# Patient Record
Sex: FEMALE | Race: WHITE | Employment: OTHER
[De-identification: names, ages, dates, MRNs, and addresses within clinical notes are randomized per-mention and may not be internally consistent; named-entity substitution may affect disease eponyms.]

---

## 2017-01-17 ENCOUNTER — CARE COORDINATION (OUTPATIENT)
Dept: CARE COORDINATION | Facility: CLINIC | Age: 77
End: 2017-01-17

## 2017-02-07 ENCOUNTER — CARE COORDINATION (OUTPATIENT)
Dept: CARE COORDINATION | Facility: CLINIC | Age: 77
End: 2017-02-07

## 2017-02-15 ENCOUNTER — HOSPITAL ENCOUNTER (OUTPATIENT)
Dept: PHARMACY | Age: 77
Setting detail: THERAPIES SERIES
Discharge: HOME | End: 2017-02-15
Payer: MEDICARE

## 2017-02-15 DIAGNOSIS — Z95.828 S/P IVC FILTER: ICD-10-CM

## 2017-02-15 DIAGNOSIS — I26.99 OTHER PULMONARY EMBOLISM WITHOUT ACUTE COR PULMONALE, UNSPECIFIED CHRONICITY (HCC): ICD-10-CM

## 2017-02-15 LAB
INR BLD: 2.1
PROTIME: 24.8 SECONDS

## 2017-02-15 PROCEDURE — 85610 PROTHROMBIN TIME: CPT

## 2017-02-15 PROCEDURE — G0463 HOSPITAL OUTPT CLINIC VISIT: HCPCS

## 2017-03-15 ENCOUNTER — HOSPITAL ENCOUNTER (OUTPATIENT)
Dept: PHARMACY | Age: 77
Setting detail: THERAPIES SERIES
Discharge: HOME | End: 2017-03-15
Payer: MEDICARE

## 2017-03-15 DIAGNOSIS — Z95.828 S/P IVC FILTER: ICD-10-CM

## 2017-03-15 DIAGNOSIS — I26.99 OTHER PULMONARY EMBOLISM WITHOUT ACUTE COR PULMONALE, UNSPECIFIED CHRONICITY (HCC): ICD-10-CM

## 2017-03-15 LAB
INR BLD: 3.8
PROTIME: 45.7 SECONDS

## 2017-03-15 PROCEDURE — G0463 HOSPITAL OUTPT CLINIC VISIT: HCPCS

## 2017-03-15 PROCEDURE — 85610 PROTHROMBIN TIME: CPT

## 2017-03-22 ENCOUNTER — TELEPHONE (OUTPATIENT)
Dept: PHARMACY | Age: 77
End: 2017-03-22

## 2017-03-29 ENCOUNTER — HOSPITAL ENCOUNTER (OUTPATIENT)
Dept: PHARMACY | Age: 77
Setting detail: THERAPIES SERIES
Discharge: HOME | End: 2017-03-29
Payer: MEDICARE

## 2017-03-29 DIAGNOSIS — Z95.828 S/P IVC FILTER: ICD-10-CM

## 2017-03-29 LAB
INR BLD: 2.1
PROTIME: 25.5 SECONDS

## 2017-03-29 PROCEDURE — 85610 PROTHROMBIN TIME: CPT

## 2017-03-29 PROCEDURE — G0463 HOSPITAL OUTPT CLINIC VISIT: HCPCS

## 2017-04-04 ENCOUNTER — OFFICE VISIT (OUTPATIENT)
Dept: FAMILY MEDICINE CLINIC | Age: 77
End: 2017-04-04
Payer: MEDICARE

## 2017-04-04 VITALS
RESPIRATION RATE: 16 BRPM | BODY MASS INDEX: 33.41 KG/M2 | SYSTOLIC BLOOD PRESSURE: 120 MMHG | WEIGHT: 200.8 LBS | DIASTOLIC BLOOD PRESSURE: 78 MMHG | HEART RATE: 62 BPM

## 2017-04-04 DIAGNOSIS — M54.2 POSTERIOR NECK PAIN: ICD-10-CM

## 2017-04-04 DIAGNOSIS — I10 ESSENTIAL HYPERTENSION: ICD-10-CM

## 2017-04-04 DIAGNOSIS — E78.2 MIXED HYPERLIPIDEMIA: ICD-10-CM

## 2017-04-04 DIAGNOSIS — E03.9 ACQUIRED HYPOTHYROIDISM: ICD-10-CM

## 2017-04-04 DIAGNOSIS — E11.9 TYPE 2 DIABETES MELLITUS WITHOUT COMPLICATION, WITHOUT LONG-TERM CURRENT USE OF INSULIN (HCC): Primary | ICD-10-CM

## 2017-04-04 PROCEDURE — 1090F PRES/ABSN URINE INCON ASSESS: CPT | Performed by: FAMILY MEDICINE

## 2017-04-04 PROCEDURE — 1123F ACP DISCUSS/DSCN MKR DOCD: CPT | Performed by: FAMILY MEDICINE

## 2017-04-04 PROCEDURE — G8417 CALC BMI ABV UP PARAM F/U: HCPCS | Performed by: FAMILY MEDICINE

## 2017-04-04 PROCEDURE — 99214 OFFICE O/P EST MOD 30 MIN: CPT | Performed by: FAMILY MEDICINE

## 2017-04-04 PROCEDURE — 4040F PNEUMOC VAC/ADMIN/RCVD: CPT | Performed by: FAMILY MEDICINE

## 2017-04-04 PROCEDURE — G8427 DOCREV CUR MEDS BY ELIG CLIN: HCPCS | Performed by: FAMILY MEDICINE

## 2017-04-04 PROCEDURE — 1036F TOBACCO NON-USER: CPT | Performed by: FAMILY MEDICINE

## 2017-04-04 PROCEDURE — G8400 PT W/DXA NO RESULTS DOC: HCPCS | Performed by: FAMILY MEDICINE

## 2017-04-04 RX ORDER — ACETAMINOPHEN AND CODEINE PHOSPHATE 300; 30 MG/1; MG/1
1 TABLET ORAL 3 TIMES DAILY PRN
Qty: 10 TABLET | Refills: 0 | Status: SHIPPED | OUTPATIENT
Start: 2017-04-04 | End: 2017-08-07

## 2017-04-04 RX ORDER — TIZANIDINE 2 MG/1
2 TABLET ORAL EVERY 6 HOURS PRN
Qty: 30 TABLET | Refills: 0 | Status: SHIPPED | OUTPATIENT
Start: 2017-04-04 | End: 2017-12-26 | Stop reason: ALTCHOICE

## 2017-04-04 ASSESSMENT — ENCOUNTER SYMPTOMS
ABDOMINAL PAIN: 0
CHEST TIGHTNESS: 0
BLOOD IN STOOL: 0
SHORTNESS OF BREATH: 0

## 2017-04-24 ENCOUNTER — CARE COORDINATION (OUTPATIENT)
Dept: CARE COORDINATION | Age: 77
End: 2017-04-24

## 2017-04-26 ENCOUNTER — HOSPITAL ENCOUNTER (OUTPATIENT)
Dept: PHARMACY | Age: 77
Setting detail: THERAPIES SERIES
Discharge: HOME | End: 2017-04-26
Payer: MEDICARE

## 2017-04-26 DIAGNOSIS — Z95.828 S/P IVC FILTER: ICD-10-CM

## 2017-04-26 LAB
INR BLD: 2.8
PROTIME: 33.8 SECONDS

## 2017-04-26 PROCEDURE — 85610 PROTHROMBIN TIME: CPT

## 2017-04-26 PROCEDURE — 99211 OFF/OP EST MAY X REQ PHY/QHP: CPT

## 2017-05-04 ENCOUNTER — HOSPITAL ENCOUNTER (OUTPATIENT)
Age: 77
Discharge: HOME | End: 2017-05-04
Payer: MEDICARE

## 2017-05-04 DIAGNOSIS — E78.2 MIXED HYPERLIPIDEMIA: ICD-10-CM

## 2017-05-04 DIAGNOSIS — E11.9 TYPE 2 DIABETES MELLITUS WITHOUT COMPLICATION, WITHOUT LONG-TERM CURRENT USE OF INSULIN (HCC): ICD-10-CM

## 2017-05-04 DIAGNOSIS — E03.9 ACQUIRED HYPOTHYROIDISM: ICD-10-CM

## 2017-05-04 DIAGNOSIS — I10 ESSENTIAL HYPERTENSION: ICD-10-CM

## 2017-05-04 LAB
ALT SERPL-CCNC: 10 U/L (ref 5–33)
ANION GAP SERPL CALCULATED.3IONS-SCNC: 12 MMOL/L (ref 9–17)
AST SERPL-CCNC: 21 U/L
BUN BLDV-MCNC: 9 MG/DL (ref 8–23)
BUN/CREAT BLD: ABNORMAL (ref 9–20)
CALCIUM SERPL-MCNC: 9.3 MG/DL (ref 8.6–10.4)
CHLORIDE BLD-SCNC: 101 MMOL/L (ref 98–107)
CHOLESTEROL/HDL RATIO: 2.6
CHOLESTEROL: 153 MG/DL
CO2: 26 MMOL/L (ref 20–31)
CREAT SERPL-MCNC: 0.68 MG/DL (ref 0.5–0.9)
CREATININE URINE: 42.1 MG/DL (ref 28–217)
GFR AFRICAN AMERICAN: >60 ML/MIN
GFR NON-AFRICAN AMERICAN: >60 ML/MIN
GFR SERPL CREATININE-BSD FRML MDRD: ABNORMAL ML/MIN/{1.73_M2}
GFR SERPL CREATININE-BSD FRML MDRD: ABNORMAL ML/MIN/{1.73_M2}
GLUCOSE BLD-MCNC: 109 MG/DL (ref 70–99)
HDLC SERPL-MCNC: 58 MG/DL
LDL CHOLESTEROL: 61 MG/DL (ref 0–130)
MAGNESIUM: 2.1 MG/DL (ref 1.6–2.6)
MICROALBUMIN/CREAT 24H UR: <12 MG/L
MICROALBUMIN/CREAT UR-RTO: 29 MCG/MG CREAT
POTASSIUM SERPL-SCNC: 4.1 MMOL/L (ref 3.7–5.3)
SODIUM BLD-SCNC: 139 MMOL/L (ref 135–144)
TRIGL SERPL-MCNC: 171 MG/DL
TSH SERPL DL<=0.05 MIU/L-ACNC: 1.9 MIU/L (ref 0.3–5)
VLDLC SERPL CALC-MCNC: ABNORMAL MG/DL (ref 1–30)

## 2017-05-04 PROCEDURE — 82043 UR ALBUMIN QUANTITATIVE: CPT

## 2017-05-04 PROCEDURE — 84450 TRANSFERASE (AST) (SGOT): CPT

## 2017-05-04 PROCEDURE — 36415 COLL VENOUS BLD VENIPUNCTURE: CPT

## 2017-05-04 PROCEDURE — 84443 ASSAY THYROID STIM HORMONE: CPT

## 2017-05-04 PROCEDURE — 83735 ASSAY OF MAGNESIUM: CPT

## 2017-05-04 PROCEDURE — 82570 ASSAY OF URINE CREATININE: CPT

## 2017-05-04 PROCEDURE — 80061 LIPID PANEL: CPT

## 2017-05-04 PROCEDURE — 84460 ALANINE AMINO (ALT) (SGPT): CPT

## 2017-05-04 PROCEDURE — 83036 HEMOGLOBIN GLYCOSYLATED A1C: CPT

## 2017-05-04 PROCEDURE — 80048 BASIC METABOLIC PNL TOTAL CA: CPT

## 2017-05-05 LAB
ESTIMATED AVERAGE GLUCOSE: 100 MG/DL
HBA1C MFR BLD: 5.1 % (ref 4–6)

## 2017-05-24 ENCOUNTER — HOSPITAL ENCOUNTER (OUTPATIENT)
Dept: PHARMACY | Age: 77
Setting detail: THERAPIES SERIES
Discharge: HOME | End: 2017-05-24
Payer: MEDICARE

## 2017-05-24 DIAGNOSIS — Z95.828 S/P IVC FILTER: ICD-10-CM

## 2017-05-24 LAB
INR BLD: 2.9
PROTIME: 35.1 SECONDS

## 2017-05-24 PROCEDURE — 85610 PROTHROMBIN TIME: CPT

## 2017-05-24 PROCEDURE — 99211 OFF/OP EST MAY X REQ PHY/QHP: CPT

## 2017-06-21 ENCOUNTER — HOSPITAL ENCOUNTER (OUTPATIENT)
Dept: PHARMACY | Age: 77
Setting detail: THERAPIES SERIES
Discharge: HOME | End: 2017-06-21
Payer: MEDICARE

## 2017-06-21 LAB
INR BLD: 3.1
PROTIME: 37.2 SECONDS

## 2017-06-21 PROCEDURE — 85610 PROTHROMBIN TIME: CPT

## 2017-06-21 PROCEDURE — 99211 OFF/OP EST MAY X REQ PHY/QHP: CPT

## 2017-07-14 ENCOUNTER — CARE COORDINATION (OUTPATIENT)
Dept: CARE COORDINATION | Age: 77
End: 2017-07-14

## 2017-07-14 ENCOUNTER — HOSPITAL ENCOUNTER (OUTPATIENT)
Dept: PHARMACY | Age: 77
Setting detail: THERAPIES SERIES
Discharge: HOME | End: 2017-07-14
Payer: MEDICARE

## 2017-07-14 DIAGNOSIS — Z95.828 S/P IVC FILTER: ICD-10-CM

## 2017-07-14 LAB
INR BLD: 1.5
PROTIME: 18.3 SECONDS

## 2017-07-14 PROCEDURE — 99211 OFF/OP EST MAY X REQ PHY/QHP: CPT

## 2017-07-14 PROCEDURE — 85610 PROTHROMBIN TIME: CPT

## 2017-07-20 ENCOUNTER — HOSPITAL ENCOUNTER (OUTPATIENT)
Dept: PHARMACY | Age: 77
Discharge: HOME | End: 2017-07-20

## 2017-07-20 DIAGNOSIS — I26.99 OTHER PULMONARY EMBOLISM WITHOUT ACUTE COR PULMONALE, UNSPECIFIED CHRONICITY (HCC): ICD-10-CM

## 2017-07-20 DIAGNOSIS — Z95.828 S/P IVC FILTER: ICD-10-CM

## 2017-07-20 LAB
INR BLD: 3.3
PROTIME: 39.1 SECONDS

## 2017-07-20 PROCEDURE — 85610 PROTHROMBIN TIME: CPT

## 2017-07-20 PROCEDURE — 99211 OFF/OP EST MAY X REQ PHY/QHP: CPT

## 2017-08-02 ENCOUNTER — HOSPITAL ENCOUNTER (OUTPATIENT)
Dept: PHARMACY | Age: 77
Setting detail: THERAPIES SERIES
Discharge: HOME | End: 2017-08-02
Payer: MEDICARE

## 2017-08-02 DIAGNOSIS — I26.99 OTHER PULMONARY EMBOLISM WITHOUT ACUTE COR PULMONALE, UNSPECIFIED CHRONICITY (HCC): ICD-10-CM

## 2017-08-02 DIAGNOSIS — Z95.828 S/P IVC FILTER: ICD-10-CM

## 2017-08-02 LAB
INR BLD: 2.8
PROTIME: 33.7 SECONDS

## 2017-08-02 PROCEDURE — 99211 OFF/OP EST MAY X REQ PHY/QHP: CPT

## 2017-08-02 PROCEDURE — 85610 PROTHROMBIN TIME: CPT

## 2017-08-02 RX ORDER — BRIMONIDINE TARTRATE/TIMOLOL 0.2%-0.5%
1 DROPS OPHTHALMIC (EYE) 2 TIMES DAILY
Refills: 9 | COMMUNITY
Start: 2017-07-07 | End: 2021-01-12 | Stop reason: ALTCHOICE

## 2017-08-07 ENCOUNTER — OFFICE VISIT (OUTPATIENT)
Dept: FAMILY MEDICINE CLINIC | Age: 77
End: 2017-08-07
Payer: MEDICARE

## 2017-08-07 VITALS
WEIGHT: 206.4 LBS | BODY MASS INDEX: 34.39 KG/M2 | SYSTOLIC BLOOD PRESSURE: 120 MMHG | RESPIRATION RATE: 16 BRPM | HEIGHT: 65 IN | HEART RATE: 70 BPM | DIASTOLIC BLOOD PRESSURE: 80 MMHG

## 2017-08-07 DIAGNOSIS — I10 ESSENTIAL HYPERTENSION: ICD-10-CM

## 2017-08-07 DIAGNOSIS — E78.2 MIXED HYPERLIPIDEMIA: ICD-10-CM

## 2017-08-07 DIAGNOSIS — E11.9 TYPE 2 DIABETES MELLITUS WITHOUT COMPLICATION, WITHOUT LONG-TERM CURRENT USE OF INSULIN (HCC): Primary | ICD-10-CM

## 2017-08-07 DIAGNOSIS — E03.9 ACQUIRED HYPOTHYROIDISM: ICD-10-CM

## 2017-08-07 DIAGNOSIS — E66.9 OBESITY (BMI 30.0-34.9): ICD-10-CM

## 2017-08-07 PROCEDURE — G8427 DOCREV CUR MEDS BY ELIG CLIN: HCPCS | Performed by: FAMILY MEDICINE

## 2017-08-07 PROCEDURE — G8417 CALC BMI ABV UP PARAM F/U: HCPCS | Performed by: FAMILY MEDICINE

## 2017-08-07 PROCEDURE — 4040F PNEUMOC VAC/ADMIN/RCVD: CPT | Performed by: FAMILY MEDICINE

## 2017-08-07 PROCEDURE — 1123F ACP DISCUSS/DSCN MKR DOCD: CPT | Performed by: FAMILY MEDICINE

## 2017-08-07 PROCEDURE — 99214 OFFICE O/P EST MOD 30 MIN: CPT | Performed by: FAMILY MEDICINE

## 2017-08-07 PROCEDURE — 1036F TOBACCO NON-USER: CPT | Performed by: FAMILY MEDICINE

## 2017-08-07 PROCEDURE — 1090F PRES/ABSN URINE INCON ASSESS: CPT | Performed by: FAMILY MEDICINE

## 2017-08-07 PROCEDURE — G8400 PT W/DXA NO RESULTS DOC: HCPCS | Performed by: FAMILY MEDICINE

## 2017-08-07 ASSESSMENT — PATIENT HEALTH QUESTIONNAIRE - PHQ9
1. LITTLE INTEREST OR PLEASURE IN DOING THINGS: 0
SUM OF ALL RESPONSES TO PHQ QUESTIONS 1-9: 0
2. FEELING DOWN, DEPRESSED OR HOPELESS: 0
SUM OF ALL RESPONSES TO PHQ9 QUESTIONS 1 & 2: 0

## 2017-08-07 ASSESSMENT — ENCOUNTER SYMPTOMS
BLOOD IN STOOL: 0
CHEST TIGHTNESS: 0
ABDOMINAL PAIN: 0
SHORTNESS OF BREATH: 0

## 2017-08-10 ENCOUNTER — HOSPITAL ENCOUNTER (OUTPATIENT)
Age: 77
Setting detail: SPECIMEN
Discharge: HOME | End: 2017-08-10
Payer: MEDICARE

## 2017-08-10 ENCOUNTER — OFFICE VISIT (OUTPATIENT)
Dept: FAMILY MEDICINE CLINIC | Age: 77
End: 2017-08-10
Payer: MEDICARE

## 2017-08-10 VITALS
HEART RATE: 60 BPM | WEIGHT: 206 LBS | BODY MASS INDEX: 34.28 KG/M2 | SYSTOLIC BLOOD PRESSURE: 126 MMHG | DIASTOLIC BLOOD PRESSURE: 74 MMHG | RESPIRATION RATE: 16 BRPM

## 2017-08-10 DIAGNOSIS — R30.0 DYSURIA: Primary | ICD-10-CM

## 2017-08-10 DIAGNOSIS — R10.30 LOWER ABDOMINAL PAIN: ICD-10-CM

## 2017-08-10 LAB
-: NORMAL
AMORPHOUS: NORMAL
BACTERIA: NORMAL
BILIRUBIN URINE: NEGATIVE
BILIRUBIN, POC: NEGATIVE
BLOOD URINE, POC: NEGATIVE
CASTS UA: NORMAL /LPF (ref 0–8)
CLARITY, POC: CLEAR
COLOR, POC: YELLOW
COLOR: YELLOW
CRYSTALS, UA: NORMAL /HPF
EPITHELIAL CELLS UA: NORMAL /HPF (ref 0–5)
GLUCOSE URINE, POC: NEGATIVE
GLUCOSE URINE: NEGATIVE
KETONES, POC: NEGATIVE
KETONES, URINE: NEGATIVE
LEUKOCYTE EST, POC: NEGATIVE
LEUKOCYTE ESTERASE, URINE: NEGATIVE
MUCUS: NORMAL
NITRITE, POC: NEGATIVE
NITRITE, URINE: NEGATIVE
OTHER OBSERVATIONS UA: NORMAL
PH UA: 5 (ref 5–8)
PH, POC: 5
PROTEIN UA: NEGATIVE
PROTEIN, POC: NEGATIVE
RBC UA: NORMAL /HPF (ref 0–4)
RENAL EPITHELIAL, UA: NORMAL /HPF
SPECIFIC GRAVITY UA: 1.02 (ref 1–1.03)
SPECIFIC GRAVITY, POC: 1.01
TRICHOMONAS: NORMAL
TURBIDITY: CLEAR
URINE HGB: NEGATIVE
UROBILINOGEN, POC: NEGATIVE
UROBILINOGEN, URINE: NORMAL
WBC UA: NORMAL /HPF (ref 0–5)
YEAST: NORMAL

## 2017-08-10 PROCEDURE — 99213 OFFICE O/P EST LOW 20 MIN: CPT | Performed by: FAMILY MEDICINE

## 2017-08-10 PROCEDURE — G8400 PT W/DXA NO RESULTS DOC: HCPCS | Performed by: FAMILY MEDICINE

## 2017-08-10 PROCEDURE — 1123F ACP DISCUSS/DSCN MKR DOCD: CPT | Performed by: FAMILY MEDICINE

## 2017-08-10 PROCEDURE — G8427 DOCREV CUR MEDS BY ELIG CLIN: HCPCS | Performed by: FAMILY MEDICINE

## 2017-08-10 PROCEDURE — G8417 CALC BMI ABV UP PARAM F/U: HCPCS | Performed by: FAMILY MEDICINE

## 2017-08-10 PROCEDURE — 81002 URINALYSIS NONAUTO W/O SCOPE: CPT | Performed by: FAMILY MEDICINE

## 2017-08-10 PROCEDURE — 4040F PNEUMOC VAC/ADMIN/RCVD: CPT | Performed by: FAMILY MEDICINE

## 2017-08-10 PROCEDURE — 1090F PRES/ABSN URINE INCON ASSESS: CPT | Performed by: FAMILY MEDICINE

## 2017-08-10 PROCEDURE — 1036F TOBACCO NON-USER: CPT | Performed by: FAMILY MEDICINE

## 2017-08-10 RX ORDER — NITROFURANTOIN 25; 75 MG/1; MG/1
100 CAPSULE ORAL 2 TIMES DAILY
Qty: 20 CAPSULE | Refills: 0 | Status: SHIPPED | OUTPATIENT
Start: 2017-08-10 | End: 2017-08-20

## 2017-08-10 ASSESSMENT — ENCOUNTER SYMPTOMS
SHORTNESS OF BREATH: 0
CHEST TIGHTNESS: 0
ABDOMINAL PAIN: 1
BLOOD IN STOOL: 0

## 2017-08-12 LAB
CULTURE: NORMAL
CULTURE: NORMAL
Lab: NORMAL
SPECIMEN DESCRIPTION: NORMAL
STATUS: NORMAL

## 2017-08-30 ENCOUNTER — HOSPITAL ENCOUNTER (OUTPATIENT)
Dept: PHARMACY | Age: 77
Setting detail: THERAPIES SERIES
Discharge: HOME | End: 2017-08-30
Payer: MEDICARE

## 2017-08-30 DIAGNOSIS — Z95.828 S/P IVC FILTER: ICD-10-CM

## 2017-08-30 DIAGNOSIS — I26.99 OTHER PULMONARY EMBOLISM WITHOUT ACUTE COR PULMONALE, UNSPECIFIED CHRONICITY (HCC): ICD-10-CM

## 2017-08-30 LAB
INR BLD: 4.1
PROTIME: 49 SECONDS

## 2017-08-30 PROCEDURE — 85610 PROTHROMBIN TIME: CPT

## 2017-08-30 PROCEDURE — 99211 OFF/OP EST MAY X REQ PHY/QHP: CPT

## 2017-09-05 ENCOUNTER — HOSPITAL ENCOUNTER (OUTPATIENT)
Dept: PHARMACY | Age: 77
Setting detail: THERAPIES SERIES
Discharge: HOME | End: 2017-09-05
Payer: MEDICARE

## 2017-09-05 DIAGNOSIS — I26.99 OTHER PULMONARY EMBOLISM WITHOUT ACUTE COR PULMONALE, UNSPECIFIED CHRONICITY (HCC): ICD-10-CM

## 2017-09-05 DIAGNOSIS — Z95.828 S/P IVC FILTER: ICD-10-CM

## 2017-09-05 LAB
INR BLD: 2.4
PROTIME: 28.2 SECONDS

## 2017-09-05 PROCEDURE — 85610 PROTHROMBIN TIME: CPT

## 2017-09-05 PROCEDURE — 99211 OFF/OP EST MAY X REQ PHY/QHP: CPT

## 2017-09-12 ENCOUNTER — CARE COORDINATION (OUTPATIENT)
Dept: CARE COORDINATION | Age: 77
End: 2017-09-12

## 2017-09-20 ENCOUNTER — HOSPITAL ENCOUNTER (OUTPATIENT)
Dept: PHARMACY | Age: 77
Setting detail: THERAPIES SERIES
Discharge: HOME | End: 2017-09-20
Payer: MEDICARE

## 2017-09-20 DIAGNOSIS — I26.99 OTHER PULMONARY EMBOLISM WITHOUT ACUTE COR PULMONALE, UNSPECIFIED CHRONICITY (HCC): ICD-10-CM

## 2017-09-20 DIAGNOSIS — Z95.828 S/P IVC FILTER: ICD-10-CM

## 2017-09-20 LAB
INR BLD: 2.7
PROTIME: 32.8 SECONDS

## 2017-09-20 PROCEDURE — 85610 PROTHROMBIN TIME: CPT

## 2017-09-20 PROCEDURE — 99211 OFF/OP EST MAY X REQ PHY/QHP: CPT

## 2017-10-12 ENCOUNTER — IMMUNIZATION (OUTPATIENT)
Dept: FAMILY MEDICINE CLINIC | Age: 77
End: 2017-10-12
Payer: MEDICARE

## 2017-10-12 VITALS — TEMPERATURE: 97.7 F

## 2017-10-12 DIAGNOSIS — Z23 NEEDS FLU SHOT: Primary | ICD-10-CM

## 2017-10-12 PROCEDURE — 90686 IIV4 VACC NO PRSV 0.5 ML IM: CPT | Performed by: FAMILY MEDICINE

## 2017-10-12 PROCEDURE — G0008 ADMIN INFLUENZA VIRUS VAC: HCPCS | Performed by: FAMILY MEDICINE

## 2017-10-18 ENCOUNTER — HOSPITAL ENCOUNTER (OUTPATIENT)
Dept: PHARMACY | Age: 77
Setting detail: THERAPIES SERIES
Discharge: HOME | End: 2017-10-18
Payer: MEDICARE

## 2017-10-18 DIAGNOSIS — Z95.828 S/P IVC FILTER: ICD-10-CM

## 2017-10-18 DIAGNOSIS — I26.99 OTHER PULMONARY EMBOLISM WITHOUT ACUTE COR PULMONALE, UNSPECIFIED CHRONICITY (HCC): ICD-10-CM

## 2017-10-18 LAB
INR BLD: 3.4
PROTIME: 40.7 SECONDS

## 2017-10-18 PROCEDURE — 99211 OFF/OP EST MAY X REQ PHY/QHP: CPT

## 2017-10-18 PROCEDURE — 85610 PROTHROMBIN TIME: CPT

## 2017-11-01 ENCOUNTER — HOSPITAL ENCOUNTER (OUTPATIENT)
Dept: PHARMACY | Age: 77
Setting detail: THERAPIES SERIES
Discharge: HOME | End: 2017-11-01
Payer: MEDICARE

## 2017-11-01 DIAGNOSIS — I26.99 OTHER PULMONARY EMBOLISM WITHOUT ACUTE COR PULMONALE, UNSPECIFIED CHRONICITY (HCC): ICD-10-CM

## 2017-11-01 DIAGNOSIS — Z95.828 S/P IVC FILTER: ICD-10-CM

## 2017-11-01 LAB
INR BLD: 3.4
PROTIME: 40.6 SECONDS

## 2017-11-01 PROCEDURE — 99211 OFF/OP EST MAY X REQ PHY/QHP: CPT

## 2017-11-01 PROCEDURE — 85610 PROTHROMBIN TIME: CPT

## 2017-11-01 NOTE — PROGRESS NOTES
Patient states compliant all of the time with regimen. No bleeding or thromboembolic side effects noted. No significant med or dietary changes. No significant recent illness or disease state changes other than continued stress with  in nursing home. PT/INR done in office per protocol. INR was supratherapeutic at 3.4 (goal 2-3)    Warfarin regimen will be held for today then lowered to 2.5mg on Friday and 5mg 6x weekly. Will retest in 2 weeks. Patient understands dosing directions and information discussed. Dosing schedule and follow up appointment given to patient. Progress note routed to referring physicians office.

## 2017-11-15 ENCOUNTER — HOSPITAL ENCOUNTER (OUTPATIENT)
Dept: PHARMACY | Age: 77
Setting detail: THERAPIES SERIES
Discharge: HOME | End: 2017-11-15
Payer: MEDICARE

## 2017-11-15 DIAGNOSIS — Z95.828 S/P IVC FILTER: ICD-10-CM

## 2017-11-15 DIAGNOSIS — I26.99 OTHER PULMONARY EMBOLISM WITHOUT ACUTE COR PULMONALE, UNSPECIFIED CHRONICITY (HCC): ICD-10-CM

## 2017-11-15 LAB
INR BLD: 2.9
PROTIME: 35.3 SECONDS

## 2017-11-15 PROCEDURE — 85610 PROTHROMBIN TIME: CPT

## 2017-11-15 PROCEDURE — 99211 OFF/OP EST MAY X REQ PHY/QHP: CPT

## 2017-11-15 NOTE — PROGRESS NOTES
Patient states compliant all of the time with regimen. No bleeding or thromboembolic side effects noted. No significant med or dietary changes. No significant recent illness or disease state changes. PT/INR done in office per protocol. INR was therapeutic at 2.9 (goal 2-3). Warfarin regimen will be continued at current dose of 2.5mg on Friday and 5mg 6x weekly. Will retest in 4 weeks. Patient understands dosing directions and information discussed. Dosing schedule and follow up appointment given to patient. Progress note routed to referring physicians office.

## 2017-12-11 RX ORDER — ATENOLOL 100 MG/1
TABLET ORAL
Qty: 90 TABLET | Refills: 1 | Status: SHIPPED | OUTPATIENT
Start: 2017-12-11 | End: 2018-04-12 | Stop reason: SDUPTHER

## 2017-12-13 ENCOUNTER — HOSPITAL ENCOUNTER (OUTPATIENT)
Dept: PHARMACY | Age: 77
Setting detail: THERAPIES SERIES
Discharge: HOME | End: 2017-12-13
Payer: MEDICARE

## 2017-12-13 DIAGNOSIS — Z95.828 S/P IVC FILTER: ICD-10-CM

## 2017-12-13 DIAGNOSIS — I26.99 OTHER PULMONARY EMBOLISM WITHOUT ACUTE COR PULMONALE, UNSPECIFIED CHRONICITY (HCC): ICD-10-CM

## 2017-12-13 PROCEDURE — 85610 PROTHROMBIN TIME: CPT

## 2017-12-13 PROCEDURE — 99211 OFF/OP EST MAY X REQ PHY/QHP: CPT

## 2017-12-26 ENCOUNTER — OFFICE VISIT (OUTPATIENT)
Dept: FAMILY MEDICINE CLINIC | Age: 77
End: 2017-12-26
Payer: MEDICARE

## 2017-12-26 ENCOUNTER — HOSPITAL ENCOUNTER (EMERGENCY)
Age: 77
Discharge: HOME | End: 2017-12-26
Attending: EMERGENCY MEDICINE
Payer: MEDICARE

## 2017-12-26 ENCOUNTER — APPOINTMENT (OUTPATIENT)
Dept: GENERAL RADIOLOGY | Age: 77
End: 2017-12-26
Payer: MEDICARE

## 2017-12-26 VITALS
OXYGEN SATURATION: 95 % | TEMPERATURE: 98.3 F | WEIGHT: 204 LBS | SYSTOLIC BLOOD PRESSURE: 131 MMHG | HEART RATE: 72 BPM | RESPIRATION RATE: 12 BRPM | DIASTOLIC BLOOD PRESSURE: 63 MMHG | HEIGHT: 65 IN | BODY MASS INDEX: 33.99 KG/M2

## 2017-12-26 VITALS
TEMPERATURE: 98.7 F | RESPIRATION RATE: 16 BRPM | WEIGHT: 205 LBS | HEART RATE: 76 BPM | HEIGHT: 60 IN | OXYGEN SATURATION: 94 % | SYSTOLIC BLOOD PRESSURE: 90 MMHG | DIASTOLIC BLOOD PRESSURE: 60 MMHG | BODY MASS INDEX: 40.25 KG/M2

## 2017-12-26 DIAGNOSIS — N30.01 ACUTE CYSTITIS WITH HEMATURIA: Primary | ICD-10-CM

## 2017-12-26 DIAGNOSIS — J06.9 VIRAL URI WITH COUGH: ICD-10-CM

## 2017-12-26 DIAGNOSIS — R05.9 COUGH: Primary | ICD-10-CM

## 2017-12-26 LAB
-: ABNORMAL
ABSOLUTE EOS #: 0.03 K/UL (ref 0–0.4)
ABSOLUTE IMMATURE GRANULOCYTE: ABNORMAL K/UL (ref 0–0.3)
ABSOLUTE LYMPH #: 0.86 K/UL (ref 1–4.8)
ABSOLUTE MONO #: 0.43 K/UL (ref 0.1–1.3)
AMORPHOUS: ABNORMAL
ANION GAP SERPL CALCULATED.3IONS-SCNC: 13 MMOL/L (ref 9–17)
ATYPICAL LYMPHOCYTE ABSOLUTE COUNT: 0.05 K/UL
ATYPICAL LYMPHOCYTES: 2 %
BACTERIA: ABNORMAL
BASOPHILS # BLD: 2 % (ref 0–2)
BASOPHILS ABSOLUTE: 0.05 K/UL (ref 0–0.2)
BILIRUBIN URINE: NEGATIVE
BNP INTERPRETATION: NORMAL
BUN BLDV-MCNC: 15 MG/DL (ref 8–23)
BUN/CREAT BLD: ABNORMAL (ref 9–20)
CALCIUM SERPL-MCNC: 8.5 MG/DL (ref 8.6–10.4)
CASTS UA: ABNORMAL /LPF
CHLORIDE BLD-SCNC: 99 MMOL/L (ref 98–107)
CO2: 23 MMOL/L (ref 20–31)
COLOR: YELLOW
COMMENT UA: ABNORMAL
CREAT SERPL-MCNC: 0.75 MG/DL (ref 0.5–0.9)
CRYSTALS, UA: ABNORMAL /HPF
DIFFERENTIAL TYPE: ABNORMAL
EKG ATRIAL RATE: 74 BPM
EKG P AXIS: 79 DEGREES
EKG P-R INTERVAL: 194 MS
EKG Q-T INTERVAL: 412 MS
EKG QRS DURATION: 122 MS
EKG QTC CALCULATION (BAZETT): 457 MS
EKG R AXIS: 73 DEGREES
EKG T AXIS: 32 DEGREES
EKG VENTRICULAR RATE: 74 BPM
EOSINOPHILS RELATIVE PERCENT: 1 % (ref 0–4)
EPITHELIAL CELLS UA: ABNORMAL /HPF
GFR AFRICAN AMERICAN: >60 ML/MIN
GFR NON-AFRICAN AMERICAN: >60 ML/MIN
GFR SERPL CREATININE-BSD FRML MDRD: ABNORMAL ML/MIN/{1.73_M2}
GFR SERPL CREATININE-BSD FRML MDRD: ABNORMAL ML/MIN/{1.73_M2}
GLUCOSE BLD-MCNC: 102 MG/DL (ref 70–99)
GLUCOSE URINE: NEGATIVE
HCT VFR BLD CALC: 38.6 % (ref 36–46)
HEMOGLOBIN: 13 G/DL (ref 12–16)
IMMATURE GRANULOCYTES: ABNORMAL %
INR BLD: 2.4
KETONES, URINE: NEGATIVE
LEUKOCYTE ESTERASE, URINE: ABNORMAL
LYMPHOCYTES # BLD: 32 % (ref 24–44)
MCH RBC QN AUTO: 32.6 PG (ref 26–34)
MCHC RBC AUTO-ENTMCNC: 33.6 G/DL (ref 31–37)
MCV RBC AUTO: 97.2 FL (ref 80–100)
MONOCYTES # BLD: 16 % (ref 1–7)
MORPHOLOGY: NORMAL
MUCUS: ABNORMAL
NITRITE, URINE: POSITIVE
OTHER OBSERVATIONS UA: ABNORMAL
PARTIAL THROMBOPLASTIN TIME: 37.8 SEC (ref 23–31)
PDW BLD-RTO: 14.1 % (ref 11.5–14.9)
PH UA: 5.5 (ref 5–8)
PLATELET # BLD: 108 K/UL (ref 150–450)
PLATELET ESTIMATE: ABNORMAL
PMV BLD AUTO: 9.8 FL (ref 6–12)
POTASSIUM SERPL-SCNC: 3.7 MMOL/L (ref 3.7–5.3)
PRO-BNP: 152 PG/ML
PROTEIN UA: ABNORMAL
PROTHROMBIN TIME: 27.4 SEC (ref 9.7–12)
RBC # BLD: 3.97 M/UL (ref 4–5.2)
RBC # BLD: ABNORMAL 10*6/UL
RBC UA: ABNORMAL /HPF
RENAL EPITHELIAL, UA: ABNORMAL /HPF
SEG NEUTROPHILS: 47 % (ref 36–66)
SEGMENTED NEUTROPHILS ABSOLUTE COUNT: 1.28 K/UL (ref 1.3–9.1)
SODIUM BLD-SCNC: 135 MMOL/L (ref 135–144)
SPECIFIC GRAVITY UA: 1.02 (ref 1–1.03)
TRICHOMONAS: ABNORMAL
TROPONIN INTERP: NORMAL
TROPONIN T: <0.03 NG/ML
TURBIDITY: ABNORMAL
URINE HGB: NEGATIVE
UROBILINOGEN, URINE: NORMAL
WBC # BLD: 2.7 K/UL (ref 3.5–11)
WBC # BLD: ABNORMAL 10*3/UL
WBC UA: ABNORMAL /HPF
YEAST: ABNORMAL

## 2017-12-26 PROCEDURE — 83880 ASSAY OF NATRIURETIC PEPTIDE: CPT

## 2017-12-26 PROCEDURE — 36415 COLL VENOUS BLD VENIPUNCTURE: CPT

## 2017-12-26 PROCEDURE — 85730 THROMBOPLASTIN TIME PARTIAL: CPT

## 2017-12-26 PROCEDURE — 85610 PROTHROMBIN TIME: CPT

## 2017-12-26 PROCEDURE — 1036F TOBACCO NON-USER: CPT | Performed by: FAMILY MEDICINE

## 2017-12-26 PROCEDURE — 85025 COMPLETE CBC W/AUTO DIFF WBC: CPT

## 2017-12-26 PROCEDURE — 81001 URINALYSIS AUTO W/SCOPE: CPT

## 2017-12-26 PROCEDURE — 6370000000 HC RX 637 (ALT 250 FOR IP): Performed by: EMERGENCY MEDICINE

## 2017-12-26 PROCEDURE — 99284 EMERGENCY DEPT VISIT MOD MDM: CPT

## 2017-12-26 PROCEDURE — 4040F PNEUMOC VAC/ADMIN/RCVD: CPT | Performed by: FAMILY MEDICINE

## 2017-12-26 PROCEDURE — 87088 URINE BACTERIA CULTURE: CPT

## 2017-12-26 PROCEDURE — 93005 ELECTROCARDIOGRAM TRACING: CPT

## 2017-12-26 PROCEDURE — 1090F PRES/ABSN URINE INCON ASSESS: CPT | Performed by: FAMILY MEDICINE

## 2017-12-26 PROCEDURE — G8400 PT W/DXA NO RESULTS DOC: HCPCS | Performed by: FAMILY MEDICINE

## 2017-12-26 PROCEDURE — 99213 OFFICE O/P EST LOW 20 MIN: CPT | Performed by: FAMILY MEDICINE

## 2017-12-26 PROCEDURE — 87186 SC STD MICRODIL/AGAR DIL: CPT

## 2017-12-26 PROCEDURE — 71020 XR CHEST STANDARD TWO VW: CPT

## 2017-12-26 PROCEDURE — 84484 ASSAY OF TROPONIN QUANT: CPT

## 2017-12-26 PROCEDURE — 1123F ACP DISCUSS/DSCN MKR DOCD: CPT | Performed by: FAMILY MEDICINE

## 2017-12-26 PROCEDURE — G8484 FLU IMMUNIZE NO ADMIN: HCPCS | Performed by: FAMILY MEDICINE

## 2017-12-26 PROCEDURE — 80048 BASIC METABOLIC PNL TOTAL CA: CPT

## 2017-12-26 PROCEDURE — G8427 DOCREV CUR MEDS BY ELIG CLIN: HCPCS | Performed by: FAMILY MEDICINE

## 2017-12-26 PROCEDURE — G8417 CALC BMI ABV UP PARAM F/U: HCPCS | Performed by: FAMILY MEDICINE

## 2017-12-26 PROCEDURE — 87086 URINE CULTURE/COLONY COUNT: CPT

## 2017-12-26 RX ORDER — CEPHALEXIN 250 MG/1
500 CAPSULE ORAL ONCE
Status: COMPLETED | OUTPATIENT
Start: 2017-12-26 | End: 2017-12-26

## 2017-12-26 RX ORDER — BENZONATATE 100 MG/1
100 CAPSULE ORAL ONCE
Status: COMPLETED | OUTPATIENT
Start: 2017-12-26 | End: 2017-12-26

## 2017-12-26 RX ORDER — 0.9 % SODIUM CHLORIDE 0.9 %
1000 INTRAVENOUS SOLUTION INTRAVENOUS ONCE
Status: DISCONTINUED | OUTPATIENT
Start: 2017-12-26 | End: 2017-12-26 | Stop reason: HOSPADM

## 2017-12-26 RX ORDER — CEPHALEXIN 500 MG/1
500 CAPSULE ORAL 4 TIMES DAILY
Qty: 28 CAPSULE | Refills: 0 | Status: SHIPPED | OUTPATIENT
Start: 2017-12-26 | End: 2018-01-02

## 2017-12-26 RX ORDER — BENZONATATE 100 MG/1
100 CAPSULE ORAL 3 TIMES DAILY PRN
Qty: 30 CAPSULE | Refills: 0 | Status: SHIPPED | OUTPATIENT
Start: 2017-12-26 | End: 2018-01-02

## 2017-12-26 RX ADMIN — BENZONATATE 100 MG: 100 CAPSULE ORAL at 19:32

## 2017-12-26 RX ADMIN — CEPHALEXIN 500 MG: 250 CAPSULE ORAL at 20:23

## 2017-12-26 ASSESSMENT — ENCOUNTER SYMPTOMS
EYE DISCHARGE: 0
TROUBLE SWALLOWING: 0
COUGH: 1
SINUS PAIN: 1
EYE PAIN: 0
RHINORRHEA: 1
SORE THROAT: 1
BACK PAIN: 1
NAUSEA: 0
COLOR CHANGE: 0
NAUSEA: 1
DIARRHEA: 0
VOICE CHANGE: 0
VOMITING: 0
ABDOMINAL PAIN: 0
CHEST TIGHTNESS: 1
ABDOMINAL PAIN: 0
WHEEZING: 1
EYE ITCHING: 0
VOMITING: 0
CHEST TIGHTNESS: 1
EYE REDNESS: 0
CONSTIPATION: 0
SHORTNESS OF BREATH: 0
RHINORRHEA: 0
SORE THROAT: 0
HEMOPTYSIS: 0
SHORTNESS OF BREATH: 1
SINUS PRESSURE: 1
COUGH: 1

## 2017-12-26 ASSESSMENT — PAIN DESCRIPTION - DESCRIPTORS: DESCRIPTORS: SQUEEZING

## 2017-12-26 ASSESSMENT — PAIN SCALES - GENERAL: PAINLEVEL_OUTOF10: 7

## 2017-12-26 ASSESSMENT — PAIN DESCRIPTION - PAIN TYPE: TYPE: ACUTE PAIN

## 2017-12-26 ASSESSMENT — PAIN DESCRIPTION - LOCATION: LOCATION: CHEST;RIB CAGE

## 2017-12-26 NOTE — ED PROVIDER NOTES
[address]  eMERGENCY dEPARTMENT eNCOUnter   Attending Attestation     Pt Name: Sher Pearl  MRN: [id number]  [place] 1940  Date of evaluation: 12/26/17       Sher Pearl is a 65 y.o. female who presents with Illness and Cough      History:   Patient has had a cough for the last 2 days. Patient just feels generally weak and unwell. Patient states she has had pneumonia before and this felt similar. Patient denies vomiting and has chronic diarrhea. She denies fevers. Exam: Vitals:   Vitals:    12/26/17 1520 12/26/17 1556   BP: (!) 106/55    Pulse: 77    Resp: 20    Temp:  98.3 °F (36.8 °C)   TempSrc:  Oral   SpO2: 97%    Weight: 204 lb (92.5 kg)    Height: 5' 5\" (1.651 m)      Heart regular rate rhythm no murmurs. Lungs scattered wheezing diffusely. Abdomen soft nontender. I performed a history and physical examination of the patient and discussed management with the resident. I reviewed the residents note and agree with the documented findings and plan of care. Any areas of disagreement are noted on the chart. I was personally present for the key portions of any procedures. I have documented in the chart those procedures where I was not present during the key portions. I have personally reviewed all images and agree with the resident's interpretation. I have reviewed the emergency nurses triage note. I agree with the chief complaint, past medical history, past surgical history, allergies, medications, social and family history as documented unless otherwise noted below. Documentation of the HPI, Physical Exam and Medical Decision Making performed by medical students or scribes is based on my personal performance of the HPI, PE and MDM. I personally evaluated and examined the patient in conjunction with the APC and agree with the assessment, treatment plan, and disposition of the patient as recorded by the APC. Additional findings are as noted.     Tori Biggs MD  Attending Emergency

## 2017-12-26 NOTE — ED PROVIDER NOTES
[address]  Emergency Department Encounter  Emergency Medicine Resident     Pt Name: Yoshi Valle  MRN: [id number]  [place] 1940  Date of evaluation: 12/26/17  PCP:  Angella Cope MD    [address] [place] [address]       Chief Complaint   Patient presents with    Illness    Cough       HISTORY OF PRESENT ILLNESS  (Location/Symptom, Timing/Onset, Context/Setting, Quality, Duration, Modifying Factors, Severity.)      Yoshi Valle is a 65 y.o. female who presents with 5 day history of cough productive of yellow mucus. Positive for sick contacts at home. Denies any fever, but does complain of some chills and sweats. States that her cough is productive of yellow mucus. Denies any history of smoking. Denies any chest pain, but states that she has a diffuse chest tightness around her lower chest area. Does have a history of atrial fibrillation for which she is on Coumadin. States that she's had some nausea and vomiting over the weekend. Denies any changes in urinary or bowel habits. States that she did receive the flu shot this year. States that her complaints are consistent with the last time she was diagnosed with pneumonia. PAST MEDICAL / SURGICAL / SOCIAL / FAMILY HISTORY      has a past medical history of Atrial fibrillation ([address] Chronic back pain; Diverticulosis; GERD (gastroesophageal reflux disease); Hyperlipidemia; Hypertension; Hypothyroidism; Obesity (BMI 30.0-34.9); Onychomycosis; PE (pulmonary embolism); and Type II or unspecified type diabetes mellitus without mention of complication, not stated as uncontrolled. has a past surgical history that includes Colon surgery; Vena Cava Filter Placement; Cholecystectomy; and Hysterectomy. Social History     Social History    Marital status:      Spouse name: N/A    Number of children: N/A    Years of education: N/A     Occupational History    Not on file.      Social History Main Topics    Smoking status: Never tenderness to palpation   Neurological: She is alert and oriented to person, place, and time. She has normal strength. GCS eye subscore is 4. GCS verbal subscore is 5. GCS motor subscore is 6. Skin: Skin is warm and dry.    Psychiatric: Her behavior is normal.       DIFFERENTIAL  DIAGNOSIS     PLAN (LABS / IMAGING / EKG):  Orders Placed This Encounter   Procedures    Urine Culture    XR CHEST STANDARD (2 VW)    CBC Auto Differential    Basic Metabolic Panel    Brain Natriuretic Peptide    TROPONIN    Protime-INR    APTT    Urinalysis with Microscopic    EKG 12 Lead    Insert peripheral IV       MEDICATIONS ORDERED:  Orders Placed This Encounter   Medications    0.9 % sodium chloride bolus    benzonatate (TESSALON) capsule 100 mg    cephALEXin (KEFLEX) capsule 500 mg    cephALEXin (KEFLEX) 500 MG capsule     Sig: Take 1 capsule by mouth 4 times daily for 7 days     Dispense:  28 capsule     Refill:  0    benzonatate (TESSALON PERLES) 100 MG capsule     Sig: Take 1 capsule by mouth 3 times daily as needed for Cough     Dispense:  30 capsule     Refill:  0       DIAGNOSTIC RESULTS / EMERGENCY DEPARTMENT COURSE / MDM     LABS:  Results for orders placed or performed during the hospital encounter of 12/26/17   CBC Auto Differential   Result Value Ref Range    WBC 2.7 (L) 3.5 - 11.0 k/uL    RBC 3.97 (L) 4.0 - 5.2 m/uL    Hemoglobin 13.0 12.0 - 16.0 g/dL    Hematocrit 38.6 36 - 46 %    MCV 97.2 80 - 100 fL    MCH 32.6 26 - 34 pg    MCHC 33.6 31 - 37 g/dL    RDW 14.1 11.5 - 14.9 %    Platelets 445 (L) 306 - 450 k/uL    MPV 9.8 6.0 - 12.0 fL    Differential Type NOT REPORTED     Immature Granulocytes NOT REPORTED 0 %    Absolute Immature Granulocyte NOT REPORTED 0.00 - 0.30 k/uL    WBC Morphology NOT REPORTED     RBC Morphology NOT REPORTED     Platelet Estimate NOT REPORTED     Seg Neutrophils 47 36 - 66 %    Lymphocytes 32 24 - 44 %    Atypical Lymphocytes 2 %    Monocytes 16 (H) 1 - 7 %    Eosinophils % 1 0 - 4 %    Basophils 2 0 - 2 %    Segs Absolute 1.28 (L) 1.3 - 9.1 k/uL    Absolute Lymph # 0.86 (L) 1.0 - 4.8 k/uL    Atypical Lymphocytes Absolute 0.05 k/uL    Absolute Mono # 0.43 0.1 - 1.3 k/uL    Absolute Eos # 0.03 0.0 - 0.4 k/uL    Basophils # 0.05 0.0 - 0.2 k/uL    Morphology Normal    Basic Metabolic Panel   Result Value Ref Range    Glucose 102 (H) 70 - 99 mg/dL    BUN 15 8 - 23 mg/dL    CREATININE 0.75 0.50 - 0.90 mg/dL    Bun/Cre Ratio NOT REPORTED 9 - 20    Calcium 8.5 (L) 8.6 - 10.4 mg/dL    Sodium 135 135 - 144 mmol/L    Potassium 3.7 3.7 - 5.3 mmol/L    Chloride 99 98 - 107 mmol/L    CO2 23 20 - 31 mmol/L    Anion Gap 13 9 - 17 mmol/L    GFR Non-African American >60 >60 mL/min    GFR African American >60 >60 mL/min    GFR Comment          GFR Staging NOT REPORTED    Brain Natriuretic Peptide   Result Value Ref Range    Pro- <300 pg/mL    BNP Interpretation         TROPONIN   Result Value Ref Range    Troponin T <0.03 <0.03 ng/mL    Troponin Interp         Protime-INR   Result Value Ref Range    Protime 27.4 (H) 9.7 - 12.0 sec    INR 2.4    APTT   Result Value Ref Range    PTT 37.8 (H) 23.0 - 31.0 sec   Urinalysis with Microscopic   Result Value Ref Range    Color, UA YELLOW YEL    Turbidity UA CLOUDY (A) CLEAR    Glucose, Ur NEGATIVE NEG    Bilirubin Urine NEGATIVE NEG    Ketones, Urine NEGATIVE NEG    Specific Gravity, UA 1.024 1.000 - 1.030    Urine Hgb NEGATIVE NEG    pH, UA 5.5 5.0 - 8.0    Protein, UA TRACE (A) NEG    Urobilinogen, Urine Normal NORM    Nitrite, Urine POSITIVE (A) NEG    Leukocyte Esterase, Urine MOD (A) NEG    Urinalysis Comments NOT REPORTED     -          WBC, UA 10 TO 20 /HPF    RBC, UA 0 TO 2 /HPF    Casts UA HYALINE /LPF    Crystals UA NOT REPORTED NONE /HPF    Epithelial Cells UA 5 TO 10 /HPF    Renal Epithelial, Urine NOT REPORTED 0 /HPF    Bacteria, UA MANY (A) NONE    Mucus, UA 1+ (A) NONE    Trichomonas, UA NOT REPORTED NONE    Amorphous, UA NOT REPORTED NONE

## 2017-12-26 NOTE — ED NOTES
Writer spoke with ED Phlebotomist,Claudia,  in regards to lab draw.       Rosita Benites RN  12/26/17 2438

## 2017-12-26 NOTE — PROGRESS NOTES
Outpatient Prescriptions   Medication Sig Dispense Refill    atenolol (TENORMIN) 100 MG tablet TAKE 1 TABLET BY MOUTH EVERY DAY 90 tablet 1    doxazosin (CARDURA) 8 MG tablet TAKE 1 TABLET BY MOUTH EVERY DAY 90 tablet 1    COMBIGAN 0.2-0.5 % ophthalmic solution Place 1 drop into both eyes 2 times daily  9    levothyroxine (SYNTHROID) 100 MCG tablet TAKE 1 TABLET BY MOUTH EVERY DAY 90 tablet 1    NONFORMULARY Indications: Advanced anti-oxidant w/echinacea and garlic      Cholecalciferol (VITAMIN D3) 5000 UNITS TABS Take 1 tablet by mouth daily      Cranberry 450 MG CAPS Take 450 mg by mouth daily       warfarin (COUMADIN) 5 MG tablet Take 5 mg by mouth daily Indications: 5 mg Wed-Monday; 2.5mg (5mg tab x 0.5) on Tuesdays (Managed by [facility] antico [facility]) Managed by [facility]      Lactobacillus (PROBIOTIC ACIDOPHILUS PO) Take 1 tablet by mouth daily      cholestyramine (QUESTRAN) 4 G packet Take 1 packet by mouth every evening   3    latanoprost (XALATAN) 0.005 % ophthalmic solution Place 1 drop into both eyes nightly       No current facility-administered medications for this visit. Allergies   Allergen Reactions    Avapro [Irbesartan]     Ciprofloxacin Hcl Nausea Only    Cozaar [Losartan Potassium]     Diovan [Valsartan]     Lipitor [Atorvastatin Calcium]     Lisinopril     Pcn [Penicillins]     Pravachol [Pravastatin Sodium]      myalgias    Tape [Adhesive Tape] Dermatitis    Zetia [Ezetimibe]     Morphine Nausea And Vomiting       Health Maintenance   Topic Date Due    DTaP/Tdap/Td vaccine (1 - Tdap) 10/02/2002    Lipid screen  05/04/2022    Zostavax vaccine  Addressed    DEXA (modify frequency per FRAX score)  Addressed    Flu vaccine  Completed    Pneumococcal low/med risk  Completed       Subjective:      Review of Systems   Constitutional: Positive for appetite change, chills, diaphoresis and fatigue. Negative for activity change and fever.    HENT: Positive for congestion, rhinorrhea, sinus pain, sinus pressure and sore throat. Negative for ear discharge, ear pain, hearing loss, postnasal drip, sneezing, trouble swallowing and voice change. Eyes: Negative for discharge, redness, itching and visual disturbance. Respiratory: Positive for cough, chest tightness, shortness of breath and wheezing. Negative for hemoptysis. Cardiovascular: Negative for chest pain. Gastrointestinal: Positive for nausea. Negative for abdominal pain, constipation, diarrhea and vomiting. Genitourinary: Negative for decreased urine volume, dysuria, flank pain and frequency. Musculoskeletal: Positive for back pain and neck pain. Negative for myalgias and neck stiffness. Has a history of chronic neck and back pain   Skin: Negative for rash. Allergic/Immunologic: Negative for environmental allergies. Neurological: Positive for headaches. Negative for dizziness, weakness and light-headedness. Hematological: Negative for adenopathy. Psychiatric/Behavioral: The patient is not nervous/anxious. Objective:     Physical Exam   Constitutional: She is oriented to person, place, and time. She appears well-developed and well-nourished. No distress. HENT:   Head: Normocephalic. Right Ear: External ear normal. A middle ear effusion is present. Left Ear: External ear normal. A middle ear effusion is present. Nose: Mucosal edema, rhinorrhea and sinus tenderness present. Mouth/Throat: Uvula is midline. No uvula swelling. Posterior oropharyngeal erythema present. No oropharyngeal exudate or posterior oropharyngeal edema. Eyes: Conjunctivae and EOM are normal. Pupils are equal, round, and reactive to light. Right eye exhibits no discharge. Left eye exhibits no discharge. Neck: Normal range of motion. Neck supple. Cardiovascular: Normal rate, regular rhythm and normal heart sounds. No murmur heard. Pulmonary/Chest: Effort normal. No respiratory distress.

## 2017-12-26 NOTE — ED NOTES
Writer updated Dr. Merlin Case in regards to the IV & lab draw issue.       Desirae Bennett RN  12/26/17 1281

## 2017-12-27 ENCOUNTER — TELEPHONE (OUTPATIENT)
Dept: PHARMACY | Age: 77
End: 2017-12-27

## 2017-12-27 NOTE — ED NOTES
Writer spoke with Dr. Vanesa Jimenez. Pt does not need to have her blood drawn for another Troponin level.       Tate Licona RN  12/26/17 2015

## 2017-12-27 NOTE — ED NOTES
Pt said that last Thursday she started with the coughing. Pt says that she gets sob with walking. Pt denied chest pain, but says it feels like her ribs are squeezing.      Delwyn Holstein, RN  12/26/17 2038

## 2017-12-28 LAB
CULTURE: ABNORMAL
CULTURE: ABNORMAL
Lab: ABNORMAL
ORGANISM: ABNORMAL
SPECIMEN DESCRIPTION: ABNORMAL
SPECIMEN DESCRIPTION: ABNORMAL
STATUS: ABNORMAL

## 2017-12-29 ENCOUNTER — HOSPITAL ENCOUNTER (OUTPATIENT)
Dept: PHARMACY | Age: 77
Setting detail: THERAPIES SERIES
Discharge: HOME | End: 2017-12-29
Payer: MEDICARE

## 2017-12-29 DIAGNOSIS — Z95.828 S/P IVC FILTER: ICD-10-CM

## 2017-12-29 LAB
INR BLD: 5.2
PROTIME: 63 SECONDS

## 2017-12-29 PROCEDURE — 85610 PROTHROMBIN TIME: CPT

## 2017-12-29 PROCEDURE — 99211 OFF/OP EST MAY X REQ PHY/QHP: CPT

## 2017-12-29 NOTE — PROGRESS NOTES
Patient states compliant with regimen. No bleeding or thromboembolic side effects noted. No significant med or dietary changes. No significant recent illness or disease state changes. PT/INR done in office per protocol. INR today is 5.2. Patient understands dosing directions and information discussed. Dosing card given to patient. Progress note faxed to office. INR supratherapeutic at 5.2. Goal 2-3  The patient did begin a Keflex antibiotic regimen for a UTI and the Keflex has made her very  Nauseated. Because of this she has been eating very little for the last several days  Hold warfarin x three days, then resume her usual dose of 2.5 mg Fridays and 5 mg 6x weekly  Recheck INR in five days  The patient was advised of precautions for an elevated INR.

## 2018-01-03 ENCOUNTER — HOSPITAL ENCOUNTER (OUTPATIENT)
Dept: PHARMACY | Age: 78
Setting detail: THERAPIES SERIES
Discharge: HOME | End: 2018-01-03
Payer: MEDICARE

## 2018-01-03 LAB
INR BLD: 2.2
PROTIME: 26.7 SECONDS

## 2018-01-03 PROCEDURE — 85610 PROTHROMBIN TIME: CPT

## 2018-01-03 PROCEDURE — 99211 OFF/OP EST MAY X REQ PHY/QHP: CPT

## 2018-01-03 NOTE — PROGRESS NOTES
Patient states compliant all of the time with regimen. No bleeding or thromboembolic side effects noted. No significant dietary changes. No disease state changes. Patient finished cephalexin on Saturday. Was taking for UTI. Had diarrhea as a side effect. Diarrhea has resolved. Patient has a cold now and is not eating well. PT/INR done in office per protocol. INR was therapeutic at 2.2 (goal 2-3). Warfarin regimen will be continued at current dose 2.5 mg Fri and 5 mg all other days. Will retest in 2 weeks. Patient understands dosing directions and information discussed. Dosing schedule and follow up appointment given to patient. Progress note routed to referring physicians office.

## 2018-01-09 ENCOUNTER — OFFICE VISIT (OUTPATIENT)
Dept: FAMILY MEDICINE CLINIC | Age: 78
End: 2018-01-09
Payer: MEDICARE

## 2018-01-09 VITALS
SYSTOLIC BLOOD PRESSURE: 110 MMHG | RESPIRATION RATE: 14 BRPM | WEIGHT: 204 LBS | TEMPERATURE: 98.1 F | DIASTOLIC BLOOD PRESSURE: 70 MMHG | HEART RATE: 64 BPM | BODY MASS INDEX: 33.95 KG/M2

## 2018-01-09 DIAGNOSIS — E11.9 TYPE 2 DIABETES MELLITUS WITHOUT COMPLICATION, WITHOUT LONG-TERM CURRENT USE OF INSULIN (HCC): Primary | ICD-10-CM

## 2018-01-09 DIAGNOSIS — E03.9 HYPOTHYROIDISM, UNSPECIFIED TYPE: ICD-10-CM

## 2018-01-09 DIAGNOSIS — I10 ESSENTIAL HYPERTENSION: ICD-10-CM

## 2018-01-09 DIAGNOSIS — J40 BRONCHITIS: ICD-10-CM

## 2018-01-09 DIAGNOSIS — E78.2 MIXED HYPERLIPIDEMIA: ICD-10-CM

## 2018-01-09 PROCEDURE — G8427 DOCREV CUR MEDS BY ELIG CLIN: HCPCS | Performed by: FAMILY MEDICINE

## 2018-01-09 PROCEDURE — G8417 CALC BMI ABV UP PARAM F/U: HCPCS | Performed by: FAMILY MEDICINE

## 2018-01-09 PROCEDURE — 1036F TOBACCO NON-USER: CPT | Performed by: FAMILY MEDICINE

## 2018-01-09 PROCEDURE — G8400 PT W/DXA NO RESULTS DOC: HCPCS | Performed by: FAMILY MEDICINE

## 2018-01-09 PROCEDURE — 1090F PRES/ABSN URINE INCON ASSESS: CPT | Performed by: FAMILY MEDICINE

## 2018-01-09 PROCEDURE — 4040F PNEUMOC VAC/ADMIN/RCVD: CPT | Performed by: FAMILY MEDICINE

## 2018-01-09 PROCEDURE — 99214 OFFICE O/P EST MOD 30 MIN: CPT | Performed by: FAMILY MEDICINE

## 2018-01-09 PROCEDURE — 1123F ACP DISCUSS/DSCN MKR DOCD: CPT | Performed by: FAMILY MEDICINE

## 2018-01-09 PROCEDURE — G8484 FLU IMMUNIZE NO ADMIN: HCPCS | Performed by: FAMILY MEDICINE

## 2018-01-09 RX ORDER — GUAIFENESIN AND CODEINE PHOSPHATE 100; 10 MG/5ML; MG/5ML
5 SOLUTION ORAL 4 TIMES DAILY PRN
Qty: 120 ML | Refills: 0 | Status: SHIPPED | OUTPATIENT
Start: 2018-01-09 | End: 2018-01-15

## 2018-01-09 RX ORDER — AZITHROMYCIN 250 MG/1
TABLET, FILM COATED ORAL
Qty: 1 PACKET | Refills: 1 | Status: SHIPPED | OUTPATIENT
Start: 2018-01-09 | End: 2018-01-19

## 2018-01-09 ASSESSMENT — ENCOUNTER SYMPTOMS
BLOOD IN STOOL: 0
SHORTNESS OF BREATH: 0
ABDOMINAL PAIN: 0
COUGH: 1
CHEST TIGHTNESS: 0

## 2018-01-09 NOTE — PROGRESS NOTES
hypertension  Continue current management  - ALT; Future  - AST; Future  - Basic Metabolic Panel; Future  - Lipid Panel; Future  - Magnesium; Future    3. Mixed hyperlipidemia  Continue current management  - ALT; Future  - AST; Future  - Basic Metabolic Panel; Future  - Lipid Panel; Future    4. Hypothyroidism, unspecified type  Continue current management  - TSH without Reflex; Future    5. Bronchitis    - azithromycin (ZITHROMAX) 250 MG tablet; Take 2 tabs (500 mg) on Day 1, and take 1 tab (250 mg) on days 2 through 5. Dispense: 1 packet; Refill: 1  - guaiFENesin-codeine (TUSSI-ORGANIDIN NR) 100-10 MG/5ML syrup; Take 5 mLs by mouth 4 times daily as needed for Cough for up to 6 days. Dispense: 120 mL; Refill: 0            Plan:      Jenifer Stanley received counseling on the following healthy behaviors: nutrition and medication adherence  Reviewed prior labs and health maintenance  Continue current medications, diet and exercise. Discussed use, benefit, and side effects of prescribed medications. Barriers to medication compliance addressed. Patient given educational materials - see patient instructions  Was a self-tracking handout given in paper form or via [employer]? No:     Requested Prescriptions      No prescriptions requested or ordered in this encounter       All patient questions answered. Patient voiced understanding. Quality Measures    There is no height or weight on file to calculate BMI. Elevated. Weight control planned discussed Healthy diet and regular exercise. . Blood pressure is normal. Treatment plan consists of No treatment change needed. Fall Risk 8/12/2016 6/23/2015 6/13/2014   2 or more falls in past year? no no no   Fall with injury in past year? no no no     The patient does not have a history of falls. I did not - not indicated , complete a risk assessment for falls.  A plan of care for falls No Treatment plan indicated    Lab Results   Component Value Date    LDLCHOLESTEROL 64 05/04/2017    (goal LDL reduction with dx if diabetes is 50% LDL reduction)    PHQ Scores 8/7/2017 4/11/2016 2/20/2015 2/13/2014   PHQ2 Score 0 0 0 0   PHQ9 Score 0 0 0 0     Interpretation of Total Score Depression Severity: 1-4 = Minimal depression, 5-9 = Mild depression, 10-14 = Moderate depression, 15-19 = Moderately severe depression, 20-27 = Severe depression        Orders Placed This Encounter   Procedures    ALT     Standing Status:   Future     Standing Expiration Date:   1/9/2019    AST     Standing Status:   Future     Standing Expiration Date:   1/9/2019    Basic Metabolic Panel     Fasting     Standing Status:   Future     Standing Expiration Date:   1/9/2019    Lipid Panel     Standing Status:   Future     Standing Expiration Date:   1/9/2019     Order Specific Question:   Is Patient Fasting?/# of Hours     Answer: Fast 8-10 hours    Magnesium     Standing Status:   Future     Standing Expiration Date:   1/9/2019    Hemoglobin A1C     Standing Status:   Future     Standing Expiration Date:   1/9/2019    TSH without Reflex     Standing Status:   Future     Standing Expiration Date:   1/9/2019     Orders Placed This Encounter   Medications    azithromycin (ZITHROMAX) 250 MG tablet     Sig: Take 2 tabs (500 mg) on Day 1, and take 1 tab (250 mg) on days 2 through 5. Dispense:  1 packet     Refill:  1    guaiFENesin-codeine (TUSSI-ORGANIDIN NR) 100-10 MG/5ML syrup     Sig: Take 5 mLs by mouth 4 times daily as needed for Cough for up to 6 days. Dispense:  120 mL     Refill:  0     Continue routine medications.  Use humidifier  Follow-up in 4 months or sooner if needed

## 2018-01-11 ENCOUNTER — TELEPHONE (OUTPATIENT)
Dept: FAMILY MEDICINE CLINIC | Age: 78
End: 2018-01-11

## 2018-01-17 ENCOUNTER — HOSPITAL ENCOUNTER (OUTPATIENT)
Dept: PHARMACY | Age: 78
Setting detail: THERAPIES SERIES
Discharge: HOME | End: 2018-01-17
Payer: MEDICARE

## 2018-01-17 ENCOUNTER — TELEPHONE (OUTPATIENT)
Dept: FAMILY MEDICINE CLINIC | Age: 78
End: 2018-01-17

## 2018-01-17 LAB
HCT VFR BLD CALC: 40.6 % (ref 36–46)
HEMOGLOBIN: 13.6 G/DL (ref 12–16)
INR BLD: 9
MCH RBC QN AUTO: 32 PG (ref 26–34)
MCHC RBC AUTO-ENTMCNC: 33.4 G/DL (ref 31–37)
MCV RBC AUTO: 95.8 FL (ref 80–100)
NRBC AUTOMATED: ABNORMAL PER 100 WBC
PDW BLD-RTO: 13.8 % (ref 11.5–14.9)
PLATELET # BLD: 137 K/UL (ref 150–450)
PMV BLD AUTO: 9.3 FL (ref 6–12)
PROTHROMBIN TIME: 110.1 SEC (ref 9.7–12)
RBC # BLD: 4.24 M/UL (ref 4–5.2)
WBC # BLD: 4.4 K/UL (ref 3.5–11)

## 2018-01-17 PROCEDURE — 85610 PROTHROMBIN TIME: CPT

## 2018-01-17 PROCEDURE — 36415 COLL VENOUS BLD VENIPUNCTURE: CPT

## 2018-01-17 PROCEDURE — 85027 COMPLETE CBC AUTOMATED: CPT

## 2018-01-17 PROCEDURE — 99211 OFF/OP EST MAY X REQ PHY/QHP: CPT

## 2018-01-17 NOTE — TELEPHONE ENCOUNTER
FYI:  Elevated INR, INR was 9. Coumadin Clinic is holding her coumadin today, tomorrow and seeing her again on Friday, 1-19-18. Patient was informed to go to the ER if she falls or starts bleeding. The patient had just lost he  and been on a zpak, she also had diarrhea which may have contributed to elevated INR.

## 2018-01-19 ENCOUNTER — HOSPITAL ENCOUNTER (OUTPATIENT)
Dept: PHARMACY | Age: 78
Setting detail: THERAPIES SERIES
Discharge: HOME | End: 2018-01-19
Payer: MEDICARE

## 2018-01-19 DIAGNOSIS — Z95.828 S/P IVC FILTER: ICD-10-CM

## 2018-01-19 LAB
INR BLD: 3
PROTIME: 35.5 SECONDS

## 2018-01-19 PROCEDURE — 85610 PROTHROMBIN TIME: CPT

## 2018-01-19 PROCEDURE — 99211 OFF/OP EST MAY X REQ PHY/QHP: CPT

## 2018-01-19 RX ORDER — LEVOTHYROXINE SODIUM 0.1 MG/1
TABLET ORAL
Qty: 90 TABLET | Refills: 0 | Status: SHIPPED | OUTPATIENT
Start: 2018-01-19 | End: 2018-04-13 | Stop reason: SDUPTHER

## 2018-01-23 ENCOUNTER — HOSPITAL ENCOUNTER (OUTPATIENT)
Dept: PHARMACY | Age: 78
Setting detail: THERAPIES SERIES
Discharge: HOME | End: 2018-01-23
Payer: MEDICARE

## 2018-01-23 LAB
INR BLD: 1.6
PROTIME: 19.6 SECONDS

## 2018-01-23 PROCEDURE — 85610 PROTHROMBIN TIME: CPT

## 2018-01-23 PROCEDURE — 99211 OFF/OP EST MAY X REQ PHY/QHP: CPT

## 2018-01-29 ENCOUNTER — HOSPITAL ENCOUNTER (OUTPATIENT)
Dept: PHARMACY | Age: 78
Setting detail: THERAPIES SERIES
Discharge: HOME | End: 2018-01-29
Payer: MEDICARE

## 2018-01-29 DIAGNOSIS — Z95.828 S/P IVC FILTER: ICD-10-CM

## 2018-01-29 LAB
INR BLD: 3.7
PROTIME: 44.2 SECONDS

## 2018-01-29 PROCEDURE — 99211 OFF/OP EST MAY X REQ PHY/QHP: CPT

## 2018-01-29 PROCEDURE — 85610 PROTHROMBIN TIME: CPT

## 2018-01-29 NOTE — PROGRESS NOTES
Patient states compliant with regimen. No bleeding or thromboembolic side effects noted. No significant med or dietary changes. No significant recent illness or disease state changes. PT/INR done in office per protocol. INR today is 3.7. Patient understands dosing directions and information discussed. Dosing card given to patient. Progress note faxed to office. INR elevated at 3.7.  Goal 2-3  Reduce warfarin dose to 2.5 mg MWF and 5 mg 4x weekly  Recheck INR in one week  The patient has begun taking Probiotic Gummy Advanced

## 2018-02-05 ENCOUNTER — HOSPITAL ENCOUNTER (OUTPATIENT)
Dept: PHARMACY | Age: 78
Setting detail: THERAPIES SERIES
Discharge: HOME | End: 2018-02-05
Payer: MEDICARE

## 2018-02-05 DIAGNOSIS — Z95.828 S/P IVC FILTER: ICD-10-CM

## 2018-02-05 LAB
INR BLD: 2.8
PROTIME: 33.6 SECONDS

## 2018-02-05 PROCEDURE — 85610 PROTHROMBIN TIME: CPT

## 2018-02-05 PROCEDURE — 99211 OFF/OP EST MAY X REQ PHY/QHP: CPT

## 2018-02-14 ENCOUNTER — HOSPITAL ENCOUNTER (OUTPATIENT)
Age: 78
Discharge: HOME | End: 2018-02-14
Payer: MEDICARE

## 2018-02-14 DIAGNOSIS — I10 ESSENTIAL HYPERTENSION: ICD-10-CM

## 2018-02-14 DIAGNOSIS — E11.9 TYPE 2 DIABETES MELLITUS WITHOUT COMPLICATION, WITHOUT LONG-TERM CURRENT USE OF INSULIN (HCC): ICD-10-CM

## 2018-02-14 DIAGNOSIS — E78.2 MIXED HYPERLIPIDEMIA: ICD-10-CM

## 2018-02-14 DIAGNOSIS — E03.9 HYPOTHYROIDISM, UNSPECIFIED TYPE: ICD-10-CM

## 2018-02-14 LAB
ALT SERPL-CCNC: 12 U/L (ref 5–33)
ANION GAP SERPL CALCULATED.3IONS-SCNC: 10 MMOL/L (ref 9–17)
AST SERPL-CCNC: 23 U/L
BUN BLDV-MCNC: 9 MG/DL (ref 8–23)
BUN/CREAT BLD: ABNORMAL (ref 9–20)
CALCIUM SERPL-MCNC: 9.8 MG/DL (ref 8.6–10.4)
CHLORIDE BLD-SCNC: 102 MMOL/L (ref 98–107)
CHOLESTEROL/HDL RATIO: 2.9
CHOLESTEROL: 161 MG/DL
CO2: 28 MMOL/L (ref 20–31)
CREAT SERPL-MCNC: 0.68 MG/DL (ref 0.5–0.9)
ESTIMATED AVERAGE GLUCOSE: 103 MG/DL
GFR AFRICAN AMERICAN: >60 ML/MIN
GFR NON-AFRICAN AMERICAN: >60 ML/MIN
GFR SERPL CREATININE-BSD FRML MDRD: ABNORMAL ML/MIN/{1.73_M2}
GFR SERPL CREATININE-BSD FRML MDRD: ABNORMAL ML/MIN/{1.73_M2}
GLUCOSE BLD-MCNC: 107 MG/DL (ref 70–99)
HBA1C MFR BLD: 5.2 % (ref 4–6)
HDLC SERPL-MCNC: 56 MG/DL
LDL CHOLESTEROL: 74 MG/DL (ref 0–130)
MAGNESIUM: 1.9 MG/DL (ref 1.6–2.6)
POTASSIUM SERPL-SCNC: 4.4 MMOL/L (ref 3.7–5.3)
SODIUM BLD-SCNC: 140 MMOL/L (ref 135–144)
TRIGL SERPL-MCNC: 157 MG/DL
TSH SERPL DL<=0.05 MIU/L-ACNC: 1.45 MIU/L (ref 0.3–5)
VLDLC SERPL CALC-MCNC: ABNORMAL MG/DL (ref 1–30)

## 2018-02-14 PROCEDURE — 80048 BASIC METABOLIC PNL TOTAL CA: CPT

## 2018-02-14 PROCEDURE — 84443 ASSAY THYROID STIM HORMONE: CPT

## 2018-02-14 PROCEDURE — 36415 COLL VENOUS BLD VENIPUNCTURE: CPT

## 2018-02-14 PROCEDURE — 84450 TRANSFERASE (AST) (SGOT): CPT

## 2018-02-14 PROCEDURE — 83036 HEMOGLOBIN GLYCOSYLATED A1C: CPT

## 2018-02-14 PROCEDURE — 80061 LIPID PANEL: CPT

## 2018-02-14 PROCEDURE — 84460 ALANINE AMINO (ALT) (SGPT): CPT

## 2018-02-14 PROCEDURE — 83735 ASSAY OF MAGNESIUM: CPT

## 2018-02-19 ENCOUNTER — HOSPITAL ENCOUNTER (OUTPATIENT)
Dept: PHARMACY | Age: 78
Setting detail: THERAPIES SERIES
Discharge: HOME | End: 2018-02-19
Payer: MEDICARE

## 2018-02-19 DIAGNOSIS — Z95.828 S/P IVC FILTER: ICD-10-CM

## 2018-02-19 LAB
INR BLD: 2.4
PROTIME: 29.3 SECONDS

## 2018-02-19 PROCEDURE — 99211 OFF/OP EST MAY X REQ PHY/QHP: CPT

## 2018-02-19 PROCEDURE — 85610 PROTHROMBIN TIME: CPT

## 2018-03-19 ENCOUNTER — OFFICE VISIT (OUTPATIENT)
Dept: FAMILY MEDICINE CLINIC | Age: 78
End: 2018-03-19
Payer: MEDICARE

## 2018-03-19 ENCOUNTER — HOSPITAL ENCOUNTER (OUTPATIENT)
Dept: PHARMACY | Age: 78
Setting detail: THERAPIES SERIES
Discharge: HOME | End: 2018-03-19
Payer: MEDICARE

## 2018-03-19 VITALS
TEMPERATURE: 97.8 F | WEIGHT: 200.6 LBS | BODY MASS INDEX: 33.38 KG/M2 | RESPIRATION RATE: 14 BRPM | SYSTOLIC BLOOD PRESSURE: 120 MMHG | HEART RATE: 68 BPM | DIASTOLIC BLOOD PRESSURE: 90 MMHG

## 2018-03-19 DIAGNOSIS — S46.812A TRAPEZIUS STRAIN, LEFT, INITIAL ENCOUNTER: ICD-10-CM

## 2018-03-19 DIAGNOSIS — J01.10 ACUTE NON-RECURRENT FRONTAL SINUSITIS: Primary | ICD-10-CM

## 2018-03-19 DIAGNOSIS — M54.2 NECK PAIN ON LEFT SIDE: ICD-10-CM

## 2018-03-19 LAB
INR BLD: 2.8
PROTIME: 33.4 SECONDS

## 2018-03-19 PROCEDURE — 99214 OFFICE O/P EST MOD 30 MIN: CPT | Performed by: NURSE PRACTITIONER

## 2018-03-19 PROCEDURE — 4040F PNEUMOC VAC/ADMIN/RCVD: CPT | Performed by: NURSE PRACTITIONER

## 2018-03-19 PROCEDURE — G8427 DOCREV CUR MEDS BY ELIG CLIN: HCPCS | Performed by: NURSE PRACTITIONER

## 2018-03-19 PROCEDURE — G8482 FLU IMMUNIZE ORDER/ADMIN: HCPCS | Performed by: NURSE PRACTITIONER

## 2018-03-19 PROCEDURE — 1036F TOBACCO NON-USER: CPT | Performed by: NURSE PRACTITIONER

## 2018-03-19 PROCEDURE — G8400 PT W/DXA NO RESULTS DOC: HCPCS | Performed by: NURSE PRACTITIONER

## 2018-03-19 PROCEDURE — G8417 CALC BMI ABV UP PARAM F/U: HCPCS | Performed by: NURSE PRACTITIONER

## 2018-03-19 PROCEDURE — 99211 OFF/OP EST MAY X REQ PHY/QHP: CPT

## 2018-03-19 PROCEDURE — 85610 PROTHROMBIN TIME: CPT

## 2018-03-19 PROCEDURE — 1090F PRES/ABSN URINE INCON ASSESS: CPT | Performed by: NURSE PRACTITIONER

## 2018-03-19 PROCEDURE — 1123F ACP DISCUSS/DSCN MKR DOCD: CPT | Performed by: NURSE PRACTITIONER

## 2018-03-19 RX ORDER — BENZONATATE 100 MG/1
100 CAPSULE ORAL 3 TIMES DAILY PRN
Qty: 30 CAPSULE | Refills: 0 | Status: SHIPPED | OUTPATIENT
Start: 2018-03-19 | End: 2018-03-27 | Stop reason: SDUPTHER

## 2018-03-19 RX ORDER — AZITHROMYCIN 250 MG/1
TABLET, FILM COATED ORAL
Qty: 1 PACKET | Refills: 0 | Status: SHIPPED | OUTPATIENT
Start: 2018-03-19 | End: 2018-03-29

## 2018-03-19 RX ORDER — ACETAMINOPHEN AND CODEINE PHOSPHATE 300; 30 MG/1; MG/1
1 TABLET ORAL DAILY PRN
Qty: 6 TABLET | Refills: 0 | Status: SHIPPED | OUTPATIENT
Start: 2018-03-19 | End: 2018-03-25

## 2018-03-19 RX ORDER — CYCLOBENZAPRINE HCL 10 MG
10 TABLET ORAL 2 TIMES DAILY PRN
Qty: 20 TABLET | Refills: 0 | Status: SHIPPED | OUTPATIENT
Start: 2018-03-19 | End: 2018-03-27 | Stop reason: SDUPTHER

## 2018-03-19 RX ORDER — ATENOLOL 50 MG/1
50 TABLET ORAL DAILY
COMMUNITY
End: 2018-04-12

## 2018-03-19 RX ORDER — ACETAMINOPHEN AND CODEINE PHOSPHATE 300; 30 MG/1; MG/1
1 TABLET ORAL DAILY PRN
Qty: 6 TABLET | Refills: 0 | Status: SHIPPED | OUTPATIENT
Start: 2018-03-19 | End: 2018-03-19 | Stop reason: SDUPTHER

## 2018-03-19 ASSESSMENT — ENCOUNTER SYMPTOMS
SHORTNESS OF BREATH: 0
NAUSEA: 0
RHINORRHEA: 1
ABDOMINAL PAIN: 0
SINUS PRESSURE: 1
COUGH: 1
SORE THROAT: 1

## 2018-03-20 ENCOUNTER — TELEPHONE (OUTPATIENT)
Dept: FAMILY MEDICINE CLINIC | Age: 78
End: 2018-03-20

## 2018-03-20 RX ORDER — TIZANIDINE 2 MG/1
2 TABLET ORAL 3 TIMES DAILY PRN
Qty: 30 TABLET | Refills: 0 | Status: SHIPPED | OUTPATIENT
Start: 2018-03-20 | End: 2018-03-28 | Stop reason: SDUPTHER

## 2018-03-27 DIAGNOSIS — J01.10 ACUTE NON-RECURRENT FRONTAL SINUSITIS: ICD-10-CM

## 2018-03-27 DIAGNOSIS — S46.812A TRAPEZIUS STRAIN, LEFT, INITIAL ENCOUNTER: ICD-10-CM

## 2018-03-27 RX ORDER — BENZONATATE 100 MG/1
100 CAPSULE ORAL 3 TIMES DAILY PRN
Qty: 30 CAPSULE | Refills: 0 | Status: SHIPPED | OUTPATIENT
Start: 2018-03-27 | End: 2018-04-06

## 2018-03-27 RX ORDER — CYCLOBENZAPRINE HCL 10 MG
10 TABLET ORAL 2 TIMES DAILY PRN
Qty: 20 TABLET | Refills: 0 | Status: SHIPPED | OUTPATIENT
Start: 2018-03-27 | End: 2021-02-10

## 2018-03-28 RX ORDER — TIZANIDINE 2 MG/1
2 TABLET ORAL 3 TIMES DAILY PRN
Qty: 30 TABLET | Refills: 0 | Status: ON HOLD | OUTPATIENT
Start: 2018-03-28 | End: 2021-01-26 | Stop reason: HOSPADM

## 2018-04-12 ENCOUNTER — TELEPHONE (OUTPATIENT)
Dept: PHARMACY | Age: 78
End: 2018-04-12

## 2018-04-13 RX ORDER — LEVOTHYROXINE SODIUM 0.1 MG/1
TABLET ORAL
Qty: 90 TABLET | Refills: 1 | Status: SHIPPED | OUTPATIENT
Start: 2018-04-13 | End: 2018-10-11 | Stop reason: SDUPTHER

## 2018-04-13 RX ORDER — DOXAZOSIN 8 MG/1
TABLET ORAL
Qty: 90 TABLET | Refills: 1 | Status: SHIPPED | OUTPATIENT
Start: 2018-04-13 | End: 2018-10-16 | Stop reason: SDUPTHER

## 2018-04-17 ENCOUNTER — HOSPITAL ENCOUNTER (OUTPATIENT)
Dept: PHARMACY | Age: 78
Setting detail: THERAPIES SERIES
Discharge: HOME | End: 2018-04-17
Payer: MEDICARE

## 2018-04-17 LAB
INR BLD: 1.9
PROTIME: 22.2 SECONDS

## 2018-04-17 PROCEDURE — 85610 PROTHROMBIN TIME: CPT

## 2018-04-17 PROCEDURE — 99211 OFF/OP EST MAY X REQ PHY/QHP: CPT

## 2018-05-02 ENCOUNTER — HOSPITAL ENCOUNTER (OUTPATIENT)
Dept: PHARMACY | Age: 78
Discharge: HOME | End: 2018-05-02

## 2018-05-02 DIAGNOSIS — Z95.828 S/P IVC FILTER: ICD-10-CM

## 2018-05-21 ENCOUNTER — OFFICE VISIT (OUTPATIENT)
Dept: FAMILY MEDICINE CLINIC | Age: 78
End: 2018-05-21
Payer: MEDICARE

## 2018-05-21 VITALS
DIASTOLIC BLOOD PRESSURE: 80 MMHG | BODY MASS INDEX: 33.95 KG/M2 | HEART RATE: 60 BPM | SYSTOLIC BLOOD PRESSURE: 126 MMHG | WEIGHT: 204 LBS | RESPIRATION RATE: 14 BRPM

## 2018-05-21 DIAGNOSIS — E11.9 TYPE 2 DIABETES MELLITUS WITHOUT COMPLICATION, WITHOUT LONG-TERM CURRENT USE OF INSULIN (HCC): Primary | ICD-10-CM

## 2018-05-21 DIAGNOSIS — E03.9 HYPOTHYROIDISM, UNSPECIFIED TYPE: ICD-10-CM

## 2018-05-21 DIAGNOSIS — J30.9 ALLERGIC RHINITIS, UNSPECIFIED CHRONICITY, UNSPECIFIED SEASONALITY, UNSPECIFIED TRIGGER: ICD-10-CM

## 2018-05-21 DIAGNOSIS — I10 ESSENTIAL HYPERTENSION: ICD-10-CM

## 2018-05-21 DIAGNOSIS — E78.2 MIXED HYPERLIPIDEMIA: ICD-10-CM

## 2018-05-21 PROCEDURE — G8417 CALC BMI ABV UP PARAM F/U: HCPCS | Performed by: FAMILY MEDICINE

## 2018-05-21 PROCEDURE — 1090F PRES/ABSN URINE INCON ASSESS: CPT | Performed by: FAMILY MEDICINE

## 2018-05-21 PROCEDURE — G8400 PT W/DXA NO RESULTS DOC: HCPCS | Performed by: FAMILY MEDICINE

## 2018-05-21 PROCEDURE — G8427 DOCREV CUR MEDS BY ELIG CLIN: HCPCS | Performed by: FAMILY MEDICINE

## 2018-05-21 PROCEDURE — 99214 OFFICE O/P EST MOD 30 MIN: CPT | Performed by: FAMILY MEDICINE

## 2018-05-21 PROCEDURE — 1036F TOBACCO NON-USER: CPT | Performed by: FAMILY MEDICINE

## 2018-05-21 PROCEDURE — 4040F PNEUMOC VAC/ADMIN/RCVD: CPT | Performed by: FAMILY MEDICINE

## 2018-05-21 PROCEDURE — 1123F ACP DISCUSS/DSCN MKR DOCD: CPT | Performed by: FAMILY MEDICINE

## 2018-05-21 RX ORDER — FEXOFENADINE HCL 180 MG/1
180 TABLET ORAL DAILY
Qty: 30 TABLET | Refills: 2 | Status: SHIPPED | OUTPATIENT
Start: 2018-05-21 | End: 2019-05-31 | Stop reason: CLARIF

## 2018-05-21 RX ORDER — APIXABAN 5 MG/1
TABLET, FILM COATED ORAL
Status: ON HOLD | COMMUNITY
Start: 2018-04-16 | End: 2021-01-26 | Stop reason: HOSPADM

## 2018-05-21 ASSESSMENT — ENCOUNTER SYMPTOMS
ABDOMINAL PAIN: 0
RHINORRHEA: 1
COUGH: 1
CHEST TIGHTNESS: 0
SHORTNESS OF BREATH: 0
BLOOD IN STOOL: 0

## 2018-10-11 RX ORDER — LEVOTHYROXINE SODIUM 0.1 MG/1
TABLET ORAL
Qty: 90 TABLET | Refills: 0 | Status: SHIPPED | OUTPATIENT
Start: 2018-10-11 | End: 2018-11-02 | Stop reason: SDUPTHER

## 2018-10-11 RX ORDER — ATENOLOL 100 MG/1
100 TABLET ORAL DAILY
Qty: 90 TABLET | Refills: 0 | Status: SHIPPED | OUTPATIENT
Start: 2018-10-11 | End: 2018-10-16 | Stop reason: SDUPTHER

## 2018-10-22 ENCOUNTER — TELEPHONE (OUTPATIENT)
Dept: FAMILY MEDICINE CLINIC | Age: 78
End: 2018-10-22

## 2018-10-22 ENCOUNTER — OFFICE VISIT (OUTPATIENT)
Dept: FAMILY MEDICINE CLINIC | Age: 78
End: 2018-10-22
Payer: MEDICARE

## 2018-10-22 VITALS
BODY MASS INDEX: 35.11 KG/M2 | DIASTOLIC BLOOD PRESSURE: 70 MMHG | SYSTOLIC BLOOD PRESSURE: 126 MMHG | HEART RATE: 64 BPM | RESPIRATION RATE: 14 BRPM | TEMPERATURE: 98.2 F | WEIGHT: 211 LBS

## 2018-10-22 DIAGNOSIS — E03.9 HYPOTHYROIDISM, UNSPECIFIED TYPE: ICD-10-CM

## 2018-10-22 DIAGNOSIS — I27.82 OTHER CHRONIC PULMONARY EMBOLISM WITHOUT ACUTE COR PULMONALE (HCC): ICD-10-CM

## 2018-10-22 DIAGNOSIS — K21.9 GASTROESOPHAGEAL REFLUX DISEASE, ESOPHAGITIS PRESENCE NOT SPECIFIED: ICD-10-CM

## 2018-10-22 DIAGNOSIS — E78.2 MIXED HYPERLIPIDEMIA: ICD-10-CM

## 2018-10-22 DIAGNOSIS — I10 ESSENTIAL HYPERTENSION: ICD-10-CM

## 2018-10-22 DIAGNOSIS — Z23 NEED FOR INFLUENZA VACCINATION: ICD-10-CM

## 2018-10-22 DIAGNOSIS — E11.9 TYPE 2 DIABETES MELLITUS WITHOUT COMPLICATION, WITHOUT LONG-TERM CURRENT USE OF INSULIN (HCC): Primary | ICD-10-CM

## 2018-10-22 PROCEDURE — 1036F TOBACCO NON-USER: CPT | Performed by: FAMILY MEDICINE

## 2018-10-22 PROCEDURE — G8400 PT W/DXA NO RESULTS DOC: HCPCS | Performed by: FAMILY MEDICINE

## 2018-10-22 PROCEDURE — 4040F PNEUMOC VAC/ADMIN/RCVD: CPT | Performed by: FAMILY MEDICINE

## 2018-10-22 PROCEDURE — G8427 DOCREV CUR MEDS BY ELIG CLIN: HCPCS | Performed by: FAMILY MEDICINE

## 2018-10-22 PROCEDURE — 99214 OFFICE O/P EST MOD 30 MIN: CPT | Performed by: FAMILY MEDICINE

## 2018-10-22 PROCEDURE — 1101F PT FALLS ASSESS-DOCD LE1/YR: CPT | Performed by: FAMILY MEDICINE

## 2018-10-22 PROCEDURE — G0008 ADMIN INFLUENZA VIRUS VAC: HCPCS | Performed by: FAMILY MEDICINE

## 2018-10-22 PROCEDURE — G8417 CALC BMI ABV UP PARAM F/U: HCPCS | Performed by: FAMILY MEDICINE

## 2018-10-22 PROCEDURE — 1090F PRES/ABSN URINE INCON ASSESS: CPT | Performed by: FAMILY MEDICINE

## 2018-10-22 PROCEDURE — 1123F ACP DISCUSS/DSCN MKR DOCD: CPT | Performed by: FAMILY MEDICINE

## 2018-10-22 PROCEDURE — 90686 IIV4 VACC NO PRSV 0.5 ML IM: CPT | Performed by: FAMILY MEDICINE

## 2018-10-22 PROCEDURE — G8482 FLU IMMUNIZE ORDER/ADMIN: HCPCS | Performed by: FAMILY MEDICINE

## 2018-10-22 RX ORDER — FAMOTIDINE 40 MG/1
40 TABLET, FILM COATED ORAL DAILY
Qty: 90 TABLET | Refills: 1 | Status: SHIPPED | OUTPATIENT
Start: 2018-10-22 | End: 2018-10-22 | Stop reason: SDUPTHER

## 2018-10-22 RX ORDER — FAMOTIDINE 40 MG/1
40 TABLET, FILM COATED ORAL DAILY
Qty: 90 TABLET | Refills: 1 | Status: SHIPPED | OUTPATIENT
Start: 2018-10-22 | End: 2022-05-09

## 2018-10-22 RX ORDER — FAMOTIDINE 40 MG/1
40 TABLET, FILM COATED ORAL DAILY
Qty: 30 TABLET | Refills: 3 | Status: SHIPPED | OUTPATIENT
Start: 2018-10-22 | End: 2018-10-22 | Stop reason: SDUPTHER

## 2018-10-22 ASSESSMENT — ENCOUNTER SYMPTOMS
BLOOD IN STOOL: 0
CHEST TIGHTNESS: 0
SHORTNESS OF BREATH: 0
ABDOMINAL PAIN: 0

## 2018-10-22 ASSESSMENT — PATIENT HEALTH QUESTIONNAIRE - PHQ9
SUM OF ALL RESPONSES TO PHQ QUESTIONS 1-9: 0
1. LITTLE INTEREST OR PLEASURE IN DOING THINGS: 0
2. FEELING DOWN, DEPRESSED OR HOPELESS: 0
SUM OF ALL RESPONSES TO PHQ QUESTIONS 1-9: 0
SUM OF ALL RESPONSES TO PHQ9 QUESTIONS 1 & 2: 0

## 2018-10-22 NOTE — PROGRESS NOTES
[facility] Attributed Provider  Melissa Chen MD as Consulting Physician (Pulmonology)  Rizwana Edwards MD as Surgeon (General Surgery)  Delfino Bradley MD as Consulting Physician (Pain Management)  Latricia Boo MD as Surgeon (Orthopedic Surgery)         Medical History Review  Past Medical, Family, and Social History reviewed and does contribute to the patient presenting condition    Health Maintenance   Topic Date Due    Shingles Vaccine (1 of 2 - 2 Dose Series) 06/07/1989    DTaP/Tdap/Td vaccine (1 - Tdap) 10/02/2002    Flu vaccine (1) 09/01/2018    TSH testing  02/14/2019    Potassium monitoring  02/14/2019    Creatinine monitoring  02/14/2019    DEXA (modify frequency per FRAX score)  Addressed    Pneumococcal low/med risk  Completed     HPI  Issue and is a 23-year-old obese white female who presents for diabetes, hypertension, hyperlipidemia, hypothyroidism. She also complains of GERD symptoms which she has had for several months. She states she is taking and tolerating her routine medication. She denies any chest pain, abdominal pain, shortness of breath, fever or chills. She states she has a good appetite and remains active. Review of Systems   Constitutional: Negative for chills and fever. HENT: Negative for congestion. Respiratory: Negative for chest tightness and shortness of breath. Cardiovascular: Negative for chest pain. Gastrointestinal: Negative for abdominal pain and blood in stool. Genitourinary: Negative for dysuria and hematuria. Skin: Negative for rash. Neurological: Negative for dizziness. Psychiatric/Behavioral: Negative for confusion. Objective:   Physical Exam   Constitutional: She is oriented to person, place, and time. She appears well-developed and well-nourished. No distress. HENT:   Head: Normocephalic and atraumatic.    Right Ear: Tympanic membrane, external ear and ear canal normal.   Left Ear: Tympanic membrane, external ear and ear canal normal.

## 2018-11-02 RX ORDER — LEVOTHYROXINE SODIUM 0.1 MG/1
TABLET ORAL
Qty: 90 TABLET | Refills: 1 | Status: SHIPPED | OUTPATIENT
Start: 2018-11-02 | End: 2019-06-23 | Stop reason: SDUPTHER

## 2018-11-02 RX ORDER — ATENOLOL 100 MG/1
100 TABLET ORAL DAILY
Qty: 90 TABLET | Refills: 1 | Status: SHIPPED | OUTPATIENT
Start: 2018-11-02 | End: 2019-10-07 | Stop reason: SDUPTHER

## 2018-11-02 RX ORDER — DOXAZOSIN 8 MG/1
TABLET ORAL
Qty: 90 TABLET | Refills: 1 | Status: SHIPPED | OUTPATIENT
Start: 2018-11-02 | End: 2019-03-26 | Stop reason: SDUPTHER

## 2018-11-12 ENCOUNTER — HOSPITAL ENCOUNTER (OUTPATIENT)
Age: 78
Discharge: HOME | End: 2018-11-12
Payer: MEDICARE

## 2018-11-12 DIAGNOSIS — E03.9 HYPOTHYROIDISM, UNSPECIFIED TYPE: ICD-10-CM

## 2018-11-12 DIAGNOSIS — E11.9 TYPE 2 DIABETES MELLITUS WITHOUT COMPLICATION, WITHOUT LONG-TERM CURRENT USE OF INSULIN (HCC): ICD-10-CM

## 2018-11-12 DIAGNOSIS — I10 ESSENTIAL HYPERTENSION: ICD-10-CM

## 2018-11-12 DIAGNOSIS — E78.2 MIXED HYPERLIPIDEMIA: ICD-10-CM

## 2018-11-12 LAB
ALT SERPL-CCNC: 12 U/L (ref 5–33)
ANION GAP SERPL CALCULATED.3IONS-SCNC: 13 MMOL/L (ref 9–17)
AST SERPL-CCNC: 23 U/L
BUN BLDV-MCNC: 11 MG/DL (ref 8–23)
BUN/CREAT BLD: ABNORMAL (ref 9–20)
CALCIUM SERPL-MCNC: 9.8 MG/DL (ref 8.6–10.4)
CHLORIDE BLD-SCNC: 101 MMOL/L (ref 98–107)
CHOLESTEROL/HDL RATIO: 2.5
CHOLESTEROL: 162 MG/DL
CO2: 25 MMOL/L (ref 20–31)
CREAT SERPL-MCNC: 0.78 MG/DL (ref 0.5–0.9)
ESTIMATED AVERAGE GLUCOSE: 103 MG/DL
GFR AFRICAN AMERICAN: >60 ML/MIN
GFR NON-AFRICAN AMERICAN: >60 ML/MIN
GFR SERPL CREATININE-BSD FRML MDRD: ABNORMAL ML/MIN/{1.73_M2}
GFR SERPL CREATININE-BSD FRML MDRD: ABNORMAL ML/MIN/{1.73_M2}
GLUCOSE BLD-MCNC: 115 MG/DL (ref 70–99)
HBA1C MFR BLD: 5.2 % (ref 4–6)
HDLC SERPL-MCNC: 64 MG/DL
LDL CHOLESTEROL: 78 MG/DL (ref 0–130)
MAGNESIUM: 2.1 MG/DL (ref 1.6–2.6)
POTASSIUM SERPL-SCNC: 4.6 MMOL/L (ref 3.7–5.3)
SODIUM BLD-SCNC: 139 MMOL/L (ref 135–144)
TRIGL SERPL-MCNC: 102 MG/DL
TSH SERPL DL<=0.05 MIU/L-ACNC: 1.75 MIU/L (ref 0.3–5)
VLDLC SERPL CALC-MCNC: NORMAL MG/DL (ref 1–30)

## 2018-11-12 PROCEDURE — 84460 ALANINE AMINO (ALT) (SGPT): CPT

## 2018-11-12 PROCEDURE — 80061 LIPID PANEL: CPT

## 2018-11-12 PROCEDURE — 83036 HEMOGLOBIN GLYCOSYLATED A1C: CPT

## 2018-11-12 PROCEDURE — 84450 TRANSFERASE (AST) (SGOT): CPT

## 2018-11-12 PROCEDURE — 83735 ASSAY OF MAGNESIUM: CPT

## 2018-11-12 PROCEDURE — 36415 COLL VENOUS BLD VENIPUNCTURE: CPT

## 2018-11-12 PROCEDURE — 80048 BASIC METABOLIC PNL TOTAL CA: CPT

## 2018-11-12 PROCEDURE — 84443 ASSAY THYROID STIM HORMONE: CPT

## 2018-12-03 ENCOUNTER — OFFICE VISIT (OUTPATIENT)
Dept: FAMILY MEDICINE CLINIC | Age: 78
End: 2018-12-03
Payer: MEDICARE

## 2018-12-03 VITALS
DIASTOLIC BLOOD PRESSURE: 80 MMHG | SYSTOLIC BLOOD PRESSURE: 130 MMHG | BODY MASS INDEX: 34.45 KG/M2 | RESPIRATION RATE: 16 BRPM | WEIGHT: 207 LBS | HEART RATE: 60 BPM | TEMPERATURE: 97.1 F

## 2018-12-03 DIAGNOSIS — R68.89 FLU-LIKE SYMPTOMS: Primary | ICD-10-CM

## 2018-12-03 DIAGNOSIS — H65.02 ACUTE SEROUS OTITIS MEDIA OF LEFT EAR, RECURRENCE NOT SPECIFIED: ICD-10-CM

## 2018-12-03 DIAGNOSIS — J20.9 ACUTE BRONCHITIS, UNSPECIFIED ORGANISM: ICD-10-CM

## 2018-12-03 DIAGNOSIS — R11.0 NAUSEA: ICD-10-CM

## 2018-12-03 DIAGNOSIS — R05.3 PERSISTENT COUGH: ICD-10-CM

## 2018-12-03 DIAGNOSIS — R06.02 SOB (SHORTNESS OF BREATH): ICD-10-CM

## 2018-12-03 LAB
INFLUENZA A ANTIBODY: NEGATIVE
INFLUENZA B ANTIBODY: NEGATIVE

## 2018-12-03 PROCEDURE — G8482 FLU IMMUNIZE ORDER/ADMIN: HCPCS | Performed by: NURSE PRACTITIONER

## 2018-12-03 PROCEDURE — 1101F PT FALLS ASSESS-DOCD LE1/YR: CPT | Performed by: NURSE PRACTITIONER

## 2018-12-03 PROCEDURE — 1036F TOBACCO NON-USER: CPT | Performed by: NURSE PRACTITIONER

## 2018-12-03 PROCEDURE — 1123F ACP DISCUSS/DSCN MKR DOCD: CPT | Performed by: NURSE PRACTITIONER

## 2018-12-03 PROCEDURE — G8400 PT W/DXA NO RESULTS DOC: HCPCS | Performed by: NURSE PRACTITIONER

## 2018-12-03 PROCEDURE — 1090F PRES/ABSN URINE INCON ASSESS: CPT | Performed by: NURSE PRACTITIONER

## 2018-12-03 PROCEDURE — 4040F PNEUMOC VAC/ADMIN/RCVD: CPT | Performed by: NURSE PRACTITIONER

## 2018-12-03 PROCEDURE — 87804 INFLUENZA ASSAY W/OPTIC: CPT | Performed by: NURSE PRACTITIONER

## 2018-12-03 PROCEDURE — G8417 CALC BMI ABV UP PARAM F/U: HCPCS | Performed by: NURSE PRACTITIONER

## 2018-12-03 PROCEDURE — G8427 DOCREV CUR MEDS BY ELIG CLIN: HCPCS | Performed by: NURSE PRACTITIONER

## 2018-12-03 PROCEDURE — 99214 OFFICE O/P EST MOD 30 MIN: CPT | Performed by: NURSE PRACTITIONER

## 2018-12-03 RX ORDER — DORZOLAMIDE HCL 20 MG/ML
1 SOLUTION/ DROPS OPHTHALMIC 2 TIMES DAILY
Refills: 10 | COMMUNITY
Start: 2018-11-09

## 2018-12-03 RX ORDER — BENZONATATE 100 MG/1
100 CAPSULE ORAL 3 TIMES DAILY PRN
Qty: 30 CAPSULE | Refills: 0 | Status: SHIPPED | OUTPATIENT
Start: 2018-12-03 | End: 2018-12-13

## 2018-12-03 RX ORDER — ONDANSETRON 4 MG/1
4 TABLET, ORALLY DISINTEGRATING ORAL 3 TIMES DAILY PRN
Qty: 12 TABLET | Refills: 0 | Status: SHIPPED | OUTPATIENT
Start: 2018-12-03 | End: 2021-02-10

## 2018-12-03 RX ORDER — AZITHROMYCIN 250 MG/1
TABLET, FILM COATED ORAL
Qty: 1 PACKET | Refills: 0 | Status: SHIPPED | OUTPATIENT
Start: 2018-12-03 | End: 2018-12-13

## 2018-12-03 ASSESSMENT — ENCOUNTER SYMPTOMS
ABDOMINAL PAIN: 0
COUGH: 1
SHORTNESS OF BREATH: 1
SINUS PRESSURE: 1
RHINORRHEA: 1
NAUSEA: 1
VOMITING: 0

## 2018-12-03 NOTE — PROGRESS NOTES
Subjective:      Patient ID: Timmy Odonnell is a 77 y.o. female. Visit Information    Have you changed or started any medications since your last visit including any over-the-counter medicines, vitamins, or herbal medicines? no   Are you having any side effects from any of your medications? -  no  Have you stopped taking any of your medications? Is so, why? -  no    Have you seen any other physician or provider since your last visit? No  Have you had any other diagnostic tests since your last visit? Yes - Records Obtained  Have you been seen in the emergency room and/or had an admission to a hospital since we last saw you? No  Have you had your routine dental cleaning in the past 6 months? no    Have you activated your [employer] account? If not, what are your barriers? No:      Patient Care Team:  Sampson Chapman MD as PCP - General (Family Medicine)  Sampson Chapman MD as PCP - S Attributed Provider  Lizzy Hairston MD as Consulting Physician (Pulmonology)  Alissa Kenney MD as Surgeon (General Surgery)  Christiana Javier MD as Consulting Physician (Pain Management)  Lynne Gilman MD as Surgeon (Orthopedic Surgery)    Medical History Review  Past Medical, Family, and Social History reviewed and does not contribute to the patient presenting condition    Health Maintenance   Topic Date Due    Shingles Vaccine (1 of 2 - 2 Dose Series) 06/07/1989    DTaP/Tdap/Td vaccine (1 - Tdap) 10/02/2002    TSH testing  11/12/2019    Potassium monitoring  11/12/2019    Creatinine monitoring  11/12/2019    DEXA (modify frequency per FRAX score)  Addressed    Flu vaccine  Completed    Pneumococcal low/med risk  Completed     HPI     77year old female with hx of DM presents with flu like symptoms: chills nasal congestion pressure cough on and off since thanksgiving. Cough is productive with yellowish sputum. Also has nausea and exertional sob. Took robitussin without relief.  Has pressure in bilateral ears which

## 2019-03-26 ENCOUNTER — OFFICE VISIT (OUTPATIENT)
Dept: FAMILY MEDICINE CLINIC | Age: 79
End: 2019-03-26
Payer: MEDICARE

## 2019-03-26 VITALS
DIASTOLIC BLOOD PRESSURE: 70 MMHG | HEART RATE: 60 BPM | SYSTOLIC BLOOD PRESSURE: 126 MMHG | RESPIRATION RATE: 14 BRPM | BODY MASS INDEX: 34.31 KG/M2 | WEIGHT: 206.2 LBS

## 2019-03-26 DIAGNOSIS — E11.9 TYPE 2 DIABETES MELLITUS WITHOUT COMPLICATION, WITHOUT LONG-TERM CURRENT USE OF INSULIN (HCC): Primary | ICD-10-CM

## 2019-03-26 DIAGNOSIS — I27.82 OTHER CHRONIC PULMONARY EMBOLISM WITHOUT ACUTE COR PULMONALE (HCC): ICD-10-CM

## 2019-03-26 DIAGNOSIS — E78.2 MIXED HYPERLIPIDEMIA: ICD-10-CM

## 2019-03-26 DIAGNOSIS — E03.9 HYPOTHYROIDISM, UNSPECIFIED TYPE: ICD-10-CM

## 2019-03-26 DIAGNOSIS — I10 ESSENTIAL HYPERTENSION: ICD-10-CM

## 2019-03-26 LAB — HBA1C MFR BLD: 5.5 %

## 2019-03-26 PROCEDURE — 1090F PRES/ABSN URINE INCON ASSESS: CPT | Performed by: FAMILY MEDICINE

## 2019-03-26 PROCEDURE — G8427 DOCREV CUR MEDS BY ELIG CLIN: HCPCS | Performed by: FAMILY MEDICINE

## 2019-03-26 PROCEDURE — 1036F TOBACCO NON-USER: CPT | Performed by: FAMILY MEDICINE

## 2019-03-26 PROCEDURE — G8482 FLU IMMUNIZE ORDER/ADMIN: HCPCS | Performed by: FAMILY MEDICINE

## 2019-03-26 PROCEDURE — G8417 CALC BMI ABV UP PARAM F/U: HCPCS | Performed by: FAMILY MEDICINE

## 2019-03-26 PROCEDURE — 99214 OFFICE O/P EST MOD 30 MIN: CPT | Performed by: FAMILY MEDICINE

## 2019-03-26 PROCEDURE — 4040F PNEUMOC VAC/ADMIN/RCVD: CPT | Performed by: FAMILY MEDICINE

## 2019-03-26 PROCEDURE — 1123F ACP DISCUSS/DSCN MKR DOCD: CPT | Performed by: FAMILY MEDICINE

## 2019-03-26 PROCEDURE — G8400 PT W/DXA NO RESULTS DOC: HCPCS | Performed by: FAMILY MEDICINE

## 2019-03-26 PROCEDURE — 83036 HEMOGLOBIN GLYCOSYLATED A1C: CPT | Performed by: FAMILY MEDICINE

## 2019-03-26 RX ORDER — DOXAZOSIN 8 MG/1
TABLET ORAL
Qty: 90 TABLET | Refills: 1 | Status: SHIPPED | OUTPATIENT
Start: 2019-03-26 | End: 2019-10-07 | Stop reason: SDUPTHER

## 2019-03-26 ASSESSMENT — ENCOUNTER SYMPTOMS
BLOOD IN STOOL: 0
ABDOMINAL PAIN: 0
SHORTNESS OF BREATH: 0
CHEST TIGHTNESS: 0

## 2019-03-26 ASSESSMENT — PATIENT HEALTH QUESTIONNAIRE - PHQ9
1. LITTLE INTEREST OR PLEASURE IN DOING THINGS: 0
2. FEELING DOWN, DEPRESSED OR HOPELESS: 0
SUM OF ALL RESPONSES TO PHQ9 QUESTIONS 1 & 2: 0
SUM OF ALL RESPONSES TO PHQ QUESTIONS 1-9: 0
SUM OF ALL RESPONSES TO PHQ QUESTIONS 1-9: 0

## 2019-05-31 ENCOUNTER — OFFICE VISIT (OUTPATIENT)
Dept: FAMILY MEDICINE CLINIC | Age: 79
End: 2019-05-31
Payer: MEDICARE

## 2019-05-31 VITALS
SYSTOLIC BLOOD PRESSURE: 120 MMHG | HEART RATE: 60 BPM | RESPIRATION RATE: 13 BRPM | DIASTOLIC BLOOD PRESSURE: 70 MMHG | WEIGHT: 207.2 LBS | TEMPERATURE: 99.2 F | BODY MASS INDEX: 34.48 KG/M2

## 2019-05-31 DIAGNOSIS — J40 BRONCHITIS: Primary | ICD-10-CM

## 2019-05-31 DIAGNOSIS — J30.9 ALLERGIC RHINITIS, UNSPECIFIED SEASONALITY, UNSPECIFIED TRIGGER: ICD-10-CM

## 2019-05-31 PROBLEM — H40.9 GLAUCOMA: Status: ACTIVE | Noted: 2019-05-31

## 2019-05-31 PROCEDURE — 1036F TOBACCO NON-USER: CPT | Performed by: FAMILY MEDICINE

## 2019-05-31 PROCEDURE — G8417 CALC BMI ABV UP PARAM F/U: HCPCS | Performed by: FAMILY MEDICINE

## 2019-05-31 PROCEDURE — 1123F ACP DISCUSS/DSCN MKR DOCD: CPT | Performed by: FAMILY MEDICINE

## 2019-05-31 PROCEDURE — G8427 DOCREV CUR MEDS BY ELIG CLIN: HCPCS | Performed by: FAMILY MEDICINE

## 2019-05-31 PROCEDURE — G8400 PT W/DXA NO RESULTS DOC: HCPCS | Performed by: FAMILY MEDICINE

## 2019-05-31 PROCEDURE — 1090F PRES/ABSN URINE INCON ASSESS: CPT | Performed by: FAMILY MEDICINE

## 2019-05-31 PROCEDURE — 4040F PNEUMOC VAC/ADMIN/RCVD: CPT | Performed by: FAMILY MEDICINE

## 2019-05-31 PROCEDURE — 99213 OFFICE O/P EST LOW 20 MIN: CPT | Performed by: FAMILY MEDICINE

## 2019-05-31 RX ORDER — AZITHROMYCIN 250 MG/1
TABLET, FILM COATED ORAL
Qty: 6 TABLET | Refills: 0 | Status: SHIPPED | OUTPATIENT
Start: 2019-05-31 | End: 2019-09-11

## 2019-05-31 RX ORDER — LEVOCETIRIZINE DIHYDROCHLORIDE 2.5 MG/5ML
SOLUTION ORAL
Qty: 1 BOTTLE | Refills: 1 | Status: ON HOLD | OUTPATIENT
Start: 2019-05-31 | End: 2021-01-26 | Stop reason: HOSPADM

## 2019-05-31 ASSESSMENT — ENCOUNTER SYMPTOMS
CHEST TIGHTNESS: 0
ABDOMINAL PAIN: 0
RHINORRHEA: 1
SHORTNESS OF BREATH: 1

## 2019-05-31 NOTE — PROGRESS NOTES
Subjective:      Patient ID: Dutch Lazo is a 77 y.o. female. Visit Information    Have you changed or started any medications since your last visit including any over-the-counter medicines, vitamins, or herbal medicines? no   Are you having any side effects from any of your medications? -  no  Have you stopped taking any of your medications? Is so, why? -  no    Have you seen any other physician or provider since your last visit? No  Have you had any other diagnostic tests since your last visit? No  Have you been seen in the emergency room and/or had an admission to a hospital since we last saw you? No  Have you had your routine dental cleaning in the past 6 months? no    Have you activated your [employer] account? If not, what are your barriers? No     Patient Care Team:  Virgilio Koyanagi, MD as PCP - General (Family Medicine)  Virgilio Koyanagi, MD as PCP - [facility]  Elmira Nichole MD as Consulting Physician (Pulmonology)  Mercedez Schwartz MD as Surgeon (General Surgery)  Ar Guerrero MD as Consulting Physician (Pain Management)  Alexis Gray MD as Surgeon (Orthopedic Surgery)    Medical History Review  Past Medical, Family, and Social History reviewed and does contribute to the patient presenting condition    Health Maintenance   Topic Date Due    Shingles Vaccine (1 of 2) 06/07/1989    DTaP/Tdap/Td vaccine (1 - Tdap) 10/02/2002    TSH testing  11/12/2019    Potassium monitoring  11/12/2019    Creatinine monitoring  11/12/2019    Flu vaccine  Completed    Pneumococcal 65+ years Vaccine  Completed    DEXA (modify frequency per FRAX score)  Addressed     HPI  Patient is a 79-year-old obese white female who presents for allergic rhinitis. . She also states that for almost a month she has had a cough which is now productive of yellowish sputum. She states she discontinued her  Claritin because it stopped working for her allergist and her insurance did not cover [employer].  She denies any fever, chills, chest pain or abdominal pain. She states she sometimes gets short of breath with exertion. She states she is taking and tolerating her routine medication. Review of Systems   Constitutional: Negative for chills and fever. HENT: Positive for congestion, rhinorrhea and sneezing. Respiratory: Positive for shortness of breath (sometimes with exertion). Negative for chest tightness. Cardiovascular: Negative for chest pain. Gastrointestinal: Negative for abdominal pain. Skin: Negative for rash. Objective:   Physical Exam   Constitutional: She is oriented to person, place, and time. She appears well-developed and well-nourished. No distress. HENT:   Head: Normocephalic and atraumatic. Right Ear: Tympanic membrane, external ear and ear canal normal.   Left Ear: Tympanic membrane, external ear and ear canal normal.   Nose: Nose normal.   Mouth/Throat: Oropharynx is clear and moist. No oropharyngeal exudate. Eyes: Conjunctivae are normal. Right eye exhibits no discharge. Left eye exhibits no discharge. No scleral icterus. Neck: Neck supple. Cardiovascular: Normal rate, regular rhythm and normal heart sounds. Pulmonary/Chest: Effort normal and breath sounds normal. No respiratory distress. She has no wheezes. Abdominal: Soft. She exhibits no distension. There is no tenderness. Musculoskeletal: She exhibits no edema. Neurological: She is alert and oriented to person, place, and time. Skin: Skin is warm and dry. No rash noted. Psychiatric: She has a normal mood and affect. Her behavior is normal.   Nursing note and vitals reviewed. Assessment:       Diagnosis Orders   1. Bronchitis     2.  Allergic rhinitis, unspecified seasonality, unspecified trigger             Plan:        Orders Placed This Encounter   Medications    azithromycin (ZITHROMAX) 250 MG tablet     Sig: Take 2 tablets on day 1 followed by 1 tablet daily thereafter     Dispense:  6 tablet     Refill: 0    Levocetirizine Dihydrochloride 2.5 MG/5ML SOLN     Sig: Take 1-2 teaspoons daily as needed for allergy symptoms     Dispense:  1 Bottle     Refill:  1      continue routine medication  Follow-up in 4-5 months     Ana Mccain MA

## 2019-07-22 ENCOUNTER — TELEPHONE (OUTPATIENT)
Dept: FAMILY MEDICINE CLINIC | Age: 79
End: 2019-07-22

## 2019-07-22 ENCOUNTER — HOSPITAL ENCOUNTER (OUTPATIENT)
Age: 79
Discharge: HOME | End: 2019-07-22
Payer: MEDICARE

## 2019-07-22 DIAGNOSIS — R30.0 DYSURIA: Primary | ICD-10-CM

## 2019-07-22 DIAGNOSIS — R30.0 DYSURIA: ICD-10-CM

## 2019-07-22 PROCEDURE — 87088 URINE BACTERIA CULTURE: CPT

## 2019-07-22 PROCEDURE — 87186 SC STD MICRODIL/AGAR DIL: CPT

## 2019-07-22 PROCEDURE — 87086 URINE CULTURE/COLONY COUNT: CPT

## 2019-07-22 RX ORDER — SULFAMETHOXAZOLE AND TRIMETHOPRIM 800; 160 MG/1; MG/1
1 TABLET ORAL 2 TIMES DAILY
Qty: 20 TABLET | Refills: 0 | Status: SHIPPED | OUTPATIENT
Start: 2019-07-22 | End: 2019-08-01

## 2019-07-24 LAB
CULTURE: ABNORMAL
Lab: ABNORMAL
SPECIMEN DESCRIPTION: ABNORMAL

## 2019-09-11 ENCOUNTER — HOSPITAL ENCOUNTER (OUTPATIENT)
Age: 79
Setting detail: SPECIMEN
Discharge: HOME | End: 2019-09-11
Payer: MEDICARE

## 2019-09-11 ENCOUNTER — OFFICE VISIT (OUTPATIENT)
Dept: FAMILY MEDICINE CLINIC | Age: 79
End: 2019-09-11
Payer: MEDICARE

## 2019-09-11 VITALS
WEIGHT: 207 LBS | TEMPERATURE: 97.8 F | BODY MASS INDEX: 34.45 KG/M2 | RESPIRATION RATE: 14 BRPM | HEART RATE: 64 BPM | SYSTOLIC BLOOD PRESSURE: 128 MMHG | DIASTOLIC BLOOD PRESSURE: 78 MMHG

## 2019-09-11 DIAGNOSIS — I10 ESSENTIAL HYPERTENSION: ICD-10-CM

## 2019-09-11 DIAGNOSIS — Z01.818 PREOPERATIVE CLEARANCE: ICD-10-CM

## 2019-09-11 DIAGNOSIS — R39.9 URINARY TRACT INFECTION SYMPTOMS: ICD-10-CM

## 2019-09-11 DIAGNOSIS — E03.9 HYPOTHYROIDISM, UNSPECIFIED TYPE: ICD-10-CM

## 2019-09-11 DIAGNOSIS — E11.9 TYPE 2 DIABETES MELLITUS WITHOUT COMPLICATION, WITHOUT LONG-TERM CURRENT USE OF INSULIN (HCC): Primary | ICD-10-CM

## 2019-09-11 DIAGNOSIS — E78.2 MIXED HYPERLIPIDEMIA: ICD-10-CM

## 2019-09-11 LAB
BILIRUBIN, POC: NEGATIVE
BLOOD URINE, POC: ABNORMAL
CLARITY, POC: ABNORMAL
COLOR, POC: YELLOW
GLUCOSE URINE, POC: NEGATIVE
KETONES, POC: NEGATIVE
LEUKOCYTE EST, POC: ABNORMAL
NITRITE, POC: NEGATIVE
PH, POC: 6.5
PROTEIN, POC: NEGATIVE
SPECIFIC GRAVITY, POC: 1
UROBILINOGEN, POC: NEGATIVE

## 2019-09-11 PROCEDURE — 4040F PNEUMOC VAC/ADMIN/RCVD: CPT | Performed by: NURSE PRACTITIONER

## 2019-09-11 PROCEDURE — 99214 OFFICE O/P EST MOD 30 MIN: CPT | Performed by: NURSE PRACTITIONER

## 2019-09-11 PROCEDURE — 1036F TOBACCO NON-USER: CPT | Performed by: NURSE PRACTITIONER

## 2019-09-11 PROCEDURE — 81003 URINALYSIS AUTO W/O SCOPE: CPT | Performed by: NURSE PRACTITIONER

## 2019-09-11 PROCEDURE — 1123F ACP DISCUSS/DSCN MKR DOCD: CPT | Performed by: NURSE PRACTITIONER

## 2019-09-11 PROCEDURE — 1090F PRES/ABSN URINE INCON ASSESS: CPT | Performed by: NURSE PRACTITIONER

## 2019-09-11 PROCEDURE — G8417 CALC BMI ABV UP PARAM F/U: HCPCS | Performed by: NURSE PRACTITIONER

## 2019-09-11 PROCEDURE — G8400 PT W/DXA NO RESULTS DOC: HCPCS | Performed by: NURSE PRACTITIONER

## 2019-09-11 PROCEDURE — G8427 DOCREV CUR MEDS BY ELIG CLIN: HCPCS | Performed by: NURSE PRACTITIONER

## 2019-09-11 RX ORDER — SULFAMETHOXAZOLE AND TRIMETHOPRIM 800; 160 MG/1; MG/1
1 TABLET ORAL 2 TIMES DAILY
Qty: 20 TABLET | Refills: 0 | Status: SHIPPED | OUTPATIENT
Start: 2019-09-11 | End: 2021-04-23 | Stop reason: SDUPTHER

## 2019-09-11 ASSESSMENT — ENCOUNTER SYMPTOMS
WHEEZING: 0
NAUSEA: 0
ABDOMINAL PAIN: 0
SHORTNESS OF BREATH: 0
CHEST TIGHTNESS: 0

## 2019-09-12 ENCOUNTER — HOSPITAL ENCOUNTER (OUTPATIENT)
Age: 79
Discharge: HOME | End: 2019-09-12
Payer: MEDICARE

## 2019-09-12 DIAGNOSIS — E03.9 HYPOTHYROIDISM, UNSPECIFIED TYPE: ICD-10-CM

## 2019-09-12 DIAGNOSIS — Z01.818 PREOPERATIVE CLEARANCE: ICD-10-CM

## 2019-09-12 DIAGNOSIS — E11.9 TYPE 2 DIABETES MELLITUS WITHOUT COMPLICATION, WITHOUT LONG-TERM CURRENT USE OF INSULIN (HCC): ICD-10-CM

## 2019-09-12 DIAGNOSIS — E78.2 MIXED HYPERLIPIDEMIA: ICD-10-CM

## 2019-09-12 DIAGNOSIS — I10 ESSENTIAL HYPERTENSION: ICD-10-CM

## 2019-09-12 LAB
ALT SERPL-CCNC: 10 U/L (ref 5–33)
CHOLESTEROL/HDL RATIO: 2.1
CHOLESTEROL: 150 MG/DL
ESTIMATED AVERAGE GLUCOSE: 103 MG/DL
HBA1C MFR BLD: 5.2 % (ref 4–6)
HCT VFR BLD CALC: 42.8 % (ref 36–46)
HDLC SERPL-MCNC: 70 MG/DL
HEMOGLOBIN: 14.1 G/DL (ref 12–16)
LDL CHOLESTEROL: 63 MG/DL (ref 0–130)
MAGNESIUM: 2 MG/DL (ref 1.6–2.6)
MCH RBC QN AUTO: 32.5 PG (ref 26–34)
MCHC RBC AUTO-ENTMCNC: 33 G/DL (ref 31–37)
MCV RBC AUTO: 98.5 FL (ref 80–100)
NRBC AUTOMATED: ABNORMAL PER 100 WBC
PDW BLD-RTO: 14.6 % (ref 11.5–14.9)
PLATELET # BLD: 130 K/UL (ref 150–450)
PMV BLD AUTO: 9.1 FL (ref 6–12)
RBC # BLD: 4.34 M/UL (ref 4–5.2)
TRIGL SERPL-MCNC: 85 MG/DL
TSH SERPL DL<=0.05 MIU/L-ACNC: 3.08 MIU/L (ref 0.3–5)
VLDLC SERPL CALC-MCNC: NORMAL MG/DL (ref 1–30)
WBC # BLD: 4.3 K/UL (ref 3.5–11)

## 2019-09-12 PROCEDURE — 83036 HEMOGLOBIN GLYCOSYLATED A1C: CPT

## 2019-09-12 PROCEDURE — 36415 COLL VENOUS BLD VENIPUNCTURE: CPT

## 2019-09-12 PROCEDURE — 84443 ASSAY THYROID STIM HORMONE: CPT

## 2019-09-12 PROCEDURE — 83735 ASSAY OF MAGNESIUM: CPT

## 2019-09-12 PROCEDURE — 85027 COMPLETE CBC AUTOMATED: CPT

## 2019-09-12 PROCEDURE — 84460 ALANINE AMINO (ALT) (SGPT): CPT

## 2019-09-12 PROCEDURE — 80061 LIPID PANEL: CPT

## 2019-09-13 LAB
CULTURE: ABNORMAL
Lab: ABNORMAL
SPECIMEN DESCRIPTION: ABNORMAL

## 2019-10-07 ENCOUNTER — OFFICE VISIT (OUTPATIENT)
Dept: FAMILY MEDICINE CLINIC | Age: 79
End: 2019-10-07
Payer: MEDICARE

## 2019-10-07 VITALS
WEIGHT: 207 LBS | SYSTOLIC BLOOD PRESSURE: 126 MMHG | DIASTOLIC BLOOD PRESSURE: 80 MMHG | RESPIRATION RATE: 13 BRPM | BODY MASS INDEX: 34.49 KG/M2 | HEART RATE: 58 BPM | HEIGHT: 65 IN | TEMPERATURE: 97.8 F

## 2019-10-07 DIAGNOSIS — Z23 NEED FOR INFLUENZA VACCINATION: ICD-10-CM

## 2019-10-07 DIAGNOSIS — Z96.22 S/P BILATERAL MYRINGOTOMY WITH TUBE PLACEMENT: ICD-10-CM

## 2019-10-07 DIAGNOSIS — E11.9 TYPE 2 DIABETES MELLITUS WITHOUT COMPLICATION, WITHOUT LONG-TERM CURRENT USE OF INSULIN (HCC): ICD-10-CM

## 2019-10-07 DIAGNOSIS — B37.2 MONILIAL RASH: Primary | ICD-10-CM

## 2019-10-07 PROCEDURE — 1036F TOBACCO NON-USER: CPT | Performed by: FAMILY MEDICINE

## 2019-10-07 PROCEDURE — 99213 OFFICE O/P EST LOW 20 MIN: CPT | Performed by: FAMILY MEDICINE

## 2019-10-07 PROCEDURE — G8417 CALC BMI ABV UP PARAM F/U: HCPCS | Performed by: FAMILY MEDICINE

## 2019-10-07 PROCEDURE — 4040F PNEUMOC VAC/ADMIN/RCVD: CPT | Performed by: FAMILY MEDICINE

## 2019-10-07 PROCEDURE — G0008 ADMIN INFLUENZA VIRUS VAC: HCPCS | Performed by: FAMILY MEDICINE

## 2019-10-07 PROCEDURE — G8427 DOCREV CUR MEDS BY ELIG CLIN: HCPCS | Performed by: FAMILY MEDICINE

## 2019-10-07 PROCEDURE — G8400 PT W/DXA NO RESULTS DOC: HCPCS | Performed by: FAMILY MEDICINE

## 2019-10-07 PROCEDURE — 1123F ACP DISCUSS/DSCN MKR DOCD: CPT | Performed by: FAMILY MEDICINE

## 2019-10-07 PROCEDURE — G8482 FLU IMMUNIZE ORDER/ADMIN: HCPCS | Performed by: FAMILY MEDICINE

## 2019-10-07 PROCEDURE — 1090F PRES/ABSN URINE INCON ASSESS: CPT | Performed by: FAMILY MEDICINE

## 2019-10-07 PROCEDURE — 90653 IIV ADJUVANT VACCINE IM: CPT | Performed by: FAMILY MEDICINE

## 2019-10-07 RX ORDER — CLOTRIMAZOLE 1 G/ML
SOLUTION TOPICAL
Qty: 60 ML | Refills: 2 | Status: SHIPPED | OUTPATIENT
Start: 2019-10-07 | End: 2019-11-12

## 2019-10-07 RX ORDER — DOXAZOSIN 8 MG/1
TABLET ORAL
Qty: 90 TABLET | Refills: 1 | Status: SHIPPED | OUTPATIENT
Start: 2019-10-07 | End: 2020-03-11

## 2019-10-07 RX ORDER — ATENOLOL 100 MG/1
100 TABLET ORAL DAILY
Qty: 90 TABLET | Refills: 1 | Status: SHIPPED | OUTPATIENT
Start: 2019-10-07 | End: 2020-03-11

## 2019-10-07 ASSESSMENT — ENCOUNTER SYMPTOMS
SHORTNESS OF BREATH: 0
CHEST TIGHTNESS: 0
ABDOMINAL PAIN: 0
BLOOD IN STOOL: 0

## 2019-10-17 ENCOUNTER — TELEPHONE (OUTPATIENT)
Dept: FAMILY MEDICINE CLINIC | Age: 79
End: 2019-10-17

## 2019-10-17 RX ORDER — FLUCONAZOLE 150 MG/1
150 TABLET ORAL DAILY
Qty: 3 TABLET | Refills: 0 | Status: SHIPPED | OUTPATIENT
Start: 2019-10-17 | End: 2019-10-20

## 2019-11-12 ENCOUNTER — OFFICE VISIT (OUTPATIENT)
Dept: FAMILY MEDICINE CLINIC | Age: 79
End: 2019-11-12
Payer: MEDICARE

## 2019-11-12 VITALS
WEIGHT: 202 LBS | HEART RATE: 74 BPM | HEIGHT: 65 IN | SYSTOLIC BLOOD PRESSURE: 136 MMHG | TEMPERATURE: 97.4 F | BODY MASS INDEX: 33.66 KG/M2 | DIASTOLIC BLOOD PRESSURE: 68 MMHG

## 2019-11-12 DIAGNOSIS — E11.9 TYPE 2 DIABETES MELLITUS WITHOUT COMPLICATION, WITHOUT LONG-TERM CURRENT USE OF INSULIN (HCC): ICD-10-CM

## 2019-11-12 DIAGNOSIS — B37.2 MONILIAL RASH: Primary | ICD-10-CM

## 2019-11-12 PROCEDURE — G8482 FLU IMMUNIZE ORDER/ADMIN: HCPCS | Performed by: FAMILY MEDICINE

## 2019-11-12 PROCEDURE — G8417 CALC BMI ABV UP PARAM F/U: HCPCS | Performed by: FAMILY MEDICINE

## 2019-11-12 PROCEDURE — 1036F TOBACCO NON-USER: CPT | Performed by: FAMILY MEDICINE

## 2019-11-12 PROCEDURE — 99213 OFFICE O/P EST LOW 20 MIN: CPT | Performed by: FAMILY MEDICINE

## 2019-11-12 PROCEDURE — 1090F PRES/ABSN URINE INCON ASSESS: CPT | Performed by: FAMILY MEDICINE

## 2019-11-12 PROCEDURE — G8400 PT W/DXA NO RESULTS DOC: HCPCS | Performed by: FAMILY MEDICINE

## 2019-11-12 PROCEDURE — G8427 DOCREV CUR MEDS BY ELIG CLIN: HCPCS | Performed by: FAMILY MEDICINE

## 2019-11-12 PROCEDURE — 4040F PNEUMOC VAC/ADMIN/RCVD: CPT | Performed by: FAMILY MEDICINE

## 2019-11-12 PROCEDURE — 1123F ACP DISCUSS/DSCN MKR DOCD: CPT | Performed by: FAMILY MEDICINE

## 2019-11-12 RX ORDER — CLOTRIMAZOLE 1 %
CREAM (GRAM) TOPICAL
Refills: 2 | COMMUNITY
Start: 2019-11-04 | End: 2019-11-12 | Stop reason: SDUPTHER

## 2019-11-12 RX ORDER — CLOTRIMAZOLE 1 %
CREAM (GRAM) TOPICAL
Qty: 45 G | Refills: 2 | Status: SHIPPED | OUTPATIENT
Start: 2019-11-12 | End: 2020-08-04 | Stop reason: ALTCHOICE

## 2019-11-12 ASSESSMENT — ENCOUNTER SYMPTOMS
BLOOD IN STOOL: 0
CHEST TIGHTNESS: 0
ABDOMINAL PAIN: 0
SHORTNESS OF BREATH: 0

## 2020-03-11 RX ORDER — DOXAZOSIN 8 MG/1
TABLET ORAL
Qty: 90 TABLET | Refills: 1 | Status: SHIPPED | OUTPATIENT
Start: 2020-03-11 | End: 2020-08-26

## 2020-03-11 RX ORDER — ATENOLOL 100 MG/1
100 TABLET ORAL DAILY
Qty: 90 TABLET | Refills: 1 | Status: SHIPPED | OUTPATIENT
Start: 2020-03-11 | End: 2020-10-05

## 2020-03-30 RX ORDER — LEVOTHYROXINE SODIUM 0.1 MG/1
TABLET ORAL
Qty: 90 TABLET | Refills: 1 | Status: SHIPPED | OUTPATIENT
Start: 2020-03-30 | End: 2020-08-26

## 2020-08-04 ENCOUNTER — OFFICE VISIT (OUTPATIENT)
Dept: FAMILY MEDICINE CLINIC | Age: 80
End: 2020-08-04
Payer: MEDICARE

## 2020-08-04 VITALS
DIASTOLIC BLOOD PRESSURE: 80 MMHG | BODY MASS INDEX: 32.52 KG/M2 | TEMPERATURE: 96.6 F | OXYGEN SATURATION: 98 % | WEIGHT: 195.4 LBS | SYSTOLIC BLOOD PRESSURE: 130 MMHG | HEART RATE: 75 BPM

## 2020-08-04 LAB — HBA1C MFR BLD: 5.3 %

## 2020-08-04 PROCEDURE — 4040F PNEUMOC VAC/ADMIN/RCVD: CPT | Performed by: FAMILY MEDICINE

## 2020-08-04 PROCEDURE — 83036 HEMOGLOBIN GLYCOSYLATED A1C: CPT | Performed by: FAMILY MEDICINE

## 2020-08-04 PROCEDURE — 1090F PRES/ABSN URINE INCON ASSESS: CPT | Performed by: FAMILY MEDICINE

## 2020-08-04 PROCEDURE — G8427 DOCREV CUR MEDS BY ELIG CLIN: HCPCS | Performed by: FAMILY MEDICINE

## 2020-08-04 PROCEDURE — 1123F ACP DISCUSS/DSCN MKR DOCD: CPT | Performed by: FAMILY MEDICINE

## 2020-08-04 PROCEDURE — 1036F TOBACCO NON-USER: CPT | Performed by: FAMILY MEDICINE

## 2020-08-04 PROCEDURE — G8417 CALC BMI ABV UP PARAM F/U: HCPCS | Performed by: FAMILY MEDICINE

## 2020-08-04 PROCEDURE — 99214 OFFICE O/P EST MOD 30 MIN: CPT | Performed by: FAMILY MEDICINE

## 2020-08-04 PROCEDURE — G8400 PT W/DXA NO RESULTS DOC: HCPCS | Performed by: FAMILY MEDICINE

## 2020-08-04 RX ORDER — NETARSUDIL 0.2 MG/ML
1 SOLUTION/ DROPS OPHTHALMIC; TOPICAL NIGHTLY
Status: ON HOLD | COMMUNITY
Start: 2020-07-24 | End: 2022-08-11 | Stop reason: HOSPADM

## 2020-08-04 RX ORDER — TIMOLOL MALEATE 5 MG/ML
1 SOLUTION/ DROPS OPHTHALMIC 2 TIMES DAILY
Status: ON HOLD | COMMUNITY
Start: 2020-08-03 | End: 2022-08-11 | Stop reason: HOSPADM

## 2020-08-04 ASSESSMENT — ENCOUNTER SYMPTOMS
BLOOD IN STOOL: 0
CHEST TIGHTNESS: 0
SHORTNESS OF BREATH: 0
ABDOMINAL PAIN: 0

## 2020-08-04 ASSESSMENT — PATIENT HEALTH QUESTIONNAIRE - PHQ9
SUM OF ALL RESPONSES TO PHQ QUESTIONS 1-9: 0
1. LITTLE INTEREST OR PLEASURE IN DOING THINGS: 0
2. FEELING DOWN, DEPRESSED OR HOPELESS: 0
SUM OF ALL RESPONSES TO PHQ9 QUESTIONS 1 & 2: 0
SUM OF ALL RESPONSES TO PHQ QUESTIONS 1-9: 0

## 2020-08-04 NOTE — PROGRESS NOTES
Subjective:      Patient ID: Chanel Murray is a 79 y.o. female. Chronic Disease Visit Information    BP Readings from Last 3 Encounters:   11/12/19 136/68   10/07/19 126/80   09/11/19 128/78          Hemoglobin A1C (%)   Date Value   09/12/2019 5.2   03/26/2019 5.5   11/12/2018 5.2     Microalb/Crt. Ratio (mcg/mg creat)   Date Value   05/04/2017 29 (H)     LDL Cholesterol (mg/dL)   Date Value   09/12/2019 63     HDL (mg/dL)   Date Value   09/12/2019 70     BUN (mg/dL)   Date Value   11/12/2018 11     CREATININE (mg/dL)   Date Value   11/12/2018 0.78     Glucose (mg/dL)   Date Value   11/12/2018 115 (H)   03/17/2012 123 (H)            Have you changed or started any medications since your last visit including any over-the-counter medicines, vitamins, or herbal medicines? yes - rhopressa   Are you having any side effects from any of your medications? -  no  Have you stopped taking any of your medications? Is so, why? -  Unknown, patient unable to verify complete med list.    Have you seen any other physician or provider since your last visit? Yes - Records Obtained  Have you had any other diagnostic tests since your last visit? No  Have you been seen in the emergency room and/or had an admission to a hospital since we last saw you? No  Have you had your annual diabetic retinal (eye) exam? Yes - Records Obtained  Have you had your routine dental cleaning in the past 6 months? no    Have you activated your [employer] account? If not, what are your barriers?  No: declined     Patient Care Team:  Kindra Moyer MD as PCP - General (Family Medicine)  Kindra Moyer MD as PCP - [facility]  Alfonso Griffiths MD as Consulting Physician (Pulmonology)  Ja Avila MD as Surgeon (General Surgery)  Love Augustin MD as Consulting Physician (Pain Management)  Cat Vargas MD as Surgeon (Orthopedic Surgery)         Medical History Review  Past Medical, Family, and Social History reviewed and does exercise. Discussed use, benefit, and side effects of prescribed medications. Barriers to medication compliance addressed. Patient given educational materials - see patient instructions  Was a self-tracking handout given in paper form or via [employer]? No:     Requested Prescriptions      No prescriptions requested or ordered in this encounter       All patient questions answered. Patient voiced understanding. Quality Measures    Body mass index is 32.52 kg/m². Elevated. Weight control planned discussed Healthy diet and regular exercise. BP: 130/80. Blood pressure is normal. Treatment plan consists of Weight Reduction. Fall Risk 8/4/2020 3/26/2019 1/9/2018 8/12/2016 6/23/2015 6/13/2014   2 or more falls in past year? no no no no no no   Fall with injury in past year? no no no no no no     The patient does not have a history of falls. I did , complete a risk assessment for falls.  A plan of care for falls No Treatment plan indicated    Lab Results   Component Value Date    LDLCHOLESTEROL 63 09/12/2019    (goal LDL reduction with dx if diabetes is 50% LDL reduction)    PHQ Scores 8/4/2020 3/26/2019 10/22/2018 8/7/2017 4/11/2016 2/20/2015 2/13/2014   PHQ2 Score 0 0 0 0 0 0 0   PHQ9 Score 0 0 0 0 0 0 0     Interpretation of Total Score Depression Severity: 1-4 = Minimal depression, 5-9 = Mild depression, 10-14 = Moderate depression, 15-19 = Moderately severe depression, 20-27 = Severe depression    Orders Placed This Encounter   Procedures    Microalbumin, Ur     Standing Status:   Future     Standing Expiration Date:   8/3/2021    ALT     Standing Status:   Future     Standing Expiration Date:   8/4/2021    AST     Standing Status:   Future     Standing Expiration Date:   8/4/2021    Basic Metabolic Panel     Fasting     Standing Status:   Future     Standing Expiration Date:   8/4/2021    Lipid Panel     Standing Status:   Future     Standing Expiration Date:   8/4/2021     Order Specific Question:

## 2020-08-11 ENCOUNTER — HOSPITAL ENCOUNTER (OUTPATIENT)
Age: 80
Discharge: HOME | End: 2020-08-11
Payer: MEDICARE

## 2020-08-11 LAB
ALT SERPL-CCNC: 13 U/L (ref 5–33)
ANION GAP SERPL CALCULATED.3IONS-SCNC: 10 MMOL/L (ref 9–17)
AST SERPL-CCNC: 22 U/L
BUN BLDV-MCNC: 7 MG/DL (ref 8–23)
BUN/CREAT BLD: ABNORMAL (ref 9–20)
CALCIUM SERPL-MCNC: 9.4 MG/DL (ref 8.6–10.4)
CHLORIDE BLD-SCNC: 103 MMOL/L (ref 98–107)
CHOLESTEROL/HDL RATIO: 2.2
CHOLESTEROL: 141 MG/DL
CO2: 25 MMOL/L (ref 20–31)
CREAT SERPL-MCNC: 0.76 MG/DL (ref 0.5–0.9)
CREATININE URINE: 16.7 MG/DL (ref 28–217)
GFR AFRICAN AMERICAN: >60 ML/MIN
GFR NON-AFRICAN AMERICAN: >60 ML/MIN
GFR SERPL CREATININE-BSD FRML MDRD: ABNORMAL ML/MIN/{1.73_M2}
GFR SERPL CREATININE-BSD FRML MDRD: ABNORMAL ML/MIN/{1.73_M2}
GLUCOSE BLD-MCNC: 120 MG/DL (ref 70–99)
HDLC SERPL-MCNC: 63 MG/DL
LDL CHOLESTEROL: 58 MG/DL (ref 0–130)
MAGNESIUM: 2 MG/DL (ref 1.6–2.6)
MICROALBUMIN/CREAT 24H UR: <12 MG/L
MICROALBUMIN/CREAT UR-RTO: ABNORMAL MCG/MG CREAT
POTASSIUM SERPL-SCNC: 4.2 MMOL/L (ref 3.7–5.3)
SODIUM BLD-SCNC: 138 MMOL/L (ref 135–144)
TRIGL SERPL-MCNC: 98 MG/DL
TSH SERPL DL<=0.05 MIU/L-ACNC: 1.39 MIU/L (ref 0.3–5)
VLDLC SERPL CALC-MCNC: NORMAL MG/DL (ref 1–30)

## 2020-08-11 PROCEDURE — 84450 TRANSFERASE (AST) (SGOT): CPT

## 2020-08-11 PROCEDURE — 83735 ASSAY OF MAGNESIUM: CPT

## 2020-08-11 PROCEDURE — 80048 BASIC METABOLIC PNL TOTAL CA: CPT

## 2020-08-11 PROCEDURE — 84460 ALANINE AMINO (ALT) (SGPT): CPT

## 2020-08-11 PROCEDURE — 84443 ASSAY THYROID STIM HORMONE: CPT

## 2020-08-11 PROCEDURE — 82570 ASSAY OF URINE CREATININE: CPT

## 2020-08-11 PROCEDURE — 82043 UR ALBUMIN QUANTITATIVE: CPT

## 2020-08-11 PROCEDURE — 80061 LIPID PANEL: CPT

## 2020-08-11 PROCEDURE — 36415 COLL VENOUS BLD VENIPUNCTURE: CPT

## 2020-08-26 RX ORDER — LEVOTHYROXINE SODIUM 0.1 MG/1
TABLET ORAL
Qty: 90 TABLET | Refills: 1 | Status: SHIPPED | OUTPATIENT
Start: 2020-08-26 | End: 2020-12-28

## 2020-08-26 RX ORDER — DOXAZOSIN 8 MG/1
TABLET ORAL
Qty: 90 TABLET | Refills: 1 | Status: SHIPPED | OUTPATIENT
Start: 2020-08-26 | End: 2020-12-28

## 2020-10-05 RX ORDER — ATENOLOL 100 MG/1
100 TABLET ORAL DAILY
Qty: 90 TABLET | Refills: 1 | Status: SHIPPED | OUTPATIENT
Start: 2020-10-05 | End: 2021-06-21

## 2020-11-30 ENCOUNTER — TELEPHONE (OUTPATIENT)
Dept: FAMILY MEDICINE CLINIC | Age: 80
End: 2020-11-30

## 2020-11-30 RX ORDER — FLUTICASONE PROPIONATE 50 MCG
SPRAY, SUSPENSION (ML) NASAL
Qty: 1 BOTTLE | Refills: 1 | Status: SHIPPED | OUTPATIENT
Start: 2020-11-30 | End: 2021-02-10

## 2020-11-30 RX ORDER — GUAIFENESIN AND DEXTROMETHORPHAN HYDROBROMIDE 600; 30 MG/1; MG/1
TABLET, EXTENDED RELEASE ORAL
Qty: 28 TABLET | Refills: 1 | Status: SHIPPED | OUTPATIENT
Start: 2020-11-30 | End: 2021-01-12 | Stop reason: ALTCHOICE

## 2020-11-30 NOTE — TELEPHONE ENCOUNTER
The patient is currently having nasal congestion with white drainage, cough with white sputum, ear pressure, headache- off and on. Symptoms started last Friday. She wondered if something could be sent to [facility] on [address]. Please advise.

## 2020-12-04 ENCOUNTER — TELEPHONE (OUTPATIENT)
Dept: FAMILY MEDICINE CLINIC | Age: 80
End: 2020-12-04

## 2020-12-04 RX ORDER — BENZONATATE 100 MG/1
CAPSULE ORAL
Qty: 30 CAPSULE | Refills: 1 | Status: SHIPPED | OUTPATIENT
Start: 2020-12-04 | End: 2021-01-12 | Stop reason: ALTCHOICE

## 2020-12-04 RX ORDER — AZITHROMYCIN 250 MG/1
TABLET, FILM COATED ORAL
Qty: 6 TABLET | Refills: 0 | Status: SHIPPED | OUTPATIENT
Start: 2020-12-04 | End: 2021-01-12 | Stop reason: ALTCHOICE

## 2020-12-04 NOTE — TELEPHONE ENCOUNTER
The patient did not  script for Flonase due to glaucoma or Mucinex DM due to her insurance not covering it and history of HTN. She is still congested with white drainage and coughing up white sputum. She wondered if you could send an antibiotic to [facility]. Please advise.

## 2020-12-04 NOTE — TELEPHONE ENCOUNTER
GRACIA-Hugo and Tessalon Perles prescribed. Patient should try using normal saline nasal spray and humidifiers.

## 2020-12-28 RX ORDER — DOXAZOSIN 8 MG/1
TABLET ORAL
Qty: 90 TABLET | Refills: 1 | Status: ON HOLD | OUTPATIENT
Start: 2020-12-28 | End: 2021-01-26 | Stop reason: HOSPADM

## 2020-12-28 RX ORDER — LEVOTHYROXINE SODIUM 0.1 MG/1
TABLET ORAL
Qty: 90 TABLET | Refills: 1 | Status: SHIPPED | OUTPATIENT
Start: 2020-12-28 | End: 2021-06-21

## 2021-01-12 ENCOUNTER — OFFICE VISIT (OUTPATIENT)
Dept: FAMILY MEDICINE CLINIC | Age: 81
End: 2021-01-12
Payer: MEDICARE

## 2021-01-12 VITALS
HEART RATE: 66 BPM | HEIGHT: 65 IN | DIASTOLIC BLOOD PRESSURE: 68 MMHG | RESPIRATION RATE: 16 BRPM | BODY MASS INDEX: 32.15 KG/M2 | WEIGHT: 193 LBS | TEMPERATURE: 97.2 F | SYSTOLIC BLOOD PRESSURE: 120 MMHG

## 2021-01-12 DIAGNOSIS — E78.2 MIXED HYPERLIPIDEMIA: ICD-10-CM

## 2021-01-12 DIAGNOSIS — I27.82 OTHER CHRONIC PULMONARY EMBOLISM WITHOUT ACUTE COR PULMONALE (HCC): ICD-10-CM

## 2021-01-12 DIAGNOSIS — E11.9 TYPE 2 DIABETES MELLITUS WITHOUT COMPLICATION, WITHOUT LONG-TERM CURRENT USE OF INSULIN (HCC): Primary | ICD-10-CM

## 2021-01-12 DIAGNOSIS — K21.9 GASTROESOPHAGEAL REFLUX DISEASE, UNSPECIFIED WHETHER ESOPHAGITIS PRESENT: ICD-10-CM

## 2021-01-12 DIAGNOSIS — I10 ESSENTIAL HYPERTENSION: ICD-10-CM

## 2021-01-12 LAB — HBA1C MFR BLD: 4.9 %

## 2021-01-12 PROCEDURE — 1123F ACP DISCUSS/DSCN MKR DOCD: CPT | Performed by: FAMILY MEDICINE

## 2021-01-12 PROCEDURE — G8417 CALC BMI ABV UP PARAM F/U: HCPCS | Performed by: FAMILY MEDICINE

## 2021-01-12 PROCEDURE — 99214 OFFICE O/P EST MOD 30 MIN: CPT | Performed by: FAMILY MEDICINE

## 2021-01-12 PROCEDURE — G8484 FLU IMMUNIZE NO ADMIN: HCPCS | Performed by: FAMILY MEDICINE

## 2021-01-12 PROCEDURE — G8400 PT W/DXA NO RESULTS DOC: HCPCS | Performed by: FAMILY MEDICINE

## 2021-01-12 PROCEDURE — 4040F PNEUMOC VAC/ADMIN/RCVD: CPT | Performed by: FAMILY MEDICINE

## 2021-01-12 PROCEDURE — 1036F TOBACCO NON-USER: CPT | Performed by: FAMILY MEDICINE

## 2021-01-12 PROCEDURE — G8427 DOCREV CUR MEDS BY ELIG CLIN: HCPCS | Performed by: FAMILY MEDICINE

## 2021-01-12 PROCEDURE — 1090F PRES/ABSN URINE INCON ASSESS: CPT | Performed by: FAMILY MEDICINE

## 2021-01-12 PROCEDURE — 83036 HEMOGLOBIN GLYCOSYLATED A1C: CPT | Performed by: FAMILY MEDICINE

## 2021-01-12 ASSESSMENT — PATIENT HEALTH QUESTIONNAIRE - PHQ9
2. FEELING DOWN, DEPRESSED OR HOPELESS: 0
SUM OF ALL RESPONSES TO PHQ QUESTIONS 1-9: 0
SUM OF ALL RESPONSES TO PHQ9 QUESTIONS 1 & 2: 0
SUM OF ALL RESPONSES TO PHQ QUESTIONS 1-9: 0

## 2021-01-12 ASSESSMENT — ENCOUNTER SYMPTOMS
BLOOD IN STOOL: 0
ABDOMINAL PAIN: 0
CHEST TIGHTNESS: 0
SHORTNESS OF BREATH: 0

## 2021-01-12 NOTE — PROGRESS NOTES
Health Maintenance   Topic Date Due    DTaP/Tdap/Td vaccine (1 - Tdap) 06/07/1958    Shingles Vaccine (1 of 2) 06/07/1989    Annual Wellness Visit (AWV)  05/29/2019    TSH testing  08/11/2021    Potassium monitoring  08/11/2021    Creatinine monitoring  08/11/2021    Flu vaccine  Completed    Pneumococcal 65+ years Vaccine  Completed    DEXA (modify frequency per FRAX score)  Addressed    Hepatitis A vaccine  Aged Out    Hib vaccine  Aged Out    Meningococcal (ACWY) vaccine  Aged Out     Patient is an 79-year-old obese white female who presents for diabetes, hypertension, hyperlipidemia, GERD. She states she is taking and tolerating her routine medication. Her hemoglobin A1c today is 4.9. She denies any chest pain, abdominal pain, shortness of breath, fever or chills. She states she has a good appetite and remains active. Review of Systems   Constitutional: Negative for chills and fever. HENT: Negative for congestion. Respiratory: Negative for chest tightness and shortness of breath. Cardiovascular: Negative for chest pain. Gastrointestinal: Negative for abdominal pain and blood in stool. Genitourinary: Negative for dysuria and hematuria. Skin: Negative for rash. Neurological: Negative for dizziness. Psychiatric/Behavioral: Negative for confusion and dysphoric mood. Objective:   Physical Exam  Vitals signs and nursing note reviewed. Constitutional:       General: She is not in acute distress. Appearance: She is well-developed. HENT:      Head: Normocephalic and atraumatic. Right Ear: Tympanic membrane, ear canal and external ear normal.      Left Ear: Tympanic membrane, ear canal and external ear normal.      Nose: Nose normal.      Mouth/Throat:      Mouth: Mucous membranes are moist.      Pharynx: Oropharynx is clear. Eyes:      General: No scleral icterus. Right eye: No discharge. Left eye: No discharge. Conjunctiva/sclera: Conjunctivae normal.   Neck:      Musculoskeletal: Neck supple. Cardiovascular:      Rate and Rhythm: Normal rate and regular rhythm. Heart sounds: Normal heart sounds. Pulmonary:      Effort: Pulmonary effort is normal. No respiratory distress. Breath sounds: Normal breath sounds. No wheezing. Abdominal:      General: There is no distension. Palpations: Abdomen is soft. Tenderness: There is no abdominal tenderness. Skin:     General: Skin is warm and dry. Findings: No rash. Neurological:      Mental Status: She is alert and oriented to person, place, and time. Psychiatric:         Mood and Affect: Mood normal.         Behavior: Behavior normal.         Assessment:      1. Type 2 diabetes mellitus without complication, without long-term current use of insulin (HCC)    - POCT glycosylated hemoglobin (Hb A1C)  - Comprehensive Metabolic Panel; Future  - Lipid Panel; Future  - Magnesium; Future  Continue current management  2. Essential hypertension    - Comprehensive Metabolic Panel; Future  - Lipid Panel; Future  - Magnesium; Future  Continue current management  3. Mixed hyperlipidemia    - Comprehensive Metabolic Panel; Future  - Lipid Panel; Future  Continue current management  4. Gastroesophageal reflux disease, unspecified whether esophagitis present    - Magnesium; Future  Continue current management  5. 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 Discussed use, benefit, and side effects of prescribed medications. Barriers to medication compliance addressed.   Patient given educational materials - see patient instructions Was a self-tracking handout given in paper form or via [employer]? No:     Requested Prescriptions      No prescriptions requested or ordered in this encounter       All patient questions answered. Patient voiced understanding. Quality Measures    Body mass index is 32.12 kg/m². Elevated. Weight control planned discussed Healthy diet and regular exercise. BP: 120/68. Blood pressure is normal. Treatment plan consists of Weight Reduction. Fall Risk 8/4/2020 3/26/2019 1/9/2018 8/12/2016 6/23/2015 6/13/2014   2 or more falls in past year? no no no no no no   Fall with injury in past year? no no no no no no     The patient does not have a history of falls. I did not - not indicated , complete a risk assessment for falls. A plan of care for falls No Treatment plan indicated    Lab Results   Component Value Date    LDLCHOLESTEROL 58 08/11/2020    (goal LDL reduction with dx if diabetes is 50% LDL reduction)    PHQ Scores 1/12/2021 8/4/2020 3/26/2019 10/22/2018 8/7/2017 4/11/2016 2/20/2015   PHQ2 Score 0 0 0 0 0 0 0   PHQ9 Score 0 0 0 0 0 0 0     Interpretation of Total Score Depression Severity: 1-4 = Minimal depression, 5-9 = Mild depression, 10-14 = Moderate depression, 15-19 = Moderately severe depression, 20-27 = Severe depression    Orders Placed This Encounter   Procedures    Comprehensive Metabolic Panel     Fasting     Standing Status:   Future     Standing Expiration Date:   1/12/2022    Lipid Panel     Standing Status:   Future     Standing Expiration Date:   1/12/2022     Order Specific Question:   Is Patient Fasting?/# of Hours     Answer:    Fast 8-10 hours    Magnesium     Standing Status:   Future     Standing Expiration Date:   1/12/2022    POCT glycosylated hemoglobin (Hb A1C)      Patient encouraged to get the COVID-19 vaccine when it becomes available to her   Continue routine medications  Follow-up in 5 to 6 months

## 2021-01-19 ENCOUNTER — HOSPITAL ENCOUNTER (OUTPATIENT)
Age: 81
Discharge: HOME | End: 2021-01-19
Payer: MEDICARE

## 2021-01-19 DIAGNOSIS — E78.2 MIXED HYPERLIPIDEMIA: ICD-10-CM

## 2021-01-19 DIAGNOSIS — K21.9 GASTROESOPHAGEAL REFLUX DISEASE, UNSPECIFIED WHETHER ESOPHAGITIS PRESENT: ICD-10-CM

## 2021-01-19 DIAGNOSIS — E11.9 TYPE 2 DIABETES MELLITUS WITHOUT COMPLICATION, WITHOUT LONG-TERM CURRENT USE OF INSULIN (HCC): ICD-10-CM

## 2021-01-19 DIAGNOSIS — I10 ESSENTIAL HYPERTENSION: ICD-10-CM

## 2021-01-19 LAB
ALBUMIN SERPL-MCNC: 3.9 G/DL (ref 3.5–5.2)
ALBUMIN/GLOBULIN RATIO: ABNORMAL (ref 1–2.5)
ALP BLD-CCNC: 77 U/L (ref 35–104)
ALT SERPL-CCNC: 14 U/L (ref 5–33)
ANION GAP SERPL CALCULATED.3IONS-SCNC: 10 MMOL/L (ref 9–17)
AST SERPL-CCNC: 26 U/L
BILIRUB SERPL-MCNC: 1.05 MG/DL (ref 0.3–1.2)
BUN BLDV-MCNC: 10 MG/DL (ref 8–23)
BUN/CREAT BLD: ABNORMAL (ref 9–20)
CALCIUM SERPL-MCNC: 9.7 MG/DL (ref 8.6–10.4)
CHLORIDE BLD-SCNC: 101 MMOL/L (ref 98–107)
CHOLESTEROL/HDL RATIO: 2.1
CHOLESTEROL: 142 MG/DL
CO2: 25 MMOL/L (ref 20–31)
CREAT SERPL-MCNC: 0.74 MG/DL (ref 0.5–0.9)
GFR AFRICAN AMERICAN: >60 ML/MIN
GFR NON-AFRICAN AMERICAN: >60 ML/MIN
GFR SERPL CREATININE-BSD FRML MDRD: ABNORMAL ML/MIN/{1.73_M2}
GFR SERPL CREATININE-BSD FRML MDRD: ABNORMAL ML/MIN/{1.73_M2}
GLUCOSE BLD-MCNC: 113 MG/DL (ref 70–99)
HDLC SERPL-MCNC: 68 MG/DL
LDL CHOLESTEROL: 51 MG/DL (ref 0–130)
MAGNESIUM: 1.9 MG/DL (ref 1.6–2.6)
POTASSIUM SERPL-SCNC: 4 MMOL/L (ref 3.7–5.3)
SODIUM BLD-SCNC: 136 MMOL/L (ref 135–144)
TOTAL PROTEIN: 7.1 G/DL (ref 6.4–8.3)
TRIGL SERPL-MCNC: 114 MG/DL
VLDLC SERPL CALC-MCNC: NORMAL MG/DL (ref 1–30)

## 2021-01-19 PROCEDURE — 80061 LIPID PANEL: CPT

## 2021-01-19 PROCEDURE — 36415 COLL VENOUS BLD VENIPUNCTURE: CPT

## 2021-01-19 PROCEDURE — 80053 COMPREHEN METABOLIC PANEL: CPT

## 2021-01-19 PROCEDURE — 83735 ASSAY OF MAGNESIUM: CPT

## 2021-01-23 ENCOUNTER — HOSPITAL ENCOUNTER (INPATIENT)
Age: 81
LOS: 3 days | Discharge: HOME | DRG: 392 | End: 2021-01-26
Attending: EMERGENCY MEDICINE | Admitting: FAMILY MEDICINE
Payer: MEDICARE

## 2021-01-23 ENCOUNTER — APPOINTMENT (OUTPATIENT)
Dept: CT IMAGING | Age: 81
DRG: 392 | End: 2021-01-23
Payer: MEDICARE

## 2021-01-23 DIAGNOSIS — K92.2 LOWER GI BLEED: Primary | ICD-10-CM

## 2021-01-23 DIAGNOSIS — D64.9 ANEMIA, UNSPECIFIED TYPE: ICD-10-CM

## 2021-01-23 PROBLEM — I48.0 PAROXYSMAL ATRIAL FIBRILLATION (HCC): Status: ACTIVE | Noted: 2021-01-23

## 2021-01-23 PROBLEM — D50.9 IRON DEFICIENCY ANEMIA: Status: ACTIVE | Noted: 2021-01-23

## 2021-01-23 LAB
-: NORMAL
ABSOLUTE EOS #: 0.2 K/UL (ref 0–0.4)
ABSOLUTE IMMATURE GRANULOCYTE: ABNORMAL K/UL (ref 0–0.3)
ABSOLUTE LYMPH #: 0.8 K/UL (ref 1–4.8)
ABSOLUTE MONO #: 0.5 K/UL (ref 0.1–1.3)
ALBUMIN SERPL-MCNC: 3.1 G/DL (ref 3.5–5.2)
ALBUMIN/GLOBULIN RATIO: ABNORMAL (ref 1–2.5)
ALP BLD-CCNC: 89 U/L (ref 35–104)
ALT SERPL-CCNC: 15 U/L (ref 5–33)
ANION GAP SERPL CALCULATED.3IONS-SCNC: 13 MMOL/L (ref 9–17)
AST SERPL-CCNC: 27 U/L
BASOPHILS # BLD: 1 % (ref 0–2)
BASOPHILS ABSOLUTE: 0 K/UL (ref 0–0.2)
BILIRUB SERPL-MCNC: 0.51 MG/DL (ref 0.3–1.2)
BUN BLDV-MCNC: 11 MG/DL (ref 8–23)
BUN/CREAT BLD: ABNORMAL (ref 9–20)
CALCIUM SERPL-MCNC: 9 MG/DL (ref 8.6–10.4)
CHLORIDE BLD-SCNC: 102 MMOL/L (ref 98–107)
CO2: 18 MMOL/L (ref 20–31)
CREAT SERPL-MCNC: 0.79 MG/DL (ref 0.5–0.9)
DIFFERENTIAL TYPE: ABNORMAL
EOSINOPHILS RELATIVE PERCENT: 3 % (ref 0–4)
GFR AFRICAN AMERICAN: >60 ML/MIN
GFR NON-AFRICAN AMERICAN: >60 ML/MIN
GFR SERPL CREATININE-BSD FRML MDRD: ABNORMAL ML/MIN/{1.73_M2}
GFR SERPL CREATININE-BSD FRML MDRD: ABNORMAL ML/MIN/{1.73_M2}
GLUCOSE BLD-MCNC: 182 MG/DL (ref 70–99)
HCT VFR BLD CALC: 35.2 % (ref 36–46)
HEMOGLOBIN: 11.4 G/DL (ref 12–16)
IMMATURE GRANULOCYTES: ABNORMAL %
INR BLD: 1.4
LYMPHOCYTES # BLD: 17 % (ref 24–44)
MCH RBC QN AUTO: 32 PG (ref 26–34)
MCHC RBC AUTO-ENTMCNC: 32.5 G/DL (ref 31–37)
MCV RBC AUTO: 98.2 FL (ref 80–100)
MONOCYTES # BLD: 11 % (ref 1–7)
NRBC AUTOMATED: ABNORMAL PER 100 WBC
PARTIAL THROMBOPLASTIN TIME: 38.7 SEC (ref 24–36)
PDW BLD-RTO: 14.6 % (ref 11.5–14.9)
PLATELET # BLD: 111 K/UL (ref 150–450)
PLATELET ESTIMATE: ABNORMAL
PMV BLD AUTO: 10.5 FL (ref 6–12)
POTASSIUM SERPL-SCNC: 4.5 MMOL/L (ref 3.7–5.3)
PROTHROMBIN TIME: 16.6 SEC (ref 11.8–14.6)
RBC # BLD: 3.58 M/UL (ref 4–5.2)
RBC # BLD: ABNORMAL 10*6/UL
REASON FOR REJECTION: NORMAL
SEG NEUTROPHILS: 68 % (ref 36–66)
SEGMENTED NEUTROPHILS ABSOLUTE COUNT: 3.3 K/UL (ref 1.3–9.1)
SODIUM BLD-SCNC: 133 MMOL/L (ref 135–144)
TOTAL PROTEIN: 6.5 G/DL (ref 6.4–8.3)
TROPONIN INTERP: NORMAL
TROPONIN T: NORMAL NG/ML
TROPONIN, HIGH SENSITIVITY: 12 NG/L (ref 0–14)
TSH SERPL DL<=0.05 MIU/L-ACNC: 1.09 MIU/L (ref 0.3–5)
WBC # BLD: 4.8 K/UL (ref 3.5–11)
WBC # BLD: ABNORMAL 10*3/UL
ZZ NTE CLEAN UP: ORDERED TEST: NORMAL
ZZ NTE WITH NAME CLEAN UP: SPECIMEN SOURCE: NORMAL

## 2021-01-23 PROCEDURE — 2060000000 HC ICU INTERMEDIATE R&B

## 2021-01-23 PROCEDURE — 99284 EMERGENCY DEPT VISIT MOD MDM: CPT

## 2021-01-23 PROCEDURE — 2580000003 HC RX 258: Performed by: STUDENT IN AN ORGANIZED HEALTH CARE EDUCATION/TRAINING PROGRAM

## 2021-01-23 PROCEDURE — 36415 COLL VENOUS BLD VENIPUNCTURE: CPT

## 2021-01-23 PROCEDURE — 84443 ASSAY THYROID STIM HORMONE: CPT

## 2021-01-23 PROCEDURE — 80053 COMPREHEN METABOLIC PANEL: CPT

## 2021-01-23 PROCEDURE — 2580000003 HC RX 258: Performed by: EMERGENCY MEDICINE

## 2021-01-23 PROCEDURE — 96360 HYDRATION IV INFUSION INIT: CPT

## 2021-01-23 PROCEDURE — 85025 COMPLETE CBC W/AUTO DIFF WBC: CPT

## 2021-01-23 PROCEDURE — C9113 INJ PANTOPRAZOLE SODIUM, VIA: HCPCS | Performed by: FAMILY MEDICINE

## 2021-01-23 PROCEDURE — 6360000004 HC RX CONTRAST MEDICATION: Performed by: EMERGENCY MEDICINE

## 2021-01-23 PROCEDURE — 6360000002 HC RX W HCPCS: Performed by: FAMILY MEDICINE

## 2021-01-23 PROCEDURE — 84484 ASSAY OF TROPONIN QUANT: CPT

## 2021-01-23 PROCEDURE — 93005 ELECTROCARDIOGRAM TRACING: CPT | Performed by: STUDENT IN AN ORGANIZED HEALTH CARE EDUCATION/TRAINING PROGRAM

## 2021-01-23 PROCEDURE — 85730 THROMBOPLASTIN TIME PARTIAL: CPT

## 2021-01-23 PROCEDURE — 85610 PROTHROMBIN TIME: CPT

## 2021-01-23 PROCEDURE — 6370000000 HC RX 637 (ALT 250 FOR IP): Performed by: FAMILY MEDICINE

## 2021-01-23 PROCEDURE — 74177 CT ABD & PELVIS W/CONTRAST: CPT

## 2021-01-23 PROCEDURE — 2580000003 HC RX 258: Performed by: FAMILY MEDICINE

## 2021-01-23 RX ORDER — ACETAMINOPHEN 650 MG/1
650 SUPPOSITORY RECTAL EVERY 6 HOURS PRN
Status: DISCONTINUED | OUTPATIENT
Start: 2021-01-23 | End: 2021-01-26 | Stop reason: HOSPADM

## 2021-01-23 RX ORDER — DORZOLAMIDE HCL 20 MG/ML
1 SOLUTION/ DROPS OPHTHALMIC 2 TIMES DAILY
Status: DISCONTINUED | OUTPATIENT
Start: 2021-01-23 | End: 2021-01-26 | Stop reason: HOSPADM

## 2021-01-23 RX ORDER — FAMOTIDINE 20 MG/1
40 TABLET, FILM COATED ORAL DAILY
Status: DISCONTINUED | OUTPATIENT
Start: 2021-01-23 | End: 2021-01-26 | Stop reason: HOSPADM

## 2021-01-23 RX ORDER — 0.9 % SODIUM CHLORIDE 0.9 %
500 INTRAVENOUS SOLUTION INTRAVENOUS ONCE
Status: COMPLETED | OUTPATIENT
Start: 2021-01-23 | End: 2021-01-23

## 2021-01-23 RX ORDER — SODIUM CHLORIDE 0.9 % (FLUSH) 0.9 %
10 SYRINGE (ML) INJECTION EVERY 12 HOURS SCHEDULED
Status: DISCONTINUED | OUTPATIENT
Start: 2021-01-23 | End: 2021-01-26 | Stop reason: ALTCHOICE

## 2021-01-23 RX ORDER — 0.9 % SODIUM CHLORIDE 0.9 %
80 INTRAVENOUS SOLUTION INTRAVENOUS ONCE
Status: COMPLETED | OUTPATIENT
Start: 2021-01-23 | End: 2021-01-23

## 2021-01-23 RX ORDER — SODIUM CHLORIDE 0.9 % (FLUSH) 0.9 %
10 SYRINGE (ML) INJECTION ONCE
Status: COMPLETED | OUTPATIENT
Start: 2021-01-23 | End: 2021-01-23

## 2021-01-23 RX ORDER — TIMOLOL MALEATE 5 MG/ML
1 SOLUTION/ DROPS OPHTHALMIC 2 TIMES DAILY
Status: DISCONTINUED | OUTPATIENT
Start: 2021-01-23 | End: 2021-01-26 | Stop reason: HOSPADM

## 2021-01-23 RX ORDER — POTASSIUM CHLORIDE 20 MEQ/1
40 TABLET, EXTENDED RELEASE ORAL PRN
Status: DISCONTINUED | OUTPATIENT
Start: 2021-01-23 | End: 2021-01-26 | Stop reason: HOSPADM

## 2021-01-23 RX ORDER — DOXAZOSIN MESYLATE 4 MG/1
4 TABLET ORAL DAILY
Status: DISCONTINUED | OUTPATIENT
Start: 2021-01-23 | End: 2021-01-26 | Stop reason: HOSPADM

## 2021-01-23 RX ORDER — ATENOLOL 50 MG/1
100 TABLET ORAL DAILY
Status: DISCONTINUED | OUTPATIENT
Start: 2021-01-23 | End: 2021-01-26 | Stop reason: HOSPADM

## 2021-01-23 RX ORDER — PANTOPRAZOLE SODIUM 40 MG/10ML
40 INJECTION, POWDER, LYOPHILIZED, FOR SOLUTION INTRAVENOUS DAILY
Status: DISCONTINUED | OUTPATIENT
Start: 2021-01-23 | End: 2021-01-26 | Stop reason: ALTCHOICE

## 2021-01-23 RX ORDER — ACETAMINOPHEN 325 MG/1
650 TABLET ORAL EVERY 6 HOURS PRN
Status: DISCONTINUED | OUTPATIENT
Start: 2021-01-23 | End: 2021-01-26 | Stop reason: HOSPADM

## 2021-01-23 RX ORDER — ACETAMINOPHEN 325 MG/1
650 TABLET ORAL EVERY 4 HOURS PRN
Status: DISCONTINUED | OUTPATIENT
Start: 2021-01-23 | End: 2021-01-26 | Stop reason: HOSPADM

## 2021-01-23 RX ORDER — SODIUM CHLORIDE 0.9 % (FLUSH) 0.9 %
10 SYRINGE (ML) INJECTION PRN
Status: DISCONTINUED | OUTPATIENT
Start: 2021-01-23 | End: 2021-01-26 | Stop reason: ALTCHOICE

## 2021-01-23 RX ORDER — LEVOTHYROXINE SODIUM 0.1 MG/1
100 TABLET ORAL DAILY
Status: DISCONTINUED | OUTPATIENT
Start: 2021-01-24 | End: 2021-01-26 | Stop reason: HOSPADM

## 2021-01-23 RX ORDER — ONDANSETRON 2 MG/ML
4 INJECTION INTRAMUSCULAR; INTRAVENOUS EVERY 6 HOURS PRN
Status: DISCONTINUED | OUTPATIENT
Start: 2021-01-23 | End: 2021-01-26 | Stop reason: ALTCHOICE

## 2021-01-23 RX ORDER — ONDANSETRON 2 MG/ML
4 INJECTION INTRAMUSCULAR; INTRAVENOUS EVERY 8 HOURS PRN
Status: DISCONTINUED | OUTPATIENT
Start: 2021-01-23 | End: 2021-01-26 | Stop reason: ALTCHOICE

## 2021-01-23 RX ORDER — LATANOPROST 50 UG/ML
1 SOLUTION/ DROPS OPHTHALMIC NIGHTLY
Status: DISCONTINUED | OUTPATIENT
Start: 2021-01-23 | End: 2021-01-26 | Stop reason: HOSPADM

## 2021-01-23 RX ORDER — SODIUM CHLORIDE 9 MG/ML
10 INJECTION INTRAVENOUS DAILY
Status: DISCONTINUED | OUTPATIENT
Start: 2021-01-23 | End: 2021-01-26 | Stop reason: ALTCHOICE

## 2021-01-23 RX ORDER — PROMETHAZINE HYDROCHLORIDE 25 MG/1
12.5 TABLET ORAL EVERY 6 HOURS PRN
Status: DISCONTINUED | OUTPATIENT
Start: 2021-01-23 | End: 2021-01-26 | Stop reason: HOSPADM

## 2021-01-23 RX ORDER — POTASSIUM CHLORIDE 7.45 MG/ML
10 INJECTION INTRAVENOUS PRN
Status: DISCONTINUED | OUTPATIENT
Start: 2021-01-23 | End: 2021-01-26 | Stop reason: ALTCHOICE

## 2021-01-23 RX ORDER — MAGNESIUM SULFATE 1 G/100ML
1000 INJECTION INTRAVENOUS PRN
Status: DISCONTINUED | OUTPATIENT
Start: 2021-01-23 | End: 2021-01-26 | Stop reason: ALTCHOICE

## 2021-01-23 RX ORDER — SODIUM CHLORIDE 9 MG/ML
INJECTION, SOLUTION INTRAVENOUS CONTINUOUS
Status: DISCONTINUED | OUTPATIENT
Start: 2021-01-23 | End: 2021-01-25 | Stop reason: ALTCHOICE

## 2021-01-23 RX ADMIN — Medication 10 ML: at 16:55

## 2021-01-23 RX ADMIN — FAMOTIDINE 40 MG: 20 TABLET ORAL at 20:09

## 2021-01-23 RX ADMIN — DORZOLAMIDE HYDROCHLORIDE 1 DROP: 20 SOLUTION/ DROPS OPHTHALMIC at 22:09

## 2021-01-23 RX ADMIN — LATANOPROST 1 DROP: 50 SOLUTION OPHTHALMIC at 22:12

## 2021-01-23 RX ADMIN — SODIUM CHLORIDE 500 ML: 9 INJECTION, SOLUTION INTRAVENOUS at 16:00

## 2021-01-23 RX ADMIN — TIMOLOL MALEATE 1 DROP: 5 SOLUTION/ DROPS OPHTHALMIC at 22:09

## 2021-01-23 RX ADMIN — PANTOPRAZOLE SODIUM 40 MG: 40 INJECTION, POWDER, FOR SOLUTION INTRAVENOUS at 20:09

## 2021-01-23 RX ADMIN — Medication 10 ML: at 20:10

## 2021-01-23 RX ADMIN — DOXAZOSIN 4 MG: 4 TABLET ORAL at 20:09

## 2021-01-23 RX ADMIN — SODIUM CHLORIDE 10 ML: 9 INJECTION INTRAMUSCULAR; INTRAVENOUS; SUBCUTANEOUS at 20:09

## 2021-01-23 RX ADMIN — SODIUM CHLORIDE: 9 INJECTION, SOLUTION INTRAVENOUS at 20:09

## 2021-01-23 RX ADMIN — SODIUM CHLORIDE 80 ML: 9 INJECTION, SOLUTION INTRAVENOUS at 16:55

## 2021-01-23 RX ADMIN — IOPAMIDOL 75 ML: 755 INJECTION, SOLUTION INTRAVENOUS at 16:55

## 2021-01-23 ASSESSMENT — ENCOUNTER SYMPTOMS
BACK PAIN: 0
RECTAL PAIN: 0
CHEST TIGHTNESS: 0
TROUBLE SWALLOWING: 0
ABDOMINAL PAIN: 0
VOMITING: 0
BLOOD IN STOOL: 1
COLOR CHANGE: 0
SHORTNESS OF BREATH: 1
SHORTNESS OF BREATH: 0
COUGH: 0
EYE ITCHING: 0
WHEEZING: 0
EYE REDNESS: 0
NAUSEA: 0

## 2021-01-23 ASSESSMENT — PAIN SCALES - GENERAL
PAINLEVEL_OUTOF10: 0
PAINLEVEL_OUTOF10: 0

## 2021-01-23 NOTE — ED PROVIDER NOTES
[address] ED  eMERGENCY dEPARTMENT eNCOUnter   Attending Attestation     Pt Name: Glenn Ladd  MRN: [id number]  [place] 1940  Date of evaluation: 1/23/21       Glenn Ladd is a 79 y.o. female who presents with Rectal Bleeding      History:   Patient is here because she noticed some blood on the paper when she wiped after having a bowel movement today. Patient states her stools have not changed from her baseline. Patient is on Eliquis and she is here because she was told that she ever saw blood that she needed to be evaluated. Patient does have an extensive history of diverticulosis had surgery for this but has no pain or fevers. Exam: Vitals:   Vitals:    01/23/21 1508 01/23/21 1509   BP: (!) 163/97    Pulse: 112    Resp: 20    Temp:  98 °F (36.7 °C)   TempSrc:  Oral   SpO2: 97%      Heart regular rate rhythm no murmurs. Abdomen soft nontender nondistended. I performed a history and physical examination of the patient and discussed management with the resident. I reviewed the residents note and agree with the documented findings and plan of care. Any areas of disagreement are noted on the chart. I was personally present for the key portions of any procedures. I have documented in the chart those procedures where I was not present during the key portions. I have personally reviewed all images and agree with the resident's interpretation. I have reviewed the emergency nurses triage note. I agree with the chief complaint, past medical history, past surgical history, allergies, medications, social and family history as documented unless otherwise noted below. Documentation of the HPI, Physical Exam and Medical Decision Making performed by medical students or scribes is based on my personal performance of the HPI, PE and MDM. I personally evaluated and examined the patient in conjunction with the APC and agree with the assessment, treatment plan, and disposition of the patient as recorded by the APC. Additional findings are as noted.     Carlota Torres MD  Attending Emergency  Physician             Mauricio Garcia MD  01/23/21 [id number]

## 2021-01-23 NOTE — ED NOTES
Multiple attempts by multiple RN's for IV and blood draw prior to paging lab.       Catherine Finn, RN  01/23/21 [id number]

## 2021-01-23 NOTE — ED NOTES
Admission Dx: GI admit     Pts Chief Complaints on Arrival: rectal bleeding     ADL's - Self-care    Pending Diagnostics:  NA     Residence PTA: home     Special Considerations/Circumstances:  Hard of hearing, does wear hearing aids.      Vitals: Current vital signs:  BP (!) 163/97   Pulse 112   Temp 98 °F (36.7 °C) (Oral)   Resp 20   SpO2 97%                             Lalito Mccain, RN  01/23/21 [id number]

## 2021-01-23 NOTE — ED PROVIDER NOTES
[address] [facility]  Emergency Department Encounter  EmergencyMedicine Resident     Pt Name:Genie Chan  MRN: [id number]  [place] 1940  Date of evaluation: 1/23/21  PCP:  Rozanne Sicard, MD    [address] [place] [address]       Chief Complaint   Patient presents with    Rectal Bleeding       HISTORY OF PRESENT ILLNESS  (Location/Symptom, Timing/Onset, Context/Setting, Quality, Duration, Modifying Factors, Severity.)    This patient was evaluated in the Emergency Department for symptoms described in the history of present illness. He/she was evaluated in the context of the global COVID-19 pandemic, which necessitated consideration that the patient might be at risk for infection with the SARS-CoV-2 virus that causes COVID-19. Institutional protocols and algorithms that pertain to the evaluation of patients at risk for COVID-19 are in a state of rapid change based on information released by regulatory bodies including the CDC and federal and state organizations. These policies and algorithms were followed during the patient's care in the ED. Anastasiia Rosen is a 79 y.o. female with a past medical history includes A. fib on Eliquis, diverticulosis with colon resection and reanastomoses, hyperlipidemia, hypertension, diabetes, known rectal fissure present emergency department today with complaints of new onset rectal bleeding. Patient states that she had one episode of normal bowel movement and noticed blood when she wiped. There was no blood in the toilet. She has been told in the past that she needs evaluation if she ever notices rectal bleeding. She has had colon resection in the past due to significant diverticulitis. Denies any new medications besides new eardrops. Continues to take Eliquis as prescribed for history of A. fib. Chronic shortness of breath is unchanged from baseline. No new chest pain, lightheadedness, palpitations, or dizziness. Denies any recent illness. PAST MEDICAL / SURGICAL / SOCIAL / FAMILY HISTORY      has a past medical history of Atrial fibrillation ([facility] [address] Chronic back pain, Diverticulosis, GERD (gastroesophageal reflux disease), Hyperlipidemia, Hypertension, Hypothyroidism, Obesity (BMI 30.0-34.9), Onychomycosis, PE (pulmonary embolism), and Type II or unspecified type diabetes mellitus without mention of complication, not stated as uncontrolled. has a past surgical history that includes Colon surgery; Vena Cava Filter Placement; Cholecystectomy; Hysterectomy; and Tympanostomy tube placement (Bilateral, 09/20/2019). Social History     Socioeconomic History    Marital status:       Spouse name: Not on file    Number of children: Not on file    Years of education: Not on file    Highest education level: Not on file   Occupational History    Not on file   Social Needs    Financial resource strain: Not on file    Food insecurity     Worry: Not on file     Inability: Not on file    Transportation needs     Medical: Not on file     Non-medical: Not on file   Tobacco Use    Smoking status: Never Smoker    Smokeless tobacco: Never Used   Substance and Sexual Activity    Alcohol use: No    Drug use: No    Sexual activity: Not on file   Lifestyle    Physical activity     Days per week: Not on file     Minutes per session: Not on file    Stress: Not on file   Relationships    Social connections     Talks on phone: Not on file     Gets together: Not on file     Attends Sikh service: Not on file     Active member of club or organization: Not on file     Attends meetings of clubs or organizations: Not on file     Relationship status: Not on file    Intimate partner violence     Fear of current or ex partner: Not on file     Emotionally abused: Not on file     Physically abused: Not on file     Forced sexual activity: Not on file   Other Topics Concern    Not on file   Social History Narrative    Not on file       Family History Adopted: Yes       Allergies: Avapro [irbesartan], Ciprofloxacin hcl, Cozaar [losartan potassium], Diovan [valsartan], Lipitor [atorvastatin calcium], Lisinopril, Pcn [penicillins], Pravachol [pravastatin sodium], Tape [adhesive tape], Tramadol, Zetia [ezetimibe], and Morphine    Home Medications:  Prior to Admission medications    Medication Sig Start Date End Date Taking?  Authorizing Provider   Lactobacillus (ACIDOPHILUS/PECTIN PO) Take 100 mg by mouth daily    Historical Provider, MD   doxazosin (CARDURA) 8 MG tablet TAKE 1 TABLET BY MOUTH ONCE A DAY (DO NOT CHANGE NDC) 12/28/20   MINH Garza CNP   levothyroxine (SYNTHROID) 100 MCG tablet TAKE 1 TABLET BY MOUTH ONCE A DAY 12/28/20   MINH Garza CNP   fluticasone (FLONASE) 50 MCG/ACT nasal spray 2 sprays into each nostril daily 11/30/20   David Delvalle MD   atenolol (TENORMIN) 100 MG tablet TAKE 1 TABLET BY MOUTH DAILY 10/5/20   David Delvalle MD   RHOPRESSA 0.02 % SOLN INSTILL 1 DROP INTO AFFECTED EYE(S) BY OPHTHALMIC ROUTE ONCE DAILY IN THE EVENING 7/24/20   Historical Provider, MD   timolol (TIMOPTIC) 0.5 % ophthalmic solution ONE DROP TWICE A DAY INTO EACH EYE 8/3/20   Historical Provider, MD   Levocetirizine Dihydrochloride 2.5 MG/5ML SOLN Take 1-2 teaspoons daily as needed for allergy symptoms 5/31/19   David Delvalle MD   dorzolamide (TRUSOPT) 2 % ophthalmic solution INSTILL 1 DROP BY OPHTHALMIC ROUTE 2 TIMES EVERY DAY IN BOTH EYES 11/9/18   Historical Provider, MD   ondansetron (ZOFRAN-ODT) 4 MG disintegrating tablet Take 1 tablet by mouth 3 times daily as needed for Nausea or Vomiting 12/3/18   MINH Garza CNP   famotidine (PEPCID) 40 MG tablet Take 1 tablet by mouth daily 10/22/18   David Delvalle MD   ELIQUIS 5 MG TABS tablet  4/16/18   Historical Provider, MD   tiZANidine (ZANAFLEX) 2 MG tablet Take 1 tablet by mouth 3 times daily as needed (For muscle spasms) 3/28/18   Jasmin Leon, APRN - CNP Extraocular Movements: Extraocular movements intact. Conjunctiva/sclera: Conjunctivae normal.   Neck:      Musculoskeletal: Normal range of motion. No muscular tenderness. Cardiovascular:      Rate and Rhythm: Tachycardia present. Rhythm irregular. Pulses: Normal pulses. Pulmonary:      Effort: Pulmonary effort is normal. No respiratory distress. Breath sounds: No stridor. No wheezing or rhonchi. Abdominal:      General: There is no distension. Palpations: Abdomen is soft. Tenderness: There is no abdominal tenderness. There is no guarding or rebound. Genitourinary:     Comments: Mild gross blood on underwear. No active bleeding from rectum. No fissure or hemorrhoid present. Hemoccult +  Musculoskeletal: Normal range of motion. General: No swelling or deformity. Skin:     General: Skin is warm and dry. Findings: No rash. Neurological:      Mental Status: She is alert and oriented to person, place, and time.    Psychiatric:         Behavior: Behavior normal.         DIFFERENTIAL  DIAGNOSIS     PLAN (LABS / IMAGING / EKG):  Orders Placed This Encounter   Procedures    CT ABDOMEN PELVIS W IV CONTRAST    Comprehensive Metabolic Panel w/ Reflex to MG    CBC Auto Differential    SPECIMEN REJECTION    Protime-INR    APTT    HEMOGLOBIN AND HEMATOCRIT, BLOOD    Comprehensive Metabolic Panel w/ Reflex to MG    CBC auto differential    Hgb/Hct    TROPONIN    Troponin    TSH with Reflex    DIET CLEAR LIQUID;    Vital signs per unit routine    Notify physician    Notify physician    Up with assistance    Telemetry Monitoring    Vital signs per unit routine    Notify physician    Up as tolerated    Daily weights    Intake and output    Place intermittent pneumatic compression device    Full Code    Inpatient consult to GI    Contact isolation    OT eval and treat    PT evaluation and treat    Initiate Oxygen Therapy Protocol  Pulse Oximetry Spot Check    EKG 12 Lead    PATIENT STATUS (FROM ED OR OR/PROCEDURAL) Inpatient       MEDICATIONS ORDERED:  Orders Placed This Encounter   Medications    0.9 % sodium chloride bolus    sodium chloride flush 0.9 % injection 10 mL    0.9 % sodium chloride bolus    iopamidol (ISOVUE-370) 76 % injection 75 mL    sodium chloride flush 0.9 % injection 10 mL    sodium chloride flush 0.9 % injection 10 mL    acetaminophen (TYLENOL) tablet 650 mg    0.9 % sodium chloride infusion    ondansetron (ZOFRAN) injection 4 mg    atenolol (TENORMIN) tablet 100 mg    dorzolamide (TRUSOPT) 2 % ophthalmic solution 1 drop    doxazosin (CARDURA) tablet 4 mg    famotidine (PEPCID) tablet 40 mg    latanoprost (XALATAN) 0.005 % ophthalmic solution 1 drop    levothyroxine (SYNTHROID) tablet 100 mcg    timolol (TIMOPTIC) 0.5 % ophthalmic solution 1 drop    sodium chloride flush 0.9 % injection 10 mL    sodium chloride flush 0.9 % injection 10 mL    OR Linked Order Group     potassium chloride (KLOR-CON M) extended release tablet 40 mEq     potassium bicarb-citric acid (EFFER-K) effervescent tablet 40 mEq     potassium chloride 10 mEq/100 mL IVPB (Peripheral Line)    magnesium sulfate 1000 mg in dextrose 5% 100 mL IVPB    OR Linked Order Group     promethazine (PHENERGAN) tablet 12.5 mg     ondansetron (ZOFRAN) injection 4 mg    magnesium hydroxide (MILK OF MAGNESIA) 400 MG/5ML suspension 30 mL    OR Linked Order Group     acetaminophen (TYLENOL) tablet 650 mg     acetaminophen (TYLENOL) suppository 650 mg    AND Linked Order Group     pantoprazole (PROTONIX) injection 40 mg     sodium chloride (PF) 0.9 % injection 10 mL         DIAGNOSTIC RESULTS / EMERGENCY DEPARTMENT COURSE / MDM     Results for orders placed or performed during the hospital encounter of 01/23/21   Comprehensive Metabolic Panel w/ Reflex to MG   Result Value Ref Range    Glucose 182 (H) 70 - 99 mg/dL BUN 11 8 - 23 mg/dL    CREATININE 0.79 0.50 - 0.90 mg/dL    Bun/Cre Ratio NOT REPORTED 9 - 20    Calcium 9.0 8.6 - 10.4 mg/dL    Sodium 133 (L) 135 - 144 mmol/L    Potassium 4.5 3.7 - 5.3 mmol/L    Chloride 102 98 - 107 mmol/L    CO2 18 (L) 20 - 31 mmol/L    Anion Gap 13 9 - 17 mmol/L    Alkaline Phosphatase 89 35 - 104 U/L    ALT 15 5 - 33 U/L    AST 27 <32 U/L    Total Bilirubin 0.51 0.3 - 1.2 mg/dL    Total Protein 6.5 6.4 - 8.3 g/dL    Alb 3.1 (L) 3.5 - 5.2 g/dL    Albumin/Globulin Ratio NOT REPORTED 1.0 - 2.5    GFR Non-African American >60 >60 mL/min    GFR African American >60 >60 mL/min    GFR Comment          GFR Staging NOT REPORTED    CBC Auto Differential   Result Value Ref Range    WBC 4.8 3.5 - 11.0 k/uL    RBC 3.58 (L) 4.0 - 5.2 m/uL    Hemoglobin 11.4 (L) 12.0 - 16.0 g/dL    Hematocrit 35.2 (L) 36 - 46 %    MCV 98.2 80 - 100 fL    MCH 32.0 26 - 34 pg    MCHC 32.5 31 - 37 g/dL    RDW 14.6 11.5 - 14.9 %    Platelets 535 (L) 264 - 450 k/uL    MPV 10.5 6.0 - 12.0 fL    NRBC Automated NOT REPORTED per 100 WBC    Differential Type NOT REPORTED     Seg Neutrophils 68 (H) 36 - 66 %    Lymphocytes 17 (L) 24 - 44 %    Monocytes 11 (H) 1 - 7 %    Eosinophils % 3 0 - 4 %    Basophils 1 0 - 2 %    Immature Granulocytes NOT REPORTED 0 %    Segs Absolute 3.30 1.3 - 9.1 k/uL    Absolute Lymph # 0.80 (L) 1.0 - 4.8 k/uL    Absolute Mono # 0.50 0.1 - 1.3 k/uL    Absolute Eos # 0.20 0.0 - 0.4 k/uL    Basophils Absolute 0.00 0.0 - 0.2 k/uL    Absolute Immature Granulocyte NOT REPORTED 0.00 - 0.30 k/uL    WBC Morphology NOT REPORTED     RBC Morphology NOT REPORTED     Platelet Estimate NOT REPORTED    SPECIMEN REJECTION   Result Value Ref Range    Specimen Source . BLOOD     Ordered Test PT,PTT     Reason for Rejection Unable to perform testing: No specimen received.      - NOT REPORTED    Protime-INR   Result Value Ref Range    Protime 16.6 (H) 11.8 - 14.6 sec    INR 1.4    APTT   Result Value Ref Range PTT 38.7 (H) 24.0 - 36.0 sec   Troponin   Result Value Ref Range    Troponin, High Sensitivity 12 0 - 14 ng/L    Troponin T NOT REPORTED <0.03 ng/mL    Troponin Interp NOT REPORTED    TSH with Reflex   Result Value Ref Range    TSH 1.09 0.30 - 5.00 mIU/L   EKG 12 Lead   Result Value Ref Range    Ventricular Rate 106 BPM    Atrial Rate 106 BPM    P-R Interval 192 ms    QRS Duration 126 ms    Q-T Interval 356 ms    QTc Calculation (Bazett) 472 ms    P Axis 85 degrees    R Axis 37 degrees    T Axis 40 degrees         RADIOLOGY:  CT ABDOMEN PELVIS W IV CONTRAST   Final Result   1. No CT evidence of an acute intra-abdominal or intrapelvic process. 2. Postsurgical sequela involving multifocal areas of the bowel. Correlate   with surgical history. No bowel obstruction or acute inflammatory process   evident. I have no prior study for comparison. 3.  Mild intra and extrahepatic bile duct dilatation status post   cholecystectomy typical of reservoir effect. 4.  Diverticulosis coli without CT evidence of acute diverticulitis. EKG  EKG Interpretation    Interpreted by me    Rhythm: Sinus tachycardia  Rate: Ventricular rate of 106  Axis: normal  Ectopy: none  Conduction: Normal right bundle branch pattern  ST Segments: no acute change  T Waves: T wave flattening in lead III and V3  Q Waves: none  Poor R wave progression    Clinical Impression: Sinus tachycardia with right bundle branch pattern, unchanged from previous EKG    All EKG's are interpreted by the Emergency Department Physician who either signs or Co-signs this chart in the absence of a cardiologist.    IMPRESSION/MDM/EMERGENCY DEPARTMENT COURSE:  Patient came to emergency department, HPI and physical exam were conducted. All nursing notes were reviewed. 29-year-old female with a past medical history that includes diverticulosis with colon resection and atrial fibrillation on Eliquis presented emergency department after 1 episode of rectal bleeding. Reports normal bowel movement followed by bright red blood on bath tissue after wiping. No blood in the toilet. At baseline shortness of breath. No chest pain. No abdominal pain. Denies any rectal pain with defecation. No lightheadedness or dizziness. Patient was screened and has no clinical signs or symptoms of a CoVID-19 infection at this time. However, given current pandemic and atypical presentations, face mask, eye protection, surgical cap, and gloves were worn during examination. Patient was wearing surgical mask. Patient is hypertensive 163/97. Tachycardic at 112. Respirations at 20.  97% on room air. Afebrile. Patient does not appear acutely ill or toxic. Differential includes anemia, lower GI bleed, upper GI bleed, internal hemorrhoids, diverticulosis, mesenteric ischemia, bowel ischemia. Will obtain basic work-up including to evaluate for electrolyte abnormalities and anemia CMP, CBC. Given patient's age and no recent CT imaging will obtain CT abdomen/pelvis with contrast.  Patient is tachycardic could be secondary to dehydration versus anemia. Will obtain EKG. Type and cross if significant anemia present. Also obtain coags. 500 cc fluid bolus.     ED Course as of Jan 24 0115   Sat Jan 23, 2021   1614 Hemoglobin is 11.4 from 14 over a year ago    [ZT]   1630 Creatinine: 0.79 [ZT]   1630 BUN: 11 [ZT]   1630 Sodium(!): 133 [ZT]   1630 CO2(!): 18 [ZT]   1630 Potassium: 4.5 [ZT]   1630 Chloride: 102 [ZT]   1630 Alk Phos: 89 [ZT]   1630 ALT: 15 [ZT]   1630 AST: 27 [ZT]   1630 Albumin(!): 3.1 [ZT]   1652 INR: 1.4 [ZT]   1652 PTT(!): 38.7 [ZT]      ED Course User Index  [ZT] Yodit Molina DO Tachycardia did improve after fluids. No significant elevation in troponin. Electrolytes within normal limits. Hemoglobin is 11.4 from over 14 a year ago. EKG unremarkable. INR 1.4. PTT 38.7. CT abdomen showing evidence of diverticulosis but no acute diverticulitis. No other acute intra abdominal pathology. Due to over 3 g drop in hemoglobin over the past year, in addition to tachycardia, it was decided the patient may need admission despite stable BP and otherwise unremarkable work-up. Babita Hendrickson Discussed with patient's PCP Dr. Christian Torre who is recommending admission for further work-up. Patient is comfortable with admission. Bridging orders placed. CONSULTS:  IP CONSULT TO GI      FINAL IMPRESSION      1. Lower GI bleed    2. Anemia, unspecified type          DISPOSITION / PLAN     DISPOSITION        PATIENT REFERRED TO:  No follow-up provider specified.     DISCHARGE MEDICATIONS:  Current Discharge Medication List          Tejas Castillo DO  Emergency Medicine Resident    (Please note that portions of thisnote were completed with a voice recognition program.  Efforts were made to edit the dictations but occasionally words are mis-transcribed.)       Tejas Castillo DO  Resident  01/24/21 [id number]

## 2021-01-23 NOTE — H&P
History and Physical      Name: Arelis Martinez  MRN: [id number]     Acct: [de-identified]  Room: 12/12    Admit Date: 1/23/2021  PCP: Chandrakant Max MD      Chief Complaint:     Chief Complaint   Patient presents with    Rectal Bleeding       History Obtained From:     patient, electronic medical record    History of Present Illness:      Arelis Martinez is a  79 y.o.  female  With a past medical history of atrial fibrillation on Eliquis, bowel resection for diverticulosis, hypertension hypothyroidism presents with Rectal Bleeding   patient states that today she noticed blood when  When the patient white after a normal bowel movement. Patient denies abdominal pain vomiting chest pain shortness of breath palpitations lightheadedness dysuria flank pain fever or chills. Past Medical History:     Past Medical History:   Diagnosis Date    Atrial fibrillation ([address] 3/28/2014    Chronic back pain     with rt. leg pain    Diverticulosis     GERD (gastroesophageal reflux disease)     Hyperlipidemia     Hypertension     Hypothyroidism     Obesity (BMI 30.0-34. 9) 8/12/2016    Onychomycosis     PE (pulmonary embolism)     H/O DVT of rt leg    Type II or unspecified type diabetes mellitus without mention of complication, not stated as uncontrolled         Past Surgical History:     Past Surgical History:   Procedure Laterality Date    CHOLECYSTECTOMY      COLON SURGERY      s/p sigmoid cloectomy    HYSTERECTOMY      TYMPANOSTOMY TUBE PLACEMENT Bilateral 09/20/2019    DR Roshni Aragon    VENA CAVA FILTER PLACEMENT      s/p        Medications Prior to Admission:       Prior to Admission medications    Medication Sig Start Date End Date Taking?  Authorizing Provider   Lactobacillus (ACIDOPHILUS/PECTIN PO) Take 100 mg by mouth daily    Historical Provider, MD   doxazosin (CARDURA) 8 MG tablet TAKE 1 TABLET BY MOUTH ONCE A DAY (DO NOT CHANGE NDC) 12/28/20   MINH Butler - CNP levothyroxine (SYNTHROID) 100 MCG tablet TAKE 1 TABLET BY MOUTH ONCE A DAY 12/28/20   MINH Brennan CNP   fluticasone [facility]) 50 MCG/ACT nasal spray 2 sprays into each nostril daily 11/30/20   Deedee Cash MD   atenolol (TENORMIN) 100 MG tablet TAKE 1 TABLET BY MOUTH DAILY 10/5/20   Deedee Cash MD   RHOPRESSA 0.02 % SOLN INSTILL 1 DROP INTO AFFECTED EYE(S) BY OPHTHALMIC ROUTE ONCE DAILY IN THE EVENING 7/24/20   Historical Provider, MD   timolol (TIMOPTIC) 0.5 % ophthalmic solution ONE DROP TWICE A DAY INTO EACH EYE 8/3/20   Historical Provider, MD   Levocetirizine Dihydrochloride 2.5 MG/5ML SOLN Take 1-2 teaspoons daily as needed for allergy symptoms 5/31/19   Deedee Cash MD   dorzolamide (TRUSOPT) 2 % ophthalmic solution INSTILL 1 DROP BY OPHTHALMIC ROUTE 2 TIMES EVERY DAY IN BOTH EYES 11/9/18   Historical Provider, MD   ondansetron (ZOFRAN-ODT) 4 MG disintegrating tablet Take 1 tablet by mouth 3 times daily as needed for Nausea or Vomiting 12/3/18   MINH Brennan CNP   famotidine (PEPCID) 40 MG tablet Take 1 tablet by mouth daily 10/22/18   Deedee Cash MD   ELIQUIS 5 MG TABS tablet  4/16/18   Historical Provider, MD   tiZANidine (ZANAFLEX) 2 MG tablet Take 1 tablet by mouth 3 times daily as needed (For muscle spasms) 3/28/18   MINH Brennan CNP   cyclobenzaprine (FLEXERIL) 10 MG tablet Take 1 tablet by mouth 2 times daily as needed (prn) 3/27/18   MINH Brennan CNP   NONFORMULARY Indications: Advanced anti-oxidant w/echinacea and garlic    Historical Provider, MD   Cholecalciferol (VITAMIN D3) 5000 UNITS TABS Take 1 tablet by mouth daily    Historical Provider, MD   Cranberry 450 MG CAPS Take 450 mg by mouth daily     Historical Provider, MD   Lactobacillus (PROBIOTIC ACIDOPHILUS PO) Take 1 tablet by mouth daily    Historical Provider, MD   cholestyramine Antoine Brenda) 4 G packet Take 1 packet by mouth every evening  4/14/16   Historical Provider, MD latanoprost (XALATAN) 0.005 % ophthalmic solution Place 1 drop into both eyes nightly    Historical Provider, MD        Allergies: Avapro [irbesartan], Ciprofloxacin hcl, Cozaar [losartan potassium], Diovan [valsartan], Lipitor [atorvastatin calcium], Lisinopril, Pcn [penicillins], Pravachol [pravastatin sodium], Tape [adhesive tape], Tramadol, Zetia [ezetimibe], and Morphine    Social History:     Tobacco:    reports that she has never smoked. She has never used smokeless tobacco.  Alcohol:      reports no history of alcohol use. Drug Use:  reports no history of drug use. Family History:     Family History   Adopted: Yes       Review of Systems:     Positive and Negative as described in HPI   all 10 systems are reviewed and negative except as Noted      Review of Systems   Constitutional: Negative for chills and fatigue. HENT: Negative for drooling, mouth sores, sneezing and trouble swallowing. Eyes: Negative for redness and itching. Respiratory: Negative for cough, chest tightness and shortness of breath. Cardiovascular: Negative for chest pain, palpitations and leg swelling. Gastrointestinal: Positive for blood in stool. Negative for abdominal pain, nausea, rectal pain and vomiting. Endocrine: Negative for heat intolerance and polyphagia. Genitourinary: Negative for difficulty urinating, dysuria, flank pain and pelvic pain. Musculoskeletal: Negative for arthralgias, joint swelling and neck stiffness. Skin: Negative for color change and pallor. Allergic/Immunologic: Negative for food allergies. Neurological: Negative for dizziness, seizures and headaches. Hematological: Does not bruise/bleed easily. Psychiatric/Behavioral: Negative for agitation, behavioral problems and suicidal ideas. The patient is not hyperactive.         Code Status:  Prior    Physical Exam:     Vitals:  /62   Pulse 82   Temp 98.5 °F (36.9 °C) (Oral)   Resp 14   SpO2 95% Temp (24hrs), Av.3 °F (36.8 °C), Min:98 °F (36.7 °C), Max:98.5 °F (36.9 °C)        Physical Exam  Vitals signs reviewed. Constitutional:       Appearance: She is obese. HENT:      Head: Normocephalic. Right Ear: External ear normal.      Left Ear: External ear normal.      Nose: Nose normal.      Mouth/Throat:      Mouth: Mucous membranes are moist.      Pharynx: Oropharynx is clear. Eyes:      Conjunctiva/sclera: Conjunctivae normal.   Neck:      Musculoskeletal: Normal range of motion and neck supple. Cardiovascular:      Rate and Rhythm: Regular rhythm. Tachycardia present. Pulses: Normal pulses. Heart sounds: Normal heart sounds. Pulmonary:      Effort: Pulmonary effort is normal.      Breath sounds: Normal breath sounds. Abdominal:      General: Bowel sounds are normal. There is no distension. Palpations: Abdomen is soft. Tenderness: There is no right CVA tenderness or left CVA tenderness. Musculoskeletal:         General: No deformity. Right lower leg: No edema. Left lower leg: No edema. Skin:     General: Skin is warm. Capillary Refill: Capillary refill takes less than 2 seconds. Coloration: Skin is not jaundiced. Neurological:      General: No focal deficit present. Mental Status: She is alert. Mental status is at baseline.    Psychiatric:         Mood and Affect: Mood normal.         Behavior: Behavior normal.               Data:     Recent Results (from the past 24 hour(s))   Comprehensive Metabolic Panel w/ Reflex to MG    Collection Time: 21  3:58 PM   Result Value Ref Range    Glucose 182 (H) 70 - 99 mg/dL    BUN 11 8 - 23 mg/dL    CREATININE 0.79 0.50 - 0.90 mg/dL    Bun/Cre Ratio NOT REPORTED 9 - 20    Calcium 9.0 8.6 - 10.4 mg/dL    Sodium 133 (L) 135 - 144 mmol/L    Potassium 4.5 3.7 - 5.3 mmol/L    Chloride 102 98 - 107 mmol/L    CO2 18 (L) 20 - 31 mmol/L    Anion Gap 13 9 - 17 mmol/L Alkaline Phosphatase 89 35 - 104 U/L    ALT 15 5 - 33 U/L    AST 27 <32 U/L    Total Bilirubin 0.51 0.3 - 1.2 mg/dL    Total Protein 6.5 6.4 - 8.3 g/dL    Alb 3.1 (L) 3.5 - 5.2 g/dL    Albumin/Globulin Ratio NOT REPORTED 1.0 - 2.5    GFR Non-African American >60 >60 mL/min    GFR African American >60 >60 mL/min    GFR Comment          GFR Staging NOT REPORTED    CBC Auto Differential    Collection Time: 01/23/21  3:58 PM   Result Value Ref Range    WBC 4.8 3.5 - 11.0 k/uL    RBC 3.58 (L) 4.0 - 5.2 m/uL    Hemoglobin 11.4 (L) 12.0 - 16.0 g/dL    Hematocrit 35.2 (L) 36 - 46 %    MCV 98.2 80 - 100 fL    MCH 32.0 26 - 34 pg    MCHC 32.5 31 - 37 g/dL    RDW 14.6 11.5 - 14.9 %    Platelets 920 (L) 665 - 450 k/uL    MPV 10.5 6.0 - 12.0 fL    NRBC Automated NOT REPORTED per 100 WBC    Differential Type NOT REPORTED     Seg Neutrophils 68 (H) 36 - 66 %    Lymphocytes 17 (L) 24 - 44 %    Monocytes 11 (H) 1 - 7 %    Eosinophils % 3 0 - 4 %    Basophils 1 0 - 2 %    Immature Granulocytes NOT REPORTED 0 %    Segs Absolute 3.30 1.3 - 9.1 k/uL    Absolute Lymph # 0.80 (L) 1.0 - 4.8 k/uL    Absolute Mono # 0.50 0.1 - 1.3 k/uL    Absolute Eos # 0.20 0.0 - 0.4 k/uL    Basophils Absolute 0.00 0.0 - 0.2 k/uL    Absolute Immature Granulocyte NOT REPORTED 0.00 - 0.30 k/uL    WBC Morphology NOT REPORTED     RBC Morphology NOT REPORTED     Platelet Estimate NOT REPORTED    SPECIMEN REJECTION    Collection Time: 01/23/21  3:58 PM   Result Value Ref Range    Specimen Source . BLOOD     Ordered Test PT,PTT     Reason for Rejection Unable to perform testing: No specimen received.      - NOT REPORTED    Protime-INR    Collection Time: 01/23/21  4:34 PM   Result Value Ref Range    Protime 16.6 (H) 11.8 - 14.6 sec    INR 1.4    APTT    Collection Time: 01/23/21  4:34 PM   Result Value Ref Range    PTT 38.7 (H) 24.0 - 36.0 sec   EKG 12 Lead    Collection Time: 01/23/21  4:54 PM   Result Value Ref Range    Ventricular Rate 106 BPM Atrial Rate 106 BPM    P-R Interval 192 ms    QRS Duration 126 ms    Q-T Interval 356 ms    QTc Calculation (Bazett) 472 ms    P Axis 85 degrees    R Axis 37 degrees    T Axis 40 degrees       Assesment:     Primary Problem  GI bleed    Principal Problem:    GI bleed  Active Problems:    Gastroesophageal reflux disease without esophagitis    Diverticulosis    Hypothyroidism    Essential hypertension    Obesity (BMI 30.0-34.9)    Iron deficiency anemia    Paroxysmal atrial fibrillation (HCC)  Resolved Problems:    * No resolved hospital problems. *      Plan:     1.  clear liquids  2. Protonix 40 mg IV daily  3.  consult GI  4.  hold Eliquis  5. H&H every 12 hours  6. trop in a.m.  7.  IV normal saline at 75 mL/hour  8.  check TSH  9. DVT prophylaxis No pharmacological DVT prophylaxis due to GI bleed  10.   EPCs  11.  check and replace electrolytes per sliding scale  12.  restart home medications        Electronically signed by Javi Yung MD     Copy sent to Dr. Chandrakant Max MD

## 2021-01-24 LAB
ABSOLUTE EOS #: 0.1 K/UL (ref 0–0.4)
ABSOLUTE IMMATURE GRANULOCYTE: ABNORMAL K/UL (ref 0–0.3)
ABSOLUTE LYMPH #: 1 K/UL (ref 1–4.8)
ABSOLUTE MONO #: 0.6 K/UL (ref 0.1–1.3)
ALBUMIN SERPL-MCNC: 3.1 G/DL (ref 3.5–5.2)
ALBUMIN/GLOBULIN RATIO: ABNORMAL (ref 1–2.5)
ALP BLD-CCNC: 85 U/L (ref 35–104)
ALT SERPL-CCNC: 16 U/L (ref 5–33)
ANION GAP SERPL CALCULATED.3IONS-SCNC: 10 MMOL/L (ref 9–17)
AST SERPL-CCNC: 28 U/L
BASOPHILS # BLD: 1 % (ref 0–2)
BASOPHILS ABSOLUTE: 0 K/UL (ref 0–0.2)
BILIRUB SERPL-MCNC: 0.71 MG/DL (ref 0.3–1.2)
BUN BLDV-MCNC: 6 MG/DL (ref 8–23)
BUN/CREAT BLD: ABNORMAL (ref 9–20)
CALCIUM SERPL-MCNC: 8.9 MG/DL (ref 8.6–10.4)
CHLORIDE BLD-SCNC: 107 MMOL/L (ref 98–107)
CO2: 23 MMOL/L (ref 20–31)
CREAT SERPL-MCNC: 0.6 MG/DL (ref 0.5–0.9)
DIFFERENTIAL TYPE: ABNORMAL
EOSINOPHILS RELATIVE PERCENT: 3 % (ref 0–4)
GFR AFRICAN AMERICAN: >60 ML/MIN
GFR NON-AFRICAN AMERICAN: >60 ML/MIN
GFR SERPL CREATININE-BSD FRML MDRD: ABNORMAL ML/MIN/{1.73_M2}
GFR SERPL CREATININE-BSD FRML MDRD: ABNORMAL ML/MIN/{1.73_M2}
GLUCOSE BLD-MCNC: 117 MG/DL (ref 70–99)
HCT VFR BLD CALC: 32.4 % (ref 36–46)
HCT VFR BLD CALC: 32.6 % (ref 36–46)
HCT VFR BLD CALC: 33 % (ref 36–46)
HEMOGLOBIN: 10.6 G/DL (ref 12–16)
HEMOGLOBIN: 10.9 G/DL (ref 12–16)
HEMOGLOBIN: 11 G/DL (ref 12–16)
IMMATURE GRANULOCYTES: ABNORMAL %
LYMPHOCYTES # BLD: 22 % (ref 24–44)
MCH RBC QN AUTO: 32.2 PG (ref 26–34)
MCHC RBC AUTO-ENTMCNC: 33.8 G/DL (ref 31–37)
MCV RBC AUTO: 95.5 FL (ref 80–100)
MONOCYTES # BLD: 14 % (ref 1–7)
NRBC AUTOMATED: ABNORMAL PER 100 WBC
PDW BLD-RTO: 13.9 % (ref 11.5–14.9)
PLATELET # BLD: 116 K/UL (ref 150–450)
PLATELET ESTIMATE: ABNORMAL
PMV BLD AUTO: 9 FL (ref 6–12)
POTASSIUM SERPL-SCNC: 4.1 MMOL/L (ref 3.7–5.3)
RBC # BLD: 3.39 M/UL (ref 4–5.2)
RBC # BLD: ABNORMAL 10*6/UL
SARS-COV-2, RAPID: NOT DETECTED
SARS-COV-2: NORMAL
SARS-COV-2: NORMAL
SEG NEUTROPHILS: 60 % (ref 36–66)
SEGMENTED NEUTROPHILS ABSOLUTE COUNT: 2.8 K/UL (ref 1.3–9.1)
SODIUM BLD-SCNC: 140 MMOL/L (ref 135–144)
SOURCE: NORMAL
TOTAL PROTEIN: 6.2 G/DL (ref 6.4–8.3)
TROPONIN INTERP: NORMAL
TROPONIN T: NORMAL NG/ML
TROPONIN, HIGH SENSITIVITY: 13 NG/L (ref 0–14)
WBC # BLD: 4.7 K/UL (ref 3.5–11)
WBC # BLD: ABNORMAL 10*3/UL

## 2021-01-24 PROCEDURE — 6370000000 HC RX 637 (ALT 250 FOR IP): Performed by: FAMILY MEDICINE

## 2021-01-24 PROCEDURE — 2580000003 HC RX 258: Performed by: STUDENT IN AN ORGANIZED HEALTH CARE EDUCATION/TRAINING PROGRAM

## 2021-01-24 PROCEDURE — 6370000000 HC RX 637 (ALT 250 FOR IP): Performed by: NURSE PRACTITIONER

## 2021-01-24 PROCEDURE — 85014 HEMATOCRIT: CPT

## 2021-01-24 PROCEDURE — 84484 ASSAY OF TROPONIN QUANT: CPT

## 2021-01-24 PROCEDURE — 85018 HEMOGLOBIN: CPT

## 2021-01-24 PROCEDURE — 2580000003 HC RX 258: Performed by: FAMILY MEDICINE

## 2021-01-24 PROCEDURE — 99222 1ST HOSP IP/OBS MODERATE 55: CPT | Performed by: INTERNAL MEDICINE

## 2021-01-24 PROCEDURE — 36415 COLL VENOUS BLD VENIPUNCTURE: CPT

## 2021-01-24 PROCEDURE — 6360000002 HC RX W HCPCS: Performed by: FAMILY MEDICINE

## 2021-01-24 PROCEDURE — C9113 INJ PANTOPRAZOLE SODIUM, VIA: HCPCS | Performed by: FAMILY MEDICINE

## 2021-01-24 PROCEDURE — 80053 COMPREHEN METABOLIC PANEL: CPT

## 2021-01-24 PROCEDURE — 2060000000 HC ICU INTERMEDIATE R&B

## 2021-01-24 PROCEDURE — U0002 COVID-19 LAB TEST NON-CDC: HCPCS

## 2021-01-24 PROCEDURE — 97161 PT EVAL LOW COMPLEX 20 MIN: CPT

## 2021-01-24 PROCEDURE — 85025 COMPLETE CBC W/AUTO DIFF WBC: CPT

## 2021-01-24 RX ORDER — POLYETHYLENE GLYCOL 3350 17 G/17G
238 POWDER, FOR SOLUTION ORAL ONCE
Status: COMPLETED | OUTPATIENT
Start: 2021-01-24 | End: 2021-01-24

## 2021-01-24 RX ADMIN — TIMOLOL MALEATE 1 DROP: 5 SOLUTION/ DROPS OPHTHALMIC at 07:28

## 2021-01-24 RX ADMIN — SODIUM CHLORIDE 10 ML: 9 INJECTION INTRAMUSCULAR; INTRAVENOUS; SUBCUTANEOUS at 07:26

## 2021-01-24 RX ADMIN — LEVOTHYROXINE SODIUM 100 MCG: 0.1 TABLET ORAL at 07:25

## 2021-01-24 RX ADMIN — PANTOPRAZOLE SODIUM 40 MG: 40 INJECTION, POWDER, FOR SOLUTION INTRAVENOUS at 07:26

## 2021-01-24 RX ADMIN — FAMOTIDINE 40 MG: 20 TABLET ORAL at 07:31

## 2021-01-24 RX ADMIN — ACETAMINOPHEN 650 MG: 325 TABLET, FILM COATED ORAL at 20:31

## 2021-01-24 RX ADMIN — SODIUM CHLORIDE: 9 INJECTION, SOLUTION INTRAVENOUS at 05:30

## 2021-01-24 RX ADMIN — BISACODYL 10 MG: 5 TABLET, COATED ORAL at 15:34

## 2021-01-24 RX ADMIN — LATANOPROST 1 DROP: 50 SOLUTION OPHTHALMIC at 19:57

## 2021-01-24 RX ADMIN — DORZOLAMIDE HYDROCHLORIDE 1 DROP: 20 SOLUTION/ DROPS OPHTHALMIC at 19:56

## 2021-01-24 RX ADMIN — DOXAZOSIN 4 MG: 4 TABLET ORAL at 20:26

## 2021-01-24 RX ADMIN — TIMOLOL MALEATE 1 DROP: 5 SOLUTION/ DROPS OPHTHALMIC at 19:56

## 2021-01-24 RX ADMIN — ACETAMINOPHEN 650 MG: 325 TABLET, FILM COATED ORAL at 11:30

## 2021-01-24 RX ADMIN — SODIUM CHLORIDE: 9 INJECTION, SOLUTION INTRAVENOUS at 20:31

## 2021-01-24 RX ADMIN — DORZOLAMIDE HYDROCHLORIDE 1 DROP: 20 SOLUTION/ DROPS OPHTHALMIC at 07:27

## 2021-01-24 RX ADMIN — ATENOLOL 100 MG: 50 TABLET ORAL at 07:24

## 2021-01-24 RX ADMIN — POLYETHYLENE GLYCOL 3350 238 G: 17 POWDER, FOR SOLUTION ORAL at 16:45

## 2021-01-24 ASSESSMENT — PAIN DESCRIPTION - DESCRIPTORS: DESCRIPTORS: DISCOMFORT

## 2021-01-24 ASSESSMENT — ENCOUNTER SYMPTOMS
CHOKING: 0
SHORTNESS OF BREATH: 0
ABDOMINAL PAIN: 0
COLOR CHANGE: 0
EYE ITCHING: 0
EYE REDNESS: 0
TROUBLE SWALLOWING: 0
CHEST TIGHTNESS: 0
WHEEZING: 0
BLOOD IN STOOL: 0
DIARRHEA: 1
RECTAL PAIN: 0
ABDOMINAL DISTENTION: 0
EYES NEGATIVE: 1
ANAL BLEEDING: 1
BLOOD IN STOOL: 1
VOICE CHANGE: 0
SORE THROAT: 0
NAUSEA: 0
COUGH: 0
BACK PAIN: 0
VOMITING: 0

## 2021-01-24 ASSESSMENT — PAIN SCALES - GENERAL
PAINLEVEL_OUTOF10: 0
PAINLEVEL_OUTOF10: 3

## 2021-01-24 ASSESSMENT — PAIN DESCRIPTION - FREQUENCY: FREQUENCY: INTERMITTENT

## 2021-01-24 ASSESSMENT — PAIN DESCRIPTION - ONSET: ONSET: ON-GOING

## 2021-01-24 NOTE — PROGRESS NOTES
Progress Note    1/24/2021   4:08 PM    Name:  Juice Walden  MRN:    [id number]     Acct:     [de-identified]   Room:  2097/2097-01  IP Day: 1     Admit Date: 1/23/2021  3:05 PM  PCP: Yao Yao MD    Subjective:     C/C:   Chief Complaint   Patient presents with    Rectal Bleeding       Interval History: Status: not changed. Patient had one episode of blood in stool. Denies abdominal pain nausea vomiting or chest pain. Recent labs reviewed blood pressure heart rate stable. Recent labs reviewed hemoglobin stable at 11    ROS:   all 10 systems reviewed and are negative except as noted    Review of Systems   Constitutional: Negative for chills and fatigue. HENT: Negative for drooling, mouth sores, sneezing and trouble swallowing. Eyes: Negative for redness and itching. Respiratory: Negative for cough, chest tightness and shortness of breath. Cardiovascular: Negative for chest pain, palpitations and leg swelling. Gastrointestinal: Positive for blood in stool. Negative for abdominal pain, nausea and vomiting. Endocrine: Negative for heat intolerance and polyphagia. Genitourinary: Negative for difficulty urinating, flank pain and pelvic pain. Musculoskeletal: Negative for arthralgias, joint swelling and neck stiffness. Skin: Negative for color change and pallor. Allergic/Immunologic: Negative for food allergies. Neurological: Negative for dizziness, seizures and headaches. Hematological: Does not bruise/bleed easily. Psychiatric/Behavioral: Negative for agitation, behavioral problems and suicidal ideas. The patient is not hyperactive. Medications: Allergies:    Allergies   Allergen Reactions    Avapro [Irbesartan]     Ciprofloxacin Hcl Nausea Only    Cozaar [Losartan Potassium]     Diovan [Valsartan]     Lipitor [Atorvastatin Calcium]     Lisinopril     Pcn [Penicillins]     Pravachol [Pravastatin Sodium]      myalgias    Tape [Adhesive Tape] Dermatitis  Tramadol Swelling    Zetia [Ezetimibe]     Morphine Nausea And Vomiting       Current Meds:     polyethylene glycol (GLYCOLAX) powder 238 g, Once      sodium chloride flush 0.9 % injection 10 mL, 2 times per day      sodium chloride flush 0.9 % injection 10 mL, PRN      acetaminophen (TYLENOL) tablet 650 mg, Q4H PRN      0.9 % sodium chloride infusion, Continuous      ondansetron (ZOFRAN) injection 4 mg, Q8H PRN      atenolol (TENORMIN) tablet 100 mg, Daily      dorzolamide (TRUSOPT) 2 % ophthalmic solution 1 drop, BID      doxazosin (CARDURA) tablet 4 mg, Daily      famotidine (PEPCID) tablet 40 mg, Daily      latanoprost (XALATAN) 0.005 % ophthalmic solution 1 drop, Nightly      levothyroxine (SYNTHROID) tablet 100 mcg, Daily      timolol (TIMOPTIC) 0.5 % ophthalmic solution 1 drop, BID      sodium chloride flush 0.9 % injection 10 mL, 2 times per day      sodium chloride flush 0.9 % injection 10 mL, PRN      potassium chloride (KLOR-CON M) extended release tablet 40 mEq, PRN    Or      potassium bicarb-citric acid (EFFER-K) effervescent tablet 40 mEq, PRN    Or      potassium chloride 10 mEq/100 mL IVPB (Peripheral Line), PRN      magnesium sulfate 1000 mg in dextrose 5% 100 mL IVPB, PRN      promethazine (PHENERGAN) tablet 12.5 mg, Q6H PRN    Or      ondansetron (ZOFRAN) injection 4 mg, Q6H PRN      magnesium hydroxide (MILK OF MAGNESIA) 400 MG/5ML suspension 30 mL, Daily PRN      acetaminophen (TYLENOL) tablet 650 mg, Q6H PRN    Or      acetaminophen (TYLENOL) suppository 650 mg, Q6H PRN      pantoprazole (PROTONIX) injection 40 mg, Daily    And      sodium chloride (PF) 0.9 % injection 10 mL, Daily        Data:     Code Status:  Full Code    Family History   Adopted: Yes       Social History     Socioeconomic History    Marital status:       Spouse name: Not on file    Number of children: Not on file    Years of education: Not on file  Highest education level: Not on file   Occupational History    Not on file   Social Needs    Financial resource strain: Not on file    Food insecurity     Worry: Not on file     Inability: Not on file    Transportation needs     Medical: Not on file     Non-medical: Not on file   Tobacco Use    Smoking status: Never Smoker    Smokeless tobacco: Never Used   Substance and Sexual Activity    Alcohol use: No    Drug use: No    Sexual activity: Not on file   Lifestyle    Physical activity     Days per week: Not on file     Minutes per session: Not on file    Stress: Not on file   Relationships    Social connections     Talks on phone: Not on file     Gets together: Not on file     Attends Yarsanism service: Not on file     Active member of club or organization: Not on file     Attends meetings of clubs or organizations: Not on file     Relationship status: Not on file    Intimate partner violence     Fear of current or ex partner: Not on file     Emotionally abused: Not on file     Physically abused: Not on file     Forced sexual activity: Not on file   Other Topics Concern    Not on file   Social History Narrative    Not on file       I/O (24Hr): Intake/Output Summary (Last 24 hours) at 1/24/2021 1608  Last data filed at 1/24/2021 0530  Gross per 24 hour   Intake 940 ml   Output 550 ml   Net 390 ml     Radiology:  Ct Abdomen Pelvis W Iv Contrast    Result Date: 1/23/2021  1. No CT evidence of an acute intra-abdominal or intrapelvic process. 2. Postsurgical sequela involving multifocal areas of the bowel. Correlate with surgical history. No bowel obstruction or acute inflammatory process evident. I have no prior study for comparison. 3.  Mild intra and extrahepatic bile duct dilatation status post cholecystectomy typical of reservoir effect. 4.  Diverticulosis coli without CT evidence of acute diverticulitis.        Labs:  Recent Results (from the past 24 hour(s))   Troponin Collection Time: 01/23/21  4:23 PM   Result Value Ref Range    Troponin, High Sensitivity 12 0 - 14 ng/L    Troponin T NOT REPORTED <0.03 ng/mL    Troponin Interp NOT REPORTED    TSH with Reflex    Collection Time: 01/23/21  4:23 PM   Result Value Ref Range    TSH 1.09 0.30 - 5.00 mIU/L   Protime-INR    Collection Time: 01/23/21  4:34 PM   Result Value Ref Range    Protime 16.6 (H) 11.8 - 14.6 sec    INR 1.4    APTT    Collection Time: 01/23/21  4:34 PM   Result Value Ref Range    PTT 38.7 (H) 24.0 - 36.0 sec   EKG 12 Lead    Collection Time: 01/23/21  4:54 PM   Result Value Ref Range    Ventricular Rate 106 BPM    Atrial Rate 106 BPM    P-R Interval 192 ms    QRS Duration 126 ms    Q-T Interval 356 ms    QTc Calculation (Bazett) 472 ms    P Axis 85 degrees    R Axis 37 degrees    T Axis 40 degrees   Comprehensive Metabolic Panel w/ Reflex to MG    Collection Time: 01/24/21  5:04 AM   Result Value Ref Range    Glucose 117 (H) 70 - 99 mg/dL    BUN 6 (L) 8 - 23 mg/dL    CREATININE 0.60 0.50 - 0.90 mg/dL    Bun/Cre Ratio NOT REPORTED 9 - 20    Calcium 8.9 8.6 - 10.4 mg/dL    Sodium 140 135 - 144 mmol/L    Potassium 4.1 3.7 - 5.3 mmol/L    Chloride 107 98 - 107 mmol/L    CO2 23 20 - 31 mmol/L    Anion Gap 10 9 - 17 mmol/L    Alkaline Phosphatase 85 35 - 104 U/L    ALT 16 5 - 33 U/L    AST 28 <32 U/L    Total Bilirubin 0.71 0.3 - 1.2 mg/dL    Total Protein 6.2 (L) 6.4 - 8.3 g/dL    Alb 3.1 (L) 3.5 - 5.2 g/dL    Albumin/Globulin Ratio NOT REPORTED 1.0 - 2.5    GFR Non-African American >60 >60 mL/min    GFR African American >60 >60 mL/min    GFR Comment          GFR Staging NOT REPORTED    CBC auto differential    Collection Time: 01/24/21  5:04 AM   Result Value Ref Range    WBC 4.7 3.5 - 11.0 k/uL    RBC 3.39 (L) 4.0 - 5.2 m/uL    Hemoglobin 10.9 (L) 12.0 - 16.0 g/dL    Hematocrit 32.4 (L) 36 - 46 %    MCV 95.5 80 - 100 fL    MCH 32.2 26 - 34 pg    MCHC 33.8 31 - 37 g/dL    RDW 13.9 11.5 - 14.9 % Platelets 706 (L) 328 - 450 k/uL    MPV 9.0 6.0 - 12.0 fL    NRBC Automated NOT REPORTED per 100 WBC    Differential Type NOT REPORTED     Seg Neutrophils 60 36 - 66 %    Lymphocytes 22 (L) 24 - 44 %    Monocytes 14 (H) 1 - 7 %    Eosinophils % 3 0 - 4 %    Basophils 1 0 - 2 %    Immature Granulocytes NOT REPORTED 0 %    Segs Absolute 2.80 1.3 - 9.1 k/uL    Absolute Lymph # 1.00 1.0 - 4.8 k/uL    Absolute Mono # 0.60 0.1 - 1.3 k/uL    Absolute Eos # 0.10 0.0 - 0.4 k/uL    Basophils Absolute 0.00 0.0 - 0.2 k/uL    Absolute Immature Granulocyte NOT REPORTED 0.00 - 0.30 k/uL    WBC Morphology NOT REPORTED     RBC Morphology NOT REPORTED     Platelet Estimate NOT REPORTED    TROPONIN    Collection Time: 21  5:04 AM   Result Value Ref Range    Troponin, High Sensitivity 13 0 - 14 ng/L    Troponin T NOT REPORTED <0.03 ng/mL    Troponin Interp NOT REPORTED    HEMOGLOBIN AND HEMATOCRIT, BLOOD    Collection Time: 21 12:57 PM   Result Value Ref Range    Hemoglobin 11.0 (L) 12.0 - 16.0 g/dL    Hematocrit 33.0 (L) 36 - 46 %   COVID-19    Collection Time: 21  3:47 PM    Specimen: Other   Result Value Ref Range    SARS-CoV-2          SARS-CoV-2, Rapid Not Detected Not Detected    Source . NASOPHARYNGEAL SWAB     SARS-CoV-2             Physical Examination:        Vitals:  BP (!) 140/61   Pulse 67   Temp 98.4 °F (36.9 °C) (Oral)   Resp 16   Wt 206 lb 5.6 oz (93.6 kg)   SpO2 97%   BMI 34.34 kg/m²   Temp (24hrs), Av.3 °F (36.8 °C), Min:98.2 °F (36.8 °C), Max:98.5 °F (36.9 °C)    No results for input(s): POCGLU in the last 72 hours. Physical Exam  Vitals signs reviewed. HENT:      Head: Normocephalic. Right Ear: External ear normal.      Left Ear: External ear normal.      Nose: Nose normal.      Mouth/Throat:      Mouth: Mucous membranes are moist.      Pharynx: Oropharynx is clear.    Eyes:      Conjunctiva/sclera: Conjunctivae normal.   Neck: Musculoskeletal: Normal range of motion and neck supple. Cardiovascular:      Rate and Rhythm: Normal rate and regular rhythm. Pulses: Normal pulses. Heart sounds: Normal heart sounds. Pulmonary:      Effort: Pulmonary effort is normal.      Breath sounds: Normal breath sounds. Abdominal:      General: Bowel sounds are normal.      Palpations: Abdomen is soft. Tenderness: There is no abdominal tenderness. Musculoskeletal:         General: No deformity. Right lower leg: No edema. Left lower leg: No edema. Skin:     General: Skin is warm. Capillary Refill: Capillary refill takes less than 2 seconds. Coloration: Skin is not jaundiced. Neurological:      General: No focal deficit present. Mental Status: She is alert. Mental status is at baseline. Psychiatric:         Mood and Affect: Mood normal.         Behavior: Behavior normal.         Assessment:        Primary Problem  GI bleed     Principal Problem:    GI bleed  Active Problems:    Gastroesophageal reflux disease without esophagitis    Diverticulosis    Hypothyroidism    Essential hypertension    Obesity (BMI 30.0-34.9)    Iron deficiency anemia    Paroxysmal atrial fibrillation (HCC)  Resolved Problems:    * No resolved hospital problems. *      Past Medical History:   Diagnosis Date    Atrial fibrillation ([facility] [address] 3/28/2014    Chronic back pain     with rt. leg pain    Diverticulosis     GERD (gastroesophageal reflux disease)     Hyperlipidemia     Hypertension     Hypothyroidism     Obesity (BMI 30.0-34. 9) 8/12/2016    Onychomycosis     PE (pulmonary embolism)     H/O DVT of rt leg    Type II or unspecified type diabetes mellitus without mention of complication, not stated as uncontrolled         Plan:        1. Clear liquid diet  2. Monitor CBC, CMP  3. IV normal saline at 75 mL/h  1. PPI Protonix 40 mg IV daily  2. DVT Prophylaxis no pharmacological DVT prophylaxis due to GI bleed  3.  EPCs 4. PT/OT to evaluate and treat  5. Pain control  6. Replace electrolytes as per sliding scale  7. Home medications reviewed and appropriate medications continued  8.  Reviewed labs and imaging studies from last 24 hours and results explained to patient      Electronically signed by Jamarcus Allen MD

## 2021-01-24 NOTE — PROGRESS NOTES
Physical Therapy    Facility/Department: [facility] PROGRESSIVE CARE  Initial Assessment    NAME: Baltazar Nevarez  : 1940  MRN: [id number]    Date of Service: 2021    Discharge Recommendations:      PT Equipment Recommendations  Equipment Needed: No    Assessment   Assessment: No PT goals Identified  Decision Making: Low Complexity  History: none  Exam: none  Clinical Presentation: stable  No Skilled PT: At baseline function  REQUIRES PT FOLLOW UP: No  Activity Tolerance  Activity Tolerance: Patient Tolerated treatment well       Patient Diagnosis(es): The primary encounter diagnosis was Lower GI bleed. A diagnosis of Anemia, unspecified type was also pertinent to this visit. has a past medical history of Atrial fibrillation ([facility] [address] Chronic back pain, Diverticulosis, GERD (gastroesophageal reflux disease), Hyperlipidemia, Hypertension, Hypothyroidism, Obesity (BMI 30.0-34.9), Onychomycosis, PE (pulmonary embolism), and Type II or unspecified type diabetes mellitus without mention of complication, not stated as uncontrolled. has a past surgical history that includes Colon surgery; Vena Cava Filter Placement; Cholecystectomy; Hysterectomy; and Tympanostomy tube placement (Bilateral, 2019).        Vision/Hearing  Vision: Impaired  Vision Exceptions: Wears glasses at all times  Hearing: Exceptions to [facility]  Hearing Exceptions: Hard of hearing/hearing concerns;Bilateral hearing aid(not working well due to Nutrino blockage\")     Subjective  General  Family / Caregiver Present: No  Follows Commands: Within Functional Limits  Subjective  Subjective: pt pleasant and cooperative  Pain Screening  Patient Currently in Pain: Denies  Vital Signs  Patient Currently in Pain: Denies       Orientation  Orientation  Overall Orientation Status: Within Normal Limits  Social/Functional History  Social/Functional History  Lives With: Alone  Type of Home: House  Home Layout: One level  Home Access: Stairs to enter with rails Entrance Stairs - Number of Steps: 7  Entrance Stairs - Rails: Left  Bathroom Shower/Tub: Walk-in shower  Bathroom Toilet: Handicap height  Bathroom Equipment: Built-in shower seat, Grab bars in shower  Home Equipment: Cane, Rolling walker  Receives Help From: Family  ADL Assistance: Independent  Homemaking Assistance: Independent  Ambulation Assistance: Independent(uses cane outside or inside PRN)  Transfer Assistance: Independent       Objective       AROM RLE (degrees)  RLE AROM: WNL  AROM LLE (degrees)  LLE AROM : WNL  Strength RLE  Strength RLE: WNL  Comment: grossly 4/5  Strength LLE  Strength LLE: WNL  Comment: grossly 4/5        Bed mobility  Supine to Sit: Independent  Sit to Supine: Independent  Scooting: Independent  Transfers  Sit to Stand: Independent  Stand to sit:  Independent  Ambulation  Ambulation?: Yes  Ambulation 1  Surface: level tile  Device: No Device  Assistance: Supervision  Gait Deviations: Slow Nadya  Distance: 50ft  Comments: forward flexed trunk with R trunk shift  Stairs/Curb  Stairs?: No     Balance  Sitting - Static: Good  Sitting - Dynamic: Good  Standing - Static: Good  Standing - Dynamic: Good        Plan   Plan  Plan Comment: No skilled PT needed at this time      Therapy Time   Individual Concurrent Group Co-treatment   Time In 0840         Time Out 0901         Minutes [address] [facility],  Normal for race

## 2021-01-24 NOTE — CARE COORDINATION
CASE MANAGEMENT NOTE:    Admission Date:  1/23/2021 Yanely Mercado is a 79 y.o.  female    Admitted for : GI bleed [K92.2]    Met with:  Patient    PCP:  David Delvalle                                Insurance:  Medicare / [facility]      Current Residence/ Living Arrangements:  independently at home             Current Services PTA:  No    Is patient agreeable to VNS: No    Freedom of choice provided:  Yes    List of [address] provided: Yes    VNS chosen:  No    DME:  straight cane, walker and wheelchair    Home Oxygen: No    Nebulizer: No    CPAP/BIPAP: No    Supplier: N/A    Potential Assistance Needed: No    SNF needed: No    Freedom of choice and list provided: No    Pharmacy:  [facility] in [facility]       Does Patient want to use MEDS to BEDS? No    Is patient currently receiving oral anticoagulation therapy? No    Is the Patient an [facility] with Readmission Risk Score greater than 14%? No  If yes, pt needs a follow up appointment made within 7 days. Family Members/Caregivers that pt would like involved in their care:    Yes    If yes, list name here:  Son Alison You and daughter Fidelina Goetz    Transportation Provider:  Family             Is patient in Isolation/One on One/Altered Mental Status? No  If yes, skip next question. If no, would they like an I-Pad to  use? No  If yes, call [phone]. Discharge Plan:  1/24 MEDICARE / [facility] - independent from 1 story home alone. DME: 2 canes, walker, wheelchair. Eliquis PTA. GI Bleed. Hgb 10.9. GI consult, clear liquids, OT ordered. Gave list of VNS agencies. Declines home care.                   Electronically signed by: Rosa Low RN on 1/24/2021 at 10:09 AM

## 2021-01-24 NOTE — CONSULTS
GI Consult Note:    Name: Luke Heck  MRN: [id number]     Acct: [de-identified]  Room: 2097/2097-01    Admit Date: 1/23/2021  PCP: Lucien Mccabe MD    Physician Requesting Consult: Tamra Meredith MD     Reason for Consult: Rectal bleeding    Chief Complaint:     Chief Complaint   Patient presents with    Rectal Bleeding       History Obtained From:     patient, electronic medical record, nursing staff    History of Present Illness:      Luke Heck is a  79 y.o.  female who presents with Rectal Bleeding      Symptoms:  Patient seen at [facility] at about 11:45 AM.    Patient has history of rectal bleeding. Prior to admission for couple of days she had small to moderate amount of rectal bleeding with bowel movements. She denies constipation. In fact she has 2-3 loose bowel movements a day. No nocturnal bowel movements. Bowel movements are semiformed to liquid consistency. She does not strain at bowel movements. No tenesmus. Patient has been on Eliquis for atrial fibrillation and DVT. Patient denies abdominal pain. Has some nausea however no emesis. Has a history of chronic GERD. Denies dysphagia. Has decreased appetite. However no weight loss. This patient had partial sigmoid colectomy done in the past for diverticulitis. Following the surgery patient had ventral hernia for which she had hernia repair done few times by Dr. Reshma Escobar. Past Medical History:     Past Medical History:   Diagnosis Date    Atrial fibrillation ([address] 3/28/2014    Chronic back pain     with rt. leg pain    Diverticulosis     GERD (gastroesophageal reflux disease)     Hyperlipidemia     Hypertension     Hypothyroidism     Obesity (BMI 30.0-34. 9) 8/12/2016    Onychomycosis     PE (pulmonary embolism)     H/O DVT of rt leg    Type II or unspecified type diabetes mellitus without mention of complication, not stated as uncontrolled         Past Surgical History: Past Surgical History:   Procedure Laterality Date    CHOLECYSTECTOMY      COLON SURGERY      s/p sigmoid cloectomy    HYSTERECTOMY      TYMPANOSTOMY TUBE PLACEMENT Bilateral 09/20/2019    DR Tariq Tapia    VENA CAVA FILTER PLACEMENT      s/p        Medications Prior to Admission:       Prior to Admission medications    Medication Sig Start Date End Date Taking?  Authorizing Provider   Lactobacillus (ACIDOPHILUS/PECTIN PO) Take 100 mg by mouth daily    Historical Provider, MD   doxazosin (CARDURA) 8 MG tablet TAKE 1 TABLET BY MOUTH ONCE A DAY (DO NOT CHANGE NDC) 12/28/20   MINH Oneil CNP   levothyroxine (SYNTHROID) 100 MCG tablet TAKE 1 TABLET BY MOUTH ONCE A DAY 12/28/20   MINH Oneil CNP   fluticasone (FLONASE) 50 MCG/ACT nasal spray 2 sprays into each nostril daily 11/30/20   Audelia Castanon MD   atenolol (TENORMIN) 100 MG tablet TAKE 1 TABLET BY MOUTH DAILY 10/5/20   Audelia Castanon MD   RHOPRESSA 0.02 % SOLN INSTILL 1 DROP INTO AFFECTED EYE(S) BY OPHTHALMIC ROUTE ONCE DAILY IN THE EVENING 7/24/20   Historical Provider, MD   timolol (TIMOPTIC) 0.5 % ophthalmic solution ONE DROP TWICE A DAY INTO EACH EYE 8/3/20   Historical Provider, MD   Levocetirizine Dihydrochloride 2.5 MG/5ML SOLN Take 1-2 teaspoons daily as needed for allergy symptoms 5/31/19   Audelia Castanon MD   dorzolamide (TRUSOPT) 2 % ophthalmic solution INSTILL 1 DROP BY OPHTHALMIC ROUTE 2 TIMES EVERY DAY IN BOTH EYES 11/9/18   Historical Provider, MD   ondansetron (ZOFRAN-ODT) 4 MG disintegrating tablet Take 1 tablet by mouth 3 times daily as needed for Nausea or Vomiting 12/3/18   MINH Oneil CNP   famotidine (PEPCID) 40 MG tablet Take 1 tablet by mouth daily 10/22/18   Audelia Castanon MD   ELIQUIS 5 MG TABS tablet  4/16/18   Historical Provider, MD   tiZANidine (ZANAFLEX) 2 MG tablet Take 1 tablet by mouth 3 times daily as needed (For muscle spasms) 3/28/18   MINH Oneil CNP cyclobenzaprine (FLEXERIL) 10 MG tablet Take 1 tablet by mouth 2 times daily as needed (prn) 3/27/18   MINH Moura - CNP   NONFORMULARY Indications: Advanced anti-oxidant w/echinacea and garlic    Historical Provider, MD   Cholecalciferol (VITAMIN D3) 5000 UNITS TABS Take 1 tablet by mouth daily    Historical Provider, MD   Cranberry 450 MG CAPS Take 450 mg by mouth daily     Historical Provider, MD   Lactobacillus (PROBIOTIC ACIDOPHILUS PO) Take 1 tablet by mouth daily    Historical Provider, MD   cholestyramine Patsie Ilya) 4 G packet Take 1 packet by mouth every evening  4/14/16   Historical Provider, MD   latanoprost (XALATAN) 0.005 % ophthalmic solution Place 1 drop into both eyes nightly    Historical Provider, MD        Allergies: Avapro [irbesartan], Ciprofloxacin hcl, Cozaar [losartan potassium], Diovan [valsartan], Lipitor [atorvastatin calcium], Lisinopril, Pcn [penicillins], Pravachol [pravastatin sodium], Tape [adhesive tape], Tramadol, Zetia [ezetimibe], and Morphine    Social History:     Tobacco:    reports that she has never smoked. She has never used smokeless tobacco.  Alcohol:      reports no history of alcohol use. Drug Use: reports no history of drug use. Family History:     Family History   Adopted: Yes       Review of Systems:     Review of Systems   Constitutional: Negative for appetite change, fatigue, fever and unexpected weight change. HENT: Negative for mouth sores, sore throat, trouble swallowing and voice change. Eyes: Negative. Respiratory: Negative for cough, choking, shortness of breath and wheezing. Cardiovascular: Negative for chest pain, palpitations and leg swelling. Gastrointestinal: Positive for anal bleeding and diarrhea. Negative for abdominal distention, abdominal pain, blood in stool, rectal pain and vomiting. Endocrine: Negative for polyphagia and polyuria. Genitourinary: Negative for difficulty urinating, frequency, hematuria, pelvic pain, urgency and vaginal bleeding. Musculoskeletal: Negative for arthralgias, back pain, gait problem and joint swelling. Skin: Negative for color change, pallor and rash. Allergic/Immunologic: Negative for food allergies. Neurological: Negative for dizziness, seizures, weakness, light-headedness and headaches. Hematological: Negative for adenopathy. Does not bruise/bleed easily. Psychiatric/Behavioral: Negative for sleep disturbance. The patient is not nervous/anxious. Code Status:  Full Code    Physical Exam:     Vitals:  BP (!) 140/61   Pulse 67   Temp 98.4 °F (36.9 °C) (Oral)   Resp 16   Wt 206 lb 5.6 oz (93.6 kg)   SpO2 97%   BMI 34.34 kg/m²   Temp (24hrs), Av.3 °F (36.8 °C), Min:98.2 °F (36.8 °C), Max:98.5 °F (36.9 °C)      Physical Exam  Vitals signs and nursing note reviewed. Constitutional:       Appearance: She is well-developed. HENT:      Head: Normocephalic and atraumatic. Eyes:      General: No scleral icterus. Conjunctiva/sclera: Conjunctivae normal.      Pupils: Pupils are equal, round, and reactive to light. Neck:      Musculoskeletal: Normal range of motion and neck supple. Thyroid: No thyromegaly. Vascular: No hepatojugular reflux or JVD. Trachea: No tracheal deviation. Cardiovascular:      Rate and Rhythm: Normal rate and regular rhythm. Heart sounds: Normal heart sounds. Pulmonary:      Effort: Pulmonary effort is normal. No respiratory distress. Breath sounds: Normal breath sounds. No wheezing or rales. Abdominal:      General: Bowel sounds are normal. There is no distension. Palpations: Abdomen is soft. There is no hepatomegaly. Tenderness: There is no abdominal tenderness. There is no rebound. Hernia: No hernia is present. Genitourinary:     Comments: No blood in the rectum for rectal examination.   Musculoskeletal:

## 2021-01-24 NOTE — DISCHARGE INSTR - COC
Continuity of Care Form    Patient Name: Alfreda Canchola   :  1940  MRN:  [id number]    Admit date:  2021  Discharge date:  21    Code Status Order: Full Code   Advance Directives:      Admitting Physician:  Otilio Milligan MD  PCP: Svai Mercedes MD    Discharging Nurse: Zee Edmondson Unit/Room#: 2097/2097-01  Discharging Unit Phone Number: [phone]    Emergency Contact:   Extended Emergency Contact Information  Primary Emergency Contact: Caleb Lama  Address: [address], [address] [place] [address] Phone: [phone]  Relation: Child  Secondary Emergency Contact: [address] Phone: [phone]  Relation: Child    Past Surgical History:  Past Surgical History:   Procedure Laterality Date    CHOLECYSTECTOMY      COLON SURGERY      s/p sigmoid cloectomy    HYSTERECTOMY      TYMPANOSTOMY TUBE PLACEMENT Bilateral 2019    DR IMAN GAUTHIER    VENA CAVA FILTER PLACEMENT      s/p       Immunization History:   Immunization History   Administered Date(s) Administered    Influenza 2013    Influenza Virus Vaccine 10/16/2014, 10/23/2015    Influenza, High-dose, Quadv, 65 yrs +, IM (Fluzone) 10/14/2020    Influenza, Quadv, IM, PF (6 mo and older Fluzone, Flulaval, Fluarix, and 3 yrs and older Afluria) 2016, 10/12/2017, 10/22/2018    Influenza, Triv, inactivated, subunit, adjuvanted, IM (Fluad 65 yrs and older) 10/07/2019    Pneumococcal Conjugate 13-valent ([id number]) 2016    Pneumococcal Polysaccharide ([id number]) 10/01/2002, 2014    Td, unspecified formulation 10/01/2002       Active Problems:  Patient Active Problem List   Diagnosis Code    Mixed hyperlipidemia E78.2    Gastroesophageal reflux disease without esophagitis K21.9    Onychomycosis B35.1    Diverticulosis K57.90    Chronic back pain M54.9, G89.29    Hypothyroidism E03.9    Essential hypertension I10    Right bundle branch block I45.10 Chronic pulmonary embolism (HCC) I27.82    Type 2 diabetes mellitus without complication, without long-term current use of insulin (HCC) E11.9    Obesity (BMI 30.0-34. 9) E66.9    Tracheobronchitis J40    Allergic rhinitis J30.9    Glaucoma H40.9    S/p bilateral myringotomy with tube placement Z96.22    GI bleed K92.2    Iron deficiency anemia D50.9    Paroxysmal atrial fibrillation (HCC) I48.0       Isolation/Infection:   Isolation            Contact          Patient Infection Status       Infection Onset Added Last Indicated Last Indicated By Review Planned Expiration Resolved Resolved By    ESBL (Extended Spectrum Beta Lactamase)  10/17/16 10/17/16 Chano Cain RN        Klebsiella - Urine 10/2016              Nurse Assessment:  Last Vital Signs: BP (!) 119/56   Pulse 77   Temp 98.2 °F (36.8 °C) (Oral)   Resp 16   Wt 206 lb 5.6 oz (93.6 kg)   SpO2 95%   BMI 34.34 kg/m²     Last documented pain score (0-10 scale): Pain Level: 0  Last Weight:   Wt Readings from Last 1 Encounters:   01/24/21 206 lb 5.6 oz (93.6 kg)     Mental Status:  oriented and alert    IV Access:  - None    Nursing Mobility/ADLs:  Walking   Independent  Transfer  Independent  Bathing  Independent  Dressing  Independent  Toileting  Independent  Feeding  [address]  Independent  Med Delivery   whole    Wound Care Documentation and Therapy:        Elimination:  Continence: Bowel: Yes  Bladder: Yes  Urinary Catheter: None   Colostomy/Ileostomy/Ileal Conduit: No       Date of Last BM: 1/24/2021      Intake/Output Summary (Last 24 hours) at 1/24/2021 0933  Last data filed at 1/24/2021 0530  Gross per 24 hour   Intake 940 ml   Output 550 ml   Net 390 ml     I/O last 3 completed shifts: In: 940 [P.O.:240; I.V.:700]  Out: 550 [Urine:550]    Safety Concerns:      At Risk for Falls    Impairments/Disabilities:      None    Nutrition Therapy:  Current Nutrition Therapy:   - Oral Diet:  General    Routes of Feeding: Oral Liquids: No Restrictions  Daily Fluid Restriction: no  Last Modified Barium Swallow with Video (Video Swallowing Test): not done    Treatments at the Time of Hospital Discharge:   Respiratory Treatments: SEE MAR  Oxygen Therapy:  is not on home oxygen therapy. Ventilator:    - No ventilator support    Rehab Therapies: Physical Therapy and Occupational Therapy  Weight Bearing Status/Restrictions: No weight bearing restirctions  Other Medical Equipment (for information only, NOT a DME order): Other Treatments: skilled nursing assessment, medication education, continued monitoring. Patient's personal belongings (please select all that are sent with patient):  None    RN SIGNATURE:  Electronically signed by Beck Schneider RN on 1/24/21 at 9:34 AM EST    CASE MANAGEMENT/SOCIAL WORK SECTION    Inpatient Status Date: ***    Readmission Risk Assessment Score:  Readmission Risk              Risk of Unplanned Readmission:        14           Discharging to Facility/ Agency   Name:   Address:  Phone:  Fax:    Dialysis Facility (if applicable)   Name:  Address:  Dialysis Schedule:  Phone:  Fax:    / signature: {Esignature:[id number]}    PHYSICIAN SECTION    Prognosis: Good    Condition at Discharge: Stable    Rehab Potential (if transferring to Rehab): Fair    Recommended Labs or Other Treatments After Discharge: none    Physician Certification: I certify the above information and transfer of Rk Baron  is necessary for the continuing treatment of the diagnosis listed and that she requires [address] for less 30 days.      Update Admission H&P: No change in H&P    PHYSICIAN SIGNATURE:  Electronically signed by Austin Jones MD on 1/26/21 at 1:19 PM EST

## 2021-01-25 ENCOUNTER — ANESTHESIA EVENT (OUTPATIENT)
Dept: ENDOSCOPY | Age: 81
DRG: 392 | End: 2021-01-25
Payer: MEDICARE

## 2021-01-25 ENCOUNTER — ANESTHESIA (OUTPATIENT)
Dept: ENDOSCOPY | Age: 81
DRG: 392 | End: 2021-01-25
Payer: MEDICARE

## 2021-01-25 VITALS — SYSTOLIC BLOOD PRESSURE: 137 MMHG | DIASTOLIC BLOOD PRESSURE: 76 MMHG | TEMPERATURE: 96.8 F | OXYGEN SATURATION: 99 %

## 2021-01-25 LAB
ABSOLUTE EOS #: 0.2 K/UL (ref 0–0.4)
ABSOLUTE IMMATURE GRANULOCYTE: ABNORMAL K/UL (ref 0–0.3)
ABSOLUTE LYMPH #: 1.1 K/UL (ref 1–4.8)
ABSOLUTE MONO #: 0.5 K/UL (ref 0.1–1.3)
ALBUMIN SERPL-MCNC: 2.9 G/DL (ref 3.5–5.2)
ALBUMIN/GLOBULIN RATIO: ABNORMAL (ref 1–2.5)
ALP BLD-CCNC: 77 U/L (ref 35–104)
ALT SERPL-CCNC: 14 U/L (ref 5–33)
ANION GAP SERPL CALCULATED.3IONS-SCNC: 10 MMOL/L (ref 9–17)
AST SERPL-CCNC: 21 U/L
BASOPHILS # BLD: 1 % (ref 0–2)
BASOPHILS ABSOLUTE: 0.1 K/UL (ref 0–0.2)
BILIRUB SERPL-MCNC: 0.66 MG/DL (ref 0.3–1.2)
BUN BLDV-MCNC: 4 MG/DL (ref 8–23)
BUN/CREAT BLD: ABNORMAL (ref 9–20)
CALCIUM SERPL-MCNC: 8.9 MG/DL (ref 8.6–10.4)
CHLORIDE BLD-SCNC: 105 MMOL/L (ref 98–107)
CO2: 22 MMOL/L (ref 20–31)
CREAT SERPL-MCNC: 0.67 MG/DL (ref 0.5–0.9)
DIFFERENTIAL TYPE: ABNORMAL
EKG ATRIAL RATE: 106 BPM
EKG P AXIS: 85 DEGREES
EKG P-R INTERVAL: 192 MS
EKG Q-T INTERVAL: 356 MS
EKG QRS DURATION: 126 MS
EKG QTC CALCULATION (BAZETT): 472 MS
EKG R AXIS: 37 DEGREES
EKG T AXIS: 40 DEGREES
EKG VENTRICULAR RATE: 106 BPM
EOSINOPHILS RELATIVE PERCENT: 4 % (ref 0–4)
GFR AFRICAN AMERICAN: >60 ML/MIN
GFR NON-AFRICAN AMERICAN: >60 ML/MIN
GFR SERPL CREATININE-BSD FRML MDRD: ABNORMAL ML/MIN/{1.73_M2}
GFR SERPL CREATININE-BSD FRML MDRD: ABNORMAL ML/MIN/{1.73_M2}
GLUCOSE BLD-MCNC: 109 MG/DL (ref 70–99)
GLUCOSE BLD-MCNC: 114 MG/DL (ref 65–105)
GLUCOSE BLD-MCNC: 116 MG/DL (ref 65–105)
HCT VFR BLD CALC: 32.8 % (ref 36–46)
HCT VFR BLD CALC: 33.7 % (ref 36–46)
HCT VFR BLD CALC: 36.1 % (ref 36–46)
HEMOGLOBIN: 10.8 G/DL (ref 12–16)
HEMOGLOBIN: 11.1 G/DL (ref 12–16)
HEMOGLOBIN: 12.1 G/DL (ref 12–16)
IMMATURE GRANULOCYTES: ABNORMAL %
LYMPHOCYTES # BLD: 25 % (ref 24–44)
MCH RBC QN AUTO: 31.8 PG (ref 26–34)
MCHC RBC AUTO-ENTMCNC: 32.9 G/DL (ref 31–37)
MCV RBC AUTO: 96.8 FL (ref 80–100)
MONOCYTES # BLD: 11 % (ref 1–7)
NRBC AUTOMATED: ABNORMAL PER 100 WBC
PDW BLD-RTO: 14.3 % (ref 11.5–14.9)
PLATELET # BLD: 129 K/UL (ref 150–450)
PLATELET ESTIMATE: ABNORMAL
PMV BLD AUTO: 9.7 FL (ref 6–12)
POTASSIUM SERPL-SCNC: 3.6 MMOL/L (ref 3.7–5.3)
RBC # BLD: 3.39 M/UL (ref 4–5.2)
RBC # BLD: ABNORMAL 10*6/UL
SEG NEUTROPHILS: 59 % (ref 36–66)
SEGMENTED NEUTROPHILS ABSOLUTE COUNT: 2.6 K/UL (ref 1.3–9.1)
SODIUM BLD-SCNC: 137 MMOL/L (ref 135–144)
TOTAL PROTEIN: 6 G/DL (ref 6.4–8.3)
WBC # BLD: 4.5 K/UL (ref 3.5–11)
WBC # BLD: ABNORMAL 10*3/UL

## 2021-01-25 PROCEDURE — 6370000000 HC RX 637 (ALT 250 FOR IP): Performed by: INTERNAL MEDICINE

## 2021-01-25 PROCEDURE — 7100000001 HC PACU RECOVERY - ADDTL 15 MIN: Performed by: INTERNAL MEDICINE

## 2021-01-25 PROCEDURE — 0DJD8ZZ INSPECTION OF LOWER INTESTINAL TRACT, VIA NATURAL OR ARTIFICIAL OPENING ENDOSCOPIC: ICD-10-PCS | Performed by: INTERNAL MEDICINE

## 2021-01-25 PROCEDURE — 6370000000 HC RX 637 (ALT 250 FOR IP): Performed by: FAMILY MEDICINE

## 2021-01-25 PROCEDURE — 2709999900 HC NON-CHARGEABLE SUPPLY: Performed by: INTERNAL MEDICINE

## 2021-01-25 PROCEDURE — 3700000001 HC ADD 15 MINUTES (ANESTHESIA): Performed by: INTERNAL MEDICINE

## 2021-01-25 PROCEDURE — 3700000000 HC ANESTHESIA ATTENDED CARE: Performed by: INTERNAL MEDICINE

## 2021-01-25 PROCEDURE — 85018 HEMOGLOBIN: CPT

## 2021-01-25 PROCEDURE — 36415 COLL VENOUS BLD VENIPUNCTURE: CPT

## 2021-01-25 PROCEDURE — 6360000002 HC RX W HCPCS: Performed by: NURSE ANESTHETIST, CERTIFIED REGISTERED

## 2021-01-25 PROCEDURE — 3609027000 HC COLONOSCOPY: Performed by: INTERNAL MEDICINE

## 2021-01-25 PROCEDURE — 2580000003 HC RX 258: Performed by: FAMILY MEDICINE

## 2021-01-25 PROCEDURE — C9113 INJ PANTOPRAZOLE SODIUM, VIA: HCPCS | Performed by: FAMILY MEDICINE

## 2021-01-25 PROCEDURE — 80053 COMPREHEN METABOLIC PANEL: CPT

## 2021-01-25 PROCEDURE — 45378 DIAGNOSTIC COLONOSCOPY: CPT | Performed by: INTERNAL MEDICINE

## 2021-01-25 PROCEDURE — 82947 ASSAY GLUCOSE BLOOD QUANT: CPT

## 2021-01-25 PROCEDURE — 6360000002 HC RX W HCPCS: Performed by: FAMILY MEDICINE

## 2021-01-25 PROCEDURE — 2500000003 HC RX 250 WO HCPCS: Performed by: NURSE ANESTHETIST, CERTIFIED REGISTERED

## 2021-01-25 PROCEDURE — 7100000000 HC PACU RECOVERY - FIRST 15 MIN: Performed by: INTERNAL MEDICINE

## 2021-01-25 PROCEDURE — 2060000000 HC ICU INTERMEDIATE R&B

## 2021-01-25 PROCEDURE — 85025 COMPLETE CBC W/AUTO DIFF WBC: CPT

## 2021-01-25 PROCEDURE — 85014 HEMATOCRIT: CPT

## 2021-01-25 RX ORDER — PROPOFOL 10 MG/ML
INJECTION, EMULSION INTRAVENOUS PRN
Status: DISCONTINUED | OUTPATIENT
Start: 2021-01-25 | End: 2021-01-25 | Stop reason: SDUPTHER

## 2021-01-25 RX ORDER — OFLOXACIN 3 MG/ML
4 SOLUTION AURICULAR (OTIC) 2 TIMES DAILY
COMMUNITY
End: 2022-05-09

## 2021-01-25 RX ORDER — OFLOXACIN 3 MG/ML
4 SOLUTION AURICULAR (OTIC) 2 TIMES DAILY
Status: DISCONTINUED | OUTPATIENT
Start: 2021-01-25 | End: 2021-01-26 | Stop reason: HOSPADM

## 2021-01-25 RX ORDER — LABETALOL HYDROCHLORIDE 5 MG/ML
5 INJECTION, SOLUTION INTRAVENOUS EVERY 10 MIN PRN
Status: DISCONTINUED | OUTPATIENT
Start: 2021-01-25 | End: 2021-01-25 | Stop reason: HOSPADM

## 2021-01-25 RX ORDER — CIPROFLOXACIN HYDROCHLORIDE 3.5 MG/ML
4 SOLUTION/ DROPS TOPICAL 2 TIMES DAILY
Status: DISCONTINUED | OUTPATIENT
Start: 2021-01-25 | End: 2021-01-25 | Stop reason: CLARIF

## 2021-01-25 RX ORDER — CHOLESTYRAMINE LIGHT 4 G/5.7G
4 POWDER, FOR SUSPENSION ORAL EVERY EVENING
Status: DISCONTINUED | OUTPATIENT
Start: 2021-01-25 | End: 2021-01-26 | Stop reason: HOSPADM

## 2021-01-25 RX ORDER — CHOLESTYRAMINE 4 G/9G
1 POWDER, FOR SUSPENSION ORAL EVERY EVENING
Status: DISCONTINUED | OUTPATIENT
Start: 2021-01-25 | End: 2021-01-25 | Stop reason: CLARIF

## 2021-01-25 RX ORDER — OXYCODONE HYDROCHLORIDE AND ACETAMINOPHEN 5; 325 MG/1; MG/1
1 TABLET ORAL PRN
Status: DISCONTINUED | OUTPATIENT
Start: 2021-01-25 | End: 2021-01-25 | Stop reason: HOSPADM

## 2021-01-25 RX ORDER — ONDANSETRON 2 MG/ML
4 INJECTION INTRAMUSCULAR; INTRAVENOUS
Status: DISCONTINUED | OUTPATIENT
Start: 2021-01-25 | End: 2021-01-25 | Stop reason: HOSPADM

## 2021-01-25 RX ORDER — LIDOCAINE HYDROCHLORIDE 20 MG/ML
INJECTION, SOLUTION EPIDURAL; INFILTRATION; INTRACAUDAL; PERINEURAL PRN
Status: DISCONTINUED | OUTPATIENT
Start: 2021-01-25 | End: 2021-01-25 | Stop reason: SDUPTHER

## 2021-01-25 RX ORDER — OFLOXACIN 3 MG/ML
4 SOLUTION AURICULAR (OTIC) 2 TIMES DAILY
Status: DISCONTINUED | OUTPATIENT
Start: 2021-01-25 | End: 2021-01-25 | Stop reason: CLARIF

## 2021-01-25 RX ORDER — OXYCODONE HYDROCHLORIDE AND ACETAMINOPHEN 5; 325 MG/1; MG/1
2 TABLET ORAL PRN
Status: DISCONTINUED | OUTPATIENT
Start: 2021-01-25 | End: 2021-01-25 | Stop reason: HOSPADM

## 2021-01-25 RX ORDER — DIPHENHYDRAMINE HYDROCHLORIDE 50 MG/ML
12.5 INJECTION INTRAMUSCULAR; INTRAVENOUS
Status: DISCONTINUED | OUTPATIENT
Start: 2021-01-25 | End: 2021-01-25 | Stop reason: HOSPADM

## 2021-01-25 RX ADMIN — TIMOLOL MALEATE 1 DROP: 5 SOLUTION/ DROPS OPHTHALMIC at 19:18

## 2021-01-25 RX ADMIN — OFLOXACIN 4 DROP: 3 SOLUTION AURICULAR (OTIC) at 19:18

## 2021-01-25 RX ADMIN — PANTOPRAZOLE SODIUM 40 MG: 40 INJECTION, POWDER, FOR SOLUTION INTRAVENOUS at 08:45

## 2021-01-25 RX ADMIN — ACETAMINOPHEN 650 MG: 325 TABLET, FILM COATED ORAL at 22:24

## 2021-01-25 RX ADMIN — ACETAMINOPHEN 650 MG: 325 TABLET, FILM COATED ORAL at 15:15

## 2021-01-25 RX ADMIN — TIMOLOL MALEATE 1 DROP: 5 SOLUTION/ DROPS OPHTHALMIC at 08:38

## 2021-01-25 RX ADMIN — LATANOPROST 1 DROP: 50 SOLUTION OPHTHALMIC at 19:17

## 2021-01-25 RX ADMIN — SODIUM CHLORIDE: 9 INJECTION, SOLUTION INTRAVENOUS at 10:31

## 2021-01-25 RX ADMIN — FAMOTIDINE 40 MG: 20 TABLET ORAL at 12:30

## 2021-01-25 RX ADMIN — DORZOLAMIDE HYDROCHLORIDE 1 DROP: 20 SOLUTION/ DROPS OPHTHALMIC at 08:38

## 2021-01-25 RX ADMIN — PROPOFOL 20 MG: 10 INJECTION, EMULSION INTRAVENOUS at 11:08

## 2021-01-25 RX ADMIN — DORZOLAMIDE HYDROCHLORIDE 1 DROP: 20 SOLUTION/ DROPS OPHTHALMIC at 19:17

## 2021-01-25 RX ADMIN — PROPOFOL 30 MG: 10 INJECTION, EMULSION INTRAVENOUS at 11:05

## 2021-01-25 RX ADMIN — CHOLESTYRAMINE 4 G: 4 POWDER, FOR SUSPENSION ORAL at 17:27

## 2021-01-25 RX ADMIN — PROPOFOL 20 MG: 10 INJECTION, EMULSION INTRAVENOUS at 11:11

## 2021-01-25 RX ADMIN — LIDOCAINE HYDROCHLORIDE 40 MG: 20 INJECTION, SOLUTION EPIDURAL; INFILTRATION; INTRACAUDAL; PERINEURAL at 11:05

## 2021-01-25 RX ADMIN — LEVOTHYROXINE SODIUM 100 MCG: 0.1 TABLET ORAL at 12:37

## 2021-01-25 RX ADMIN — SODIUM CHLORIDE 10 ML: 9 INJECTION INTRAMUSCULAR; INTRAVENOUS; SUBCUTANEOUS at 08:45

## 2021-01-25 RX ADMIN — ATENOLOL 100 MG: 50 TABLET ORAL at 12:30

## 2021-01-25 ASSESSMENT — PULMONARY FUNCTION TESTS
PIF_VALUE: 1
PIF_VALUE: 1
PIF_VALUE: 0

## 2021-01-25 ASSESSMENT — PAIN SCALES - GENERAL
PAINLEVEL_OUTOF10: 0
PAINLEVEL_OUTOF10: 0
PAINLEVEL_OUTOF10: 5

## 2021-01-25 ASSESSMENT — PAIN - FUNCTIONAL ASSESSMENT: PAIN_FUNCTIONAL_ASSESSMENT: 0-10

## 2021-01-25 NOTE — ANESTHESIA PRE PROCEDURE
tiZANidine (ZANAFLEX) 2 MG tablet Take 1 tablet by mouth 3 times daily as needed (For muscle spasms) 3/28/18   MINH Salomon CNP   cyclobenzaprine (FLEXERIL) 10 MG tablet Take 1 tablet by mouth 2 times daily as needed (prn) 3/27/18   Natasha ZechariahMINH hoover CNP   NONFORMULARY Indications: Advanced anti-oxidant w/echinacea and garlic    Historical Provider, MD   Cholecalciferol (VITAMIN D3) 5000 UNITS TABS Take 1 tablet by mouth daily    Historical Provider, MD   Cranberry 450 MG CAPS Take 450 mg by mouth daily     Historical Provider, MD   Lactobacillus (PROBIOTIC ACIDOPHILUS PO) Take 1 tablet by mouth daily    Historical Provider, MD   cholestyramine Ankush Siegel) 4 G packet Take 1 packet by mouth every evening  4/14/16   Historical Provider, MD   latanoprost (XALATAN) 0.005 % ophthalmic solution Place 1 drop into both eyes nightly    Historical Provider, MD       Current medications:    Current Facility-Administered Medications   Medication Dose Route Frequency Provider Last Rate Last Admin    sodium chloride flush 0.9 % injection 10 mL  10 mL Intravenous 2 times per day [facility],    10 mL at 01/23/21 2010    sodium chloride flush 0.9 % injection 10 mL  10 mL Intravenous PRN [facility]        acetaminophen (TYLENOL) tablet 650 mg  650 mg Oral Q4H PRN [facility]        0.9 % sodium chloride infusion   Intravenous Continuous Jaciel Harris MD 50 mL/hr at 01/24/21 2031 New Bag at 01/24/21 2031    ondansetron (ZOFRAN) injection 4 mg  4 mg Intravenous Q8H PRN [facility],         atenolol (TENORMIN) tablet 100 mg  100 mg Oral Daily Jaciel Harris MD   100 mg at 01/24/21 0724    dorzolamide (TRUSOPT) 2 % ophthalmic solution 1 drop  1 drop Both Eyes BID Jaciel Harris MD   1 drop at 01/24/21 1956    doxazosin (CARDURA) tablet 4 mg  4 mg Oral Daily Jaciel Harris MD   4 mg at 01/24/21 2026  famotidine (PEPCID) tablet 40 mg  40 mg Oral Daily Paula Huff MD   40 mg at 01/24/21 0731    latanoprost (XALATAN) 0.005 % ophthalmic solution 1 drop  1 drop Both Eyes Nightly Paula Huff MD   1 drop at 01/24/21 1957    levothyroxine (SYNTHROID) tablet 100 mcg  100 mcg Oral Daily aPula Huff MD   100 mcg at 01/24/21 0725    timolol (TIMOPTIC) 0.5 % ophthalmic solution 1 drop  1 drop Both Eyes BID Paula Huff MD   1 drop at 01/24/21 1956    sodium chloride flush 0.9 % injection 10 mL  10 mL Intravenous 2 times per day Paula Huff MD        sodium chloride flush 0.9 % injection 10 mL  10 mL Intravenous PRN Paula Huff MD        potassium chloride (KLOR-CON M) extended release tablet 40 mEq  40 mEq Oral PRN Paula Huff MD        Or    potassium bicarb-citric acid (EFFER-K) effervescent tablet 40 mEq  40 mEq Oral PRN Paula Huff MD        Or    potassium chloride 10 mEq/100 mL IVPB (Peripheral Line)  10 mEq Intravenous PRN Paula Huff MD        magnesium sulfate 1000 mg in dextrose 5% 100 mL IVPB  1,000 mg Intravenous PRN Paula Huff MD        promethazine (PHENERGAN) tablet 12.5 mg  12.5 mg Oral Q6H PRN Paula Huff MD        Or    ondansetron (ZOFRAN) injection 4 mg  4 mg Intravenous Q6H PRN Paula Huff MD        magnesium hydroxide (MILK OF MAGNESIA) 400 MG/5ML suspension 30 mL  30 mL Oral Daily PRN Paula Huff MD        acetaminophen (TYLENOL) tablet 650 mg  650 mg Oral Q6H PRN Paula Huff MD   650 mg at 01/24/21 2031    Or    acetaminophen (TYLENOL) suppository 650 mg  650 mg Rectal Q6H PRN Paula Huff MD        pantoprazole (PROTONIX) injection 40 mg  40 mg Intravenous Daily Paula Huff MD   40 mg at 01/24/21 4827    And    sodium chloride (PF) 0.9 % injection 10 mL  10 mL Intravenous Daily Paula Huff MD   10 mL at 01/24/21 0726       Allergies:     Allergies   Allergen Reactions  Avapro [Irbesartan]     Ciprofloxacin Hcl Nausea Only    Cozaar [Losartan Potassium]     Diovan [Valsartan]     Lipitor [Atorvastatin Calcium]     Lisinopril     Pcn [Penicillins]     Pravachol [Pravastatin Sodium]      myalgias    Tape [Adhesive Tape] Dermatitis    Tramadol Swelling    Zetia [Ezetimibe]     Morphine Nausea And Vomiting       Problem List:    Patient Active Problem List   Diagnosis Code    Mixed hyperlipidemia E78.2    Gastroesophageal reflux disease without esophagitis K21.9    Onychomycosis B35.1    Diverticulosis K57.90    Chronic back pain M54.9, G89.29    Hypothyroidism E03.9    Essential hypertension I10    Right bundle branch block I45.10    Chronic pulmonary embolism (HCC) I27.82    Type 2 diabetes mellitus without complication, without long-term current use of insulin (HCC) E11.9    Obesity (BMI 30.0-34. 9) E66.9    Tracheobronchitis J40    Allergic rhinitis J30.9    Glaucoma H40.9    S/p bilateral myringotomy with tube placement Z96.22    GI bleed K92.2    Iron deficiency anemia D50.9    Paroxysmal atrial fibrillation (HCC) I48.0       Past Medical History:        Diagnosis Date    Atrial fibrillation ([facility] [address] 3/28/2014    Chronic back pain     with rt. leg pain    Diverticulosis     GERD (gastroesophageal reflux disease)     Hyperlipidemia     Hypertension     Hypothyroidism     Obesity (BMI 30.0-34. 9) 8/12/2016    Onychomycosis     PE (pulmonary embolism)     H/O DVT of rt leg    Type II or unspecified type diabetes mellitus without mention of complication, not stated as uncontrolled        Past Surgical History:        Procedure Laterality Date    CHOLECYSTECTOMY      COLON SURGERY      s/p sigmoid cloectomy    HYSTERECTOMY      TYMPANOSTOMY TUBE PLACEMENT Bilateral 09/20/2019    DR Field Pair    VENA CAVA FILTER PLACEMENT      s/p       Social History:    Social History     Tobacco Use    Smoking status: Never Smoker  Smokeless tobacco: Never Used   Substance Use Topics    Alcohol use: No                                Counseling given: Not Answered      Vital Signs (Current):   Vitals:    01/24/21 2015 01/25/21 0045 01/25/21 0333 01/25/21 0745   BP: 137/60 126/67  132/75   Pulse: 72 75  103   Resp: 17 15  16   Temp: 97.6 °F (36.4 °C) 97.8 °F (36.6 °C)  97.7 °F (36.5 °C)   TempSrc: Oral Axillary  Oral   SpO2: 98% 96%  96%   Weight:   211 lb 6.7 oz (95.9 kg)                                               BP Readings from Last 3 Encounters:   01/25/21 132/75   01/12/21 120/68   08/04/20 130/80       NPO Status:                                                                                 BMI:   Wt Readings from Last 3 Encounters:   01/25/21 211 lb 6.7 oz (95.9 kg)   01/12/21 193 lb (87.5 kg)   08/04/20 195 lb 6.4 oz (88.6 kg)     Body mass index is 35.18 kg/m². CBC:   Lab Results   Component Value Date    WBC 4.5 01/25/2021    RBC 3.39 01/25/2021    RBC 4.19 03/17/2012    HGB 10.8 01/25/2021    HCT 32.8 01/25/2021    MCV 96.8 01/25/2021    RDW 14.3 01/25/2021     01/25/2021     03/17/2012       CMP:   Lab Results   Component Value Date     01/25/2021    K 3.6 01/25/2021     01/25/2021    CO2 22 01/25/2021    BUN 4 01/25/2021    CREATININE 0.67 01/25/2021    GFRAA >60 01/25/2021    LABGLOM >60 01/25/2021    GLUCOSE 109 01/25/2021    GLUCOSE 123 03/17/2012    PROT 6.0 01/25/2021    CALCIUM 8.9 01/25/2021    BILITOT 0.66 01/25/2021    ALKPHOS 77 01/25/2021    AST 21 01/25/2021    ALT 14 01/25/2021       POC Tests: No results for input(s): POCGLU, POCNA, POCK, POCCL, POCBUN, POCHEMO, POCHCT in the last 72 hours.     Coags:   Lab Results   Component Value Date    PROTIME 16.6 01/23/2021    PROTIME 22.2 04/17/2018    PROTIME 31.1 03/17/2014    INR 1.4 01/23/2021    APTT 38.7 01/23/2021       HCG (If Applicable): No results found for: PREGTESTUR, PREGSERUM, HCG, HCGQUANT     ABGs:   Lab Results Component Value Date    PO2ART 110.0 10/03/2012    VXC0SOT 22.2 10/03/2012    I0GNMXAZ 95.4 10/03/2012        Type & Screen (If Applicable):  No results found for: LABABO, LABRH    Drug/Infectious Status (If Applicable):  No results found for: HIV, HEPCAB    COVID-19 Screening (If Applicable):   Lab Results   Component Value Date    COVID19 Not Detected 01/24/2021         Anesthesia Evaluation  Patient summary reviewed and Nursing notes reviewed no history of anesthetic complications:   Airway: Mallampati: III  TM distance: >3 FB   Neck ROM: full  Mouth opening: > = 3 FB Dental: normal exam         Pulmonary:Negative Pulmonary ROS and normal exam  breath sounds clear to auscultation                             Cardiovascular:    (+) hypertension:, dysrhythmias: atrial fibrillation, hyperlipidemia        Rhythm: regular  Rate: normal                    Neuro/Psych:   (+) neuromuscular disease:,              ROS comment: Chronic back pain  Hard of Hearing - uses hearing aids GI/Hepatic/Renal:   (+) GERD:,          ROS comment: Diverticulosis. Endo/Other:    (+) DiabetesType II DM, , hypothyroidism, blood dyscrasia: anemia:., .                 Abdominal:           Vascular:   + PE. Anesthesia Plan      MAC     ASA 3       Induction: intravenous. MIPS: Prophylactic antiemetics administered. Anesthetic plan and risks discussed with patient. Plan discussed with CRNA.                   Rosa Boland MD   1/25/2021

## 2021-01-25 NOTE — ANESTHESIA POSTPROCEDURE EVALUATION
Department of Anesthesiology  Postprocedure Note    Patient: Kimberly Olvera  MRN: [id number]  YOB: 1940  Date of evaluation: 1/25/2021  Time:  12:04 PM     Procedure Summary     Date: 01/25/21 Room / Location: [address] [place] [address] ENDO 03 / [facility] ENDO    Anesthesia Start: 1059 Anesthesia Stop: 1120    Procedure: COLONOSCOPY DIAGNOSTIC (N/A ) Diagnosis: (GI BLEED)    Surgeons: Cary Bowser MD Responsible Provider: Kenzie Owusu MD    Anesthesia Type: MAC ASA Status: 3          Anesthesia Type: MAC    Chuck Phase I: Chuck Score: 10    Chuck Phase II:      Last vitals: Reviewed and per EMR flowsheets.        Anesthesia Post Evaluation    Comments: POST- ANESTHESIA EVALUATION       Pt Name: Kimberly Olvera  MRN: [id number]  YOB: 1940  Date of evaluation: 1/25/2021  Time:  12:04 PM      BP (!) 132/49   Pulse 81   Temp 98 °F (36.7 °C) (Infrared)   Resp 20   Ht 5' 5\" (1.651 m)   Wt 211 lb 6.7 oz (95.9 kg)   SpO2 94%   BMI 35.18 kg/m²      Consciousness Level  Awake  Cardiopulmonary Status  Stable  Pain Adequately Treated YES  Nausea / Vomiting  NO  Adequate Hydration  YES  Anesthesia Related Complications NONE      Electronically signed by Kenzie Owusu MD on 1/25/2021 at 12:04 PM

## 2021-01-25 NOTE — PLAN OF CARE
Problem: Bleeding:  Goal: Will show no signs and symptoms of excessive bleeding  Description: Will show no signs and symptoms of excessive bleeding  Outcome: Met This Shift   Patients H & H is still stable at this time   Problem: Falls - Risk of:  Goal: Will remain free from falls  Description: Will remain free from falls  Outcome: Met This Shift   No falls noted this shift. Patient ambulates with x1 staff assistance without difficulty. Bed kept in low position. Safe environment maintained. Bedside table & call light in reach. Uses call light appropriately when needing assistance. Problem: DAILY CARE  Goal: Daily care needs are met  Outcome: Met This Shift   Patient and staff currently meeting all patient's daily care needs. Patient encouraged to participate and complete all ADLs per self. Will continue to monitor for opportunities for patient to meet all ADLs per self.

## 2021-01-25 NOTE — OP NOTE
COLONOSCOPY    DATE OF PROCEDURE: 1/25/2021    SURGEON: Rafael Song MD    ASSISTANT: None    PREOPERATIVE DIAGNOSIS: History of hematochezia, patient is on anticoagulation therapy. Procedure for prompt evaluate colonic lesions    POSTOPERATIVE DIAGNOSIS: No signs of acute bleeding. Friable mucosa at the anastomosis. This patient appears to have subtotal colectomy in the past.  Anastomosis is at about 30 to 40 cm from the anus. OPERATION: Total colonoscopy     ANESTHESIA: MAC    ESTIMATED BLOOD LOSS: None    COMPLICATIONS: None     SPECIMENS:  Was Not Obtained    HISTORY: The patient is a 79y.o. year old female with history of above preop diagnosis. I recommended colonoscopy with possible biopsy or polypectomy and I explained the risk, benefits, expected outcome, and alternatives to the procedure. Risks included but are not limited to bleeding, infection, respiratory distress, hypotension, and perforation of the colon and possibility of missing a lesion. The patient understands and is in agreement. PROCEDURE:  The patient's SPO2 remained above 90% throughout the procedure. Digital rectal exam was normal.  The colonoscope was inserted through the anus into the rectum and advanced under direct vision to the cecum without difficulty. Terminal ileum was examined for approximately 2 inches. The prep was good. Findings:  Patient appears to have a subtotal colectomy. 1 year follow-up  Rectum and lower sigmoid colon present and in this  segment few diverticuli seen. At the anastomosis mucosa is friable. No signs of acute bleeding. However with the touch of the scope, the mucosa which is friable oozes blood. no obvious lesion seen in this area. No colitis noted. No signs of diverticular bleed. Anorectal area examined in the retroflexed view and nonspecific.     Withdrawal Time was (minutes): 4

## 2021-01-26 VITALS
DIASTOLIC BLOOD PRESSURE: 61 MMHG | SYSTOLIC BLOOD PRESSURE: 141 MMHG | HEART RATE: 70 BPM | BODY MASS INDEX: 35.3 KG/M2 | OXYGEN SATURATION: 96 % | RESPIRATION RATE: 18 BRPM | TEMPERATURE: 97.9 F | HEIGHT: 65 IN | WEIGHT: 211.86 LBS

## 2021-01-26 PROCEDURE — 6370000000 HC RX 637 (ALT 250 FOR IP): Performed by: INTERNAL MEDICINE

## 2021-01-26 PROCEDURE — 99232 SBSQ HOSP IP/OBS MODERATE 35: CPT | Performed by: INTERNAL MEDICINE

## 2021-01-26 PROCEDURE — APPSS30 APP SPLIT SHARED TIME 16-30 MINUTES: Performed by: NURSE PRACTITIONER

## 2021-01-26 RX ORDER — DOXAZOSIN MESYLATE 4 MG/1
4 TABLET ORAL DAILY
Qty: 30 TABLET | Refills: 3 | Status: SHIPPED | OUTPATIENT
Start: 2021-01-26 | End: 2021-05-20 | Stop reason: SDUPTHER

## 2021-01-26 RX ADMIN — ATENOLOL 100 MG: 50 TABLET ORAL at 07:47

## 2021-01-26 RX ADMIN — ACETAMINOPHEN 650 MG: 325 TABLET, FILM COATED ORAL at 12:58

## 2021-01-26 RX ADMIN — TIMOLOL MALEATE 1 DROP: 5 SOLUTION/ DROPS OPHTHALMIC at 07:47

## 2021-01-26 RX ADMIN — FAMOTIDINE 40 MG: 20 TABLET ORAL at 07:47

## 2021-01-26 RX ADMIN — LEVOTHYROXINE SODIUM 100 MCG: 0.1 TABLET ORAL at 07:47

## 2021-01-26 RX ADMIN — DORZOLAMIDE HYDROCHLORIDE 1 DROP: 20 SOLUTION/ DROPS OPHTHALMIC at 07:47

## 2021-01-26 RX ADMIN — OFLOXACIN 4 DROP: 3 SOLUTION AURICULAR (OTIC) at 07:48

## 2021-01-26 ASSESSMENT — PAIN DESCRIPTION - ONSET: ONSET: UNABLE TO TELL

## 2021-01-26 ASSESSMENT — PAIN DESCRIPTION - DESCRIPTORS: DESCRIPTORS: CONSTANT;DISCOMFORT

## 2021-01-26 ASSESSMENT — PAIN DESCRIPTION - FREQUENCY: FREQUENCY: INTERMITTENT

## 2021-01-26 ASSESSMENT — PAIN SCALES - GENERAL: PAINLEVEL_OUTOF10: 5

## 2021-01-26 NOTE — PROGRESS NOTES
GI Progress notes    1/26/2021   1:41 PM    Name:  Kimberly Olvera  MRN:    [id number]     Acct:     [de-identified]   Room:  2097/2097-01   Day: 3     Admit Date: 1/23/2021  3:05 PM  PCP: Dusty Sotelo MD    Subjective:     C/C:   Chief Complaint   Patient presents with    Rectal Bleeding       Interval History: Status: not changed. Patient seen and examined. No acute events overnight. No overt GI bleeding  Hgb stable 11.1  S/p colonoscopy yesterday. No signs of bleeding. Has friable mucosa at the anastomosis. Patient appears to have subtotal colectomy in the past.  Tolerating diet. No nausea, vomiting, abdominal pain. ROS:  Constitutional: negative for chills, fevers and sweats  Gastrointestinal: negative for abdominal pain, constipation, diarrhea, nausea and vomiting      Medications: Allergies:    Allergies   Allergen Reactions    Avapro [Irbesartan]     Ciprofloxacin Hcl Nausea Only    Cozaar [Losartan Potassium]     Diovan [Valsartan]     Lipitor [Atorvastatin Calcium]     Lisinopril     Pcn [Penicillins]     Pravachol [Pravastatin Sodium]      myalgias    Tape [Adhesive Tape] Dermatitis    Tramadol Swelling    Zetia [Ezetimibe]     Morphine Nausea And Vomiting       Current Meds:     cholestyramine light packet 4 g, QPM      ofloxacin (FLOXIN) 0.3 % otic solution 4 drop, BID      acetaminophen (TYLENOL) tablet 650 mg, Q4H PRN      atenolol (TENORMIN) tablet 100 mg, Daily      dorzolamide (TRUSOPT) 2 % ophthalmic solution 1 drop, BID      doxazosin (CARDURA) tablet 4 mg, Daily      famotidine (PEPCID) tablet 40 mg, Daily      latanoprost (XALATAN) 0.005 % ophthalmic solution 1 drop, Nightly      levothyroxine (SYNTHROID) tablet 100 mcg, Daily      timolol (TIMOPTIC) 0.5 % ophthalmic solution 1 drop, BID      potassium chloride (KLOR-CON M) extended release tablet 40 mEq, PRN    Or      potassium bicarb-citric acid (EFFER-K) effervescent tablet 40 mEq, PRN   promethazine (PHENERGAN) tablet 12.5 mg, Q6H PRN      magnesium hydroxide (MILK OF MAGNESIA) 400 MG/5ML suspension 30 mL, Daily PRN      acetaminophen (TYLENOL) tablet 650 mg, Q6H PRN    Or      acetaminophen (TYLENOL) suppository 650 mg, Q6H PRN        Data:     Code Status:  Full Code    Family History   Adopted: Yes       Social History     Socioeconomic History    Marital status:       Spouse name: Not on file    Number of children: Not on file    Years of education: Not on file    Highest education level: Not on file   Occupational History    Not on file   Social Needs    Financial resource strain: Not on file    Food insecurity     Worry: Not on file     Inability: Not on file    Transportation needs     Medical: Not on file     Non-medical: Not on file   Tobacco Use    Smoking status: Never Smoker    Smokeless tobacco: Never Used   Substance and Sexual Activity    Alcohol use: No    Drug use: No    Sexual activity: Not on file   Lifestyle    Physical activity     Days per week: Not on file     Minutes per session: Not on file    Stress: Not on file   Relationships    Social connections     Talks on phone: Not on file     Gets together: Not on file     Attends [employer] service: Not on file     Active member of club or organization: Not on file     Attends meetings of clubs or organizations: Not on file     Relationship status: Not on file    Intimate partner violence     Fear of current or ex partner: Not on file     Emotionally abused: Not on file     Physically abused: Not on file     Forced sexual activity: Not on file   Other Topics Concern    Not on file   Social History Narrative    Not on file       Vitals:  BP (!) 141/61   Pulse 70   Temp 97.9 °F (36.6 °C) (Oral)   Resp 18   Ht 5' 5\" (1.651 m)   Wt 211 lb 13.8 oz (96.1 kg)   SpO2 96%   BMI 35.26 kg/m²   Temp (24hrs), Av °F (36.7 °C), Min:97.6 °F (36.4 °C), Max:98.4 °F (36.9 °C)    Recent Labs     21 1025 01/25/21  1123   POCGLU 116* 114*       I/O (24Hr):     Intake/Output Summary (Last 24 hours) at 1/26/2021 1341  Last data filed at 1/25/2021 1842  Gross per 24 hour   Intake 222 ml   Output    Net 222 ml       Labs:      CBC:   Lab Results   Component Value Date    WBC 4.5 01/25/2021    RBC 3.39 01/25/2021    RBC 4.19 03/17/2012    HGB 11.1 01/25/2021    HCT 33.7 01/25/2021    MCV 96.8 01/25/2021    MCH 31.8 01/25/2021    MCHC 32.9 01/25/2021    RDW 14.3 01/25/2021     01/25/2021     03/17/2012    MPV 9.7 01/25/2021     CBC with Differential:    Lab Results   Component Value Date    WBC 4.5 01/25/2021    RBC 3.39 01/25/2021    RBC 4.19 03/17/2012    HGB 11.1 01/25/2021    HCT 33.7 01/25/2021     01/25/2021     03/17/2012    MCV 96.8 01/25/2021    MCH 31.8 01/25/2021    MCHC 32.9 01/25/2021    RDW 14.3 01/25/2021    LYMPHOPCT 25 01/25/2021    MONOPCT 11 01/25/2021    BASOPCT 1 01/25/2021    MONOSABS 0.50 01/25/2021    LYMPHSABS 1.10 01/25/2021    EOSABS 0.20 01/25/2021    BASOSABS 0.10 01/25/2021    DIFFTYPE NOT REPORTED 01/25/2021     Hemoglobin/Hematocrit:    Lab Results   Component Value Date    HGB 11.1 01/25/2021    HCT 33.7 01/25/2021     CMP:    Lab Results   Component Value Date     01/25/2021    K 3.6 01/25/2021     01/25/2021    CO2 22 01/25/2021    BUN 4 01/25/2021    CREATININE 0.67 01/25/2021    GFRAA >60 01/25/2021    LABGLOM >60 01/25/2021    GLUCOSE 109 01/25/2021    GLUCOSE 123 03/17/2012    PROT 6.0 01/25/2021    LABALBU 2.9 01/25/2021    LABALBU 4.3 03/17/2012    CALCIUM 8.9 01/25/2021    BILITOT 0.66 01/25/2021    ALKPHOS 77 01/25/2021    AST 21 01/25/2021    ALT 14 01/25/2021     BMP:    Lab Results   Component Value Date     01/25/2021    K 3.6 01/25/2021     01/25/2021    CO2 22 01/25/2021    BUN 4 01/25/2021    LABALBU 2.9 01/25/2021    LABALBU 4.3 03/17/2012    CREATININE 0.67 01/25/2021    CALCIUM 8.9 01/25/2021 GFRAA >60 01/25/2021    LABGLOM >60 01/25/2021    GLUCOSE 109 01/25/2021    GLUCOSE 123 03/17/2012     PT/INR:    Lab Results   Component Value Date    PROTIME 16.6 01/23/2021    PROTIME 22.2 04/17/2018    PROTIME 31.1 03/17/2014    INR 1.4 01/23/2021     PTT:    Lab Results   Component Value Date    APTT 38.7 01/23/2021   [APTT}    Physical Examination:        General appearance: alert, cooperative and no distress  Mental Status: oriented to person, place and time and normal affect  Abdomen: soft, nontender, nondistended, bowel sounds present    Assessment:        Primary Problem  GI bleed     Active Hospital Problems    Diagnosis Date Noted    GI bleed [K92.2] 01/23/2021    Iron deficiency anemia [D50.9] 01/23/2021    Paroxysmal atrial fibrillation ([facility] [address] [I48.0] 01/23/2021    Obesity (BMI 30.0-34. 9) [E66.9] 08/12/2016    Essential hypertension [I10] 10/23/2015    Hypothyroidism [E03.9] 08/23/2012    Diverticulosis [K57.90]     Gastroesophageal reflux disease without esophagitis [K21.9]      Past Medical History:   Diagnosis Date    Atrial fibrillation ([facility] [address] 3/28/2014    Chronic back pain     with rt. leg pain    Diverticulosis     GERD (gastroesophageal reflux disease)     Hyperlipidemia     Hypertension     Hypothyroidism     Obesity (BMI 30.0-34. 9) 8/12/2016    Onychomycosis     PE (pulmonary embolism)     H/O DVT of rt leg    Type II or unspecified type diabetes mellitus without mention of complication, not stated as uncontrolled         Plan:        1. Rectal bleeding s/p colonoscopy, no signs of bleeding, has friable mucosa at anastomosis  1. High-fiber diet  2. Avoid constipation  3. Advised holding Eliquis for another 2 to 3 days  4.  May DC per GI    Explained to the patient and d/W Nursing Staff  Will F/U with you  Please call or Page for any issues or change in status  Thanks    Electronically signed by MINH Hawk NP on 1/26/2021 at 1:41 PM I independently performed an evaluation on Yanely Mercado. I have reviewed the above documentation completed by the Nurse Practitioner and agree with the documented findings and plan of care. I have personally evaluated this patient with the APRN and have completed at least one if not all key elements of the E/M (history, physical exam, and MDM). Please see my additional contributions to the HPI, physical exam, assessment, and medical decision making. Patient has very short sigmoid colon. Still has diverticulosis in the remaining colon. Patient may be started on anticoagulation therapy.

## 2021-01-26 NOTE — PLAN OF CARE
Problem: Falls - Risk of:  Goal: Will remain free from falls  Description: Will remain free from falls  Outcome: Ongoing  Goal: Absence of physical injury  Description: Absence of physical injury  Outcome: Ongoing     Problem: Bleeding:  Goal: Will show no signs and symptoms of excessive bleeding  Description: Will show no signs and symptoms of excessive bleeding  Outcome: Ongoing     Problem: SAFETY  Goal: Free from accidental physical injury  Outcome: Ongoing  Goal: Free from intentional harm  Outcome: Ongoing     Problem: DAILY CARE  Goal: Daily care needs are met  Outcome: Ongoing     Problem: PAIN  Goal: Patient's pain/discomfort is manageable  Outcome: Ongoing     Problem: SKIN INTEGRITY  Goal: Skin integrity is maintained or improved  Outcome: Ongoing     Problem: KNOWLEDGE DEFICIT  Goal: Patient/S.O. demonstrates understanding of disease process, treatment plan, medications, and discharge instructions.   Outcome: Ongoing     Problem: DISCHARGE BARRIERS  Goal: Patient's continuum of care needs are met  Outcome: Ongoing

## 2021-01-26 NOTE — PROGRESS NOTES
Physician Progress Note      PATIENT:               Chelo Vázquez  CSN #:                  [id number]  :                       1940  ADMIT DATE:       2021 3:05 PM  [address] [place] DATE:  RESPONDING  PROVIDER #:        Lawanda Stewart MD          QUERY TEXT:    Patient admitted with melena and is on chronic anticoagulation. If possible,   please document in the progress notes and discharge summary if you are   evaluating and/or treating any of the following: The medical record reflects the following:  Risk Factors: diverticulosis s/p colectomy for same in past;  on Eliquis for   atrial fibrillation  Clinical Indicators:    PT 16.6 PTT 38.7;   GI consult note:  I do not   think her bleeding is secondary to diverticuli;   colonoscopy findings:    few diverticuli w/o evidence of diverticular bleed, no signs acute bleeding,   no colitis;  with retraction of scope, mucosa bleeds  Treatment: GI consult, diagnostic testing; Eliquis on hold, IV Protonix  Options provided:  -- Melena due to Eliquis. -- Melena unrelated to anticoagulation  -- Other - I will add my own diagnosis  -- Disagree - Not applicable / Not valid  -- Disagree - Clinically unable to determine / Unknown  -- Refer to Clinical Documentation Reviewer    PROVIDER RESPONSE TEXT:    Patient has melena which is unrelated to anticoagulation.     Query created by: Marco Antonio Nick on 2021 12:44 PM      Electronically signed by:  Lawanda Stewart MD 2021 8:57 PM

## 2021-01-26 NOTE — PROGRESS NOTES
Hospitalist Progress Note  1/25/2021 8:59 PM  Subjective:   Admit Date: 1/23/2021  PCP: Morales Villatoro MD     Full Code      C/c:  Chief Complaint   Patient presents with    Rectal Bleeding         Interval History: pt doing better, slight drop in hb,having c scope later today,    Diet: DIET GENERAL;                                ip days:2  Medications:   Scheduled Meds:   cholestyramine light  4 g Oral QPM    ofloxacin  4 drop Both Ears BID    sodium chloride flush  10 mL Intravenous 2 times per day    atenolol  100 mg Oral Daily    dorzolamide  1 drop Both Eyes BID    doxazosin  4 mg Oral Daily    famotidine  40 mg Oral Daily    latanoprost  1 drop Both Eyes Nightly    levothyroxine  100 mcg Oral Daily    timolol  1 drop Both Eyes BID    sodium chloride flush  10 mL Intravenous 2 times per day    pantoprazole  40 mg Intravenous Daily    And    sodium chloride (PF)  10 mL Intravenous Daily     Continuous Infusions:   sodium chloride 50 mL/hr at 01/25/21 1031     PRN Meds:.sodium chloride flush, acetaminophen, ondansetron, sodium chloride flush, potassium chloride **OR** potassium alternative oral replacement **OR** potassium chloride, magnesium sulfate, promethazine **OR** ondansetron, magnesium hydroxide, acetaminophen **OR** acetaminophen     CBC:   Recent Labs     01/23/21  1558 01/24/21  0504 01/24/21  0504 01/25/21  0419 01/25/21  1302 01/25/21  1954   WBC 4.8 4.7  --  4.5  --   --    HGB 11.4* 10.9*   < > 10.8* 12.1 11.1*   * 116*  --  129*  --   --     < > = values in this interval not displayed.      BMP:    Recent Labs     01/23/21  1558 01/24/21  0504 01/25/21  0419   * 140 137   K 4.5 4.1 3.6*    107 105   CO2 18* 23 22   BUN 11 6* 4*   CREATININE 0.79 0.60 0.67   GLUCOSE 182* 117* 109*     Hepatic:   Recent Labs     01/23/21  1558 01/24/21  0504 01/25/21  0419   AST 27 28 21   ALT 15 16 14   BILITOT 0.51 0.71 0.66   ALKPHOS 89 85 77 Troponin: No results for input(s): TROPONINI in the last 72 hours. BNP: No results for input(s): BNP in the last 72 hours. Lipids: No results for input(s): CHOL, HDL in the last 72 hours.     Invalid input(s): LDLCALCU  INR:   Recent Labs     01/23/21  1634   INR 1.4       Objective:   Vitals: /68   Pulse 71   Temp 97.8 °F (36.6 °C) (Oral)   Resp 20   Ht 5' 5\" (1.651 m)   Wt 211 lb 6.7 oz (95.9 kg)   SpO2 95%   BMI 35.18 kg/m²   General appearance: alert, appears stated age and cooperative  Skin: Skin color, texture, turgor normal. No rashes or lesions  Lungs: clear to auscultation bilaterally  Heart: regular rate and rhythm, S1, S2 normal, no murmur, click, rub or gallop  Abdomen: soft, non-tender; bowel sounds normal; no masses,  no organomegaly  Extremities: extremities normal, atraumatic, no cyanosis or edema  Neurologic: Mental status: Alert, oriented, thought content appropriate    Prophylaxis:   DVT with  [] lovenox        [] heparin        [] Scd        [x] none:     Radiology:  Ct Abdomen Pelvis W Iv Contrast    Result Date: 1/23/2021 EXAMINATION: CT OF THE ABDOMEN AND PELVIS WITH CONTRAST 1/23/2021 4:34 pm TECHNIQUE: CT of the abdomen and pelvis was performed with the administration of intravenous contrast. Multiplanar reformatted images are provided for review. Dose modulation, iterative reconstruction, and/or weight based adjustment of the mA/kV was utilized to reduce the radiation dose to as low as reasonably achievable. COMPARISON: None. HISTORY: ORDERING SYSTEM PROVIDED HISTORY: new onset GI bleeding TECHNOLOGIST PROVIDED HISTORY: IV Only Contrast new onset GI bleeding Reason for Exam: new onset GI bleeding; patient c/o rectal bleeding since this morning and nausea Acuity: Acute Type of Exam: Initial FINDINGS: Lower Chest: Visualized portions of the lungs are clear. Cardiac and posterior mediastinal structures visualized are unremarkable. Organs: Normal attenuation pattern throughout the liver. No discrete hepatic lesion. Mild central intrahepatic bile duct dilatation is seen. Common bile duct measures 10 mm. The gallbladder is absent status post cholecystectomy. Benign 2 cm cyst medial inferior pole left kidney has density 17 Hounsfield units. The kidneys, spleen, adrenal glands and pancreas appear unremarkable. GI/Bowel: Multifocal diverticula involve the descending and sigmoid colon without evidence of acute inflammatory change. Postsurgical sequela with several areas of bowel resection and reanastomosis mid abdomen/pelvis. No definite obstruction or acute inflammatory process involving the bowel. Additional area of resection and reanastomosis is demonstrated at the sigmoid colon. Unremarkable appearance of the stomach. Pelvis: Partially filled urinary bladder appears unremarkable. No pelvic adenopathy or free fluid is seen. Peritoneum/Retroperitoneum: Calcific atherosclerotic disease aorta. No aneurysm. Unremarkable appearance of the IVC, IVC filter in place. No adenopathy or fluid.  Bones/Soft Tissues: No acute superficial soft tissue or osseous structure abnormality evident. 1.  No CT evidence of an acute intra-abdominal or intrapelvic process. 2. Postsurgical sequela involving multifocal areas of the bowel. Correlate with surgical history. No bowel obstruction or acute inflammatory process evident. I have no prior study for comparison. 3.  Mild intra and extrahepatic bile duct dilatation status post cholecystectomy typical of reservoir effect. 4.  Diverticulosis coli without CT evidence of acute diverticulitis. Assessment :   1. Gi bleed/ to hv c scope later today  2. S/p colectomy  3. Hypothyroid  4. diaetes     Plan:   1. Continue present care  2. See order    Patient Active Problem List:     Mixed hyperlipidemia     Gastroesophageal reflux disease without esophagitis     Onychomycosis     Diverticulosis     Chronic back pain     Hypothyroidism     Essential hypertension     Right bundle branch block     Chronic pulmonary embolism (HCC)     Type 2 diabetes mellitus without complication, without long-term current use of insulin (HCC)     Obesity (BMI 30.0-34. 9)     Tracheobronchitis     Allergic rhinitis     Glaucoma     S/p bilateral myringotomy with tube placement     GI bleed     Iron deficiency anemia     Paroxysmal atrial fibrillation ([facility] [address]      Anticipated Disposition upon discharge: [] Home                                                                         [] Home with Home Health                                                                         [] [facility]                                                                         [] [address]      Patient is admitted as inpatient status because of co-morbidities listed above, severity of signs and symptoms as outlined, requirement for current medical therapies and most importantly because of direct risk to patient if care not provided in a hospital setting.           Michael Burgess MD  [facility] Hospitalist

## 2021-01-26 NOTE — FLOWSHEET NOTE
01/26/21 1425   Encounter Summary   Services provided to: Patient   Referral/Consult From: Rounding   Complexity of Encounter Low   Length of Encounter 15 minutes   Routine   Type Initial   Assessment Sleeping   Intervention Prayer   Outcome Did not respond

## 2021-01-26 NOTE — DISCHARGE SUMMARY
Hospitalist Discharge Summary    Brenden Mon  :  1940  MRN:  [id number]    Admit date:  2021  Discharge date:  21    Admitting Physician:  Queta Hernandez MD    Discharge Diagnoses:   Patient Active Problem List   Diagnosis    Mixed hyperlipidemia    Gastroesophageal reflux disease without esophagitis    Onychomycosis    Diverticulosis    Chronic back pain    Hypothyroidism    Essential hypertension    Right bundle branch block    Chronic pulmonary embolism ([facility] [address]    Type 2 diabetes mellitus without complication, without long-term current use of insulin ([facility])    Obesity (BMI 30.0-34. 9)    Tracheobronchitis    Allergic rhinitis    Glaucoma    S/p bilateral myringotomy with tube placement    GI bleed    Iron deficiency anemia    Paroxysmal atrial fibrillation (HCC)        Admission Condition:  fair      Discharged Condition:  good    Hospital Course/Treatments   Admitted with h/o of gi bleed,pt has had colectomy in past,pt was seen by gi service,pt underwent   C.scope which showed  No pathology except small colon, pt hb was stable. pt was d/ with following meds,pt will hold anticoagulation until seen by cardio    Discharge Medications:       Fidelia Lama S Lisa Ware Medication Instructions [id number]    Printed on:21 1320   Medication Information                      atenolol (TENORMIN) 100 MG tablet  TAKE 1 TABLET BY MOUTH DAILY             Cholecalciferol (VITAMIN D3) 5000 UNITS TABS  Take 1 tablet by mouth daily             cholestyramine (QUESTRAN) 4 G packet  Take 1 packet by mouth every evening              Cranberry 450 MG CAPS  Take 450 mg by mouth daily              cyclobenzaprine (FLEXERIL) 10 MG tablet  Take 1 tablet by mouth 2 times daily as needed (prn)             dorzolamide (TRUSOPT) 2 % ophthalmic solution  INSTILL 1 DROP BY OPHTHALMIC ROUTE 2 TIMES EVERY DAY IN BOTH EYES             doxazosin (CARDURA) 4 MG tablet  Take 1 tablet by mouth daily famotidine (PEPCID) 40 MG tablet  Take 1 tablet by mouth daily             fluticasone (FLONASE) 50 MCG/ACT nasal spray  2 sprays into each nostril daily             Lactobacillus (ACIDOPHILUS/PECTIN PO)  Take 100 mg by mouth daily             latanoprost (XALATAN) 0.005 % ophthalmic solution  Place 1 drop into both eyes nightly             levothyroxine (SYNTHROID) 100 MCG tablet  TAKE 1 TABLET BY MOUTH ONCE A DAY             NONFORMULARY  Indications: Advanced anti-oxidant w/echinacea and garlic             ofloxacin (FLOXIN) 0.3 % otic solution  Place 4 drops into both ears 2 times daily             ondansetron (ZOFRAN-ODT) 4 MG disintegrating tablet  Take 1 tablet by mouth 3 times daily as needed for Nausea or Vomiting             RHOPRESSA 0.02 % SOLN  INSTILL 1 DROP INTO AFFECTED EYE(S) BY OPHTHALMIC ROUTE ONCE DAILY IN THE EVENING             timolol (TIMOPTIC) 0.5 % ophthalmic solution  ONE DROP TWICE A DAY INTO EACH EYE                 Consults:  IP CONSULT TO GI    Significant Diagnostic Studies:  Ct Abdomen Pelvis W Iv Contrast    Result Date: 1/23/2021 tissue or osseous structure abnormality evident. 1.  No CT evidence of an acute intra-abdominal or intrapelvic process. 2. Postsurgical sequela involving multifocal areas of the bowel. Correlate with surgical history. No bowel obstruction or acute inflammatory process evident. I have no prior study for comparison. 3.  Mild intra and extrahepatic bile duct dilatation status post cholecystectomy typical of reservoir effect. 4.  Diverticulosis coli without CT evidence of acute diverticulitis. Disposition:   home    Discharge Instructions: Activity: activity as tolerated  Diet:  regular diet    Follow up with Savi Mercedes MD in 1 weeks.     Signed:  Jinny Oconnell  1/26/2021, 1:20 PM    Time spent in discharge of this pt is more than 30 minutes in examination,evaluvation,  counseling and review of medication and discharge plan

## 2021-01-27 ENCOUNTER — TELEPHONE (OUTPATIENT)
Dept: FAMILY MEDICINE CLINIC | Age: 81
End: 2021-01-27

## 2021-01-27 ENCOUNTER — CARE COORDINATION (OUTPATIENT)
Dept: CASE MANAGEMENT | Age: 81
End: 2021-01-27

## 2021-01-27 DIAGNOSIS — K62.5 HEMORRHAGE OF RECTUM AND ANUS: Primary | ICD-10-CM

## 2021-01-27 PROCEDURE — 1111F DSCHRG MED/CURRENT MED MERGE: CPT

## 2021-01-27 NOTE — TELEPHONE ENCOUNTER
[facility] Transitions Initial Follow Up Call    Call within 2 business days of discharge: Yes     Patient: Jaun Ernst Patient : 1940 MRN: [id number]    [unfilled]    RARS: Readmission Risk Score: 12       Spoke with: the patient. Discharge department/facility: D/C [facility] 21 GI BLEED    Non-face-to-face services provided:  Scheduled appointment with Specialist-she made an appointment to see cardiology for 21. She will be calling Dr Arden Feldman office to schedule a follow up appointment.   Obtained and reviewed discharge summary and/or continuity of care documents    Follow Up  Future Appointments   Date Time Provider David Shen   2021  3:00 PM Shmuel Carrasco MD [address], RN

## 2021-01-27 NOTE — CARE COORDINATION
[facility] Transitions Initial Follow Up Call    Call within 2 business days of discharge: Yes    Patient: Baltazar Nevarez Patient : 1940   MRN: [id number]  Reason for Admission: rectal bleeding  Discharge Date: 21 RARS: Readmission Risk Score: 12      Last Discharge [facility]       Complaint Diagnosis Description Type Department Provider    21 Rectal Bleeding Lower GI bleed . .. ED to Hosp-Admission (Discharged) (ADMITTED) Talia Alfaro MD; Dany Cano . ..            Spoke with: [address] [facility]: [facility]    Non-face-to-face services provided:  Communication with home health agencies or other community services the patient is currently using-writer lan Peres, sent up vns with [address] [place] [address] Transitions 24 Hour Call    Schedule Follow Up Appointment with PCP: Declined  Do you have any ongoing symptoms?: No  Do you have a copy of your discharge instructions?: Yes  Do you have all of your prescriptions and are they filled?: Yes  Have you been contacted by a [address]?: No  Have you scheduled your follow up appointment?: No  Were you discharged with any Home Care or Post Acute Services: Yes  Post Acute Services: Home Health (Comment: John Jaramillo)  Patient DME: Fairy Galeazzi you feel like you have everything you need to keep you well at home?: Yes  Are you an active caregiver in your home?: No  Care Transitions Interventions     Other Services:  (Comment: Lyla Castleman)      Writer spoke to patient, she stated she was doing well, no new needs or concerns at this time, reviewed discharge instructions and medications, 1111F order completed, patient has a follow up appointment with cardiologist on 2021, patient stated she has already talked to Ramon Diez at her pcp's and was told to schedule a follow up after she has her other appointment, patient stated she would schedule her own appointment, patient informed writer that she was suppose to be discharged with VNS but had not heard from anyone, writer [facility] [employer] spoke to Talia Vallejo, he was able to pull orders for skilled services, is going to contact patient today and will schedule start of care for tomorrow, iris going to reach out to patient and update her of situation, writer provided contact information, will continue to follow//JU    Challenges to be reviewed by the provider   Additional needs identified to be addressed with provider No  none    Discussed COVID-19 related testing which was available at this time. Test results were negative. Patient informed of results, if available? Yes         Method of communication with provider : none    Advance Care Planning:   Does patient have an Advance Directive:  decision maker updated. Was this a readmission? No  Patient stated reason for admission:   Patients top risk factors for readmission: medical condition    Care Transition Nurse (CTN) contacted the patient by telephone to perform post hospital discharge assessment. Verified name and  with patient as identifiers. Provided introduction to self, and explanation of the CTN role. CTN reviewed discharge instructions, medical action plan and red flags with patient who verbalized understanding. Patient given an opportunity to ask questions and does not have any further questions or concerns at this time. Were discharge instructions available to patient? Yes. Reviewed appropriate site of care based on symptoms and resources available to patient including: PCP, Specialist, Urgent care clinics, Home health, When to call 911 and CTN. The patient agrees to contact the PCP office for questions related to their healthcare. Medication reconciliation was performed with patient, who verbalizes understanding of administration of home medications. Advised obtaining a 90-day supply of all daily and as-needed medications. Covid Risk Education    Patient has following risk factors of: diabetes.    Education provided regarding infection

## 2021-02-05 ENCOUNTER — OFFICE VISIT (OUTPATIENT)
Dept: FAMILY MEDICINE CLINIC | Age: 81
End: 2021-02-05
Payer: MEDICARE

## 2021-02-05 VITALS
HEART RATE: 68 BPM | WEIGHT: 187 LBS | DIASTOLIC BLOOD PRESSURE: 72 MMHG | RESPIRATION RATE: 16 BRPM | SYSTOLIC BLOOD PRESSURE: 124 MMHG | TEMPERATURE: 96.4 F | BODY MASS INDEX: 31.12 KG/M2

## 2021-02-05 DIAGNOSIS — I10 ESSENTIAL HYPERTENSION: ICD-10-CM

## 2021-02-05 DIAGNOSIS — K92.2 GASTROINTESTINAL HEMORRHAGE, UNSPECIFIED GASTROINTESTINAL HEMORRHAGE TYPE: ICD-10-CM

## 2021-02-05 DIAGNOSIS — E78.2 MIXED HYPERLIPIDEMIA: ICD-10-CM

## 2021-02-05 DIAGNOSIS — E11.9 TYPE 2 DIABETES MELLITUS WITHOUT COMPLICATION, WITHOUT LONG-TERM CURRENT USE OF INSULIN (HCC): Primary | ICD-10-CM

## 2021-02-05 PROCEDURE — 99495 TRANSJ CARE MGMT MOD F2F 14D: CPT | Performed by: FAMILY MEDICINE

## 2021-02-05 PROCEDURE — 1111F DSCHRG MED/CURRENT MED MERGE: CPT | Performed by: FAMILY MEDICINE

## 2021-02-05 SDOH — ECONOMIC STABILITY: INCOME INSECURITY: HOW HARD IS IT FOR YOU TO PAY FOR THE VERY BASICS LIKE FOOD, HOUSING, MEDICAL CARE, AND HEATING?: NOT HARD AT ALL

## 2021-02-05 SDOH — ECONOMIC STABILITY: TRANSPORTATION INSECURITY
IN THE PAST 12 MONTHS, HAS LACK OF TRANSPORTATION KEPT YOU FROM MEETINGS, WORK, OR FROM GETTING THINGS NEEDED FOR DAILY LIVING?: NOT ASKED

## 2021-02-05 ASSESSMENT — PATIENT HEALTH QUESTIONNAIRE - PHQ9
SUM OF ALL RESPONSES TO PHQ QUESTIONS 1-9: 2
1. LITTLE INTEREST OR PLEASURE IN DOING THINGS: 1
SUM OF ALL RESPONSES TO PHQ QUESTIONS 1-9: 2
SUM OF ALL RESPONSES TO PHQ9 QUESTIONS 1 & 2: 2
2. FEELING DOWN, DEPRESSED OR HOPELESS: 1

## 2021-02-05 ASSESSMENT — ENCOUNTER SYMPTOMS
BLOOD IN STOOL: 1
SHORTNESS OF BREATH: 0
ABDOMINAL PAIN: 0
CHEST TIGHTNESS: 0

## 2021-02-05 NOTE — PROGRESS NOTES
tablet  TAKE 1 TABLET BY MOUTH DAILY             Cholecalciferol (VITAMIN D3) 5000 UNITS TABS  Take 1 tablet by mouth daily             cholestyramine (QUESTRAN) 4 G packet  Take 1 packet by mouth every evening              Cranberry 450 MG CAPS  Take 450 mg by mouth daily              cyclobenzaprine (FLEXERIL) 10 MG tablet  Take 1 tablet by mouth 2 times daily as needed (prn)             dorzolamide (TRUSOPT) 2 % ophthalmic solution  INSTILL 1 DROP BY OPHTHALMIC ROUTE 2 TIMES EVERY DAY IN BOTH EYES             doxazosin (CARDURA) 4 MG tablet  Take 1 tablet by mouth daily             famotidine (PEPCID) 40 MG tablet  Take 1 tablet by mouth daily             fluticasone (FLONASE) 50 MCG/ACT nasal spray  2 sprays into each nostril daily             Lactobacillus (ACIDOPHILUS/PECTIN PO)  Take 100 mg by mouth daily             latanoprost (XALATAN) 0.005 % ophthalmic solution  Place 1 drop into both eyes nightly             levothyroxine (SYNTHROID) 100 MCG tablet  TAKE 1 TABLET BY MOUTH ONCE A DAY             NONFORMULARY  Indications: Advanced anti-oxidant w/echinacea and garlic             ofloxacin (FLOXIN) 0.3 % otic solution  Place 4 drops into both ears 2 times daily             ondansetron (ZOFRAN-ODT) 4 MG disintegrating tablet  Take 1 tablet by mouth 3 times daily as needed for Nausea or Vomiting             RHOPRESSA 0.02 % SOLN  INSTILL 1 DROP INTO AFFECTED EYE(S) BY OPHTHALMIC ROUTE ONCE DAILY IN THE EVENING             timolol (TIMOPTIC) 0.5 % ophthalmic solution  ONE DROP TWICE A DAY INTO EACH EYE                   Medications marked \"taking\" at this time  Outpatient Medications Marked as Taking for the 2/5/21 encounter (Office Visit) with Dusty Sotelo MD   Medication Sig Dispense Refill    apixaban (ELIQUIS) 5 MG TABS tablet Take 5 mg by mouth 2 times daily      doxazosin (CARDURA) 4 MG tablet Take 1 tablet by mouth daily 30 tablet 3    ofloxacin (FLOXIN) 0.3 % otic solution Place 4 drops into both ears 2 times daily      Lactobacillus (ACIDOPHILUS/PECTIN PO) Take 100 mg by mouth daily      levothyroxine (SYNTHROID) 100 MCG tablet TAKE 1 TABLET BY MOUTH ONCE A DAY 90 tablet 1    fluticasone (FLONASE) 50 MCG/ACT nasal spray 2 sprays into each nostril daily 1 Bottle 1    atenolol (TENORMIN) 100 MG tablet TAKE 1 TABLET BY MOUTH DAILY 90 tablet 1    RHOPRESSA 0.02 % SOLN INSTILL 1 DROP INTO AFFECTED EYE(S) BY OPHTHALMIC ROUTE ONCE DAILY IN THE EVENING      timolol (TIMOPTIC) 0.5 % ophthalmic solution ONE DROP TWICE A DAY INTO EACH EYE      dorzolamide (TRUSOPT) 2 % ophthalmic solution INSTILL 1 DROP BY OPHTHALMIC ROUTE 2 TIMES EVERY DAY IN BOTH EYES  10    ondansetron (ZOFRAN-ODT) 4 MG disintegrating tablet Take 1 tablet by mouth 3 times daily as needed for Nausea or Vomiting 12 tablet 0    famotidine (PEPCID) 40 MG tablet Take 1 tablet by mouth daily 90 tablet 1    cyclobenzaprine (FLEXERIL) 10 MG tablet Take 1 tablet by mouth 2 times daily as needed (prn) 20 tablet 0    NONFORMULARY Indications: Advanced anti-oxidant w/echinacea and garlic      Cholecalciferol (VITAMIN D3) 5000 UNITS TABS Take 1 tablet by mouth daily      Cranberry 450 MG CAPS Take 450 mg by mouth daily       cholestyramine (QUESTRAN) 4 G packet Take 1 packet by mouth every evening   3    latanoprost (XALATAN) 0.005 % ophthalmic solution Place 1 drop into both eyes nightly          Medications patient taking as of now reconciled against medications ordered at time of hospital discharge: Yes    Chief Complaint   Patient presents with    Follow[facility]Up from [facility] 1/23/21 GI BLEED     Health Maintenance     PT IS DUE FOR AWV       HPI    Inpatient course: Discharge summary reviewed- see chart. Interval history/Current status:   Patient is an 79-year-old white female who presents for transitional care visit for recent hospitalization from 1/23/2021 to 1/26/21 for GI bleed and anemia.   Colonoscopy was done and patient's Eliquis was held and her bleeding resolved. Patient states she was recently seen by her cardiologist who resumed her Eliquis. She has an appointment next week with her gastroenterologist.  She states that so far she has not noticed any blood in her stools since discharge from the hospital.  She denies any fever, chills, chest pain, abdominal pain, shortness of breath. She has a good appetite and remains active. She states she is taking and tolerating her routine medications. Review of Systems   Constitutional: Negative for chills and fever. HENT: Negative for congestion. Respiratory: Negative for chest tightness and shortness of breath. Cardiovascular: Negative for chest pain. Gastrointestinal: Positive for blood in stool (  Resolved). Negative for abdominal pain. Genitourinary: Negative for dysuria and hematuria. Skin: Negative for rash. Neurological: Negative for dizziness. Psychiatric/Behavioral: Negative for confusion and dysphoric mood. Vitals:    02/05/21 0929   BP: 124/72   Site: Right Lower Arm   Position: Sitting   Cuff Size: Medium Adult   Pulse: 68   Resp: 16   Temp: 96.4 °F (35.8 °C)   TempSrc: Infrared   Weight: 187 lb (84.8 kg)     Body mass index is 31.12 kg/m². Wt Readings from Last 3 Encounters:   02/05/21 187 lb (84.8 kg)   01/26/21 211 lb 13.8 oz (96.1 kg)   01/12/21 193 lb (87.5 kg)     BP Readings from Last 3 Encounters:   02/05/21 124/72   01/26/21 (!) 141/61   01/25/21 137/76       Physical Exam  Vitals signs and nursing note reviewed. Constitutional:       General: She is not in acute distress. Appearance: She is well-developed. HENT:      Head: Normocephalic and atraumatic. Right Ear: Tympanic membrane, ear canal and external ear normal.      Left Ear: Tympanic membrane, ear canal and external ear normal.      Nose: Nose normal.      Mouth/Throat:      Mouth: Mucous membranes are moist.      Pharynx: Oropharynx is clear.    Eyes:

## 2021-02-10 ENCOUNTER — OFFICE VISIT (OUTPATIENT)
Dept: GASTROENTEROLOGY | Age: 81
End: 2021-02-10
Payer: MEDICARE

## 2021-02-10 VITALS
HEART RATE: 90 BPM | SYSTOLIC BLOOD PRESSURE: 133 MMHG | TEMPERATURE: 96.8 F | BODY MASS INDEX: 31.81 KG/M2 | DIASTOLIC BLOOD PRESSURE: 85 MMHG | HEIGHT: 65 IN | OXYGEN SATURATION: 97 % | WEIGHT: 190.9 LBS

## 2021-02-10 DIAGNOSIS — K62.5 HEMORRHAGE OF RECTUM AND ANUS: Primary | ICD-10-CM

## 2021-02-10 PROCEDURE — 1111F DSCHRG MED/CURRENT MED MERGE: CPT | Performed by: INTERNAL MEDICINE

## 2021-02-10 PROCEDURE — 99213 OFFICE O/P EST LOW 20 MIN: CPT | Performed by: INTERNAL MEDICINE

## 2021-02-10 PROCEDURE — G8417 CALC BMI ABV UP PARAM F/U: HCPCS | Performed by: INTERNAL MEDICINE

## 2021-02-10 PROCEDURE — G8400 PT W/DXA NO RESULTS DOC: HCPCS | Performed by: INTERNAL MEDICINE

## 2021-02-10 PROCEDURE — 1036F TOBACCO NON-USER: CPT | Performed by: INTERNAL MEDICINE

## 2021-02-10 PROCEDURE — G8427 DOCREV CUR MEDS BY ELIG CLIN: HCPCS | Performed by: INTERNAL MEDICINE

## 2021-02-10 PROCEDURE — 1090F PRES/ABSN URINE INCON ASSESS: CPT | Performed by: INTERNAL MEDICINE

## 2021-02-10 PROCEDURE — 4040F PNEUMOC VAC/ADMIN/RCVD: CPT | Performed by: INTERNAL MEDICINE

## 2021-02-10 PROCEDURE — 1123F ACP DISCUSS/DSCN MKR DOCD: CPT | Performed by: INTERNAL MEDICINE

## 2021-02-10 PROCEDURE — G8484 FLU IMMUNIZE NO ADMIN: HCPCS | Performed by: INTERNAL MEDICINE

## 2021-02-10 ASSESSMENT — ENCOUNTER SYMPTOMS
COUGH: 1
SORE THROAT: 0
VOMITING: 0
SHORTNESS OF BREATH: 1
CONSTIPATION: 0
RECTAL PAIN: 0
CHOKING: 0
BACK PAIN: 0
ABDOMINAL DISTENTION: 1
NAUSEA: 0
DIARRHEA: 1
TROUBLE SWALLOWING: 0
APNEA: 0
BLOOD IN STOOL: 0
ANAL BLEEDING: 0
WHEEZING: 0
VOICE CHANGE: 1
ABDOMINAL PAIN: 0

## 2021-02-10 NOTE — PROGRESS NOTES
GI OFFICE FOLLOW UP    INTERVAL HISTORY:   No referring provider defined for this encounter. Chief Complaint   Patient presents with    Follow-Up from Hospital     Patient is her today for a hospital f/u       No diagnosis found. HISTORY OF PRESENT ILLNESS: Kaley Aguila is a 79 y.o. female with a past history remarkable for history of rectal bleeding,   Patient seen for follow-up of rectal bleeding. Recently patient was admitted at [facility] with hematochezia. At that time patient had investigations done and was found to have friable mucosa at the anastomosis that was oozing blood. With holding of anticoagulation therapy, bleeding resolved. At present she resumed anticoagulation therapy and are doing well. No hematochezia. Bowel movements satisfactory and in fact she does not have constipation. She has 1 or 2 liquid stools a day. Denies abdominal pain, bloating, nausea vomiting etc.    Overall she is doing well. Past Medical,Family, and Social History reviewed and does contribute to the patient presenting condition. Patient's PMH/PSH,SH,PSYCH Hx, MEDs, ALLERGIES, and ROS were all reviewed and updated in the appropriate sections. Yes      PAST MEDICAL HISTORY:  Past Medical History:   Diagnosis Date    Atrial fibrillation ([facility] [address] 3/28/2014    Chronic back pain     with rt. leg pain    Diverticulosis     GERD (gastroesophageal reflux disease)     Hyperlipidemia     Hypertension     Hypothyroidism     Obesity (BMI 30.0-34. 9) 8/12/2016    Onychomycosis     PE (pulmonary embolism)     H/O DVT of rt leg    Type II or unspecified type diabetes mellitus without mention of complication, not stated as uncontrolled        Past Surgical History:   Procedure Laterality Date    CHOLECYSTECTOMY      COLON SURGERY      s/p sigmoid cloectomy    COLONOSCOPY N/A 1/25/2021    COLONOSCOPY DIAGNOSTIC performed by Shankar Hand MD at [facility] 09/20/2019    DR IMAN GAUTHIER    VENA CAVA FILTER PLACEMENT      s/p       CURRENT MEDICATIONS:    Current Outpatient Medications:     apixaban (ELIQUIS) 5 MG TABS tablet, Take 5 mg by mouth 2 times daily, Disp: , Rfl:     doxazosin (CARDURA) 4 MG tablet, Take 1 tablet by mouth daily, Disp: 30 tablet, Rfl: 3    ofloxacin (FLOXIN) 0.3 % otic solution, Place 4 drops into both ears 2 times daily, Disp: , Rfl:     Lactobacillus (ACIDOPHILUS/PECTIN PO), Take 100 mg by mouth daily, Disp: , Rfl:     levothyroxine (SYNTHROID) 100 MCG tablet, TAKE 1 TABLET BY MOUTH ONCE A DAY, Disp: 90 tablet, Rfl: 1    atenolol (TENORMIN) 100 MG tablet, TAKE 1 TABLET BY MOUTH DAILY, Disp: 90 tablet, Rfl: 1    RHOPRESSA 0.02 % SOLN, INSTILL 1 DROP INTO AFFECTED EYE(S) BY OPHTHALMIC ROUTE ONCE DAILY IN THE EVENING, Disp: , Rfl:     timolol (TIMOPTIC) 0.5 % ophthalmic solution, ONE DROP TWICE A DAY INTO EACH EYE, Disp: , Rfl:     dorzolamide (TRUSOPT) 2 % ophthalmic solution, INSTILL 1 DROP BY OPHTHALMIC ROUTE 2 TIMES EVERY DAY IN BOTH EYES, Disp: , Rfl: 10    famotidine (PEPCID) 40 MG tablet, Take 1 tablet by mouth daily, Disp: 90 tablet, Rfl: 1    NONFORMULARY, Indications: Advanced anti-oxidant w/echinacea and garlic, Disp: , Rfl:     Cholecalciferol (VITAMIN D3) 5000 UNITS TABS, Take 1 tablet by mouth daily, Disp: , Rfl:     Cranberry 450 MG CAPS, Take 450 mg by mouth daily , Disp: , Rfl:     cholestyramine (QUESTRAN) 4 G packet, Take 1 packet by mouth every evening , Disp: , Rfl: 3    latanoprost (XALATAN) 0.005 % ophthalmic solution, Place 1 drop into both eyes nightly, Disp: , Rfl:     ALLERGIES:   Allergies   Allergen Reactions    Avapro [Irbesartan]     Ciprofloxacin Hcl Nausea Only    Cozaar [Losartan Potassium]     Diovan [Valsartan]     Lipitor [Atorvastatin Calcium]  Lisinopril     Pcn [Penicillins]     Pravachol [Pravastatin Sodium]      myalgias    Tape [Adhesive Tape] Dermatitis    Tramadol Swelling    Zetia [Ezetimibe]     Morphine Nausea And Vomiting       FAMILY HISTORY:       Adopted: Yes   Problem Relation Age of Onset    No Known Problems Mother     No Known Problems Father          SOCIAL HISTORY:   Social History     Socioeconomic History    Marital status:      Spouse name: Not on file    Number of children: Not on file    Years of education: Not on file    Highest education level: Not on file   Occupational History    Not on file   Social Needs    Financial resource strain: Not hard at all    Food insecurity     Worry: Never true     Inability: Never true   [employer] needs     Medical: Not on file     Non-medical: Not on file   Tobacco Use    Smoking status: Never Smoker    Smokeless tobacco: Never Used   Substance and Sexual Activity    Alcohol use: No    Drug use: No    Sexual activity: Not on file   Lifestyle    Physical activity     Days per week: Not on file     Minutes per session: Not on file    Stress: Not on file   Relationships    Social connections     Talks on phone: Not on file     Gets together: Not on file     Attends Spiritism service: Not on file     Active member of club or organization: Not on file     Attends meetings of clubs or organizations: Not on file     Relationship status: Not on file    Intimate partner violence     Fear of current or ex partner: Not on file     Emotionally abused: Not on file     Physically abused: Not on file     Forced sexual activity: Not on file   Other Topics Concern    Not on file   Social History Narrative    Not on file         REVIEW OF SYSTEMS:         Review of Systems   Constitutional: Positive for appetite change. Negative for fatigue and unexpected weight change. HENT: Positive for postnasal drip and voice change.  Negative for sore throat and trouble swallowing. Eyes: Positive for visual disturbance (glasses). Respiratory: Positive for cough (sinus drainage) and shortness of breath (exercise). Negative for apnea, choking and wheezing. Cardiovascular: Negative for chest pain, palpitations and leg swelling. Gastrointestinal: Positive for abdominal distention (gas) and diarrhea. Negative for abdominal pain, anal bleeding, blood in stool, constipation, nausea, rectal pain and vomiting. Genitourinary: Negative for difficulty urinating. Musculoskeletal: Positive for arthralgias and gait problem (cane). Negative for back pain and myalgias. Neurological: Negative for dizziness, tremors, weakness, light-headedness, numbness and headaches. Hematological: Bruises/bleeds easily. Psychiatric/Behavioral: Positive for sleep disturbance. The patient is not nervous/anxious. PHYSICAL EXAMINATION:     Vital signs reviewed per the nursing documentation. /85   Pulse 90   Temp 96.8 °F (36 °C)   Ht 5' 5\" (1.651 m)   Wt 190 lb 14.4 oz (86.6 kg)   SpO2 97%   BMI 31.77 kg/m²   Body mass index is 31.77 kg/m². Physical Exam  Vitals signs and nursing note reviewed. Constitutional:       Appearance: She is well-developed. HENT:      Head: Normocephalic and atraumatic. Eyes:      General: No scleral icterus. Conjunctiva/sclera: Conjunctivae normal.      Pupils: Pupils are equal, round, and reactive to light. Neck:      Musculoskeletal: Normal range of motion and neck supple. Thyroid: No thyromegaly. Vascular: No hepatojugular reflux or JVD. Trachea: No tracheal deviation. Comments: Elderly patient with limited ambulation. She uses cane for walker. Cardiovascular:      Rate and Rhythm: Normal rate and regular rhythm. Heart sounds: Normal heart sounds. Pulmonary:      Effort: Pulmonary effort is normal. No respiratory distress. Breath sounds: Normal breath sounds. No wheezing or rales.    Abdominal: General: Bowel sounds are normal. There is no distension. Palpations: Abdomen is soft. There is no hepatomegaly or mass. Tenderness: There is no abdominal tenderness. There is no rebound. Hernia: No hernia is present. Genitourinary:     Rectum: Guaiac result negative. Musculoskeletal:         General: No tenderness. Comments: No joint swelling   Lymphadenopathy:      Cervical: No cervical adenopathy. Skin:     General: Skin is warm. Findings: No bruising, ecchymosis, erythema or rash. Neurological:      Mental Status: She is alert and oriented to person, place, and time. Cranial Nerves: No cranial nerve deficit. Psychiatric:         Thought Content: Thought content normal.           LABORATORY DATA: Reviewed  Lab Results   Component Value Date    WBC 4.5 01/25/2021    HGB 11.1 (L) 01/25/2021    HCT 33.7 (L) 01/25/2021    MCV 96.8 01/25/2021     (L) 01/25/2021     01/25/2021    K 3.6 (L) 01/25/2021     01/25/2021    CO2 22 01/25/2021    BUN 4 (L) 01/25/2021    CREATININE 0.67 01/25/2021    LABPROT 5.4 (L) 10/08/2012    LABALBU 2.9 (L) 01/25/2021    BILITOT 0.66 01/25/2021    ALKPHOS 77 01/25/2021    AST 21 01/25/2021    ALT 14 01/25/2021    INR 1.4 01/23/2021         Lab Results   Component Value Date    RBC 3.39 (L) 01/25/2021    HGB 11.1 (L) 01/25/2021    MCV 96.8 01/25/2021    MCH 31.8 01/25/2021    MCHC 32.9 01/25/2021    RDW 14.3 01/25/2021    MPV 9.7 01/25/2021    BASOPCT 1 01/25/2021    LYMPHSABS 1.10 01/25/2021    MONOSABS 0.50 01/25/2021    NEUTROABS 2.60 01/25/2021    EOSABS 0.20 01/25/2021    BASOSABS 0.10 01/25/2021         DIAGNOSTIC TESTING:     Ct Abdomen Pelvis W Iv Contrast    Result Date: 1/23/2021  EXAMINATION: CT OF THE ABDOMEN AND PELVIS WITH CONTRAST 1/23/2021 4:34 pm TECHNIQUE: CT of the abdomen and pelvis was performed with the administration of intravenous contrast. Multiplanar reformatted images are provided for review.  Dose modulation, iterative reconstruction, and/or weight based adjustment of the mA/kV was utilized to reduce the radiation dose to as low as reasonably achievable. COMPARISON: None. HISTORY: ORDERING SYSTEM PROVIDED HISTORY: new onset GI bleeding TECHNOLOGIST PROVIDED HISTORY: IV Only Contrast new onset GI bleeding Reason for Exam: new onset GI bleeding; patient c/o rectal bleeding since this morning and nausea Acuity: Acute Type of Exam: Initial FINDINGS: Lower Chest: Visualized portions of the lungs are clear. Cardiac and posterior mediastinal structures visualized are unremarkable. Organs: Normal attenuation pattern throughout the liver. No discrete hepatic lesion. Mild central intrahepatic bile duct dilatation is seen. Common bile duct measures 10 mm. The gallbladder is absent status post cholecystectomy. Benign 2 cm cyst medial inferior pole left kidney has density 17 Hounsfield units. The kidneys, spleen, adrenal glands and pancreas appear unremarkable. GI/Bowel: Multifocal diverticula involve the descending and sigmoid colon without evidence of acute inflammatory change. Postsurgical sequela with several areas of bowel resection and reanastomosis mid abdomen/pelvis. No definite obstruction or acute inflammatory process involving the bowel. Additional area of resection and reanastomosis is demonstrated at the sigmoid colon. Unremarkable appearance of the stomach. Pelvis: Partially filled urinary bladder appears unremarkable. No pelvic adenopathy or free fluid is seen. Peritoneum/Retroperitoneum: Calcific atherosclerotic disease aorta. No aneurysm. Unremarkable appearance of the IVC, IVC filter in place. No adenopathy or fluid. Bones/Soft Tissues: No acute superficial soft tissue or osseous structure abnormality evident. 1.  No CT evidence of an acute intra-abdominal or intrapelvic process. 2. Postsurgical sequela involving multifocal areas of the bowel. Correlate with surgical history.   No bowel

## 2021-04-23 ENCOUNTER — TELEPHONE (OUTPATIENT)
Dept: FAMILY MEDICINE CLINIC | Age: 81
End: 2021-04-23

## 2021-04-23 DIAGNOSIS — R39.9 URINARY TRACT INFECTION SYMPTOMS: ICD-10-CM

## 2021-04-23 RX ORDER — SULFAMETHOXAZOLE AND TRIMETHOPRIM 800; 160 MG/1; MG/1
1 TABLET ORAL 2 TIMES DAILY
Qty: 20 TABLET | Refills: 0 | Status: SHIPPED | OUTPATIENT
Start: 2021-04-23 | End: 2021-05-03

## 2021-04-23 NOTE — TELEPHONE ENCOUNTER
The patient woke up at 3 am today with dysuria, polyuria and bladder discomfort. [facility] is [facility] [address]. Please advise.

## 2021-05-21 RX ORDER — DOXAZOSIN MESYLATE 4 MG/1
4 TABLET ORAL DAILY
Qty: 90 TABLET | Refills: 1 | Status: SHIPPED | OUTPATIENT
Start: 2021-05-21 | End: 2021-09-20

## 2021-06-21 RX ORDER — ATENOLOL 100 MG/1
TABLET ORAL
Qty: 90 TABLET | Refills: 1 | Status: SHIPPED | OUTPATIENT
Start: 2021-06-21 | End: 2021-09-20

## 2021-06-21 RX ORDER — LEVOTHYROXINE SODIUM 0.1 MG/1
TABLET ORAL
Qty: 90 TABLET | Refills: 1 | Status: SHIPPED | OUTPATIENT
Start: 2021-06-21 | End: 2022-01-06

## 2021-07-12 ENCOUNTER — OFFICE VISIT (OUTPATIENT)
Dept: FAMILY MEDICINE CLINIC | Age: 81
End: 2021-07-12
Payer: MEDICARE

## 2021-07-12 VITALS
WEIGHT: 192 LBS | RESPIRATION RATE: 18 BRPM | DIASTOLIC BLOOD PRESSURE: 68 MMHG | HEART RATE: 64 BPM | BODY MASS INDEX: 31.95 KG/M2 | SYSTOLIC BLOOD PRESSURE: 124 MMHG | TEMPERATURE: 96.3 F

## 2021-07-12 DIAGNOSIS — E03.9 HYPOTHYROIDISM, UNSPECIFIED TYPE: ICD-10-CM

## 2021-07-12 DIAGNOSIS — E11.9 TYPE 2 DIABETES MELLITUS WITHOUT COMPLICATION, WITHOUT LONG-TERM CURRENT USE OF INSULIN (HCC): Primary | ICD-10-CM

## 2021-07-12 DIAGNOSIS — E78.2 MIXED HYPERLIPIDEMIA: ICD-10-CM

## 2021-07-12 DIAGNOSIS — I10 ESSENTIAL HYPERTENSION: ICD-10-CM

## 2021-07-12 LAB — HBA1C MFR BLD: 5.3 %

## 2021-07-12 PROCEDURE — 1090F PRES/ABSN URINE INCON ASSESS: CPT | Performed by: FAMILY MEDICINE

## 2021-07-12 PROCEDURE — G8427 DOCREV CUR MEDS BY ELIG CLIN: HCPCS | Performed by: FAMILY MEDICINE

## 2021-07-12 PROCEDURE — G8400 PT W/DXA NO RESULTS DOC: HCPCS | Performed by: FAMILY MEDICINE

## 2021-07-12 PROCEDURE — 83036 HEMOGLOBIN GLYCOSYLATED A1C: CPT | Performed by: FAMILY MEDICINE

## 2021-07-12 PROCEDURE — G8417 CALC BMI ABV UP PARAM F/U: HCPCS | Performed by: FAMILY MEDICINE

## 2021-07-12 PROCEDURE — 4040F PNEUMOC VAC/ADMIN/RCVD: CPT | Performed by: FAMILY MEDICINE

## 2021-07-12 PROCEDURE — 1123F ACP DISCUSS/DSCN MKR DOCD: CPT | Performed by: FAMILY MEDICINE

## 2021-07-12 PROCEDURE — 1036F TOBACCO NON-USER: CPT | Performed by: FAMILY MEDICINE

## 2021-07-12 PROCEDURE — 99214 OFFICE O/P EST MOD 30 MIN: CPT | Performed by: FAMILY MEDICINE

## 2021-07-12 ASSESSMENT — ENCOUNTER SYMPTOMS
BLOOD IN STOOL: 0
ABDOMINAL PAIN: 0
SHORTNESS OF BREATH: 0
CHEST TIGHTNESS: 0

## 2021-07-12 NOTE — PROGRESS NOTES
Social History reviewed and does contribute to the patient presenting condition    Health Maintenance   Topic Date Due    Shingles Vaccine (1 of 2) Never done    DTaP/Tdap/Td vaccine (1 - Tdap) 10/02/2002    Annual Wellness Visit (AWV)  Never done    Flu vaccine (1) 09/01/2021    TSH testing  01/23/2022    Potassium monitoring  01/25/2022    Creatinine monitoring  01/25/2022    Pneumococcal 65+ years Vaccine  Completed    COVID-19 Vaccine  Completed    DEXA (modify frequency per FRAX score)  Addressed    Hepatitis A vaccine  Aged Out    Hib vaccine  Aged Out    Meningococcal (ACWY) vaccine  Aged Out     HPI  Patient is an 54-year-old obese white female who presents for diabetes, hypertension, hyperlipidemia, hypothyroidism. Patient states that she is taking and tolerating her routine medications. She denies any chest pain, abdominal pain, shortness of breath, fever or chills. Her hemoglobin A1c today was 5.3. She states she has a good appetite and tries to remain active    Review of Systems   Constitutional: Negative for chills and fever. HENT: Negative for congestion. Respiratory: Negative for chest tightness and shortness of breath. Cardiovascular: Negative for chest pain. Gastrointestinal: Negative for abdominal pain and blood in stool. Genitourinary: Negative for dysuria and hematuria. Skin: Negative for rash. Neurological: Negative for dizziness. Psychiatric/Behavioral: Negative for confusion and dysphoric mood. Objective:   Physical Exam  Vitals and nursing note reviewed. Constitutional:       General: She is not in acute distress. Appearance: She is well-developed. HENT:      Head: Normocephalic and atraumatic. Right Ear: Ear canal and external ear normal. A PE tube is present. Left Ear: Ear canal and external ear normal. A PE tube is present.       Nose: Nose normal.      Mouth/Throat:      Mouth: Mucous membranes are moist.      Pharynx: Oropharynx is clear. Eyes:      General: No scleral icterus. Right eye: No discharge. Left eye: No discharge. Conjunctiva/sclera: Conjunctivae normal.   Cardiovascular:      Rate and Rhythm: Normal rate and regular rhythm. Heart sounds: Normal heart sounds. Pulmonary:      Effort: Pulmonary effort is normal. No respiratory distress. Breath sounds: Normal breath sounds. No wheezing. Abdominal:      General: There is no distension. Palpations: Abdomen is soft. Tenderness: There is no abdominal tenderness. Musculoskeletal:      Cervical back: Neck supple. Skin:     General: Skin is warm and dry. Findings: No rash. Neurological:      Mental Status: She is alert and oriented to person, place, and time. Psychiatric:         Mood and Affect: Mood normal.         Behavior: Behavior normal.         Assessment:       Diagnosis Orders   1. Type 2 diabetes mellitus without complication, without long-term current use of insulin ([facility])  POCT glycosylated hemoglobin (Hb A1C)    Basic Metabolic Panel    Lipid Panel    Magnesium    CBC Auto Differential   2. Essential hypertension  ALT    AST    Basic Metabolic Panel    Lipid Panel    Magnesium    CBC Auto Differential   3. Mixed hyperlipidemia  ALT    AST    Basic Metabolic Panel    Lipid Panel   4. Hypothyroidism, unspecified type  Basic Metabolic Panel    Lipid Panel           Plan:       Dm Mayorga received counseling on the following healthy behaviors: nutrition and medication adherence  Reviewed prior labs and health maintenance  Continue current medications, diet and exercise. Discussed use, benefit, and side effects of prescribed medications. Barriers to medication compliance addressed. Patient given educational materials - see patient instructions  Was a self-tracking handout given in paper form or via [employer]?  No:     Requested Prescriptions      No prescriptions requested or ordered in this encounter       All patient questions answered. Patient voiced understanding. Quality Measures    Body mass index is 31.95 kg/m². Elevated. Weight control planned discussed Healthy diet and regular exercise. BP: 124/68. Blood pressure is normal. Treatment plan consists of Weight Reduction. Fall Risk 8/4/2020 3/26/2019 1/9/2018 8/12/2016 6/23/2015 6/13/2014   2 or more falls in past year? no no no no no no   Fall with injury in past year? no no no no no no     The patient does not have a history of falls. I did not - not indicated , complete a risk assessment for falls. A plan of care for falls No Treatment plan indicated    Lab Results   Component Value Date    LDLCHOLESTEROL 51 01/19/2021    (goal LDL reduction with dx if diabetes is 50% LDL reduction)    PHQ Scores 2/5/2021 1/12/2021 8/4/2020 3/26/2019 10/22/2018 8/7/2017 4/11/2016   PHQ2 Score 2 0 0 0 0 0 0   PHQ9 Score 2 0 0 0 0 0 0     Interpretation of Total Score Depression Severity: 1-4 = Minimal depression, 5-9 = Mild depression, 10-14 = Moderate depression, 15-19 = Moderately severe depression, 20-27 = Severe depression    Orders Placed This Encounter   Procedures    ALT     Standing Status:   Future     Standing Expiration Date:   7/12/2022    AST     Standing Status:   Future     Standing Expiration Date:   7/12/2022    Basic Metabolic Panel     Fasting     Standing Status:   Future     Standing Expiration Date:   7/12/2022    Lipid Panel     Standing Status:   Future     Standing Expiration Date:   7/12/2022     Order Specific Question:   Is Patient Fasting?/# of Hours     Answer:    Fast 8-10 hours    Magnesium     Standing Status:   Future     Standing Expiration Date:   7/12/2022    CBC Auto Differential     Standing Status:   Future     Standing Expiration Date:   7/12/2022    POCT glycosylated hemoglobin (Hb A1C)         Continue routine medications  Follow-up in 6 months or sooner if needed

## 2021-07-17 ENCOUNTER — HOSPITAL ENCOUNTER (OUTPATIENT)
Age: 81
Discharge: HOME | End: 2021-07-17
Payer: MEDICARE

## 2021-07-17 DIAGNOSIS — I10 ESSENTIAL HYPERTENSION: ICD-10-CM

## 2021-07-17 DIAGNOSIS — E03.9 HYPOTHYROIDISM, UNSPECIFIED TYPE: ICD-10-CM

## 2021-07-17 DIAGNOSIS — E11.9 TYPE 2 DIABETES MELLITUS WITHOUT COMPLICATION, WITHOUT LONG-TERM CURRENT USE OF INSULIN (HCC): ICD-10-CM

## 2021-07-17 DIAGNOSIS — E78.2 MIXED HYPERLIPIDEMIA: ICD-10-CM

## 2021-07-17 LAB
ABSOLUTE EOS #: 0.1 K/UL (ref 0–0.4)
ABSOLUTE IMMATURE GRANULOCYTE: ABNORMAL K/UL (ref 0–0.3)
ABSOLUTE LYMPH #: 1.1 K/UL (ref 1–4.8)
ABSOLUTE MONO #: 0.5 K/UL (ref 0.1–1.3)
ALT SERPL-CCNC: 8 U/L (ref 5–33)
ANION GAP SERPL CALCULATED.3IONS-SCNC: 9 MMOL/L (ref 9–17)
AST SERPL-CCNC: 19 U/L
BASOPHILS # BLD: 1 % (ref 0–2)
BASOPHILS ABSOLUTE: 0.1 K/UL (ref 0–0.2)
BUN BLDV-MCNC: 11 MG/DL (ref 8–23)
BUN/CREAT BLD: ABNORMAL (ref 9–20)
CALCIUM SERPL-MCNC: 9.2 MG/DL (ref 8.6–10.4)
CHLORIDE BLD-SCNC: 103 MMOL/L (ref 98–107)
CHOLESTEROL/HDL RATIO: 2.1
CHOLESTEROL: 129 MG/DL
CO2: 25 MMOL/L (ref 20–31)
CREAT SERPL-MCNC: 0.85 MG/DL (ref 0.5–0.9)
DIFFERENTIAL TYPE: ABNORMAL
EOSINOPHILS RELATIVE PERCENT: 3 % (ref 0–4)
GFR AFRICAN AMERICAN: >60 ML/MIN
GFR NON-AFRICAN AMERICAN: >60 ML/MIN
GFR SERPL CREATININE-BSD FRML MDRD: ABNORMAL ML/MIN/{1.73_M2}
GFR SERPL CREATININE-BSD FRML MDRD: ABNORMAL ML/MIN/{1.73_M2}
GLUCOSE BLD-MCNC: 111 MG/DL (ref 70–99)
HCT VFR BLD CALC: 27.6 % (ref 36–46)
HDLC SERPL-MCNC: 62 MG/DL
HEMOGLOBIN: 8.8 G/DL (ref 12–16)
IMMATURE GRANULOCYTES: ABNORMAL %
LDL CHOLESTEROL: 50 MG/DL (ref 0–130)
LYMPHOCYTES # BLD: 24 % (ref 24–44)
MAGNESIUM: 2 MG/DL (ref 1.6–2.6)
MCH RBC QN AUTO: 26.7 PG (ref 26–34)
MCHC RBC AUTO-ENTMCNC: 31.8 G/DL (ref 31–37)
MCV RBC AUTO: 84 FL (ref 80–100)
MONOCYTES # BLD: 10 % (ref 1–7)
NRBC AUTOMATED: ABNORMAL PER 100 WBC
PDW BLD-RTO: 15.3 % (ref 11.5–14.9)
PLATELET # BLD: 130 K/UL (ref 150–450)
PLATELET ESTIMATE: ABNORMAL
PMV BLD AUTO: 9.2 FL (ref 6–12)
POTASSIUM SERPL-SCNC: 4.2 MMOL/L (ref 3.7–5.3)
RBC # BLD: 3.28 M/UL (ref 4–5.2)
RBC # BLD: ABNORMAL 10*6/UL
SEG NEUTROPHILS: 62 % (ref 36–66)
SEGMENTED NEUTROPHILS ABSOLUTE COUNT: 2.9 K/UL (ref 1.3–9.1)
SODIUM BLD-SCNC: 137 MMOL/L (ref 135–144)
TRIGL SERPL-MCNC: 84 MG/DL
VLDLC SERPL CALC-MCNC: NORMAL MG/DL (ref 1–30)
WBC # BLD: 4.6 K/UL (ref 3.5–11)
WBC # BLD: ABNORMAL 10*3/UL

## 2021-07-17 PROCEDURE — 85025 COMPLETE CBC W/AUTO DIFF WBC: CPT

## 2021-07-17 PROCEDURE — 84450 TRANSFERASE (AST) (SGOT): CPT

## 2021-07-17 PROCEDURE — 83735 ASSAY OF MAGNESIUM: CPT

## 2021-07-17 PROCEDURE — 80061 LIPID PANEL: CPT

## 2021-07-17 PROCEDURE — 84460 ALANINE AMINO (ALT) (SGPT): CPT

## 2021-07-17 PROCEDURE — 36415 COLL VENOUS BLD VENIPUNCTURE: CPT

## 2021-07-17 PROCEDURE — 80048 BASIC METABOLIC PNL TOTAL CA: CPT

## 2021-07-19 ENCOUNTER — TELEPHONE (OUTPATIENT)
Dept: GASTROENTEROLOGY | Age: 81
End: 2021-07-19

## 2021-07-23 ENCOUNTER — TELEPHONE (OUTPATIENT)
Dept: FAMILY MEDICINE CLINIC | Age: 81
End: 2021-07-23

## 2021-07-23 RX ORDER — SULFAMETHOXAZOLE AND TRIMETHOPRIM 800; 160 MG/1; MG/1
1 TABLET ORAL 2 TIMES DAILY
Qty: 20 TABLET | Refills: 0 | Status: SHIPPED | OUTPATIENT
Start: 2021-07-23 | End: 2021-08-02

## 2021-07-23 NOTE — TELEPHONE ENCOUNTER
Patient called C/O urgency, burning and sticky when wiping. Can you send something for her to [facility] on [address]? She also wanted you to know she couldn't get in the Dr. Hosea Mayberry until 8-4-21.

## 2021-08-05 ENCOUNTER — OFFICE VISIT (OUTPATIENT)
Dept: GASTROENTEROLOGY | Age: 81
End: 2021-08-05
Payer: MEDICARE

## 2021-08-05 VITALS
BODY MASS INDEX: 32.24 KG/M2 | OXYGEN SATURATION: 95 % | WEIGHT: 193.5 LBS | TEMPERATURE: 98.6 F | DIASTOLIC BLOOD PRESSURE: 71 MMHG | HEIGHT: 65 IN | HEART RATE: 76 BPM | SYSTOLIC BLOOD PRESSURE: 143 MMHG

## 2021-08-05 DIAGNOSIS — R19.5 OB + STOOL: Primary | ICD-10-CM

## 2021-08-05 DIAGNOSIS — D50.0 IRON DEFICIENCY ANEMIA DUE TO CHRONIC BLOOD LOSS: ICD-10-CM

## 2021-08-05 PROCEDURE — 4040F PNEUMOC VAC/ADMIN/RCVD: CPT | Performed by: INTERNAL MEDICINE

## 2021-08-05 PROCEDURE — G8400 PT W/DXA NO RESULTS DOC: HCPCS | Performed by: INTERNAL MEDICINE

## 2021-08-05 PROCEDURE — G8417 CALC BMI ABV UP PARAM F/U: HCPCS | Performed by: INTERNAL MEDICINE

## 2021-08-05 PROCEDURE — 1036F TOBACCO NON-USER: CPT | Performed by: INTERNAL MEDICINE

## 2021-08-05 PROCEDURE — 99214 OFFICE O/P EST MOD 30 MIN: CPT | Performed by: INTERNAL MEDICINE

## 2021-08-05 PROCEDURE — 1123F ACP DISCUSS/DSCN MKR DOCD: CPT | Performed by: INTERNAL MEDICINE

## 2021-08-05 PROCEDURE — G8427 DOCREV CUR MEDS BY ELIG CLIN: HCPCS | Performed by: INTERNAL MEDICINE

## 2021-08-05 PROCEDURE — 1090F PRES/ABSN URINE INCON ASSESS: CPT | Performed by: INTERNAL MEDICINE

## 2021-08-05 ASSESSMENT — ENCOUNTER SYMPTOMS
BLOOD IN STOOL: 0
RECTAL PAIN: 0
VOICE CHANGE: 1
ABDOMINAL PAIN: 0
BACK PAIN: 0
TROUBLE SWALLOWING: 0
DIARRHEA: 1
CHOKING: 0
WHEEZING: 0
ABDOMINAL DISTENTION: 1
SORE THROAT: 0
SHORTNESS OF BREATH: 1
VOMITING: 0
ANAL BLEEDING: 0
NAUSEA: 0
APNEA: 0
COUGH: 1
CONSTIPATION: 0

## 2021-08-05 NOTE — PROGRESS NOTES
GI OFFICE FOLLOW UP    INTERVAL HISTORY:   No referring provider defined for this encounter. Chief Complaint   Patient presents with    GI Problem     Patient is here today due to patient's PCP advised to come see GI for low hemoglobin       1. OB + stool    2. Iron deficiency anemia due to chronic blood loss              HISTORY OF PRESENT ILLNESS: Tosha Duong is a 79 y.o. female with a past history remarkable for ,   Patient seen with a history of anemia. Patient has been on anticoagulation therapy with Eliquis. Denies obvious hematochezia. Bowel movements satisfactory. Recent hemoglobin is about 8 g which is a drop from 10 g. She denies symptoms of anemia including abdominal pain, discomfort, weakness, tiredness, dizziness etc.  She has a history of pulmonary embolism for which she is on anticoagulation therapy. No nausea vomiting. No diarrhea. No constipation. Denies abdominal pain, bloating, fever chills etc.    Patient was admitted at [facility] in January 2021 and at that time she had significant anemia. She had a colonoscopy done and was found to have subtotal colectomy. Anastomosis was at about 30 to 40 cm from the anus. Patient has friable mucosa at the anastomosis. Patient had subtotal colectomy in the past for diverticulitis. Following that she had several ventral hernia surgeries done in the past.      Past Medical,Family, and Social History reviewed and does contribute to the patient presenting condition. Patient's PMH/PSH,SH,PSYCH Hx, MEDs, ALLERGIES, and ROS were all reviewed and updated in the appropriate sections.  Yes      PAST MEDICAL HISTORY:  Past Medical History:   Diagnosis Date    Atrial fibrillation ([facility] [address] 3/28/2014    Chronic back pain     with rt. leg pain    Diverticulosis     GERD (gastroesophageal reflux disease)     Hyperlipidemia     Hypertension     Hypothyroidism     Obesity (BMI 30.0-34. 9) 8/12/2016    Onychomycosis     PE (pulmonary embolism)     H/O DVT of rt leg    Type II or unspecified type diabetes mellitus without mention of complication, not stated as uncontrolled        Past Surgical History:   Procedure Laterality Date    CHOLECYSTECTOMY      COLON SURGERY      s/p sigmoid cloectomy    COLONOSCOPY N/A 1/25/2021    COLONOSCOPY DIAGNOSTIC performed by Sparkle Trevino MD at [facility] 09/20/2019    DR IMAN GAUTHIER    VENA CAVA FILTER PLACEMENT      s/p       CURRENT MEDICATIONS:    Current Outpatient Medications:     atenolol (TENORMIN) 100 MG tablet, TAKE 1 TABLET BY MOUTH EVERY DAY, Disp: 90 tablet, Rfl: 1    levothyroxine (SYNTHROID) 100 MCG tablet, TAKE 1 TABLET BY MOUTH EVERY DAY, Disp: 90 tablet, Rfl: 1    doxazosin (CARDURA) 4 MG tablet, Take 1 tablet by mouth daily, Disp: 90 tablet, Rfl: 1    apixaban (ELIQUIS) 5 MG TABS tablet, Take 5 mg by mouth 2 times daily, Disp: , Rfl:     ofloxacin (FLOXIN) 0.3 % otic solution, Place 4 drops into both ears 2 times daily, Disp: , Rfl:     Lactobacillus (ACIDOPHILUS/PECTIN PO), Take 100 mg by mouth daily, Disp: , Rfl:     RHOPRESSA 0.02 % SOLN, INSTILL 1 DROP INTO AFFECTED EYE(S) BY OPHTHALMIC ROUTE ONCE DAILY IN THE EVENING, Disp: , Rfl:     timolol (TIMOPTIC) 0.5 % ophthalmic solution, ONE DROP TWICE A DAY INTO EACH EYE, Disp: , Rfl:     dorzolamide (TRUSOPT) 2 % ophthalmic solution, INSTILL 1 DROP BY OPHTHALMIC ROUTE 2 TIMES EVERY DAY IN BOTH EYES, Disp: , Rfl: 10    famotidine (PEPCID) 40 MG tablet, Take 1 tablet by mouth daily, Disp: 90 tablet, Rfl: 1    NONFORMULARY, Indications: Advanced anti-oxidant w/echinacea and garlic, Disp: , Rfl:     Cholecalciferol (VITAMIN D3) 5000 UNITS TABS, Take 1 tablet by mouth daily, Disp: , Rfl:     Cranberry 450 MG CAPS, Take 450 mg by mouth daily , Disp: , Rfl:     cholestyramine (QUESTRAN) 4 G packet, Take 1 packet by mouth every evening , Disp: , Rfl: 3    latanoprost (XALATAN) 0.005 % ophthalmic solution, Place 1 drop into both eyes nightly, Disp: , Rfl:     ALLERGIES:   Allergies   Allergen Reactions    Avapro [Irbesartan]     Ciprofloxacin Hcl Nausea Only    Cozaar [Losartan Potassium]     Diovan [Valsartan]     Lipitor [Atorvastatin Calcium]     Lisinopril     Pcn [Penicillins]     Pravachol [Pravastatin Sodium]      myalgias    Tape [Adhesive Tape] Dermatitis    Tramadol Swelling    Zetia [Ezetimibe]     Morphine Nausea And Vomiting       FAMILY HISTORY:       Adopted: Yes   Problem Relation Age of Onset    No Known Problems Mother     No Known Problems Father          SOCIAL HISTORY:   Social History     Socioeconomic History    Marital status:      Spouse name: Not on file    Number of children: Not on file    Years of education: Not on file    Highest education level: Not on file   Occupational History    Not on file   Tobacco Use    Smoking status: Never Smoker    Smokeless tobacco: Never Used   Vaping Use    Vaping Use: Never used   Substance and Sexual Activity    Alcohol use: No    Drug use: No    Sexual activity: Not on file   Other Topics Concern    Not on file   Social History Narrative    Not on file     Social Determinants of Health     Financial Resource Strain: Low Risk     Difficulty of Paying Living Expenses: Not hard at all   Food Insecurity: No Food Insecurity    Worried About [facility] in the Last Year: Never true    [facility] in the Last Year: Never true   Transportation Needs:     Lack of Transportation (Medical):      Lack of Transportation (Non-Medical):    Physical Activity:     Days of Exercise per Week:     Minutes of Exercise per Session:    Stress:     Feeling of Stress :    Social Connections:     Frequency of Communication with Friends and Family:     Frequency of Social Gatherings with Friends and Family:     Attends Jew Services:     Active Member of Clubs or Organizations:     Attends Club or Organization Meetings:     Marital Status:    Intimate Partner Violence:     Fear of Current or Ex-Partner:     Emotionally Abused:     Physically Abused:     Sexually Abused:          REVIEW OF SYSTEMS:         Review of Systems   Constitutional: Positive for appetite change. Negative for fatigue and unexpected weight change. HENT: Positive for postnasal drip and voice change. Negative for sore throat and trouble swallowing. Eyes: Positive for visual disturbance (glasses). Respiratory: Positive for cough (sinus drainage) and shortness of breath (exercise). Negative for apnea, choking and wheezing. Cardiovascular: Negative for chest pain, palpitations and leg swelling. Gastrointestinal: Positive for abdominal distention (gas) and diarrhea. Negative for abdominal pain, anal bleeding, blood in stool, constipation, nausea, rectal pain and vomiting. Genitourinary: Negative for difficulty urinating. Musculoskeletal: Positive for arthralgias and gait problem (cane). Negative for back pain and myalgias. Neurological: Negative for dizziness, tremors, weakness, light-headedness, numbness and headaches. Hematological: Bruises/bleeds easily. Psychiatric/Behavioral: Positive for sleep disturbance. The patient is not nervous/anxious. PHYSICAL EXAMINATION:     Vital signs reviewed per the nursing documentation. BP (!) 143/71   Pulse 76   Temp 98.6 °F (37 °C)   Ht 5' 5\" (1.651 m)   Wt 193 lb 8 oz (87.8 kg)   SpO2 95%   BMI 32.20 kg/m²   Body mass index is 32.2 kg/m². Physical Exam  Vitals and nursing note reviewed. Constitutional:       Appearance: She is well-developed. HENT:      Head: Normocephalic and atraumatic. Eyes:      General: No scleral icterus.      Conjunctiva/sclera: Conjunctivae normal.      Pupils: Pupils are equal, round, and reactive to light. Neck:      Thyroid: No thyromegaly. Vascular: No hepatojugular reflux or JVD. Trachea: No tracheal deviation. Cardiovascular:      Rate and Rhythm: Normal rate and regular rhythm. Heart sounds: Normal heart sounds. Pulmonary:      Effort: Pulmonary effort is normal. No respiratory distress. Breath sounds: Normal breath sounds. No wheezing or rales. Abdominal:      General: Bowel sounds are normal. There is no distension. Palpations: Abdomen is soft. There is no hepatomegaly or mass. Tenderness: There is no abdominal tenderness. There is no rebound. Hernia: No hernia is present. Musculoskeletal:         General: No tenderness. Cervical back: Normal range of motion and neck supple. Comments: No joint swelling   Lymphadenopathy:      Cervical: No cervical adenopathy. Skin:     General: Skin is warm. Findings: No bruising, ecchymosis, erythema or rash. Neurological:      Mental Status: She is alert and oriented to person, place, and time. Cranial Nerves: No cranial nerve deficit. Psychiatric:         Thought Content:  Thought content normal.           LABORATORY DATA: Reviewed  Lab Results   Component Value Date    WBC 4.6 07/17/2021    HGB 8.8 (L) 07/17/2021    HCT 27.6 (L) 07/17/2021    MCV 84.0 07/17/2021     (L) 07/17/2021     07/17/2021    K 4.2 07/17/2021     07/17/2021    CO2 25 07/17/2021    BUN 11 07/17/2021    CREATININE 0.85 07/17/2021    LABPROT 5.4 (L) 10/08/2012    LABALBU 2.9 (L) 01/25/2021    BILITOT 0.66 01/25/2021    ALKPHOS 77 01/25/2021    AST 19 07/17/2021    ALT 8 07/17/2021    INR 1.4 01/23/2021         Lab Results   Component Value Date    RBC 3.28 (L) 07/17/2021    HGB 8.8 (L) 07/17/2021    MCV 84.0 07/17/2021    MCH 26.7 07/17/2021    MCHC 31.8 07/17/2021    RDW 15.3 (H) 07/17/2021    MPV 9.2 07/17/2021    BASOPCT 1 07/17/2021    LYMPHSABS 1.10 07/17/2021    MONOSABS 0.50 07/17/2021    NEUTROABS 2.90 07/17/2021    EOSABS 0.10 07/17/2021    BASOSABS 0.10 07/17/2021         DIAGNOSTIC TESTING:     No results found. Assessment  1. OB + stool    2. Iron deficiency anemia due to chronic blood loss        Plan  At present patient is doing reasonably well. She has anemia. Stool is positive for occult blood. Need to monitor hemoglobin. Need to discuss with cardiology regarding need for anticoagulation therapy. May need sigmoidoscopy to evaluate mucosa at the anastomosis. If she develops weakness, tiredness, dizziness etc. to contact me. To call me regarding hemoglobin results. After above discussion, patient understood and agreed. Thank you for allowing me to participate in the care of Ms. Lama. For any further questions please do not hesitate to contact me. I have reviewed and agree with the ROS entered by the MA/LPN. Note is dictated utilizing voice recognition software. Unfortunately this leads to occasional typographical errors.  Please contact our office if you have any questions        Jaja Galicia MD,FACP, [facility]  Board Certified in Gastroenterology and [address] [place] [address] Gastroenterology  Office #: [phone]

## 2021-08-06 ENCOUNTER — TELEPHONE (OUTPATIENT)
Dept: GASTROENTEROLOGY | Age: 81
End: 2021-08-06

## 2021-08-06 ENCOUNTER — HOSPITAL ENCOUNTER (OUTPATIENT)
Age: 81
Discharge: HOME | End: 2021-08-06
Payer: MEDICARE

## 2021-08-06 LAB
ABSOLUTE RETIC #: 0.05 M/UL (ref 0.02–0.1)
FOLATE: 17.6 NG/ML
HCT VFR BLD CALC: 24.8 % (ref 36–46)
HEMOGLOBIN: 7.7 G/DL (ref 12–16)
IMMATURE RETIC FRACT: NORMAL %
IRON: 19 UG/DL (ref 37–145)
MCH RBC QN AUTO: 25.4 PG (ref 26–34)
MCHC RBC AUTO-ENTMCNC: 31.1 G/DL (ref 31–37)
MCV RBC AUTO: 81.6 FL (ref 80–100)
NRBC AUTOMATED: ABNORMAL PER 100 WBC
PDW BLD-RTO: 16.4 % (ref 11.5–14.9)
PLATELET # BLD: 146 K/UL (ref 150–450)
PMV BLD AUTO: 8.8 FL (ref 6–12)
RBC # BLD: 3.04 M/UL (ref 4–5.2)
RETIC %: 1.6 % (ref 0.5–2)
RETIC HEMOGLOBIN: NORMAL PG (ref 28.2–35.7)
VITAMIN B-12: 294 PG/ML (ref 232–1245)
WBC # BLD: 3.2 K/UL (ref 3.5–11)

## 2021-08-06 PROCEDURE — 36415 COLL VENOUS BLD VENIPUNCTURE: CPT

## 2021-08-06 PROCEDURE — 85045 AUTOMATED RETICULOCYTE COUNT: CPT

## 2021-08-06 PROCEDURE — 83540 ASSAY OF IRON: CPT

## 2021-08-06 PROCEDURE — 82607 VITAMIN B-12: CPT

## 2021-08-06 PROCEDURE — 85027 COMPLETE CBC AUTOMATED: CPT

## 2021-08-06 PROCEDURE — 82746 ASSAY OF FOLIC ACID SERUM: CPT

## 2021-08-09 NOTE — TELEPHONE ENCOUNTER
Feliciar spoke with doctor on 8/6/21 and stated that the patient called and stated she had her labs completed. Writer is waiting for doctor's recommendations. Doctor is on call 8/9/21 - 8/13/21, therefore writer will try to contact doctor for the recommendations and call the patient.

## 2021-08-26 ENCOUNTER — HOSPITAL ENCOUNTER (EMERGENCY)
Age: 81
Discharge: HOME | End: 2021-08-26
Attending: EMERGENCY MEDICINE
Payer: MEDICARE

## 2021-08-26 VITALS
WEIGHT: 193 LBS | SYSTOLIC BLOOD PRESSURE: 135 MMHG | HEART RATE: 80 BPM | RESPIRATION RATE: 18 BRPM | DIASTOLIC BLOOD PRESSURE: 49 MMHG | HEIGHT: 65 IN | TEMPERATURE: 97.9 F | BODY MASS INDEX: 32.15 KG/M2 | OXYGEN SATURATION: 96 %

## 2021-08-26 DIAGNOSIS — R19.5 DARK STOOLS: Primary | ICD-10-CM

## 2021-08-26 LAB
ABSOLUTE EOS #: 0.04 K/UL (ref 0–0.4)
ABSOLUTE IMMATURE GRANULOCYTE: ABNORMAL K/UL (ref 0–0.3)
ABSOLUTE LYMPH #: 0.82 K/UL (ref 1–4.8)
ABSOLUTE MONO #: 0.43 K/UL (ref 0.1–1.3)
ANION GAP SERPL CALCULATED.3IONS-SCNC: 9 MMOL/L (ref 9–17)
BASOPHILS # BLD: 1 % (ref 0–2)
BASOPHILS ABSOLUTE: 0.04 K/UL (ref 0–0.2)
BUN BLDV-MCNC: 10 MG/DL (ref 8–23)
BUN/CREAT BLD: ABNORMAL (ref 9–20)
CALCIUM SERPL-MCNC: 8.9 MG/DL (ref 8.6–10.4)
CHLORIDE BLD-SCNC: 102 MMOL/L (ref 98–107)
CO2: 27 MMOL/L (ref 20–31)
CREAT SERPL-MCNC: 0.94 MG/DL (ref 0.5–0.9)
DATE, STOOL #1: NORMAL
DATE, STOOL #2: NORMAL
DATE, STOOL #3: NORMAL
DIFFERENTIAL TYPE: ABNORMAL
EOSINOPHILS RELATIVE PERCENT: 1 % (ref 0–4)
GFR AFRICAN AMERICAN: >60 ML/MIN
GFR NON-AFRICAN AMERICAN: 57 ML/MIN
GFR SERPL CREATININE-BSD FRML MDRD: ABNORMAL ML/MIN/{1.73_M2}
GFR SERPL CREATININE-BSD FRML MDRD: ABNORMAL ML/MIN/{1.73_M2}
GLUCOSE BLD-MCNC: 113 MG/DL (ref 70–99)
HCT VFR BLD CALC: 25.3 % (ref 36–46)
HEMOCCULT SP1 STL QL: NEGATIVE
HEMOCCULT SP2 STL QL: NORMAL
HEMOCCULT SP3 STL QL: NORMAL
HEMOGLOBIN: 7.7 G/DL (ref 12–16)
IMMATURE GRANULOCYTES: ABNORMAL %
INR BLD: 1.3
LYMPHOCYTES # BLD: 21 % (ref 24–44)
MCH RBC QN AUTO: 24.6 PG (ref 26–34)
MCHC RBC AUTO-ENTMCNC: 30.4 G/DL (ref 31–37)
MCV RBC AUTO: 80.9 FL (ref 80–100)
MONOCYTES # BLD: 11 % (ref 1–7)
MORPHOLOGY: ABNORMAL
NRBC AUTOMATED: ABNORMAL PER 100 WBC
PARTIAL THROMBOPLASTIN TIME: 27.6 SEC (ref 24–36)
PDW BLD-RTO: 17.8 % (ref 11.5–14.9)
PLATELET # BLD: 126 K/UL (ref 150–450)
PLATELET ESTIMATE: ABNORMAL
PMV BLD AUTO: 8.4 FL (ref 6–12)
POTASSIUM SERPL-SCNC: 4.1 MMOL/L (ref 3.7–5.3)
PROTHROMBIN TIME: 16.7 SEC (ref 11.8–14.6)
RBC # BLD: 3.12 M/UL (ref 4–5.2)
RBC # BLD: ABNORMAL 10*6/UL
SEG NEUTROPHILS: 66 % (ref 36–66)
SEGMENTED NEUTROPHILS ABSOLUTE COUNT: 2.57 K/UL (ref 1.3–9.1)
SODIUM BLD-SCNC: 138 MMOL/L (ref 135–144)
TIME, STOOL #1: 1645
TIME, STOOL #2: NORMAL
TIME, STOOL #3: NORMAL
WBC # BLD: 3.9 K/UL (ref 3.5–11)
WBC # BLD: ABNORMAL 10*3/UL

## 2021-08-26 PROCEDURE — 85730 THROMBOPLASTIN TIME PARTIAL: CPT

## 2021-08-26 PROCEDURE — 99283 EMERGENCY DEPT VISIT LOW MDM: CPT

## 2021-08-26 PROCEDURE — 82270 OCCULT BLOOD FECES: CPT

## 2021-08-26 PROCEDURE — 85610 PROTHROMBIN TIME: CPT

## 2021-08-26 PROCEDURE — 80048 BASIC METABOLIC PNL TOTAL CA: CPT

## 2021-08-26 PROCEDURE — 36415 COLL VENOUS BLD VENIPUNCTURE: CPT

## 2021-08-26 PROCEDURE — 85025 COMPLETE CBC W/AUTO DIFF WBC: CPT

## 2021-08-26 ASSESSMENT — ENCOUNTER SYMPTOMS
COUGH: 0
ABDOMINAL PAIN: 0
TROUBLE SWALLOWING: 0
WHEEZING: 0
VOMITING: 0
CHOKING: 0
DIARRHEA: 0
EYES NEGATIVE: 1
BACK PAIN: 0
SHORTNESS OF BREATH: 1
ABDOMINAL DISTENTION: 0
APNEA: 0
VOICE CHANGE: 0
BLOOD IN STOOL: 1
NAUSEA: 0

## 2021-08-26 NOTE — ED TRIAGE NOTES
Mode of arrival (squad #, walk in, police, etc) : Walk in        Chief complaint(s): Rectal bleeding        Arrival Note (brief scenario, treatment PTA, etc). : Pt arrives to ED c/o \"black stool. \" Patient states that today she noticed that black stool. Patient denies any abdominal pain. Patient states that she had this happen in January and was admitted and determined to have \"seapage in the colon. \" Patient was taken off her blood thinner at that time. Patient is currently on Eliquis for history of blood clots. C= \"Have you ever felt that you should Cut down on your drinking? \"  No  A= \"Have people Annoyed you by criticizing your drinking? \"  No  G= \"Have you ever felt bad or Guilty about your drinking? \"  No  E= \"Have you ever had a drink as an Eye-opener first thing in the morning to steady your nerves or to help a hangover? \"  No      Deferred []      Reason for deferring: N/A    *If yes to two or more: probable alcohol abuse. *

## 2021-08-26 NOTE — ED PROVIDER NOTES
[address]  Emergency Department Encounter  Emergency Medicine Resident     Pt Name: Sampson Riggins  RQY:[id number]  [place] 1940  Date of evaluation: 8/26/21  PCP:  Vijaya Chacon MD    CHIEF COMPLAINT       Chief Complaint   Patient presents with    Rectal Bleeding       HISTORY OF PRESENT ILLNESS  (Location/Symptom, Timing/Onset, Context/Setting, Quality, Duration, ModifyingFactors, Severity.)      Sampson Riggins is a 79 y.o. female with PMH of A. fib, diverticulosis with secondary colon resection with complications presents the emergency department for 1 day of painless melena. Patient states he had 1 large bowel movement today that was dark black in color. Patient denies any other constitutional symptoms. States that she is no more fatigued than normal, is been no change in appetite, p.o. ins and outs or mental status. Patient states that she had a similar episode to this back in January of this year where she was admitted to the hospital and found to have a small seeping GI bleed which was stopped after being off of the Eliquis which he takes for A. fib. PAST MEDICAL / SURGICAL / SOCIAL /FAMILY HISTORY      has a past medical history of Atrial fibrillation ([address] Chronic back pain, Diverticulosis, GERD (gastroesophageal reflux disease), Hyperlipidemia, Hypertension, Hypothyroidism, Obesity (BMI 30.0-34.9), Onychomycosis, PE (pulmonary embolism), and Type II or unspecified type diabetes mellitus without mention of complication, not stated as uncontrolled. No other pertinent PMH on review with patient/guardian. has a past surgical history that includes Colon surgery; Vena Cava Filter Placement; Cholecystectomy; Hysterectomy; Tympanostomy tube placement (Bilateral, 09/20/2019); and Colonoscopy (N/A, 1/25/2021). No other pertinent PSH on review with patient/guardian. Social History     Socioeconomic History    Marital status:       Spouse name: Not on file    Number of children: Not on file    Years of education: Not on file    Highest education level: Not on file   Occupational History    Not on file   Tobacco Use    Smoking status: Never Smoker    Smokeless tobacco: Never Used   Vaping Use    Vaping Use: Never used   Substance and Sexual Activity    Alcohol use: No    Drug use: No    Sexual activity: Not on file   Other Topics Concern    Not on file   Social History Narrative    Not on file     Social Determinants of Health     Financial Resource Strain: Low Risk     Difficulty of Paying Living Expenses: Not hard at all   Food Insecurity: No Food Insecurity    Worried About [address] [employer] in the Last Year: Never true    [address] [employer]  [employer] in the Last Year: Never true   Transportation Needs:     Lack of Transportation (Medical):  Lack of Transportation (Non-Medical):    Physical Activity:     Days of Exercise per Week:     Minutes of Exercise per Session:    Stress:     Feeling of Stress :    Social Connections:     Frequency of Communication with Friends and Family:     Frequency of Social Gatherings with Friends and Family:     Attends Mandaeism Services:     Active Member of Clubs or Organizations:     Attends Club or Organization Meetings:     Marital Status:    Intimate Partner Violence:     Fear of Current or Ex-Partner:     Emotionally Abused:     Physically Abused:     Sexually Abused:        I counseled the patient against using tobacco products. Family History   Adopted: Yes   Problem Relation Age of Onset    No Known Problems Mother     No Known Problems Father      No other pertinent FamHx on review with patient/guardian. Allergies:   Avapro [irbesartan], Ciprofloxacin hcl, Cozaar [losartan potassium], Diovan [valsartan], Lipitor [atorvastatin calcium], Lisinopril, Pcn [penicillins], Pravachol [pravastatin sodium], Tape [adhesive tape], Tramadol, Zetia [ezetimibe], and Morphine    Home Medications:  Prior to Admission medications    Medication Sig Start Date End Date Taking? Authorizing Provider   atenolol (TENORMIN) 100 MG tablet TAKE 1 TABLET BY MOUTH EVERY DAY 6/21/21   Sariah Treviño MD   levothyroxine (SYNTHROID) 100 MCG tablet TAKE 1 TABLET BY MOUTH EVERY DAY 6/21/21   Sariah Treviño MD   doxazosin (CARDURA) 4 MG tablet Take 1 tablet by mouth daily 5/21/21   Sariah Treviño MD   apixaban (ELIQUIS) 5 MG TABS tablet Take 5 mg by mouth 2 times daily 2/2/21   Historical Provider, MD   ofloxacin (FLOXIN) 0.3 % otic solution Place 4 drops into both ears 2 times daily    Historical Provider, MD   Lactobacillus (ACIDOPHILUS/PECTIN PO) Take 100 mg by mouth daily    Historical Provider, MD   RHOPRESSA 0.02 % SOLN INSTILL 1 DROP INTO AFFECTED EYE(S) BY OPHTHALMIC ROUTE ONCE DAILY IN THE EVENING 7/24/20   Historical Provider, MD   timolol (TIMOPTIC) 0.5 % ophthalmic solution ONE DROP TWICE A DAY INTO [facility] EYE 8/3/20   Historical Provider, MD   dorzolamide (TRUSOPT) 2 % ophthalmic solution INSTILL 1 DROP BY OPHTHALMIC ROUTE 2 TIMES EVERY DAY IN BOTH EYES 11/9/18   Historical Provider, MD   famotidine (PEPCID) 40 MG tablet Take 1 tablet by mouth daily 10/22/18   Sariah Treviño MD   NONFORMULARY Indications: Advanced anti-oxidant w/echinacea and garlic    Historical Provider, MD   Cholecalciferol (VITAMIN D3) 5000 UNITS TABS Take 1 tablet by mouth daily    Historical Provider, MD   Cranberry 450 MG CAPS Take 450 mg by mouth daily     Historical Provider, MD   cholestyramine Delos Galdino) 4 G packet Take 1 packet by mouth every evening  4/14/16   Historical Provider, MD   latanoprost (XALATAN) 0.005 % ophthalmic solution Place 1 drop into both eyes nightly    Historical Provider, MD       REVIEW OF SYSTEMS    (2-9 systems for level 4, 10 ormore for level 5)      Review of Systems   Constitutional: Negative for activity change, appetite change, diaphoresis and fatigue.    HENT: Negative for congestion, trouble swallowing and voice change. Eyes: Negative. Respiratory: Positive for shortness of breath (Baseline). Negative for apnea, cough, choking and wheezing. Cardiovascular: Negative. Gastrointestinal: Positive for blood in stool. Negative for abdominal distention, abdominal pain, diarrhea, nausea and vomiting. Genitourinary: Negative. Musculoskeletal: Negative for arthralgias, back pain, neck pain and neck stiffness. Skin: Negative. Neurological: Negative. Psychiatric/Behavioral: Negative. PHYSICAL EXAM   (up to 7 for level 4, 8 or more for level 5)      INITIAL VITALS:   BP (!) 135/49   Pulse 80   Temp 97.9 °F (36.6 °C) (Oral)   Resp 18   Ht 5' 5\" (1.651 m)   Wt 193 lb (87.5 kg)   SpO2 96%   BMI 32.12 kg/m²     Physical Exam  Vitals reviewed. Constitutional:       General: She is not in acute distress. Appearance: Normal appearance. HENT:      Head: Normocephalic and atraumatic. Nose: Nose normal.      Mouth/Throat:      Mouth: Mucous membranes are moist.      Pharynx: Oropharynx is clear. Eyes:      Extraocular Movements: Extraocular movements intact. Conjunctiva/sclera: Conjunctivae normal.      Pupils: Pupils are equal, round, and reactive to light. Cardiovascular:      Rate and Rhythm: Normal rate and regular rhythm. Pulses: Normal pulses. Heart sounds: Normal heart sounds. Pulmonary:      Effort: Pulmonary effort is normal.      Breath sounds: Normal breath sounds. Abdominal:      General: Abdomen is flat. Bowel sounds are normal.      Palpations: Abdomen is soft. Musculoskeletal:         General: Normal range of motion. Cervical back: Normal range of motion and neck supple. Skin:     General: Skin is warm and dry. Capillary Refill: Capillary refill takes less than 2 seconds. Neurological:      General: No focal deficit present. Mental Status: She is alert. Mental status is at baseline.    Psychiatric:         Mood and Affect: Mood normal.         DIFFERENTIAL  DIAGNOSIS     DDX: Upper GI bleed, lower GI bleed, erroneously seen black stool    PLAN (LABS / IMAGING / EKG):  Orders Placed This Encounter   Procedures    CBC Auto Differential    Basic Metabolic Panel w/ Reflex to MG    Protime-INR    APTT    OCCULT BLOOD SCREEN       MEDICATIONS ORDERED:  No orders of the defined types were placed in this encounter.           DIAGNOSTIC RESULTS / EMERGENCY DEPARTMENT COURSE / MDM     LABS:  Results for orders placed or performed during the hospital encounter of 08/26/21   CBC Auto Differential   Result Value Ref Range    WBC 3.9 3.5 - 11.0 k/uL    RBC 3.12 (L) 4.0 - 5.2 m/uL    Hemoglobin 7.7 (L) 12.0 - 16.0 g/dL    Hematocrit 25.3 (L) 36 - 46 %    MCV 80.9 80 - 100 fL    MCH 24.6 (L) 26 - 34 pg    MCHC 30.4 (L) 31 - 37 g/dL    RDW 17.8 (H) 11.5 - 14.9 %    Platelets 875 (L) 236 - 450 k/uL    MPV 8.4 6.0 - 12.0 fL    NRBC Automated NOT REPORTED per 100 WBC    Differential Type NOT REPORTED     Seg Neutrophils PENDING %    Lymphocytes PENDING %    Monocytes PENDING %    Eosinophils % PENDING %    Basophils PENDING %    Immature Granulocytes NOT REPORTED 0 %    Segs Absolute PENDING k/uL    Absolute Lymph # PENDING k/uL    Absolute Mono # PENDING k/uL    Absolute Eos # PENDING k/uL    Basophils Absolute PENDING 0.0 - 0.2 k/uL    Absolute Immature Granulocyte NOT REPORTED 0.00 - 0.30 k/uL    WBC Morphology NOT REPORTED     RBC Morphology NOT REPORTED     Platelet Estimate NOT REPORTED    Basic Metabolic Panel w/ Reflex to MG   Result Value Ref Range    Glucose 113 (H) 70 - 99 mg/dL    BUN 10 8 - 23 mg/dL    CREATININE 0.94 (H) 0.50 - 0.90 mg/dL    Bun/Cre Ratio NOT REPORTED 9 - 20    Calcium 8.9 8.6 - 10.4 mg/dL    Sodium 138 135 - 144 mmol/L    Potassium 4.1 3.7 - 5.3 mmol/L    Chloride 102 98 - 107 mmol/L    CO2 27 20 - 31 mmol/L    Anion Gap 9 9 - 17 mmol/L    GFR Non-African American 57 (L) >60 mL/min    GFR African American >60 >60 mL/min GFR Comment          GFR Staging NOT REPORTED    Protime-INR   Result Value Ref Range    Protime 16.7 (H) 11.8 - 14.6 sec    INR 1.3    APTT   Result Value Ref Range    PTT 27.6 24.0 - 36.0 sec   OCCULT BLOOD SCREEN   Result Value Ref Range    Occult Blood, Stool #1 NEGATIVE NEGATIVE    Date, Stool #1 82,621     Time, Stool #1 1,645     Occult Blood, Stool #2 NOT REPORTED NEGATIVE    Date, Stool #2 NOT REPORTED     Time, Stool #2 NOT REPORTED     Occult Blood, Stool #3 NOT REPORTED NEGATIVE    Date, Stool #3 NOT REPORTED     Time, Stool #3 NOT REPORTED          IMPRESSION/MDM/ED COURSE:  79 y.o. female presented with 1 day of painless melena reported by the patient. Patient has history of similar episode back in January of this year which was a problem secondary to her Eliquis use. Will obtain CBC, BMP, PT/INR and fecal occult blood screening test.  If patient is positive for fecal occult blood, will give Protonix as well as admit to the hospital, if negative will have patient follow-up with PCP in the outpatient setting. ED Course as of Aug 26 1853   Thu Aug 26, 2021   1726 Patient's fecal occult blood test is negative for acute findings for blood. [ES]   6970 Hemoglobin Quant(!): 7.7 [ES]   1519 Patient's hemoglobin is unchanged since her prior scans. Labs are within her normal limits and unchanged from normal in prior. Will discharge at this time home to have her follow-up with PCP at her convenience. [ES]      ED Course User Index  [ES] Matty Montenegro MD        Patient/Guardian requesting discharge. Patient/Guardian was given written and verbal instructions prior to discharge. Patient/Guardian understood and agreed. Patient/Guardian had no further questions.        RADIOLOGY:  No orders to display         EKG  None    All EKG's are interpreted by the Emergency Department Physician who either signs or Co-signs this chart in the absence of a cardiologist.      PROCEDURES:  None    CONSULTS:  None        FINAL IMPRESSION      1.  Dark stools          DISPOSITION / PLAN     DISPOSITION        PATIENT REFERREDTO:  MD Dwayne Sanchez [address]  [address] [address]  [phone]    Call       [facility] ED  Dwayne Hackett [address]  [address] [facility]  [phone]    As needed, If symptoms worsen      DISCHARGE MEDICATIONS:  New Prescriptions    No medications on file       Becky Marcelino MD  PGY 2  Resident Physician Emergency Medicine  08/26/21 6:53 PM        (Please note that portions of this note were completed with a voice recognition program.Efforts were made to edit the dictations but occasionally words are mis-transcribed.)       Becky Marcelino MD  Resident  08/26/21 1992

## 2021-08-26 NOTE — ED PROVIDER NOTES
EMERGENCY DEPARTMENT ENCOUNTER   ATTENDING ATTESTATION     Pt Name: Sampson Riggins  MRN: [id number]  [place] 1940  Date of evaluation: 8/26/21       Sampson Riggins is a 79 y.o. female who presents with Rectal Bleeding      MDM:   Had an episode of dark stools today. No red stools, no hematemesis. Came here to get checked out bc she's had gi bleeds in past, not sure. Abd soft nt. Vitals:   Vitals:    08/26/21 1617   BP: (!) 135/49   Pulse: 80   Resp: 18   Temp: 97.9 °F (36.6 °C)   TempSrc: Oral   SpO2: 96%   Weight: 193 lb (87.5 kg)   Height: 5' 5\" (1.651 m)         I personally evaluated and examined the patient in conjunction with the resident and agree with the assessment, treatment plan, and disposition of the patient as recorded by the resident. I performed a history and physical examination of the patient and discussed management with the resident. I reviewed the residents note and agree with the documented findings and plan of care. Any areas of disagreement are noted on the chart. I was personally present for the key portions of any procedures. I have documented in the chart those procedures where I was not present during the key portions. I have personally reviewed all images and agree with the resident's interpretation. I have reviewed the emergency nurses triage note. I agree with the chief complaint, past medical history, past surgical history, allergies, medications, social and family history as documented unless otherwise noted.     Anne Marie Rodriguez MD  Attending Emergency Physician           Anne Marie Rodriguez MD  08/26/21 1800

## 2021-09-20 RX ORDER — DOXAZOSIN MESYLATE 4 MG/1
TABLET ORAL
Qty: 90 TABLET | Refills: 0 | Status: SHIPPED | OUTPATIENT
Start: 2021-09-20 | End: 2022-01-06

## 2021-09-20 RX ORDER — ATENOLOL 100 MG/1
TABLET ORAL
Qty: 90 TABLET | Refills: 0 | Status: SHIPPED | OUTPATIENT
Start: 2021-09-20 | End: 2022-01-10

## 2021-11-29 ENCOUNTER — HOSPITAL ENCOUNTER (INPATIENT)
Age: 81
LOS: 8 days | Discharge: HOME HEALTH | DRG: 377 | End: 2021-12-07
Attending: EMERGENCY MEDICINE | Admitting: FAMILY MEDICINE
Payer: MEDICARE

## 2021-11-29 ENCOUNTER — APPOINTMENT (OUTPATIENT)
Dept: GENERAL RADIOLOGY | Age: 81
DRG: 377 | End: 2021-11-29
Payer: MEDICARE

## 2021-11-29 ENCOUNTER — APPOINTMENT (OUTPATIENT)
Dept: CT IMAGING | Age: 81
DRG: 377 | End: 2021-11-29
Payer: MEDICARE

## 2021-11-29 ENCOUNTER — TELEPHONE (OUTPATIENT)
Dept: FAMILY MEDICINE CLINIC | Age: 81
End: 2021-11-29

## 2021-11-29 DIAGNOSIS — R11.2 INTRACTABLE NAUSEA AND VOMITING: Primary | ICD-10-CM

## 2021-11-29 LAB
ABSOLUTE EOS #: 0 K/UL (ref 0–0.4)
ABSOLUTE IMMATURE GRANULOCYTE: ABNORMAL K/UL (ref 0–0.3)
ABSOLUTE LYMPH #: 1.1 K/UL (ref 1–4.8)
ABSOLUTE MONO #: 0.7 K/UL (ref 0.1–1.3)
ALBUMIN SERPL-MCNC: 3.5 G/DL (ref 3.5–5.2)
ALBUMIN/GLOBULIN RATIO: ABNORMAL (ref 1–2.5)
ALP BLD-CCNC: 66 U/L (ref 35–104)
ALT SERPL-CCNC: 8 U/L (ref 5–33)
ANION GAP SERPL CALCULATED.3IONS-SCNC: 12 MMOL/L (ref 9–17)
AST SERPL-CCNC: 28 U/L
BASOPHILS # BLD: 1 % (ref 0–2)
BASOPHILS ABSOLUTE: 0.1 K/UL (ref 0–0.2)
BILIRUB SERPL-MCNC: 0.6 MG/DL (ref 0.3–1.2)
BUN BLDV-MCNC: 10 MG/DL (ref 8–23)
BUN/CREAT BLD: ABNORMAL (ref 9–20)
C-REACTIVE PROTEIN: <3 MG/L (ref 0–5)
CALCIUM SERPL-MCNC: 8.7 MG/DL (ref 8.6–10.4)
CHLORIDE BLD-SCNC: 95 MMOL/L (ref 98–107)
CO2: 22 MMOL/L (ref 20–31)
CREAT SERPL-MCNC: 0.74 MG/DL (ref 0.5–0.9)
D-DIMER QUANTITATIVE: 2.34 MG/L FEU (ref 0–0.59)
DIFFERENTIAL TYPE: ABNORMAL
EOSINOPHILS RELATIVE PERCENT: 1 % (ref 0–4)
GFR AFRICAN AMERICAN: >60 ML/MIN
GFR NON-AFRICAN AMERICAN: >60 ML/MIN
GFR SERPL CREATININE-BSD FRML MDRD: ABNORMAL ML/MIN/{1.73_M2}
GFR SERPL CREATININE-BSD FRML MDRD: ABNORMAL ML/MIN/{1.73_M2}
GLUCOSE BLD-MCNC: 103 MG/DL (ref 70–99)
HCT VFR BLD CALC: 23.5 % (ref 36–46)
HEMOGLOBIN: 7.2 G/DL (ref 12–16)
IMMATURE GRANULOCYTES: ABNORMAL %
INR BLD: 1.2
LACTIC ACID: 0.9 MMOL/L (ref 0.5–2.2)
LIPASE: 21 U/L (ref 13–60)
LYMPHOCYTES # BLD: 21 % (ref 24–44)
MCH RBC QN AUTO: 22 PG (ref 26–34)
MCHC RBC AUTO-ENTMCNC: 30.5 G/DL (ref 31–37)
MCV RBC AUTO: 72.1 FL (ref 80–100)
MONOCYTES # BLD: 13 % (ref 1–7)
NRBC AUTOMATED: ABNORMAL PER 100 WBC
PARTIAL THROMBOPLASTIN TIME: 28 SEC (ref 24–36)
PDW BLD-RTO: 19.1 % (ref 11.5–14.9)
PLATELET # BLD: 133 K/UL (ref 150–450)
PLATELET ESTIMATE: ABNORMAL
PMV BLD AUTO: 8.6 FL (ref 6–12)
POTASSIUM SERPL-SCNC: 4.6 MMOL/L (ref 3.7–5.3)
PROTHROMBIN TIME: 15.2 SEC (ref 11.8–14.6)
RBC # BLD: 3.26 M/UL (ref 4–5.2)
RBC # BLD: ABNORMAL 10*6/UL
SARS-COV-2, RAPID: NOT DETECTED
SEDIMENTATION RATE, ERYTHROCYTE: 7 MM (ref 0–30)
SEG NEUTROPHILS: 64 % (ref 36–66)
SEGMENTED NEUTROPHILS ABSOLUTE COUNT: 3.2 K/UL (ref 1.3–9.1)
SODIUM BLD-SCNC: 129 MMOL/L (ref 135–144)
SPECIMEN DESCRIPTION: NORMAL
TOTAL PROTEIN: 6.6 G/DL (ref 6.4–8.3)
TROPONIN INTERP: NORMAL
TROPONIN INTERP: NORMAL
TROPONIN T: NORMAL NG/ML
TROPONIN T: NORMAL NG/ML
TROPONIN, HIGH SENSITIVITY: 10 NG/L (ref 0–14)
TROPONIN, HIGH SENSITIVITY: 10 NG/L (ref 0–14)
WBC # BLD: 5.1 K/UL (ref 3.5–11)
WBC # BLD: ABNORMAL 10*3/UL

## 2021-11-29 PROCEDURE — 86140 C-REACTIVE PROTEIN: CPT

## 2021-11-29 PROCEDURE — U0005 INFEC AGEN DETEC AMPLI PROBE: HCPCS

## 2021-11-29 PROCEDURE — U0003 INFECTIOUS AGENT DETECTION BY NUCLEIC ACID (DNA OR RNA); SEVERE ACUTE RESPIRATORY SYNDROME CORONAVIRUS 2 (SARS-COV-2) (CORONAVIRUS DISEASE [COVID-19]), AMPLIFIED PROBE TECHNIQUE, MAKING USE OF HIGH THROUGHPUT TECHNOLOGIES AS DESCRIBED BY CMS-2020-01-R: HCPCS

## 2021-11-29 PROCEDURE — 80053 COMPREHEN METABOLIC PANEL: CPT

## 2021-11-29 PROCEDURE — 93005 ELECTROCARDIOGRAM TRACING: CPT | Performed by: EMERGENCY MEDICINE

## 2021-11-29 PROCEDURE — 2000000000 HC ICU R&B

## 2021-11-29 PROCEDURE — 36415 COLL VENOUS BLD VENIPUNCTURE: CPT

## 2021-11-29 PROCEDURE — 85379 FIBRIN DEGRADATION QUANT: CPT

## 2021-11-29 PROCEDURE — 85025 COMPLETE CBC W/AUTO DIFF WBC: CPT

## 2021-11-29 PROCEDURE — 85652 RBC SED RATE AUTOMATED: CPT

## 2021-11-29 PROCEDURE — 2580000003 HC RX 258: Performed by: EMERGENCY MEDICINE

## 2021-11-29 PROCEDURE — 74177 CT ABD & PELVIS W/CONTRAST: CPT

## 2021-11-29 PROCEDURE — 71045 X-RAY EXAM CHEST 1 VIEW: CPT

## 2021-11-29 PROCEDURE — 99285 EMERGENCY DEPT VISIT HI MDM: CPT

## 2021-11-29 PROCEDURE — 87635 SARS-COV-2 COVID-19 AMP PRB: CPT

## 2021-11-29 PROCEDURE — 96361 HYDRATE IV INFUSION ADD-ON: CPT

## 2021-11-29 PROCEDURE — 6360000002 HC RX W HCPCS: Performed by: EMERGENCY MEDICINE

## 2021-11-29 PROCEDURE — 84484 ASSAY OF TROPONIN QUANT: CPT

## 2021-11-29 PROCEDURE — 71260 CT THORAX DX C+: CPT

## 2021-11-29 PROCEDURE — 83605 ASSAY OF LACTIC ACID: CPT

## 2021-11-29 PROCEDURE — 96374 THER/PROPH/DIAG INJ IV PUSH: CPT

## 2021-11-29 PROCEDURE — 85610 PROTHROMBIN TIME: CPT

## 2021-11-29 PROCEDURE — 85730 THROMBOPLASTIN TIME PARTIAL: CPT

## 2021-11-29 PROCEDURE — 6360000004 HC RX CONTRAST MEDICATION: Performed by: EMERGENCY MEDICINE

## 2021-11-29 PROCEDURE — 83690 ASSAY OF LIPASE: CPT

## 2021-11-29 RX ORDER — 0.9 % SODIUM CHLORIDE 0.9 %
1000 INTRAVENOUS SOLUTION INTRAVENOUS ONCE
Status: COMPLETED | OUTPATIENT
Start: 2021-11-29 | End: 2021-11-29

## 2021-11-29 RX ORDER — 0.9 % SODIUM CHLORIDE 0.9 %
80 INTRAVENOUS SOLUTION INTRAVENOUS ONCE
Status: COMPLETED | OUTPATIENT
Start: 2021-11-29 | End: 2021-11-29

## 2021-11-29 RX ORDER — SODIUM CHLORIDE 0.9 % (FLUSH) 0.9 %
10 SYRINGE (ML) INJECTION PRN
Status: DISCONTINUED | OUTPATIENT
Start: 2021-11-29 | End: 2021-11-30 | Stop reason: SDUPTHER

## 2021-11-29 RX ORDER — METOCLOPRAMIDE HYDROCHLORIDE 5 MG/ML
10 INJECTION INTRAMUSCULAR; INTRAVENOUS ONCE
Status: COMPLETED | OUTPATIENT
Start: 2021-11-29 | End: 2021-11-29

## 2021-11-29 RX ORDER — SODIUM CHLORIDE 0.9 % (FLUSH) 0.9 %
5-40 SYRINGE (ML) INJECTION PRN
Status: DISCONTINUED | OUTPATIENT
Start: 2021-11-29 | End: 2021-12-07 | Stop reason: HOSPADM

## 2021-11-29 RX ORDER — ONDANSETRON 2 MG/ML
4 INJECTION INTRAMUSCULAR; INTRAVENOUS EVERY 6 HOURS PRN
Status: DISCONTINUED | OUTPATIENT
Start: 2021-11-29 | End: 2021-12-07 | Stop reason: HOSPADM

## 2021-11-29 RX ORDER — ACETAMINOPHEN 325 MG/1
650 TABLET ORAL EVERY 4 HOURS PRN
Status: DISCONTINUED | OUTPATIENT
Start: 2021-11-29 | End: 2021-12-07 | Stop reason: HOSPADM

## 2021-11-29 RX ORDER — SODIUM CHLORIDE 9 MG/ML
1000 INJECTION, SOLUTION INTRAVENOUS CONTINUOUS
Status: DISCONTINUED | OUTPATIENT
Start: 2021-11-29 | End: 2021-12-03

## 2021-11-29 RX ORDER — ONDANSETRON 4 MG/1
4 TABLET, ORALLY DISINTEGRATING ORAL EVERY 8 HOURS PRN
Status: DISCONTINUED | OUTPATIENT
Start: 2021-11-29 | End: 2021-12-07 | Stop reason: HOSPADM

## 2021-11-29 RX ORDER — SODIUM CHLORIDE 9 MG/ML
25 INJECTION, SOLUTION INTRAVENOUS PRN
Status: DISCONTINUED | OUTPATIENT
Start: 2021-11-29 | End: 2021-12-07 | Stop reason: HOSPADM

## 2021-11-29 RX ORDER — SODIUM CHLORIDE 0.9 % (FLUSH) 0.9 %
5-40 SYRINGE (ML) INJECTION EVERY 12 HOURS SCHEDULED
Status: DISCONTINUED | OUTPATIENT
Start: 2021-11-29 | End: 2021-12-07 | Stop reason: HOSPADM

## 2021-11-29 RX ORDER — ONDANSETRON 2 MG/ML
4 INJECTION INTRAMUSCULAR; INTRAVENOUS ONCE
Status: COMPLETED | OUTPATIENT
Start: 2021-11-29 | End: 2021-11-29

## 2021-11-29 RX ADMIN — SODIUM CHLORIDE 1000 ML: 9 INJECTION, SOLUTION INTRAVENOUS at 18:51

## 2021-11-29 RX ADMIN — SODIUM CHLORIDE 80 ML: 9 INJECTION, SOLUTION INTRAVENOUS at 19:46

## 2021-11-29 RX ADMIN — SODIUM CHLORIDE, PRESERVATIVE FREE 10 ML: 5 INJECTION INTRAVENOUS at 19:46

## 2021-11-29 RX ADMIN — SODIUM CHLORIDE 1000 ML: 9 INJECTION, SOLUTION INTRAVENOUS at 23:46

## 2021-11-29 RX ADMIN — IOPAMIDOL 75 ML: 755 INJECTION, SOLUTION INTRAVENOUS at 19:46

## 2021-11-29 RX ADMIN — ONDANSETRON 4 MG: 2 INJECTION INTRAMUSCULAR; INTRAVENOUS at 18:51

## 2021-11-29 RX ADMIN — METOCLOPRAMIDE 10 MG: 5 INJECTION, SOLUTION INTRAMUSCULAR; INTRAVENOUS at 21:34

## 2021-11-29 ASSESSMENT — ENCOUNTER SYMPTOMS
SHORTNESS OF BREATH: 1
EYE PAIN: 0
DIARRHEA: 1
COUGH: 1
BACK PAIN: 0
COLOR CHANGE: 0
ABDOMINAL PAIN: 1
VOMITING: 1
NAUSEA: 1

## 2021-11-29 NOTE — ED TRIAGE NOTES
Mode of arrival (squad #, walk in, police, etc) : walk in        Chief complaint(s): Emesis        Arrival Note (brief scenario, treatment PTA, etc). : Pt states she has been having dry heaving with emesis since Thursday night. C= \"Have you ever felt that you should Cut down on your drinking? \"  No  A= \"Have people Annoyed you by criticizing your drinking? \"  No  G= \"Have you ever felt bad or Guilty about your drinking? \"  No  E= \"Have you ever had a drink as an Eye-opener first thing in the morning to steady your nerves or to help a hangover? \"  No      Deferred []      Reason for deferring: N/A    *If yes to two or more: probable alcohol abuse. *

## 2021-11-29 NOTE — ED PROVIDER NOTES
EMERGENCY DEPARTMENT ENCOUNTER    Pt Name: Luisana Sarmiento  MRN: [id number]  [place] 1940  Date of evaluation: 11/29/21  CHIEF COMPLAINT       Chief Complaint   Patient presents with    Nausea & Vomiting    Cough     HISTORY OF PRESENT ILLNESS   24-year-old female presents for complaint of nausea vomiting cough and shortness of breath. Patient reports she developed nausea and generalized abdominal pain on Thursday of last week, states that symptoms have gotten progressively worse she has not been able to keep anything down. Patient states she also developed cough with some shortness of breath, states the shortness of breath is worse with exertion, denies any significant sputum production, denies any associated chest pain, patient did have some possible Covid contacts recently. Patient reports she is vaccinated. The history is provided by the patient. REVIEW OF SYSTEMS     Review of Systems   Constitutional: Negative for fever. HENT: Negative for congestion and ear pain. Eyes: Negative for pain. Respiratory: Positive for cough and shortness of breath. Cardiovascular: Negative for chest pain, palpitations and leg swelling. Gastrointestinal: Positive for abdominal pain, diarrhea, nausea and vomiting. Genitourinary: Negative for dysuria and flank pain. Musculoskeletal: Negative for back pain. Skin: Negative for color change. Neurological: Negative for numbness and headaches. Psychiatric/Behavioral: Negative for confusion. All other systems reviewed and are negative. PASTMEDICAL HISTORY     Past Medical History:   Diagnosis Date    Atrial fibrillation ([address] 3/28/2014    Chronic back pain     with rt. leg pain    Diverticulosis     GERD (gastroesophageal reflux disease)     Hyperlipidemia     Hypertension     Hypothyroidism     Obesity (BMI 30.0-34. 9) 8/12/2016    Onychomycosis     PE (pulmonary embolism)     H/O DVT of rt leg    Type II or unspecified type diabetes mellitus without mention of complication, not stated as uncontrolled      Past Problem List  Patient Active Problem List   Diagnosis Code    Mixed hyperlipidemia E78.2    Gastroesophageal reflux disease without esophagitis K21.9    Onychomycosis B35.1    Diverticulosis K57.90    Chronic back pain M54.9, G89.29    Hypothyroidism E03.9    Essential hypertension I10    Right bundle branch block I45.10    Chronic pulmonary embolism (HCC) I27.82    Type 2 diabetes mellitus without complication, without long-term current use of insulin (HCC) E11.9    Obesity (BMI 30.0-34. 9) E66.9    Tracheobronchitis J40    Allergic rhinitis J30.9    Glaucoma H40.9    S/p bilateral myringotomy with tube placement Z96.22    GI bleed K92.2    Iron deficiency anemia D50.9    Paroxysmal atrial fibrillation (HCC) I48.0    Hemorrhage of rectum and anus K62.5    Intractable nausea and vomiting R11.2     SURGICAL HISTORY       Past Surgical History:   Procedure Laterality Date    CHOLECYSTECTOMY      COLON SURGERY      s/p sigmoid cloectomy    COLONOSCOPY N/A 1/25/2021    COLONOSCOPY DIAGNOSTIC performed by Davina Pandey MD at [address] [facility] TYMPANOSTOMY TUBE PLACEMENT Bilateral 09/20/2019    DR IMAN GAUTHIER    VENA CAVA FILTER PLACEMENT      s/p     CURRENT MEDICATIONS       Previous Medications    APIXABAN (ELIQUIS) 5 MG TABS TABLET    Take 5 mg by mouth 2 times daily    ATENOLOL (TENORMIN) 100 MG TABLET    TAKE 1 TABLET BY MOUTH EVERY DAY    CHOLECALCIFEROL (VITAMIN D3) 5000 UNITS TABS    Take 1 tablet by mouth daily    CHOLESTYRAMINE (QUESTRAN) 4 G PACKET    Take 1 packet by mouth every evening     CRANBERRY 450 MG CAPS    Take 450 mg by mouth daily     DORZOLAMIDE (TRUSOPT) 2 % OPHTHALMIC SOLUTION    INSTILL 1 DROP BY OPHTHALMIC ROUTE 2 TIMES EVERY DAY IN BOTH EYES    DOXAZOSIN (CARDURA) 4 MG TABLET    TAKE 1 TABLET BY MOUTH EVERY DAY     FAMOTIDINE (PEPCID) 40 MG TABLET    Take 1 tablet breath sounds. Abdominal:      General: Bowel sounds are normal. There is no distension. Palpations: Abdomen is soft. Tenderness: There is generalized abdominal tenderness. Musculoskeletal:         General: Normal range of motion. Cervical back: Normal range of motion. Skin:     General: Skin is warm and dry. Capillary Refill: Capillary refill takes less than 2 seconds. Neurological:      General: No focal deficit present. Mental Status: She is alert. Psychiatric:         Mood and Affect: Mood normal.         MEDICAL DECISION MAKIN-year-old female presents with complaint of nausea vomiting cough and abdominal pain. On initial exam patient no acute distress vitals are stable, does have generalized abdominal tenderness on exam, symptoms are concerning for possible Covid infection, patient also does have a history of prior diverticulitis, will check labs CT abdomen pelvis    Labs reviewed patient was noted have elevation of her D-dimer at 2.3, will check CT chest, sodium was noted to be 129 chloride of 93 likely consistent with patient nausea and vomiting concern for dehydration    We will start patient on IV fluids    Patient reevaluated still with nausea, will provide repeat dosing    Given patient's persistent nausea and mild hyponatremia, will admit for further symptom management        Spoke with Dr. Viral Nash who accepts admission. Patient demonstrates understanding and agreement with the plan, was given the opportunity to ask questions, and these questions were answered to the best of the provided information at this time. VS stable for transfer. This dictation was prepared using [employer] voice recognition software.            CRITICAL CARE:       PROCEDURES:    Procedures    DIAGNOSTIC RESULTS   EKG:All EKG's are interpreted by the Emergency Department Physician who either signs or Co-signs this chart in the absence of a cardiologist.    Sinus rhythm rate of 68, normal axis, no significant ST segment elevation or depression, nonspecific T wave changes, right bundle branch block    RADIOLOGY:All plain film, CT, MRI, and formal ultrasound images (except ED bedside ultrasound) are read by the radiologist, see reports below, unless otherwisenoted in MDM or here. CT ABDOMEN PELVIS W IV CONTRAST Additional Contrast? None    (Results Pending)   XR CHEST PORTABLE    (Results Pending)   CT CHEST PULMONARY EMBOLISM W CONTRAST    (Results Pending)     LABS: All lab results were reviewed by myself, and all abnormals are listed below.   Labs Reviewed   CBC WITH AUTO DIFFERENTIAL - Abnormal; Notable for the following components:       Result Value    RBC 3.26 (*)     Hemoglobin 7.2 (*)     Hematocrit 23.5 (*)     MCV 72.1 (*)     MCH 22.0 (*)     MCHC 30.5 (*)     RDW 19.1 (*)     Platelets 897 (*)     Lymphocytes 21 (*)     Monocytes 13 (*)     All other components within normal limits   COMPREHENSIVE METABOLIC PANEL W/ REFLEX TO MG FOR LOW K - Abnormal; Notable for the following components:    Glucose 103 (*)     Sodium 129 (*)     Chloride 95 (*)     All other components within normal limits   PROTIME-INR - Abnormal; Notable for the following components:    Protime 15.2 (*)     All other components within normal limits   D-DIMER, QUANTITATIVE - Abnormal; Notable for the following components:    D-Dimer, Quant 2.34 (*)     All other components within normal limits   COVID-19, RAPID   LIPASE   TROPONIN   APTT   C-REACTIVE PROTEIN   LACTIC ACID   SEDIMENTATION RATE       EMERGENCY DEPARTMENTCOURSE:         Vitals:    Vitals:    11/29/21 1406   BP: (!) 107/90   Pulse: 76   Resp: 16   Temp: 98.1 °F (36.7 °C)   SpO2: 95%       The patient was given the following medications while in the emergency department:  Orders Placed This Encounter   Medications    0.9 % sodium chloride bolus    ondansetron (ZOFRAN) injection 4 mg    0.9 % sodium chloride infusion    sodium chloride flush 0.9 % injection 10 mL    0.9 % sodium chloride bolus    iopamidol (ISOVUE-370) 76 % injection 75 mL    metoclopramide (REGLAN) injection 10 mg     CONSULTS:  IP CONSULT TO INTERNAL MEDICINE    FINAL IMPRESSION      1. Intractable nausea and vomiting          DISPOSITION/PLAN   DISPOSITION Admitted 11/29/2021 08:45:02 PM      PATIENT REFERRED TO:  No follow-up provider specified. DISCHARGE MEDICATIONS:  New Prescriptions    No medications on file     The care is provided during an unprecedented national emergency due to the novel coronavirus, COVID 19.   Padmini Flores DO  Attending Emergency Physician                  Padmini Flores DO  11/30/21 111

## 2021-11-29 NOTE — TELEPHONE ENCOUNTER
Patient called stating she has been sick since Thanksgiving [place], cannot eat or drink, coughing, SOB, nauseated. Per Dr. Flora Sweeney, patient should go to the ER to be evaluated. Patient agreed.

## 2021-11-30 LAB
ABSOLUTE EOS #: 0 K/UL (ref 0–0.4)
ABSOLUTE IMMATURE GRANULOCYTE: ABNORMAL K/UL (ref 0–0.3)
ABSOLUTE LYMPH #: 0.63 K/UL (ref 1–4.8)
ABSOLUTE MONO #: 0.54 K/UL (ref 0.1–1.3)
ABSOLUTE RETIC #: 0.1 M/UL (ref 0.02–0.1)
ALBUMIN SERPL-MCNC: 3.4 G/DL (ref 3.5–5.2)
ALBUMIN/GLOBULIN RATIO: ABNORMAL (ref 1–2.5)
ALP BLD-CCNC: 62 U/L (ref 35–104)
ALT SERPL-CCNC: 6 U/L (ref 5–33)
ANION GAP SERPL CALCULATED.3IONS-SCNC: 8 MMOL/L (ref 9–17)
AST SERPL-CCNC: 13 U/L
BASOPHILS # BLD: 1 % (ref 0–2)
BASOPHILS ABSOLUTE: 0.05 K/UL (ref 0–0.2)
BILIRUB SERPL-MCNC: 0.46 MG/DL (ref 0.3–1.2)
BUN BLDV-MCNC: 10 MG/DL (ref 8–23)
BUN/CREAT BLD: ABNORMAL (ref 9–20)
CALCIUM SERPL-MCNC: 8.4 MG/DL (ref 8.6–10.4)
CHLORIDE BLD-SCNC: 100 MMOL/L (ref 98–107)
CO2: 25 MMOL/L (ref 20–31)
CREAT SERPL-MCNC: 0.78 MG/DL (ref 0.5–0.9)
DIFFERENTIAL TYPE: ABNORMAL
EKG ATRIAL RATE: 288 BPM
EKG Q-T INTERVAL: 444 MS
EKG QRS DURATION: 124 MS
EKG QTC CALCULATION (BAZETT): 472 MS
EKG R AXIS: 40 DEGREES
EKG T AXIS: 36 DEGREES
EKG VENTRICULAR RATE: 68 BPM
EOSINOPHILS RELATIVE PERCENT: 0 % (ref 0–4)
GFR AFRICAN AMERICAN: >60 ML/MIN
GFR NON-AFRICAN AMERICAN: >60 ML/MIN
GFR SERPL CREATININE-BSD FRML MDRD: ABNORMAL ML/MIN/{1.73_M2}
GFR SERPL CREATININE-BSD FRML MDRD: ABNORMAL ML/MIN/{1.73_M2}
GLUCOSE BLD-MCNC: 114 MG/DL (ref 70–99)
HCT VFR BLD CALC: 22.5 % (ref 36–46)
HCT VFR BLD CALC: 25.5 % (ref 36–46)
HEMOGLOBIN: 6.8 G/DL (ref 12–16)
HEMOGLOBIN: 7.8 G/DL (ref 12–16)
IMMATURE GRANULOCYTES: ABNORMAL %
IMMATURE RETIC FRACT: ABNORMAL %
IRON SATURATION: 4 % (ref 20–55)
IRON: 20 UG/DL (ref 37–145)
LYMPHOCYTES # BLD: 14 % (ref 24–44)
MCH RBC QN AUTO: 22.5 PG (ref 26–34)
MCHC RBC AUTO-ENTMCNC: 30 G/DL (ref 31–37)
MCV RBC AUTO: 74.9 FL (ref 80–100)
MONOCYTES # BLD: 12 % (ref 1–7)
MORPHOLOGY: ABNORMAL
NRBC AUTOMATED: ABNORMAL PER 100 WBC
PDW BLD-RTO: 19.1 % (ref 11.5–14.9)
PLATELET # BLD: 123 K/UL (ref 150–450)
PLATELET ESTIMATE: ABNORMAL
PMV BLD AUTO: 8.1 FL (ref 6–12)
POTASSIUM SERPL-SCNC: 4.1 MMOL/L (ref 3.7–5.3)
RBC # BLD: 3 M/UL (ref 4–5.2)
RBC # BLD: ABNORMAL 10*6/UL
RETIC %: 3.1 % (ref 0.5–2)
RETIC HEMOGLOBIN: ABNORMAL PG (ref 28.2–35.7)
SARS-COV-2: NORMAL
SARS-COV-2: NOT DETECTED
SEG NEUTROPHILS: 73 % (ref 36–66)
SEGMENTED NEUTROPHILS ABSOLUTE COUNT: 3.28 K/UL (ref 1.3–9.1)
SODIUM BLD-SCNC: 133 MMOL/L (ref 135–144)
SOURCE: NORMAL
TOTAL IRON BINDING CAPACITY: 445 UG/DL (ref 250–450)
TOTAL PROTEIN: 6.2 G/DL (ref 6.4–8.3)
UNSATURATED IRON BINDING CAPACITY: 425 UG/DL (ref 112–347)
WBC # BLD: 4.5 K/UL (ref 3.5–11)
WBC # BLD: ABNORMAL 10*3/UL

## 2021-11-30 PROCEDURE — 85045 AUTOMATED RETICULOCYTE COUNT: CPT

## 2021-11-30 PROCEDURE — 93010 ELECTROCARDIOGRAM REPORT: CPT | Performed by: INTERNAL MEDICINE

## 2021-11-30 PROCEDURE — 6370000000 HC RX 637 (ALT 250 FOR IP): Performed by: FAMILY MEDICINE

## 2021-11-30 PROCEDURE — 83550 IRON BINDING TEST: CPT

## 2021-11-30 PROCEDURE — 86920 COMPATIBILITY TEST SPIN: CPT

## 2021-11-30 PROCEDURE — 82746 ASSAY OF FOLIC ACID SERUM: CPT

## 2021-11-30 PROCEDURE — 82607 VITAMIN B-12: CPT

## 2021-11-30 PROCEDURE — 2580000003 HC RX 258: Performed by: FAMILY MEDICINE

## 2021-11-30 PROCEDURE — 85018 HEMOGLOBIN: CPT

## 2021-11-30 PROCEDURE — 83540 ASSAY OF IRON: CPT

## 2021-11-30 PROCEDURE — 82947 ASSAY GLUCOSE BLOOD QUANT: CPT

## 2021-11-30 PROCEDURE — 36415 COLL VENOUS BLD VENIPUNCTURE: CPT

## 2021-11-30 PROCEDURE — 85014 HEMATOCRIT: CPT

## 2021-11-30 PROCEDURE — 1200000000 HC SEMI PRIVATE

## 2021-11-30 PROCEDURE — 85025 COMPLETE CBC W/AUTO DIFF WBC: CPT

## 2021-11-30 PROCEDURE — 80053 COMPREHEN METABOLIC PANEL: CPT

## 2021-11-30 PROCEDURE — P9016 RBC LEUKOCYTES REDUCED: HCPCS

## 2021-11-30 PROCEDURE — 86900 BLOOD TYPING SEROLOGIC ABO: CPT

## 2021-11-30 PROCEDURE — 86850 RBC ANTIBODY SCREEN: CPT

## 2021-11-30 PROCEDURE — 86901 BLOOD TYPING SEROLOGIC RH(D): CPT

## 2021-11-30 PROCEDURE — 36430 TRANSFUSION BLD/BLD COMPNT: CPT

## 2021-11-30 RX ORDER — POTASSIUM CHLORIDE 7.45 MG/ML
10 INJECTION INTRAVENOUS PRN
Status: DISCONTINUED | OUTPATIENT
Start: 2021-11-30 | End: 2021-12-07 | Stop reason: HOSPADM

## 2021-11-30 RX ORDER — DOXAZOSIN MESYLATE 4 MG/1
4 TABLET ORAL DAILY
Status: DISCONTINUED | OUTPATIENT
Start: 2021-11-30 | End: 2021-12-03

## 2021-11-30 RX ORDER — ATENOLOL 50 MG/1
100 TABLET ORAL DAILY
Status: DISCONTINUED | OUTPATIENT
Start: 2021-11-30 | End: 2021-12-07 | Stop reason: HOSPADM

## 2021-11-30 RX ORDER — CHOLESTYRAMINE 4 G/9G
1 POWDER, FOR SUSPENSION ORAL EVERY EVENING
Status: DISCONTINUED | OUTPATIENT
Start: 2021-11-30 | End: 2021-11-30 | Stop reason: CLARIF

## 2021-11-30 RX ORDER — LEVOTHYROXINE SODIUM 0.1 MG/1
100 TABLET ORAL DAILY
Status: DISCONTINUED | OUTPATIENT
Start: 2021-11-30 | End: 2021-12-07 | Stop reason: HOSPADM

## 2021-11-30 RX ORDER — FAMOTIDINE 20 MG/1
20 TABLET, FILM COATED ORAL 2 TIMES DAILY
Status: DISCONTINUED | OUTPATIENT
Start: 2021-11-30 | End: 2021-12-07

## 2021-11-30 RX ORDER — CHOLESTYRAMINE LIGHT 4 G/5.7G
4 POWDER, FOR SUSPENSION ORAL EVERY EVENING
Status: DISCONTINUED | OUTPATIENT
Start: 2021-11-30 | End: 2021-12-07 | Stop reason: HOSPADM

## 2021-11-30 RX ORDER — TIMOLOL MALEATE 5 MG/ML
1 SOLUTION/ DROPS OPHTHALMIC 2 TIMES DAILY
Status: DISCONTINUED | OUTPATIENT
Start: 2021-11-30 | End: 2021-12-07 | Stop reason: HOSPADM

## 2021-11-30 RX ORDER — LATANOPROST 50 UG/ML
1 SOLUTION/ DROPS OPHTHALMIC NIGHTLY
Status: DISCONTINUED | OUTPATIENT
Start: 2021-11-30 | End: 2021-12-07 | Stop reason: HOSPADM

## 2021-11-30 RX ORDER — POTASSIUM CHLORIDE 20 MEQ/1
40 TABLET, EXTENDED RELEASE ORAL PRN
Status: DISCONTINUED | OUTPATIENT
Start: 2021-11-30 | End: 2021-12-07 | Stop reason: HOSPADM

## 2021-11-30 RX ORDER — SODIUM CHLORIDE 9 MG/ML
INJECTION, SOLUTION INTRAVENOUS PRN
Status: DISCONTINUED | OUTPATIENT
Start: 2021-11-30 | End: 2021-12-07 | Stop reason: HOSPADM

## 2021-11-30 RX ORDER — DORZOLAMIDE HCL 20 MG/ML
1 SOLUTION/ DROPS OPHTHALMIC 3 TIMES DAILY
Status: DISCONTINUED | OUTPATIENT
Start: 2021-11-30 | End: 2021-12-07 | Stop reason: HOSPADM

## 2021-11-30 RX ADMIN — SODIUM CHLORIDE, PRESERVATIVE FREE 10 ML: 5 INJECTION INTRAVENOUS at 19:19

## 2021-11-30 RX ADMIN — DORZOLAMIDE HYDROCHLORIDE 1 DROP: 20 SOLUTION/ DROPS OPHTHALMIC at 19:17

## 2021-11-30 RX ADMIN — ACETAMINOPHEN 650 MG: 325 TABLET, FILM COATED ORAL at 17:53

## 2021-11-30 RX ADMIN — FAMOTIDINE 20 MG: 20 TABLET ORAL at 19:18

## 2021-11-30 RX ADMIN — CHOLESTYRAMINE 4 G: 4 POWDER, FOR SUSPENSION ORAL at 19:23

## 2021-11-30 RX ADMIN — TIMOLOL MALEATE 1 DROP: 5 SOLUTION OPHTHALMIC at 19:38

## 2021-11-30 RX ADMIN — LATANOPROST 1 DROP: 50 SOLUTION OPHTHALMIC at 21:33

## 2021-11-30 ASSESSMENT — PAIN SCALES - GENERAL: PAINLEVEL_OUTOF10: 5

## 2021-11-30 NOTE — H&P
Hospital Medicine  History and Physical                                                                                                                                                 Full Code    Patient:  Gildardo Martin  MRN: [id number]                                                                                                                                                                     CHIEF COMPLAINT:  Nausea/vomitting    History Obtained From:  patient  PCP: Jackson Blood MD    HISTORY OF PRESENT ILLNESS:   The patient is a 79 y.o. female who presents with h/o of  Nausea and vomiting and some sob, pt was seen in ER,pt also says she was in contact with covid pt, pt has been vaccinated three times with covid pfizer, pt had rapid covid which was negative  And pcr sent out also is negative now. Past Medical History:        Diagnosis Date    Atrial fibrillation ([address] 3/28/2014    Chronic back pain     with rt. leg pain    Diverticulosis     GERD (gastroesophageal reflux disease)     Hyperlipidemia     Hypertension     Hypothyroidism     Obesity (BMI 30.0-34. 9) 8/12/2016    Onychomycosis     PE (pulmonary embolism)     H/O DVT of rt leg    Type II or unspecified type diabetes mellitus without mention of complication, not stated as uncontrolled        Past Surgical History:        Procedure Laterality Date    CHOLECYSTECTOMY      COLON SURGERY      s/p sigmoid cloectomy    COLONOSCOPY N/A 1/25/2021    COLONOSCOPY DIAGNOSTIC performed by Reji Rome MD at [facility] 09/20/2019    DR IMAN GAUTHIER    VENA CAVA FILTER PLACEMENT      s/p       Medications Prior to Admission:    Prior to Admission medications    Medication Sig Start Date End Date Taking?  Authorizing Provider   apixaban (ELIQUIS) 5 MG TABS tablet Take 5 mg by mouth 2 times daily 2/2/21  Yes Historical Provider, MD   Lactobacillus (ACIDOPHILUS/PECTIN PO) Take 100 mg by mouth daily   Yes Historical Provider, MD   atenolol (TENORMIN) 100 MG tablet TAKE 1 TABLET BY MOUTH EVERY DAY 9/20/21   Leoncio Beltran MD   doxazosin (CARDURA) 4 MG tablet TAKE 1 TABLET BY MOUTH EVERY DAY  9/20/21   Leoncio Beltran MD   levothyroxine (SYNTHROID) 100 MCG tablet TAKE 1 TABLET BY MOUTH EVERY DAY 6/21/21   Leoncio Beltran MD   ofloxacin (FLOXIN) 0.3 % otic solution Place 4 drops into both ears 2 times daily    Historical Provider, MD   RHOPRESSA 0.02 % SOLN INSTILL 1 DROP INTO AFFECTED EYE(S) BY OPHTHALMIC ROUTE ONCE DAILY IN THE EVENING 7/24/20   Historical Provider, MD   timolol (TIMOPTIC) 0.5 % ophthalmic solution ONE DROP TWICE A DAY INTO [facility] EYE 8/3/20   Historical Provider, MD   dorzolamide (TRUSOPT) 2 % ophthalmic solution INSTILL 1 DROP BY OPHTHALMIC ROUTE 2 TIMES EVERY DAY IN BOTH EYES 11/9/18   Historical Provider, MD   famotidine (PEPCID) 40 MG tablet Take 1 tablet by mouth daily 10/22/18   Leoncio Beltran MD   NONFORMULARY Indications: Advanced anti-oxidant w/echinacea and garlic    Historical Provider, MD   Cholecalciferol (VITAMIN D3) 5000 UNITS TABS Take 1 tablet by mouth daily    Historical Provider, MD   Cranberry 450 MG CAPS Take 450 mg by mouth daily     Historical Provider, MD   cholestyramine Jannice Lung) 4 G packet Take 1 packet by mouth every evening  4/14/16   Historical Provider, MD   latanoprost (XALATAN) 0.005 % ophthalmic solution Place 1 drop into both eyes nightly    Historical Provider, MD       Current meds  Scheduled Meds:   atenolol  100 mg Oral Daily    dorzolamide  1 drop Both Eyes TID    doxazosin  4 mg Oral Daily    famotidine  20 mg Oral BID    latanoprost  1 drop Both Eyes Nightly    levothyroxine  100 mcg Oral Daily    timolol  1 drop Both Eyes BID    cholestyramine light  4 g Oral QPM    sodium chloride flush  5-40 mL IntraVENous 2 times per day     Continuous Infusions:   sodium chloride      sodium chloride 75 mL/hr at 11/30/21 0651    sodium chloride       PRN Meds:.potassium chloride **OR** potassium alternative oral replacement **OR** potassium chloride, sodium chloride, sodium chloride flush, sodium chloride, acetaminophen, ondansetron **OR** ondansetron    Allergies: Avapro [irbesartan], Ciprofloxacin hcl, Cozaar [losartan potassium], Diovan [valsartan], Lipitor [atorvastatin calcium], Lisinopril, Pcn [penicillins], Pravachol [pravastatin sodium], Tape [adhesive tape], Tramadol, Zetia [ezetimibe], and Morphine    Social History:   TOBACCO:   reports that she has never smoked. She has never used smokeless tobacco.  ETOH:   reports no history of alcohol use.   OCCUPATION:      Family History:       Adopted: Yes   Problem Relation Age of Onset    No Known Problems Mother     No Known Problems Father        REVIEW OF SYSTEMS:  Constitutional:  negative for  fevers, chills, sweats and weight loss  HEENT:  negative for  hearing loss, ear drainage, nasal congestion, snoring, hoarseness and voice change  Neck:  No swollen glands and No h/o goiter or thyroid disease  Cardiac:  negative for  chest pain, dyspnea, palpitations, orthopnea, PND, edema  Respiratory:  Positive  for  dry cough, and some sob  Gastrointestinal:  Positive for abdominal pain diarrhea and n/v  :  negative for frequency, dysuria, urinary incontinence, hesitancy, decreased stream and hematuria  Musculoskeletal:  negative for  myalgias, arthralgias, joint swelling and stiff joints  Neuro:  negative for headaches, dizziness, seizures, memory problems, visual disturbance, weakness and syncope      Physical Exam:    Vitals: BP (!) 137/58   Pulse 88   Temp 97.3 °F (36.3 °C) (Oral)   Resp 16   Ht 5' (1.524 m)   Wt 191 lb 12.8 oz (87 kg)   SpO2 97%   BMI 37.46 kg/m²   General appearance: alert, appears stated age and cooperative  Skin: Skin color, texture, turgor normal. No rashes or lesions  HEENT: Head: Normocephalic, no lesions, without obvious abnormality. Eye: Normal external eye, conjunctiva, lids cornea, BREN  Neck: no adenopathy, no carotid bruit, no JVD, supple, symmetrical, trachea midline and thyroid not enlarged, symmetric, no tenderness/mass/nodules  Lungs: clear to auscultation bilaterally  Heart: regular rate and rhythm, S1, S2 normal, no murmur, click, rub or gallop  Abdomen: soft, non-tender; bowel sounds normal; no masses,  no organomegaly  Extremities: extremities normal, atraumatic, no cyanosis or edema  Neurologic: Mental status: Alert, oriented, thought content appropriate    CBC:   Recent Labs     11/29/21 1835 11/30/21  0645   WBC 5.1 4.5   HGB 7.2* 6.8*   * 123*     BMP:    Recent Labs     11/29/21 1835 11/30/21  0645   * 133*   K 4.6 4.1   CL 95* 100   CO2 22 25   BUN 10 10   CREATININE 0.74 0.78   GLUCOSE 103* 114*     Hepatic:   Recent Labs     11/29/21 1835 11/30/21  0645   AST 28 13   ALT 8 6   BILITOT 0.60 0.46   ALKPHOS 66 62     Troponin: No results for input(s): TROPONINI in the last 72 hours. BNP: No results for input(s): BNP in the last 72 hours. Lipids: No results for input(s): CHOL, HDL in the last 72 hours. Invalid input(s): LDLCALCU  ABGs:   Lab Results   Component Value Date    PO2ART 110.0 10/03/2012     INR:   Recent Labs     11/29/21 1835   INR 1.2     -----------------------------------------------------------------  PA/lat CXR: CT ABDOMEN PELVIS W IV CONTRAST Additional Contrast? None    Result Date: 11/30/2021  EXAMINATION: CT OF THE ABDOMEN AND PELVIS WITH CONTRAST 11/29/2021 4:32 pm TECHNIQUE: CT of the abdomen and pelvis was performed with the administration of intravenous contrast. Multiplanar reformatted images are provided for review. Dose modulation, iterative reconstruction, and/or weight based adjustment of the mA/kV was utilized to reduce the radiation dose to as low as reasonably achievable.  COMPARISON: Concurrent chest CT, CT abdomen/pelvis 01/23/2021 HISTORY: Pain FINDINGS: required. Trace perihepatic and perisplenic ascites with mild nonspecific graying of the pelvic fat, possibly related to volume status or underlying infectious/inflammatory etiology. Postoperative changes of the bowel with patulous anastomoses, but no bowel obstruction. Colonic diverticulosis without diverticulitis. XR CHEST PORTABLE    Result Date: 11/30/2021  EXAMINATION: XRAY CHEST TECHNIQUE: Single-view chest COMPARISON: 12/26/2017, 09/22/2016, 09/19/2016 HISTORY: Cough, shortness of breath FINDINGS: Mild basal predominant pulmonary opacities with some interstitial prominence. Small bilateral pleural effusions. No pneumothorax. Cardiomegaly. No acute bony findings. Mild basal predominant pulmonary opacities with some interstitial changes. Findings may be due to edema or infection in the appropriate clinical setting. Small bilateral pleural effusions. CT CHEST PULMONARY EMBOLISM W CONTRAST    Result Date: 11/30/2021  EXAMINATION: CTA OF THE CHEST, 11/29/2021 4:32 pm TECHNIQUE: CTA of the chest was performed after the administration of intravenous contrast.  Multiplanar reformatted images are provided for review. MIP images are provided for review. Dose modulation, iterative reconstruction, and/or weight based adjustment of the mA/kV was utilized to reduce the radiation dose to as low as reasonably achievable. COMPARISON: Concurrent abdominal CT, CT abdomen pelvis 01/23/2021 HISTORY: Rule out PE FINDINGS: Pulmonary Arteries: Pulmonary arteries are suboptimally opacified for evaluation. No evidence of intraluminal filling defect to suggest pulmonary embolism in the main pulmonary arteries with assessment of the remainder of the pulmonary arterial tree markedly degraded. Main pulmonary artery is upper limits of normal in caliber. Mediastinum: No evidence of mediastinal lymphadenopathy. The heart and pericardium demonstrate no acute abnormality. Cardiomegaly.  Coronary artery and mitral annular calcifications. There is no acute abnormality of the thoracic aorta. Lungs/pleura: Mild faint patchy ground-glass opacities throughout both lungs resulting in mosaic attenuation. 6 mm solid noncalcified nodule in the lateral basal left lower lobe, unchanged from 11 months prior. Small to moderate bilateral layering pleural effusions. No pneumothorax. Bronchial wall thickening. Secretions in the trachea. Upper Abdomen: Abdominal findings are reported separately under same-day CT abdomen and pelvis. Soft Tissues/Bones: No acute bone or soft tissue abnormality. Osseous demineralization and degenerative changes of the shoulders and spine. Suboptimal opacification of the pulmonary arterial tree significantly limiting assessment for pulmonary emboli. No definite thrombus in the main pulmonary arteries. Mild patchy ground-glass opacities throughout both lungs resulting in mosaic attenuation. In the setting of bronchial wall thickening this is favored to be secondary to underlying acute or chronic airways disease. Mild edema or sequelae of viral pneumonia are potential differential considerations. Small to moderate layering bilateral pleural effusions. Cardiomegaly. No pericardial effusion. EKG: Normal Sinus, P waves seen best in V1  Right bundle branch block  Abnormal ECG    Prophylaxis:   DVT with  [] lovenox        [] heparin        [] Scd        [x] none:     Assessment and Plan   1. Nausea/vomitting /sob/ negative for covid by rapid and pcr/will take out isolation  2. Anemia/1 unit of prbc/gi seeing  3.  Nausea/vomitting better    Patient Active Problem List   Diagnosis Code    Mixed hyperlipidemia E78.2    Gastroesophageal reflux disease without esophagitis K21.9    Onychomycosis B35.1    Diverticulosis K57.90    Chronic back pain M54.9, G89.29    Hypothyroidism E03.9    Essential hypertension I10    Right bundle branch block I45.10    Chronic pulmonary embolism (HCC) I27.82    Type 2 diabetes mellitus without complication, without long-term current use of insulin (HCC) E11.9    Obesity (BMI 30.0-34. 9) E66.9    Tracheobronchitis J40    Allergic rhinitis J30.9    Glaucoma H40.9    S/p bilateral myringotomy with tube placement Z96.22    GI bleed K92.2    Iron deficiency anemia D50.9    Paroxysmal atrial fibrillation (HCC) I48.0    Hemorrhage of rectum and anus K62.5    Intractable nausea and vomiting R11.2       Anticipated Disposition upon discharge: [] Home                                                                         [] Home with Home Health                                                                         [] [facility]                                                                         [] [facility] [address]          Patient is admitted as inpatient status because of co-morbidities listed above, severity of signs and symptoms as outlined, requirement for current medical therapies and most importantly because of direct risk to patient if care not provided in a hospital setting.     Ja Wilkes MD, MD  Admitting Hospitalist

## 2021-11-30 NOTE — ED NOTES
Bedside report given to Olena Reinoso, [address]. Lab paged for specimen collection.      Ammy Smalls RN  11/29/21 [id number]

## 2021-11-30 NOTE — PROGRESS NOTES
Patient tolerating blood transfusion.     Electronically signed by Boston Starr RN on 11/30/21 at 5:20 PM EST

## 2021-11-30 NOTE — PROGRESS NOTES
Writer called daughter, Josh Loredo, with an update.     Electronically signed by Carla Walters RN on 11/30/21 at 6:32 PM EST

## 2021-11-30 NOTE — ED NOTES
Report given to Brandon Goldman RN from [facility]. Report method by phone   The following was reviewed with receiving RN:   Current vital signs:  BP (!) 138/56   Pulse 85   Temp 98.1 °F (36.7 °C)   Resp 12   SpO2 98%                MEWS Score: 1     Any medication or safety alerts were reviewed. Any pending diagnostics and notifications were also reviewed, as well as any safety concerns or issues, abnormal labs, abnormal imaging, and abnormal assessment findings.  Questions were answered.     -Hgb 6.8     Anne-Marie Cardoza RN  11/30/21 [id number]

## 2021-11-30 NOTE — PROGRESS NOTES
Patient recieving 1 unit PRBC.     Electronically signed by Hermelinda Tena RN on 11/30/21 at 3:22 PM EST

## 2021-11-30 NOTE — CARE COORDINATION
CASE MANAGEMENT NOTE:    Admission Date:  11/29/2021 Tommy Keating is a 79 y.o.  female    Admitted for : Intractable nausea and vomiting [R11.2]    Met with:  Family son Sim Frye    PCP: [id number]:  medicare      Is patient alert and oriented at time of discussion:  Yes    Current Residence/ Living Arrangements:  independently at home ,alone        Current Services PTA:  No    Does patient go to outpatient dialysis: No  If yes, location and chair time: NA    Is patient agreeable to VNS: No    Freedom of choice provided:  No    List of [address] provided: No    VNS chosen:  No    DME:  straight cane, walker and wheelchair    Home Oxygen: No    Nebulizer: No    CPAP/BIPAP: No    Supplier: N/A    Potential Assistance Needed: No    SNF needed: No    Freedom of choice and list provided: No    Ph[facility]:  [facility]      Does Patient want to use MEDS to BEDS? No    Is patient currently receiving oral anticoagulation therapy? Yes- Eliquis PTA  [facility] Risk Score greater than 14%? Yes  If yes, pt needs a follow up appointment made within 7 days. Family Members/Caregivers that pt would like involved in their care:    Yes    If yes, list name here:  Sim Frye and Geo Glaser children    Transportation Provider:  Family             Discharge Plan:  11/30 MEDICARE COVID rule out PCR negative  11/29 , rapid negative 11/29. From home alone, independent. DME; cane, walker. Pt does not have any services at home and declines at this time. Denies Needs. Son and dtr transport the patient. Pt was on Eliquis PTA. New consult for GI , pt will need flex sigmoid once stable. Pt to get 1 UPRBC today due to Hgb 7.2 on admit. Clear liquids. , ddimer 2.34. Following for needs. [facility] 16% will need appt. //JF                 Electronically signed by: Sasha Handy RN on 11/30/2021 at 2:41 PM

## 2021-11-30 NOTE — PROGRESS NOTES
Patient arrived to room 2024 from ER via bed. Patient alert and oriented. Bed wheels locked, call light within reach.      Electronically signed by Robert Escobar RN on 11/30/21 at 9:57 AM EST

## 2021-11-30 NOTE — PLAN OF CARE
PRE-CONSULT ROUNDING NOTE    HPI    70-year-old female who presents to the ED with complaints of nausea, vomiting, diarrhea, cough, shortness of breath. Patient is currently a Covid rule out and is in isolation. History obtained from RN, chart review. Onset of symptoms about 5 days ago. Reports nausea, generalized abdominal pain that progressively got worse through the weekend. Currently patient is unable to keep anything down. No reported fevers, chills, chest pain. Does have shortness of breath, cough. Reportedly patient did have some possible Covid contacts recently. She was recently vaccinated. On admission hemoglobin 7.2  WBC 5.1  Sodium 129, chloride 95 -likely due to persistent nausea vomiting  D-dimer was elevated 2.34  Rapid Covid was negative however PCR is outstanding. CT abdomen and pelvis revealed periportal edema which is superimposed on the patient's baseline intrahepatic and extrahepatic biliary ductal dilatation in the setting of prior cholecystectomy. May be related to CHF or possible volume resuscitation. Has trace perihepatic and perisplenic ascites. Noted postoperative changes of the bowel with patulous anastomosis without bowel obstruction. Colonic diverticulosis without diverticulitis. Patient was recently seen in our office with history of anemia. Patient has been on anticoagulation therapy with Eliquis. Stool was positive for occult blood in August 2021. Patient had colonoscopy in January 2021 found to have subtotal colectomy with anastomosis at about 40 cm from the anus. Noted to have friable mucosa at the anastomosis. Per RN, she has not had any nausea, vomiting since arriving to the floor. She has had multiple loose stools. No overt GI bleeding. No fevers, chills. No reported abdominal pains.     Review of Systems   Unable to perform ROS: Other       Physical Exam  Due to the current efforts to prevent transmission of COVID-19 and also the need to preserve PPE for other caregivers, a face-to-face encounter with the patient was not performed. That being said, all relevant records and diagnostic tests were reviewed, including laboratory results and imaging. Please reference any relevant documentation elsewhere. 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 Per nursing staff patient is stable without acute GI issues. Await Covid-19 test results. We will follow the patient.

## 2021-12-01 LAB
ALBUMIN SERPL-MCNC: 3.2 G/DL (ref 3.5–5.2)
ALBUMIN/GLOBULIN RATIO: ABNORMAL (ref 1–2.5)
ALP BLD-CCNC: 62 U/L (ref 35–104)
ALT SERPL-CCNC: 7 U/L (ref 5–33)
AST SERPL-CCNC: 15 U/L
BILIRUB SERPL-MCNC: 0.75 MG/DL (ref 0.3–1.2)
BILIRUBIN DIRECT: 0.32 MG/DL
BILIRUBIN, INDIRECT: 0.43 MG/DL (ref 0–1)
C DIFF AG + TOXIN: NEGATIVE
CAMPYLOBACTER PCR: NORMAL
DATE, STOOL #1: NORMAL
DATE, STOOL #2: NORMAL
DATE, STOOL #3: NORMAL
E COLI ENTEROTOXIGENIC PCR: NORMAL
FOLATE: >20 NG/ML
GLOBULIN: ABNORMAL G/DL (ref 1.5–3.8)
GLUCOSE BLD-MCNC: 112 MG/DL (ref 65–105)
GLUCOSE BLD-MCNC: 112 MG/DL (ref 65–105)
HEMOCCULT SP1 STL QL: NEGATIVE
HEMOCCULT SP2 STL QL: NORMAL
HEMOCCULT SP3 STL QL: NORMAL
LIPASE: 16 U/L (ref 13–60)
PLESIOMONAS SHIGELLOIDES PCR: NORMAL
SALMONELLA PCR: NORMAL
SHIGATOXIN GENE PCR: NORMAL
SHIGELLA SP PCR: NORMAL
SPECIMEN DESCRIPTION: NORMAL
SPECIMEN DESCRIPTION: NORMAL
TIME, STOOL #1: NORMAL
TIME, STOOL #2: NORMAL
TIME, STOOL #3: NORMAL
TOTAL PROTEIN: 6 G/DL (ref 6.4–8.3)
VIBRIO PCR: NORMAL
VITAMIN B-12: 269 PG/ML (ref 232–1245)
YERSINIA ENTEROCOLITICA PCR: NORMAL

## 2021-12-01 PROCEDURE — 6370000000 HC RX 637 (ALT 250 FOR IP): Performed by: FAMILY MEDICINE

## 2021-12-01 PROCEDURE — 87324 CLOSTRIDIUM AG IA: CPT

## 2021-12-01 PROCEDURE — 87506 IADNA-DNA/RNA PROBE TQ 6-11: CPT

## 2021-12-01 PROCEDURE — 87449 NOS EACH ORGANISM AG IA: CPT

## 2021-12-01 PROCEDURE — 99222 1ST HOSP IP/OBS MODERATE 55: CPT | Performed by: INTERNAL MEDICINE

## 2021-12-01 PROCEDURE — 82270 OCCULT BLOOD FECES: CPT

## 2021-12-01 PROCEDURE — 83690 ASSAY OF LIPASE: CPT

## 2021-12-01 PROCEDURE — 2580000003 HC RX 258: Performed by: FAMILY MEDICINE

## 2021-12-01 PROCEDURE — 80076 HEPATIC FUNCTION PANEL: CPT

## 2021-12-01 PROCEDURE — 1200000000 HC SEMI PRIVATE

## 2021-12-01 PROCEDURE — 36415 COLL VENOUS BLD VENIPUNCTURE: CPT

## 2021-12-01 PROCEDURE — 2580000003 HC RX 258: Performed by: EMERGENCY MEDICINE

## 2021-12-01 RX ORDER — SODIUM PHOSPHATE, DIBASIC AND SODIUM PHOSPHATE, MONOBASIC 7; 19 G/133ML; G/133ML
2 ENEMA RECTAL ONCE
Status: COMPLETED | OUTPATIENT
Start: 2021-12-02 | End: 2021-12-02

## 2021-12-01 RX ADMIN — TIMOLOL MALEATE 1 DROP: 5 SOLUTION OPHTHALMIC at 07:50

## 2021-12-01 RX ADMIN — ATENOLOL 100 MG: 50 TABLET ORAL at 07:49

## 2021-12-01 RX ADMIN — DORZOLAMIDE HYDROCHLORIDE 1 DROP: 20 SOLUTION/ DROPS OPHTHALMIC at 07:50

## 2021-12-01 RX ADMIN — DOXAZOSIN MESYLATE 4 MG: 4 TABLET ORAL at 07:49

## 2021-12-01 RX ADMIN — FAMOTIDINE 20 MG: 20 TABLET ORAL at 07:49

## 2021-12-01 RX ADMIN — ACETAMINOPHEN 650 MG: 325 TABLET, FILM COATED ORAL at 22:04

## 2021-12-01 RX ADMIN — FAMOTIDINE 20 MG: 20 TABLET ORAL at 19:52

## 2021-12-01 RX ADMIN — LATANOPROST 1 DROP: 50 SOLUTION OPHTHALMIC at 22:49

## 2021-12-01 RX ADMIN — TIMOLOL MALEATE 1 DROP: 5 SOLUTION OPHTHALMIC at 22:49

## 2021-12-01 RX ADMIN — SODIUM CHLORIDE 1000 ML: 9 INJECTION, SOLUTION INTRAVENOUS at 16:54

## 2021-12-01 RX ADMIN — ACETAMINOPHEN 650 MG: 325 TABLET, FILM COATED ORAL at 04:11

## 2021-12-01 RX ADMIN — SODIUM CHLORIDE, PRESERVATIVE FREE 10 ML: 5 INJECTION INTRAVENOUS at 07:50

## 2021-12-01 RX ADMIN — SODIUM CHLORIDE, PRESERVATIVE FREE 10 ML: 5 INJECTION INTRAVENOUS at 22:04

## 2021-12-01 RX ADMIN — DORZOLAMIDE HYDROCHLORIDE 1 DROP: 20 SOLUTION/ DROPS OPHTHALMIC at 21:34

## 2021-12-01 RX ADMIN — LEVOTHYROXINE SODIUM 100 MCG: 0.1 TABLET ORAL at 04:11

## 2021-12-01 ASSESSMENT — PAIN DESCRIPTION - PAIN TYPE
TYPE: ACUTE PAIN
TYPE: CHRONIC PAIN

## 2021-12-01 ASSESSMENT — PAIN DESCRIPTION - LOCATION
LOCATION: HEAD
LOCATION: BACK;SHOULDER

## 2021-12-01 ASSESSMENT — PAIN DESCRIPTION - DESCRIPTORS
DESCRIPTORS: HEADACHE
DESCRIPTORS: ACHING

## 2021-12-01 ASSESSMENT — PAIN SCALES - GENERAL
PAINLEVEL_OUTOF10: 2
PAINLEVEL_OUTOF10: 5
PAINLEVEL_OUTOF10: 0
PAINLEVEL_OUTOF10: 3

## 2021-12-01 NOTE — PLAN OF CARE
Problem: Falls - Risk of:  Goal: Will remain free from falls  Description: Will remain free from falls  Outcome: Ongoing  Note: Patient remains free of incidence/ injury. Bed remains in low position. Up with standby.

## 2021-12-01 NOTE — PROGRESS NOTES
Hospitalist Progress Note  12/1/2021 6:51 PM  Subjective:   Admit Date: 11/29/2021  PCP: Genoveva Walker MD     Full Code      C/c:  Chief Complaint   Patient presents with    Nausea & Vomiting    Cough         Interval History: covid rapid and pcr negative/isolation taken off/pt received 1 unit of prbc still has some n/v    Diet: ADULT DIET; Easy to Chew  Diet NPO                                ip days:2  Medications:   Scheduled Meds:   [START ON 12/2/2021] fleet  2 enema Rectal Once    atenolol  100 mg Oral Daily    dorzolamide  1 drop Both Eyes TID    doxazosin  4 mg Oral Daily    famotidine  20 mg Oral BID    latanoprost  1 drop Both Eyes Nightly    levothyroxine  100 mcg Oral Daily    timolol  1 drop Both Eyes BID    cholestyramine light  4 g Oral QPM    sodium chloride flush  5-40 mL IntraVENous 2 times per day     Continuous Infusions:   sodium chloride      sodium chloride 1,000 mL (12/01/21 1654)    sodium chloride       PRN Meds:.potassium chloride **OR** potassium alternative oral replacement **OR** potassium chloride, sodium chloride, sodium chloride flush, sodium chloride, acetaminophen, ondansetron **OR** ondansetron     CBC:   Recent Labs     11/29/21 1835 11/30/21  0645 11/30/21  1953   WBC 5.1 4.5  --    HGB 7.2* 6.8* 7.8*   * 123*  --      BMP:    Recent Labs     11/29/21 1835 11/30/21  0645   * 133*   K 4.6 4.1   CL 95* 100   CO2 22 25   BUN 10 10   CREATININE 0.74 0.78   GLUCOSE 103* 114*     Hepatic:   Recent Labs     11/29/21 1835 11/30/21  0645 12/01/21  0458   AST 28 13 15   ALT 8 6 7   BILITOT 0.60 0.46 0.75   ALKPHOS 66 62 62     Troponin: No results for input(s): TROPONINI in the last 72 hours. BNP: No results for input(s): BNP in the last 72 hours. Lipids: No results for input(s): CHOL, HDL in the last 72 hours.     Invalid input(s): LDLCALCU  INR:   Recent Labs     11/29/21 1835   INR 1.2       Objective:   Vitals: BP (!) 121/55   Pulse 66   Temp 97.9 °F (36.6 °C)   Resp 18   Ht 5' (1.524 m)   Wt 191 lb 12.8 oz (87 kg)   SpO2 100%   BMI 37.46 kg/m²   General appearance: alert, appears stated age and cooperative  Skin: Skin color, texture, turgor normal. No rashes or lesions  Lungs: clear to auscultation bilaterally  Heart: regular rate and rhythm, S1, S2 normal, no murmur, click, rub or gallop  Abdomen: soft, non-tender; bowel sounds normal; no masses,  no organomegaly  Extremities: extremities normal, atraumatic, no cyanosis or edema  Neurologic: Mental status: Alert, oriented, thought content appropriate    Prophylaxis:   DVT with  [] lovenox        [] heparin        [] Scd        [x] none:     Radiology:  CT ABDOMEN PELVIS W IV CONTRAST Additional Contrast? None    Result Date: 11/30/2021  EXAMINATION: CT OF THE ABDOMEN AND PELVIS WITH CONTRAST 11/29/2021 4:32 pm TECHNIQUE: CT of the abdomen and pelvis was performed with the administration of intravenous contrast. Multiplanar reformatted images are provided for review. Dose modulation, iterative reconstruction, and/or weight based adjustment of the mA/kV was utilized to reduce the radiation dose to as low as reasonably achievable. COMPARISON: Concurrent chest CT, CT abdomen/pelvis 01/23/2021 HISTORY: Pain FINDINGS: Lower Chest: Chest findings are reported separately under today's chest CT report. Organs: Liver is grossly normal in contour and attenuation. Cholecystectomy with intrahepatic and extrahepatic biliary ductal dilatation as well as periportal edema. Spleen is normal caliber. Pancreas is diffusely atrophic. Minimal nonspecific nodularity of the adrenal glands, unchanged. 2 cm cyst at the lower pole left kidney, unchanged. No hydronephrosis. Excreting contrast limits assessment for urolithiasis. GI/Bowel: Postoperative changes of the bowel with multiple partial colonic resections.   Anastomoses in proximal colon and sigmoid colon appear somewhat patulous but without any rapid transition in caliber to suggest associated obstruction. Diverticulosis involving remnant portions of the descending and sigmoid colon without diverticulitis. The small bowel is normal caliber. Pelvis: Hysterectomy. Neither ovary is identified. Urinary bladder is without any wall thickening or inflammatory change. No bladder calcifications. Peritoneum/Retroperitoneum: Mild inflammatory changes pelvic fat, nonspecific, and new from prior. Trace perihepatic and perisplenic ascites. No free air or lymphadenopathy. Infrarenal IVC filter. Mild-to-moderate aortoiliac atherosclerosis without aneurysm. Bones/Soft Tissues: Operative changes along the anterior abdominal wall which appear similar to prior. Advanced degenerative changes of the spine with ankylosis across the L1-L2 and L5-S1 disc spaces, unchanged. Periportal edema which is superimposed on the patient's baseline intrahepatic and extrahepatic biliary ductal dilatation in the setting of prior cholecystectomy. This could be related to congestive heart failure or possibly volume resuscitation as the IVC is somewhat prominent, though other considerations include inflammatory processes within the liver and biliary tree such as hepatitis or cholangitis. Clinical correlation is required. Trace perihepatic and perisplenic ascites with mild nonspecific graying of the pelvic fat, possibly related to volume status or underlying infectious/inflammatory etiology. Postoperative changes of the bowel with patulous anastomoses, but no bowel obstruction. Colonic diverticulosis without diverticulitis. XR CHEST PORTABLE    Result Date: 11/30/2021  EXAMINATION: XRAY CHEST TECHNIQUE: Single-view chest COMPARISON: 12/26/2017, 09/22/2016, 09/19/2016 HISTORY: Cough, shortness of breath FINDINGS: Mild basal predominant pulmonary opacities with some interstitial prominence. Small bilateral pleural effusions. No pneumothorax. Cardiomegaly. No acute bony findings.      Mild basal predominant pulmonary opacities with some interstitial changes. Findings may be due to edema or infection in the appropriate clinical setting. Small bilateral pleural effusions. CT CHEST PULMONARY EMBOLISM W CONTRAST    Result Date: 11/30/2021  EXAMINATION: CTA OF THE CHEST, 11/29/2021 4:32 pm TECHNIQUE: CTA of the chest was performed after the administration of intravenous contrast.  Multiplanar reformatted images are provided for review. MIP images are provided for review. Dose modulation, iterative reconstruction, and/or weight based adjustment of the mA/kV was utilized to reduce the radiation dose to as low as reasonably achievable. COMPARISON: Concurrent abdominal CT, CT abdomen pelvis 01/23/2021 HISTORY: Rule out PE FINDINGS: Pulmonary Arteries: Pulmonary arteries are suboptimally opacified for evaluation. No evidence of intraluminal filling defect to suggest pulmonary embolism in the main pulmonary arteries with assessment of the remainder of the pulmonary arterial tree markedly degraded. Main pulmonary artery is upper limits of normal in caliber. Mediastinum: No evidence of mediastinal lymphadenopathy. The heart and pericardium demonstrate no acute abnormality. Cardiomegaly. Coronary artery and mitral annular calcifications. There is no acute abnormality of the thoracic aorta. Lungs/pleura: Mild faint patchy ground-glass opacities throughout both lungs resulting in mosaic attenuation. 6 mm solid noncalcified nodule in the lateral basal left lower lobe, unchanged from 11 months prior. Small to moderate bilateral layering pleural effusions. No pneumothorax. Bronchial wall thickening. Secretions in the trachea. Upper Abdomen: Abdominal findings are reported separately under same-day CT abdomen and pelvis. Soft Tissues/Bones: No acute bone or soft tissue abnormality. Osseous demineralization and degenerative changes of the shoulders and spine.      Suboptimal opacification of the pulmonary arterial tree significantly limiting assessment for pulmonary emboli. No definite thrombus in the main pulmonary arteries. Mild patchy ground-glass opacities throughout both lungs resulting in mosaic attenuation. In the setting of bronchial wall thickening this is favored to be secondary to underlying acute or chronic airways disease. Mild edema or sequelae of viral pneumonia are potential differential considerations. Small to moderate layering bilateral pleural effusions. Cardiomegaly. No pericardial effusion. Assessment :   1. Gi bleed/1 unit  prbc/transfused  2. N/v /better  3. covid r/o by rapid and pcr     Plan:   1. Gi seeing  2. Continue present care    Patient Active Problem List:     Mixed hyperlipidemia     Gastroesophageal reflux disease without esophagitis     Onychomycosis     Diverticulosis     Chronic back pain     Hypothyroidism     Essential hypertension     Right bundle branch block     Chronic pulmonary embolism (HCC)     Type 2 diabetes mellitus without complication, without long-term current use of insulin (HCC)     Obesity (BMI 30.0-34. 9)     Tracheobronchitis     Allergic rhinitis     Glaucoma     S/p bilateral myringotomy with tube placement     GI bleed     Iron deficiency anemia     Paroxysmal atrial fibrillation (HCC)     Hemorrhage of rectum and anus     Intractable nausea and vomiting      Anticipated Disposition upon discharge: [] Home                                                                         [] Home with Home Health                                                                         [] [facility]                                                                         [] [address]      Patient is admitted as inpatient status because of co-morbidities listed above, severity of signs and symptoms as outlined, requirement for current medical therapies and most importantly because of direct risk to patient if care not provided in a hospital setting.           Boaz Lucas MD, MD  [facility]

## 2021-12-01 NOTE — PLAN OF CARE
Problem: Falls - Risk of:  Goal: Will remain free from falls  Description: Will remain free from falls  12/1/2021 1532 by Stevo Andres RN  Outcome: Ongoing patient remains free of injury, bed is in low position, call light in reach

## 2021-12-01 NOTE — CONSULTS
GI Consult Note:    Name: Anabela Nation  MRN: [id number]     Acct: [de-identified]  Room: 2055/2055-01    Admit Date: 11/29/2021  PCP: Paola Jones MD    Physician Requesting Consult: Noel Jenkins MD     Reason for Consult: Nausea, vomiting. Anemia. Chief Complaint:     Chief Complaint   Patient presents with    Nausea & Vomiting    Cough       History Obtained From:     patient, electronic medical record    History of Present Illness:      Anabela Nation is a  79 y.o.  female who presents with Nausea & Vomiting and Cough      Symptoms:  Patient seen with the symptoms of persistent nausea and vomiting. Patient states in the last 4 to 5 days she has constant nausea. Not able to keep food. Soon after eating food she regurgitates and brings it out. No hematemesis. Emesis contains previously ingested food. No fever chills. No dysphagia. Has a chronic GERD. She denies abdominal distention. Has nonspecific discomfort. No radiation. Not related to micturition. Denies urinary symptoms. Patient does have loose bowels. Typically has 3-4 bowel movements a day. No obvious melena. Intermittent hematochezia. Patient had subtotal colectomy in the past.  Colonoscopy/sigmoidoscopy few months ago did reveal friable mucosa at the anastomosis. At that time patient has rectal bleeding. Patient needed transfusions. Since admission to the hospital she is doing reasonably well. She has a pulmonary symptoms which appears to be getting better. History of cough, shortness of breath prior to admission. Covid testing negative. Labs reviewed hemoglobin has been stable, WBC count normal.  Iron levels low. Reticulocyte count elevated.   C07 folic acid levels normal.  Past Medical History:     Past Medical History:   Diagnosis Date    Atrial fibrillation ([address] 3/28/2014    Chronic back pain     with rt. leg pain    Diverticulosis     GERD (gastroesophageal reflux disease)     Hyperlipidemia  Hypertension     Hypothyroidism     Obesity (BMI 30.0-34. 9) 8/12/2016    Onychomycosis     PE (pulmonary embolism)     H/O DVT of rt leg    Type II or unspecified type diabetes mellitus without mention of complication, not stated as uncontrolled         Past Surgical History:     Past Surgical History:   Procedure Laterality Date    CHOLECYSTECTOMY      COLON SURGERY      s/p sigmoid cloectomy    COLONOSCOPY N/A 1/25/2021    COLONOSCOPY DIAGNOSTIC performed by Ailyn Chopra MD at [facility] 09/20/2019    DR IMAN GAUTHIER    VENA CAVA FILTER PLACEMENT      s/p        Medications Prior to Admission:       Prior to Admission medications    Medication Sig Start Date End Date Taking?  Authorizing Provider   apixaban (ELIQUIS) 5 MG TABS tablet Take 5 mg by mouth 2 times daily 2/2/21  Yes Historical Provider, MD   Lactobacillus (ACIDOPHILUS/PECTIN PO) Take 100 mg by mouth daily   Yes Historical Provider, MD   atenolol (TENORMIN) 100 MG tablet TAKE 1 TABLET BY MOUTH EVERY DAY 9/20/21   Rob Alexandra MD   doxazosin (CARDURA) 4 MG tablet TAKE 1 TABLET BY MOUTH EVERY DAY  9/20/21   Rob Alexandra MD   levothyroxine (SYNTHROID) 100 MCG tablet TAKE 1 TABLET BY MOUTH EVERY DAY 6/21/21   Rob Alexandra MD   ofloxacin (FLOXIN) 0.3 % otic solution Place 4 drops into both ears 2 times daily    Historical Provider, MD   RHOPRESSA 0.02 % SOLN INSTILL 1 DROP INTO AFFECTED EYE(S) BY OPHTHALMIC ROUTE ONCE DAILY IN THE EVENING 7/24/20   Historical Provider, MD   timolol (TIMOPTIC) 0.5 % ophthalmic solution ONE DROP TWICE A DAY INTO [facility] EYE 8/3/20   Historical Provider, MD   dorzolamide (TRUSOPT) 2 % ophthalmic solution INSTILL 1 DROP BY OPHTHALMIC ROUTE 2 TIMES EVERY DAY IN BOTH EYES 11/9/18   Historical Provider, MD   famotidine (PEPCID) 40 MG tablet Take 1 tablet by mouth daily 10/22/18   Rob Alexandra MD   NONFORMULARY Indications: Advanced anti-oxidant w/echinacea and garlic    Historical Provider, MD   Cholecalciferol (VITAMIN D3) 5000 UNITS TABS Take 1 tablet by mouth daily    Historical Provider, MD   Cranberry 450 MG CAPS Take 450 mg by mouth daily     Historical Provider, MD   cholestyramine Micki Figueroa) 4 G packet Take 1 packet by mouth every evening  16   Historical Provider, MD   latanoprost (XALATAN) 0.005 % ophthalmic solution Place 1 drop into both eyes nightly    Historical Provider, MD        Allergies: Avapro [irbesartan], Ciprofloxacin hcl, Cozaar [losartan potassium], Diovan [valsartan], Lipitor [atorvastatin calcium], Lisinopril, Pcn [penicillins], Pravachol [pravastatin sodium], Tape [adhesive tape], Tramadol, Zetia [ezetimibe], and Morphine    Social History:     Tobacco:    reports that she has never smoked. She has never used smokeless tobacco.  Alcohol:      reports no history of alcohol use. Drug Use: reports no history of drug use.     Family History:     Family History   Adopted: Yes   Problem Relation Age of Onset    No Known Problems Mother     No Known Problems Father        Review of Systems:     Review of Systems    Code Status:  Full Code    Physical Exam:     Vitals:  BP (!) 157/62   Pulse 95   Temp 97.8 °F (36.6 °C) (Oral)   Resp 16   Ht 5' (1.524 m)   Wt 191 lb 12.8 oz (87 kg)   SpO2 95%   BMI 37.46 kg/m²   Temp (24hrs), Av.4 °F (36.3 °C), Min:97.2 °F (36.2 °C), Max:97.8 °F (36.6 °C)      Physical Exam  Data:   CBC:   Lab Results   Component Value Date    WBC 4.5 2021    RBC 3.00 2021    RBC 4.19 2012    HGB 7.8 2021    HCT 25.5 2021    MCV 74.9 2021    MCH 22.5 2021    MCHC 30.0 2021    RDW 19.1 2021     2021     2012    MPV 8.1 2021     CBC with Differential:  Lab Results   Component Value Date    WBC 4.5 2021    RBC 3.00 2021    RBC 4.19 2012    HGB 7.8 2021    HCT 25.5 2021  11/30/2021     03/17/2012    MCV 74.9 11/30/2021    MCH 22.5 11/30/2021    MCHC 30.0 11/30/2021    RDW 19.1 11/30/2021    LYMPHOPCT 14 11/30/2021    MONOPCT 12 11/30/2021    BASOPCT 1 11/30/2021    MONOSABS 0.54 11/30/2021    LYMPHSABS 0.63 11/30/2021    EOSABS 0.00 11/30/2021    BASOSABS 0.05 11/30/2021    DIFFTYPE NOT REPORTED 11/30/2021     Hemoglobin/Hematocrit:  Lab Results   Component Value Date    HGB 7.8 11/30/2021    HCT 25.5 11/30/2021     CMP:    Lab Results   Component Value Date     11/30/2021    K 4.1 11/30/2021     11/30/2021    CO2 25 11/30/2021    BUN 10 11/30/2021    CREATININE 0.78 11/30/2021    GFRAA >60 11/30/2021    LABGLOM >60 11/30/2021    GLUCOSE 114 11/30/2021    GLUCOSE 123 03/17/2012    PROT 6.0 12/01/2021    LABALBU 3.2 12/01/2021    LABALBU 4.3 03/17/2012    CALCIUM 8.4 11/30/2021    BILITOT 0.75 12/01/2021    ALKPHOS 62 12/01/2021    AST 15 12/01/2021    ALT 7 12/01/2021     BMP:  Lab Results   Component Value Date     11/30/2021    K 4.1 11/30/2021     11/30/2021    CO2 25 11/30/2021    BUN 10 11/30/2021    LABALBU 3.2 12/01/2021    LABALBU 4.3 03/17/2012    CREATININE 0.78 11/30/2021    CALCIUM 8.4 11/30/2021    GFRAA >60 11/30/2021    LABGLOM >60 11/30/2021    GLUCOSE 114 11/30/2021    GLUCOSE 123 03/17/2012     PT/INR:    Lab Results   Component Value Date    PROTIME 15.2 11/29/2021    PROTIME 22.2 04/17/2018    PROTIME 31.1 03/17/2014    INR 1.2 11/29/2021     PTT:    Lab Results   Component Value Date    APTT 28.0 11/29/2021   [APTT}    Assesment:     Primary Problem  <principal problem not specified>    Active Hospital Problems    Diagnosis Date Noted    Intractable nausea and vomiting [R11.2] 11/29/2021       Plan:     Patient appears stable. She is asymptomatic. However she has abdominal distention. Given that she has anemia, nausea, and intermittent hematochezia needs EGD and sigmoidoscopy and this are arranged.     Diet as tolerated. To keep on PPI therapy. Discussed with the patient, she understood, agreed with plan of management    Thank you for allowing me to participate in the care of your patient. Please feel free to contact me with anyquestions or concerns. Electronically signed by Hermelinda Lewis MD on 12/1/2021 at 1:25 PM     Copy sent to Dr. Efe Roberts MD    Note is dictated utilizing voice recognition software. Unfortunately this leads to occasional typographical errors. Please contact our office if you have any questions.

## 2021-12-01 NOTE — PROGRESS NOTES
GI Progress notes    12/1/2021   10:40 AM    Name:  Dylan Ramsay  MRN:    [id number]     Acct:     [de-identified]   Room:  [address] Day: 2     Admit Date: 11/29/2021  5:34 PM  PCP: Krystle Handley MD    Subjective:     C/C:   Chief Complaint   Patient presents with    Nausea & Vomiting    Cough       Interval History: Status: not changed. Patient seen and examined. No acute events overnight. Hgb improved to 7.8  Stool negative for occult blood. ROS:  Constitutional: negative for chills, fevers and sweats  Gastrointestinal: negative for abdominal pain, constipation, diarrhea, nausea and vomiting      Medications: Allergies:    Allergies   Allergen Reactions    Avapro [Irbesartan]     Ciprofloxacin Hcl Nausea Only    Cozaar [Losartan Potassium]     Diovan [Valsartan]     Lipitor [Atorvastatin Calcium]     Lisinopril     Pcn [Penicillins]     Pravachol [Pravastatin Sodium]      myalgias    Tape [Adhesive Tape] Dermatitis    Tramadol Swelling    Zetia [Ezetimibe]     Morphine Nausea And Vomiting       Current Meds: atenolol (TENORMIN) tablet 100 mg, Daily  dorzolamide (TRUSOPT) 2 % ophthalmic solution 1 drop, TID  doxazosin (CARDURA) tablet 4 mg, Daily  famotidine (PEPCID) tablet 20 mg, BID  latanoprost (XALATAN) 0.005 % ophthalmic solution 1 drop, Nightly  levothyroxine (SYNTHROID) tablet 100 mcg, Daily  timolol (TIMOPTIC) 0.5 % ophthalmic solution 1 drop, BID  potassium chloride (KLOR-CON M) extended release tablet 40 mEq, PRN   Or  potassium bicarb-citric acid (EFFER-K) effervescent tablet 40 mEq, PRN   Or  potassium chloride 10 mEq/100 mL IVPB (Peripheral Line), PRN  cholestyramine light packet 4 g, QPM  0.9 % sodium chloride infusion, PRN  0.9 % sodium chloride infusion, Continuous  sodium chloride flush 0.9 % injection 5-40 mL, 2 times per day  sodium chloride flush 0.9 % injection 5-40 mL, PRN  0.9 % sodium chloride infusion, PRN  acetaminophen (TYLENOL) tablet 650 mg, Q4H PRN  ondansetron (ZOFRAN-ODT) disintegrating tablet 4 mg, Q8H PRN   Or  ondansetron (ZOFRAN) injection 4 mg, Q6H PRN        Data:     Code Status:  Full Code    Family History   Adopted: Yes   Problem Relation Age of Onset    No Known Problems Mother     No Known Problems Father        Social History     Socioeconomic History    Marital status:      Spouse name: Not on file    Number of children: Not on file    Years of education: Not on file    Highest education level: Not on file   Occupational History    Not on file   Tobacco Use    Smoking status: Never Smoker    Smokeless tobacco: Never Used   Vaping Use    Vaping Use: Never used   Substance and Sexual Activity    Alcohol use: No    Drug use: No    Sexual activity: Not on file   Other Topics Concern    Not on file   Social History Narrative    Not on file     Social Determinants of Health     Financial Resource Strain: Low Risk     Difficulty of Paying Living Expenses: Not hard at all   Food Insecurity: No Food Insecurity    Worried About [address] in the Last Year: Never true    [address] in the Last Year: Never true   Transportation Needs:     Lack of Transportation (Medical): Not on file    Lack of Transportation (Non-Medical):  Not on file   Physical Activity:     Days of Exercise per Week: Not on file    Minutes of Exercise per Session: Not on file   Stress:     Feeling of Stress : Not on file   Social Connections:     Frequency of Communication with Friends and Family: Not on file    Frequency of Social Gatherings with Friends and Family: Not on file    Attends [employer] Services: Not on file    Active Member of Clubs or Organizations: Not on file    Attends Club or Organization Meetings: Not on file    Marital Status: Not on file   Intimate Partner Violence:     Fear of Current or Ex-Partner: Not on file    Emotionally Abused: Not on file    Physically Abused: Not on file    Sexually Abused: Not on file   Housing Stability: Unable to Pay for Housing in the Last Year: Not on file    Number of Places Lived in the Last Year: Not on file    Unstable Housing in the Last Year: Not on file       Vitals:  BP (!) 157/62   Pulse 95   Temp 97.8 °F (36.6 °C) (Oral)   Resp 16   Ht 5' (1.524 m)   Wt 191 lb 12.8 oz (87 kg)   SpO2 95%   BMI 37.46 kg/m²   Temp (24hrs), Av.4 °F (36.3 °C), Min:97.2 °F (36.2 °C), Max:97.8 °F (36.6 °C)    Recent Labs     21  0914 21  1112   POCGLU 112* 112*       I/O (24Hr):     Intake/Output Summary (Last 24 hours) at 2021 1040  Last data filed at 2021 1753  Gross per 24 hour   Intake 375 ml   Output --   Net 375 ml       Labs:      CBC:   Lab Results   Component Value Date    WBC 4.5 2021    RBC 3.00 2021    RBC 4.19 2012    HGB 7.8 2021    HCT 25.5 2021    MCV 74.9 2021    MCH 22.5 2021    MCHC 30.0 2021    RDW 19.1 2021     2021     2012    MPV 8.1 2021     CBC with Differential:    Lab Results   Component Value Date    WBC 4.5 2021    RBC 3.00 2021    RBC 4.19 2012    HGB 7.8 2021    HCT 25.5 2021     2021     2012    MCV 74.9 2021    MCH 22.5 2021    MCHC 30.0 2021    RDW 19.1 2021    LYMPHOPCT 14 2021    MONOPCT 12 2021    BASOPCT 1 2021    MONOSABS 0.54 2021    LYMPHSABS 0.63 2021    EOSABS 0.00 2021    BASOSABS 0.05 2021    DIFFTYPE NOT REPORTED 2021     Hemoglobin/Hematocrit:    Lab Results   Component Value Date    HGB 7.8 2021    HCT 25.5 2021     CMP:    Lab Results   Component Value Date     2021    K 4.1 2021     2021    CO2 25 2021    BUN 10 2021    CREATININE 0.78 2021    GFRAA >60 2021    LABGLOM >60 2021    GLUCOSE 114 2021    GLUCOSE 123 2012    PROT 6.0 2021    LABALBU 3.2 12/01/2021    LABALBU 4.3 03/17/2012    CALCIUM 8.4 11/30/2021    BILITOT 0.75 12/01/2021    ALKPHOS 62 12/01/2021    AST 15 12/01/2021    ALT 7 12/01/2021     BMP:    Lab Results   Component Value Date     11/30/2021    K 4.1 11/30/2021     11/30/2021    CO2 25 11/30/2021    BUN 10 11/30/2021    LABALBU 3.2 12/01/2021    LABALBU 4.3 03/17/2012    CREATININE 0.78 11/30/2021    CALCIUM 8.4 11/30/2021    GFRAA >60 11/30/2021    LABGLOM >60 11/30/2021    GLUCOSE 114 11/30/2021    GLUCOSE 123 03/17/2012     PT/INR:    Lab Results   Component Value Date    PROTIME 15.2 11/29/2021    PROTIME 22.2 04/17/2018    PROTIME 31.1 03/17/2014    INR 1.2 11/29/2021     PTT:    Lab Results   Component Value Date    APTT 28.0 11/29/2021   [APTT}    Physical Examination:        General appearance: alert, cooperative and no distress  Mental Status: oriented to person, place and time and normal affect  Abdomen: soft, nontender, nondistended, bowel sounds present  Assessment:        Primary Problem  <principal problem not specified>     Active Hospital Problems    Diagnosis Date Noted    Intractable nausea and vomiting [R11.2] 11/29/2021     Past Medical History:   Diagnosis Date    Atrial fibrillation ([address] 3/28/2014    Chronic back pain     with rt. leg pain    Diverticulosis     GERD (gastroesophageal reflux disease)     Hyperlipidemia     Hypertension     Hypothyroidism     Obesity (BMI 30.0-34. 9) 8/12/2016    Onychomycosis     PE (pulmonary embolism)     H/O DVT of rt leg    Type II or unspecified type diabetes mellitus without mention of complication, not stated as uncontrolled         Plan:        Nausea, vomiting, diarrhea  Improved  Will arrange flex sig tomorrow  Will prep with enemas in am  Soft diet as tolerated  NPO after midnight  Anemia  Hgb stable  Negative for occult blood  Monitor for bleeding  Trend H&H  Transfuse for hgb < 7    Explained to the patient and d/W Nursing Staff  Will F/U with you  Please call or Page for any issues or change in status  Thanks    Electronically signed by MINH Souza NP on 12/1/2021 at 10:40 AM

## 2021-12-01 NOTE — PLAN OF CARE
Problem: Skin Integrity:  Goal: Will show no infection signs and symptoms  Description: Will show no infection signs and symptoms  12/1/2021 1533 by Lisa Martin RN  Outcome: Ongoing no signs or symptoms of skin breakdown, no reddness

## 2021-12-01 NOTE — FLOWSHEET NOTE
Prayed outside patient's room     12/01/21 1246   Encounter Summary   Services provided to: Patient   Referral/Consult From: Rounding   Complexity of Encounter Low   Length of Encounter 15 minutes   Spiritual/Protestant   Type Spiritual support   Intervention Prayer

## 2021-12-02 ENCOUNTER — ANESTHESIA EVENT (OUTPATIENT)
Dept: ENDOSCOPY | Age: 81
DRG: 377 | End: 2021-12-02
Payer: MEDICARE

## 2021-12-02 ENCOUNTER — ANESTHESIA (OUTPATIENT)
Dept: ENDOSCOPY | Age: 81
DRG: 377 | End: 2021-12-02
Payer: MEDICARE

## 2021-12-02 VITALS
SYSTOLIC BLOOD PRESSURE: 112 MMHG | OXYGEN SATURATION: 100 % | DIASTOLIC BLOOD PRESSURE: 58 MMHG | RESPIRATION RATE: 15 BRPM

## 2021-12-02 LAB
ABO/RH: NORMAL
ANTIBODY SCREEN: NEGATIVE
ARM BAND NUMBER: NORMAL
BLD PROD TYP BPU: NORMAL
CROSSMATCH RESULT: NORMAL
DISPENSE STATUS BLOOD BANK: NORMAL
EXPIRATION DATE: NORMAL
GLUCOSE BLD-MCNC: 100 MG/DL (ref 65–105)
GLUCOSE BLD-MCNC: 95 MG/DL (ref 65–105)
TRANSFUSION STATUS: NORMAL
UNIT DIVISION: 0
UNIT NUMBER: NORMAL

## 2021-12-02 PROCEDURE — 3609008400 HC SIGMOIDOSCOPY DIAGNOSTIC: Performed by: INTERNAL MEDICINE

## 2021-12-02 PROCEDURE — 7100000001 HC PACU RECOVERY - ADDTL 15 MIN: Performed by: INTERNAL MEDICINE

## 2021-12-02 PROCEDURE — 2580000003 HC RX 258: Performed by: EMERGENCY MEDICINE

## 2021-12-02 PROCEDURE — 3609017100 HC EGD: Performed by: INTERNAL MEDICINE

## 2021-12-02 PROCEDURE — 6370000000 HC RX 637 (ALT 250 FOR IP): Performed by: INTERNAL MEDICINE

## 2021-12-02 PROCEDURE — 6360000002 HC RX W HCPCS: Performed by: NURSE ANESTHETIST, CERTIFIED REGISTERED

## 2021-12-02 PROCEDURE — 1200000000 HC SEMI PRIVATE

## 2021-12-02 PROCEDURE — 7100000000 HC PACU RECOVERY - FIRST 15 MIN: Performed by: INTERNAL MEDICINE

## 2021-12-02 PROCEDURE — 2580000003 HC RX 258: Performed by: ANESTHESIOLOGY

## 2021-12-02 PROCEDURE — 43235 EGD DIAGNOSTIC BRUSH WASH: CPT | Performed by: INTERNAL MEDICINE

## 2021-12-02 PROCEDURE — 2500000003 HC RX 250 WO HCPCS: Performed by: NURSE ANESTHETIST, CERTIFIED REGISTERED

## 2021-12-02 PROCEDURE — 2709999900 HC NON-CHARGEABLE SUPPLY: Performed by: INTERNAL MEDICINE

## 2021-12-02 PROCEDURE — 0DJ08ZZ INSPECTION OF UPPER INTESTINAL TRACT, VIA NATURAL OR ARTIFICIAL OPENING ENDOSCOPIC: ICD-10-PCS | Performed by: INTERNAL MEDICINE

## 2021-12-02 PROCEDURE — 3700000001 HC ADD 15 MINUTES (ANESTHESIA): Performed by: INTERNAL MEDICINE

## 2021-12-02 PROCEDURE — 0DJD8ZZ INSPECTION OF LOWER INTESTINAL TRACT, VIA NATURAL OR ARTIFICIAL OPENING ENDOSCOPIC: ICD-10-PCS | Performed by: INTERNAL MEDICINE

## 2021-12-02 PROCEDURE — 3700000000 HC ANESTHESIA ATTENDED CARE: Performed by: INTERNAL MEDICINE

## 2021-12-02 PROCEDURE — 6370000000 HC RX 637 (ALT 250 FOR IP): Performed by: NURSE PRACTITIONER

## 2021-12-02 PROCEDURE — 82947 ASSAY GLUCOSE BLOOD QUANT: CPT

## 2021-12-02 PROCEDURE — 45330 DIAGNOSTIC SIGMOIDOSCOPY: CPT | Performed by: INTERNAL MEDICINE

## 2021-12-02 PROCEDURE — 6370000000 HC RX 637 (ALT 250 FOR IP): Performed by: FAMILY MEDICINE

## 2021-12-02 RX ORDER — LIDOCAINE HYDROCHLORIDE 20 MG/ML
INJECTION, SOLUTION INFILTRATION; PERINEURAL PRN
Status: DISCONTINUED | OUTPATIENT
Start: 2021-12-02 | End: 2021-12-02 | Stop reason: SDUPTHER

## 2021-12-02 RX ORDER — SODIUM PHOSPHATE, DIBASIC AND SODIUM PHOSPHATE, MONOBASIC 7; 19 G/133ML; G/133ML
1 ENEMA RECTAL ONCE
Status: COMPLETED | OUTPATIENT
Start: 2021-12-02 | End: 2021-12-02

## 2021-12-02 RX ORDER — MORPHINE SULFATE 2 MG/ML
2 INJECTION, SOLUTION INTRAMUSCULAR; INTRAVENOUS EVERY 5 MIN PRN
Status: DISCONTINUED | OUTPATIENT
Start: 2021-12-02 | End: 2021-12-02 | Stop reason: HOSPADM

## 2021-12-02 RX ORDER — SODIUM CHLORIDE 0.9 % (FLUSH) 0.9 %
10 SYRINGE (ML) INJECTION PRN
Status: DISCONTINUED | OUTPATIENT
Start: 2021-12-02 | End: 2021-12-02 | Stop reason: HOSPADM

## 2021-12-02 RX ORDER — PROPOFOL 10 MG/ML
INJECTION, EMULSION INTRAVENOUS PRN
Status: DISCONTINUED | OUTPATIENT
Start: 2021-12-02 | End: 2021-12-02 | Stop reason: SDUPTHER

## 2021-12-02 RX ORDER — ONDANSETRON 2 MG/ML
4 INJECTION INTRAMUSCULAR; INTRAVENOUS
Status: DISCONTINUED | OUTPATIENT
Start: 2021-12-02 | End: 2021-12-02 | Stop reason: HOSPADM

## 2021-12-02 RX ORDER — LABETALOL HYDROCHLORIDE 5 MG/ML
5 INJECTION, SOLUTION INTRAVENOUS EVERY 10 MIN PRN
Status: DISCONTINUED | OUTPATIENT
Start: 2021-12-02 | End: 2021-12-02 | Stop reason: HOSPADM

## 2021-12-02 RX ORDER — DIPHENHYDRAMINE HYDROCHLORIDE 50 MG/ML
12.5 INJECTION INTRAMUSCULAR; INTRAVENOUS
Status: DISCONTINUED | OUTPATIENT
Start: 2021-12-02 | End: 2021-12-02 | Stop reason: HOSPADM

## 2021-12-02 RX ORDER — LACTOBACILLUS RHAMNOSUS GG 10B CELL
1 CAPSULE ORAL 2 TIMES DAILY WITH MEALS
Status: DISCONTINUED | OUTPATIENT
Start: 2021-12-02 | End: 2021-12-07 | Stop reason: HOSPADM

## 2021-12-02 RX ORDER — MEPERIDINE HYDROCHLORIDE 25 MG/ML
12.5 INJECTION INTRAMUSCULAR; INTRAVENOUS; SUBCUTANEOUS EVERY 5 MIN PRN
Status: DISCONTINUED | OUTPATIENT
Start: 2021-12-02 | End: 2021-12-02 | Stop reason: HOSPADM

## 2021-12-02 RX ORDER — LIDOCAINE HYDROCHLORIDE 10 MG/ML
1 INJECTION, SOLUTION EPIDURAL; INFILTRATION; INTRACAUDAL; PERINEURAL
Status: DISCONTINUED | OUTPATIENT
Start: 2021-12-02 | End: 2021-12-02 | Stop reason: HOSPADM

## 2021-12-02 RX ORDER — SODIUM CHLORIDE 9 MG/ML
INJECTION, SOLUTION INTRAVENOUS CONTINUOUS
Status: DISCONTINUED | OUTPATIENT
Start: 2021-12-02 | End: 2021-12-02

## 2021-12-02 RX ORDER — SODIUM CHLORIDE 9 MG/ML
25 INJECTION, SOLUTION INTRAVENOUS PRN
Status: DISCONTINUED | OUTPATIENT
Start: 2021-12-02 | End: 2021-12-02 | Stop reason: HOSPADM

## 2021-12-02 RX ADMIN — TIMOLOL MALEATE 1 DROP: 5 SOLUTION OPHTHALMIC at 21:02

## 2021-12-02 RX ADMIN — SODIUM PHOSPHATE ENEMA 1 ENEMA: 7; 19 ENEMA RECTAL at 11:19

## 2021-12-02 RX ADMIN — LIDOCAINE HYDROCHLORIDE 80 MG: 20 INJECTION, SOLUTION INFILTRATION; PERINEURAL at 12:57

## 2021-12-02 RX ADMIN — PROPOFOL 30 MG: 10 INJECTION, EMULSION INTRAVENOUS at 12:57

## 2021-12-02 RX ADMIN — SODIUM CHLORIDE: 9 INJECTION, SOLUTION INTRAVENOUS at 12:50

## 2021-12-02 RX ADMIN — LATANOPROST 1 DROP: 50 SOLUTION OPHTHALMIC at 23:00

## 2021-12-02 RX ADMIN — DOXAZOSIN MESYLATE 4 MG: 4 TABLET ORAL at 07:20

## 2021-12-02 RX ADMIN — SALINE LAXATIVE 2 ENEMA: 7; 19 ENEMA RECTAL at 06:11

## 2021-12-02 RX ADMIN — FAMOTIDINE 20 MG: 20 TABLET ORAL at 21:00

## 2021-12-02 RX ADMIN — FAMOTIDINE 20 MG: 20 TABLET ORAL at 07:15

## 2021-12-02 RX ADMIN — PROPOFOL 20 MG: 10 INJECTION, EMULSION INTRAVENOUS at 12:58

## 2021-12-02 RX ADMIN — SODIUM CHLORIDE 1000 ML: 9 INJECTION, SOLUTION INTRAVENOUS at 06:11

## 2021-12-02 RX ADMIN — PROPOFOL 10 MG: 10 INJECTION, EMULSION INTRAVENOUS at 13:05

## 2021-12-02 RX ADMIN — ATENOLOL 100 MG: 50 TABLET ORAL at 07:15

## 2021-12-02 RX ADMIN — DORZOLAMIDE HYDROCHLORIDE 1 DROP: 20 SOLUTION/ DROPS OPHTHALMIC at 07:15

## 2021-12-02 RX ADMIN — TIMOLOL MALEATE 1 DROP: 5 SOLUTION OPHTHALMIC at 07:20

## 2021-12-02 RX ADMIN — DORZOLAMIDE HYDROCHLORIDE 1 DROP: 20 SOLUTION/ DROPS OPHTHALMIC at 21:02

## 2021-12-02 ASSESSMENT — PULMONARY FUNCTION TESTS
PIF_VALUE: 1

## 2021-12-02 ASSESSMENT — PAIN - FUNCTIONAL ASSESSMENT: PAIN_FUNCTIONAL_ASSESSMENT: 0-10

## 2021-12-02 ASSESSMENT — PAIN SCALES - GENERAL
PAINLEVEL_OUTOF10: 0

## 2021-12-02 ASSESSMENT — ENCOUNTER SYMPTOMS
STRIDOR: 0
SHORTNESS OF BREATH: 0

## 2021-12-02 ASSESSMENT — LIFESTYLE VARIABLES: SMOKING_STATUS: 0

## 2021-12-02 NOTE — PROGRESS NOTES
Pt returns from OR, pt. Is alert and  O x4  No c/o of pain, or SOB, pt.  Able to swallow water, without any difficulty

## 2021-12-02 NOTE — CARE COORDINATION
ONGOING DISCHARGE PLAN:    Patient is alert and oriented x4. Spoke with patient regarding discharge plan and patient confirms that plan is still home. She states she had [facility] when she was discharged from here in January, and she would like the same company again. [facility] [place] was the agency she used previously. Referral faxed to [facility] and notified Artem Horan from [facility] of this. Pt going for flex sig today. Will continue to follow for additional discharge needs.     Electronically signed by Kerwin Sinha RN on 12/2/2021 at 10:09 AM

## 2021-12-02 NOTE — OP NOTE
sigmoid colon. Small bowel was examined at 60 to 70 inches long. No abnormal mucosa seen in the small bowel that can account for patient's symptoms of loose bowels. Recommendations: Diet as tolerated. If patient has persistent symptoms of nausea vomiting need further diagnostic evaluation. The patient has tolerated the procedure and conscious sedation without unusual events.      Electronically signed by Tulio Hurst MD on 12/2/2021 at 1:17 PM

## 2021-12-02 NOTE — ANESTHESIA PRE PROCEDURE
Department of Anesthesiology  Preprocedure Note       Name:  Gildardo Martin   Age:  79 y.o.  :  1940                                          MRN:  [id number]         Date:  2021      Surgeon: Janneth Signs):  Reji Rome MD    Procedure: Procedure(s):  SIGMOIDOSCOPY DIAGNOSTIC FLEXIBLE    Medications prior to admission:   Prior to Admission medications    Medication Sig Start Date End Date Taking?  Authorizing Provider   apixaban (ELIQUIS) 5 MG TABS tablet Take 5 mg by mouth 2 times daily 21  Yes Historical Provider, MD   Lactobacillus (ACIDOPHILUS/PECTIN PO) Take 100 mg by mouth daily   Yes Historical Provider, MD   atenolol (TENORMIN) 100 MG tablet TAKE 1 TABLET BY MOUTH EVERY DAY 21   Jackson Blood MD   doxazosin (CARDURA) 4 MG tablet TAKE 1 TABLET BY MOUTH EVERY DAY  21   Jackson Blood MD   levothyroxine (SYNTHROID) 100 MCG tablet TAKE 1 TABLET BY MOUTH EVERY DAY 21   Jackson Blood MD   ofloxacin (FLOXIN) 0.3 % otic solution Place 4 drops into both ears 2 times daily    Historical Provider, MD   RHOPRESSA 0.02 % SOLN INSTILL 1 DROP INTO AFFECTED EYE(S) BY OPHTHALMIC ROUTE ONCE DAILY IN THE EVENING 20   Historical Provider, MD   timolol (TIMOPTIC) 0.5 % ophthalmic solution ONE DROP TWICE A DAY INTO [facility] EYE 8/3/20   Historical Provider, MD   dorzolamide (TRUSOPT) 2 % ophthalmic solution INSTILL 1 DROP BY OPHTHALMIC ROUTE 2 TIMES EVERY DAY IN BOTH EYES 18   Historical Provider, MD   famotidine (PEPCID) 40 MG tablet Take 1 tablet by mouth daily 10/22/18   Jackson Blood MD   NONFORMULARY Indications: Advanced anti-oxidant w/echinacea and garlic    Historical Provider, MD   Cholecalciferol (VITAMIN D3) 5000 UNITS TABS Take 1 tablet by mouth daily    Historical Provider, MD   Cranberry 450 MG CAPS Take 450 mg by mouth daily     Historical Provider, MD   cholestyramine Baltazar George) 4 G packet Take 1 packet by mouth every evening  16   Historical Provider, MD   latanoprost (XALATAN) 0.005 % ophthalmic solution Place 1 drop into both eyes nightly    Historical Provider, MD       Current medications:    Current Facility-Administered Medications   Medication Dose Route Frequency Provider Last Rate Last Admin    sodium chloride flush 0.9 % injection 10 mL  10 mL IntraVENous PRN Bonita Sewell MD        0.9 % sodium chloride infusion  25 mL IntraVENous PRN Bonita Sewell MD        lidocaine PF 1 % injection 1 mL  1 mL IntraDERmal Once PRN Bonita Sewell MD        0.9 % sodium chloride infusion   IntraVENous Continuous Yoli Cardoso MD        [MAR Hold] fleet rectal enema 1 enema  1 enema Rectal Once Arelis Mauro MD        [facility] Hold] atenolol (TENORMIN) tablet 100 mg  100 mg Oral Daily Jacinto Willoughby MD   100 mg at 12/02/21 0715    [MAR Hold] dorzolamide (TRUSOPT) 2 % ophthalmic solution 1 drop  1 drop Both Eyes TID Jacinto Willoughby MD   1 drop at 12/02/21 0715    [MAR Hold] doxazosin (CARDURA) tablet 4 mg  4 mg Oral Daily Jacinto Willoughby MD   4 mg at 12/02/21 0720    [MAR Hold] famotidine (PEPCID) tablet 20 mg  20 mg Oral BID Jacinto Willoughby MD   20 mg at 12/02/21 0715    [MAR Hold] latanoprost (XALATAN) 0.005 % ophthalmic solution 1 drop  1 drop Both Eyes Nightly Jacinto Willoughby MD   1 drop at 12/01/21 2249    [MAR Hold] levothyroxine (SYNTHROID) tablet 100 mcg  100 mcg Oral Daily Jacinto Willoughby MD   100 mcg at 12/01/21 0411    [MAR Hold] timolol (TIMOPTIC) 0.5 % ophthalmic solution 1 drop  1 drop Both Eyes BID Jacinto Willoughby MD   1 drop at 12/02/21 0720    [MAR Hold] potassium chloride (KLOR-CON M) extended release tablet 40 mEq  40 mEq Oral PRN Jacinto Willoughby MD        Or   [address] [place] [address] Hold] potassium bicarb-citric acid (EFFER-K) effervescent tablet 40 mEq  40 mEq Oral PRN Jacinto Willoughby MD        Or   [address] [place] [address] Hold] potassium chloride 10 mEq/100 mL IVPB (Peripheral Line)  10 mEq IntraVENous PRN Jacinto Willoughby MD        [facility] Hold] cholestyramine light packet 4 g  4 g Oral QPM Gillian Hansen MD   4 g at 11/30/21 1923    [MAR Hold] 0.9 % sodium chloride infusion   IntraVENous PRN MINH Amezquita NP        [facility] Hold] 0.9 % sodium chloride infusion  1,000 mL IntraVENous Continuous Lenoard [place] Kojo, DO 75 mL/hr at 12/02/21 0611 1,000 mL at 12/02/21 0611    [MAR Hold] sodium chloride flush 0.9 % injection 5-40 mL  5-40 mL IntraVENous 2 times per day Gillian Hansen MD   10 mL at 12/01/21 2204    [MAR Hold] sodium chloride flush 0.9 % injection 5-40 mL  5-40 mL IntraVENous PRN Gillian Hansen MD        [facility] Hold] 0.9 % sodium chloride infusion  25 mL IntraVENous PRN Gillian Hansen MD        [facility] Hold] acetaminophen (TYLENOL) tablet 650 mg  650 mg Oral Q4H PRN Gillian Hansen MD   650 mg at 12/01/21 2204    [MAR Hold] ondansetron (ZOFRAN-ODT) disintegrating tablet 4 mg  4 mg Oral Q8H PRN Gillian Hansen MD        Or   [facility] Hold] ondansetron [facility]) injection 4 mg  4 mg IntraVENous Q6H PRN Gillian Hansen MD           Allergies: Allergies   Allergen Reactions    Avapro [Irbesartan]     Ciprofloxacin Hcl Nausea Only    Cozaar [Losartan Potassium]     Diovan [Valsartan]     Lipitor [Atorvastatin Calcium]     Lisinopril     Pcn [Penicillins]     Pravachol [Pravastatin Sodium]      myalgias    Tape [Adhesive Tape] Dermatitis    Tramadol Swelling    Zetia [Ezetimibe]     Morphine Nausea And Vomiting       Problem List:    Patient Active Problem List   Diagnosis Code    Mixed hyperlipidemia E78.2    Gastroesophageal reflux disease without esophagitis K21.9    Onychomycosis B35.1    Diverticulosis K57.90    Chronic back pain M54.9, G89.29    Hypothyroidism E03.9    Essential hypertension I10    Right bundle branch block I45.10    Chronic pulmonary embolism (HCC) I27.82    Type 2 diabetes mellitus without complication, without long-term current use of insulin (HCC) E11.9    Obesity (BMI 30.0-34. 9) E66.9    Tracheobronchitis J40  Allergic rhinitis J30.9    Glaucoma H40.9    S/p bilateral myringotomy with tube placement Z96.22    GI bleed K92.2    Iron deficiency anemia D50.9    Paroxysmal atrial fibrillation (HCC) I48.0    Hemorrhage of rectum and anus K62.5    Intractable nausea and vomiting R11.2       Past Medical History:        Diagnosis Date    Atrial fibrillation (HCC) 3/28/2014    Chronic back pain     with rt. leg pain    Diverticulosis     GERD (gastroesophageal reflux disease)     Hyperlipidemia     Hypertension     Hypothyroidism     Obesity (BMI 30.0-34. 9) 8/12/2016    Onychomycosis     PE (pulmonary embolism)     H/O DVT of rt leg    Type II or unspecified type diabetes mellitus without mention of complication, not stated as uncontrolled        Past Surgical History:        Procedure Laterality Date    CHOLECYSTECTOMY      COLON SURGERY      s/p sigmoid cloectomy    COLONOSCOPY N/A 1/25/2021    COLONOSCOPY DIAGNOSTIC performed [facility]em Argu[facility] 09/20/[facility]    DR Aydin Mary    VENA CAVA FILTER PLACEMENT      s/p       Social History:    Social History     Tobacco Use    Smoking status: Never Smoker    Smokeless tobacco: Never Used   Substance Use Topics    Alcohol use:  No                                Counseling given: Not Answered      Vital Signs (Current):   Vitals:    12/01/21 1858 12/01/21 2230 12/01/21 2353 12/02/21 0718   BP: 128/65  133/71 (!) 154/53   Pulse: 65  68 78   Resp: 16  19 18   Temp: 98.4 °F (36.9 °C)  98.1 °F (36.7 °C) 97.9 °F (36.6 °C)   TempSrc:       SpO2: 100% 90% 93% 92%   Weight:       Height:                                                  BP Readings from Last 3 Encounters:   12/02/21 (!) 154/53   08/26/21 (!) 135/49   08/05/21 (!) 143/71       NPO Status:                                                                                 BMI:   Wt Readings from Last 3 Encounters:   11/30/21 191 lb 12.8 oz (87 kg)   08/26/21 193 lb (87.5 kg)   08/05/21 193 lb 8 oz (87.8 kg)     Body mass index is 37.46 kg/m².     CBC:   Lab Results   Component Value Date    WBC 4.5 11/30/2021    RBC 3.00 11/30/2021    RBC 4.19 03/17/2012    HGB 7.8 11/30/2021    HCT 25.5 11/30/2021    MCV 74.9 11/30/2021    RDW 19.1 11/30/2021     11/30/2021     03/17/2012     HB 7.8    CMP:   Lab Results   Component Value Date     11/30/2021    K 4.1 11/30/2021     11/30/2021    CO2 25 11/30/2021    BUN 10 11/30/2021    CREATININE 0.78 11/30/2021    GFRAA >60 11/30/2021    LABGLOM >60 11/30/2021    GLUCOSE 114 11/30/2021    GLUCOSE 123 03/17/2012    PROT 6.0 12/01/2021    CALCIUM 8.4 11/30/2021    BILITOT 0.75 12/01/2021    ALKPHOS 62 12/01/2021    AST 15 12/01/2021    ALT 7 12/01/2021       POC Tests:   Recent Labs     11/30/21  1112   POCGLU 112*       Coags:   Lab Results   Component Value Date    PROTIME 15.2 11/29/2021    PROTIME 22.2 04/17/2018    PROTIME 31.1 03/17/2014    INR 1.2 11/29/2021    APTT 28.0 11/29/2021       HCG (If Applicable): No results found for: PREGTESTUR, PREGSERUM, HCG, HCGQUANT     ABGs:   Lab Results   Component Value Date    PO2ART 110.0 10/03/2012    MLW6MYD 22.2 10/03/2012    [id number] 95.4 10/03/2012        Type & Screen (If Applicable):  No results found for: LABABO, LABRH    Drug/Infectious Status (If Applicable):  No results found for: HIV, HEPCAB    COVID-19 Screening (If Applicable):   Lab Results   Component Value Date    COVID19 Not Detected 11/29/2021    COVID19 Not Detected 11/29/2021           Anesthesia Evaluation  Patient summary reviewed and Nursing notes reviewed no history of anesthetic complications:   Airway: Mallampati: II  TM distance: >3 FB   Neck ROM: full  Mouth opening: > = 3 FB Dental: normal exam         Pulmonary:Negative Pulmonary ROS and normal exam  breath sounds clear to auscultation      (-) pneumonia, COPD, asthma, shortness of breath, recent URI, sleep apnea, rhonchi, wheezes, rales, stridor, not a current smoker and no decreased breath sounds          Patient did not smoke on day of surgery. Cardiovascular:  Exercise tolerance: good (>4 METS),   (+) hypertension: no interval change,     (-) pacemaker, valvular problems/murmurs, past MI, CAD, CABG/stent, dysrhythmias,  angina,  CHF, orthopnea, PND,  CASANOVA, murmur, weak pulses,  friction rub, systolic click, carotid bruit,  JVD, peripheral edema, no pulmonary hypertension and no hyperlipidemia    ECG reviewed  Rhythm: regular  Rate: normal  Echocardiogram reviewed         Beta Blocker:  Dose within 24 Hrs         Neuro/Psych:   Negative Neuro/Psych ROS     (-) seizures, neuromuscular disease, TIA, CVA, headaches, psychiatric history and depression/anxiety            GI/Hepatic/Renal:   (+) GERD: no interval change,      (-) hiatal hernia, PUD, hepatitis, liver disease, no renal disease, bowel prep and no morbid obesity       Endo/Other:    (+) DiabetesType II DM, no interval change, , hypothyroidism::., no malignancy/cancer. (-) hyperthyroidism, blood dyscrasia, arthritis, no electrolyte abnormalities, no malignancy/cancer               Abdominal:             Vascular: negative vascular ROS. - PVD and DVT. Other Findings:           Anesthesia Plan      general     ASA 3       Induction: intravenous. MIPS: Postoperative opioids intended and Prophylactic antiemetics administered. Anesthetic plan and risks discussed with patient. Plan discussed with CRNA.                   Rajendra Youngblood MD   12/2/2021

## 2021-12-02 NOTE — ANESTHESIA POSTPROCEDURE EVALUATION
POST- ANESTHESIA EVALUATION       Pt Name: Naresh Medrano  MRN: [id number]  YOB: 1940  Date of evaluation: 12/2/2021  Time:  2:51 PM      BP (!) 159/70   Pulse 70   Temp 98.1 °F (36.7 °C) (Axillary)   Resp 18   Ht 5' (1.524 m)   Wt 191 lb (86.6 kg)   SpO2 90%   BMI 37.30 kg/m²      Consciousness Level  Awake  Cardiopulmonary Status  Stable  Pain Adequately Treated YES  Nausea / Vomiting  NO  Adequate Hydration  YES  Anesthesia Related Complications NONE      Electronically signed by Shabbir Mccloud MD on 12/2/2021 at 2:51 PM       Department of Anesthesiology  Postprocedure Note    Patient: Naresh Medrano  MRN: [id number]  YOB: 1940  Date of evaluation: 12/2/2021  Time:  2:51 PM     Procedure Summary     Date: 12/02/21 Room / Location: [place] [address] / [facility]    Anesthesia Start: 1250 Anesthesia Stop: 3124    Procedures:       SIGMOIDOSCOPY DIAGNOSTIC FLEXIBLE (N/A )      EGD DIAGNOSTIC ONLY (N/A Esophagus) Diagnosis:       (ANEMIA)      (PT FULLY VACCINATED)    Surgeons: Tyler Hawkins MD Responsible Provider: Shabbir Mccloud MD    Anesthesia Type: general ASA Status: 3          Anesthesia Type: general    Chuck Phase I: Chuck Score: 7    Chuck Phase II:      Last vitals: Reviewed and per EMR flowsheets.        Anesthesia Post Evaluation

## 2021-12-02 NOTE — PROGRESS NOTES
Patient c/o chronic back discomfort, primarily on the left side. Massage with lotion given and warm blanket applied. Offered heating pad which patient declined. Positioned on side.

## 2021-12-02 NOTE — OP NOTE
ESOPHAGOGASTRODUODENOSCOPY   ( EGD )  DATE OF PROCEDURE: 12/2/2021     SURGEON: Joaquim Brown MD    ASSISTANT: None    PREOPERATIVE DIAGNOSIS: Persistent nausea vomiting. Also had dropping hemoglobin. Which is for prompt evaluate upper GI lesions. POSTOPERATIVE DIAGNOSIS: No abnormality seen up to the second part of the duodenum that can account for patient's symptoms. Patient was constantly belching could not hold the air to distend the stomach. OPERATION: Upper GI endoscopy     ANESTHESIA: MAC    ESTIMATED BLOOD LOSS: None    COMPLICATIONS: None. SPECIMENS:  Was Not Obtained    HISTORY: The patient is a 79y.o. year old female with history of above preop diagnosis. I recommended esophagogastroduodenoscopy with possible biopsy and I explained the risk, benefits, expected outcome, and alternatives to the procedure. Risks included but are not limited to bleeding, infection, respiratory distress, hypotension, and perforation of the esophagus, stomach, or duodenum. Patient understands and is in agreement. PROCEDURE: The patient was given IV conscious sedation. The patient's SPO2 remained above 90% throughout the procedure. Cetacaine spray given. Patient placed in left lateral position. Olympus  videogastroscope was inserted orally under vision into the esophagus without difficulty and advanced into the stomach then through the pylorus up to the second part of duodenum. Findings:    Retropharyngeal area was grossly normal appearing    Esophagus: normal    Stomach:  No lesions seen. No gastritis or ulceration seen. Polyp to be noted that patient was constantly belching air and could not evaluate satisfactorily the body of the stomach. Also during this time patient has desaturation of oxygen and has to come out of stomach quickly.     Duodenum:     Descending: normal    Bulb: normal        During this procedure patient was constantly belching and not able to distend the stomach. Also while examining the stomach, patient had oxygen desaturation and has become out of the stomach quickly. So even though no obvious lesion seen, small lesions can be missed. While withdrawing the scope the above findings were verified and the scope was removed. The patient has tolerated the procedure without unusual events. Recommendations/Plan:   1. Soft diet. 2. F/U In Office as instructed  3.  Discussed with the family                   Electronically signed by Felicity Brown MD  on 12/2/2021 at 1:12 PM

## 2021-12-02 NOTE — PROGRESS NOTES
Hospitalist Progress Note  12/2/2021 5:39 PM  Subjective:   Admit Date: 11/29/2021  PCP: Bebe Jara MD     Full Code      C/c:  Chief Complaint   Patient presents with    Nausea & Vomiting    Cough         Interval History: doing slightly better, had flexible sigmoid and egd, no abnormalities    Diet: ADULT DIET; Dysphagia - Soft and Bite Sized                                ip days:3  Medications:   Scheduled Meds:   lactobacillus  1 capsule Oral BID WC    atenolol  100 mg Oral Daily    dorzolamide  1 drop Both Eyes TID    doxazosin  4 mg Oral Daily    famotidine  20 mg Oral BID    latanoprost  1 drop Both Eyes Nightly    levothyroxine  100 mcg Oral Daily    timolol  1 drop Both Eyes BID    cholestyramine light  4 g Oral QPM    sodium chloride flush  5-40 mL IntraVENous 2 times per day     Continuous Infusions:   sodium chloride      sodium chloride 75 mL/hr at 12/02/21 1423    sodium chloride       PRN Meds:.potassium chloride **OR** potassium alternative oral replacement **OR** potassium chloride, sodium chloride, sodium chloride flush, sodium chloride, acetaminophen, ondansetron **OR** ondansetron     CBC:   Recent Labs     11/29/21 1835 11/30/21  0645 11/30/21  1953   WBC 5.1 4.5  --    HGB 7.2* 6.8* 7.8*   * 123*  --      BMP:    Recent Labs     11/29/21 1835 11/30/21  0645   * 133*   K 4.6 4.1   CL 95* 100   CO2 22 25   BUN 10 10   CREATININE 0.74 0.78   GLUCOSE 103* 114*     Hepatic:   Recent Labs     11/29/21 1835 11/30/21  0645 12/01/21  0458   AST 28 13 15   ALT 8 6 7   BILITOT 0.60 0.46 0.75   ALKPHOS 66 62 62     Troponin: No results for input(s): TROPONINI in the last 72 hours. BNP: No results for input(s): BNP in the last 72 hours. Lipids: No results for input(s): CHOL, HDL in the last 72 hours.     Invalid input(s): LDLCALCU  INR:   Recent Labs     11/29/21 1835   INR 1.2       Objective:   Vitals: BP (!) 159/70   Pulse 70   Temp 98.1 °F (36.7 °C) (Axillary)   Resp 18   Ht 5' (1.524 m)   Wt 191 lb (86.6 kg)   SpO2 90%   BMI 37.30 kg/m²   General appearance: alert, appears stated age and cooperative  Skin: Skin color, texture, turgor normal. No rashes or lesions  Lungs: clear to auscultation bilaterally  Heart: regular rate and rhythm, S1, S2 normal, no murmur, click, rub or gallop  Abdomen: soft, non-tender; bowel sounds normal; no masses,  no organomegaly  Extremities: extremities normal, atraumatic, no cyanosis or edema  Neurologic: Mental status: Alert, oriented, thought content appropriate    Prophylaxis:   DVT with  [] lovenox        [] heparin        [] Scd        [x] none:     Radiology:  CT ABDOMEN PELVIS W IV CONTRAST Additional Contrast? None    Result Date: 11/30/2021  EXAMINATION: CT OF THE ABDOMEN AND PELVIS WITH CONTRAST 11/29/2021 4:32 pm TECHNIQUE: CT of the abdomen and pelvis was performed with the administration of intravenous contrast. Multiplanar reformatted images are provided for review. Dose modulation, iterative reconstruction, and/or weight based adjustment of the mA/kV was utilized to reduce the radiation dose to as low as reasonably achievable. COMPARISON: Concurrent chest CT, CT abdomen/pelvis 01/23/2021 HISTORY: Pain FINDINGS: Lower Chest: Chest findings are reported separately under today's chest CT report. Organs: Liver is grossly normal in contour and attenuation. Cholecystectomy with intrahepatic and extrahepatic biliary ductal dilatation as well as periportal edema. Spleen is normal caliber. Pancreas is diffusely atrophic. Minimal nonspecific nodularity of the adrenal glands, unchanged. 2 cm cyst at the lower pole left kidney, unchanged. No hydronephrosis. Excreting contrast limits assessment for urolithiasis. GI/Bowel: Postoperative changes of the bowel with multiple partial colonic resections.   Anastomoses in proximal colon and sigmoid colon appear somewhat patulous but without any rapid transition in caliber to suggest associated obstruction. Diverticulosis involving remnant portions of the descending and sigmoid colon without diverticulitis. The small bowel is normal caliber. Pelvis: Hysterectomy. Neither ovary is identified. Urinary bladder is without any wall thickening or inflammatory change. No bladder calcifications. Peritoneum/Retroperitoneum: Mild inflammatory changes pelvic fat, nonspecific, and new from prior. Trace perihepatic and perisplenic ascites. No free air or lymphadenopathy. Infrarenal IVC filter. Mild-to-moderate aortoiliac atherosclerosis without aneurysm. Bones/Soft Tissues: Operative changes along the anterior abdominal wall which appear similar to prior. Advanced degenerative changes of the spine with ankylosis across the L1-L2 and L5-S1 disc spaces, unchanged. Periportal edema which is superimposed on the patient's baseline intrahepatic and extrahepatic biliary ductal dilatation in the setting of prior cholecystectomy. This could be related to congestive heart failure or possibly volume resuscitation as the IVC is somewhat prominent, though other considerations include inflammatory processes within the liver and biliary tree such as hepatitis or cholangitis. Clinical correlation is required. Trace perihepatic and perisplenic ascites with mild nonspecific graying of the pelvic fat, possibly related to volume status or underlying infectious/inflammatory etiology. Postoperative changes of the bowel with patulous anastomoses, but no bowel obstruction. Colonic diverticulosis without diverticulitis. XR CHEST PORTABLE    Result Date: 11/30/2021  EXAMINATION: XRAY CHEST TECHNIQUE: Single-view chest COMPARISON: 12/26/2017, 09/22/2016, 09/19/2016 HISTORY: Cough, shortness of breath FINDINGS: Mild basal predominant pulmonary opacities with some interstitial prominence. Small bilateral pleural effusions. No pneumothorax. Cardiomegaly. No acute bony findings.      Mild basal predominant pulmonary opacities with some interstitial changes. Findings may be due to edema or infection in the appropriate clinical setting. Small bilateral pleural effusions. CT CHEST PULMONARY EMBOLISM W CONTRAST    Result Date: 11/30/2021  EXAMINATION: CTA OF THE CHEST, 11/29/2021 4:32 pm TECHNIQUE: CTA of the chest was performed after the administration of intravenous contrast.  Multiplanar reformatted images are provided for review. MIP images are provided for review. Dose modulation, iterative reconstruction, and/or weight based adjustment of the mA/kV was utilized to reduce the radiation dose to as low as reasonably achievable. COMPARISON: Concurrent abdominal CT, CT abdomen pelvis 01/23/2021 HISTORY: Rule out PE FINDINGS: Pulmonary Arteries: Pulmonary arteries are suboptimally opacified for evaluation. No evidence of intraluminal filling defect to suggest pulmonary embolism in the main pulmonary arteries with assessment of the remainder of the pulmonary arterial tree markedly degraded. Main pulmonary artery is upper limits of normal in caliber. Mediastinum: No evidence of mediastinal lymphadenopathy. The heart and pericardium demonstrate no acute abnormality. Cardiomegaly. Coronary artery and mitral annular calcifications. There is no acute abnormality of the thoracic aorta. Lungs/pleura: Mild faint patchy ground-glass opacities throughout both lungs resulting in mosaic attenuation. 6 mm solid noncalcified nodule in the lateral basal left lower lobe, unchanged from 11 months prior. Small to moderate bilateral layering pleural effusions. No pneumothorax. Bronchial wall thickening. Secretions in the trachea. Upper Abdomen: Abdominal findings are reported separately under same-day CT abdomen and pelvis. Soft Tissues/Bones: No acute bone or soft tissue abnormality. Osseous demineralization and degenerative changes of the shoulders and spine.      Suboptimal opacification of the pulmonary arterial tree significantly limiting assessment for pulmonary emboli. No definite thrombus in the main pulmonary arteries. Mild patchy ground-glass opacities throughout both lungs resulting in mosaic attenuation. In the setting of bronchial wall thickening this is favored to be secondary to underlying acute or chronic airways disease. Mild edema or sequelae of viral pneumonia are potential differential considerations. Small to moderate layering bilateral pleural effusions. Cardiomegaly. No pericardial effusion. Assessment :   1. N/v. egd normal  2. Continue present care     Plan:   1. See order  . Patient Active Problem List:     Mixed hyperlipidemia     Gastroesophageal reflux disease without esophagitis     Onychomycosis     Diverticulosis     Chronic back pain     Hypothyroidism     Essential hypertension     Right bundle branch block     Chronic pulmonary embolism (HCC)     Type 2 diabetes mellitus without complication, without long-term current use of insulin (HCC)     Obesity (BMI 30.0-34. 9)     Tracheobronchitis     Allergic rhinitis     Glaucoma     S/p bilateral myringotomy with tube placement     GI bleed     Iron deficiency anemia     Paroxysmal atrial fibrillation (HCC)     Hemorrhage of rectum and anus     Intractable nausea and vomiting      Anticipated Disposition upon discharge: [] Home                                                                         [] Home with Home Health                                                                         [] [facility]                                                                         [] [address] [place] [address],[address]      Patient is admitted as inpatient status because of co-morbidities listed above, severity of signs and symptoms as outlined, requirement for current medical therapies and most importantly because of direct risk to patient if care not provided in a hospital setting.           Gunnar Melendez MD, MD  [facility] Hospitalist

## 2021-12-03 ENCOUNTER — APPOINTMENT (OUTPATIENT)
Dept: GENERAL RADIOLOGY | Age: 81
DRG: 377 | End: 2021-12-03
Payer: MEDICARE

## 2021-12-03 LAB
ALBUMIN SERPL-MCNC: 3.3 G/DL (ref 3.5–5.2)
ALBUMIN/GLOBULIN RATIO: ABNORMAL (ref 1–2.5)
ALP BLD-CCNC: 58 U/L (ref 35–104)
ALT SERPL-CCNC: 9 U/L (ref 5–33)
ANION GAP SERPL CALCULATED.3IONS-SCNC: 10 MMOL/L (ref 9–17)
AST SERPL-CCNC: 15 U/L
BILIRUB SERPL-MCNC: 0.55 MG/DL (ref 0.3–1.2)
BUN BLDV-MCNC: 6 MG/DL (ref 8–23)
BUN/CREAT BLD: ABNORMAL (ref 9–20)
CALCIUM SERPL-MCNC: 8.4 MG/DL (ref 8.6–10.4)
CHLORIDE BLD-SCNC: 101 MMOL/L (ref 98–107)
CO2: 23 MMOL/L (ref 20–31)
CREAT SERPL-MCNC: 0.72 MG/DL (ref 0.5–0.9)
GFR AFRICAN AMERICAN: >60 ML/MIN
GFR NON-AFRICAN AMERICAN: >60 ML/MIN
GFR SERPL CREATININE-BSD FRML MDRD: ABNORMAL ML/MIN/{1.73_M2}
GFR SERPL CREATININE-BSD FRML MDRD: ABNORMAL ML/MIN/{1.73_M2}
GLUCOSE BLD-MCNC: 103 MG/DL (ref 70–99)
LIPASE: 29 U/L (ref 13–60)
POTASSIUM SERPL-SCNC: 3.7 MMOL/L (ref 3.7–5.3)
SODIUM BLD-SCNC: 134 MMOL/L (ref 135–144)
TOTAL PROTEIN: 6 G/DL (ref 6.4–8.3)

## 2021-12-03 PROCEDURE — 94640 AIRWAY INHALATION TREATMENT: CPT

## 2021-12-03 PROCEDURE — 2580000003 HC RX 258: Performed by: INTERNAL MEDICINE

## 2021-12-03 PROCEDURE — 71045 X-RAY EXAM CHEST 1 VIEW: CPT

## 2021-12-03 PROCEDURE — 94760 N-INVAS EAR/PLS OXIMETRY 1: CPT

## 2021-12-03 PROCEDURE — 6370000000 HC RX 637 (ALT 250 FOR IP): Performed by: INTERNAL MEDICINE

## 2021-12-03 PROCEDURE — 6360000002 HC RX W HCPCS: Performed by: FAMILY MEDICINE

## 2021-12-03 PROCEDURE — 36415 COLL VENOUS BLD VENIPUNCTURE: CPT

## 2021-12-03 PROCEDURE — 6370000000 HC RX 637 (ALT 250 FOR IP): Performed by: FAMILY MEDICINE

## 2021-12-03 PROCEDURE — 80053 COMPREHEN METABOLIC PANEL: CPT

## 2021-12-03 PROCEDURE — 94664 DEMO&/EVAL PT USE INHALER: CPT

## 2021-12-03 PROCEDURE — 1200000000 HC SEMI PRIVATE

## 2021-12-03 PROCEDURE — 83690 ASSAY OF LIPASE: CPT

## 2021-12-03 PROCEDURE — 99232 SBSQ HOSP IP/OBS MODERATE 35: CPT | Performed by: INTERNAL MEDICINE

## 2021-12-03 RX ORDER — FUROSEMIDE 10 MG/ML
40 INJECTION INTRAMUSCULAR; INTRAVENOUS ONCE
Status: COMPLETED | OUTPATIENT
Start: 2021-12-03 | End: 2021-12-03

## 2021-12-03 RX ORDER — ALBUTEROL SULFATE 2.5 MG/3ML
2.5 SOLUTION RESPIRATORY (INHALATION) EVERY 4 HOURS PRN
Status: DISCONTINUED | OUTPATIENT
Start: 2021-12-03 | End: 2021-12-07 | Stop reason: HOSPADM

## 2021-12-03 RX ORDER — DOXAZOSIN MESYLATE 1 MG/1
4 TABLET ORAL
Status: DISCONTINUED | OUTPATIENT
Start: 2021-12-03 | End: 2021-12-07 | Stop reason: HOSPADM

## 2021-12-03 RX ORDER — FERROUS SULFATE 325(65) MG
325 TABLET ORAL
Status: DISCONTINUED | OUTPATIENT
Start: 2021-12-04 | End: 2021-12-07 | Stop reason: HOSPADM

## 2021-12-03 RX ADMIN — CHOLESTYRAMINE 4 G: 4 POWDER, FOR SUSPENSION ORAL at 17:48

## 2021-12-03 RX ADMIN — DORZOLAMIDE HYDROCHLORIDE 1 DROP: 20 SOLUTION/ DROPS OPHTHALMIC at 08:57

## 2021-12-03 RX ADMIN — DORZOLAMIDE HYDROCHLORIDE 1 DROP: 20 SOLUTION/ DROPS OPHTHALMIC at 20:53

## 2021-12-03 RX ADMIN — LATANOPROST 1 DROP: 50 SOLUTION OPHTHALMIC at 22:19

## 2021-12-03 RX ADMIN — SODIUM CHLORIDE 1000 ML: 9 INJECTION, SOLUTION INTRAVENOUS at 04:36

## 2021-12-03 RX ADMIN — FUROSEMIDE 40 MG: 10 INJECTION, SOLUTION INTRAMUSCULAR; INTRAVENOUS at 17:48

## 2021-12-03 RX ADMIN — ACETAMINOPHEN 650 MG: 325 TABLET, FILM COATED ORAL at 03:11

## 2021-12-03 RX ADMIN — TIMOLOL MALEATE 1 DROP: 5 SOLUTION OPHTHALMIC at 08:57

## 2021-12-03 RX ADMIN — ACETAMINOPHEN 650 MG: 325 TABLET, FILM COATED ORAL at 23:04

## 2021-12-03 RX ADMIN — ALBUTEROL SULFATE 2.5 MG: 2.5 SOLUTION RESPIRATORY (INHALATION) at 18:01

## 2021-12-03 RX ADMIN — ATENOLOL 100 MG: 50 TABLET ORAL at 08:57

## 2021-12-03 RX ADMIN — SODIUM CHLORIDE, PRESERVATIVE FREE 10 ML: 5 INJECTION INTRAVENOUS at 21:00

## 2021-12-03 RX ADMIN — FAMOTIDINE 20 MG: 20 TABLET ORAL at 08:56

## 2021-12-03 RX ADMIN — APIXABAN 5 MG: 5 TABLET, FILM COATED ORAL at 20:56

## 2021-12-03 RX ADMIN — DOXAZOSIN 4 MG: 1 TABLET ORAL at 16:06

## 2021-12-03 RX ADMIN — FAMOTIDINE 20 MG: 20 TABLET ORAL at 20:56

## 2021-12-03 RX ADMIN — Medication 1 CAPSULE: at 16:04

## 2021-12-03 RX ADMIN — TIMOLOL MALEATE 1 DROP: 5 SOLUTION OPHTHALMIC at 21:06

## 2021-12-03 RX ADMIN — Medication 1 CAPSULE: at 08:56

## 2021-12-03 RX ADMIN — LEVOTHYROXINE SODIUM 100 MCG: 0.1 TABLET ORAL at 06:13

## 2021-12-03 RX ADMIN — DORZOLAMIDE HYDROCHLORIDE 1 DROP: 20 SOLUTION/ DROPS OPHTHALMIC at 14:05

## 2021-12-03 ASSESSMENT — PAIN SCALES - GENERAL
PAINLEVEL_OUTOF10: 5
PAINLEVEL_OUTOF10: 3
PAINLEVEL_OUTOF10: 3

## 2021-12-03 ASSESSMENT — PAIN DESCRIPTION - PAIN TYPE
TYPE: CHRONIC PAIN
TYPE: CHRONIC PAIN

## 2021-12-03 NOTE — PROGRESS NOTES
Hospitalist Progress Note  12/3/2021 6:19 PM  Subjective:   Admit Date: 11/29/2021  PCP: Miguel Azar MD     Full Code      C/c:  Chief Complaint   Patient presents with    Nausea & Vomiting    Cough         Interval History: little  More sob, iv fluids on hold    Diet: ADULT DIET; Dysphagia - Soft and Bite Sized                                ip days:4  Medications:   Scheduled Meds:   doxazosin  4 mg Oral Dinner    apixaban  5 mg Oral BID    lactobacillus  1 capsule Oral BID WC    atenolol  100 mg Oral Daily    dorzolamide  1 drop Both Eyes TID    famotidine  20 mg Oral BID    latanoprost  1 drop Both Eyes Nightly    levothyroxine  100 mcg Oral Daily    timolol  1 drop Both Eyes BID    cholestyramine light  4 g Oral QPM    sodium chloride flush  5-40 mL IntraVENous 2 times per day     Continuous Infusions:   sodium chloride      sodium chloride       PRN Meds:.albuterol, potassium chloride **OR** potassium alternative oral replacement **OR** potassium chloride, sodium chloride, sodium chloride flush, sodium chloride, acetaminophen, ondansetron **OR** ondansetron     CBC:   Recent Labs     11/30/21  1953   HGB 7.8*     BMP:    Recent Labs     12/03/21  0619   *   K 3.7      CO2 23   BUN 6*   CREATININE 0.72   GLUCOSE 103*     Hepatic:   Recent Labs     12/01/21  0458 12/03/21  0619   AST 15 15   ALT 7 9   BILITOT 0.75 0.55   ALKPHOS 62 58     Troponin: No results for input(s): TROPONINI in the last 72 hours. BNP: No results for input(s): BNP in the last 72 hours. Lipids: No results for input(s): CHOL, HDL in the last 72 hours. Invalid input(s): LDLCALCU  INR: No results for input(s): INR in the last 72 hours.     Objective:   Vitals: BP (!) 146/73   Pulse 68   Temp 97.9 °F (36.6 °C) (Oral)   Resp 20   Ht 5' (1.524 m)   Wt 191 lb (86.6 kg)   SpO2 92%   BMI 37.30 kg/m²   General appearance: alert, appears stated age and cooperative  Skin: Skin color, texture, turgor normal. No rashes or lesions  Lungs: clear to auscultation bilaterally  Heart: regular rate and rhythm, S1, S2 normal, no murmur, click, rub or gallop  Abdomen: soft, non-tender; bowel sounds normal; no masses,  no organomegaly  Extremities: extremities normal, atraumatic, no cyanosis or edema  Neurologic: Mental status: Alert, oriented, thought content appropriate    Prophylaxis:   DVT with  [] lovenox        [] heparin        [] Scd        [x] apixaban     Radiology:  CT ABDOMEN PELVIS W IV CONTRAST Additional Contrast? None    Result Date: 11/30/2021  EXAMINATION: CT OF THE ABDOMEN AND PELVIS WITH CONTRAST 11/29/2021 4:32 pm TECHNIQUE: CT of the abdomen and pelvis was performed with the administration of intravenous contrast. Multiplanar reformatted images are provided for review. Dose modulation, iterative reconstruction, and/or weight based adjustment of the mA/kV was utilized to reduce the radiation dose to as low as reasonably achievable. COMPARISON: Concurrent chest CT, CT abdomen/pelvis 01/23/2021 HISTORY: Pain FINDINGS: Lower Chest: Chest findings are reported separately under today's chest CT report. Organs: Liver is grossly normal in contour and attenuation. Cholecystectomy with intrahepatic and extrahepatic biliary ductal dilatation as well as periportal edema. Spleen is normal caliber. Pancreas is diffusely atrophic. Minimal nonspecific nodularity of the adrenal glands, unchanged. 2 cm cyst at the lower pole left kidney, unchanged. No hydronephrosis. Excreting contrast limits assessment for urolithiasis. GI/Bowel: Postoperative changes of the bowel with multiple partial colonic resections. Anastomoses in proximal colon and sigmoid colon appear somewhat patulous but without any rapid transition in caliber to suggest associated obstruction. Diverticulosis involving remnant portions of the descending and sigmoid colon without diverticulitis. The small bowel is normal caliber.  Pelvis: Hysterectomy. Neither ovary is identified. Urinary bladder is without any wall thickening or inflammatory change. No bladder calcifications. Peritoneum/Retroperitoneum: Mild inflammatory changes pelvic fat, nonspecific, and new from prior. Trace perihepatic and perisplenic ascites. No free air or lymphadenopathy. Infrarenal IVC filter. Mild-to-moderate aortoiliac atherosclerosis without aneurysm. Bones/Soft Tissues: Operative changes along the anterior abdominal wall which appear similar to prior. Advanced degenerative changes of the spine with ankylosis across the L1-L2 and L5-S1 disc spaces, unchanged. Periportal edema which is superimposed on the patient's baseline intrahepatic and extrahepatic biliary ductal dilatation in the setting of prior cholecystectomy. This could be related to congestive heart failure or possibly volume resuscitation as the IVC is somewhat prominent, though other considerations include inflammatory processes within the liver and biliary tree such as hepatitis or cholangitis. Clinical correlation is required. Trace perihepatic and perisplenic ascites with mild nonspecific graying of the pelvic fat, possibly related to volume status or underlying infectious/inflammatory etiology. Postoperative changes of the bowel with patulous anastomoses, but no bowel obstruction. Colonic diverticulosis without diverticulitis. XR CHEST PORTABLE    Result Date: 11/30/2021  EXAMINATION: XRAY CHEST TECHNIQUE: Single-view chest COMPARISON: 12/26/2017, 09/22/2016, 09/19/2016 HISTORY: Cough, shortness of breath FINDINGS: Mild basal predominant pulmonary opacities with some interstitial prominence. Small bilateral pleural effusions. No pneumothorax. Cardiomegaly. No acute bony findings. Mild basal predominant pulmonary opacities with some interstitial changes. Findings may be due to edema or infection in the appropriate clinical setting. Small bilateral pleural effusions.      CT CHEST PULMONARY EMBOLISM W CONTRAST    Result Date: 11/30/2021  EXAMINATION: CTA OF THE CHEST, 11/29/2021 4:32 pm TECHNIQUE: CTA of the chest was performed after the administration of intravenous contrast.  Multiplanar reformatted images are provided for review. MIP images are provided for review. Dose modulation, iterative reconstruction, and/or weight based adjustment of the mA/kV was utilized to reduce the radiation dose to as low as reasonably achievable. COMPARISON: Concurrent abdominal CT, CT abdomen pelvis 01/23/2021 HISTORY: Rule out PE FINDINGS: Pulmonary Arteries: Pulmonary arteries are suboptimally opacified for evaluation. No evidence of intraluminal filling defect to suggest pulmonary embolism in the main pulmonary arteries with assessment of the remainder of the pulmonary arterial tree markedly degraded. Main pulmonary artery is upper limits of normal in caliber. Mediastinum: No evidence of mediastinal lymphadenopathy. The heart and pericardium demonstrate no acute abnormality. Cardiomegaly. Coronary artery and mitral annular calcifications. There is no acute abnormality of the thoracic aorta. Lungs/pleura: Mild faint patchy ground-glass opacities throughout both lungs resulting in mosaic attenuation. 6 mm solid noncalcified nodule in the lateral basal left lower lobe, unchanged from 11 months prior. Small to moderate bilateral layering pleural effusions. No pneumothorax. Bronchial wall thickening. Secretions in the trachea. Upper Abdomen: Abdominal findings are reported separately under same-day CT abdomen and pelvis. Soft Tissues/Bones: No acute bone or soft tissue abnormality. Osseous demineralization and degenerative changes of the shoulders and spine. Suboptimal opacification of the pulmonary arterial tree significantly limiting assessment for pulmonary emboli. No definite thrombus in the main pulmonary arteries.  Mild patchy ground-glass opacities throughout both lungs resulting in mosaic attenuation. In the setting of bronchial wall thickening this is favored to be secondary to underlying acute or chronic airways disease. Mild edema or sequelae of viral pneumonia are potential differential considerations. Small to moderate layering bilateral pleural effusions. Cardiomegaly. No pericardial effusion. Assessment :   1. Sob/off fluids/stat x ray/lasix/aerosol  2. Cardio to see     Plan:   1. Continue present plan  2. See order    Patient Active Problem List:     Mixed hyperlipidemia     Gastroesophageal reflux disease without esophagitis     Onychomycosis     Diverticulosis     Chronic back pain     Hypothyroidism     Essential hypertension     Right bundle branch block     Chronic pulmonary embolism (HCC)     Type 2 diabetes mellitus without complication, without long-term current use of insulin (HCC)     Obesity (BMI 30.0-34. 9)     Tracheobronchitis     Allergic rhinitis     Glaucoma     S/p bilateral myringotomy with tube placement     GI bleed     Iron deficiency anemia     Paroxysmal atrial fibrillation (HCC)     Hemorrhage of rectum and anus     Intractable nausea and vomiting      Anticipated Disposition upon discharge: [] Home                                                                         [] Home with Home Health                                                                         [] [facility]                                                                         [] [address]      Patient is admitted as inpatient status because of co-morbidities listed above, severity of signs and symptoms as outlined, requirement for current medical therapies and most importantly because of direct risk to patient if care not provided in a hospital setting.           Isis Deshpande MD, MD  [facility] Hospitalist

## 2021-12-03 NOTE — PLAN OF CARE
Problem: Falls - Risk of:  Goal: Will remain free from falls  Description: Will remain free from falls  12/3/2021 1533 by Adam Menchaca RN  Outcome: Ongoing  12/3/2021 0435 by Jose Trivedi RN  Outcome: Ongoing  Goal: Absence of physical injury  Description: Absence of physical injury  12/3/2021 1533 by Adam Menchaca RN  Outcome: Ongoing  12/3/2021 0435 by Jose Trivedi RN  Outcome: Ongoing     Problem: Skin Integrity:  Goal: Will show no infection signs and symptoms  Description: Will show no infection signs and symptoms  12/3/2021 1533 by Adam Menchaca RN  Outcome: Ongoing  12/3/2021 0435 by Jose Trivedi RN  Outcome: Ongoing  Goal: Absence of new skin breakdown  Description: Absence of new skin breakdown  12/3/2021 1533 by Adam Menchaca RN  Outcome: Ongoing  12/3/2021 0435 by Jose Trivedi RN  Outcome: Ongoing

## 2021-12-03 NOTE — PROGRESS NOTES
Gastroenterology Progress Note    Tommy Keating is a 79 y.o. female patient. Hospitalization Day:4    I am seeing the patient today for anemia, loose bowels    SUBJECTIVE:    Patient seen around 11:30 AM.  At that time she was doing fine. Tolerating diet well. Has formed stools. Hemoglobin stable. Overall her status improved. Discussed with the nursing staff and they did not voice any concerns. Physical    VITALS:  BP (!) 146/73   Pulse 68   Temp 97.9 °F (36.6 °C)   Resp 18   Ht 5' (1.524 m)   Wt 191 lb (86.6 kg)   SpO2 93%   BMI 37.30 kg/m²   TEMPERATURE:  Current - Temp: 97.9 °F (36.6 °C); Max - Temp  Av.1 °F (36.7 °C)  Min: 97.9 °F (36.6 °C)  Max: 98.2 °F (36.8 °C)    General:  Alert and oriented,  No apparent distress  Skin- without jaundice  Eyes: anicteric sclera  Cardiac: RRR, Nl s1s2, without murmurs  Lungs CTA Bilaterally, normal effort  Abdomen soft, ND, NT, no HSM, Bowel sounds normal  Ext: without edema  Neuro: no asterixis     Data    Data Review:    Recent Labs     213   HGB 7.8*   HCT 25.5*     Recent Labs     21   *   K 3.7      CO2 23   BUN 6*   CREATININE 0.72     Recent Labs     218 21  0619   AST 15 15   ALT 7 9   BILIDIR 0.32*  --    BILITOT 0.75 0.55   ALKPHOS 62 58     Recent Labs     218 21  0619   LIPASE 16 29     No results for input(s): PROTIME, INR in the last 72 hours. No results for input(s): PTT in the last 72 hours. Radiology Review:        ASSESSMENT:  Active Problems:    Intractable nausea and vomiting  Resolved Problems:    * No resolved hospital problems. *      The conditions that I am treating are Stable and are improving    PLAN :  1. As far as he is concerned she is doing well. 2. Advised to stay on iron therapy. 3. Monitor hemoglobin as an outpatient. 4. To see in the office in the next 3 to 4 weeks.   5. From GI point of view, may be discharged    Thank you for allowing me to participate in the care of your patient. Please feel free to contact me with any concerns. [phone]    Cassie Torrez MD    Note is dictated utilizing voice recognition software. Unfortunately this leads to occasional typographical errors. Please contact our office if you have any questions.

## 2021-12-03 NOTE — CARE COORDINATION
ONGOING DISCHARGE PLAN:    Spoke with patient regarding discharge plan, and she confirmed that plan is home with [facility]. Referral sent and accepted. Pt had flexible sigmoidoscopy yesterday which was negative. Possible d/c home today. Will continue to follow for additional discharge needs.     Electronically signed by Joseph Lizarraga RN on 12/3/2021 at 12:22 PM

## 2021-12-03 NOTE — PROGRESS NOTES
Breath Sounds: slt wheeze  RR[de-identified] 20  Pulse Sat: 92% on room air  Home Meds: none    · Bronchodilator assessment at level:   · []    Home Level  BRONCHODILATOR ASSESSMENT SCORE  Score 0 1 2 3 4 5   Breath Sounds   []  Patient Baseline []  No Wheeze good aeration [x]  Faint, scattered wheezing, good aeration []  Expiratory Wheezing and or moderately diminished []  Insp/Exp wheeze and/or very diminished []  Insp/Exp and/ or marked distress   Respiratory Rate   []  Patient Baseline [x]  Less than 20 [x]  Less than 20 []  20-25 []  Greater than 25 []  Greater than 25   Peak flow % of Pred or PB []  NA   []  Greater than 90%  []  81-90% []  71-80% []  Less than or equal to 70%  or unable to perform []  Unable due to Respiratory Distress   Dyspnea re []  Patient Baseline [x]  No SOB []  No SOB []  SOB on exertion []  SOB min activity []  At rest/acute   e FEV% Predicted       []  NA []  Above 69%  []  Unable []  Above 60-69%  []  Unable []  Above 50-59%  []  Unable []  Above 35-49%  []  Unable []  Less than 35%  []  Unable

## 2021-12-03 NOTE — PROGRESS NOTES
Physician Progress Note      PATIENT:               Kristi Jack  CSN #:                  [id number]  :                       1940  ADMIT DATE:       2021 5:34 PM  [address] [place] DATE:  RESPONDING  PROVIDER #:        Cynthia Ernst MD          QUERY TEXT:    Pt admitted with intermittent hematochezia and has anemia documented. If   possible, please document in progress notes and discharge summary further   specificity regarding the acuity and type of anemia:      The medical record reflects the following:  Risk Factors: 79 yr old with hx GERD, Diverticulosis  Clinical Indicators: Hgb 7.2 >>6.8 >7.8, EGD/Flex sig no acute abnormalities,   Persistent Nausea and Vomiting. Colonoscopy/sigmoidoscopy few months ago did   reveal friable mucosa at the anastomosis, Intermittent Hematochezia, Fe   Saturation 4L, Iron 20L  Treatment: GI consult, EGD/Flex Sigmoidoscopy, lab monitoring, Transfused 2 U   RBC    Thank you, please contact me for any questions! Elsa Conteh RN CDI Supervisor   [phone]  Options provided:  -- Anemia due to acute on chronic blood loss  -- Anemia due to iron deficiency  -- Anemia due to acute blood loss  -- Other - I will add my own diagnosis  -- Disagree - Not applicable / Not valid  -- Disagree - Clinically unable to determine / Unknown  -- Refer to Clinical Documentation Reviewer    PROVIDER RESPONSE TEXT:    This patient has iron deficiency anemia without acute blood loss anemia.     Query created by: Wing Foster on 12/3/2021 9:22 AM      Electronically signed by:  Cynthia Ernst MD 12/3/2021 6:19 PM

## 2021-12-04 ENCOUNTER — PRE-EVALUATION (OUTPATIENT)
Dept: CARDIOLOGY | Age: 81
End: 2021-12-04

## 2021-12-04 LAB
ABSOLUTE EOS #: 0.12 K/UL (ref 0–0.4)
ABSOLUTE IMMATURE GRANULOCYTE: ABNORMAL K/UL (ref 0–0.3)
ABSOLUTE LYMPH #: 0.82 K/UL (ref 1–4.8)
ABSOLUTE MONO #: 0.47 K/UL (ref 0.1–1.3)
ALBUMIN SERPL-MCNC: 3.6 G/DL (ref 3.5–5.2)
ALBUMIN/GLOBULIN RATIO: ABNORMAL (ref 1–2.5)
ALP BLD-CCNC: 65 U/L (ref 35–104)
ALT SERPL-CCNC: 10 U/L (ref 5–33)
ANION GAP SERPL CALCULATED.3IONS-SCNC: 10 MMOL/L (ref 9–17)
AST SERPL-CCNC: 18 U/L
BASOPHILS # BLD: 1 % (ref 0–2)
BASOPHILS ABSOLUTE: 0.04 K/UL (ref 0–0.2)
BILIRUB SERPL-MCNC: 0.58 MG/DL (ref 0.3–1.2)
BUN BLDV-MCNC: 5 MG/DL (ref 8–23)
BUN/CREAT BLD: ABNORMAL (ref 9–20)
CALCIUM SERPL-MCNC: 8.9 MG/DL (ref 8.6–10.4)
CHLORIDE BLD-SCNC: 97 MMOL/L (ref 98–107)
CO2: 29 MMOL/L (ref 20–31)
CREAT SERPL-MCNC: 0.68 MG/DL (ref 0.5–0.9)
DIFFERENTIAL TYPE: ABNORMAL
EOSINOPHILS RELATIVE PERCENT: 3 % (ref 0–4)
GFR AFRICAN AMERICAN: >60 ML/MIN
GFR NON-AFRICAN AMERICAN: >60 ML/MIN
GFR SERPL CREATININE-BSD FRML MDRD: ABNORMAL ML/MIN/{1.73_M2}
GFR SERPL CREATININE-BSD FRML MDRD: ABNORMAL ML/MIN/{1.73_M2}
GLUCOSE BLD-MCNC: 105 MG/DL (ref 70–99)
HCT VFR BLD CALC: 27.3 % (ref 36–46)
HEMOGLOBIN: 8.5 G/DL (ref 12–16)
IMMATURE GRANULOCYTES: ABNORMAL %
LYMPHOCYTES # BLD: 21 % (ref 24–44)
MAGNESIUM: 1.8 MG/DL (ref 1.6–2.6)
MCH RBC QN AUTO: 23.4 PG (ref 26–34)
MCHC RBC AUTO-ENTMCNC: 31.3 G/DL (ref 31–37)
MCV RBC AUTO: 74.8 FL (ref 80–100)
MONOCYTES # BLD: 12 % (ref 1–7)
MORPHOLOGY: ABNORMAL
NRBC AUTOMATED: ABNORMAL PER 100 WBC
PDW BLD-RTO: 20 % (ref 11.5–14.9)
PLATELET # BLD: 158 K/UL (ref 150–450)
PLATELET ESTIMATE: ABNORMAL
PMV BLD AUTO: 8.3 FL (ref 6–12)
POTASSIUM SERPL-SCNC: 3.4 MMOL/L (ref 3.7–5.3)
RBC # BLD: 3.65 M/UL (ref 4–5.2)
RBC # BLD: ABNORMAL 10*6/UL
SEG NEUTROPHILS: 63 % (ref 36–66)
SEGMENTED NEUTROPHILS ABSOLUTE COUNT: 2.45 K/UL (ref 1.3–9.1)
SODIUM BLD-SCNC: 136 MMOL/L (ref 135–144)
TOTAL PROTEIN: 6.6 G/DL (ref 6.4–8.3)
WBC # BLD: 3.9 K/UL (ref 3.5–11)
WBC # BLD: ABNORMAL 10*3/UL

## 2021-12-04 PROCEDURE — 85025 COMPLETE CBC W/AUTO DIFF WBC: CPT

## 2021-12-04 PROCEDURE — 6370000000 HC RX 637 (ALT 250 FOR IP): Performed by: INTERNAL MEDICINE

## 2021-12-04 PROCEDURE — 6360000002 HC RX W HCPCS: Performed by: FAMILY MEDICINE

## 2021-12-04 PROCEDURE — 6370000000 HC RX 637 (ALT 250 FOR IP): Performed by: FAMILY MEDICINE

## 2021-12-04 PROCEDURE — 36415 COLL VENOUS BLD VENIPUNCTURE: CPT

## 2021-12-04 PROCEDURE — 83735 ASSAY OF MAGNESIUM: CPT

## 2021-12-04 PROCEDURE — 1200000000 HC SEMI PRIVATE

## 2021-12-04 PROCEDURE — 2580000003 HC RX 258: Performed by: INTERNAL MEDICINE

## 2021-12-04 PROCEDURE — 80053 COMPREHEN METABOLIC PANEL: CPT

## 2021-12-04 RX ORDER — FUROSEMIDE 10 MG/ML
40 INJECTION INTRAMUSCULAR; INTRAVENOUS ONCE
Status: COMPLETED | OUTPATIENT
Start: 2021-12-04 | End: 2021-12-04

## 2021-12-04 RX ADMIN — SODIUM CHLORIDE, PRESERVATIVE FREE 10 ML: 5 INJECTION INTRAVENOUS at 08:50

## 2021-12-04 RX ADMIN — TIMOLOL MALEATE 1 DROP: 5 SOLUTION OPHTHALMIC at 21:00

## 2021-12-04 RX ADMIN — ONDANSETRON 4 MG: 4 TABLET, ORALLY DISINTEGRATING ORAL at 15:39

## 2021-12-04 RX ADMIN — Medication 1 CAPSULE: at 17:04

## 2021-12-04 RX ADMIN — FERROUS SULFATE TAB 325 MG (65 MG ELEMENTAL FE) 325 MG: 325 (65 FE) TAB at 08:48

## 2021-12-04 RX ADMIN — DORZOLAMIDE HYDROCHLORIDE 1 DROP: 20 SOLUTION/ DROPS OPHTHALMIC at 21:00

## 2021-12-04 RX ADMIN — FAMOTIDINE 20 MG: 20 TABLET ORAL at 20:59

## 2021-12-04 RX ADMIN — FUROSEMIDE 40 MG: 10 INJECTION, SOLUTION INTRAMUSCULAR; INTRAVENOUS at 17:04

## 2021-12-04 RX ADMIN — DORZOLAMIDE HYDROCHLORIDE 1 DROP: 20 SOLUTION/ DROPS OPHTHALMIC at 08:49

## 2021-12-04 RX ADMIN — LATANOPROST 1 DROP: 50 SOLUTION OPHTHALMIC at 21:00

## 2021-12-04 RX ADMIN — ATENOLOL 100 MG: 50 TABLET ORAL at 08:48

## 2021-12-04 RX ADMIN — Medication 1 CAPSULE: at 08:47

## 2021-12-04 RX ADMIN — SODIUM CHLORIDE, PRESERVATIVE FREE 10 ML: 5 INJECTION INTRAVENOUS at 20:59

## 2021-12-04 RX ADMIN — DORZOLAMIDE HYDROCHLORIDE 1 DROP: 20 SOLUTION/ DROPS OPHTHALMIC at 15:37

## 2021-12-04 RX ADMIN — CHOLESTYRAMINE 4 G: 4 POWDER, FOR SUSPENSION ORAL at 17:18

## 2021-12-04 RX ADMIN — FAMOTIDINE 20 MG: 20 TABLET ORAL at 08:48

## 2021-12-04 RX ADMIN — DOXAZOSIN 4 MG: 1 TABLET ORAL at 17:05

## 2021-12-04 RX ADMIN — POTASSIUM CHLORIDE 40 MEQ: 1500 TABLET, EXTENDED RELEASE ORAL at 11:40

## 2021-12-04 RX ADMIN — APIXABAN 5 MG: 5 TABLET, FILM COATED ORAL at 20:59

## 2021-12-04 RX ADMIN — APIXABAN 5 MG: 5 TABLET, FILM COATED ORAL at 08:47

## 2021-12-04 RX ADMIN — TIMOLOL MALEATE 1 DROP: 5 SOLUTION OPHTHALMIC at 08:49

## 2021-12-04 RX ADMIN — LEVOTHYROXINE SODIUM 100 MCG: 0.1 TABLET ORAL at 05:58

## 2021-12-04 ASSESSMENT — ENCOUNTER SYMPTOMS
CHEST TIGHTNESS: 0
EYE REDNESS: 0
ABDOMINAL PAIN: 0
EYE ITCHING: 0
SHORTNESS OF BREATH: 1
COUGH: 0
TROUBLE SWALLOWING: 0
VOMITING: 0
BLOOD IN STOOL: 0
COLOR CHANGE: 0
NAUSEA: 0

## 2021-12-04 NOTE — PLAN OF CARE
Problem: Falls - Risk of:  Goal: Will remain free from falls  Description: Will remain free from falls  Outcome: Ongoing  Note: Up with assistance ,alert and oriented x4 calls out appropriately  Goal: Absence of physical injury  Description: Absence of physical injury  Outcome: Ongoing     Problem: Skin Integrity:  Goal: Will show no infection signs and symptoms  Description: Will show no infection signs and symptoms  Outcome: Ongoing  Note: No new skin breakdown noted  Goal: Absence of new skin breakdown  Description: Absence of new skin breakdown  Outcome: Ongoing     Problem: Cardiac:  Goal: Ability to maintain an adequate cardiac output will improve  Description: Ability to maintain an adequate cardiac output will improve  Outcome: Ongoing  Goal: Hemodynamic stability will improve  Description: Hemodynamic stability will improve  Outcome: Ongoing     Problem: Fluid Volume:  Goal: Ability to achieve and maintain adequate urine output will improve  Description: Ability to achieve and maintain adequate urine output will improve  Outcome: Ongoing  Note: Shortness of breath with exertion,lasix IV given with good results. Daily wt and I&O     Problem: Respiratory:  Goal: Respiratory status will improve  Description: Respiratory status will improve  Outcome: Ongoing

## 2021-12-04 NOTE — PROGRESS NOTES
TABS tablet Take 5 mg by mouth 2 times daily 2/2/21   Historical Provider, MD   ofloxacin (FLOXIN) 0.3 % otic solution Place 4 drops into both ears 2 times daily    Historical Provider, MD   Lactobacillus (ACIDOPHILUS/PECTIN PO) Take 100 mg by mouth daily    Historical Provider, MD   RHOPRESSA 0.02 % SOLN INSTILL 1 DROP INTO AFFECTED EYE(S) BY OPHTHALMIC ROUTE ONCE DAILY IN THE EVENING 7/24/20   Historical Provider, MD   timolol (TIMOPTIC) 0.5 % ophthalmic solution ONE DROP TWICE A DAY INTO [facility] EYE 8/3/20   Historical Provider, MD   dorzolamide (TRUSOPT) 2 % ophthalmic solution INSTILL 1 DROP BY OPHTHALMIC ROUTE 2 TIMES EVERY DAY IN BOTH EYES 11/9/18   Historical Provider, MD   famotidine (PEPCID) 40 MG tablet Take 1 tablet by mouth daily 10/22/18   Efe Roberts MD   NONFORMULARY Indications: Advanced anti-oxidant w/echinacea and garlic    Historical Provider, MD   Cholecalciferol (VITAMIN D3) 5000 UNITS TABS Take 1 tablet by mouth daily    Historical Provider, MD   Cranberry 450 MG CAPS Take 450 mg by mouth daily     Historical Provider, MD   cholestyramine Shahida Piano) 4 G packet Take 1 packet by mouth every evening  4/14/16   Historical Provider, MD   latanoprost (XALATAN) 0.005 % ophthalmic solution Place 1 drop into both eyes nightly    Historical Provider, MD        Hospital Meds:    No current facility-administered medications for this visit. No current outpatient medications on file.      Facility-Administered Medications Ordered in Other Visits   Medication Dose Route Frequency Provider Last Rate Last Admin    doxazosin (CARDURA) tablet 4 mg  4 mg Oral Dinner Chandan Alexandra MD   4 mg at 12/03/21 1606    apixaban (ELIQUIS) tablet 5 mg  5 mg Oral BID Chnadan Alexandra MD   5 mg at 12/04/21 0847    albuterol (PROVENTIL) nebulizer solution 2.5 mg  2.5 mg Nebulization Q4H PRN Chandan Alexandra MD   2.5 mg at 12/03/21 1801    ferrous sulfate (IRON 325) tablet 325 mg  325 mg Oral Daily with breakfast Trang Nieto MD   325 mg at 12/04/21 0848    lactobacillus (CULTURELLE) capsule 1 capsule  1 capsule Oral BID  Krysta Calzada MD   1 capsule at 12/04/21 0847    atenolol (TENORMIN) tablet 100 mg  100 mg Oral Daily Ora Dakin, MD   100 mg at 12/04/21 0848    dorzolamide (TRUSOPT) 2 % ophthalmic solution 1 drop  1 drop Both Eyes TID Ora Dakin, MD   1 drop at 12/04/21 0849    famotidine (PEPCID) tablet 20 mg  20 mg Oral BID Ora Dakin, MD   20 mg at 12/04/21 0848    latanoprost (XALATAN) 0.005 % ophthalmic solution 1 drop  1 drop Both Eyes Nightly Ora Dakin, MD   1 drop at 12/03/21 2219    levothyroxine (SYNTHROID) tablet 100 mcg  100 mcg Oral Daily Ora Dakin, MD   100 mcg at 12/04/21 0558    timolol (TIMOPTIC) 0.5 % ophthalmic solution 1 drop  1 drop Both Eyes BID Ora Dakin, MD   1 drop at 12/04/21 0849    potassium chloride (KLOR-CON M) extended release tablet 40 mEq  40 mEq Oral PRN Ora Dakin, MD        Or    potassium bicarb-citric acid (EFFER-K) effervescent tablet 40 mEq  40 mEq Oral PRN Ora Dakin, MD        Or    potassium chloride 10 mEq/100 mL IVPB (Peripheral Line)  10 mEq IntraVENous PRN Ora Dakin, MD        cholestyramine light packet 4 g  4 g Oral QPM Ora Dakin, MD   4 g at 12/03/21 1748    0.9 % sodium chloride infusion   IntraVENous PRN Ora Dakin, MD        sodium chloride flush 0.9 % injection 5-40 mL  5-40 mL IntraVENous 2 times per day Ora Dakin, MD   10 mL at 12/04/21 0850    sodium chloride flush 0.9 % injection 5-40 mL  5-40 mL IntraVENous PRN Ora Dakin, MD        0.9 % sodium chloride infusion  25 mL IntraVENous PRN Ora Dakin, MD        acetaminophen (TYLENOL) tablet 650 mg  650 mg Oral Q4H PRN Ora Dakin, MD   650 mg at 12/03/21 2304    ondansetron (ZOFRAN-ODT) disintegrating tablet 4 mg  4 mg Oral Q8H PRN Joaquim Brown MD        Or    ondansetron (ZOFRAN) injection 4 mg  4 mg IntraVENous Q6H PRN Joaquim Brown MD           Social History:       TOBACCO:   reports that she has never smoked. She has never used smokeless tobacco.  ETOH:   reports no history of alcohol use. DRUGS:  reports no history of drug use. OCCUPATION:          Family Histroy:         Adopted: Yes   Problem Relation Age of Onset    No Known Problems Mother     No Known Problems Father            Review of Systems:   · Constitutional: there has been no unanticipated weight loss. There's been no change in energy level, sleep pattern, or activity level. · Eyes: No visual changes or diplopia. No scleral icterus. · ENT: No Headaches, hearing loss or vertigo. No mouth sores or sore throat. · Cardiovascular: No chest pain, dyspnea on exertion, palpitations or loss of consciousness. No cough, hemoptysis, pleuritic pain, or phlebitis. · Respiratory: No cough or wheezing, no sputum production. No hematemesis. · Gastrointestinal: No abdominal pain, appetite loss, blood in stools. No change in bowel or bladder habits. · Genitourinary: No dysuria, trouble voiding, or hematuria. · Musculoskeletal:  No gait disturbance, weakness or joint complaints. · Integumentary: No rash or pruritis. · Neurological: No headache, diplopia, change in muscle strength, numbness or tingling. No change in gait, balance, coordination, mood, affect, memory, mentation, behavior. · Psychiatric: No anxiety, or depression. · Endocrine: No temperature intolerance. No excessive thirst, fluid intake, or urination. No tremor. · Hematologic/Lymphatic: No abnormal bruising or bleeding, blood clots or swollen lymph nodes. · Allergic/Immunologic: No nasal congestion or hives. Physical Exam    Vital Signs: There were no vitals taken for this visit.        Admission       General appearance: Awake, Alert Cooperative    Head: Normocephalic, without obvious abnormality, atraumatic    Eyes: Conjunctivae/corneas clear. PERRL, EOM's intact. Fundi benign    Neck: no adenopathy, no carotid bruit, no JVD, supple, symmetrical, trachea midline and thyroid: not enlarged, symmetric, no tenderness/mass/nodules    Lungs: clear to auscultation bilaterally    Heart: regular rate and rhythm, S1, S2 normal, no murmur, click, rub or gallop    Abdomen: Soft, non-tender. Bowel sounds normal. No masses,  no organomegaly    Extremities: extremities normal, atraumatic, no cyanosis or edema    Skin: Skin color, texture, turgor normal. No rashes or lesions    Neurologic: Grossly normal            Labs:      CBC:   Recent Labs     12/04/21 0627   WBC 3.9   HGB 8.5*   HCT 27.3*   MCV 74.8*        BMP:   Recent Labs     12/03/21  0619 12/04/21  0627   * 136   K 3.7 3.4*    97*   CO2 23 29   BUN 6* 5*   CREATININE 0.72 0.68     PT/INR: No results for input(s): PROTIME, INR in the last 72 hours. APTT: No results for input(s): APTT in the last 72 hours. MAG:   Recent Labs     12/04/21  0627   MG 1.8     D Dimer: No results for input(s): DDIMER in the last 72 hours. Troponin T No results for input(s): TROPHS in the last 72 hours. ProBNP Invalid input(s): PRO-BNP    CT ABDOMEN PELVIS W IV CONTRAST Additional Contrast? None    Result Date: 11/30/2021  Periportal edema which is superimposed on the patient's baseline intrahepatic and extrahepatic biliary ductal dilatation in the setting of prior cholecystectomy. This could be related to congestive heart failure or possibly volume resuscitation as the IVC is somewhat prominent, though other considerations include inflammatory processes within the liver and biliary tree such as hepatitis or cholangitis. Clinical correlation is required. Trace perihepatic and perisplenic ascites with mild nonspecific graying of the pelvic fat, possibly related to volume status or underlying infectious/inflammatory etiology.  Postoperative changes

## 2021-12-04 NOTE — PROGRESS NOTES
Progress Note    12/4/2021   1:57 PM    Name:  Tona Leahy  MRN:    [id number]     Acct:     [de-identified]   Room:  2055/2055-01  IP Day: 5     Admit Date: 11/29/2021  5:34 PM  PCP: Miguel Azar MD    Subjective:     C/C:   Chief Complaint   Patient presents with   Wallace Nausea & Vomiting    Cough       Interval History: Status: not changed. Patient has shortness of breath with minimal ambulation denies cough or chest pain. Have been no reports of nausea or vomiting. Vital signs reviewed patient oxygen saturation is 94% on room air. Recent labs reviewed potassium 3.4, hemoglobin 8.5    ROS:   all 10 systems reviewed and are negative except as noted    Review of Systems   Constitutional: Negative for chills and fatigue. HENT: Negative for drooling, mouth sores, sneezing and trouble swallowing. Eyes: Negative for redness and itching. Respiratory: Positive for shortness of breath. Negative for cough and chest tightness. Cardiovascular: Negative for chest pain, palpitations and leg swelling. Gastrointestinal: Negative for abdominal pain, blood in stool, nausea and vomiting. Endocrine: Negative for heat intolerance and polyphagia. Genitourinary: Negative for difficulty urinating, flank pain and pelvic pain. Musculoskeletal: Negative for arthralgias, joint swelling and neck stiffness. Skin: Negative for color change and pallor. Allergic/Immunologic: Negative for food allergies. Neurological: Negative for dizziness, seizures and headaches. Hematological: Does not bruise/bleed easily. Psychiatric/Behavioral: Negative for agitation, behavioral problems and suicidal ideas. The patient is not hyperactive. Medications: Allergies:    Allergies   Allergen Reactions    Avapro [Irbesartan]     Ciprofloxacin Hcl Nausea Only    Cozaar [Losartan Potassium]     Diovan [Valsartan]     Lipitor [Atorvastatin Calcium]     Lisinopril     Pcn [Penicillins]     Pravachol [employer] Sodium]      myalgias    Tape Justino  Tape] Dermatitis    Tramadol Swelling    Zetia [Ezetimibe]     Morphine Nausea And Vomiting       Current Meds: furosemide (LASIX) injection 40 mg, Once  doxazosin (CARDURA) tablet 4 mg, Dinner  apixaban (ELIQUIS) tablet 5 mg, BID  albuterol (PROVENTIL) nebulizer solution 2.5 mg, Q4H PRN  ferrous sulfate (IRON 325) tablet 325 mg, Daily with breakfast  lactobacillus (CULTURELLE) capsule 1 capsule, BID WC  atenolol (TENORMIN) tablet 100 mg, Daily  dorzolamide (TRUSOPT) 2 % ophthalmic solution 1 drop, TID  famotidine (PEPCID) tablet 20 mg, BID  latanoprost (XALATAN) 0.005 % ophthalmic solution 1 drop, Nightly  levothyroxine (SYNTHROID) tablet 100 mcg, Daily  timolol (TIMOPTIC) 0.5 % ophthalmic solution 1 drop, BID  potassium chloride (KLOR-CON M) extended release tablet 40 mEq, PRN   Or  potassium bicarb-citric acid (EFFER-K) effervescent tablet 40 mEq, PRN   Or  potassium chloride 10 mEq/100 mL IVPB (Peripheral Line), PRN  cholestyramine light packet 4 g, QPM  0.9 % sodium chloride infusion, PRN  sodium chloride flush 0.9 % injection 5-40 mL, 2 times per day  sodium chloride flush 0.9 % injection 5-40 mL, PRN  0.9 % sodium chloride infusion, PRN  acetaminophen (TYLENOL) tablet 650 mg, Q4H PRN  ondansetron (ZOFRAN-ODT) disintegrating tablet 4 mg, Q8H PRN   Or  ondansetron (ZOFRAN) injection 4 mg, Q6H PRN        Data:     Code Status:  Full Code    Family History   Adopted: Yes   Problem Relation Age of Onset    No Known Problems Mother     No Known Problems Father        Social History     Socioeconomic History    Marital status:       Spouse name: Not on file    Number of children: Not on file    Years of education: Not on file    Highest education level: Not on file   Occupational History    Not on file   Tobacco Use    Smoking status: Never Smoker    Smokeless tobacco: Never Used   Vaping Use    Vaping Use: Never used   Substance and Sexual Activity    volume resuscitation as the IVC is somewhat prominent, though other considerations include inflammatory processes within the liver and biliary tree such as hepatitis or cholangitis. Clinical correlation is required. Trace perihepatic and perisplenic ascites with mild nonspecific graying of the pelvic fat, possibly related to volume status or underlying infectious/inflammatory etiology. Postoperative changes of the bowel with patulous anastomoses, but no bowel obstruction. Colonic diverticulosis without diverticulitis. XR CHEST PORTABLE    Result Date: 12/3/2021  Slight increase in the interstitial prominence and bibasilar opacities, concerning for worsening edema or pneumonia. Small bilateral pleural effusions. XR CHEST PORTABLE    Result Date: 11/30/2021  Mild basal predominant pulmonary opacities with some interstitial changes. Findings may be due to edema or infection in the appropriate clinical setting. Small bilateral pleural effusions. CT CHEST PULMONARY EMBOLISM W CONTRAST    Result Date: 11/30/2021  Suboptimal opacification of the pulmonary arterial tree significantly limiting assessment for pulmonary emboli. No definite thrombus in the main pulmonary arteries. Mild patchy ground-glass opacities throughout both lungs resulting in mosaic attenuation. In the setting of bronchial wall thickening this is favored to be secondary to underlying acute or chronic airways disease. Mild edema or sequelae of viral pneumonia are potential differential considerations. Small to moderate layering bilateral pleural effusions. Cardiomegaly. No pericardial effusion.        Labs:  Recent Results (from the past 24 hour(s))   CBC with DIFF    Collection Time: 12/04/21  6:27 AM   Result Value Ref Range    WBC 3.9 3.5 - 11.0 k/uL    RBC 3.65 (L) 4.0 - 5.2 m/uL    Hemoglobin 8.5 (L) 12.0 - 16.0 g/dL    Hematocrit 27.3 (L) 36 - 46 %    MCV 74.8 (L) 80 - 100 fL    MCH 23.4 (L) 26 - 34 pg    MCHC 31.3 31 - 37 g/dL    RDW 20.0 (H) 11.5 - 14.9 %    Platelets 036 411 - 524 k/uL    MPV 8.3 6.0 - 12.0 fL    NRBC Automated NOT REPORTED per 100 WBC    Differential Type NOT REPORTED     Immature Granulocytes NOT REPORTED 0 %    Absolute Immature Granulocyte NOT REPORTED 0.00 - 0.30 k/uL    WBC Morphology NOT REPORTED     RBC Morphology NOT REPORTED     Platelet Estimate NOT REPORTED     Seg Neutrophils 63 36 - 66 %    Lymphocytes 21 (L) 24 - 44 %    Monocytes 12 (H) 1 - 7 %    Eosinophils % 3 0 - 4 %    Basophils 1 0 - 2 %    Segs Absolute 2.45 1.3 - 9.1 k/uL    Absolute Lymph # 0.82 (L) 1.0 - 4.8 k/uL    Absolute Mono # 0.47 0.1 - 1.3 k/uL    Absolute Eos # 0.12 0.0 - 0.4 k/uL    Basophils Absolute 0.04 0.0 - 0.2 k/uL    Morphology ANISOCYTOSIS PRESENT     Morphology HYPOCHROMIA PRESENT     Morphology MICROCYTOSIS PRESENT     Morphology 1+ POLYCHROMASIA     Morphology FEW ELLIPTOCYTES    Comprehensive Metabolic Panel w/ Reflex to MG    Collection Time: 12/04/21  6:27 AM   Result Value Ref Range    Glucose 105 (H) 70 - 99 mg/dL    BUN 5 (L) 8 - 23 mg/dL    CREATININE 0.68 0.50 - 0.90 mg/dL    Bun/Cre Ratio NOT REPORTED 9 - 20    Calcium 8.9 8.6 - 10.4 mg/dL    Sodium 136 135 - 144 mmol/L    Potassium 3.4 (L) 3.7 - 5.3 mmol/L    Chloride 97 (L) 98 - 107 mmol/L    CO2 29 20 - 31 mmol/L    Anion Gap 10 9 - 17 mmol/L    Alkaline Phosphatase 65 35 - 104 U/L    ALT 10 5 - 33 U/L    AST 18 <32 U/L    Total Bilirubin 0.58 0.3 - 1.2 mg/dL    Total Protein 6.6 6.4 - 8.3 g/dL    Albumin 3.6 3.5 - 5.2 g/dL    Albumin/Globulin Ratio NOT REPORTED 1.0 - 2.5    GFR Non-African American >60 >60 mL/min    GFR African American >60 >60 mL/min    GFR Comment          GFR Staging NOT REPORTED    Magnesium    Collection Time: 12/04/21  6:27 AM   Result Value Ref Range    Magnesium 1.8 1.6 - 2.6 mg/dL       Physical Examination:        Vitals:  BP (!) 142/65   Pulse 71   Temp 97.9 °F (36.6 °C)   Resp 20   Ht 5' (1.524 m)   Wt 192 lb (87.1 kg)   SpO2 94% BMI 37.50 kg/m²   Temp (24hrs), Av.9 °F (36.6 °C), Min:97.9 °F (36.6 °C), Max:97.9 °F (36.6 °C)    Recent Labs     21  1224 21  1319   POCGLU 95 100         Physical Exam  Vitals reviewed. HENT:      Head: Normocephalic. Right Ear: External ear normal.      Left Ear: External ear normal.      Nose: Nose normal.      Mouth/Throat:      Mouth: Mucous membranes are moist.      Pharynx: Oropharynx is clear. Eyes:      Conjunctiva/sclera: Conjunctivae normal.   Cardiovascular:      Rate and Rhythm: Normal rate and regular rhythm. Pulses: Normal pulses. Heart sounds: Normal heart sounds. Pulmonary:      Effort: Pulmonary effort is normal.      Breath sounds: Examination of the right-lower field reveals rales. Examination of the left-lower field reveals rales. Rales present. No wheezing. Abdominal:      General: Bowel sounds are normal.      Palpations: Abdomen is soft. Musculoskeletal:         General: No deformity. Cervical back: Normal range of motion and neck supple. Right lower leg: No edema. Left lower leg: No edema. Skin:     General: Skin is warm. Capillary Refill: Capillary refill takes less than 2 seconds. Coloration: Skin is not jaundiced. Neurological:      General: No focal deficit present. Mental Status: She is alert. Mental status is at baseline. Psychiatric:         Mood and Affect: Mood normal.         Behavior: Behavior normal.         Assessment:        Primary Problem  Intractable nausea and vomiting     Principal Problem:    Intractable nausea and vomiting  Active Problems:    Essential hypertension    Iron deficiency anemia    Paroxysmal atrial fibrillation (HCC)  Resolved Problems:    * No resolved hospital problems.  *      Past Medical History:   Diagnosis Date    Atrial fibrillation ([facility] [address] 3/28/2014    Chronic back pain     with rt. leg pain    Diverticulosis     GERD (gastroesophageal reflux disease)     Hyperlipidemia     Hypertension     Hypothyroidism     Obesity (BMI 30.0-34. 9) 8/12/2016    Onychomycosis     PE (pulmonary embolism)     H/O DVT of rt leg    Type II or unspecified type diabetes mellitus without mention of complication, not stated as uncontrolled         Plan:        1. Lasix 40 mg IV now  1. 2D echo  2. CBC, CMP  3. Continue ferrous sulfate 325 mg p.o. twice daily  4. DVT Prophylaxis Eliquis 5 mg p.o. daily  5. EPCs  6. PT/OT to evaluate and treat  7. Pain control  8. Replace electrolytes as per sliding scale  9. Home medications reviewed and appropriate medications continued  10.  Reviewed labs and imaging studies from last 24 hours and results explained to patient      Electronically signed by Rizwana Gilbert MD

## 2021-12-04 NOTE — PLAN OF CARE
Problem: Falls - Risk of:  Goal: Will remain free from falls  Description: Will remain free from falls  12/4/2021 0326 by Willaim Cockayne, RN  Outcome: Ongoing  Note: Patient remains free of falls and injuries throughout shift. Bed remains in the lowest position, wheels locked, call light and bedside table are within reach. 12/3/2021 1533 by Stan Em RN  Outcome: Ongoing  Goal: Absence of physical injury  Description: Absence of physical injury  12/4/2021 0326 by Willaim Cockayne, RN  Outcome: Ongoing  12/3/2021 1533 by Stan Em RN  Outcome: Ongoing     Problem: Skin Integrity:  Goal: Will show no infection signs and symptoms  Description: Will show no infection signs and symptoms  12/4/2021 0326 by Willaim Cockayne, RN  Outcome: Ongoing  12/3/2021 1533 by Stan Em RN  Outcome: Ongoing  Goal: Absence of new skin breakdown  Description: Absence of new skin breakdown  12/4/2021 0326 by Willaim Cockayne, RN  Outcome: Ongoing  Note: Assess the overall condition of the skin. Check on bony prominences such as the sacrum, trochanters, scapulae, elbows, heels, inner and outer malleolus, inner and outer knees, back of head). Reinforce the importance of turning, mobility, and ambulation.    12/3/2021 1533 by Stan Em RN  Outcome: Ongoing

## 2021-12-04 NOTE — DISCHARGE INSTR - COC
Continuity of Care Form    Patient Name: Margarita Warner   :  1940  MRN:  [id number]    Admit date:  2021  Discharge date:  2021    Code Status Order: Full Code   Advance Directives:   Advance Care Flowsheet Documentation       Date/Time Healthcare Directive Type of Healthcare Directive Copy in [address] Agent's Name Healthcare Agent's Phone Number    21 1212 Yes, patient has an advance directive for healthcare treatment -- Yes, copy in chart -- -- --            Admitting Physician:  Tejal Bonner MD  PCP: Leoncio Beltran MD    Discharging Nurse: [facility] Unit/Room#: 4320/8748-81  Discharging Unit Phone Number: [phone]    Emergency Contact:   Extended Emergency Contact Information  Primary Emergency Contact: Caleb Lama  Address: [address] [place] [address] Phone: [phone]  Relation: Child  Secondary Emergency Contact: [address] [place] [address] Phone: [phone]  Relation: Child    Past Surgical History:  Past Surgical History:   Procedure Laterality Date    CHOLECYSTECTOMY      COLON SURGERY      s/p sigmoid cloectomy    COLONOSCOPY N/A 2021    COLONOSCOPY DIAGNOSTIC performed by Claude Orr MD at [address] [place] [address] N/A 2021    [address] [place] [address] performed by Claude [facility]r MD at [facility] 2019    DR IMAN GAUTHIER    UPPER GASTROINTESTINAL ENDOSCOPY N/A 2021    EGD DIAGNOSTIC ONLY performed by Claude Orr MD at [address] [place] [address]      s/p       Immunization History:   Immunization History   Administered Date(s) Administered    COVID-19, TERESA Iyer, 30mcg/0.3mL 2021, 2021, 10/26/2021    Influenza 2013    Influenza Virus Vaccine 10/16/2014, 10/23/2015    Influenza, High Dose (Fluzone 65 yrs and older) 2021    Influenza, High-dose, Quadv, 65 yrs +, IM (Fluzone) 10/14/2020    Influenza, Quadv, IM, PF (6 mo and older Fluzone, Flulaval, Fluarix, and 3 yrs and older Afluria) 11/04/2016, 10/12/2017, 10/22/2018    Influenza, Triv, inactivated, subunit, adjuvanted, IM (Fluad 65 yrs and older) 10/07/2019    Pneumococcal Conjugate 13-valent ([id number]) 04/11/2016    Pneumococcal Polysaccharide ([id number]) 10/01/2002, 02/13/2014    Td, unspecified formulation 10/01/2002       Active Problems:  Patient Active Problem List   Diagnosis Code    Mixed hyperlipidemia E78.2    Gastroesophageal reflux disease without esophagitis K21.9    Onychomycosis B35.1    Diverticulosis K57.90    Chronic back pain M54.9, G89.29    Hypothyroidism E03.9    Essential hypertension I10    Right bundle branch block I45.10    Chronic pulmonary embolism (HCC) I27.82    Type 2 diabetes mellitus without complication, without long-term current use of insulin (HCC) E11.9    Obesity (BMI 30.0-34. 9) E66.9    Tracheobronchitis J40    Allergic rhinitis J30.9    Glaucoma H40.9    S/p bilateral myringotomy with tube placement Z96.22    GI bleed K92.2    Iron deficiency anemia D50.9    Paroxysmal atrial fibrillation (HCC) I48.0    Hemorrhage of rectum and anus K62.5    Intractable nausea and vomiting R11.2       Isolation/Infection:   Isolation            Contact          Patient Infection Status       Infection Onset Added Last Indicated Last Indicated By Review Planned Expiration Resolved Resolved By    ESBL (Extended Spectrum Beta Lactamase)  10/17/16 10/17/16 Lalo Sykes RN        Klebsiella - Urine 10/2016      Resolved    C-diff Rule Out 11/30/21 11/30/21 12/01/21 C DIFF TOXIN/ANTIGEN (Ordered)   12/01/21 Rule-Out Test Resulted    COVID-19 (Rule Out) 11/29/21 11/29/21 11/29/21 COVID-19 (Ordered)   11/30/21 Rule-Out Test Resulted    COVID-19 (Rule Out) 11/29/21 11/29/21 11/29/21 SARS-CoV-2 NAAT (Rapid) (Ordered)   11/29/21 Rule-Out Test Resulted            Nurse Assessment:  Last Vital Signs: BP (!) 148/65   Pulse 64   Temp 97.9 °F (36.6 °C) (Oral)   Resp 20   Ht 5' (1.524 m)   Wt 192 lb (87.1 kg)   SpO2 95%   BMI 37.50 kg/m²     Last documented pain score (0-10 scale): Pain Level: 3  Last Weight:   Wt Readings from Last 1 Encounters:   12/04/21 192 lb (87.1 kg)     Mental Status:  oriented and alert    IV Access:  - None    Nursing Mobility/ADLs:  Walking   Independent  Transfer  Independent  Bathing  Assisted  Dressing  Independent  [address]  Independent  Med Delivery   whole    Wound Care Documentation and Therapy:        Elimination:  Continence: Bowel: Yes  Bladder: Yes  Urinary Catheter: None   Colostomy/Ileostomy/Ileal Conduit: No       Date of Last BM:    Intake/Output Summary (Last 24 hours) at 12/4/2021 1123  Last data filed at 12/4/2021 0900  Gross per 24 hour   Intake --   Output 5250 ml   Net -5250 ml     I/O last 3 completed shifts:  In: -   Out: 4800 [Urine:4800]    Safety Concerns:     None    Impairments/Disabilities:      Vision and Hearing    Nutrition Therapy:  Current Nutrition Therapy:   - Oral Diet:  General    Routes of Feeding: Oral  Liquids: Thin Liquids  Daily Fluid Restriction: no  Last Modified Barium Swallow with Video (Video Swallowing Test): not done    Treatments at the Time of Hospital Discharge:   Respiratory Treatments: none  Oxygen Therapy:  is not on home oxygen therapy. Ventilator:    - No ventilator support    Rehab Therapies: Physical Therapy and Occupational Therapy  Weight Bearing Status/Restrictions: No weight bearing restirctions  Other Medical Equipment (for information only, NOT a DME order):  wheelchair and cane  Other Treatments: Skilled nursing assessment and monitoring. Medication education and monitoring.     Patient's personal belongings (please select all that are sent with patient):  [place]    RN SIGNATURE:  Electronically signed by Elisha Gonzales RN on 12/6/21 at 1:46 PM EST    CASE MANAGEMENT/SOCIAL WORK SECTION    Inpatient Status Date: 11/29/2021    Readmission Risk Assessment Score:  Readmission Risk              Risk of Unplanned Readmission:  16           Discharging to Facility/ [address]  P: [phone]  F: [phone]     Dialysis Facility (if applicable)   Name:  Address:  Dialysis Schedule:  Phone:  Fax:    / signature: Electronically signed by Zona Schwab, RN on 12/4/21 at 11:24 AM EST    PHYSICIAN SECTION    Prognosis: Good    Condition at Discharge: Stable    Rehab Potential (if transferring to Rehab): Good    Recommended Labs or Other Treatments After Discharge: none    Physician Certification: I certify the above information and transfer of Myrna Thompson  is necessary for the continuing treatment of the diagnosis listed and that she requires Home Care for greater 30 days.      Update Admission H&P: No change in H&P    PHYSICIAN SIGNATURE:  Electronically signed by Wili Mayes MD on 12/7/21 at 5:41 PM EST

## 2021-12-04 NOTE — CARE COORDINATION
ONGOING DISCHARGE PLAN:    Patient is alert and oriented x4. Spoke with patient regarding discharge plan and patient confirms that plan is still to return home with [facility]. Pt had flex sigmoid which was negative. Hgb 8.5 today. Cardiology ordered lexiscan stress test and echo. Will continue to follow for additional discharge needs.     Electronically signed by Debbie Lees RN on 12/4/2021 at 11:19 AM

## 2021-12-05 ENCOUNTER — PRE-EVALUATION (OUTPATIENT)
Dept: CARDIOLOGY | Age: 81
End: 2021-12-05

## 2021-12-05 LAB
ABSOLUTE EOS #: 0.12 K/UL (ref 0–0.4)
ABSOLUTE IMMATURE GRANULOCYTE: ABNORMAL K/UL (ref 0–0.3)
ABSOLUTE LYMPH #: 0.82 K/UL (ref 1–4.8)
ABSOLUTE MONO #: 0.41 K/UL (ref 0.1–1.3)
ALBUMIN SERPL-MCNC: 3.4 G/DL (ref 3.5–5.2)
ALBUMIN/GLOBULIN RATIO: ABNORMAL (ref 1–2.5)
ALP BLD-CCNC: 63 U/L (ref 35–104)
ALT SERPL-CCNC: 10 U/L (ref 5–33)
ANION GAP SERPL CALCULATED.3IONS-SCNC: 9 MMOL/L (ref 9–17)
AST SERPL-CCNC: 21 U/L
BASOPHILS # BLD: 1 % (ref 0–2)
BASOPHILS ABSOLUTE: 0.04 K/UL (ref 0–0.2)
BILIRUB SERPL-MCNC: 0.49 MG/DL (ref 0.3–1.2)
BUN BLDV-MCNC: 4 MG/DL (ref 8–23)
BUN/CREAT BLD: ABNORMAL (ref 9–20)
CALCIUM SERPL-MCNC: 9 MG/DL (ref 8.6–10.4)
CHLORIDE BLD-SCNC: 97 MMOL/L (ref 98–107)
CO2: 32 MMOL/L (ref 20–31)
CREAT SERPL-MCNC: 0.83 MG/DL (ref 0.5–0.9)
DIFFERENTIAL TYPE: ABNORMAL
EOSINOPHILS RELATIVE PERCENT: 3 % (ref 0–4)
GFR AFRICAN AMERICAN: >60 ML/MIN
GFR NON-AFRICAN AMERICAN: >60 ML/MIN
GFR SERPL CREATININE-BSD FRML MDRD: ABNORMAL ML/MIN/{1.73_M2}
GFR SERPL CREATININE-BSD FRML MDRD: ABNORMAL ML/MIN/{1.73_M2}
GLUCOSE BLD-MCNC: 146 MG/DL (ref 70–99)
HCT VFR BLD CALC: 27.1 % (ref 36–46)
HEMOGLOBIN: 8.3 G/DL (ref 12–16)
IMMATURE GRANULOCYTES: ABNORMAL %
LYMPHOCYTES # BLD: 20 % (ref 24–44)
MCH RBC QN AUTO: 23 PG (ref 26–34)
MCHC RBC AUTO-ENTMCNC: 30.8 G/DL (ref 31–37)
MCV RBC AUTO: 74.7 FL (ref 80–100)
MONOCYTES # BLD: 10 % (ref 1–7)
MORPHOLOGY: ABNORMAL
NRBC AUTOMATED: ABNORMAL PER 100 WBC
PDW BLD-RTO: 20.1 % (ref 11.5–14.9)
PLATELET # BLD: 149 K/UL (ref 150–450)
PLATELET ESTIMATE: ABNORMAL
PMV BLD AUTO: 7.8 FL (ref 6–12)
POTASSIUM SERPL-SCNC: 3.8 MMOL/L (ref 3.7–5.3)
RBC # BLD: 3.62 M/UL (ref 4–5.2)
RBC # BLD: ABNORMAL 10*6/UL
SEG NEUTROPHILS: 66 % (ref 36–66)
SEGMENTED NEUTROPHILS ABSOLUTE COUNT: 2.71 K/UL (ref 1.3–9.1)
SODIUM BLD-SCNC: 138 MMOL/L (ref 135–144)
TOTAL PROTEIN: 6.3 G/DL (ref 6.4–8.3)
WBC # BLD: 4.1 K/UL (ref 3.5–11)
WBC # BLD: ABNORMAL 10*3/UL

## 2021-12-05 PROCEDURE — 6370000000 HC RX 637 (ALT 250 FOR IP): Performed by: FAMILY MEDICINE

## 2021-12-05 PROCEDURE — 1200000000 HC SEMI PRIVATE

## 2021-12-05 PROCEDURE — 6360000002 HC RX W HCPCS: Performed by: FAMILY MEDICINE

## 2021-12-05 PROCEDURE — 85025 COMPLETE CBC W/AUTO DIFF WBC: CPT

## 2021-12-05 PROCEDURE — 36415 COLL VENOUS BLD VENIPUNCTURE: CPT

## 2021-12-05 PROCEDURE — 2580000003 HC RX 258: Performed by: INTERNAL MEDICINE

## 2021-12-05 PROCEDURE — 6370000000 HC RX 637 (ALT 250 FOR IP): Performed by: INTERNAL MEDICINE

## 2021-12-05 PROCEDURE — 80053 COMPREHEN METABOLIC PANEL: CPT

## 2021-12-05 RX ORDER — FUROSEMIDE 10 MG/ML
40 INJECTION INTRAMUSCULAR; INTRAVENOUS ONCE
Status: COMPLETED | OUTPATIENT
Start: 2021-12-05 | End: 2021-12-05

## 2021-12-05 RX ADMIN — SODIUM CHLORIDE, PRESERVATIVE FREE 10 ML: 5 INJECTION INTRAVENOUS at 09:33

## 2021-12-05 RX ADMIN — FUROSEMIDE 40 MG: 10 INJECTION, SOLUTION INTRAMUSCULAR; INTRAVENOUS at 17:11

## 2021-12-05 RX ADMIN — APIXABAN 5 MG: 5 TABLET, FILM COATED ORAL at 09:28

## 2021-12-05 RX ADMIN — LEVOTHYROXINE SODIUM 100 MCG: 0.1 TABLET ORAL at 06:17

## 2021-12-05 RX ADMIN — TIMOLOL MALEATE 1 DROP: 5 SOLUTION OPHTHALMIC at 18:10

## 2021-12-05 RX ADMIN — CHOLESTYRAMINE 4 G: 4 POWDER, FOR SUSPENSION ORAL at 17:13

## 2021-12-05 RX ADMIN — Medication 1 CAPSULE: at 09:31

## 2021-12-05 RX ADMIN — DOXAZOSIN 4 MG: 1 TABLET ORAL at 17:12

## 2021-12-05 RX ADMIN — DORZOLAMIDE HYDROCHLORIDE 1 DROP: 20 SOLUTION/ DROPS OPHTHALMIC at 09:29

## 2021-12-05 RX ADMIN — DORZOLAMIDE HYDROCHLORIDE 1 DROP: 20 SOLUTION/ DROPS OPHTHALMIC at 14:00

## 2021-12-05 RX ADMIN — FAMOTIDINE 20 MG: 20 TABLET ORAL at 09:28

## 2021-12-05 RX ADMIN — SODIUM CHLORIDE, PRESERVATIVE FREE 10 ML: 5 INJECTION INTRAVENOUS at 21:38

## 2021-12-05 RX ADMIN — ATENOLOL 100 MG: 50 TABLET ORAL at 09:28

## 2021-12-05 RX ADMIN — DORZOLAMIDE HYDROCHLORIDE 1 DROP: 20 SOLUTION/ DROPS OPHTHALMIC at 22:12

## 2021-12-05 RX ADMIN — FERROUS SULFATE TAB 325 MG (65 MG ELEMENTAL FE) 325 MG: 325 (65 FE) TAB at 09:31

## 2021-12-05 RX ADMIN — TIMOLOL MALEATE 1 DROP: 5 SOLUTION OPHTHALMIC at 09:29

## 2021-12-05 RX ADMIN — FAMOTIDINE 20 MG: 20 TABLET ORAL at 21:37

## 2021-12-05 RX ADMIN — Medication 1 CAPSULE: at 17:12

## 2021-12-05 RX ADMIN — APIXABAN 5 MG: 5 TABLET, FILM COATED ORAL at 21:37

## 2021-12-05 RX ADMIN — LATANOPROST 1 DROP: 50 SOLUTION OPHTHALMIC at 22:00

## 2021-12-05 ASSESSMENT — ENCOUNTER SYMPTOMS
TROUBLE SWALLOWING: 0
NAUSEA: 0
SHORTNESS OF BREATH: 1
CHEST TIGHTNESS: 0
COUGH: 0
COLOR CHANGE: 0
BLOOD IN STOOL: 0
EYE REDNESS: 0
VOMITING: 0
EYE ITCHING: 0
ABDOMINAL PAIN: 0

## 2021-12-05 NOTE — PLAN OF CARE
Problem: Falls - Risk of:  Goal: Will remain free from falls  Description: Will remain free from falls  12/5/2021 0356 by Emil Shah RN  Outcome: Ongoing  Note: No falls this shift. Call light within reach and siderails x2. Bed in lowest position. Patient safety maintained. Problem: Skin Integrity:  Goal: Will show no infection signs and symptoms  Description: Will show no infection signs and symptoms  12/5/2021 0356 by Emil Shah RN  Outcome: Ongoing  Note: Patient remains free from infection and no signs and symptoms of infection. Problem: Skin Integrity:  Goal: Absence of new skin breakdown  Description: Absence of new skin breakdown  12/5/2021 0356 by Emil Shah RN  Outcome: Ongoing  Note: Skin assessment as charted. No new areas of breakdown.

## 2021-12-05 NOTE — PROGRESS NOTES
[address] [place]  [phone]          Progress Note    Patient Name:  Viktoria Olson    :  1940 10:54 AM      SUBJECTIVE       Ms. Daniel Benitez  has no chest pain, shortness of breath, palpitations, nausea or vomiting      OBJECTIVE     Vital signs: There were no vitals taken for this visit. Amy hernandez    Admit Weight:       Last 3 weights: Wt Readings from Last 3 Encounters:   21 185 lb 12.8 oz (84.3 kg)   21 193 lb (87.5 kg)   21 193 lb 8 oz (87.8 kg)       BMI: There is no height or weight on file to calculate BMI. Input/Output:       Intake/Output Summary (Last 24 hours) at 2021 1054  Last data filed at 2021 0323  Gross per 24 hour   Intake 840 ml   Output 3850 ml   Net -3010 ml       [unfilled]  Exam:     General appearance: awake and alert moves all ext   Lungs: no rhonchi, no wheezes, no rales  Heart: S1 and S2 no murmur  Abdomen: positive bowel sounds, no bruits, no masses  Extremities: warm and dry, no cyanosis, no clubbing        Laboratory Studies:     CBC:   Recent Labs     21  0627 21  0811   WBC 3.9 4.1   HGB 8.5* 8.3*   HCT 27.3* 27.1*   MCV 74.8* 74.7*    149*     BMP:   Recent Labs     21  0619 21  0627 21  0811   * 136 138   K 3.7 3.4* 3.8    97* 97*   CO2 23 29 32*   BUN 6* 5* 4*   CREATININE 0.72 0.68 0.83     PT/INR: No results for input(s): PROTIME, INR in the last 72 hours. APTT: No results for input(s): APTT in the last 72 hours. MAG:   Recent Labs     21  0627   MG 1.8     D Dimer: No results for input(s): DDIMER in the last 72 hours. Troponin  No results for input(s): TROPHS in the last 72 hours. BNP No results for input(s): BNP in the last 72 hours. No results for input(s): PROBNP in the last 72 hours.       Pulse Ox: @FLOWSTAT(10:24)@  Supplemental O2:       Current Meds:   Continuous Infusions:       CT ABDOMEN PELVIS W IV CONTRAST Additional Contrast? None    Result Date: 11/30/2021  Periportal edema which is superimposed on the patient's baseline intrahepatic and extrahepatic biliary ductal dilatation in the setting of prior cholecystectomy. This could be related to congestive heart failure or possibly volume resuscitation as the IVC is somewhat prominent, though other considerations include inflammatory processes within the liver and biliary tree such as hepatitis or cholangitis. Clinical correlation is required. Trace perihepatic and perisplenic ascites with mild nonspecific graying of the pelvic fat, possibly related to volume status or underlying infectious/inflammatory etiology. Postoperative changes of the bowel with patulous anastomoses, but no bowel obstruction. Colonic diverticulosis without diverticulitis. XR CHEST PORTABLE    Result Date: 12/3/2021  Slight increase in the interstitial prominence and bibasilar opacities, concerning for worsening edema or pneumonia. Small bilateral pleural effusions. XR CHEST PORTABLE    Result Date: 11/30/2021  Mild basal predominant pulmonary opacities with some interstitial changes. Findings may be due to edema or infection in the appropriate clinical setting. Small bilateral pleural effusions. CT CHEST PULMONARY EMBOLISM W CONTRAST    Result Date: 11/30/2021  Suboptimal opacification of the pulmonary arterial tree significantly limiting assessment for pulmonary emboli. No definite thrombus in the main pulmonary arteries. Mild patchy ground-glass opacities throughout both lungs resulting in mosaic attenuation. In the setting of bronchial wall thickening this is favored to be secondary to underlying acute or chronic airways disease. Mild edema or sequelae of viral pneumonia are potential differential considerations. Small to moderate layering bilateral pleural effusions. Cardiomegaly. No pericardial effusion.        Echo:      ASSESSMENT     Active Problems:    * No active hospital problems. *  Resolved Problems:    * No resolved hospital problems. *      PLAN       Admitted for intractable nausea and vomiting. Doing well with no SOB. Echo with normal LV function.     Electronically signed by Mariola Mckeon MD on 12/5/2021 at 10:54 AM

## 2021-12-05 NOTE — CARE COORDINATION
ONGOING DISCHARGE PLAN:    Patient is alert and oriented x4. Spoke with patient regarding discharge plan and patient confirms that plan is still home with [facility] (accepted). Pt to have stress test and 2decho on Monday and possible d/c after testing complete. Eliquis PTA    Will continue to follow for additional discharge needs.     Electronically signed by Sebastian Reyes RN on 12/5/2021 at 9:54 AM

## 2021-12-05 NOTE — PROGRESS NOTES
Progress Note    12/5/2021   3:52 PM    Name:  Annabelle Stephens  MRN:    [id number]     Acct:     [de-identified]   Room:  2055/2055-01  IP Day: 6     Admit Date: 11/29/2021  5:34 PM  PCP: Felicity Dunlap MD    Subjective:     C/C:   Chief Complaint   Patient presents with   Leticia Regulo Nausea & Vomiting    Cough       Interval History: Status: not changed. Patient shortness of breath is minimally improved denies chest pain, vomiting abdominal pain or diarrhea. Patient oxygen saturation is 91% on room air blood pressure and heart rate stable. Recent labs reviewed hemoglobin 8.3    ROS:   all 10 systems reviewed and are negative except as noted    Review of Systems   Constitutional: Negative for chills and fatigue. HENT: Negative for drooling, mouth sores, sneezing and trouble swallowing. Eyes: Negative for redness and itching. Respiratory: Positive for shortness of breath. Negative for cough and chest tightness. Cardiovascular: Negative for chest pain, palpitations and leg swelling. Gastrointestinal: Negative for abdominal pain, blood in stool, nausea and vomiting. Endocrine: Negative for heat intolerance and polyphagia. Genitourinary: Negative for difficulty urinating, flank pain and pelvic pain. Musculoskeletal: Negative for arthralgias, joint swelling and neck stiffness. Skin: Negative for color change and pallor. Allergic/Immunologic: Negative for food allergies. Neurological: Negative for dizziness, seizures and headaches. Hematological: Does not bruise/bleed easily. Psychiatric/Behavioral: Negative for agitation, behavioral problems and suicidal ideas. The patient is not hyperactive. Medications: Allergies:    Allergies   Allergen Reactions    Avapro [Irbesartan]     Ciprofloxacin Hcl Nausea Only    Cozaar [Losartan Potassium]     Diovan [Valsartan]     Lipitor [Atorvastatin Calcium]     Lisinopril     Pcn [Penicillins]     Pravachol [Pravastatin Sodium]      myalgias    Tape Da Boss Tape] Dermatitis    Tramadol Swelling    Zetia [Ezetimibe]     Morphine Nausea And Vomiting       Current Meds: regadenoson (LEXISCAN) injection 0.4 mg, ONCE PRN  furosemide (LASIX) injection 40 mg, Once  doxazosin (CARDURA) tablet 4 mg, Dinner  apixaban (ELIQUIS) tablet 5 mg, BID  albuterol (PROVENTIL) nebulizer solution 2.5 mg, Q4H PRN  ferrous sulfate (IRON 325) tablet 325 mg, Daily with breakfast  lactobacillus (CULTURELLE) capsule 1 capsule, BID WC  atenolol (TENORMIN) tablet 100 mg, Daily  dorzolamide (TRUSOPT) 2 % ophthalmic solution 1 drop, TID  famotidine (PEPCID) tablet 20 mg, BID  latanoprost (XALATAN) 0.005 % ophthalmic solution 1 drop, Nightly  levothyroxine (SYNTHROID) tablet 100 mcg, Daily  timolol (TIMOPTIC) 0.5 % ophthalmic solution 1 drop, BID  potassium chloride (KLOR-CON M) extended release tablet 40 mEq, PRN   Or  potassium bicarb-citric acid (EFFER-K) effervescent tablet 40 mEq, PRN   Or  potassium chloride 10 mEq/100 mL IVPB (Peripheral Line), PRN  cholestyramine light packet 4 g, QPM  0.9 % sodium chloride infusion, PRN  sodium chloride flush 0.9 % injection 5-40 mL, 2 times per day  sodium chloride flush 0.9 % injection 5-40 mL, PRN  0.9 % sodium chloride infusion, PRN  acetaminophen (TYLENOL) tablet 650 mg, Q4H PRN  ondansetron (ZOFRAN-ODT) disintegrating tablet 4 mg, Q8H PRN   Or  ondansetron (ZOFRAN) injection 4 mg, Q6H PRN        Data:     Code Status:  Full Code    Family History   Adopted: Yes   Problem Relation Age of Onset    No Known Problems Mother     No Known Problems Father        Social History     Socioeconomic History    Marital status:       Spouse name: Not on file    Number of children: Not on file    Years of education: Not on file    Highest education level: Not on file   Occupational History    Not on file   Tobacco Use    Smoking status: Never Smoker    Smokeless tobacco: Never Used   Vaping Use    Vaping Use: Never used   Substance congestive heart failure or possibly volume resuscitation as the IVC is somewhat prominent, though other considerations include inflammatory processes within the liver and biliary tree such as hepatitis or cholangitis. Clinical correlation is required. Trace perihepatic and perisplenic ascites with mild nonspecific graying of the pelvic fat, possibly related to volume status or underlying infectious/inflammatory etiology. Postoperative changes of the bowel with patulous anastomoses, but no bowel obstruction. Colonic diverticulosis without diverticulitis. XR CHEST PORTABLE    Result Date: 12/3/2021  Slight increase in the interstitial prominence and bibasilar opacities, concerning for worsening edema or pneumonia. Small bilateral pleural effusions. XR CHEST PORTABLE    Result Date: 11/30/2021  Mild basal predominant pulmonary opacities with some interstitial changes. Findings may be due to edema or infection in the appropriate clinical setting. Small bilateral pleural effusions. CT CHEST PULMONARY EMBOLISM W CONTRAST    Result Date: 11/30/2021  Suboptimal opacification of the pulmonary arterial tree significantly limiting assessment for pulmonary emboli. No definite thrombus in the main pulmonary arteries. Mild patchy ground-glass opacities throughout both lungs resulting in mosaic attenuation. In the setting of bronchial wall thickening this is favored to be secondary to underlying acute or chronic airways disease. Mild edema or sequelae of viral pneumonia are potential differential considerations. Small to moderate layering bilateral pleural effusions. Cardiomegaly. No pericardial effusion.        Labs:  Recent Results (from the past 24 hour(s))   CBC with DIFF    Collection Time: 12/05/21  8:11 AM   Result Value Ref Range    WBC 4.1 3.5 - 11.0 k/uL    RBC 3.62 (L) 4.0 - 5.2 m/uL    Hemoglobin 8.3 (L) 12.0 - 16.0 g/dL    Hematocrit 27.1 (L) 36 - 46 %    MCV 74.7 (L) 80 - 100 fL    MCH 23.0 (L) 26 - 34 pg    MCHC 30.8 (L) 31 - 37 g/dL    RDW 20.1 (H) 11.5 - 14.9 %    Platelets 025 (L) 674 - 450 k/uL    MPV 7.8 6.0 - 12.0 fL    NRBC Automated NOT REPORTED per 100 WBC    Differential Type NOT REPORTED     Immature Granulocytes NOT REPORTED 0 %    Absolute Immature Granulocyte NOT REPORTED 0.00 - 0.30 k/uL    WBC Morphology NOT REPORTED     RBC Morphology NOT REPORTED     Platelet Estimate NOT REPORTED     Seg Neutrophils 66 36 - 66 %    Lymphocytes 20 (L) 24 - 44 %    Monocytes 10 (H) 1 - 7 %    Eosinophils % 3 0 - 4 %    Basophils 1 0 - 2 %    Segs Absolute 2.71 1.3 - 9.1 k/uL    Absolute Lymph # 0.82 (L) 1.0 - 4.8 k/uL    Absolute Mono # 0.41 0.1 - 1.3 k/uL    Absolute Eos # 0.12 0.0 - 0.4 k/uL    Basophils Absolute 0.04 0.0 - 0.2 k/uL    Morphology ANISOCYTOSIS PRESENT     Morphology HYPOCHROMIA PRESENT     Morphology MICROCYTOSIS PRESENT     Morphology 1+ ELLIPTOCYTES     Morphology 1+ POLYCHROMASIA    Comprehensive Metabolic Panel w/ Reflex to MG    Collection Time: 21  8:11 AM   Result Value Ref Range    Glucose 146 (H) 70 - 99 mg/dL    BUN 4 (L) 8 - 23 mg/dL    CREATININE 0.83 0.50 - 0.90 mg/dL    Bun/Cre Ratio NOT REPORTED 9 - 20    Calcium 9.0 8.6 - 10.4 mg/dL    Sodium 138 135 - 144 mmol/L    Potassium 3.8 3.7 - 5.3 mmol/L    Chloride 97 (L) 98 - 107 mmol/L    CO2 32 (H) 20 - 31 mmol/L    Anion Gap 9 9 - 17 mmol/L    Alkaline Phosphatase 63 35 - 104 U/L    ALT 10 5 - 33 U/L    AST 21 <32 U/L    Total Bilirubin 0.49 0.3 - 1.2 mg/dL    Total Protein 6.3 (L) 6.4 - 8.3 g/dL    Albumin 3.4 (L) 3.5 - 5.2 g/dL    Albumin/Globulin Ratio NOT REPORTED 1.0 - 2.5    GFR Non-African American >60 >60 mL/min    GFR African American >60 >60 mL/min    GFR Comment          GFR Staging NOT REPORTED        Physical Examination:        Vitals:  BP (!) 136/107   Pulse 69   Temp 98.8 °F (37.1 °C) (Oral)   Resp 16   Ht 5' (1.524 m)   Wt 185 lb 12.8 oz (84.3 kg)   SpO2 91%   BMI 36.29 kg/m²   Temp (24hrs), Av.2 °F (36.8 °C), Min:97.8 °F (36.6 °C), Max:98.8 °F (37.1 °C)    No results for input(s): POCGLU in the last 72 hours. Physical Exam  Vitals reviewed. Constitutional:       Appearance: She is obese. HENT:      Head: Normocephalic. Right Ear: External ear normal.      Left Ear: External ear normal.      Nose: Nose normal.      Mouth/Throat:      Mouth: Mucous membranes are moist.      Pharynx: Oropharynx is clear. Eyes:      Conjunctiva/sclera: Conjunctivae normal.   Cardiovascular:      Rate and Rhythm: Normal rate and regular rhythm. Pulses: Normal pulses. Heart sounds: Normal heart sounds. Pulmonary:      Effort: Pulmonary effort is normal.      Breath sounds: Examination of the right-lower field reveals rales. Examination of the left-lower field reveals rales. Rales present. Abdominal:      General: Bowel sounds are normal.      Palpations: Abdomen is soft. Musculoskeletal:         General: No deformity. Cervical back: Normal range of motion and neck supple. Right lower leg: No edema. Left lower leg: No edema. Skin:     General: Skin is warm. Capillary Refill: Capillary refill takes less than 2 seconds. Coloration: Skin is not jaundiced. Neurological:      General: No focal deficit present. Mental Status: She is alert. Mental status is at baseline. Psychiatric:         Mood and Affect: Mood normal.         Behavior: Behavior normal.         Assessment:        Primary Problem  Intractable nausea and vomiting     Principal Problem:    Intractable nausea and vomiting  Active Problems:    Essential hypertension    Iron deficiency anemia    Paroxysmal atrial fibrillation (HCC)  Resolved Problems:    * No resolved hospital problems.  *      Past Medical History:   Diagnosis Date    [facility]    Chronic back pain     with rt. leg pain    Diverticulosis     GERD (gastroesophageal reflux disease)     Hyperlipidemia     Hypertension     Hypothyroidism     Obesity (BMI 30.0-34. 9) 8/12/2016    Onychomycosis     PE (pulmonary embolism)     H/O DVT of rt leg    Type II or unspecified type diabetes mellitus without mention of complication, not stated as uncontrolled         Plan:        1. Lasix 40 mg IV now  1. 2D echo  2. Cardiac stress test in a.m. 3. CBC, CMP  4. Continue ferrous sulfate 325 mg p.o. twice daily  5. DVT Prophylaxis Eliquis 5 mg p.o. daily  6. EPCs  7. PT/OT to evaluate and treat  8. Replace electrolytes as per sliding scale  9. Home medications reviewed and appropriate medications continued  10.  Reviewed labs and imaging studies from last 24 hours and results explained to patient      Electronically signed by Elliott Grissom MD

## 2021-12-05 NOTE — PLAN OF CARE
Problem: Falls - Risk of:  Goal: Will remain free from falls  Description: Will remain free from falls  12/5/2021 1605 by Clifton Pierre RN  Outcome: Ongoing  12/5/2021 0356 by Nichelle Pickering RN  Outcome: Ongoing  Note: No falls this shift. Call light within reach and siderails x2. Bed in lowest position. Patient safety maintained. Goal: Absence of physical injury  Description: Absence of physical injury  12/5/2021 1605 by Clifton Pierre RN  Outcome: Ongoing  12/5/2021 0356 by Nichelle Pickering RN  Outcome: Ongoing     Problem: Skin Integrity:  Goal: Will show no infection signs and symptoms  Description: Will show no infection signs and symptoms  12/5/2021 1605 by Clifton Pierre RN  Outcome: Ongoing  12/5/2021 0356 by Nichelle Pickering RN  Outcome: Ongoing  Note: Patient remains free from infection and no signs and symptoms of infection. Goal: Absence of new skin breakdown  Description: Absence of new skin breakdown  12/5/2021 1605 by Clifton Pierre RN  Outcome: Ongoing  12/5/2021 0356 by Nichelle Pickering RN  Outcome: Ongoing  Note: Skin assessment as charted. No new areas of breakdown.         Problem: Cardiac:  Goal: Ability to maintain an adequate cardiac output will improve  Description: Ability to maintain an adequate cardiac output will improve  12/5/2021 1605 by Clifton Pierre RN  Outcome: Ongoing  12/5/2021 0356 by Nichelle Pickering RN  Outcome: Ongoing  Goal: Hemodynamic stability will improve  Description: Hemodynamic stability will improve  12/5/2021 1605 by Clifton Pierre RN  Outcome: Ongoing  12/5/2021 0356 by Nichelle Pickering RN  Outcome: Ongoing     Problem: Fluid Volume:  Goal: Ability to achieve and maintain adequate urine output will improve  Description: Ability to achieve and maintain adequate urine output will improve  12/5/2021 1605 by Clifton Pierre RN  Outcome: Ongoing  12/5/2021 0356 by Nichelle Pickering RN  Outcome: Ongoing     Problem: Respiratory:  Goal: Respiratory status will improve  Description: Respiratory status will improve  12/5/2021 1605 by Abundio Medina RN  Outcome: Ongoing  12/5/2021 0356 by Karlos Cuellar RN  Outcome: Ongoing

## 2021-12-05 NOTE — PROGRESS NOTES
Nutrition Assessment     Type and Reason for Visit: Initial (length of stay)    Nutrition Recommendations/Plan: Will provide Regular diet with meat cut to bite sized pieces. Nutrition Assessment:  Pt was admitted due to intractable N/V which is now resolved. Pt has been consuming more than 50% of most meals provided and is wondering why we are cutting her toast into bite sized pieces. Nutrition needs are being met with po intake. Malnutrition Assessment:  Malnutrition Status: No malnutrition    Current Nutrition Therapies:    ADULT DIET;  Regular    Anthropometric Measures:  · Height: 5' (152.4 cm)  · Current Body Wt: 185 lb (83.9 kg)   · BMI: 36.1    Nutrition Diagnosis:   No nutrition diagnosis at this time     Nutrition Interventions:   Food and/or Nutrient Delivery:  Modify Current Diet  Nutrition Education/Counseling:  No recommendation at this time   Food/Nutrient Intake Outcomes:  Food and Nutrient Intake      Electronically signed by Emani Louis RD, LD on 12/5/21 at 1:18 PM EST    Contact: [phone]

## 2021-12-06 ENCOUNTER — APPOINTMENT (OUTPATIENT)
Dept: NON INVASIVE DIAGNOSTICS | Age: 81
DRG: 377 | End: 2021-12-06
Payer: MEDICARE

## 2021-12-06 ENCOUNTER — APPOINTMENT (OUTPATIENT)
Dept: NUCLEAR MEDICINE | Age: 81
DRG: 377 | End: 2021-12-06
Payer: MEDICARE

## 2021-12-06 LAB
ABSOLUTE EOS #: 0.1 K/UL (ref 0–0.4)
ABSOLUTE IMMATURE GRANULOCYTE: ABNORMAL K/UL (ref 0–0.3)
ABSOLUTE LYMPH #: 1.1 K/UL (ref 1–4.8)
ABSOLUTE MONO #: 0.7 K/UL (ref 0.1–1.3)
ALBUMIN SERPL-MCNC: 3.2 G/DL (ref 3.5–5.2)
ALBUMIN/GLOBULIN RATIO: ABNORMAL (ref 1–2.5)
ALP BLD-CCNC: 59 U/L (ref 35–104)
ALT SERPL-CCNC: 9 U/L (ref 5–33)
ANION GAP SERPL CALCULATED.3IONS-SCNC: 10 MMOL/L (ref 9–17)
AST SERPL-CCNC: 20 U/L
BASOPHILS # BLD: 1 % (ref 0–2)
BASOPHILS ABSOLUTE: 0.1 K/UL (ref 0–0.2)
BILIRUB SERPL-MCNC: 0.46 MG/DL (ref 0.3–1.2)
BUN BLDV-MCNC: 6 MG/DL (ref 8–23)
BUN/CREAT BLD: ABNORMAL (ref 9–20)
CALCIUM SERPL-MCNC: 8.9 MG/DL (ref 8.6–10.4)
CHLORIDE BLD-SCNC: 98 MMOL/L (ref 98–107)
CO2: 32 MMOL/L (ref 20–31)
CREAT SERPL-MCNC: 0.82 MG/DL (ref 0.5–0.9)
DIFFERENTIAL TYPE: ABNORMAL
EOSINOPHILS RELATIVE PERCENT: 3 % (ref 0–4)
GFR AFRICAN AMERICAN: >60 ML/MIN
GFR NON-AFRICAN AMERICAN: >60 ML/MIN
GFR SERPL CREATININE-BSD FRML MDRD: ABNORMAL ML/MIN/{1.73_M2}
GFR SERPL CREATININE-BSD FRML MDRD: ABNORMAL ML/MIN/{1.73_M2}
GLUCOSE BLD-MCNC: 110 MG/DL (ref 70–99)
HCT VFR BLD CALC: 26.5 % (ref 36–46)
HEMOGLOBIN: 8.1 G/DL (ref 12–16)
IMMATURE GRANULOCYTES: ABNORMAL %
LV EF: 63 %
LV EF: 70 %
LVEF MODALITY: NORMAL
LVEF MODALITY: NORMAL
LYMPHOCYTES # BLD: 21 % (ref 24–44)
MCH RBC QN AUTO: 22.7 PG (ref 26–34)
MCHC RBC AUTO-ENTMCNC: 30.6 G/DL (ref 31–37)
MCV RBC AUTO: 74.3 FL (ref 80–100)
MONOCYTES # BLD: 13 % (ref 1–7)
NRBC AUTOMATED: ABNORMAL PER 100 WBC
PDW BLD-RTO: 19.6 % (ref 11.5–14.9)
PLATELET # BLD: 145 K/UL (ref 150–450)
PLATELET ESTIMATE: ABNORMAL
PMV BLD AUTO: 7.7 FL (ref 6–12)
POTASSIUM SERPL-SCNC: 3.8 MMOL/L (ref 3.7–5.3)
RBC # BLD: 3.57 M/UL (ref 4–5.2)
RBC # BLD: ABNORMAL 10*6/UL
SEG NEUTROPHILS: 62 % (ref 36–66)
SEGMENTED NEUTROPHILS ABSOLUTE COUNT: 3.3 K/UL (ref 1.3–9.1)
SODIUM BLD-SCNC: 140 MMOL/L (ref 135–144)
TOTAL PROTEIN: 6 G/DL (ref 6.4–8.3)
TROPONIN INTERP: NORMAL
TROPONIN T: NORMAL NG/ML
TROPONIN, HIGH SENSITIVITY: 14 NG/L (ref 0–14)
WBC # BLD: 5.3 K/UL (ref 3.5–11)
WBC # BLD: ABNORMAL 10*3/UL

## 2021-12-06 PROCEDURE — 84484 ASSAY OF TROPONIN QUANT: CPT

## 2021-12-06 PROCEDURE — 3430000000 HC RX DIAGNOSTIC RADIOPHARMACEUTICAL: Performed by: FAMILY MEDICINE

## 2021-12-06 PROCEDURE — 1200000000 HC SEMI PRIVATE

## 2021-12-06 PROCEDURE — 6370000000 HC RX 637 (ALT 250 FOR IP): Performed by: FAMILY MEDICINE

## 2021-12-06 PROCEDURE — 78452 HT MUSCLE IMAGE SPECT MULT: CPT

## 2021-12-06 PROCEDURE — 2580000003 HC RX 258: Performed by: INTERNAL MEDICINE

## 2021-12-06 PROCEDURE — 85025 COMPLETE CBC W/AUTO DIFF WBC: CPT

## 2021-12-06 PROCEDURE — 2580000003 HC RX 258: Performed by: FAMILY MEDICINE

## 2021-12-06 PROCEDURE — 36415 COLL VENOUS BLD VENIPUNCTURE: CPT

## 2021-12-06 PROCEDURE — 93306 TTE W/DOPPLER COMPLETE: CPT

## 2021-12-06 PROCEDURE — 6360000002 HC RX W HCPCS: Performed by: FAMILY MEDICINE

## 2021-12-06 PROCEDURE — 80053 COMPREHEN METABOLIC PANEL: CPT

## 2021-12-06 PROCEDURE — 93017 CV STRESS TEST TRACING ONLY: CPT

## 2021-12-06 PROCEDURE — A9500 TC99M SESTAMIBI: HCPCS | Performed by: FAMILY MEDICINE

## 2021-12-06 PROCEDURE — 6370000000 HC RX 637 (ALT 250 FOR IP): Performed by: INTERNAL MEDICINE

## 2021-12-06 RX ORDER — SODIUM CHLORIDE 0.9 % (FLUSH) 0.9 %
10 SYRINGE (ML) INJECTION PRN
Status: DISCONTINUED | OUTPATIENT
Start: 2021-12-06 | End: 2021-12-07 | Stop reason: HOSPADM

## 2021-12-06 RX ORDER — METOPROLOL TARTRATE 5 MG/5ML
5 INJECTION INTRAVENOUS EVERY 5 MIN PRN
Status: ACTIVE | OUTPATIENT
Start: 2021-12-06 | End: 2021-12-06

## 2021-12-06 RX ORDER — AMINOPHYLLINE DIHYDRATE 25 MG/ML
50 INJECTION, SOLUTION INTRAVENOUS PRN
Status: ACTIVE | OUTPATIENT
Start: 2021-12-06 | End: 2021-12-06

## 2021-12-06 RX ORDER — NITROGLYCERIN 0.4 MG/1
0.4 TABLET SUBLINGUAL EVERY 5 MIN PRN
Status: ACTIVE | OUTPATIENT
Start: 2021-12-06 | End: 2021-12-06

## 2021-12-06 RX ORDER — ATROPINE SULFATE 0.1 MG/ML
0.5 INJECTION INTRAVENOUS EVERY 5 MIN PRN
Status: ACTIVE | OUTPATIENT
Start: 2021-12-06 | End: 2021-12-06

## 2021-12-06 RX ORDER — SODIUM CHLORIDE 9 MG/ML
500 INJECTION, SOLUTION INTRAVENOUS CONTINUOUS PRN
Status: ACTIVE | OUTPATIENT
Start: 2021-12-06 | End: 2021-12-06

## 2021-12-06 RX ORDER — ALBUTEROL SULFATE 90 UG/1
2 AEROSOL, METERED RESPIRATORY (INHALATION) PRN
Status: ACTIVE | OUTPATIENT
Start: 2021-12-06 | End: 2021-12-06

## 2021-12-06 RX ORDER — SODIUM CHLORIDE 0.9 % (FLUSH) 0.9 %
5-40 SYRINGE (ML) INJECTION PRN
Status: ACTIVE | OUTPATIENT
Start: 2021-12-06 | End: 2021-12-06

## 2021-12-06 RX ADMIN — SODIUM CHLORIDE, PRESERVATIVE FREE 10 ML: 5 INJECTION INTRAVENOUS at 07:35

## 2021-12-06 RX ADMIN — SODIUM CHLORIDE, PRESERVATIVE FREE 10 ML: 5 INJECTION INTRAVENOUS at 20:54

## 2021-12-06 RX ADMIN — TIMOLOL MALEATE 1 DROP: 5 SOLUTION OPHTHALMIC at 20:45

## 2021-12-06 RX ADMIN — DORZOLAMIDE HYDROCHLORIDE 1 DROP: 20 SOLUTION/ DROPS OPHTHALMIC at 13:00

## 2021-12-06 RX ADMIN — FAMOTIDINE 20 MG: 20 TABLET ORAL at 20:45

## 2021-12-06 RX ADMIN — APIXABAN 5 MG: 5 TABLET, FILM COATED ORAL at 12:58

## 2021-12-06 RX ADMIN — Medication 1 CAPSULE: at 17:58

## 2021-12-06 RX ADMIN — TIMOLOL MALEATE 1 DROP: 5 SOLUTION OPHTHALMIC at 12:59

## 2021-12-06 RX ADMIN — REGADENOSON 0.4 MG: 0.08 INJECTION, SOLUTION INTRAVENOUS at 09:47

## 2021-12-06 RX ADMIN — SODIUM CHLORIDE, PRESERVATIVE FREE 10 ML: 5 INJECTION INTRAVENOUS at 09:08

## 2021-12-06 RX ADMIN — FAMOTIDINE 20 MG: 20 TABLET ORAL at 12:58

## 2021-12-06 RX ADMIN — APIXABAN 5 MG: 5 TABLET, FILM COATED ORAL at 20:45

## 2021-12-06 RX ADMIN — FERROUS SULFATE TAB 325 MG (65 MG ELEMENTAL FE) 325 MG: 325 (65 FE) TAB at 12:58

## 2021-12-06 RX ADMIN — TETRAKIS(2-METHOXYISOBUTYLISOCYANIDE)COPPER(I) TETRAFLUOROBORATE 12.3 MILLICURIE: 1 INJECTION, POWDER, LYOPHILIZED, FOR SOLUTION INTRAVENOUS at 07:35

## 2021-12-06 RX ADMIN — CHOLESTYRAMINE 4 G: 4 POWDER, FOR SUSPENSION ORAL at 17:57

## 2021-12-06 RX ADMIN — LATANOPROST 1 DROP: 50 SOLUTION OPHTHALMIC at 20:45

## 2021-12-06 RX ADMIN — ATENOLOL 100 MG: 50 TABLET ORAL at 12:57

## 2021-12-06 RX ADMIN — TETRAKIS(2-METHOXYISOBUTYLISOCYANIDE)COPPER(I) TETRAFLUOROBORATE 38.9 MILLICURIE: 1 INJECTION, POWDER, LYOPHILIZED, FOR SOLUTION INTRAVENOUS at 09:49

## 2021-12-06 RX ADMIN — DOXAZOSIN 4 MG: 1 TABLET ORAL at 17:57

## 2021-12-06 RX ADMIN — Medication 1 CAPSULE: at 12:58

## 2021-12-06 RX ADMIN — DORZOLAMIDE HYDROCHLORIDE 1 DROP: 20 SOLUTION/ DROPS OPHTHALMIC at 20:45

## 2021-12-06 RX ADMIN — LEVOTHYROXINE SODIUM 100 MCG: 0.1 TABLET ORAL at 12:58

## 2021-12-06 RX ADMIN — SODIUM CHLORIDE, PRESERVATIVE FREE 10 ML: 5 INJECTION INTRAVENOUS at 13:01

## 2021-12-06 NOTE — CONSULTS
Oral Daily    timolol  1 drop Both Eyes BID    cholestyramine light  4 g Oral QPM    sodium chloride flush  5-40 mL IntraVENous 2 times per day            VITAL SIGNS:    BP (!) 142/71   Pulse 83   Temp 99.3 °F (37.4 °C) (Oral) Comment: eating  Resp 18   Ht 5' (1.524 m)   Wt 185 lb 12.8 oz (84.3 kg)   SpO2 90%   BMI 36.29 kg/m²  3 L/min      Admit Weight:  191 lb 12.9 oz (87 kg)    Last 3 weights: Wt Readings from Last 3 Encounters:   12/05/21 185 lb 12.8 oz (84.3 kg)   08/26/21 193 lb (87.5 kg)   08/05/21 193 lb 8 oz (87.8 kg)       BMI: Body mass index is 36.29 kg/m². INPUT/OUTPUT:          Intake/Output Summary (Last 24 hours) at 12/6/2021 1540  Last data filed at 12/6/2021 0745  Gross per 24 hour   Intake --   Output 1950 ml   Net -1950 ml         EXAM:     General appearance: awake, alert. Pleasant. Neck: No JVD or thyromegaly  Chest: clear bilaterally. No tenderness. No rhonchi or wheezing. Cardiac: Regular rate and rhythm. No significant murmur or gallop or rubs. Abdomen: soft, non-tender. Extremities: no cyanosis, no clubbing, no calf tenderness, no leg edema. Pulses: intact bilateral radial pulses. Skin:  warm and dry. Neuro:  Able to move all 4 extremities. Nuclear stress test Dec 2021:    Impression:     1. Attenuation related artifact versus infarct of the inferior and apical   walls.  Please note exam is limited in the absence of prone stress imaging. No scintigraphic evidence for reversible ischemia. 2. Left ventricular ejection fracture of greater than 70%. 3.  Please see report for EKG portion of the examination which will be   performed separately by physician from cardiology. Risk stratification:  Intermediate risk          ASSESSMENT:    Chronic shortness of breath on exertion.     No stress-induced myocardial ischemia, attenuation related artifact versus inferior wall/apical infarction, LVEF 70%, intermediate risk study on nuclear stress test December 2021.    History of right bundle branch block on abnormal EKG. History of recurrent pulmonary emboli. On chronic Eliquis therapy. Sees pulmonologist.    History of nausea. Other problems as charted. REC/PLAN:    Relatively stable overall cardiac wise with no heart failure clinically on examination today. Reviewed nuclear stress test results and reassured patient. 2D echocardiogram results are pending. I reviewed my most recent office visit note in September 2021 under care everywhere section in the E [facility] and please refer to that note for further details. Continue on current cardiac medications. Discussed with the nurse. We will follow. Hopefully discharge soon. Electronically signed by Kiran Berg MD, [facility]        PLEASE NOTE:  This progress note was completed using a voice transcription system. Every effort was made to ensure accuracy. However, inadvertent computerized transcription errors may be present.

## 2021-12-06 NOTE — PLAN OF CARE
Problem: Falls - Risk of:  Goal: Will remain free from falls  Description: Will remain free from falls  12/6/2021 0444 by Myrna Hu RN  Outcome: Ongoing  Note: Patient remained free from falls all throughout shift. Bed locked, side rails up X2, belongings and call light within reach.     12/5/2021 1605 by Fer Richmond RN  Outcome: Ongoing  Goal: Absence of physical injury  Description: Absence of physical injury  12/6/2021 0444 by Myrna Hu RN  Outcome: Ongoing  12/5/2021 1605 by Fer Richmond RN  Outcome: Ongoing     Problem: Skin Integrity:  Goal: Will show no infection signs and symptoms  Description: Will show no infection signs and symptoms  12/6/2021 0444 by Myrna Hu RN  Outcome: Ongoing  12/5/2021 1605 by Fer Richmond RN  Outcome: Ongoing  Goal: Absence of new skin breakdown  Description: Absence of new skin breakdown  12/6/2021 0444 by Myrna Hu RN  Outcome: Ongoing  12/5/2021 1605 by Fer Richmond RN  Outcome: Ongoing     Problem: Cardiac:  Goal: Ability to maintain an adequate cardiac output will improve  Description: Ability to maintain an adequate cardiac output will improve  12/6/2021 0444 by Myrna Hu RN  Outcome: Ongoing  12/5/2021 1605 by Fer Richmond RN  Outcome: Ongoing  Goal: Hemodynamic stability will improve  Description: Hemodynamic stability will improve  12/6/2021 0444 by Myrna Hu RN  Outcome: Ongoing  12/5/2021 1605 by Fer Richmond RN  Outcome: Ongoing     Problem: Fluid Volume:  Goal: Ability to achieve and maintain adequate urine output will improve  Description: Ability to achieve and maintain adequate urine output will improve  12/6/2021 0444 by Myrna Hu RN  Outcome: Ongoing  12/5/2021 1605 by Fer Richmond RN  Outcome: Ongoing     Problem: Respiratory:  Goal: Respiratory status will improve  Description: Respiratory status will improve  12/6/2021 0444 by Myrna Hu RN  Outcome: Ongoing  12/5/2021 1605 by Devang Gary SINA [place]  Outcome: Ongoing

## 2021-12-06 NOTE — PROCEDURES
[address] [facility] [address]                              CARDIAC STRESS TEST    PATIENT NAME: Cristo Rodriguez                    :        1940  MED REC NO:   [id number]                              ROOM:       2055  ACCOUNT NO:   [de-identified]                           ADMIT DATE: 2021  PROVIDER:     Apple Ro    DATE OF STUDY:  2021    TEST TYPE: LEXISCAN CARDIOLYTE STRESS TEST  INDICATION: CHEST PAIN, SHORTNESS OF BREATH  REFERRING PHYSICIAN: DR. Michelle Puri     RESTING HEART RATE: 79 BEATS PER MINUTE  RESTING BLOOD PRESSURE: 123/73    MEDICATION(S) GIVEN: 0.4 MG IV LEXISCAN  REASON FOR TERMINATION: MEDICATION INFUSION COMPLETE    RESTING EKG: ABNORMAL  COMMENTS: NORMAL SINUS RHYTHM, RIGHT BUNDLE BRANCH BLOCK  STRESS HEART RESPONSE: NORMAL RESPONSE  BLOOD PRESSURE RESPONSE: APPROPRIATE  STRESS EKGs: NO CHANGES SEEN  ISCHEMIC EKG CHANGES: NONE    EKG IMPRESSION: ELECTROCARDIOGRAPHICALLY NEGATIVE LEXISCAN STRESS TEST. RADIOISOTOPE RESULTS TO FOLLOW FROM THE DEPARTMENT OF NUCLEAR MEDICINE.             Domenico Romo    D: 2021 11:37:28       T: 2021 11:38:47     [id number]  Job#: [id number]     Doc#: Unknown    CC:    (Retain this field even if not dictated or not decipherable)

## 2021-12-07 VITALS
HEIGHT: 60 IN | OXYGEN SATURATION: 97 % | DIASTOLIC BLOOD PRESSURE: 108 MMHG | SYSTOLIC BLOOD PRESSURE: 118 MMHG | TEMPERATURE: 98.9 F | BODY MASS INDEX: 36.48 KG/M2 | WEIGHT: 185.8 LBS | RESPIRATION RATE: 18 BRPM | HEART RATE: 74 BPM

## 2021-12-07 LAB
ABSOLUTE EOS #: 0.1 K/UL (ref 0–0.4)
ABSOLUTE IMMATURE GRANULOCYTE: ABNORMAL K/UL (ref 0–0.3)
ABSOLUTE LYMPH #: 0.9 K/UL (ref 1–4.8)
ABSOLUTE MONO #: 0.4 K/UL (ref 0.1–1.3)
ALBUMIN SERPL-MCNC: 3.4 G/DL (ref 3.5–5.2)
ALBUMIN/GLOBULIN RATIO: ABNORMAL (ref 1–2.5)
ALP BLD-CCNC: 63 U/L (ref 35–104)
ALT SERPL-CCNC: 10 U/L (ref 5–33)
ANION GAP SERPL CALCULATED.3IONS-SCNC: 9 MMOL/L (ref 9–17)
AST SERPL-CCNC: 18 U/L
BASOPHILS # BLD: 2 % (ref 0–2)
BASOPHILS ABSOLUTE: 0.1 K/UL (ref 0–0.2)
BILIRUB SERPL-MCNC: 0.5 MG/DL (ref 0.3–1.2)
BUN BLDV-MCNC: 7 MG/DL (ref 8–23)
BUN/CREAT BLD: ABNORMAL (ref 9–20)
CALCIUM SERPL-MCNC: 9.2 MG/DL (ref 8.6–10.4)
CHLORIDE BLD-SCNC: 98 MMOL/L (ref 98–107)
CO2: 30 MMOL/L (ref 20–31)
CREAT SERPL-MCNC: 0.95 MG/DL (ref 0.5–0.9)
DIFFERENTIAL TYPE: ABNORMAL
EOSINOPHILS RELATIVE PERCENT: 3 % (ref 0–4)
GFR AFRICAN AMERICAN: >60 ML/MIN
GFR NON-AFRICAN AMERICAN: 56 ML/MIN
GFR SERPL CREATININE-BSD FRML MDRD: ABNORMAL ML/MIN/{1.73_M2}
GFR SERPL CREATININE-BSD FRML MDRD: ABNORMAL ML/MIN/{1.73_M2}
GLUCOSE BLD-MCNC: 158 MG/DL (ref 70–99)
HCT VFR BLD CALC: 30.2 % (ref 36–46)
HEMOGLOBIN: 9.2 G/DL (ref 12–16)
IMMATURE GRANULOCYTES: ABNORMAL %
LYMPHOCYTES # BLD: 21 % (ref 24–44)
MCH RBC QN AUTO: 23.1 PG (ref 26–34)
MCHC RBC AUTO-ENTMCNC: 30.3 G/DL (ref 31–37)
MCV RBC AUTO: 76.1 FL (ref 80–100)
MONOCYTES # BLD: 9 % (ref 1–7)
NRBC AUTOMATED: ABNORMAL PER 100 WBC
PDW BLD-RTO: 20.3 % (ref 11.5–14.9)
PLATELET # BLD: 130 K/UL (ref 150–450)
PLATELET ESTIMATE: ABNORMAL
PMV BLD AUTO: 7.8 FL (ref 6–12)
POTASSIUM SERPL-SCNC: 3.9 MMOL/L (ref 3.7–5.3)
RBC # BLD: 3.97 M/UL (ref 4–5.2)
RBC # BLD: ABNORMAL 10*6/UL
SEG NEUTROPHILS: 65 % (ref 36–66)
SEGMENTED NEUTROPHILS ABSOLUTE COUNT: 2.7 K/UL (ref 1.3–9.1)
SODIUM BLD-SCNC: 137 MMOL/L (ref 135–144)
TOTAL PROTEIN: 6.3 G/DL (ref 6.4–8.3)
WBC # BLD: 4.2 K/UL (ref 3.5–11)
WBC # BLD: ABNORMAL 10*3/UL

## 2021-12-07 PROCEDURE — 6370000000 HC RX 637 (ALT 250 FOR IP): Performed by: INTERNAL MEDICINE

## 2021-12-07 PROCEDURE — 85025 COMPLETE CBC W/AUTO DIFF WBC: CPT

## 2021-12-07 PROCEDURE — 6370000000 HC RX 637 (ALT 250 FOR IP): Performed by: FAMILY MEDICINE

## 2021-12-07 PROCEDURE — 2580000003 HC RX 258: Performed by: INTERNAL MEDICINE

## 2021-12-07 PROCEDURE — 80053 COMPREHEN METABOLIC PANEL: CPT

## 2021-12-07 PROCEDURE — 36415 COLL VENOUS BLD VENIPUNCTURE: CPT

## 2021-12-07 RX ORDER — FERROUS SULFATE 325(65) MG
325 TABLET ORAL
Qty: 30 TABLET | Refills: 3 | Status: SHIPPED | OUTPATIENT
Start: 2021-12-08 | End: 2022-04-05 | Stop reason: SDUPTHER

## 2021-12-07 RX ORDER — FAMOTIDINE 20 MG/1
20 TABLET, FILM COATED ORAL DAILY
Status: DISCONTINUED | OUTPATIENT
Start: 2021-12-08 | End: 2021-12-07 | Stop reason: HOSPADM

## 2021-12-07 RX ADMIN — CHOLESTYRAMINE 4 G: 4 POWDER, FOR SUSPENSION ORAL at 17:09

## 2021-12-07 RX ADMIN — Medication 1 CAPSULE: at 17:09

## 2021-12-07 RX ADMIN — ATENOLOL 100 MG: 50 TABLET ORAL at 08:40

## 2021-12-07 RX ADMIN — LEVOTHYROXINE SODIUM 100 MCG: 0.1 TABLET ORAL at 06:23

## 2021-12-07 RX ADMIN — FAMOTIDINE 20 MG: 20 TABLET ORAL at 07:20

## 2021-12-07 RX ADMIN — FERROUS SULFATE TAB 325 MG (65 MG ELEMENTAL FE) 325 MG: 325 (65 FE) TAB at 07:20

## 2021-12-07 RX ADMIN — APIXABAN 5 MG: 5 TABLET, FILM COATED ORAL at 07:22

## 2021-12-07 RX ADMIN — SODIUM CHLORIDE, PRESERVATIVE FREE 10 ML: 5 INJECTION INTRAVENOUS at 07:22

## 2021-12-07 RX ADMIN — DORZOLAMIDE HYDROCHLORIDE 1 DROP: 20 SOLUTION/ DROPS OPHTHALMIC at 07:20

## 2021-12-07 RX ADMIN — Medication 1 CAPSULE: at 07:20

## 2021-12-07 RX ADMIN — TIMOLOL MALEATE 1 DROP: 5 SOLUTION OPHTHALMIC at 07:20

## 2021-12-07 RX ADMIN — DOXAZOSIN 4 MG: 1 TABLET ORAL at 17:09

## 2021-12-07 ASSESSMENT — PAIN SCALES - GENERAL
PAINLEVEL_OUTOF10: 0
PAINLEVEL_OUTOF10: 0

## 2021-12-07 NOTE — PROGRESS NOTES
Hospitalist Progress Note  12/6/2021 7:02 PM  Subjective:   Admit Date: 11/29/2021  PCP: Alexy Phillips MD     Full Code      C/c:  Chief Complaint   Patient presents with    Nausea & Vomiting    Cough         Interval History: still complaining of sob, had stress test no revaling ischemia/cardio input seen    Diet: ADULT DIET; Regular                                ip days:7  Medications:   Scheduled Meds:   doxazosin  4 mg Oral Dinner    apixaban  5 mg Oral BID    ferrous sulfate  325 mg Oral Daily with breakfast    lactobacillus  1 capsule Oral BID WC    atenolol  100 mg Oral Daily    dorzolamide  1 drop Both Eyes TID    famotidine  20 mg Oral BID    latanoprost  1 drop Both Eyes Nightly    levothyroxine  100 mcg Oral Daily    timolol  1 drop Both Eyes BID    cholestyramine light  4 g Oral QPM    sodium chloride flush  5-40 mL IntraVENous 2 times per day     Continuous Infusions:   sodium chloride      sodium chloride       PRN Meds:.sodium chloride flush, perflutren lipid microspheres, albuterol, potassium chloride **OR** potassium alternative oral replacement **OR** potassium chloride, sodium chloride, sodium chloride flush, sodium chloride, acetaminophen, ondansetron **OR** ondansetron     CBC:   Recent Labs     12/04/21  0627 12/05/21  0811 12/06/21  1550   WBC 3.9 4.1 5.3   HGB 8.5* 8.3* 8.1*    149* 145*     BMP:    Recent Labs     12/04/21  0627 12/05/21  0811 12/06/21  0734    138 140   K 3.4* 3.8 3.8   CL 97* 97* 98   CO2 29 32* 32*   BUN 5* 4* 6*   CREATININE 0.68 0.83 0.82   GLUCOSE 105* 146* 110*     Hepatic:   Recent Labs     12/04/21  0627 12/05/21  0811 12/06/21  0734   AST 18 21 20   ALT 10 10 9   BILITOT 0.58 0.49 0.46   ALKPHOS 65 63 59     Troponin: No results for input(s): TROPONINI in the last 72 hours. BNP: No results for input(s): BNP in the last 72 hours. Lipids: No results for input(s): CHOL, HDL in the last 72 hours.     Invalid input(s): LDLCALCU  INR: No results for input(s): INR in the last 72 hours. Objective:   Vitals: BP (!) 142/71   Pulse 83   Temp 99.3 °F (37.4 °C) (Oral) Comment: eating  Resp 18   Ht 5' (1.524 m)   Wt 185 lb 12.8 oz (84.3 kg)   SpO2 90%   BMI 36.29 kg/m²   General appearance: alert, appears stated age and cooperative  Skin: Skin color, texture, turgor normal. No rashes or lesions  Lungs: clear to auscultation bilaterally  Heart: regular rate and rhythm, S1, S2 normal, no murmur, click, rub or gallop  Abdomen: soft, non-tender; bowel sounds normal; no masses,  no organomegaly  Extremities: extremities normal, atraumatic, no cyanosis or edema  Neurologic: Mental status: Alert, oriented, thought content appropriate    Prophylaxis:   DVT with  [] lovenox        [] heparin        [] Scd        [x] apixaban     Radiology:  CT ABDOMEN PELVIS W IV CONTRAST Additional Contrast? None    Result Date: 11/30/2021  EXAMINATION: CT OF THE ABDOMEN AND PELVIS WITH CONTRAST 11/29/2021 4:32 pm TECHNIQUE: CT of the abdomen and pelvis was performed with the administration of intravenous contrast. Multiplanar reformatted images are provided for review. Dose modulation, iterative reconstruction, and/or weight based adjustment of the mA/kV was utilized to reduce the radiation dose to as low as reasonably achievable. COMPARISON: Concurrent chest CT, CT abdomen/pelvis 01/23/2021 HISTORY: Pain FINDINGS: Lower Chest: Chest findings are reported separately under today's chest CT report. Organs: Liver is grossly normal in contour and attenuation. Cholecystectomy with intrahepatic and extrahepatic biliary ductal dilatation as well as periportal edema. Spleen is normal caliber. Pancreas is diffusely atrophic. Minimal nonspecific nodularity of the adrenal glands, unchanged. 2 cm cyst at the lower pole left kidney, unchanged. No hydronephrosis. Excreting contrast limits assessment for urolithiasis.  GI/Bowel: Postoperative changes of the bowel with multiple partial colonic resections. Anastomoses in proximal colon and sigmoid colon appear somewhat patulous but without any rapid transition in caliber to suggest associated obstruction. Diverticulosis involving remnant portions of the descending and sigmoid colon without diverticulitis. The small bowel is normal caliber. Pelvis: Hysterectomy. Neither ovary is identified. Urinary bladder is without any wall thickening or inflammatory change. No bladder calcifications. Peritoneum/Retroperitoneum: Mild inflammatory changes pelvic fat, nonspecific, and new from prior. Trace perihepatic and perisplenic ascites. No free air or lymphadenopathy. Infrarenal IVC filter. Mild-to-moderate aortoiliac atherosclerosis without aneurysm. Bones/Soft Tissues: Operative changes along the anterior abdominal wall which appear similar to prior. Advanced degenerative changes of the spine with ankylosis across the L1-L2 and L5-S1 disc spaces, unchanged. Periportal edema which is superimposed on the patient's baseline intrahepatic and extrahepatic biliary ductal dilatation in the setting of prior cholecystectomy. This could be related to congestive heart failure or possibly volume resuscitation as the IVC is somewhat prominent, though other considerations include inflammatory processes within the liver and biliary tree such as hepatitis or cholangitis. Clinical correlation is required. Trace perihepatic and perisplenic ascites with mild nonspecific graying of the pelvic fat, possibly related to volume status or underlying infectious/inflammatory etiology. Postoperative changes of the bowel with patulous anastomoses, but no bowel obstruction. Colonic diverticulosis without diverticulitis.      XR CHEST PORTABLE    Result Date: 11/30/2021  EXAMINATION: XRAY CHEST TECHNIQUE: Single-view chest COMPARISON: 12/26/2017, 09/22/2016, 09/19/2016 HISTORY: Cough, shortness of breath FINDINGS: Mild basal predominant pulmonary opacities with some interstitial prominence. Small bilateral pleural effusions. No pneumothorax. Cardiomegaly. No acute bony findings. Mild basal predominant pulmonary opacities with some interstitial changes. Findings may be due to edema or infection in the appropriate clinical setting. Small bilateral pleural effusions. CT CHEST PULMONARY EMBOLISM W CONTRAST    Result Date: 11/30/2021  EXAMINATION: CTA OF THE CHEST, 11/29/2021 4:32 pm TECHNIQUE: CTA of the chest was performed after the administration of intravenous contrast.  Multiplanar reformatted images are provided for review. MIP images are provided for review. Dose modulation, iterative reconstruction, and/or weight based adjustment of the mA/kV was utilized to reduce the radiation dose to as low as reasonably achievable. COMPARISON: Concurrent abdominal CT, CT abdomen pelvis 01/23/2021 HISTORY: Rule out PE FINDINGS: Pulmonary Arteries: Pulmonary arteries are suboptimally opacified for evaluation. No evidence of intraluminal filling defect to suggest pulmonary embolism in the main pulmonary arteries with assessment of the remainder of the pulmonary arterial tree markedly degraded. Main pulmonary artery is upper limits of normal in caliber. Mediastinum: No evidence of mediastinal lymphadenopathy. The heart and pericardium demonstrate no acute abnormality. Cardiomegaly. Coronary artery and mitral annular calcifications. There is no acute abnormality of the thoracic aorta. Lungs/pleura: Mild faint patchy ground-glass opacities throughout both lungs resulting in mosaic attenuation. 6 mm solid noncalcified nodule in the lateral basal left lower lobe, unchanged from 11 months prior. Small to moderate bilateral layering pleural effusions. No pneumothorax. Bronchial wall thickening. Secretions in the trachea. Upper Abdomen: Abdominal findings are reported separately under same-day CT abdomen and pelvis. Soft Tissues/Bones: No acute bone or soft tissue abnormality. Osseous demineralization and degenerative changes of the shoulders and spine. Suboptimal opacification of the pulmonary arterial tree significantly limiting assessment for pulmonary emboli. No definite thrombus in the main pulmonary arteries. Mild patchy ground-glass opacities throughout both lungs resulting in mosaic attenuation. In the setting of bronchial wall thickening this is favored to be secondary to underlying acute or chronic airways disease. Mild edema or sequelae of viral pneumonia are potential differential considerations. Small to moderate layering bilateral pleural effusions. Cardiomegaly. No pericardial effusion. Assessment :   1. Pul embolism/apixaban  2. Sob/negative for ischemia     Plan:   1. Continue present care  2. See order    Patient Active Problem List:     Mixed hyperlipidemia     Gastroesophageal reflux disease without esophagitis     Onychomycosis     Diverticulosis     Chronic back pain     Hypothyroidism     Essential hypertension     Right bundle branch block     Chronic pulmonary embolism (HCC)     Type 2 diabetes mellitus without complication, without long-term current use of insulin (HCC)     Obesity (BMI 30.0-34. 9)     Tracheobronchitis     Allergic rhinitis     Glaucoma     S/p bilateral myringotomy with tube placement     GI bleed     Iron deficiency anemia     Paroxysmal atrial fibrillation (HCC)     Hemorrhage of rectum and anus     Intractable nausea and vomiting      Anticipated Disposition upon discharge: [] Home                                                                         [] Home with Home Health                                                                         [] [facility]                                                                         [] [address]      Patient is admitted as inpatient status because of co-morbidities listed above, severity of signs and symptoms as outlined, requirement for current medical therapies and most importantly because of direct risk to patient if care not provided in a hospital setting.           Krysta Calzada MD, MD  Rounding Hospitalist

## 2021-12-07 NOTE — CARE COORDINATION
ONGOING DISCHARGE PLAN:    Patient is alert and oriented x4. Spoke with patient regarding discharge plan and patient confirms that plan is still home with [facility]. Awaiting final discharge order. Will continue to follow for additional discharge needs.     Electronically signed by José Kumar RN on 12/7/2021 at 11:10 AM

## 2021-12-07 NOTE — DISCHARGE SUMMARY
Hospitalist Discharge Summary    Moises Brown  :  1940  MRN:  [id number]    Admit date:  2021  Discharge date:  21    Admitting Physician:  Sona Coelho MD    Discharge Diagnoses:   Patient Active Problem List   Diagnosis    Mixed hyperlipidemia    Gastroesophageal reflux disease without esophagitis    Onychomycosis    Diverticulosis    Chronic back pain    Hypothyroidism    Essential hypertension    Right bundle branch block    Chronic pulmonary embolism (HCC)    Type 2 diabetes mellitus without complication, without long-term current use of insulin (HCC)    Obesity (BMI 30.0-34. 9)    Tracheobronchitis    Allergic rhinitis    Glaucoma    S/p bilateral myringotomy with tube placement    GI bleed    Iron deficiency anemia    Paroxysmal atrial fibrillation (HCC)    Hemorrhage of rectum and anus    Intractable nausea and vomiting        Admission Condition:  fair      Discharged Condition:  good    Hospital Course/Treatments   Admitted with uti and sob, pt was rx with antibiotics and pt was seen by cardio, pt underwent stress test which was negative for ischemia, pt wwas doing well, pt d/c with following meds    Discharge Medications:         Medication List      START taking these medications    ferrous sulfate 325 (65 Fe) MG tablet  Commonly known as: IRON 325  Take 1 tablet by mouth daily (with breakfast)  Start taking on: 2021        CONTINUE taking these medications    ACIDOPHILUS/PECTIN PO     apixaban 5 MG Tabs tablet  Commonly known as: ELIQUIS     atenolol 100 MG tablet  Commonly known as: TENORMIN  TAKE 1 TABLET BY MOUTH EVERY DAY     cholestyramine 4 g packet  Commonly known as: QUESTRAN     Cranberry 450 MG Caps     dorzolamide 2 % ophthalmic solution  Commonly known as: TRUSOPT     doxazosin 4 MG tablet  Commonly known as: CARDURA  TAKE 1 TABLET BY MOUTH EVERY DAY     famotidine 40 MG tablet  Commonly known as: PEPCID  Take 1 tablet by mouth daily latanoprost 0.005 % ophthalmic solution  Commonly known as: XALATAN     levothyroxine 100 MCG tablet  Commonly known as: SYNTHROID  TAKE 1 TABLET BY MOUTH EVERY DAY     NONFORMULARY     ofloxacin 0.3 % otic solution  Commonly known as: FLOXIN     Rhopressa 0.02 % Soln  Generic drug: Netarsudil Dimesylate     timolol 0.5 % ophthalmic solution  Commonly known as: TIMOPTIC     Vitamin D3 125 MCG (5000 UT) Tabs           Where to Get Your Medications      These medications were sent to  [facility]   [address] [place] [address]    Phone: [phone]   · ferrous sulfate 325 (65 Fe) MG tablet           Consults:  IP CONSULT TO INTERNAL MEDICINE  IP CONSULT TO GI  IP CONSULT TO CARDIOLOGY    Significant Diagnostic Studies:  CT ABDOMEN PELVIS W IV CONTRAST Additional Contrast? None    Result Date: 11/30/2021  EXAMINATION: CT OF THE ABDOMEN AND PELVIS WITH CONTRAST 11/29/2021 4:32 pm TECHNIQUE: CT of the abdomen and pelvis was performed with the administration of intravenous contrast. Multiplanar reformatted images are provided for review. Dose modulation, iterative reconstruction, and/or weight based adjustment of the mA/kV was utilized to reduce the radiation dose to as low as reasonably achievable. COMPARISON: Concurrent chest CT, CT abdomen/pelvis 01/23/2021 HISTORY: Pain FINDINGS: Lower Chest: Chest findings are reported separately under today's chest CT report. Organs: Liver is grossly normal in contour and attenuation. Cholecystectomy with intrahepatic and extrahepatic biliary ductal dilatation as well as periportal edema. Spleen is normal caliber. Pancreas is diffusely atrophic. Minimal nonspecific nodularity of the adrenal glands, unchanged. 2 cm cyst at the lower pole left kidney, unchanged. No hydronephrosis. Excreting contrast limits assessment for urolithiasis.  GI/Bowel: Postoperative changes of the bowel with multiple partial colonic resections. Anastomoses in proximal colon and sigmoid colon appear somewhat patulous but without any rapid transition in caliber to suggest associated obstruction. Diverticulosis involving remnant portions of the descending and sigmoid colon without diverticulitis. The small bowel is normal caliber. Pelvis: Hysterectomy. Neither ovary is identified. Urinary bladder is without any wall thickening or inflammatory change. No bladder calcifications. Peritoneum/Retroperitoneum: Mild inflammatory changes pelvic fat, nonspecific, and new from prior. Trace perihepatic and perisplenic ascites. No free air or lymphadenopathy. Infrarenal IVC filter. Mild-to-moderate aortoiliac atherosclerosis without aneurysm. Bones/Soft Tissues: Operative changes along the anterior abdominal wall which appear similar to prior. Advanced degenerative changes of the spine with ankylosis across the L1-L2 and L5-S1 disc spaces, unchanged. Periportal edema which is superimposed on the patient's baseline intrahepatic and extrahepatic biliary ductal dilatation in the setting of prior cholecystectomy. This could be related to congestive heart failure or possibly volume resuscitation as the IVC is somewhat prominent, though other considerations include inflammatory processes within the liver and biliary tree such as hepatitis or cholangitis. Clinical correlation is required. Trace perihepatic and perisplenic ascites with mild nonspecific graying of the pelvic fat, possibly related to volume status or underlying infectious/inflammatory etiology. Postoperative changes of the bowel with patulous anastomoses, but no bowel obstruction. Colonic diverticulosis without diverticulitis.      XR CHEST PORTABLE    Result Date: 11/30/2021  EXAMINATION: XRAY CHEST TECHNIQUE: Single-view chest COMPARISON: 12/26/2017, 09/22/2016, 09/19/2016 HISTORY: Cough, shortness of breath FINDINGS: Mild basal predominant pulmonary opacities with some interstitial prominence. Small bilateral pleural effusions. No pneumothorax. Cardiomegaly. No acute bony findings. Mild basal predominant pulmonary opacities with some interstitial changes. Findings may be due to edema or infection in the appropriate clinical setting. Small bilateral pleural effusions. CT CHEST PULMONARY EMBOLISM W CONTRAST    Result Date: 11/30/2021  EXAMINATION: CTA OF THE CHEST, 11/29/2021 4:32 pm TECHNIQUE: CTA of the chest was performed after the administration of intravenous contrast.  Multiplanar reformatted images are provided for review. MIP images are provided for review. Dose modulation, iterative reconstruction, and/or weight based adjustment of the mA/kV was utilized to reduce the radiation dose to as low as reasonably achievable. COMPARISON: Concurrent abdominal CT, CT abdomen pelvis 01/23/2021 HISTORY: Rule out PE FINDINGS: Pulmonary Arteries: Pulmonary arteries are suboptimally opacified for evaluation. No evidence of intraluminal filling defect to suggest pulmonary embolism in the main pulmonary arteries with assessment of the remainder of the pulmonary arterial tree markedly degraded. Main pulmonary artery is upper limits of normal in caliber. Mediastinum: No evidence of mediastinal lymphadenopathy. The heart and pericardium demonstrate no acute abnormality. Cardiomegaly. Coronary artery and mitral annular calcifications. There is no acute abnormality of the thoracic aorta. Lungs/pleura: Mild faint patchy ground-glass opacities throughout both lungs resulting in mosaic attenuation. 6 mm solid noncalcified nodule in the lateral basal left lower lobe, unchanged from 11 months prior. Small to moderate bilateral layering pleural effusions. No pneumothorax. Bronchial wall thickening. Secretions in the trachea. Upper Abdomen: Abdominal findings are reported separately under same-day CT abdomen and pelvis. Soft Tissues/Bones: No acute bone or soft tissue abnormality. Osseous demineralization and degenerative changes of the shoulders and spine. Suboptimal opacification of the pulmonary arterial tree significantly limiting assessment for pulmonary emboli. No definite thrombus in the main pulmonary arteries. Mild patchy ground-glass opacities throughout both lungs resulting in mosaic attenuation. In the setting of bronchial wall thickening this is favored to be secondary to underlying acute or chronic airways disease. Mild edema or sequelae of viral pneumonia are potential differential considerations. Small to moderate layering bilateral pleural effusions. Cardiomegaly. No pericardial effusion. Disposition:   home    Discharge Instructions: Activity: activity as tolerated  Diet:  regular diet    Follow up with Andrew Kang MD in 1 weeks.     Signed:  Lev Knight MD  12/7/2021, 5:42 PM    Time spent in discharge of this pt is more than 30 minutes in examination,evaluvation,  counseling and review of medication and discharge plan

## 2021-12-07 NOTE — PLAN OF CARE
Problem: Falls - Risk of:  Goal: Will remain free from falls  Outcome: Ongoing  Goal: Absence of physical injury  Outcome: Ongoing  Note: Pt remained free of any injury or fall throughout the shift. Bed remained in lowest position, side rails up x2, call light within reach. Problem: Skin Integrity:  Goal: Will show no infection signs and symptoms  Outcome: Ongoing  Goal: Absence of new skin breakdown  Outcome: Ongoing  Note: Patient showed no new s/s of skin breakdown. Patient was repositioned and assessed frequently. Skin integrity maintained.         Problem: Cardiac:  Goal: Ability to maintain an adequate cardiac output will improve  Outcome: Ongoing  Goal: Hemodynamic stability will improve  Outcome: Ongoing     Problem: Fluid Volume:  Goal: Ability to achieve and maintain adequate urine output will improve  Outcome: Ongoing     Problem: Respiratory:  Goal: Respiratory status will improve  Outcome: Ongoing

## 2021-12-07 NOTE — PROGRESS NOTES
Patient provided discharge education, patient verbalized understanding stated no questions, all belongings sent with patient

## 2021-12-07 NOTE — PROGRESS NOTES
Pt requests that she do her own eyedrops. Pt states \"I've done them on my own for years. I just want to do them myself. I have a routine! \"      Santiago Sanders will scan eyedrops appropriately and allow the patient to administer them herself.

## 2021-12-07 NOTE — FLOWSHEET NOTE
PT states that she's been at the hospital for 8th days and wants to go home. Welcomed prayer. 12/07/21 1412   Encounter Summary   Services provided to: Patient   Referral/Consult From: [facility]   (12-7-21)   Complexity of Encounter Moderate   Length of Encounter 15 minutes   Spiritual Assessment Completed Yes   Spiritual/P[employer]al   Type Spiritual support   Assessment Calm; Approachable; Anxious;  Coping; Peaceful   Intervention Active listening; Explored feelings, thoughts, concerns; Prayer; Sustaining presence/ Ministry of presence   Outcome Expressed gratitude; Engaged in conversation; Coping; Receptive; Encouraged

## 2021-12-07 NOTE — PROGRESS NOTES
[facility] CARDIOLOGY Progress Note LATE ENTRY    12/7/2021 3:39 PM      Subjective:  Ms. Una Prado  denies any chest pain or shortness of breath or palpitations or lightheadedness or dizziness. Review of systems:  No fever or chills. No cough. No diarrhea. No headaches.              LABS:     Recent Results (from the past 24 hour(s))   CBC Auto Differential    Collection Time: 12/06/21  3:50 PM   Result Value Ref Range    WBC 5.3 3.5 - 11.0 k/uL    RBC 3.57 (L) 4.0 - 5.2 m/uL    Hemoglobin 8.1 (L) 12.0 - 16.0 g/dL    Hematocrit 26.5 (L) 36 - 46 %    MCV 74.3 (L) 80 - 100 fL    MCH 22.7 (L) 26 - 34 pg    MCHC 30.6 (L) 31 - 37 g/dL    RDW 19.6 (H) 11.5 - 14.9 %    Platelets 404 (L) 427 - 450 k/uL    MPV 7.7 6.0 - 12.0 fL    NRBC Automated NOT REPORTED per 100 WBC    Differential Type NOT REPORTED     Immature Granulocytes NOT REPORTED 0 %    Absolute Immature Granulocyte NOT REPORTED 0.00 - 0.30 k/uL    WBC Morphology NOT REPORTED     RBC Morphology NOT REPORTED     Platelet Estimate NOT REPORTED     Seg Neutrophils 62 36 - 66 %    Lymphocytes 21 (L) 24 - 44 %    Monocytes 13 (H) 1 - 7 %    Eosinophils % 3 0 - 4 %    Basophils 1 0 - 2 %    Segs Absolute 3.30 1.3 - 9.1 k/uL    Absolute Lymph # 1.10 1.0 - 4.8 k/uL    Absolute Mono # 0.70 0.1 - 1.3 k/uL    Absolute Eos # 0.10 0.0 - 0.4 k/uL    Basophils Absolute 0.10 0.0 - 0.2 k/uL   CBC with DIFF    Collection Time: 12/07/21  8:08 AM   Result Value Ref Range    WBC 4.2 3.5 - 11.0 k/uL    RBC 3.97 (L) 4.0 - 5.2 m/uL    Hemoglobin 9.2 (L) 12.0 - 16.0 g/dL    Hematocrit 30.2 (L) 36 - 46 %    MCV 76.1 (L) 80 - 100 fL    MCH 23.1 (L) 26 - 34 pg    MCHC 30.3 (L) 31 - 37 g/dL    RDW 20.3 (H) 11.5 - 14.9 %    Platelets 328 (L) 755 - 450 k/uL    MPV 7.8 6.0 - 12.0 fL    NRBC Automated NOT REPORTED per 100 WBC    Differential Type NOT REPORTED     Immature Granulocytes NOT REPORTED 0 %    Absolute Immature Granulocyte NOT REPORTED 0.00 - 0.30 k/uL    WBC Morphology NOT REPORTED     RBC Morphology NOT REPORTED     Platelet Estimate NOT REPORTED     Seg Neutrophils 65 36 - 66 %    Lymphocytes 21 (L) 24 - 44 %    Monocytes 9 (H) 1 - 7 %    Eosinophils % 3 0 - 4 %    Basophils 2 0 - 2 %    Segs Absolute 2.70 1.3 - 9.1 k/uL    Absolute Lymph # 0.90 (L) 1.0 - 4.8 k/uL    Absolute Mono # 0.40 0.1 - 1.3 k/uL    Absolute Eos # 0.10 0.0 - 0.4 k/uL    Basophils Absolute 0.10 0.0 - 0.2 k/uL   Comprehensive Metabolic Panel w/ Reflex to MG    Collection Time: 21  8:08 AM   Result Value Ref Range    Glucose 158 (H) 70 - 99 mg/dL    BUN 7 (L) 8 - 23 mg/dL    CREATININE 0.95 (H) 0.50 - 0.90 mg/dL    Bun/Cre Ratio NOT REPORTED 9 - 20    Calcium 9.2 8.6 - 10.4 mg/dL    Sodium 137 135 - 144 mmol/L    Potassium 3.9 3.7 - 5.3 mmol/L    Chloride 98 98 - 107 mmol/L    CO2 30 20 - 31 mmol/L    Anion Gap 9 9 - 17 mmol/L    Alkaline Phosphatase 63 35 - 104 U/L    ALT 10 5 - 33 U/L    AST 18 <32 U/L    Total Bilirubin 0.50 0.3 - 1.2 mg/dL    Total Protein 6.3 (L) 6.4 - 8.3 g/dL    Albumin 3.4 (L) 3.5 - 5.2 g/dL    Albumin/Globulin Ratio NOT REPORTED 1.0 - 2.5    GFR Non- 56 (L) >60 mL/min    GFR African American >60 >60 mL/min    GFR Comment          GFR Staging NOT REPORTED        Pulse Ox:  SpO2  Av.7 %  Min: 92 %  Max: 97 %    Supplemental O2: O2 Flow Rate (L/min): 3 L/min     Current Meds:    [START ON 2021] famotidine  20 mg Oral Daily    doxazosin  4 mg Oral Dinner    apixaban  5 mg Oral BID    ferrous sulfate  325 mg Oral Daily with breakfast    lactobacillus  1 capsule Oral BID WC    atenolol  100 mg Oral Daily    dorzolamide  1 drop Both Eyes TID    latanoprost  1 drop Both Eyes Nightly    levothyroxine  100 mcg Oral Daily    timolol  1 drop Both Eyes BID    cholestyramine light  4 g Oral QPM    sodium chloride flush  5-40 mL IntraVENous 2 times per day         Continuous Infusions:    sodium chloride      sodium chloride                VITAL SIGNS:    BP (!) 118/59   Pulse 74   Temp 98.9 °F (37.2 °C) (Oral)   Resp 18   Ht 5' (1.524 m)   Wt 185 lb 12.8 oz (84.3 kg)   SpO2 97%   BMI 36.29 kg/m²  3 L/min      Admit Weight:  191 lb 12.9 oz (87 kg)    Last 3 weights: Wt Readings from Last 3 Encounters:   12/05/21 185 lb 12.8 oz (84.3 kg)   08/26/21 193 lb (87.5 kg)   08/05/21 193 lb 8 oz (87.8 kg)       BMI: Body mass index is 36.29 kg/m². INPUT/OUTPUT:          Intake/Output Summary (Last 24 hours) at 12/7/2021 1539  Last data filed at 12/7/2021 0720  Gross per 24 hour   Intake 10 ml   Output 1200 ml   Net -1190 ml         EXAM:     General appearance: Awake, alert. Pleasant. Laying in bed comfortably. Chest:   [place] air entry bilaterally. No rhonchi or wheezing. Cardiac: Regular rate and rhythm. No significant murmur or gallop or rubs. Extremities: No leg edema. Skin:  warm and dry. Neuro:  Able to move all 4 extremities. Nuclear stress test Dec 2021:     Impression:     1. Attenuation related artifact versus infarct of the inferior and apical   walls.  Please note exam is limited in the absence of prone stress imaging. No scintigraphic evidence for reversible ischemia. 2. Left ventricular ejection fracture of greater than 70%. 3.  Please see report for EKG portion of the examination which will be   performed separately by physician from cardiology. Risk stratification:  Intermediate risk            2D echocardiogram results still pending. ASSESSMENT:    Chronic shortness of breath on exertion.     No stress-induced myocardial ischemia, attenuation related artifact versus inferior wall/apical infarction, LVEF 70%, intermediate risk study on nuclear stress test December 2021.     History of right bundle branch block on abnormal EKG.    History of recurrent pulmonary emboli. On chronic Eliquis therapy. Sees pulmonologist.     History of nausea.     Other problems as charted. REC/PLAN:    Remains stable cardiac-wise. Okay to discharge any time from cardiac standpoint on all current cardiac medications and advised to follow-up with me in office in 4-6 weeks or sooner as needed. Discussed with the charge nurse and advised her to notify patient regarding her 2D echocardiogram findings once the report is available later on today as patient was inquiring at the time of my evaluation this morning. Thanks. Electronically signed by Truong John MD, [facility] [place]        PLEASE NOTE:  This progress note was completed using a voice transcription system. Every effort was made to ensure accuracy. However, inadvertent computerized transcription errors may be present.

## 2021-12-07 NOTE — DISCHARGE INSTR - DIET

## 2021-12-08 ENCOUNTER — CARE COORDINATION (OUTPATIENT)
Dept: CASE MANAGEMENT | Age: 81
End: 2021-12-08

## 2021-12-08 DIAGNOSIS — R11.2 INTRACTABLE NAUSEA AND VOMITING: Primary | ICD-10-CM

## 2021-12-08 PROCEDURE — 1111F DSCHRG MED/CURRENT MED MERGE: CPT | Performed by: FAMILY MEDICINE

## 2021-12-08 NOTE — CARE COORDINATION
[facility] Transitions Initial Follow Up Call    Call within 2 business days of discharge: Yes    Patient: Guilherme Omalley Patient : 1940    MRN: [id number]  Reason for Admission: Intractable nausea and vomiting    Discharge Date: 21 RARS: Readmission Risk Score: 15.3 ( )      Last Discharge [facility]       Complaint Diagnosis Description Type Department Provider    21 Nausea & Vomiting; Cough Intractable nausea and vomiting ED to Hosp-Admission (Discharged) (ADMITTED) [facility] Gunnar Melendez MD; Gracy Valles . .. Spoke with:Genie who states she is doing pretty good, she denies nausea, vomiting, chest pain, SOB,at times , dizziness, using walker to ambulate. Eating and drinking ok, loose stools normal bladder elimination. Medications reviewed and taking as directed. [facility] be ought this afternoon. 1111 f complete. Denies concerns    Facility: [facility]    Non-face-to-face services provided:  Obtained and reviewed discharge summary and/or continuity of care documents  Transitions of Care Initial Call    Was this an external facility discharge? No   Challenges to be reviewed by the provider   Additional needs identified to be addressed with provider: No  none             Method of communication with provider : none      Advance Care Planning:   Does patient have an Advance Directive: reviewed and needs to be updated, not on file; education provided, not on file, patient declined education, decision maker updated and referral to internal ACP facilitator. Was this a readmission? No   Patient stated reason for admission: Intractable nausea and vomiting    Patients top risk factors for readmission: functional physical ability  lack of knowledge about disease    Care Transition Nurse (CTN) contacted the patient by telephone to perform post hospital discharge assessment. Verified name and  with patient as identifiers.  Provided introduction to self, and explanation of the CTN role. CTN reviewed discharge instructions, medical action plan and red flags with patient who verbalized understanding. Patient given an opportunity to ask questions and does not have any further questions or concerns at this time. Were discharge instructions available to patient? Yes. Reviewed appropriate site of care based on symptoms and resources available to patient including: PCP and Specialist. The patient agrees to contact the PCP office for questions related to their healthcare. Medication reconciliation was performed with patient, who verbalizes understanding of administration of home medications. Advised obtaining a 90-day supply of all daily and as-needed medications. Covid Risk Education     Educated patient about risk for severe COVID-19 due to risk factors according to CDC guidelines. LPN CC reviewed discharge instructions, medical action plan and red flag symptoms with the patient who verbalized understanding. Discussed COVID vaccination status: Yes. Education provided on COVID-19 vaccination as appropriate. Discussed exposure protocols and quarantine with CDC Guidelines. Patient was given an opportunity to verbalize any questions and concerns and agrees to contact LPN CC or health care provider for questions related to their healthcare. Reviewed and educated patient on any new and changed medications related to discharge diagnosis. Was patient discharged with a pulse oximeter? No Discussed and confirmed pulse oximeter discharge instructions and when to notify provider or seek emergency care. LPN CC provided contact information. Plan for follow-up call in 5-7 days based on severity of symptoms and risk factors.   Plan for next call: symptom management-nausea, weakness      Care Transitions 24 Hour Call    Do you have any ongoing symptoms?: No  Do you have a copy of your discharge instructions?: Yes  Do you have all of your prescriptions and are they filled?: Yes  Have you been contacted by a [address]?: No  Have you scheduled your follow up appointment?: Yes  How are you going to get to your appointment?: Car - family or friend to transport  Were you discharged with any Home Care or Post Acute Services: Yes  Post Acute Services: [address] [place] [address] Davie Armstrong (Comment: [facility])  Patient DME: Purnima Mccormack you feel like you have everything you need to keep you well at home?: Yes  Are you an active caregiver in your home?: No  Care Transitions Interventions     Other Services:  (Comment: [facility])          Follow Up  Future Appointments   Date Time Provider David Shen   12/10/2021  3:00 PM MD Toby Horton   1/13/2022  2:00 PM MD David Horton LPN

## 2021-12-08 NOTE — CARE COORDINATION
Continuity of Care Form    Patient Name: Viktoria Olson   :  1940  MRN:  [id number]    Admit date:  2021  Discharge date:  2021    Code Status Order: Full Code   Advance Directives:   [facility] Documentation       Date/Time Healthcare Directive Type of Healthcare Directive Copy in [address] Agent's Name Healthcare Agent's Phone Number    21 1215 Yes, patient has an advance directive for healthcare treatment -- Yes, copy in chart -- -- --            Admitting Physician:  Lielani Acharya MD  PCP: Morales Stephens MD    Discharging Nurse: [facility] Unit/Room#: 8699/9597-20  Discharging Unit Phone Number: [phone]    Emergency Contact:   Extended Emergency Contact Information  Primary Emergency Contact: Caleb Lama  Address: [address] [place] [address] Phone: [phone]  Relation: Child  Secondary Emergency Contact: [address] [place] [address] Phone: [phone]  Relation: Child    Past Surgical History:  Past Surgical History:   Procedure Laterality Date    CHOLECYSTECTOMY      COLON SURGERY      s/p sigmoid cloectomy    COLONOSCOPY N/A 2021    COLONOSCOPY DIAGNOSTIC performed by Evita Salas MD at [address] N/A 2021    [facility] performed by Evita [facility]as MD at [facility] 2019    DR IMAN GAUTHIER    UPPER GASTROINTESTINAL ENDOSCOPY N/A 2021    EGD DIAGNOSTIC ONLY [facility]      s/p       Immunization History:   Immunization History   Administered Date(s) Administered    COVID-19, Jiménez Peter, PF, 30mcg/0.3mL 2021, 2021, 10/26/2021    Influenza 2013    Influenza Virus Vaccine 10/16/2014, 10/23/2015    Influenza, High Dose (Fluzone 65 yrs and older) 2021    Influenza, High-dose, Quadv, 65 yrs +, IM (Fluzone) 10/14/2020    Influenza, Max Forget, IM, PF (6 mo and older Fluzone, Flulaval, Fluarix, and 3 yrs and older Afluria) 11/04/2016, 10/12/2017, 10/22/2018    Influenza, Triv, inactivated, subunit, adjuvanted, IM (Fluad 65 yrs and older) 10/07/2019    Pneumococcal Conjugate 13-valent ([id number]) 04/11/2016    Pneumococcal Polysaccharide ([id number]) 10/01/2002, 02/13/2014    Td, unspecified formulation 10/01/2002       Active Problems:  Patient Active Problem List   Diagnosis Code    Mixed hyperlipidemia E78.2    Gastroesophageal reflux disease without esophagitis K21.9    Onychomycosis B35.1    Diverticulosis K57.90    Chronic back pain M54.9, G89.29    Hypothyroidism E03.9    Essential hypertension I10    Right bundle branch block I45.10    Chronic pulmonary embolism (HCC) I27.82    Type 2 diabetes mellitus without complication, without long-term current use of insulin (HCC) E11.9    Obesity (BMI 30.0-34. 9) E66.9    Tracheobronchitis J40    Allergic rhinitis J30.9    Glaucoma H40.9    S/p bilateral myringotomy with tube placement Z96.22    GI bleed K92.2    Iron deficiency anemia D50.9    Paroxysmal atrial fibrillation (HCC) I48.0    Hemorrhage of rectum and anus K62.5    Intractable nausea and vomiting R11.2       Isolation/Infection:   Isolation            Contact          Patient Infection Status       Infection Onset Added Last Indicated Last Indicated By Review Planned Expiration Resolved Resolved By    ESBL (Extended Spectrum Beta Lactamase)  10/17/16 10/17/16 Feliz Lewis RN        Klebsiella - Urine 10/2016      Resolved    C-diff Rule Out 11/30/21 11/30/21 12/01/21 C DIFF TOXIN/ANTIGEN (Ordered)   12/01/21 Rule-Out Test Resulted    COVID-19 (Rule Out) 11/29/21 11/29/21 11/29/21 COVID-19 (Ordered)   11/30/21 Rule-Out Test Resulted    COVID-19 (Rule Out) 11/29/21 11/29/21 11/29/21 SARS-CoV-2 NAAT (Rapid) (Ordered)   11/29/21 Rule-Out Test Resulted            Nurse Assessment:  Last Vital Signs: BP (!) 148/65   Pulse 64   Temp 97.9 °F (36.6 °C) (Oral)   Resp 20   Ht 5' (1.524 m)   Wt 192 lb (87.1 kg)   SpO2 95%   BMI 37.50 kg/m²     Last documented pain score (0-10 scale): Pain Level: 3  Last Weight:   Wt Readings from Last 1 Encounters:   12/04/21 192 lb (87.1 kg)     Mental Status:  oriented and alert    IV Access:  - None    Nursing Mobility/ADLs:  Walking   Independent  Transfer  Independent  Bathing  Assisted  Dressing  Independent  [address]  Independent  Med Delivery   whole    Wound Care Documentation and Therapy:        Elimination:  Continence:   · Bowel: Yes  · Bladder: Yes  Urinary Catheter: None   Colostomy/Ileostomy/Ileal Conduit: No       Date of Last BM:    Intake/Output Summary (Last 24 hours) at 12/4/2021 1123  Last data filed at 12/4/2021 0900  Gross per 24 hour   Intake --   Output 5250 ml   Net -5250 ml     I/O last 3 completed shifts:  In: -   Out: 4800 [Urine:4800]    Safety Concerns:     None    Impairments/Disabilities:      Vision and Hearing    Nutrition Therapy:  Current Nutrition Therapy:   - Oral Diet:  General    Routes of Feeding: Oral  Liquids: Thin Liquids  Daily Fluid Restriction: no  Last Modified Barium Swallow with Video (Video Swallowing Test): not done    Treatments at the Time of Hospital Discharge:   Respiratory Treatments: none  Oxygen Therapy:  is not on home oxygen therapy. Ventilator:    - No ventilator support    Rehab Therapies: Physical Therapy and Occupational Therapy  Weight Bearing Status/Restrictions: No weight bearing restirctions  Other Medical Equipment (for information only, NOT a DME order):  wheelchair and cane  Other Treatments: Skilled nursing assessment and monitoring. Medication education and monitoring.     Patient's personal belongings (please select all that are sent with patient):  [place]    RN SIGNATURE:  Electronically signed by Yasmin Morris RN on 12/6/21 at 1:46 PM EST    CASE MANAGEMENT/SOCIAL WORK SECTION    Inpatient Status Date: 11/29/2021    Readmission Risk Assessment Score:  Readmission Risk              Risk of Unplanned Readmission:  16           Discharging to Facility/ [address]  P: [phone]  F: [phone]     Dialysis Facility (if applicable)   · Name:  · Address:  · Dialysis Schedule:  · Phone:  · Fax:    / signature: Electronically signed by Yasmin Morris RN on 12/4/21 at 11:24 AM EST    PHYSICIAN SECTION    Prognosis: Good    Condition at Discharge: Stable    Rehab Potential (if transferring to Rehab): Good    Recommended Labs or Other Treatments After Discharge: none    Physician Certification: I certify the above information and transfer of Margarita Warner  is necessary for the continuing treatment of the diagnosis listed and that she requires Home Care for greater 30 days.      Update Admission H&P: No change in H&P    PHYSICIAN SIGNATURE:  Electronically signed by Lisa Arriola MD on 12/7/21 at 5:41 PM EST      START taking these medications    START taking these medications   ferrous sulfate 325 (65 Fe) MG tablet  Commonly known as: IRON 325  Take 1 tablet by mouth daily (with breakfast)     CONTINUE taking these medications    CONTINUE taking these medications   ACIDOPHILUS/PECTIN PO   apixaban 5 MG Tabs tablet  Commonly known as: ELIQUIS   atenolol 100 MG tablet  Commonly known as: TENORMIN  TAKE 1 TABLET BY MOUTH EVERY DAY   cholestyramine 4 g packet  Commonly known as: QUESTRAN   Cranberry 450 MG Caps   dorzolamide 2 % ophthalmic solution  Commonly known as: TRUSOPT   doxazosin 4 MG tablet  Commonly known as: CARDURA  TAKE 1 TABLET BY MOUTH EVERY DAY   famotidine 40 MG tablet  Commonly known as: PEPCID  Take 1 tablet by mouth daily   latanoprost 0.005 % ophthalmic solution  Commonly known as: XALATAN   levothyroxine 100 MCG tablet  Commonly known as: SYNTHROID  TAKE 1 TABLET BY MOUTH EVERY DAY   NONFORMULARY   ofloxacin 0.3 % otic solution  Commonly known as: FLOXIN   Rhopressa 0.02 % Soln  Generic drug: Netarsudil Dimesylate   timolol 0.5 % ophthalmic solution  Commonly known as: TIMOPTIC   Vitamin D3 125 MCG (5000 UT) Tabs

## 2021-12-10 ENCOUNTER — OFFICE VISIT (OUTPATIENT)
Dept: FAMILY MEDICINE CLINIC | Age: 81
End: 2021-12-10
Payer: MEDICARE

## 2021-12-10 VITALS
TEMPERATURE: 96.4 F | RESPIRATION RATE: 16 BRPM | HEART RATE: 72 BPM | WEIGHT: 188.2 LBS | BODY MASS INDEX: 36.76 KG/M2 | DIASTOLIC BLOOD PRESSURE: 80 MMHG | SYSTOLIC BLOOD PRESSURE: 124 MMHG

## 2021-12-10 DIAGNOSIS — E78.2 MIXED HYPERLIPIDEMIA: ICD-10-CM

## 2021-12-10 DIAGNOSIS — E03.9 HYPOTHYROIDISM, UNSPECIFIED TYPE: ICD-10-CM

## 2021-12-10 DIAGNOSIS — E11.9 TYPE 2 DIABETES MELLITUS WITHOUT COMPLICATION, WITHOUT LONG-TERM CURRENT USE OF INSULIN (HCC): Primary | ICD-10-CM

## 2021-12-10 DIAGNOSIS — I10 ESSENTIAL HYPERTENSION: ICD-10-CM

## 2021-12-10 PROCEDURE — 99496 TRANSJ CARE MGMT HIGH F2F 7D: CPT | Performed by: FAMILY MEDICINE

## 2021-12-10 PROCEDURE — 1111F DSCHRG MED/CURRENT MED MERGE: CPT | Performed by: FAMILY MEDICINE

## 2021-12-10 ASSESSMENT — ENCOUNTER SYMPTOMS
SHORTNESS OF BREATH: 1
WHEEZING: 0
ABDOMINAL PAIN: 0
CHEST TIGHTNESS: 0
BLOOD IN STOOL: 0

## 2021-12-10 NOTE — PROGRESS NOTES
Post-Discharge Transitional Care Management Services or Hospital Follow Up      Phuong Villaseñor   YOB: 1940    Date of Office Visit:  12/10/2021  Date of Hospital Admission: 11/29/21  Date of Hospital Discharge: 12/7/21  Readmission Risk Score(high >=14%. Medium >=10%):Readmission Risk Score: 15.3 ( )      Care management risk score Rising risk (score 2-5) and Complex Care (Scores >=6): 5     Non face to face  following discharge, date last encounter closed (first attempt may have been earlier): 12/8/2021 12:03 PM 12/8/2021 12:03 PM    Call initiated 2 business days of discharge: Yes     Patient Active Problem List   Diagnosis    Mixed hyperlipidemia    Gastroesophageal reflux disease without esophagitis    Onychomycosis    Diverticulosis    Chronic back pain    Hypothyroidism    Essential hypertension    Right bundle branch block    Chronic pulmonary embolism ([facility]    Type 2 diabetes mellitus without complication, without long-term current use of insulin ([facility]    Obesity (BMI 30.0-34. 9)    Tracheobronchitis    Allergic rhinitis    Glaucoma    S/p bilateral myringotomy with tube placement    GI bleed    Iron deficiency anemia    Paroxysmal atrial fibrillation (HCC)    Hemorrhage of rectum and anus    Intractable nausea and vomiting       Allergies   Allergen Reactions    Avapro [Irbesartan]     Ciprofloxacin Hcl Nausea Only    Cozaar [Losartan Potassium]     Diovan [Valsartan]     Lipitor [Atorvastatin Calcium]     Lisinopril     Pcn [Penicillins]     Pravachol [Pravastatin Sodium]      myalgias    Tape [Adhesive Tape] Dermatitis    Tramadol Swelling    Zetia [Ezetimibe]     Morphine Nausea And Vomiting       Medications listed as ordered at the time of discharge from hospital  @DISCHARGEMEDSLIST(<NOROUTINE> error)@      Medications marked \"taking\" at this time  Outpatient Medications Marked as Taking for the 12/10/21 encounter (Office Visit) with Don Morrison MD   Medication Sig Dispense Refill    ferrous sulfate (IRON 325) 325 (65 Fe) MG tablet Take 1 tablet by mouth daily (with breakfast) 30 tablet 3    atenolol (TENORMIN) 100 MG tablet TAKE 1 TABLET BY MOUTH EVERY DAY 90 tablet 0    doxazosin (CARDURA) 4 MG tablet TAKE 1 TABLET BY MOUTH EVERY DAY  90 tablet 0    levothyroxine (SYNTHROID) 100 MCG tablet TAKE 1 TABLET BY MOUTH EVERY DAY 90 tablet 1    apixaban (ELIQUIS) 5 MG TABS tablet Take 5 mg by mouth 2 times daily      ofloxacin (FLOXIN) 0.3 % otic solution Place 4 drops into both ears 2 times daily      Lactobacillus (ACIDOPHILUS/PECTIN PO) Take 100 mg by mouth daily      RHOPRESSA 0.02 % SOLN INSTILL 1 DROP INTO AFFECTED EYE(S) BY OPHTHALMIC ROUTE ONCE DAILY IN THE EVENING      timolol (TIMOPTIC) 0.5 % ophthalmic solution ONE DROP TWICE A DAY INTO EACH EYE      dorzolamide (TRUSOPT) 2 % ophthalmic solution INSTILL 1 DROP BY OPHTHALMIC ROUTE 2 TIMES EVERY DAY IN BOTH EYES  10    famotidine (PEPCID) 40 MG tablet Take 1 tablet by mouth daily 90 tablet 1    NONFORMULARY Indications: Advanced anti-oxidant w/echinacea and garlic      Cholecalciferol (VITAMIN D3) 5000 UNITS TABS Take 1 tablet by mouth daily      Cranberry 450 MG CAPS Take 450 mg by mouth daily       cholestyramine (QUESTRAN) 4 G packet Take 1 packet by mouth every evening   3    latanoprost (XALATAN) 0.005 % ophthalmic solution Place 1 drop into both eyes nightly          Medications patient taking as of now reconciled against medications ordered at time of hospital discharge: Yes    Chief Complaint   Patient presents with    Follow-Up from JESSICA CANALES     11/29/21 [address] [place] [address]       HPI    Inpatient course: Discharge summary reviewed- see chart.     Interval history/Current status:  Patient is an 79-year-old obese white female who presents for transitional care visit for recent hospitalization at [facility] from 11/29/2021 to 12/7/2021 for UTI and shortness of breath. Patient is also here for her diabetes, hypertension, hyperlipidemia, hypothyroidism. She states that she feels a lot better since discharge from the hospital and her shortness of breath has improved also. She also will be receiving physical therapy at home. She states she is taking and tolerating her routine medications. She denies any fever, chills, chest pain, abdominal pain, shortness of breath. She has a good appetite and tries to remain active and is using a walker with ambulation. Review of Systems   Constitutional: Negative for chills and fever. HENT: Negative for congestion. Respiratory: Positive for shortness of breath (  Much improved according to patient). Negative for chest tightness and wheezing. Cardiovascular: Negative for chest pain. Gastrointestinal: Negative for abdominal pain and blood in stool. Genitourinary: Negative for dysuria and hematuria. Skin: Negative for rash. Neurological: Negative for dizziness. Psychiatric/Behavioral: Negative for confusion and dysphoric mood. Vitals:    12/10/21 1502   BP: 124/80   Site: Right Lower Arm   Position: Sitting   Cuff Size: Medium Adult   Pulse: 72   Resp: 16   Temp: 96.4 °F (35.8 °C)   TempSrc: Infrared   Weight: 188 lb 3.2 oz (85.4 kg)     Body mass index is 36.76 kg/m². Wt Readings from Last 3 Encounters:   12/10/21 188 lb 3.2 oz (85.4 kg)   12/05/21 185 lb 12.8 oz (84.3 kg)   08/26/21 193 lb (87.5 kg)     BP Readings from Last 3 Encounters:   12/10/21 124/80   12/07/21 (!) 118/108   12/02/21 (!) 112/58       Physical Exam  Vitals and nursing note reviewed. Constitutional:       General: She is not in acute distress. Appearance: She is well-developed. HENT:      Head: Normocephalic and atraumatic.       Right Ear: Tympanic membrane, ear canal and external ear normal.      Left Ear: Tympanic membrane, ear canal and external ear normal.      Nose: Nose normal.      Mouth/Throat: Standing Expiration Date:   12/10/2022    TSH without Reflex     Standing Status:   Future     Standing Expiration Date:   12/10/2022    Hemoglobin A1C     Standing Status:   Future     Standing Expiration Date:   12/10/2022    AL DISCHARGE MEDS RECONCILED W/ CURRENT OUTPATIENT MED LIST     Continue routine medications  Follow-up in 3 months  Medical Decision Making: high complexity

## 2021-12-10 NOTE — PROGRESS NOTES
Physician Progress Note      PATIENT:               Karen HICKMAN #:                  [id number]  :                       1940  ADMIT DATE:       2021 5:34 PM  [facility], [place] DATE:        2021 6:39 PM  RESPONDING  PROVIDER #:        Cynthia Hoffman MD          QUERY TEXT:    Patient admitted with Melena. Noted documentation of pulmonary embolism in IM   progress note on . In order to support the diagnosis of pulmonary   embolism, please include additional clinical indicators in your documentation. Or please document if the diagnosis of pulmonary embolism has been ruled out   after further study. The medical record reflects the following:  Risk Factors: 80yo, Hx PE  Clinical Indicators: CTA CHEST--> suboptimal opacification of the pulmonary   arterial tree significantly limiting assessment for pulmonary emboli, no   definite thrombus in the main pulmonary arteries; per cardio progress note-->   history of recurrent pulmonary embolism, on chronic Eliquis therapy; D-Dimer   2.34; Treatment: PO Eliquis home med continued, monitoring, CTA CHEST  Options provided:  -- Pulmonary embolism was ruled out  -- Pulmonary embolism present as evidenced by, Please document evidence.   -- Other - I will add my own diagnosis  -- Disagree - Not applicable / Not valid  -- Disagree - Clinically unable to determine / Unknown  -- Refer to Clinical Documentation Reviewer    PROVIDER RESPONSE TEXT:    Pulmonary embolism is present as evidenced by by cta    Query created by: Maria Teresa Lane on 2021 10:33 AM      Electronically signed by:  Cynthia Hoffman MD 12/10/2021 6:25 PM

## 2021-12-15 ENCOUNTER — CARE COORDINATION (OUTPATIENT)
Dept: CASE MANAGEMENT | Age: 81
End: 2021-12-15

## 2021-12-15 NOTE — CARE COORDINATION
[facility] Transitions Follow Up Call    12/15/2021    Patient: Tommy Keating  Patient : 1940    MRN: [id number]  Reason for Admission: Intractable nausea and vomiting    Discharge Date: 21 RARS: Readmission Risk Score: 15.3 ( )         Spoke with: Kaylyn Jimenez who states she is doing so so , she states PT really wears her out denies chest pain, SOB, dizziness. Has nausea at times from iron pills , no vomiting since discharge. No other concerns at present time. Care Transitions Follow Up Call    Needs to be reviewed by the provider   Additional needs identified to be addressed with provider: No  none             Method of communication with provider : none      Care Transition Nurse (CTN) contacted the patient by telephone to follow up after admission on . Verified name and  with patient as identifiers. Addressed changes since last contact: none  Discussed follow-up appointments. If no appointment was previously scheduled, appointment scheduling offered: Yes. Is follow up appointment scheduled within 7 days of discharge? Yes. Advance Care Planning:   Does patient have an Advance Directive: reviewed and needs to be updated, not on file; education provided, not on file, patient declined education, decision maker updated and referral to internal ACP facilitator. CTN reviewed discharge instructions, medical action plan and red flags with patient and discussed any barriers to care and/or understanding of plan of care after discharge. Discussed appropriate site of care based on symptoms and resources available to patient including: PCP and Home health. The patient agrees to contact the PCP office for questions related to their healthcare.      Patients top risk factors for readmission: functional physical ability  lack of knowledge about disease  Interventions to address risk factors: Obtained and reviewed discharge summary and/or continuity of care documents          CTN provided contact information for future needs. Plan for follow-up call in 10-14 days based on severity of symptoms and risk factors. Plan for next call: symptom management-fatigue nausea        Care Transitions Subsequent and Final Call    Subsequent and Final Calls  Do you have any ongoing symptoms?: Yes  Onset of Patient-reported symptoms: In the past 7 days  Patient-reported symptoms: Fatigue, Nausea  Interventions for patient-reported symptoms: Notified PCP/Physician  Have your medications changed?: No  Do you have any questions related to your medications?: No  Do you currently have any active services?: Yes  Are you currently active with any services?: Home Health  Do you have any needs or concerns that I can assist you with?: No  Identified Barriers: Other  Care Transitions Interventions     Other Services:  (Comment: [facility])   Other Interventions:            Follow Up  Future Appointments   Date Time Provider David Shen   3/4/2022  2:30 MD David Domínguez LPN

## 2021-12-28 ENCOUNTER — HOSPITAL ENCOUNTER (OUTPATIENT)
Age: 81
Setting detail: SPECIMEN
Discharge: HOME | End: 2021-12-28
Payer: MEDICARE

## 2021-12-28 ENCOUNTER — TELEPHONE (OUTPATIENT)
Dept: FAMILY MEDICINE CLINIC | Age: 81
End: 2021-12-28

## 2021-12-28 DIAGNOSIS — R30.0 DYSURIA: Primary | ICD-10-CM

## 2021-12-28 DIAGNOSIS — R30.0 DYSURIA: ICD-10-CM

## 2021-12-28 LAB
-: ABNORMAL
AMORPHOUS: ABNORMAL
BACTERIA: ABNORMAL
BILIRUBIN URINE: NEGATIVE
CASTS UA: ABNORMAL /LPF
COLOR: YELLOW
COMMENT UA: ABNORMAL
CRYSTALS, UA: ABNORMAL /HPF
EPITHELIAL CELLS UA: ABNORMAL /HPF
GLUCOSE URINE: NEGATIVE
KETONES, URINE: NEGATIVE
LEUKOCYTE ESTERASE, URINE: ABNORMAL
MUCUS: ABNORMAL
NITRITE, URINE: POSITIVE
OTHER OBSERVATIONS UA: ABNORMAL
PH UA: 7 (ref 5–8)
PROTEIN UA: NEGATIVE
RBC UA: ABNORMAL /HPF
RENAL EPITHELIAL, UA: ABNORMAL /HPF
SPECIFIC GRAVITY UA: 1.01 (ref 1–1.03)
TRICHOMONAS: ABNORMAL
TURBIDITY: ABNORMAL
URINE HGB: NEGATIVE
UROBILINOGEN, URINE: NORMAL
WBC UA: ABNORMAL /HPF
YEAST: ABNORMAL

## 2021-12-28 PROCEDURE — 81001 URINALYSIS AUTO W/SCOPE: CPT

## 2021-12-28 PROCEDURE — 87086 URINE CULTURE/COLONY COUNT: CPT

## 2021-12-28 PROCEDURE — 87077 CULTURE AEROBIC IDENTIFY: CPT

## 2021-12-28 PROCEDURE — 87186 SC STD MICRODIL/AGAR DIL: CPT

## 2021-12-28 RX ORDER — SULFAMETHOXAZOLE AND TRIMETHOPRIM 800; 160 MG/1; MG/1
1 TABLET ORAL 2 TIMES DAILY
Qty: 14 TABLET | Refills: 0 | Status: SHIPPED | OUTPATIENT
Start: 2021-12-28 | End: 2022-01-04

## 2021-12-28 NOTE — TELEPHONE ENCOUNTER
Patient called C/O burning upon urination started on 12-24-21. It's hard for her to get out with walker and needs to get a ride. Is there anyway you can send something for her to [facility] [address]? Please advise.

## 2021-12-29 ENCOUNTER — CARE COORDINATION (OUTPATIENT)
Dept: CASE MANAGEMENT | Age: 81
End: 2021-12-29

## 2021-12-29 LAB
CULTURE: ABNORMAL
Lab: ABNORMAL
SPECIMEN DESCRIPTION: ABNORMAL

## 2021-12-29 NOTE — CARE COORDINATION
future needs. Plan for follow-up call in 7-10 days based on severity of symptoms and risk factors. Plan for next call: symptom management-urine issues        Care Transitions Subsequent and Final Call    Subsequent and Final Calls  Do you have any ongoing symptoms?: Yes  Onset of Patient-reported symptoms: Today  Patient-reported symptoms: Weakness  Interventions for patient-reported symptoms: Notified PCP/Physician  Have your medications changed?: No  Do you have any questions related to your medications?: No  Do you currently have any active services?: Yes  Are you currently active with any services?: Home Health  Do you have any needs or concerns that I can assist you with?: No  Identified Barriers: Other  Care Transitions Interventions     Other Services:  (Comment: [facility])   Other Interventions:            Follow Up  Future Appointments   Date Time Provider David Shen   3/4/2022  2:30 MD David Conway LPN

## 2022-01-05 ENCOUNTER — CARE COORDINATION (OUTPATIENT)
Dept: CASE MANAGEMENT | Age: 82
End: 2022-01-05

## 2022-01-05 NOTE — CARE COORDINATION
[facility] Transitions Follow Up Call    2022    Patient: Arnaldo Argueta  Patient : 1940    MRN: [id number]  Reason for Admission: Intractable nausea and vomiting    Discharge Date: 21 RARS: Readmission Risk Score: 15.3 ( )         Spoke with Mya Trishahenri she states she is feeling good, denies chest pain, nausea, dizziness, vomiting. Has no urinary concerns at present time. Is eating and drinking well is agreeable to last call. Care Transitions Follow Up Call    Needs to be reviewed by the provider   Additional needs identified to be addressed with provider: No  none             Method of communication with provider : none      Care Transition Nurse (CTN) contacted the patient by telephone to follow up after admission on 21. Verified name and  with patient as identifiers. Addressed changes since last contact: none  Discussed follow-up appointments. If no appointment was previously scheduled, appointment scheduling offered: Yes. Is follow up appointment scheduled within 7 days of discharge? Yes. Advance Care Planning:   Does patient have an Advance Directive: reviewed and needs to be updated, not on file; education provided, not on file, patient declined education, decision maker updated and referral to internal ACP facilitator. CTN reviewed discharge instructions, medical action plan and red flags with patient and discussed any barriers to care and/or understanding of plan of care after discharge. Discussed appropriate site of care based on symptoms and resources available to patient including: PCP and Specialist. The patient agrees to contact the PCP office for questions related to their healthcare. Patients top risk factors for readmission: lack of knowledge about disease  Interventions to address risk factors: Obtained and reviewed discharge summary and/or continuity of care documents        CTN provided contact information for future needs.  No further follow-up call

## 2022-01-06 RX ORDER — LEVOTHYROXINE SODIUM 0.1 MG/1
TABLET ORAL
Qty: 90 TABLET | Refills: 0 | Status: SHIPPED | OUTPATIENT
Start: 2022-01-06 | End: 2022-04-11

## 2022-01-06 RX ORDER — DOXAZOSIN MESYLATE 4 MG/1
TABLET ORAL
Qty: 90 TABLET | Refills: 0 | Status: SHIPPED | OUTPATIENT
Start: 2022-01-06 | End: 2022-04-11

## 2022-01-10 RX ORDER — ATENOLOL 100 MG/1
TABLET ORAL
Qty: 90 TABLET | Refills: 1 | Status: SHIPPED | OUTPATIENT
Start: 2022-01-10 | End: 2022-07-11

## 2022-02-21 ENCOUNTER — HOSPITAL ENCOUNTER (OUTPATIENT)
Age: 82
Discharge: HOME | End: 2022-02-21
Payer: MEDICARE

## 2022-02-21 DIAGNOSIS — E78.2 MIXED HYPERLIPIDEMIA: ICD-10-CM

## 2022-02-21 DIAGNOSIS — E11.9 TYPE 2 DIABETES MELLITUS WITHOUT COMPLICATION, WITHOUT LONG-TERM CURRENT USE OF INSULIN (HCC): ICD-10-CM

## 2022-02-21 DIAGNOSIS — I10 ESSENTIAL HYPERTENSION: ICD-10-CM

## 2022-02-21 DIAGNOSIS — E03.9 HYPOTHYROIDISM, UNSPECIFIED TYPE: ICD-10-CM

## 2022-02-21 LAB
ALBUMIN SERPL-MCNC: 3.7 G/DL (ref 3.5–5.2)
ALBUMIN/GLOBULIN RATIO: ABNORMAL (ref 1–2.5)
ALP BLD-CCNC: 94 U/L (ref 35–104)
ALT SERPL-CCNC: 13 U/L (ref 5–33)
ANION GAP SERPL CALCULATED.3IONS-SCNC: 10 MMOL/L (ref 9–17)
AST SERPL-CCNC: 25 U/L
BILIRUB SERPL-MCNC: 0.79 MG/DL (ref 0.3–1.2)
BUN BLDV-MCNC: 9 MG/DL (ref 8–23)
BUN/CREAT BLD: ABNORMAL (ref 9–20)
CALCIUM SERPL-MCNC: 9.5 MG/DL (ref 8.6–10.4)
CHLORIDE BLD-SCNC: 105 MMOL/L (ref 98–107)
CHOLESTEROL/HDL RATIO: 2
CHOLESTEROL: 133 MG/DL
CO2: 24 MMOL/L (ref 20–31)
CREAT SERPL-MCNC: 0.78 MG/DL (ref 0.5–0.9)
ESTIMATED AVERAGE GLUCOSE: 94 MG/DL
GFR AFRICAN AMERICAN: >60 ML/MIN
GFR NON-AFRICAN AMERICAN: >60 ML/MIN
GFR SERPL CREATININE-BSD FRML MDRD: ABNORMAL ML/MIN/{1.73_M2}
GFR SERPL CREATININE-BSD FRML MDRD: ABNORMAL ML/MIN/{1.73_M2}
GLUCOSE BLD-MCNC: 113 MG/DL (ref 70–99)
HBA1C MFR BLD: 4.9 % (ref 4–6)
HCT VFR BLD CALC: 34.8 % (ref 36–46)
HDLC SERPL-MCNC: 65 MG/DL
HEMOGLOBIN: 11.7 G/DL (ref 12–16)
LDL CHOLESTEROL: 52 MG/DL (ref 0–130)
MAGNESIUM: 1.8 MG/DL (ref 1.6–2.6)
MCH RBC QN AUTO: 30.8 PG (ref 26–34)
MCHC RBC AUTO-ENTMCNC: 33.6 G/DL (ref 31–37)
MCV RBC AUTO: 91.9 FL (ref 80–100)
NRBC AUTOMATED: ABNORMAL PER 100 WBC
PDW BLD-RTO: 16 % (ref 11.5–14.9)
PLATELET # BLD: 111 K/UL (ref 150–450)
PMV BLD AUTO: 8.3 FL (ref 6–12)
POTASSIUM SERPL-SCNC: 4 MMOL/L (ref 3.7–5.3)
RBC # BLD: 3.79 M/UL (ref 4–5.2)
SODIUM BLD-SCNC: 139 MMOL/L (ref 135–144)
TOTAL PROTEIN: 6.6 G/DL (ref 6.4–8.3)
TRIGL SERPL-MCNC: 79 MG/DL
TSH SERPL DL<=0.05 MIU/L-ACNC: 1.63 MIU/L (ref 0.3–5)
VLDLC SERPL CALC-MCNC: NORMAL MG/DL (ref 1–30)
WBC # BLD: 4.3 K/UL (ref 3.5–11)

## 2022-02-21 PROCEDURE — 84443 ASSAY THYROID STIM HORMONE: CPT

## 2022-02-21 PROCEDURE — 83735 ASSAY OF MAGNESIUM: CPT

## 2022-02-21 PROCEDURE — 83036 HEMOGLOBIN GLYCOSYLATED A1C: CPT

## 2022-02-21 PROCEDURE — 85027 COMPLETE CBC AUTOMATED: CPT

## 2022-02-21 PROCEDURE — 80061 LIPID PANEL: CPT

## 2022-02-21 PROCEDURE — 80053 COMPREHEN METABOLIC PANEL: CPT

## 2022-02-21 PROCEDURE — 36415 COLL VENOUS BLD VENIPUNCTURE: CPT

## 2022-03-04 ENCOUNTER — OFFICE VISIT (OUTPATIENT)
Dept: FAMILY MEDICINE CLINIC | Age: 82
End: 2022-03-04
Payer: MEDICARE

## 2022-03-04 ENCOUNTER — HOSPITAL ENCOUNTER (OUTPATIENT)
Age: 82
Setting detail: SPECIMEN
Discharge: HOME | End: 2022-03-04

## 2022-03-04 VITALS
RESPIRATION RATE: 16 BRPM | HEART RATE: 62 BPM | DIASTOLIC BLOOD PRESSURE: 66 MMHG | SYSTOLIC BLOOD PRESSURE: 122 MMHG | WEIGHT: 186 LBS | BODY MASS INDEX: 36.33 KG/M2 | TEMPERATURE: 97 F

## 2022-03-04 DIAGNOSIS — I48.0 PAROXYSMAL ATRIAL FIBRILLATION (HCC): ICD-10-CM

## 2022-03-04 DIAGNOSIS — I10 ESSENTIAL HYPERTENSION: ICD-10-CM

## 2022-03-04 DIAGNOSIS — I27.82 OTHER CHRONIC PULMONARY EMBOLISM WITHOUT ACUTE COR PULMONALE (HCC): ICD-10-CM

## 2022-03-04 DIAGNOSIS — N30.00 ACUTE CYSTITIS WITHOUT HEMATURIA: ICD-10-CM

## 2022-03-04 DIAGNOSIS — R30.0 DYSURIA: ICD-10-CM

## 2022-03-04 DIAGNOSIS — R35.89 POLYURIA: ICD-10-CM

## 2022-03-04 DIAGNOSIS — E11.9 TYPE 2 DIABETES MELLITUS WITHOUT COMPLICATION, WITHOUT LONG-TERM CURRENT USE OF INSULIN (HCC): Primary | ICD-10-CM

## 2022-03-04 DIAGNOSIS — E78.2 MIXED HYPERLIPIDEMIA: ICD-10-CM

## 2022-03-04 LAB
BILIRUBIN, POC: NEGATIVE
BLOOD URINE, POC: ABNORMAL
CLARITY, POC: ABNORMAL
COLOR, POC: YELLOW
GLUCOSE URINE, POC: NEGATIVE
KETONES, POC: NEGATIVE
LEUKOCYTE EST, POC: ABNORMAL
NITRITE, POC: POSITIVE
PH, POC: 6.5
PROTEIN, POC: NEGATIVE
SPECIFIC GRAVITY, POC: 1.01
UROBILINOGEN, POC: NEGATIVE

## 2022-03-04 PROCEDURE — 1036F TOBACCO NON-USER: CPT | Performed by: FAMILY MEDICINE

## 2022-03-04 PROCEDURE — 1123F ACP DISCUSS/DSCN MKR DOCD: CPT | Performed by: FAMILY MEDICINE

## 2022-03-04 PROCEDURE — G8427 DOCREV CUR MEDS BY ELIG CLIN: HCPCS | Performed by: FAMILY MEDICINE

## 2022-03-04 PROCEDURE — 81003 URINALYSIS AUTO W/O SCOPE: CPT | Performed by: FAMILY MEDICINE

## 2022-03-04 PROCEDURE — 4040F PNEUMOC VAC/ADMIN/RCVD: CPT | Performed by: FAMILY MEDICINE

## 2022-03-04 PROCEDURE — G8482 FLU IMMUNIZE ORDER/ADMIN: HCPCS | Performed by: FAMILY MEDICINE

## 2022-03-04 PROCEDURE — 99214 OFFICE O/P EST MOD 30 MIN: CPT | Performed by: FAMILY MEDICINE

## 2022-03-04 PROCEDURE — G8400 PT W/DXA NO RESULTS DOC: HCPCS | Performed by: FAMILY MEDICINE

## 2022-03-04 PROCEDURE — G8417 CALC BMI ABV UP PARAM F/U: HCPCS | Performed by: FAMILY MEDICINE

## 2022-03-04 PROCEDURE — 1090F PRES/ABSN URINE INCON ASSESS: CPT | Performed by: FAMILY MEDICINE

## 2022-03-04 RX ORDER — SULFAMETHOXAZOLE AND TRIMETHOPRIM 800; 160 MG/1; MG/1
1 TABLET ORAL 2 TIMES DAILY
Qty: 20 TABLET | Refills: 0 | Status: SHIPPED | OUTPATIENT
Start: 2022-03-04 | End: 2022-03-14

## 2022-03-04 SDOH — ECONOMIC STABILITY: FOOD INSECURITY: WITHIN THE PAST 12 MONTHS, YOU WORRIED THAT YOUR FOOD WOULD RUN OUT BEFORE YOU GOT MONEY TO BUY MORE.: NEVER TRUE

## 2022-03-04 SDOH — ECONOMIC STABILITY: FOOD INSECURITY: WITHIN THE PAST 12 MONTHS, THE FOOD YOU BOUGHT JUST DIDN'T LAST AND YOU DIDN'T HAVE MONEY TO GET MORE.: NEVER TRUE

## 2022-03-04 ASSESSMENT — ENCOUNTER SYMPTOMS
BLOOD IN STOOL: 0
ABDOMINAL PAIN: 0
CHEST TIGHTNESS: 0
SHORTNESS OF BREATH: 0

## 2022-03-04 ASSESSMENT — PATIENT HEALTH QUESTIONNAIRE - PHQ9
2. FEELING DOWN, DEPRESSED OR HOPELESS: 0
SUM OF ALL RESPONSES TO PHQ QUESTIONS 1-9: 0
1. LITTLE INTEREST OR PLEASURE IN DOING THINGS: 0
SUM OF ALL RESPONSES TO PHQ9 QUESTIONS 1 & 2: 0

## 2022-03-04 ASSESSMENT — SOCIAL DETERMINANTS OF HEALTH (SDOH): HOW HARD IS IT FOR YOU TO PAY FOR THE VERY BASICS LIKE FOOD, HOUSING, MEDICAL CARE, AND HEATING?: NOT HARD AT ALL

## 2022-03-04 NOTE — PROGRESS NOTES
Subjective:      Patient ID: Constantin Shelton is a 79 y.o. female. Chronic Disease Visit Information    BP Readings from Last 3 Encounters:   12/10/21 124/80   12/07/21 (!) 118/108   12/02/21 (!) 112/58          Hemoglobin A1C (%)   Date Value   02/21/2022 4.9   07/12/2021 5.3   01/12/2021 4.9     Microalb/Crt. Ratio (mcg/mg creat)   Date Value   08/11/2020 CANNOT BE CALCULATED     LDL Cholesterol (mg/dL)   Date Value   02/21/2022 52     HDL (mg/dL)   Date Value   02/21/2022 65     BUN (mg/dL)   Date Value   02/21/2022 9     CREATININE (mg/dL)   Date Value   02/21/2022 0.78     Glucose (mg/dL)   Date Value   02/21/2022 113 (H)   03/17/2012 123 (H)            Have you changed or started any medications since your last visit including any over-the-counter medicines, vitamins, or herbal medicines? no   Are you having any side effects from any of your medications? -  no  Have you stopped taking any of your medications? Is so, why? -  no    Have you seen any other physician or provider since your last visit? Yes - Records Obtained  Have you had any other diagnostic tests since your last visit? Yes - Records Obtained  Have you been seen in the emergency room and/or had an admission to a hospital since we last saw you? Yes - Records Obtained  Have you had your annual diabetic retinal (eye) exam? No  Have you had your routine dental cleaning in the past 6 months? NO    Have you activated your [employer] account? If not, what are your barriers?  Yes     Patient Care Team:  Cecy Lawton MD as PCP - General (Family Medicine)  Cecy Lawton MD as PCP - [facility] [place] [facility]  Sushma Farooq MD as Consulting Physician (Pulmonology)  Lamin Atkins MD as Surgeon (General Surgery)  Tino Pretty MD as Consulting Physician (Pain Management)  Tom Coffey MD as Surgeon (Orthopedic Surgery)  Kash Navarro MD as Consulting Physician (Gastroenterology)         Medical History Review  Past Medical, Family, and Social History reviewed and does contribute to the patient presenting condition    Health Maintenance   Topic Date Due    Depression Screen  Never done    Shingles Vaccine (1 of 2) Never done    DTaP/Tdap/Td vaccine (1 - Tdap) 10/02/2002    Annual Wellness Visit (AWV)  Never done    TSH testing  02/21/2023    Potassium monitoring  02/21/2023    Creatinine monitoring  02/21/2023    Flu vaccine  Completed    Pneumococcal 65+ years Vaccine  Completed    COVID-19 Vaccine  Completed    DEXA (modify frequency per FRAX score)  Addressed    Hepatitis A vaccine  Aged Out    Hib vaccine  Aged Out    Meningococcal (ACWY) vaccine  Aged Out     HPI  Patient is an 62-year-old obese white female who presents for diabetes, hypertension, hyperlipidemia, atrial fibrillation, chronic PE. She also states for the past 2 days she has been having urinary frequency and burning with urination. She denies any flank pain or hematuria. She states she is taking and tolerating her routine medications. She denies any fever, chills, chest pain, abdominal pain, shortness of breath. She has a good appetite and tries to remain active. Review of Systems   Constitutional: Negative for chills and fever. HENT: Negative for congestion. Respiratory: Negative for chest tightness and shortness of breath. Cardiovascular: Negative for chest pain. Gastrointestinal: Negative for abdominal pain and blood in stool. Genitourinary: Positive for dysuria, frequency and urgency. Negative for flank pain and hematuria. Skin: Negative for rash. Neurological: Negative for dizziness. Psychiatric/Behavioral: Negative for confusion and dysphoric mood. Objective:   Physical Exam  Vitals and nursing note reviewed. Constitutional:       General: She is not in acute distress. Appearance: She is well-developed. HENT:      Head: Normocephalic and atraumatic.       Right Ear: Tympanic membrane, ear canal and external ear normal. Left Ear: Tympanic membrane, ear canal and external ear normal.      Nose: Nose normal.      Mouth/Throat:      Mouth: Mucous membranes are moist.      Pharynx: Oropharynx is clear. Eyes:      General: No scleral icterus. Right eye: No discharge. Left eye: No discharge. Conjunctiva/sclera: Conjunctivae normal.   Cardiovascular:      Rate and Rhythm: Normal rate and regular rhythm. Heart sounds: Normal heart sounds. Pulmonary:      Effort: Pulmonary effort is normal. No respiratory distress. Breath sounds: Normal breath sounds. No wheezing. Abdominal:      General: There is no distension. Palpations: Abdomen is soft. Tenderness: There is abdominal tenderness (  Mild suprapubic tenderness). There is no right CVA tenderness or left CVA tenderness. Musculoskeletal:      Cervical back: Neck supple. Skin:     General: Skin is warm and dry. Findings: No rash. Neurological:      Mental Status: She is alert and oriented to person, place, and time. Psychiatric:         Mood and Affect: Mood normal.         Behavior: Behavior normal.         Assessment:      1. Type 2 diabetes mellitus without complication, without long-term current use of insulin ([address]  Continue current management    2. Essential hypertension  Continue current management    3. Mixed hyperlipidemia    Continue current management  4. Acute cystitis without hematuria    - sulfamethoxazole-trimethoprim (BACTRIM DS) 800-160 MG per tablet; Take 1 tablet by mouth 2 times daily for 10 days  Dispense: 20 tablet; Refill: 0    5. Dysuria    - POCT Urinalysis No Micro (Auto)  - Culture, Urine; Future    6. Polyuria    - POCT Urinalysis No Micro (Auto)  - Culture, Urine; Future    7. Other chronic pulmonary embolism without acute cor pulmonale ([address]  Continue management by patient's pulmonologist    8.  Paroxysmal atrial fibrillation ([address]  Continue management by patient's cardiologist          Plan: Orders Placed This Encounter   Procedures    Culture, Urine     Standing Status:   Future     Standing Expiration Date:   3/4/2023     Order Specific Question:   Specify (ex-cath, midstream, cysto, etc)?      Answer:   CLEAN CATCH    POCT Urinalysis No Micro (Auto)     Orders Placed This Encounter   Medications    sulfamethoxazole-trimethoprim (BACTRIM DS) 800-160 MG per tablet     Sig: Take 1 tablet by mouth 2 times daily for 10 days     Dispense:  20 tablet     Refill:  0   UA result noted        Results of recent labs discussed with patient  Continue routine medications  Follow-up in 6 months or sooner if needed

## 2022-03-05 DIAGNOSIS — R35.89 POLYURIA: ICD-10-CM

## 2022-03-05 DIAGNOSIS — R30.0 DYSURIA: ICD-10-CM

## 2022-03-06 LAB
CULTURE: ABNORMAL
SPECIMEN DESCRIPTION: ABNORMAL

## 2022-04-05 RX ORDER — FERROUS SULFATE 325(65) MG
325 TABLET ORAL
Qty: 30 TABLET | Refills: 3 | Status: SHIPPED | OUTPATIENT
Start: 2022-04-05 | End: 2022-08-02

## 2022-04-05 NOTE — TELEPHONE ENCOUNTER
ferrous sulfate refill needed. She contacted Dr. Hansen Bowels office no response.   He only saw her in hospital.

## 2022-04-11 RX ORDER — LEVOTHYROXINE SODIUM 0.1 MG/1
TABLET ORAL
Qty: 90 TABLET | Refills: 1 | Status: SHIPPED | OUTPATIENT
Start: 2022-04-11

## 2022-04-11 RX ORDER — DOXAZOSIN MESYLATE 4 MG/1
TABLET ORAL
Qty: 90 TABLET | Refills: 1 | Status: SHIPPED | OUTPATIENT
Start: 2022-04-11

## 2022-05-05 ENCOUNTER — TELEPHONE (OUTPATIENT)
Dept: FAMILY MEDICINE CLINIC | Age: 82
End: 2022-05-05

## 2022-05-05 NOTE — TELEPHONE ENCOUNTER
FYI:  Patient called C/O swelling bilateral legs, some shortness of breath x 3 days. Patient was instructed to call cardiologist (Dr. Trudy Zimmerman), and /or go to ER for evaluation.

## 2022-05-06 ENCOUNTER — HOSPITAL ENCOUNTER (OUTPATIENT)
Age: 82
Discharge: HOME | DRG: 291 | End: 2022-05-06
Payer: MEDICARE

## 2022-05-06 LAB
ANION GAP SERPL CALCULATED.3IONS-SCNC: 10 MMOL/L (ref 9–17)
BUN BLDV-MCNC: 9 MG/DL (ref 8–23)
CALCIUM SERPL-MCNC: 9.1 MG/DL (ref 8.6–10.4)
CHLORIDE BLD-SCNC: 103 MMOL/L (ref 98–107)
CO2: 22 MMOL/L (ref 20–31)
CREAT SERPL-MCNC: 0.92 MG/DL (ref 0.5–0.9)
GFR AFRICAN AMERICAN: >60 ML/MIN
GFR NON-AFRICAN AMERICAN: 58 ML/MIN
GFR SERPL CREATININE-BSD FRML MDRD: ABNORMAL ML/MIN/{1.73_M2}
GLUCOSE BLD-MCNC: 102 MG/DL (ref 70–99)
POTASSIUM SERPL-SCNC: 5 MMOL/L (ref 3.7–5.3)
SODIUM BLD-SCNC: 135 MMOL/L (ref 135–144)

## 2022-05-06 PROCEDURE — 80048 BASIC METABOLIC PNL TOTAL CA: CPT

## 2022-05-06 PROCEDURE — 36415 COLL VENOUS BLD VENIPUNCTURE: CPT

## 2022-05-09 ENCOUNTER — APPOINTMENT (OUTPATIENT)
Dept: CT IMAGING | Age: 82
DRG: 291 | End: 2022-05-09
Payer: MEDICARE

## 2022-05-09 ENCOUNTER — APPOINTMENT (OUTPATIENT)
Dept: GENERAL RADIOLOGY | Age: 82
DRG: 291 | End: 2022-05-09
Payer: MEDICARE

## 2022-05-09 ENCOUNTER — HOSPITAL ENCOUNTER (INPATIENT)
Age: 82
LOS: 3 days | Discharge: HOME | DRG: 291 | End: 2022-05-12
Attending: EMERGENCY MEDICINE | Admitting: FAMILY MEDICINE
Payer: MEDICARE

## 2022-05-09 DIAGNOSIS — M79.89 LEG SWELLING: ICD-10-CM

## 2022-05-09 DIAGNOSIS — R06.02 SHORTNESS OF BREATH: ICD-10-CM

## 2022-05-09 DIAGNOSIS — I50.9 CONGESTIVE HEART FAILURE, UNSPECIFIED HF CHRONICITY, UNSPECIFIED HEART FAILURE TYPE (HCC): Primary | ICD-10-CM

## 2022-05-09 PROBLEM — I48.91 ATRIAL FIBRILLATION WITH RVR (HCC): Status: ACTIVE | Noted: 2022-05-09

## 2022-05-09 PROBLEM — I50.33 DIASTOLIC CHF, ACUTE ON CHRONIC (HCC): Status: ACTIVE | Noted: 2022-05-09

## 2022-05-09 LAB
ABSOLUTE EOS #: 0.1 K/UL (ref 0–0.4)
ABSOLUTE LYMPH #: 0.9 K/UL (ref 1–4.8)
ABSOLUTE MONO #: 0.4 K/UL (ref 0.1–1.3)
ALBUMIN SERPL-MCNC: 3.9 G/DL (ref 3.5–5.2)
ALP BLD-CCNC: 87 U/L (ref 35–104)
ALT SERPL-CCNC: 9 U/L (ref 5–33)
ANION GAP SERPL CALCULATED.3IONS-SCNC: 12 MMOL/L (ref 9–17)
AST SERPL-CCNC: 19 U/L
BASOPHILS # BLD: 2 % (ref 0–2)
BASOPHILS ABSOLUTE: 0.1 K/UL (ref 0–0.2)
BILIRUB SERPL-MCNC: 0.57 MG/DL (ref 0.3–1.2)
BUN BLDV-MCNC: 11 MG/DL (ref 8–23)
CALCIUM SERPL-MCNC: 9.4 MG/DL (ref 8.6–10.4)
CHLORIDE BLD-SCNC: 102 MMOL/L (ref 98–107)
CO2: 26 MMOL/L (ref 20–31)
CREAT SERPL-MCNC: 1.05 MG/DL (ref 0.5–0.9)
EOSINOPHILS RELATIVE PERCENT: 3 % (ref 0–4)
GFR AFRICAN AMERICAN: >60 ML/MIN
GFR NON-AFRICAN AMERICAN: 50 ML/MIN
GFR SERPL CREATININE-BSD FRML MDRD: ABNORMAL ML/MIN/{1.73_M2}
GLUCOSE BLD-MCNC: 105 MG/DL (ref 70–99)
HCT VFR BLD CALC: 35.7 % (ref 36–46)
HEMOGLOBIN: 11.7 G/DL (ref 12–16)
INFLUENZA A: NOT DETECTED
INFLUENZA B: NOT DETECTED
LYMPHOCYTES # BLD: 20 % (ref 24–44)
MAGNESIUM: 1.9 MG/DL (ref 1.6–2.6)
MCH RBC QN AUTO: 32.2 PG (ref 26–34)
MCHC RBC AUTO-ENTMCNC: 32.8 G/DL (ref 31–37)
MCV RBC AUTO: 98.1 FL (ref 80–100)
MONOCYTES # BLD: 9 % (ref 1–7)
PDW BLD-RTO: 14.7 % (ref 11.5–14.9)
PLATELET # BLD: 105 K/UL (ref 150–450)
PMV BLD AUTO: 9.9 FL (ref 6–12)
POTASSIUM SERPL-SCNC: 4.6 MMOL/L (ref 3.7–5.3)
PRO-BNP: 2261 PG/ML
RBC # BLD: 3.64 M/UL (ref 4–5.2)
SARS-COV-2 RNA, RT PCR: NOT DETECTED
SEG NEUTROPHILS: 66 % (ref 36–66)
SEGMENTED NEUTROPHILS ABSOLUTE COUNT: 3 K/UL (ref 1.3–9.1)
SODIUM BLD-SCNC: 140 MMOL/L (ref 135–144)
SOURCE: NORMAL
SPECIMEN DESCRIPTION: NORMAL
TOTAL PROTEIN: 6.5 G/DL (ref 6.4–8.3)
TROPONIN, HIGH SENSITIVITY: 23 NG/L (ref 0–14)
TROPONIN, HIGH SENSITIVITY: 24 NG/L (ref 0–14)
TSH SERPL DL<=0.05 MIU/L-ACNC: 1.93 UIU/ML (ref 0.3–5)
WBC # BLD: 4.4 K/UL (ref 3.5–11)

## 2022-05-09 PROCEDURE — 84484 ASSAY OF TROPONIN QUANT: CPT

## 2022-05-09 PROCEDURE — 93005 ELECTROCARDIOGRAM TRACING: CPT | Performed by: STUDENT IN AN ORGANIZED HEALTH CARE EDUCATION/TRAINING PROGRAM

## 2022-05-09 PROCEDURE — 83735 ASSAY OF MAGNESIUM: CPT

## 2022-05-09 PROCEDURE — 85025 COMPLETE CBC W/AUTO DIFF WBC: CPT

## 2022-05-09 PROCEDURE — 6360000002 HC RX W HCPCS: Performed by: STUDENT IN AN ORGANIZED HEALTH CARE EDUCATION/TRAINING PROGRAM

## 2022-05-09 PROCEDURE — 84443 ASSAY THYROID STIM HORMONE: CPT

## 2022-05-09 PROCEDURE — 99285 EMERGENCY DEPT VISIT HI MDM: CPT

## 2022-05-09 PROCEDURE — 96375 TX/PRO/DX INJ NEW DRUG ADDON: CPT

## 2022-05-09 PROCEDURE — 1200000000 HC SEMI PRIVATE

## 2022-05-09 PROCEDURE — 36415 COLL VENOUS BLD VENIPUNCTURE: CPT

## 2022-05-09 PROCEDURE — 80053 COMPREHEN METABOLIC PANEL: CPT

## 2022-05-09 PROCEDURE — 83880 ASSAY OF NATRIURETIC PEPTIDE: CPT

## 2022-05-09 PROCEDURE — 2500000003 HC RX 250 WO HCPCS: Performed by: STUDENT IN AN ORGANIZED HEALTH CARE EDUCATION/TRAINING PROGRAM

## 2022-05-09 PROCEDURE — 96374 THER/PROPH/DIAG INJ IV PUSH: CPT

## 2022-05-09 PROCEDURE — 87636 SARSCOV2 & INF A&B AMP PRB: CPT

## 2022-05-09 PROCEDURE — 6370000000 HC RX 637 (ALT 250 FOR IP): Performed by: FAMILY MEDICINE

## 2022-05-09 PROCEDURE — 71046 X-RAY EXAM CHEST 2 VIEWS: CPT

## 2022-05-09 PROCEDURE — 6370000000 HC RX 637 (ALT 250 FOR IP): Performed by: STUDENT IN AN ORGANIZED HEALTH CARE EDUCATION/TRAINING PROGRAM

## 2022-05-09 RX ORDER — MAGNESIUM SULFATE 1 G/100ML
1000 INJECTION INTRAVENOUS PRN
Status: DISCONTINUED | OUTPATIENT
Start: 2022-05-09 | End: 2022-05-12 | Stop reason: HOSPADM

## 2022-05-09 RX ORDER — SODIUM CHLORIDE 0.9 % (FLUSH) 0.9 %
10 SYRINGE (ML) INJECTION PRN
Status: DISCONTINUED | OUTPATIENT
Start: 2022-05-09 | End: 2022-05-12 | Stop reason: HOSPADM

## 2022-05-09 RX ORDER — SODIUM CHLORIDE 9 MG/ML
INJECTION, SOLUTION INTRAVENOUS PRN
Status: DISCONTINUED | OUTPATIENT
Start: 2022-05-09 | End: 2022-05-12 | Stop reason: HOSPADM

## 2022-05-09 RX ORDER — LATANOPROST 50 UG/ML
1 SOLUTION/ DROPS OPHTHALMIC NIGHTLY
Status: DISCONTINUED | OUTPATIENT
Start: 2022-05-09 | End: 2022-05-12 | Stop reason: HOSPADM

## 2022-05-09 RX ORDER — FUROSEMIDE 10 MG/ML
40 INJECTION INTRAMUSCULAR; INTRAVENOUS DAILY
Status: DISCONTINUED | OUTPATIENT
Start: 2022-05-10 | End: 2022-05-11

## 2022-05-09 RX ORDER — POTASSIUM CHLORIDE 20 MEQ/1
40 TABLET, EXTENDED RELEASE ORAL PRN
Status: DISCONTINUED | OUTPATIENT
Start: 2022-05-09 | End: 2022-05-12 | Stop reason: HOSPADM

## 2022-05-09 RX ORDER — METOPROLOL TARTRATE 50 MG/1
50 TABLET, FILM COATED ORAL ONCE
Status: COMPLETED | OUTPATIENT
Start: 2022-05-09 | End: 2022-05-09

## 2022-05-09 RX ORDER — DORZOLAMIDE HCL 20 MG/ML
1 SOLUTION/ DROPS OPHTHALMIC 2 TIMES DAILY
Status: DISCONTINUED | OUTPATIENT
Start: 2022-05-09 | End: 2022-05-12 | Stop reason: HOSPADM

## 2022-05-09 RX ORDER — METOPROLOL TARTRATE 5 MG/5ML
5 INJECTION INTRAVENOUS ONCE
Status: COMPLETED | OUTPATIENT
Start: 2022-05-09 | End: 2022-05-09

## 2022-05-09 RX ORDER — ACETAMINOPHEN 650 MG/1
650 SUPPOSITORY RECTAL EVERY 6 HOURS PRN
Status: DISCONTINUED | OUTPATIENT
Start: 2022-05-09 | End: 2022-05-12 | Stop reason: HOSPADM

## 2022-05-09 RX ORDER — ACETAMINOPHEN 500 MG
500 TABLET ORAL NIGHTLY
COMMUNITY

## 2022-05-09 RX ORDER — FUROSEMIDE 10 MG/ML
20 INJECTION INTRAMUSCULAR; INTRAVENOUS ONCE
Status: COMPLETED | OUTPATIENT
Start: 2022-05-09 | End: 2022-05-09

## 2022-05-09 RX ORDER — ONDANSETRON 4 MG/1
4 TABLET, ORALLY DISINTEGRATING ORAL EVERY 8 HOURS PRN
Status: DISCONTINUED | OUTPATIENT
Start: 2022-05-09 | End: 2022-05-12 | Stop reason: HOSPADM

## 2022-05-09 RX ORDER — ATENOLOL 50 MG/1
100 TABLET ORAL DAILY
Status: DISCONTINUED | OUTPATIENT
Start: 2022-05-10 | End: 2022-05-12 | Stop reason: HOSPADM

## 2022-05-09 RX ORDER — POTASSIUM CHLORIDE 7.45 MG/ML
10 INJECTION INTRAVENOUS PRN
Status: DISCONTINUED | OUTPATIENT
Start: 2022-05-09 | End: 2022-05-12 | Stop reason: HOSPADM

## 2022-05-09 RX ORDER — 0.9 % SODIUM CHLORIDE 0.9 %
80 INTRAVENOUS SOLUTION INTRAVENOUS ONCE
Status: DISCONTINUED | OUTPATIENT
Start: 2022-05-09 | End: 2022-05-12 | Stop reason: HOSPADM

## 2022-05-09 RX ORDER — ONDANSETRON 2 MG/ML
4 INJECTION INTRAMUSCULAR; INTRAVENOUS EVERY 6 HOURS PRN
Status: DISCONTINUED | OUTPATIENT
Start: 2022-05-09 | End: 2022-05-12 | Stop reason: HOSPADM

## 2022-05-09 RX ORDER — LEVOTHYROXINE SODIUM 0.1 MG/1
100 TABLET ORAL
Status: DISCONTINUED | OUTPATIENT
Start: 2022-05-10 | End: 2022-05-12 | Stop reason: HOSPADM

## 2022-05-09 RX ORDER — ACETAMINOPHEN 325 MG/1
650 TABLET ORAL EVERY 6 HOURS PRN
Status: DISCONTINUED | OUTPATIENT
Start: 2022-05-09 | End: 2022-05-12 | Stop reason: HOSPADM

## 2022-05-09 RX ORDER — TIMOLOL MALEATE 5 MG/ML
1 SOLUTION/ DROPS OPHTHALMIC 2 TIMES DAILY
Status: DISCONTINUED | OUTPATIENT
Start: 2022-05-09 | End: 2022-05-12 | Stop reason: HOSPADM

## 2022-05-09 RX ORDER — SPIRONOLACTONE 25 MG/1
25 TABLET ORAL DAILY
Status: ON HOLD | COMMUNITY
End: 2022-05-12 | Stop reason: HOSPADM

## 2022-05-09 RX ORDER — FERROUS SULFATE 325(65) MG
325 TABLET ORAL
Status: DISCONTINUED | OUTPATIENT
Start: 2022-05-10 | End: 2022-05-12 | Stop reason: HOSPADM

## 2022-05-09 RX ORDER — SODIUM CHLORIDE 0.9 % (FLUSH) 0.9 %
10 SYRINGE (ML) INJECTION EVERY 12 HOURS SCHEDULED
Status: DISCONTINUED | OUTPATIENT
Start: 2022-05-09 | End: 2022-05-12 | Stop reason: HOSPADM

## 2022-05-09 RX ADMIN — METOPROLOL TARTRATE 50 MG: 50 TABLET, FILM COATED ORAL at 17:25

## 2022-05-09 RX ADMIN — TIMOLOL MALEATE 1 DROP: 5 SOLUTION OPHTHALMIC at 23:06

## 2022-05-09 RX ADMIN — METOPROLOL TARTRATE 5 MG: 1 INJECTION, SOLUTION INTRAVENOUS at 16:38

## 2022-05-09 RX ADMIN — DORZOLAMIDE HYDROCHLORIDE 1 DROP: 20 SOLUTION/ DROPS OPHTHALMIC at 23:06

## 2022-05-09 RX ADMIN — AMIODARONE HYDROCHLORIDE 1 MG/MIN: 1.8 INJECTION, SOLUTION INTRAVENOUS at 20:40

## 2022-05-09 RX ADMIN — APIXABAN 5 MG: 5 TABLET, FILM COATED ORAL at 23:04

## 2022-05-09 RX ADMIN — AMIODARONE HYDROCHLORIDE 300 MG: 1.5 INJECTION, SOLUTION INTRAVENOUS at 20:16

## 2022-05-09 RX ADMIN — FUROSEMIDE 20 MG: 10 INJECTION, SOLUTION INTRAMUSCULAR; INTRAVENOUS at 18:26

## 2022-05-09 RX ADMIN — LATANOPROST 1 DROP: 50 SOLUTION OPHTHALMIC at 23:05

## 2022-05-09 ASSESSMENT — ENCOUNTER SYMPTOMS
DIARRHEA: 0
BACK PAIN: 0
ABDOMINAL PAIN: 0
SHORTNESS OF BREATH: 1
VOMITING: 0
NAUSEA: 1
COUGH: 0
RHINORRHEA: 0

## 2022-05-09 ASSESSMENT — PAIN - FUNCTIONAL ASSESSMENT: PAIN_FUNCTIONAL_ASSESSMENT: NONE - DENIES PAIN

## 2022-05-09 NOTE — ED PROVIDER NOTES
[address] ED  eMERGENCY dEPARTMENT eNCOUnter   Attending Attestation     Pt Name: Stephany Marroquin  MRN: [id number]  [place] 1940  Date of evaluation: 5/9/22    History, EXAM, MDM:    Stephany Marroquin is a 79 y.o. female who presents with Shortness of Breath and Leg Swelling    Presents with subacute progresive sob, cough, weight gain, and leg edema. On exam pt is tachycardic. She has bilateral basilar crackles. She has bilateral equal edema. EKG shows a narrow complex tachycardia HR is 144, , , no GALE, No STD, No TWI, the axis is normal.      CXR shows bilateral pleural effusions. Clinically pt has CHF. Starting diuresis. BB for rate control. BP will not tolerate CCB at this time. Consult to cardiology if HR not controlled by metoprolol. R/o flu / covid. Plan admission to hospital.       Vitals:   Vitals:    05/09/22 1432   BP: 95/75   Pulse: 144   Resp: 20   Temp: 99 °F (37.2 °C)   TempSrc: Oral   SpO2: 98%   Weight: 185 lb (83.9 kg)   Height: 5' 5\" (1.651 m)     I performed a history and physical examination of the patient and discussed management with the resident. I reviewed the residents note and agree with the documented findings and plan of care. Any areas of disagreement are noted on the chart. I was personally present for the key portions of any procedures. I have documented in the chart those procedures where I was not present during the key portions. I have personally reviewed all images and agree with the resident's interpretation. I have reviewed the emergency nurses triage note. I agree with the chief complaint, past medical history, past surgical history, allergies, medications, social and family history as documented unless otherwise noted below. Documentation of the HPI, Physical Exam and Medical Decision Making performed by medical students or scribes is based on my personal performance of the HPI, PE and MDM.  For Phys Assistant/ Nurse Practitioner cases/documentation I have had a face to face evaluation of this patient and have completed at least one if not all key elements of the E/M (history, physical exam, and MDM). Additional findings are as noted.     Estephania Riley MD  Attending Emergency  Physician               Estephania Riley MD  05/09/22 [id number]

## 2022-05-09 NOTE — ED TRIAGE NOTES
Mode of arrival (squad #, walk in, police, etc) : walk-in      Chief complaint(s): Extremity sweling; SOB      Arrival Note (brief scenario, treatment PTA, etc). : Pt presents to ED with bilateral lower extremity swelling. Pt reports being prescribed 25mg daily spironolactone on Saturday (5/7/2022), denies any relief. Upon arrival, pt HR reading at 144bpm. Pt denies CP. C= \"Have you ever felt that you should Cut down on your drinking? \"  No  A= \"Have people Annoyed you by criticizing your drinking? \"  No  G= \"Have you ever felt bad or Guilty about your drinking? \"  No  E= \"Have you ever had a drink as an Eye-opener first thing in the morning to steady your nerves or to help a hangover? \"  No      *If yes to two or more: probable alcohol abuse. *

## 2022-05-09 NOTE — ED PROVIDER NOTES
[facility]  ED  Emergency Department Encounter  Emergency Medicine Resident     Pt Name: Esperanza Joiner  MRN: [id number]  [place] 1940  Date of evaluation: 5/9/22  PCP:  Sariah Treviño MD    [address] [place] [address]       Chief Complaint   Patient presents with    Shortness of Breath    Leg Swelling       HISTORY OFPRESENT ILLNESS  (Location/Symptom, Timing/Onset, Context/Setting, Quality, Duration, Modifying Ok Come.)      Esperanza Joiner is a 79 y.o. female who presents with tightness of around her lower chest, nausea, shortness of breath and leg swelling. This is been ongoing for past 2 weeks and progressively worsening. She was started on spironolactone a few days ago when she called her doctor regarding her leg swelling. This is not helped much by her report. She has tightness around the lower rib cage, but denies any other chest pain. She feels short of breath at rest and with exertion. She has never had significant leg swelling in the past.  She has a very distant history of blood clots, but states this has not been an issue recently. No other DVT or PE risk factors. Other past medical history notable for atrial fibrillation, glaucoma, hypertension, hypothyroidism and type 2 diabetes. No fevers or chills. No other complaints this time. PAST MEDICAL / SURGICAL / SOCIAL / FAMILY HISTORY      has a past medical history of Atrial fibrillation ([address] Chronic back pain, Diverticulosis, GERD (gastroesophageal reflux disease), Hyperlipidemia, Hypertension, Hypothyroidism, Obesity (BMI 30.0-34.9), Onychomycosis, PE (pulmonary embolism), and Type II or unspecified type diabetes mellitus without mention of complication, not stated as uncontrolled. has a past surgical history that includes Colon surgery; Vena Cava Filter Placement; Cholecystectomy; Hysterectomy; Tympanostomy tube placement (Bilateral, 09/20/2019);  Colonoscopy (N/A, 1/25/2021); sigmoidoscopy (N/A, 12/2/2021); and Upper gastrointestinal endoscopy (N/A, 12/2/2021). Social History     Socioeconomic History    Marital status:      Spouse name: Not on file    Number of children: Not on file    Years of education: Not on file    Highest education level: Not on file   Occupational History    Not on file   Tobacco Use    Smoking status: Never Smoker    Smokeless tobacco: Never Used   Vaping Use    Vaping Use: Never used   Substance and Sexual Activity    Alcohol use: No    Drug use: No    Sexual activity: Not on file   Other Topics Concern    Not on file   Social History Narrative    Not on file     Social Determinants of Health     Financial Resource Strain: Low Risk     Difficulty of Paying Living Expenses: Not hard at all   Food Insecurity: No Food Insecurity    Worried About [address] in the Last Year: Never true    [address]  [employer] in the Last Year: Never true   Transportation Needs:     Lack of Transportation (Medical): Not on file    Lack of Transportation (Non-Medical):  Not on file   Physical Activity:     Days of Exercise per Week: Not on file    Minutes of Exercise per Session: Not on file   Stress:     Feeling of Stress : Not on file   Social Connections:     Frequency of Communication with Friends and Family: Not on file    Frequency of Social Gatherings with Friends and Family: Not on file    Attends [employer] Services: Not on file    Active Member of [address] [place] [address] or Organizations: Not on file    Attends Club or Organization Meetings: Not on file    Marital Status: Not on file   Intimate Partner Violence:     Fear of Current or Ex-Partner: Not on file    Emotionally Abused: Not on file    Physically Abused: Not on file    Sexually Abused: Not on file   Housing Stability:     Unable to Pay for Housing in the Last Year: Not on file    Number of [place] in the Last Year: Not on file    Unstable Housing in the Last Year: Not on file       Family History Adopted: Yes   Problem Relation Age of Onset    No Known Problems Mother     No Known Problems Father         Allergies: Avapro [irbesartan], Ciprofloxacin hcl, Cozaar [losartan potassium], Diovan [valsartan], Lipitor [atorvastatin calcium], Lisinopril, Pcn [penicillins], Pravachol [pravastatin sodium], Tape [adhesive tape], Tramadol, Zetia [ezetimibe], and Morphine    Home Medications:  Prior to Admission medications    Medication Sig Start Date End Date Taking?  Authorizing Provider   spironolactone (ALDACTONE) 25 MG tablet Take 25 mg by mouth daily   Yes Historical Provider, MD   acetaminophen (TYLENOL) 500 MG tablet Take 500 mg by mouth at bedtime   Yes Historical Provider, MD   levothyroxine (SYNTHROID) 100 MCG tablet TAKE 1 TABLET BY MOUTH EVERY DAY 4/11/22   Dexter Jensen MD   doxazosin (CARDURA) 4 MG tablet TAKE 1 TABLET BY MOUTH EVERY DAY 4/11/22   Dexter Jensen MD   ferrous sulfate (IRON 325) 325 (65 Fe) MG tablet Take 1 tablet by mouth daily (with breakfast) 4/5/22   Dexter Jensen MD   atenolol (TENORMIN) 100 MG tablet TAKE 1 TABLET BY MOUTH EVERY DAY 1/10/22   Dexter Jensen MD   apixaban (ELIQUIS) 5 MG TABS tablet Take 5 mg by mouth 2 times daily 2/2/21   Historical Provider, MD   RHOPRESSA 0.02 % SOLN Place 1 drop into both eyes nightly  7/24/20   Historical Provider, MD   timolol (TIMOPTIC) 0.5 % ophthalmic solution Place 1 drop into both eyes 2 times daily  8/3/20   Historical Provider, MD   dorzolamide (TRUSOPT) 2 % ophthalmic solution Place 1 drop into both eyes 2 times daily  11/9/18   Historical Provider, MD   Cholecalciferol (VITAMIN D3) 5000 UNITS TABS Take 1 tablet by mouth daily    Historical Provider, MD   cholestyramine Anoop Montane) 4 G packet Take 1 packet by mouth every evening  4/14/16   Historical Provider, MD   latanoprost (XALATAN) 0.005 % ophthalmic solution Place 1 drop into both eyes nightly    Historical Provider, MD       REVIEW OFSYSTEMS    (2-9 systems for level 4, 10 or more for level 5)      Review of Systems   Constitutional: Negative for chills and fever. HENT: Negative for congestion and rhinorrhea. Eyes: Negative for visual disturbance. Respiratory: Positive for shortness of breath. Negative for cough. Cardiovascular: Positive for leg swelling. Negative for chest pain. Gastrointestinal: Positive for nausea. Negative for abdominal pain, diarrhea and vomiting. Genitourinary: Negative for dysuria. Musculoskeletal: Negative for back pain and neck pain. Skin: Negative for rash. Neurological: Negative for weakness, numbness and headaches. PHYSICAL EXAM   (up to 7 for level 4, 8 or more forlevel 5)      INITIAL VITALS:   ED Triage Vitals [05/09/22 1432]   BP Temp Temp Source Pulse Resp SpO2 Height Weight   95/75 99 °F (37.2 °C) Oral 144 20 98 % 5' 5\" (1.651 m) 185 lb (83.9 kg)       Physical Exam  Constitutional:       General: She is not in acute distress. Appearance: Normal appearance. She is normal weight. She is not ill-appearing, toxic-appearing or diaphoretic. HENT:      Head: Normocephalic and atraumatic. Nose: Nose normal.      Mouth/Throat:      Mouth: Mucous membranes are moist.      Pharynx: Oropharynx is clear. No oropharyngeal exudate or posterior oropharyngeal erythema. Eyes:      Extraocular Movements: Extraocular movements intact. Pupils: Pupils are equal, round, and reactive to light. Cardiovascular:      Rate and Rhythm: Normal rate and regular rhythm. Heart sounds: Normal heart sounds. No murmur heard. Pulmonary:      Effort: Pulmonary effort is normal. No respiratory distress. Breath sounds: Normal breath sounds. No wheezing, rhonchi or rales. Comments: Crackles bilateral bases. Clear breath sounds throughout otherwise. Abdominal:      General: There is no distension. Palpations: Abdomen is soft. Tenderness: There is no abdominal tenderness.  There is no guarding or rebound. Musculoskeletal:         General: No tenderness. Normal range of motion. Cervical back: Normal range of motion and neck supple. Right lower leg: No edema. Left lower leg: No edema. Skin:     General: Skin is warm and dry. Neurological:      General: No focal deficit present. Mental Status: She is alert and oriented to person, place, and time. Motor: No weakness. Psychiatric:         Mood and Affect: Mood normal.         DIFFERENTIAL  DIAGNOSIS     PLAN (LABS / IMAGING / EKG):  Orders Placed This Encounter   Procedures    COVID-19 & Influenza Combo    XR CHEST (2 VW)    CBC with Auto Differential    Comprehensive Metabolic Panel w/ Reflex to MG    Troponin    Brain Natriuretic Peptide    Magnesium    TSH with Reflex    Intake and output    Inpatient consult to Cardiology    Inpatient consult to Internal Medicine    EKG 12 Lead    ADMIT TO INPATIENT       MEDICATIONS ORDERED:  Orders Placed This Encounter   Medications    metoprolol (LOPRESSOR) injection 5 mg    iopamidol (ISOVUE-370) 76 % injection 100 mL    sodium chloride flush 0.9 % injection 10 mL    0.9 % sodium chloride bolus    furosemide (LASIX) injection 20 mg    metoprolol tartrate (LOPRESSOR) tablet 50 mg    DISCONTD: amiodarone (CORDARONE) 300 mg in dextrose 5 % 100 mL bolus    DISCONTD: amiodarone (CORDARONE) 450 mg in dextrose 5 % 250 mL infusion    DISCONTD: amiodarone (NEXTERONE) 150 mg in dextrose 5% 100 ml    DISCONTD: amiodarone (NEXTERONE) 360 mg in dextrose 5% 200 ml    FOLLOWED BY Linked Order Group     amiodarone (NEXTERONE) 150 mg in dextrose 5% 100 ml     amiodarone (NEXTERONE) 360 mg in dextrose 5% 200 ml     Initial MDM/Plan/ED COURSE:    79 y.o. female who presents with shortness of breath, leg swelling and tightness around the lower chest.  On exam, patient is in no acute distress.   Vitals demonstrate tachycardia in the 140s initial exam.  Her blood pressure is also on the lower side, with systolics in the low 591Y. She appears to be fluctuating frequently between 120s to 140s for heart rate. She does have a history of atrial fibrillation but has been taking all of her medications as prescribed. She does have 1-2+ pitting edema in the lower extremities. Crackles at the base of the lungs. Overall history and exam most consistent with CHF. Heart rate also not well controlled without home medications. We will try 5 mg Lopressor at this time. Labs sent to rule out ACS, infection, electrolyte abnormality, EKG performed and chest x-ray to be done. We will also consider CT pulmonary embolism study to rule out PE although overall exam most consistent with CHF    ED Course as of 05/09/22 2059   Mon May 09, 2022   1706 Patient continues to be tachycardic despite 5 mg Lopressor. We will try diuresis and oral metoprolol for rate control. Still awaiting further labs including TSH, BNP, and viral testing. [JS]   1724 TSH: 1.93 [JS]   1724 Magnesium: 1.9 [JS]   1724 Troponin, High Sensitivity(!): 24 [JS]   1724 WBC: 4.4 [JS]   1802 Pro-BNP(!): 2,261  Last bnp normal   [JS]   1808 SARS-CoV-2 RNA, RT PCR: Not Detected [JS]   3296 INFLUENZA A: Not Detected [JS]   0799 HPXJQVRYV B: Not Detected [JS]   3699 Troponin, High Sensitivity(!): 23 [JS]   1856 Spoke with admitting physician who will place orders. Still awaiting call from cardiology. [JS]   [address] [facility] with cardiologist Dr. Indra Chaparro who recommends 300 mg amnio bolus followed by 1 mg/min with no rate reduction.  [JS]      ED Course User Index  [JS] Raymundo Glez, DO    :     DIAGNOSTIC RESULTS / EMERGENCYDEPARTMENT COURSE / MDM     LABS:  Labs Reviewed   CBC WITH AUTO DIFFERENTIAL - Abnormal; Notable for the following components:       Result Value    RBC 3.64 (*)     Hemoglobin 11.7 (*)     Hematocrit 35.7 (*)     Platelets 505 (*)     Lymphocytes 20 (*)     Monocytes 9 (*)     Absolute Lymph # 0.90 (*)     All other components within normal limits   COMPREHENSIVE METABOLIC PANEL W/ REFLEX TO MG FOR LOW K - Abnormal; Notable for the following components:    Glucose 105 (*)     CREATININE 1.05 (*)     GFR Non- 50 (*)     All other components within normal limits   TROPONIN - Abnormal; Notable for the following components:    Troponin, High Sensitivity 24 (*)     All other components within normal limits   TROPONIN - Abnormal; Notable for the following components:    Troponin, High Sensitivity 23 (*)     All other components within normal limits   BRAIN NATRIURETIC PEPTIDE - Abnormal; Notable for the following components:    Pro-BNP 2,261 (*)     All other components within normal limits   COVID-19 & INFLUENZA COMBO   MAGNESIUM   TSH WITH REFLEX           XR CHEST (2 VW)    Result Date: 5/9/2022  EXAMINATION: TWO XRAY VIEWS OF THE CHEST 5/9/2022 3:49 pm COMPARISON: December 3, 2021 HISTORY: ORDERING SYSTEM PROVIDED HISTORY: shortness of breath, lower chest pain TECHNOLOGIST PROVIDED HISTORY: shortness of breath, lower chest pain FINDINGS: Small bilateral pleural effusions. Cardiomegaly. Mediastinum normal.  Lungs clear. Bony thorax intact. Small bilateral effusions. EKG  EKG demonstrates tachycardia, narrow complex, rate of 144. Normal axis.  and QTc 504. No acute ST changes. No T wave inversions. All EKG's are interpreted by the Emergency Department Physicianwho either signs or Co-signs this chart in the absence of a cardiologist.      PROCEDURES:  None    CONSULTS:  IP CONSULT TO CARDIOLOGY  IP CONSULT TO INTERNAL MEDICINE    CRITICAL CARE:  Please see attending note    FINAL IMPRESSION      1. Congestive heart failure, unspecified HF chronicity, unspecified heart failure type (HCC)    2. Leg swelling    3. Shortness of breath          DISPOSITION / PLAN     DISPOSITION Admitted 05/09/2022 06:57:20 PM      PATIENT REFERRED TO:  No follow-up provider specified.     DISCHARGE MEDICATIONS:  New Prescriptions    No medications on file       Kasi Hart DO  Emergency Medicine Resident    (Please note that portions of this note were completed with a voice recognition program.Efforts were made to edit the dictations but occasionally words are mis-transcribed.)       Kasi Hart DO  Resident  05/09/22 8980

## 2022-05-09 NOTE — ED NOTES
Report given to [facility] RN from [facility]. Report method by phone   The following was reviewed with receiving RN:   Current vital signs:  /73   Pulse 113   Temp 99 °F (37.2 °C) (Oral)   Resp 26   Ht 5' 5\" (1.651 m)   Wt 185 lb (83.9 kg)   SpO2 95%   BMI 30.79 kg/m²                MEWS Score: 5     Any medication or safety alerts were reviewed. Any pending diagnostics and notifications were also reviewed, as well as any safety concerns or issues, abnormal labs, abnormal imaging, and abnormal assessment findings. Questions were answered.      Will start amiodorone prior to patient traveling to [facility]            [facility], RN  05/09/22 0887

## 2022-05-10 PROBLEM — E66.812 CLASS 2 OBESITY DUE TO EXCESS CALORIES IN ADULT: Status: ACTIVE | Noted: 2022-05-10

## 2022-05-10 PROBLEM — E66.09 CLASS 2 OBESITY DUE TO EXCESS CALORIES IN ADULT: Status: ACTIVE | Noted: 2022-05-10

## 2022-05-10 LAB
ABSOLUTE EOS #: 0.1 K/UL (ref 0–0.4)
ABSOLUTE LYMPH #: 0.9 K/UL (ref 1–4.8)
ABSOLUTE MONO #: 0.6 K/UL (ref 0.1–1.3)
ALBUMIN SERPL-MCNC: 4 G/DL (ref 3.5–5.2)
ALP BLD-CCNC: 92 U/L (ref 35–104)
ALT SERPL-CCNC: 10 U/L (ref 5–33)
ANION GAP SERPL CALCULATED.3IONS-SCNC: 12 MMOL/L (ref 9–17)
AST SERPL-CCNC: 20 U/L
BASOPHILS # BLD: 1 % (ref 0–2)
BASOPHILS ABSOLUTE: 0.1 K/UL (ref 0–0.2)
BILIRUB SERPL-MCNC: 0.42 MG/DL (ref 0.3–1.2)
BUN BLDV-MCNC: 14 MG/DL (ref 8–23)
CALCIUM SERPL-MCNC: 9.3 MG/DL (ref 8.6–10.4)
CHLORIDE BLD-SCNC: 97 MMOL/L (ref 98–107)
CO2: 26 MMOL/L (ref 20–31)
CREAT SERPL-MCNC: 1.5 MG/DL (ref 0.5–0.9)
EKG ATRIAL RATE: 120 BPM
EKG Q-T INTERVAL: 332 MS
EKG QRS DURATION: 112 MS
EKG QTC CALCULATION (BAZETT): 514 MS
EKG R AXIS: 113 DEGREES
EKG T AXIS: -9 DEGREES
EKG VENTRICULAR RATE: 144 BPM
EOSINOPHILS RELATIVE PERCENT: 2 % (ref 0–4)
GFR AFRICAN AMERICAN: 40 ML/MIN
GFR NON-AFRICAN AMERICAN: 33 ML/MIN
GFR SERPL CREATININE-BSD FRML MDRD: ABNORMAL ML/MIN/{1.73_M2}
GLUCOSE BLD-MCNC: 117 MG/DL (ref 70–99)
HCT VFR BLD CALC: 37.9 % (ref 36–46)
HEMOGLOBIN: 12.1 G/DL (ref 12–16)
LYMPHOCYTES # BLD: 15 % (ref 24–44)
MCH RBC QN AUTO: 31.7 PG (ref 26–34)
MCHC RBC AUTO-ENTMCNC: 31.9 G/DL (ref 31–37)
MCV RBC AUTO: 99.5 FL (ref 80–100)
MONOCYTES # BLD: 10 % (ref 1–7)
PDW BLD-RTO: 14.9 % (ref 11.5–14.9)
PLATELET # BLD: 116 K/UL (ref 150–450)
PMV BLD AUTO: 10.2 FL (ref 6–12)
POTASSIUM SERPL-SCNC: 4.2 MMOL/L (ref 3.7–5.3)
RBC # BLD: 3.81 M/UL (ref 4–5.2)
REASON FOR REJECTION: NORMAL
SEG NEUTROPHILS: 72 % (ref 36–66)
SEGMENTED NEUTROPHILS ABSOLUTE COUNT: 4.3 K/UL (ref 1.3–9.1)
SODIUM BLD-SCNC: 135 MMOL/L (ref 135–144)
TOTAL PROTEIN: 6.8 G/DL (ref 6.4–8.3)
WBC # BLD: 6 K/UL (ref 3.5–11)
ZZ NTE CLEAN UP: ORDERED TEST: NORMAL
ZZ NTE WITH NAME CLEAN UP: SPECIMEN SOURCE: NORMAL

## 2022-05-10 PROCEDURE — 36415 COLL VENOUS BLD VENIPUNCTURE: CPT

## 2022-05-10 PROCEDURE — 2500000003 HC RX 250 WO HCPCS: Performed by: STUDENT IN AN ORGANIZED HEALTH CARE EDUCATION/TRAINING PROGRAM

## 2022-05-10 PROCEDURE — 6360000002 HC RX W HCPCS: Performed by: FAMILY MEDICINE

## 2022-05-10 PROCEDURE — 97166 OT EVAL MOD COMPLEX 45 MIN: CPT

## 2022-05-10 PROCEDURE — 6370000000 HC RX 637 (ALT 250 FOR IP): Performed by: FAMILY MEDICINE

## 2022-05-10 PROCEDURE — 85025 COMPLETE CBC W/AUTO DIFF WBC: CPT

## 2022-05-10 PROCEDURE — 93010 ELECTROCARDIOGRAM REPORT: CPT | Performed by: INTERNAL MEDICINE

## 2022-05-10 PROCEDURE — 2580000003 HC RX 258: Performed by: FAMILY MEDICINE

## 2022-05-10 PROCEDURE — 97116 GAIT TRAINING THERAPY: CPT

## 2022-05-10 PROCEDURE — 1200000000 HC SEMI PRIVATE

## 2022-05-10 PROCEDURE — 97530 THERAPEUTIC ACTIVITIES: CPT

## 2022-05-10 PROCEDURE — 97162 PT EVAL MOD COMPLEX 30 MIN: CPT

## 2022-05-10 PROCEDURE — 80053 COMPREHEN METABOLIC PANEL: CPT

## 2022-05-10 RX ORDER — METOCLOPRAMIDE 10 MG/1
10 TABLET ORAL
Status: DISCONTINUED | OUTPATIENT
Start: 2022-05-10 | End: 2022-05-12 | Stop reason: HOSPADM

## 2022-05-10 RX ADMIN — DORZOLAMIDE HYDROCHLORIDE 1 DROP: 20 SOLUTION/ DROPS OPHTHALMIC at 21:36

## 2022-05-10 RX ADMIN — AMIODARONE HYDROCHLORIDE 1 MG/MIN: 1.8 INJECTION, SOLUTION INTRAVENOUS at 06:24

## 2022-05-10 RX ADMIN — LEVOTHYROXINE SODIUM 100 MCG: 0.1 TABLET ORAL at 05:52

## 2022-05-10 RX ADMIN — SODIUM CHLORIDE, PRESERVATIVE FREE 10 ML: 5 INJECTION INTRAVENOUS at 08:06

## 2022-05-10 RX ADMIN — APIXABAN 5 MG: 5 TABLET, FILM COATED ORAL at 21:40

## 2022-05-10 RX ADMIN — METOCLOPRAMIDE 10 MG: 10 TABLET ORAL at 16:38

## 2022-05-10 RX ADMIN — ACETAMINOPHEN 650 MG: 325 TABLET ORAL at 01:23

## 2022-05-10 RX ADMIN — AMIODARONE HYDROCHLORIDE 1 MG/MIN: 1.8 INJECTION, SOLUTION INTRAVENOUS at 13:22

## 2022-05-10 RX ADMIN — APIXABAN 5 MG: 5 TABLET, FILM COATED ORAL at 08:00

## 2022-05-10 RX ADMIN — TIMOLOL MALEATE 1 DROP: 5 SOLUTION OPHTHALMIC at 21:35

## 2022-05-10 RX ADMIN — AMIODARONE HYDROCHLORIDE 1 MG/MIN: 1.8 INJECTION, SOLUTION INTRAVENOUS at 01:21

## 2022-05-10 RX ADMIN — AMIODARONE HYDROCHLORIDE 1 MG/MIN: 1.8 INJECTION, SOLUTION INTRAVENOUS at 18:33

## 2022-05-10 RX ADMIN — LATANOPROST 1 DROP: 50 SOLUTION OPHTHALMIC at 21:35

## 2022-05-10 RX ADMIN — ATENOLOL 100 MG: 50 TABLET ORAL at 08:00

## 2022-05-10 RX ADMIN — FUROSEMIDE 40 MG: 10 INJECTION, SOLUTION INTRAMUSCULAR; INTRAVENOUS at 08:00

## 2022-05-10 RX ADMIN — TIMOLOL MALEATE 1 DROP: 5 SOLUTION OPHTHALMIC at 08:06

## 2022-05-10 RX ADMIN — AMIODARONE HYDROCHLORIDE 1 MG/MIN: 1.8 INJECTION, SOLUTION INTRAVENOUS at 23:29

## 2022-05-10 RX ADMIN — FERROUS SULFATE TAB 325 MG (65 MG ELEMENTAL FE) 325 MG: 325 (65 FE) TAB at 07:59

## 2022-05-10 RX ADMIN — DORZOLAMIDE HYDROCHLORIDE 1 DROP: 20 SOLUTION/ DROPS OPHTHALMIC at 08:06

## 2022-05-10 ASSESSMENT — ENCOUNTER SYMPTOMS
EYE REDNESS: 0
VOMITING: 0
BLOOD IN STOOL: 0
COUGH: 0
ABDOMINAL PAIN: 0
COLOR CHANGE: 0
CHEST TIGHTNESS: 1
TROUBLE SWALLOWING: 0
SHORTNESS OF BREATH: 1
NAUSEA: 0
EYE ITCHING: 0

## 2022-05-10 ASSESSMENT — PAIN DESCRIPTION - DESCRIPTORS: DESCRIPTORS: ACHING

## 2022-05-10 ASSESSMENT — PAIN SCALES - GENERAL
PAINLEVEL_OUTOF10: 0
PAINLEVEL_OUTOF10: 4
PAINLEVEL_OUTOF10: 0

## 2022-05-10 ASSESSMENT — PAIN DESCRIPTION - LOCATION: LOCATION: BACK

## 2022-05-10 ASSESSMENT — PAIN - FUNCTIONAL ASSESSMENT: PAIN_FUNCTIONAL_ASSESSMENT: ACTIVITIES ARE NOT PREVENTED

## 2022-05-10 NOTE — ACP (ADVANCE CARE PLANNING)
Advance Care Planning     Advance Care Planning Activator (Inpatient)  Conversation Note      Date of ACP Conversation: 5/10/2022     Conversation Conducted with: Patient with Decision Making Capacity    ACP Activator: Karlee Peterson RN        Health Care Decision Maker:     Current Designated Health Care Decision Maker:     Primary Decision Maker: Freya Jaun - Brayan - [phone]  Click here to complete Healthcare Decision Makers including section of the Healthcare Decision Maker Relationship (ie \"Primary\")      Care Preferences    Ventilation: \"If you were in your present state of health and suddenly became very ill and were unable to breathe on your own, what would your preference be about the use of a ventilator (breathing machine) if it were available to you? \"      Would the patient desire the use of ventilator (breathing machine)?: no    \"If your health worsens and it becomes clear that your chance of recovery is unlikely, what would your preference be about the use of a ventilator (breathing machine) if it were available to you? \"     Would the patient desire the use of ventilator (breathing machine)?: No      Resuscitation  \"CPR works best to restart the heart when there is a sudden event, like a heart attack, in someone who is otherwise healthy. Unfortunately, CPR does not typically restart the heart for people who have serious health conditions or who are very sick. \"    \"In the event your heart stopped as a result of an underlying serious health condition, would you want attempts to be made to restart your heart (answer \"yes\" for attempt to resuscitate) or would you prefer a natural death (answer \"no\" for do not attempt to resuscitate)? \" no       [] Yes   [] No   Educated Patient / Tori Jones regarding differences between Advance Directives and portable DNR orders.     Length of ACP Conversation in minutes:      Conversation Outcomes:  [x] ACP discussion completed  [] Existing advance directive reviewed with patient; no changes to patient's previously recorded wishes  [] New Advance Directive completed  [] Portable Do Not Rescitate prepared for Provider review and signature  [] POLST/POST/MOLST/MOST prepared for Provider review and signature      Follow-up plan:    [] Schedule follow-up conversation to continue planning  [] Referred individual to Provider for additional questions/concerns   [] Advised patient/agent/surrogate to review completed ACP document and update if needed with changes in condition, patient preferences or care setting    [x] This note routed to one or more involved healthcare providers

## 2022-05-10 NOTE — PROGRESS NOTES
Patient admitted per cart to room 2121. VS taken and telemetry applied. Oriented to room. Patient denies any pain or respiratory distress. Bed alarm placed and patient instructed to call nurse if help needed to go to restroom.

## 2022-05-10 NOTE — PROGRESS NOTES
RN spoke with Dr Barbara Gray to update that patient sees Dr Katie Gregg as her cardiologist and that Dr Leonard Schaumann group was consulted. Orders to change consult to Dr Katie Gregg received. RN updated Dr Barbara Gray that lab unsuccessfully tried to get labs three times this morning. Dr Barbara Gray asked for lab to try again this afternoon. RN called and updated lab.

## 2022-05-10 NOTE — PROGRESS NOTES
Nutrition Education    · Educated on Low Na diet  · Learners: Patient  · Readiness: Acceptance  · Method: Explanation and Handout  · Response: Verbalizes Understanding  · Contact name and number provided.     Andi Dean RD, LD  Contact Number: [phone]

## 2022-05-10 NOTE — H&P
History and Physical      Name: Cristo Calvillo  MRN: [id number]     Acct: [de-identified]  Room: 2121/2121-01    Admit Date: 5/9/2022  PCP: Tyler Wright MD      Chief Complaint:     Chief Complaint   Patient presents with    Shortness of Breath    Leg Swelling       History Obtained From:     patient, electronic medical record    History of Present Illness:      Cristo Calvillo is a  79 y.o.  female  With PAF on Eliquis, hypertension, CHF presents with Shortness of Breath and Leg Swelling   patient states that she  Has been experiencing chest tightness and shortness of breath for the last 2 weeks that are progressively worsening. Patient has orthopnea and exertional dyspnea, lower extremity leg swelling. Patient denies  lightheadedness visual change cough diaphoresis nausea vomiting abdominal pain dysuria extremity weakness headache fever or chills    Past Medical History:     Past Medical History:   Diagnosis Date    Atrial fibrillation ([address] 3/28/2014    Chronic back pain     with rt. leg pain    Diverticulosis     GERD (gastroesophageal reflux disease)     Hyperlipidemia     Hypertension     Hypothyroidism     Obesity (BMI 30.0-34. 9) 8/12/2016    Onychomycosis     PE (pulmonary embolism)     H/O DVT of rt leg    Type II or unspecified type diabetes mellitus without mention of complication, not stated as uncontrolled         Past Surgical History:     Past Surgical History:   Procedure Laterality Date    CHOLECYSTECTOMY      COLON SURGERY      s/p sigmoid cloectomy    COLONOSCOPY N/A 1/25/2021    COLONOSCOPY DIAGNOSTIC performed by Carolyne Miles MD at [address] [facility] N/A 12/2/2021    [address] performed by Carolyne Miles MD at [address] [facility] 09/20/2019    DR IMAN GAUTHIER    UPPER GASTROINTESTINAL ENDOSCOPY N/A 12/2/2021    EGD DIAGNOSTIC ONLY performed by Carolyne Miles MD at [place] [address]  VENA CAVA FILTER PLACEMENT      s/p        Medications Prior to Admission:       Prior to Admission medications    Medication Sig Start Date End Date Taking? Authorizing Provider   spironolactone (ALDACTONE) 25 MG tablet Take 25 mg by mouth daily   Yes Historical Provider, MD   acetaminophen (TYLENOL) 500 MG tablet Take 500 mg by mouth at bedtime   Yes Historical Provider, MD   levothyroxine (SYNTHROID) 100 MCG tablet TAKE 1 TABLET BY MOUTH EVERY DAY 4/11/22   Sydnie Orozco MD   doxazosin (CARDURA) 4 MG tablet TAKE 1 TABLET BY MOUTH EVERY DAY 4/11/22   Sydnie Orozco MD   ferrous sulfate (IRON 325) 325 (65 Fe) MG tablet Take 1 tablet by mouth daily (with breakfast) 4/5/22   Sydnie Orozco MD   atenolol (TENORMIN) 100 MG tablet TAKE 1 TABLET BY MOUTH EVERY DAY 1/10/22   Sydnie Orozco MD   apixaban (ELIQUIS) 5 MG TABS tablet Take 5 mg by mouth 2 times daily 2/2/21   Historical Provider, MD   RHOPRESSA 0.02 % SOLN Place 1 drop into both eyes nightly  7/24/20   Historical Provider, MD   timolol (TIMOPTIC) 0.5 % ophthalmic solution Place 1 drop into both eyes 2 times daily  8/3/20   Historical Provider, MD   dorzolamide (TRUSOPT) 2 % ophthalmic solution Place 1 drop into both eyes 2 times daily  11/9/18   Historical Provider, MD   Cholecalciferol (VITAMIN D3) 5000 UNITS TABS Take 1 tablet by mouth daily    Historical Provider, MD   cholestyramine Le Talia) 4 G packet Take 1 packet by mouth every evening  4/14/16   Historical Provider, MD   latanoprost (XALATAN) 0.005 % ophthalmic solution Place 1 drop into both eyes nightly    Historical Provider, MD        Allergies: Avapro [irbesartan], Ciprofloxacin hcl, Cozaar [losartan potassium], Diovan [valsartan], Lipitor [atorvastatin calcium], Lisinopril, Pcn [penicillins], Pravachol [pravastatin sodium], Tape [adhesive tape], Tramadol, Zetia [ezetimibe], and Morphine    Social History:     Tobacco:    reports that she has never smoked.  She has never used smokeless tobacco.  Alcohol:      reports no history of alcohol use. Drug Use:  reports no history of drug use. Family History:     Family History   Adopted: Yes   Problem Relation Age of Onset    No Known Problems Mother     No Known Problems Father        Review of Systems:     Positive and Negative as described in HPI   all 10 systems are reviewed and negative except as Noted      Review of Systems   Constitutional: Negative for chills and fatigue. HENT: Negative for drooling, mouth sores, sneezing and trouble swallowing. Eyes: Negative for redness and itching. Respiratory: Positive for chest tightness and shortness of breath. Negative for cough. Cardiovascular: Positive for leg swelling. Negative for chest pain and palpitations. Gastrointestinal: Negative for abdominal pain, blood in stool, nausea and vomiting. Endocrine: Negative for heat intolerance and polyphagia. Genitourinary: Negative for difficulty urinating, flank pain and pelvic pain. Musculoskeletal: Negative for arthralgias, joint swelling and neck stiffness. Skin: Negative for color change and pallor. Allergic/Immunologic: Negative for food allergies. Neurological: Negative for dizziness, seizures and headaches. Hematological: Does not bruise/bleed easily. Psychiatric/Behavioral: Negative for agitation, behavioral problems and suicidal ideas. The patient is not hyperactive. Code Status:  DNR-CCA    Physical Exam:     Vitals:  /70   Pulse 111   Temp 97.9 °F (36.6 °C) (Oral)   Resp 18   Ht 5' 5\" (1.651 m)   Wt 237 lb 3.2 oz (107.6 kg)   SpO2 99%   BMI 39.47 kg/m²   Temp (24hrs), Av.8 °F (36.6 °C), Min:97.3 °F (36.3 °C), Max:98.2 °F (36.8 °C)        Physical Exam  Vitals reviewed. Constitutional:       Appearance: She is obese. HENT:      Head: Normocephalic.       Right Ear: External ear normal.      Left Ear: External ear normal.      Nose: Nose normal.      Mouth/Throat: Mouth: Mucous membranes are moist.      Pharynx: Oropharynx is clear. Eyes:      Conjunctiva/sclera: Conjunctivae normal.   Cardiovascular:      Rate and Rhythm: Tachycardia present. Rhythm irregular. Pulses: Normal pulses. Heart sounds: Normal heart sounds. Pulmonary:      Effort: Pulmonary effort is normal.      Breath sounds: Examination of the right-lower field reveals rales. Examination of the left-lower field reveals rales. Rales present. Abdominal:      General: Bowel sounds are normal.      Palpations: Abdomen is soft. Tenderness: There is no abdominal tenderness. There is no right CVA tenderness or left CVA tenderness. Musculoskeletal:         General: No deformity. Cervical back: Normal range of motion and neck supple. Right lower leg: Edema (1+) present. Left lower leg: Edema (1+) present. Skin:     General: Skin is warm. Capillary Refill: Capillary refill takes less than 2 seconds. Coloration: Skin is not jaundiced. Neurological:      General: No focal deficit present. Mental Status: She is alert. Mental status is at baseline.    Psychiatric:         Mood and Affect: Mood normal.         Behavior: Behavior normal.               Data:     Recent Results (from the past 24 hour(s))   CBC with Auto Differential    Collection Time: 05/09/22  4:22 PM   Result Value Ref Range    WBC 4.4 3.5 - 11.0 k/uL    RBC 3.64 (L) 4.0 - 5.2 m/uL    Hemoglobin 11.7 (L) 12.0 - 16.0 g/dL    Hematocrit 35.7 (L) 36 - 46 %    MCV 98.1 80 - 100 fL    MCH 32.2 26 - 34 pg    MCHC 32.8 31 - 37 g/dL    RDW 14.7 11.5 - 14.9 %    Platelets 140 (L) 549 - 450 k/uL    MPV 9.9 6.0 - 12.0 fL    Seg Neutrophils 66 36 - 66 %    Lymphocytes 20 (L) 24 - 44 %    Monocytes 9 (H) 1 - 7 %    Eosinophils % 3 0 - 4 %    Basophils 2 0 - 2 %    Segs Absolute 3.00 1.3 - 9.1 k/uL    Absolute Lymph # 0.90 (L) 1.0 - 4.8 k/uL    Absolute Mono # 0.40 0.1 - 1.3 k/uL    Absolute Eos # 0.10 0.0 - 0.4 k/uL Basophils Absolute 0.10 0.0 - 0.2 k/uL   Comprehensive Metabolic Panel w/ Reflex to MG    Collection Time: 05/09/22  4:22 PM   Result Value Ref Range    Glucose 105 (H) 70 - 99 mg/dL    BUN 11 8 - 23 mg/dL    CREATININE 1.05 (H) 0.50 - 0.90 mg/dL    Calcium 9.4 8.6 - 10.4 mg/dL    Sodium 140 135 - 144 mmol/L    Potassium 4.6 3.7 - 5.3 mmol/L    Chloride 102 98 - 107 mmol/L    CO2 26 20 - 31 mmol/L    Anion Gap 12 9 - 17 mmol/L    Alkaline Phosphatase 87 35 - 104 U/L    ALT 9 5 - 33 U/L    AST 19 <32 U/L    Total Bilirubin 0.57 0.3 - 1.2 mg/dL    Total Protein 6.5 6.4 - 8.3 g/dL    Albumin 3.9 3.5 - 5.2 g/dL    GFR Non- 50 (L) >60 mL/min    GFR African American >60 >60 mL/min    GFR Comment         Troponin    Collection Time: 05/09/22  4:22 PM   Result Value Ref Range    Troponin, High Sensitivity 24 (H) 0 - 14 ng/L   Brain Natriuretic Peptide    Collection Time: 05/09/22  4:22 PM   Result Value Ref Range    Pro-BNP 2,261 (H) <300 pg/mL   Magnesium    Collection Time: 05/09/22  4:22 PM   Result Value Ref Range    Magnesium 1.9 1.6 - 2.6 mg/dL   TSH with Reflex    Collection Time: 05/09/22  4:22 PM   Result Value Ref Range    TSH 1.93 0.30 - 5.00 uIU/mL   COVID-19 & Influenza Combo    Collection Time: 05/09/22  5:29 PM    Specimen: Nasopharyngeal Swab   Result Value Ref Range    Specimen Description . NASOPHARYNGEAL SWAB     Source . NASOPHARYNGEAL SWAB     SARS-CoV-2 RNA, RT PCR Not Detected Not Detected    INFLUENZA A Not Detected Not Detected    INFLUENZA B Not Detected Not Detected   Troponin    Collection Time: 05/09/22  5:30 PM   Result Value Ref Range    Troponin, High Sensitivity 23 (H) 0 - 14 ng/L   SPECIMEN REJECTION    Collection Time: 05/10/22  7:32 AM   Result Value Ref Range    Specimen Source BLOOD     Ordered Test CMPX, CDP     Reason for Rejection Unable to perform testing: Specimen hemolyzed.     CBC with Auto Differential    Collection Time: 05/10/22  2:29 PM   Result Value Ref Range    WBC 6.0 3.5 - 11.0 k/uL    RBC 3.81 (L) 4.0 - 5.2 m/uL    Hemoglobin 12.1 12.0 - 16.0 g/dL    Hematocrit 37.9 36 - 46 %    MCV 99.5 80 - 100 fL    MCH 31.7 26 - 34 pg    MCHC 31.9 31 - 37 g/dL    RDW 14.9 11.5 - 14.9 %    Platelets 820 (L) 983 - 450 k/uL    MPV 10.2 6.0 - 12.0 fL    Seg Neutrophils 72 (H) 36 - 66 %    Lymphocytes 15 (L) 24 - 44 %    Monocytes 10 (H) 1 - 7 %    Eosinophils % 2 0 - 4 %    Basophils 1 0 - 2 %    Segs Absolute 4.30 1.3 - 9.1 k/uL    Absolute Lymph # 0.90 (L) 1.0 - 4.8 k/uL    Absolute Mono # 0.60 0.1 - 1.3 k/uL    Absolute Eos # 0.10 0.0 - 0.4 k/uL    Basophils Absolute 0.10 0.0 - 0.2 k/uL   Comprehensive Metabolic Panel w/ Reflex to MG    Collection Time: 05/10/22  2:29 PM   Result Value Ref Range    Glucose 117 (H) 70 - 99 mg/dL    BUN 14 8 - 23 mg/dL    CREATININE 1.50 (H) 0.50 - 0.90 mg/dL    Calcium 9.3 8.6 - 10.4 mg/dL    Sodium 135 135 - 144 mmol/L    Potassium 4.2 3.7 - 5.3 mmol/L    Chloride 97 (L) 98 - 107 mmol/L    CO2 26 20 - 31 mmol/L    Anion Gap 12 9 - 17 mmol/L    Alkaline Phosphatase 92 35 - 104 U/L    ALT 10 5 - 33 U/L    AST 20 <32 U/L    Total Bilirubin 0.42 0.3 - 1.2 mg/dL    Total Protein 6.8 6.4 - 8.3 g/dL    Albumin 4.0 3.5 - 5.2 g/dL    GFR Non- 33 (L) >60 mL/min    GFR African American 40 (L) >60 mL/min    GFR Comment             Assesment:     Primary Problem  Diastolic CHF, acute on chronic (HCC)    Principal Problem:    Diastolic CHF, acute on chronic (HCC)  Active Problems:    Atrial fibrillation with RVR ([facility])    Class 2 obesity due to excess calories in adult    Hypothyroidism    Essential hypertension  Resolved Problems:    * No resolved hospital problems. *      Plan:     1. IV amiodarone drip per Cardiology  2. IV Lasix 40 mg daily  3.  cardiac telemetry  4. DNR CCA no intubation  5. Discussed with patient son and nurse at bedside  6. CBC, CMP  7.  chest x-ray report shows small bilateral effusion  8.   DVT prophylaxis Eliquis 5 mg p.o. twice daily  9. EPCs  10.  check and replace electrolytes per sliding scale  11.   restart home medications        Electronically signed by Farhan Bowden MD     Copy sent to Dr. Aydee Hatch MD

## 2022-05-10 NOTE — PLAN OF CARE
Problem: Discharge Planning  Goal: Discharge to home or other facility with appropriate resources  Outcome: Progressing     Problem: Safety - Adult  Goal: Free from fall injury  Outcome: Progressing     Problem: ABCDS Injury Assessment  Goal: Absence of physical injury  Outcome: Progressing     Problem: Skin/Tissue Integrity  Goal: Absence of new skin breakdown  Description: 1. Monitor for areas of redness and/or skin breakdown  2. Assess vascular access sites hourly  3. Every 4-6 hours minimum:  Change oxygen saturation probe site  4. Every 4-6 hours:  If on nasal continuous positive airway pressure, respiratory therapy assess nares and determine need for appliance change or resting period.   Outcome: Progressing     Problem: Respiratory - Adult  Goal: Achieves optimal ventilation and oxygenation  Outcome: Progressing     Problem: Cardiovascular - Adult  Goal: Maintains optimal cardiac output and hemodynamic stability  Outcome: Progressing  Goal: Absence of cardiac dysrhythmias or at baseline  Outcome: Progressing     Problem: Pain  Goal: Verbalizes/displays adequate comfort level or baseline comfort level  Outcome: Progressing     Problem: Chronic Conditions and Co-morbidities  Goal: Patient's chronic conditions and co-morbidity symptoms are monitored and maintained or improved  Outcome: Progressing

## 2022-05-10 NOTE — PROGRESS NOTES
Occupational Therapy  Facility/Department: [address] [place] [address]  Occupational Therapy Initial Assessment    Name: Kennedi Bowens  : 1940  MRN: [id number]  Date of Service: 5/10/2022    Discharge Recommendations:  Patient would benefit from continued therapy after discharge  OT Equipment Recommendations  Equipment Needed:  (TBD)       Patient Diagnosis(es): The primary encounter diagnosis was Congestive heart failure, unspecified HF chronicity, unspecified heart failure type ([address] Diagnoses of Leg swelling and Shortness of breath were also pertinent to this visit. Past Medical History:  has a past medical history of Atrial fibrillation ([address] Chronic back pain, Diverticulosis, GERD (gastroesophageal reflux disease), Hyperlipidemia, Hypertension, Hypothyroidism, Obesity (BMI 30.0-34.9), Onychomycosis, PE (pulmonary embolism), and Type II or unspecified type diabetes mellitus without mention of complication, not stated as uncontrolled. Past Surgical History:  has a past surgical history that includes Colon surgery; Vena Cava Filter Placement; Cholecystectomy; Hysterectomy; Tympanostomy tube placement (Bilateral, 2019); Colonoscopy (N/A, 2021); sigmoidoscopy (N/A, 2021); and Upper gastrointestinal endoscopy (N/A, 2021). Treatment Diagnosis: Impaired self-care status      Assessment   Performance deficits / Impairments: Decreased functional mobility ; Decreased ADL status; Decreased strength;Decreased ROM; Decreased endurance;Decreased balance  Treatment Diagnosis: Impaired self-care status  Prognosis: Good  Decision Making: Medium Complexity  REQUIRES OT FOLLOW-UP: Yes  Activity Tolerance  Activity Tolerance: Patient limited by fatigue        Plan   Plan  Times per Week: 4-5  Current Treatment Recommendations: Strengthening,ROM,Balance training,Functional mobility training,Endurance training,Safety education & training,Patient/Caregiver education & training,Equipment evaluation, education, & procurement,Self-Care / ADL     Restrictions  Restrictions/Precautions  Restrictions/Precautions: Fall Risk,General Precautions  Required Braces or Orthoses?: No  Implants present? :  (denies)    Subjective   General  Chart Reviewed: Yes  Patient assessed for rehabilitation services?: Yes  Additional Pertinent Hx: 79 y.o.  female  With PAF on Eliquis, hypertension, CHF presents with Shortness of Breath and Leg Swelling. patient states that she  Has been experiencing chest tightness and shortness of breath for the last 2 weeks that are progressively worsening. Patient has orthopnea and exertional dyspnea, lower extremity leg swelling. Family / Caregiver Present: No  Referring Practitioner: Vidya Noyola MD  Diagnosis: Diastolic CHF (acute on chronic)  General Comment  Comments: Okay for OT/PT evaluation per SINA Gonzalez. Pt is pleasant and agreeable for session. Pain: Pt denies pain  Social/Functional History  Social/Functional History  Lives With: Alone  Type of Home: House  Home Layout: One level  Home Access: Stairs to enter with rails  Entrance Stairs - Number of Steps: 4 GALE with R HR; 4 steps inside home with B HR   Entrance Stairs - Rails: Right  Bathroom Shower/Tub: Walk-in shower,Doors  Bathroom Toilet: Handicap height (Support nearby for support)  Bathroom Equipment: Built-in shower seat,Hand-held shower,Grab bars in shower  Bathroom Accessibility: Not accessible  Home Equipment: Cane,Walker, rolling,Reacher,Wheelchair-manual,Grab bars  Has the patient had two or more falls in the past year or any fall with injury in the past year?: No  Receives Help From: Family  ADL Assistance: [address]: Needs assistance (Ptr cooks/cleans; dtr completes grocery shopping)  Homemaking Responsibilities: Yes  Ambulation Assistance: Independent (with cane)  Transfer Assistance: Independent  Active : No  Patient's  Info:  Son/dtr drive  Mode of Transportation: Truck  IADL Comments: sleeps in a flat bed  Additional Comments: Pt reports that her son and dtr live nearby, able to assist as needed. Objective   Pulse: 111  Heart Rate Source: Monitor  BP: 112/70  BP Location: Right lower arm  MAP (Calculated): 84  Resp: 18  SpO2: 99 %  O2 Device: None (Room air)  Vision Exceptions: Wears glasses at all times  Hearing: Exceptions to [facility]  Hearing Exceptions: Hard of hearing/hearing concerns;Bilateral hearing aid (Batteries are dead)       Observation/Palpation  Posture: Fair  Observation: BLE swollen  Safety Devices  Type of Devices: All fall risk precautions in place;Call light within reach;Gait belt;Patient at risk for falls        AROM: Generally decreased, functional  Strength: Generally decreased, functional  Coordination: Within functional limits  Tone: Normal  Sensation: Intact  ADL  Feeding: Setup (per pt report- unable to cut meat)  Grooming: Setup  UE Bathing: Contact guard assistance  LE Bathing: Maximum assistance  UE Dressing: Contact guard assistance  LE Dressing: Maximum assistance  Toileting: Maximum assistance  Additional Comments: ADL scores based on skilled observation and clinical reasoning, unless otherwise noted. Assistance required due to significant fatigue/low endurance and limited activity tolerance, impacting independence with self care  Tone RUE  RUE Tone: Normotonic  Tone LUE  LUE Tone: Normotonic  Coordination  Movements Are Fluid And Coordinated: Yes     Bed mobility  Supine to Sit: Stand by assistance  Sit to Supine: Stand by assistance  Scooting: Stand by assistance  Bed Mobility Comments: HOB slightly elevated (~15 degrees). No complaints of lightheadedness/dizziness.  Prior to returning to bed, pt requested a seated rest break due to SOB  Transfers  Sit to stand: Contact guard assistance  Stand to sit: Contact guard assistance  Transfer Comments: Cued for hand placement for safety     Cognition  Overall Cognitive Status: [facility]        Sensation  Overall Sensation Concurrent Group Co-treatment   Time In 5277         Time Out 1417         Minutes 28         Timed Code Treatment Minutes: [address], OT

## 2022-05-10 NOTE — PROGRESS NOTES
Physician Progress Note      PATIENT:               Bynum Harada  [facility] #:                  [id number]  :                       1940  ADMIT DATE:       2022 3:29 PM  [address] [place] DATE:  RESPONDING  PROVIDER #:        Jeremy Ramachandran MD          QUERY TEXT:    Pt admitted for treatment of Acute on Chronic HFpEF. Noted elevated Creatinine   since admission from 1.05- 1.50 mg/dL. If possible, please document in the   progress notes and discharge summary if you are evaluating and/or treating any   of the following: The medical record reflects the following:  Risk Factors: HTN, CHF, HLD, CAD, Diuretics  Clinical Indicators: Cr 1.05-->1.50. BUN 11-->14. GFR 50--> 33. Historical   results from 6132-0439 range from Cr 0.68- 0.95; BUN 4-10; GFR >60-58  Treatment: Labs, Treatment of acute chf, E-Lyte monitoring and replacement. Defined by Kidney Disease Improving Global Outcomes (KDIGO) clinical practice   guideline for acute kidney injury:  -Increase in SCr by greater than or equal to 0.3 mg/dl within 48 hours; or  -Increase or decrease in SCr to greater than or equal to 1.5 times baseline,   which is known or presumed to have occurred within the prior 7 days; or  -Urine volume < 0.5ml/kg/h for 6 hours  Options provided:  -- Acute kidney failure  -- Elevated Creatinine w/p JOSH  -- Other - I will add my own diagnosis  -- Disagree - Not applicable / Not valid  -- Disagree - Clinically unable to determine / Unknown  -- Refer to Clinical Documentation Reviewer    PROVIDER RESPONSE TEXT:    This patient developed elevated creatinine w/o JOSH.     Query created by: Justine Lopez on 5/10/2022 4:02 PM      Electronically signed by:  Jeremy Ramachandran MD 5/10/2022 4:08 PM

## 2022-05-10 NOTE — PLAN OF CARE
[facility] consulted for patient. Review of chart patient follows with [address]. Patient states she is a patient of Dr Candis Preciado. RN notified to consult [address].

## 2022-05-10 NOTE — PLAN OF CARE
Problem: Discharge Planning  Goal: Discharge to home or other facility with appropriate resources  Outcome: Progressing     Problem: Safety - Adult  Goal: Free from fall injury  Outcome: Progressing  Note: Bed alarm on. Patient alert and oriented. Using call light appropriately. Assisted to [facility]. Problem: ABCDS Injury Assessment  Goal: Absence of physical injury  Outcome: Progressing     Problem: Skin/Tissue Integrity  Goal: Absence of new skin breakdown  Description: 1. Monitor for areas of redness and/or skin breakdown  2. Assess vascular access sites hourly  3. Every 4-6 hours minimum:  Change oxygen saturation probe site  4. Every 4-6 hours:  If on nasal continuous positive airway pressure, respiratory therapy assess nares and determine need for appliance change or resting period. Outcome: Progressing  Note: Buttocks slightly reddened. Air flow provided to mattress. Problem: Respiratory - Adult  Goal: Achieves optimal ventilation and oxygenation  Outcome: Progressing  Note: Lungs with crackles in bases. Oxygen sat in mid 90s on room air. Problem: Cardiovascular - Adult  Goal: Maintains optimal cardiac output and hemodynamic stability  Outcome: Progressing  Note: Patient in afib RVR. Amiodarone drip continues. BP stable. Goal: Absence of cardiac dysrhythmias or at baseline  Outcome: Progressing     Problem: Pain  Goal: Verbalizes/displays adequate comfort level or baseline comfort level  Outcome: Progressing  Note: Patient complains of back ache. Medicated with tylenol with relief.

## 2022-05-10 NOTE — CARE COORDINATION
CASE MANAGEMENT NOTE:    Admission Date:  5/9/2022 Sreekanth Cox is a 79 y.o.  female    Admitted for : Shortness of breath [R06.02]  Leg swelling [C62.99]  Diastolic CHF, acute on chronic (HCC) [I50.33]  Congestive heart failure, unspecified HF chronicity, unspecified heart failure type ([facility] [address] [I50.9]    Met with:  Family son Boogie Brantley     PCP: Angel Webster                                 Insurance:  medicare       Is patient alert and oriented at time of discussion: Yes     Current Residence/ Living Arrangements:  independently at home ,alone         Current Services PTA:  No     Does patient go to outpatient dialysis: No  If yes, location and chair time: NA     Is patient agreeable to VNS: No     Freedom of choice provided:  No     List of [address] provided: No     VNS chosen:  No     DME:  straight cane, walker and wheelchair     Home Oxygen: No     Nebulizer: No     CPAP/BIPAP: No     Supplier: N/A     Potential Assistance Needed: No     SNF needed: No     Freedom of choice and list provided: No     Pharmacy:  [facility]       Does Patient want to use MEDS to BEDS? No     Is patient currently receiving oral anticoagulation therapy? Yes- Eliquis PTA  Is the Patient an [facility] with Readmission Risk Score greater than 14%? Yes  If yes, pt needs a follow up appointment made within 7 days.     Family Members/Caregivers that pt would like involved in their care: Yes     If yes, list name here:  Boogie Brantley and Homer Cartwright children     Transportation Provider:  Family              Discharge Plan:  5/10/22 MEDICARE  From home alone, independent. DME; cane, walker. VNS agreeable to Daniel Moreno has had in the past  Son and dtr transport the patient. Pt was on Eliquis PTA. Following for needs. [place] 14% will need appt. //tv                 Electronically signed by:  Mary Moore RN on 5/10/2022 at 2:47 PM

## 2022-05-10 NOTE — PROGRESS NOTES
Physical Therapy  Facility/Department: [facility] PROGRESSIVE CARE  Physical Therapy Initial Assessment    Name: Kierra Lance  : 1940  MRN: [id number]  Date of Service: 5/10/2022    Discharge Recommendations:  Patient would benefit from continued therapy after discharge          Patient Diagnosis(es): The primary encounter diagnosis was Congestive heart failure, unspecified HF chronicity, unspecified heart failure type ([facility] [address] Diagnoses of Leg swelling and Shortness of breath were also pertinent to this visit. Past Medical History:  has a past medical history of Atrial fibrillation ([facility] [address] Chronic back pain, Diverticulosis, GERD (gastroesophageal reflux disease), Hyperlipidemia, Hypertension, Hypothyroidism, Obesity (BMI 30.0-34.9), Onychomycosis, PE (pulmonary embolism), and Type II or unspecified type diabetes mellitus without mention of complication, not stated as uncontrolled. Past Surgical History:  has a past surgical history that includes Colon surgery; Vena Cava Filter Placement; Cholecystectomy; Hysterectomy; Tympanostomy tube placement (Bilateral, 2019); Colonoscopy (N/A, 2021); sigmoidoscopy (N/A, 2021); and Upper gastrointestinal endoscopy (N/A, 2021). Assessment   Assessment: Pt presetns with generalized weakness/decreased endurnace, needs assists for safe mobility with rolling walker. Faitques easily with minimla activity. Pt has 8 steps to get into her home. Will need further thrapy to address her deficits and improve her strength /endurance so she can eneter/exit her home safely. Specific Instructions for Next Treatment: Monitor HR/sao2 with activity  Therapy Prognosis: Good  Decision Making: Medium Complexity  Requires PT Follow-Up: Yes  Activity Tolerance  Activity Tolerance: Patient limited by endurance; Patient limited by fatigue     Plan   Plan  Plan: 5-7 times per week  Specific Instructions for Next Treatment: Monitor HR/sao2 with activity  Current Treatment Recommendations: Strengthening,ROM,Balance training,Functional mobility training,Transfer training,Endurance training,Gait training,Stair training,Safety education & training,Home exercise program,Patient/Caregiver education & training,Therapeutic activities  Safety Devices  Type of Devices: All fall risk precautions in place,Call light within reach,Gait belt,Patient at risk for falls     Restrictions  Restrictions/Precautions  Restrictions/Precautions: Fall Risk,General Precautions  Required Braces or Orthoses?: No  Implants present? :  (denies)     Subjective   Pain: Pt denies pain  General  Patient assessed for rehabilitation services?: Yes  Additional Pertinent Hx: Bridget Lynn is a  79 y.o.  female  With PAF on Eliquis, hypertension, CHF presents with Shortness of Breath and Leg Swelling. patient states that she  Has been experiencing chest tightness and shortness of breath for the last 2 weeks that are progressively worsening. Patient has orthopnea and exertional dyspnea, lower extremity leg swelling. Patient denies  lightheadedness visual change cough diaphoresis nausea vomiting abdominal pain dysuria extremity weakness headache fever or chills.   Family / Caregiver Present: Yes (Son)  Referral Date : 05/09/22  Diagnosis: Shortness of breath/CHF  Follows Commands: Within Functional Limits         Social/Functional History  Social/Functional History  Lives With: Alone  Type of Home: House  Home Layout: One level  Home Access: Stairs to enter with rails  Entrance Stairs - Number of Steps: 4 GALE with R HR; 4 steps inside home with B HR   Entrance Stairs - Rails: Right  Bathroom Shower/Tub: Walk-in shower,Doors  Bathroom Toilet: Handicap height (Support nearby for support)  Bathroom Equipment: Built-in shower seat,Hand-held shower,Grab bars in shower  Bathroom Accessibility: Not accessible  Home Equipment: Cane,Walker, rolling,Reacher,Wheelchair-manual,Grab bars  Has the patient had two or more falls in the past year or any fall with injury in the past year?: No  Receives Help From: Family  ADL Assistance: Independent  Homemaking Assistance: Needs assistance (Ptr cooks/cleans; dtr completes grocery shopping)  Homemaking Responsibilities: Yes  Ambulation Assistance: Independent (with cane)  Transfer Assistance: Independent  Active : No  Patient's  Info: Son/dtr drive  Mode of Transportation: Truck  IADL Comments: sleeps in a flat bed  Additional Comments: Pt reports that her son and dtr live nearby, able to assist as needed. Vision/Hearing  Vision Exceptions: Wears glasses at all times  Hearing: Exceptions to [facility]  Hearing Exceptions: Hard of hearing/hearing concerns;Bilateral hearing aid (Batteries are dead)    Cognition   Orientation  Overall Orientation Status: Within Functional Limits  Cognition  Overall Cognitive Status: WFL     Objective   SpO2: 99 %  O2 Device: None (Room air)     Observation/Palpation  Posture: Fair  Observation: BLE swollen        AROM RLE (degrees)  RLE AROM: WFL  AROM LLE (degrees)  LLE AROM : WFL  Strength RLE  Comment: 4-/5  Strength LLE  Comment: 4-/5     Sensation  Overall Sensation Status: Impaired (Numbness/tingling in B feet (reports feeling better))  Additional Comments: Pt reports sensational deficits in B hands- randomly drops various items (i.e. reading the newspaper)     Bed mobility  Supine to Sit: Stand by assistance  Sit to Supine: Stand by assistance  Scooting: Stand by assistance  Bed Mobility Comments: HOB slightly elevated (~15 degrees). No complaints of lightheadedness/dizziness.  Prior to returning to bed, pt requested a seated rest break due to SOB  Transfers  Sit to Stand: Contact guard assistance  Stand to sit: Contact guard assistance  Ambulation  Surface: level tile  Device: Rolling Walker  Assistance: Contact guard assistance  Quality of Gait: Decreased endurance, fatiques very easily, neeeded standing break in between during ambualtion  Gait Deviations: Slow Nadya;Decreased step length;Decreased step height  Distance: 40 ft  Comments: Sao2 94%,  after ambulation. Balance  Posture: Fair  Sitting - Static: Good  Sitting - Dynamic: Fair  Standing - Static: Good (RW)  Standing - Dynamic: Fair (R)           OutComes Score                                                  AM-PAC Score  AM-PAC Inpatient Mobility Raw Score : 17 (05/10/22 1650)  AM-PAC Inpatient T-Scale Score : 42.13 (05/10/22 1650)  Mobility Inpatient CMS 0-100% Score: 50.57 (05/10/22 1650)  Mobility Inpatient CMS G-Code Modifier : CK (05/10/22 1650)          Goals  Short Term Goals  Time Frame for Short term goals: 5 to 6 visits  Short term goal 1: Pt able to demonstarte mod-I bed mobility  Short term goal 2:  Pt able to perform sit<>stand mod-I  Short term goal 3: Pt able to transfer at sueprvison level with rolling walker  Short term goal 4: Pt mayur to ambualte 60 to 80 ft x 2 with rolling walker at supervsion level.    Short term goal 5: Pt mayur to perform 6\" step up x 6 to 8 reps with 1 UE support, SBA  Patient Goals   Patient goals : Breath better, feel stronger       Therapy Time   Individual Concurrent Group Co-treatment   Time In 8164         Time Out 1417         Minutes 28         Timed Code Treatment Minutes: [address] [facility] [address] [place], [place]

## 2022-05-11 LAB
ABSOLUTE EOS #: 0.1 K/UL (ref 0–0.4)
ABSOLUTE LYMPH #: 0.8 K/UL (ref 1–4.8)
ABSOLUTE MONO #: 0.5 K/UL (ref 0.1–1.3)
ALBUMIN SERPL-MCNC: 3.7 G/DL (ref 3.5–5.2)
ALP BLD-CCNC: 75 U/L (ref 35–104)
ALT SERPL-CCNC: 8 U/L (ref 5–33)
ANION GAP SERPL CALCULATED.3IONS-SCNC: 12 MMOL/L (ref 9–17)
AST SERPL-CCNC: 18 U/L
BASOPHILS # BLD: 1 % (ref 0–2)
BASOPHILS ABSOLUTE: 0.1 K/UL (ref 0–0.2)
BILIRUB SERPL-MCNC: 0.41 MG/DL (ref 0.3–1.2)
BUN BLDV-MCNC: 13 MG/DL (ref 8–23)
CALCIUM SERPL-MCNC: 8.9 MG/DL (ref 8.6–10.4)
CHLORIDE BLD-SCNC: 96 MMOL/L (ref 98–107)
CO2: 25 MMOL/L (ref 20–31)
CREAT SERPL-MCNC: 1.33 MG/DL (ref 0.5–0.9)
EOSINOPHILS RELATIVE PERCENT: 3 % (ref 0–4)
GFR AFRICAN AMERICAN: 46 ML/MIN
GFR NON-AFRICAN AMERICAN: 38 ML/MIN
GFR SERPL CREATININE-BSD FRML MDRD: ABNORMAL ML/MIN/{1.73_M2}
GLUCOSE BLD-MCNC: 162 MG/DL (ref 70–99)
HCT VFR BLD CALC: 36.7 % (ref 36–46)
HEMOGLOBIN: 11.7 G/DL (ref 12–16)
LYMPHOCYTES # BLD: 17 % (ref 24–44)
MAGNESIUM: 1.7 MG/DL (ref 1.6–2.6)
MCH RBC QN AUTO: 31.6 PG (ref 26–34)
MCHC RBC AUTO-ENTMCNC: 32 G/DL (ref 31–37)
MCV RBC AUTO: 98.8 FL (ref 80–100)
MONOCYTES # BLD: 10 % (ref 1–7)
PDW BLD-RTO: 14.9 % (ref 11.5–14.9)
PLATELET # BLD: 101 K/UL (ref 150–450)
PMV BLD AUTO: 10 FL (ref 6–12)
POTASSIUM SERPL-SCNC: 3.5 MMOL/L (ref 3.7–5.3)
RBC # BLD: 3.71 M/UL (ref 4–5.2)
SEG NEUTROPHILS: 69 % (ref 36–66)
SEGMENTED NEUTROPHILS ABSOLUTE COUNT: 3.4 K/UL (ref 1.3–9.1)
SODIUM BLD-SCNC: 133 MMOL/L (ref 135–144)
TOTAL PROTEIN: 6.3 G/DL (ref 6.4–8.3)
WBC # BLD: 4.9 K/UL (ref 3.5–11)

## 2022-05-11 PROCEDURE — 83735 ASSAY OF MAGNESIUM: CPT

## 2022-05-11 PROCEDURE — 6370000000 HC RX 637 (ALT 250 FOR IP): Performed by: FAMILY MEDICINE

## 2022-05-11 PROCEDURE — 2500000003 HC RX 250 WO HCPCS: Performed by: STUDENT IN AN ORGANIZED HEALTH CARE EDUCATION/TRAINING PROGRAM

## 2022-05-11 PROCEDURE — 1200000000 HC SEMI PRIVATE

## 2022-05-11 PROCEDURE — 2580000003 HC RX 258: Performed by: FAMILY MEDICINE

## 2022-05-11 PROCEDURE — 85025 COMPLETE CBC W/AUTO DIFF WBC: CPT

## 2022-05-11 PROCEDURE — 6360000002 HC RX W HCPCS: Performed by: INTERNAL MEDICINE

## 2022-05-11 PROCEDURE — 36415 COLL VENOUS BLD VENIPUNCTURE: CPT

## 2022-05-11 PROCEDURE — 80053 COMPREHEN METABOLIC PANEL: CPT

## 2022-05-11 PROCEDURE — 6360000002 HC RX W HCPCS: Performed by: FAMILY MEDICINE

## 2022-05-11 RX ORDER — FUROSEMIDE 10 MG/ML
60 INJECTION INTRAMUSCULAR; INTRAVENOUS 2 TIMES DAILY
Status: DISCONTINUED | OUTPATIENT
Start: 2022-05-11 | End: 2022-05-12

## 2022-05-11 RX ORDER — CHOLESTYRAMINE LIGHT 4 G/5.7G
1 POWDER, FOR SUSPENSION ORAL EVERY EVENING
Status: DISCONTINUED | OUTPATIENT
Start: 2022-05-11 | End: 2022-05-12 | Stop reason: HOSPADM

## 2022-05-11 RX ADMIN — METOCLOPRAMIDE 10 MG: 10 TABLET ORAL at 15:57

## 2022-05-11 RX ADMIN — LEVOTHYROXINE SODIUM 100 MCG: 0.1 TABLET ORAL at 04:54

## 2022-05-11 RX ADMIN — APIXABAN 5 MG: 5 TABLET, FILM COATED ORAL at 21:25

## 2022-05-11 RX ADMIN — AMIODARONE HYDROCHLORIDE 1 MG/MIN: 1.8 INJECTION, SOLUTION INTRAVENOUS at 18:52

## 2022-05-11 RX ADMIN — DORZOLAMIDE HYDROCHLORIDE 1 DROP: 20 SOLUTION/ DROPS OPHTHALMIC at 08:03

## 2022-05-11 RX ADMIN — TIMOLOL MALEATE 1 DROP: 5 SOLUTION OPHTHALMIC at 21:20

## 2022-05-11 RX ADMIN — METOCLOPRAMIDE 10 MG: 10 TABLET ORAL at 12:00

## 2022-05-11 RX ADMIN — FUROSEMIDE 60 MG: 10 INJECTION, SOLUTION INTRAMUSCULAR; INTRAVENOUS at 17:06

## 2022-05-11 RX ADMIN — AMIODARONE HYDROCHLORIDE 1 MG/MIN: 1.8 INJECTION, SOLUTION INTRAVENOUS at 11:34

## 2022-05-11 RX ADMIN — CHOLESTYRAMINE 4 G: 4 POWDER, FOR SUSPENSION ORAL at 17:04

## 2022-05-11 RX ADMIN — LATANOPROST 1 DROP: 50 SOLUTION OPHTHALMIC at 21:21

## 2022-05-11 RX ADMIN — ATENOLOL 100 MG: 50 TABLET ORAL at 08:02

## 2022-05-11 RX ADMIN — FERROUS SULFATE TAB 325 MG (65 MG ELEMENTAL FE) 325 MG: 325 (65 FE) TAB at 08:02

## 2022-05-11 RX ADMIN — ACETAMINOPHEN 650 MG: 325 TABLET ORAL at 00:51

## 2022-05-11 RX ADMIN — APIXABAN 5 MG: 5 TABLET, FILM COATED ORAL at 08:03

## 2022-05-11 RX ADMIN — TIMOLOL MALEATE 1 DROP: 5 SOLUTION OPHTHALMIC at 08:04

## 2022-05-11 RX ADMIN — METOCLOPRAMIDE 10 MG: 10 TABLET ORAL at 04:53

## 2022-05-11 RX ADMIN — AMIODARONE HYDROCHLORIDE 1 MG/MIN: 1.8 INJECTION, SOLUTION INTRAVENOUS at 04:53

## 2022-05-11 RX ADMIN — SODIUM CHLORIDE, PRESERVATIVE FREE 10 ML: 5 INJECTION INTRAVENOUS at 08:03

## 2022-05-11 RX ADMIN — FUROSEMIDE 40 MG: 10 INJECTION, SOLUTION INTRAMUSCULAR; INTRAVENOUS at 08:03

## 2022-05-11 RX ADMIN — DORZOLAMIDE HYDROCHLORIDE 1 DROP: 20 SOLUTION/ DROPS OPHTHALMIC at 21:21

## 2022-05-11 RX ADMIN — POTASSIUM CHLORIDE 40 MEQ: 20 TABLET, EXTENDED RELEASE ORAL at 15:57

## 2022-05-11 ASSESSMENT — PAIN DESCRIPTION - LOCATION: LOCATION: BACK

## 2022-05-11 ASSESSMENT — PAIN DESCRIPTION - DESCRIPTORS: DESCRIPTORS: ACHING

## 2022-05-11 ASSESSMENT — PAIN SCALES - GENERAL: PAINLEVEL_OUTOF10: 4

## 2022-05-11 NOTE — PLAN OF CARE
Problem: Discharge Planning  Goal: Discharge to home or other facility with appropriate resources  5/11/2022 0224 by Jerzy White RN  Outcome: Progressing  5/10/2022 1808 by Maia Roberson RN  Outcome: Progressing     Problem: Safety - Adult  Goal: Free from fall injury  5/11/2022 0224 by Jerzy White RN  Outcome: Progressing  Note: Patient alert and oriented. Bed alarm on. Assisted to [facility] to void. 5/10/2022 1808 by Maia Roberson RN  Outcome: Progressing     Problem: ABCDS Injury Assessment  Goal: Absence of physical injury  5/11/2022 0224 by Jerzy White RN  Outcome: Progressing  5/10/2022 1808 by Maia Roberson RN  Outcome: Progressing     Problem: Skin/Tissue Integrity  Goal: Absence of new skin breakdown  Description: 1. Monitor for areas of redness and/or skin breakdown  2. Assess vascular access sites hourly  3. Every 4-6 hours minimum:  Change oxygen saturation probe site  4. Every 4-6 hours:  If on nasal continuous positive airway pressure, respiratory therapy assess nares and determine need for appliance change or resting period. 5/11/2022 0224 by Jerzy White RN  Outcome: Progressing  Note: Buttocks slightly reddened. Able to turn and reposition self in bed.  5/10/2022 1808 by Maia Roberson RN  Outcome: Progressing     Problem: Respiratory - Adult  Goal: Achieves optimal ventilation and oxygenation  5/11/2022 0224 by Jerzy White RN  Outcome: Progressing  Note: Lungs clear but decreased. Denies shortness of breath. Oxygen sat in mid 90s without oxygen. 5/10/2022 1808 by Maia Roberson RN  Outcome: Progressing     Problem: Cardiovascular - Adult  Goal: Maintains optimal cardiac output and hemodynamic stability  5/11/2022 0227 by Jerzy White RN  Note: Patient in atrial fib. HR in 110s. Remains on amiodarone drip.   5/11/2022 0224 by Jerzy White RN  Outcome: Progressing  5/10/2022 1808 by Maia Roberson RN  Outcome: Progressing  Goal: Absence of cardiac dysrhythmias or at baseline  5/11/2022 0224 by Leigh Ann Lawson RN  Outcome: Progressing  5/10/2022 1808 by Shi Sandoval RN  Outcome: Progressing     Problem: Pain  Goal: Verbalizes/displays adequate comfort level or baseline comfort level  5/11/2022 0224 by Leigh Ann Lawson RN  Outcome: Progressing  Note: Complains of back pain. Medicated with tylenol with reilef of back pain.   5/10/2022 1808 by Shi Sandoval RN  Outcome: Progressing     Problem: Chronic Conditions and Co-morbidities  Goal: Patient's chronic conditions and co-morbidity symptoms are monitored and maintained or improved  5/11/2022 0224 by Leigh Ann Lawson RN  Outcome: Progressing  5/10/2022 1808 by Shi Sandoval RN  Outcome: Progressing

## 2022-05-11 NOTE — FLOWSHEET NOTE
05/11/22 1859   Encounter Summary   Encounter Overview/Reason  Attempted Encounter   Service Provided For: Patient   Complexity of Encounter Low   Spiritual/Emotional needs   Type Spiritual Support   Assessment/Intervention/Outcome   Assessment Unable to assess  (PATIENT SLEEPING)   Intervention Prayer (assurance of)/[place]

## 2022-05-11 NOTE — CONSULTS
80yo here with LE edema and SOB  BNP >2000  She is on lasix 40 once daily, she is not much improved  Right lower rib pain    Echo normal EF  ekg atrial flutter rvr  She has known afib on a/c  Currently on amiodarone gtt    12 systems reviewed and negative  Social History     Socioeconomic History    Marital status:      Spouse name: Not on file    Number of children: Not on file    Years of education: Not on file    Highest education level: Not on file   Occupational History    Not on file   Tobacco Use    Smoking status: Never Smoker    Smokeless tobacco: Never Used   Vaping Use    Vaping Use: Never used   Substance and Sexual Activity    Alcohol use: No    Drug use: No    Sexual activity: Not on file   Other Topics Concern    Not on file   Social History Narrative    Not on file     Social Determinants of Health     Financial Resource Strain: Low Risk     Difficulty of Paying Living Expenses: Not hard at all   Food Insecurity: No Food Insecurity    Worried About [address] in the Last Year: Never true    [address] in the Last Year: Never true   Transportation Needs:     Lack of Transportation (Medical): Not on file    Lack of Transportation (Non-Medical):  Not on file   Physical Activity:     Days of Exercise per Week: Not on file    Minutes of Exercise per Session: Not on file   Stress:     Feeling of Stress : Not on file   Social Connections:     Frequency of Communication with Friends and Family: Not on file    Frequency of Social Gatherings with Friends and Family: Not on file    Attends Muslim Services: Not on file    Active Member of Clubs or Organizations: Not on file    Attends Club or Organization Meetings: Not on file    Marital Status: Not on file   Intimate Partner Violence:     Fear of Current or Ex-Partner: Not on file    Emotionally Abused: Not on file    Physically Abused: Not on file    Sexually Abused: Not on file   Housing Stability:     Unable to Pay for Housing in the Last Year: Not on file    Number of Places Lived in the Last Year: Not on file    Unstable Housing in the Last Year: Not on file     Past Medical History:   Diagnosis Date    Atrial fibrillation ([address] 3/28/2014    Chronic back pain     with rt. leg pain    Diverticulosis     GERD (gastroesophageal reflux disease)     Hyperlipidemia     Hypertension     Hypothyroidism     Obesity (BMI 30.0-34. 9) 8/12/2016    Onychomycosis     PE (pulmonary embolism)     H/O DVT of rt leg    Type II or unspecified type diabetes mellitus without mention of complication, not stated as uncontrolled      Family History   Adopted: Yes   Problem Relation Age of Onset    No Known Problems Mother     No Known Problems Father      Scheduled Meds:   furosemide  60 mg IntraVENous BID    metoclopramide  10 mg Oral TID AC    sodium chloride  80 mL IntraVENous Once    apixaban  5 mg Oral BID    atenolol  100 mg Oral Daily    dorzolamide  1 drop Both Eyes BID    ferrous sulfate  325 mg Oral Daily with breakfast    latanoprost  1 drop Both Eyes Nightly    levothyroxine  100 mcg Oral QAM AC    timolol  1 drop Both Eyes BID    sodium chloride flush  10 mL IntraVENous 2 times per day     Continuous Infusions:   sodium chloride      Amiodarone 1 mg/min (05/11/22 0453)     PRN Meds:. iopamidol, sodium chloride flush, sodium chloride flush, sodium chloride, potassium chloride **OR** potassium alternative oral replacement **OR** potassium chloride, magnesium sulfate, ondansetron **OR** ondansetron, magnesium hydroxide, acetaminophen **OR** acetaminophen  Vitals:    05/11/22 0815   BP: 122/76   Pulse:    Resp:    Temp:    SpO2:      .exrbp  nad  rrr  No m  Skin intact  Op clear  jvp elevated  Ext 3+ pitting ble  cta anteriorly  abd soft  Neuro nonfocal    Impression  1. Acute HFPEF  Probably exacerbated by AF RVR  2.  AF RVR controlled on amiodarone gtt    Switch to oral amio tomorrow  Then eventually 400mg daily on d/c  Increase lasix to 60mg iv bid  Cont eliquis

## 2022-05-11 NOTE — CARE COORDINATION
ONGOING DISCHARGE PLAN:    Patient is alert and oriented x4. Spoke with patient regarding discharge plan and patient confirms that plan is still home with [facility]. Pt lives with Son and DIL. Amiodarone gtt, switch to PO amio tomorrow. IV lasix 60 mg BID. Eliquis PTA. , K+ 3.5, BUN 13, Creat 1.33    Will continue to follow for additional discharge needs.     Electronically signed by Chilango Gil RN on 5/11/2022 at 9:59 AM

## 2022-05-11 NOTE — PLAN OF CARE
Problem: Discharge Planning  Goal: Discharge to home or other facility with appropriate resources  5/11/2022 0224 by Joan Dunbar RN  Outcome: Progressing  5/10/2022 1808 by Tequila Hodge RN  Outcome: Progressing     Problem: Safety - Adult  Goal: Free from fall injury  5/11/2022 0224 by Joan Dunbar RN  Outcome: Progressing  Note: Patient alert and oriented. Bed alarm on. Assisted to [facility] to void. 5/10/2022 1808 by Tequila Hodge RN  Outcome: Progressing     Problem: ABCDS Injury Assessment  Goal: Absence of physical injury  5/11/2022 0224 by Joan Dunbar RN  Outcome: Progressing  5/10/2022 1808 by Tequila Hodge RN  Outcome: Progressing     Problem: Skin/Tissue Integrity  Goal: Absence of new skin breakdown  Description: 1. Monitor for areas of redness and/or skin breakdown  2. Assess vascular access sites hourly  3. Every 4-6 hours minimum:  Change oxygen saturation probe site  4. Every 4-6 hours:  If on nasal continuous positive airway pressure, respiratory therapy assess nares and determine need for appliance change or resting period. 5/11/2022 0224 by Joan Dunbar RN  Outcome: Progressing  Note: Buttocks slightly reddened. Able to turn and reposition self in bed.  5/10/2022 1808 by Tequila Hodge RN  Outcome: Progressing     Problem: Respiratory - Adult  Goal: Achieves optimal ventilation and oxygenation  5/11/2022 0224 by Joan Dunbar RN  Outcome: Progressing  Note: Lungs clear but decreased. Denies shortness of breath. Oxygen sat in mid 90s without oxygen.   5/10/2022 1808 by Tequila Hodge RN  Outcome: Progressing     Problem: Cardiovascular - Adult  Goal: Maintains optimal cardiac output and hemodynamic stability  5/11/2022 0224 by Joan Dunbar RN  Outcome: Progressing  5/10/2022 1808 by Tequila Hodge RN  Outcome: Progressing  Goal: Absence of cardiac dysrhythmias or at baseline  5/11/2022 0224 by Joan Dunbar RN  Outcome: Progressing  5/10/2022 1808 by Carmela Beltrán RN  Outcome: Progressing     Problem: Pain  Goal: Verbalizes/displays adequate comfort level or baseline comfort level  5/11/2022 0224 by Fuad Jung RN  Outcome: Progressing  Note: Complains of back pain. Medicated with tylenol with reilef of back pain.   5/10/2022 1808 by Carmela Beltrán RN  Outcome: Progressing     Problem: Chronic Conditions and Co-morbidities  Goal: Patient's chronic conditions and co-morbidity symptoms are monitored and maintained or improved  5/11/2022 0224 by Fuad Jung RN  Outcome: Progressing  5/10/2022 1808 by Carmela Beltrán RN  Outcome: Progressing

## 2022-05-11 NOTE — PLAN OF CARE
Problem: Safety - Adult  Goal: Free from fall injury  5/11/2022 1623 by Tiny Haile RN  Outcome: Progressing   The patient remained free from falls this shift, call light within reach, bed in locked and lowest position. Side rails up x2. Problem: Skin/Tissue Integrity  Goal: Absence of new skin breakdown  Description: 1. Monitor for areas of redness and/or skin breakdown  2. Assess vascular access sites hourly  3. Every 4-6 hours minimum:  Change oxygen saturation probe site  4. Every 4-6 hours:  If on nasal continuous positive airway pressure, respiratory therapy assess nares and determine need for appliance change or resting period.   5/11/2022 1623 by Tiny Haile RN  Outcome: Progressing     Problem: Respiratory - Adult  Goal: Achieves optimal ventilation and oxygenation  5/11/2022 1623 by Tiny Haile RN  Outcome: Progressing

## 2022-05-12 VITALS
WEIGHT: 228.8 LBS | SYSTOLIC BLOOD PRESSURE: 107 MMHG | OXYGEN SATURATION: 96 % | BODY MASS INDEX: 38.12 KG/M2 | RESPIRATION RATE: 18 BRPM | HEART RATE: 102 BPM | DIASTOLIC BLOOD PRESSURE: 80 MMHG | HEIGHT: 65 IN | TEMPERATURE: 98.2 F

## 2022-05-12 LAB
ABSOLUTE EOS #: 0.1 K/UL (ref 0–0.4)
ABSOLUTE LYMPH #: 1 K/UL (ref 1–4.8)
ABSOLUTE MONO #: 0.5 K/UL (ref 0.1–1.3)
ALBUMIN SERPL-MCNC: 3.6 G/DL (ref 3.5–5.2)
ALP BLD-CCNC: 77 U/L (ref 35–104)
ALT SERPL-CCNC: 8 U/L (ref 5–33)
ANION GAP SERPL CALCULATED.3IONS-SCNC: 12 MMOL/L (ref 9–17)
AST SERPL-CCNC: 15 U/L
BASOPHILS # BLD: 1 % (ref 0–2)
BASOPHILS ABSOLUTE: 0 K/UL (ref 0–0.2)
BILIRUB SERPL-MCNC: 0.42 MG/DL (ref 0.3–1.2)
BUN BLDV-MCNC: 13 MG/DL (ref 8–23)
CALCIUM SERPL-MCNC: 8.8 MG/DL (ref 8.6–10.4)
CHLORIDE BLD-SCNC: 99 MMOL/L (ref 98–107)
CO2: 26 MMOL/L (ref 20–31)
CREAT SERPL-MCNC: 1.48 MG/DL (ref 0.5–0.9)
EOSINOPHILS RELATIVE PERCENT: 3 % (ref 0–4)
GFR AFRICAN AMERICAN: 41 ML/MIN
GFR NON-AFRICAN AMERICAN: 34 ML/MIN
GFR SERPL CREATININE-BSD FRML MDRD: ABNORMAL ML/MIN/{1.73_M2}
GLUCOSE BLD-MCNC: 107 MG/DL (ref 70–99)
HCT VFR BLD CALC: 35.2 % (ref 36–46)
HEMOGLOBIN: 11.5 G/DL (ref 12–16)
LYMPHOCYTES # BLD: 21 % (ref 24–44)
MCH RBC QN AUTO: 32.2 PG (ref 26–34)
MCHC RBC AUTO-ENTMCNC: 32.7 G/DL (ref 31–37)
MCV RBC AUTO: 98.6 FL (ref 80–100)
MONOCYTES # BLD: 10 % (ref 1–7)
PDW BLD-RTO: 14.9 % (ref 11.5–14.9)
PLATELET # BLD: 91 K/UL (ref 150–450)
PMV BLD AUTO: 9.9 FL (ref 6–12)
POTASSIUM SERPL-SCNC: 3.7 MMOL/L (ref 3.7–5.3)
RBC # BLD: 3.57 M/UL (ref 4–5.2)
SEG NEUTROPHILS: 65 % (ref 36–66)
SEGMENTED NEUTROPHILS ABSOLUTE COUNT: 3 K/UL (ref 1.3–9.1)
SODIUM BLD-SCNC: 137 MMOL/L (ref 135–144)
TOTAL PROTEIN: 6.1 G/DL (ref 6.4–8.3)
WBC # BLD: 4.6 K/UL (ref 3.5–11)

## 2022-05-12 PROCEDURE — 80053 COMPREHEN METABOLIC PANEL: CPT

## 2022-05-12 PROCEDURE — 97530 THERAPEUTIC ACTIVITIES: CPT

## 2022-05-12 PROCEDURE — 6370000000 HC RX 637 (ALT 250 FOR IP): Performed by: INTERNAL MEDICINE

## 2022-05-12 PROCEDURE — 36415 COLL VENOUS BLD VENIPUNCTURE: CPT

## 2022-05-12 PROCEDURE — 6370000000 HC RX 637 (ALT 250 FOR IP): Performed by: FAMILY MEDICINE

## 2022-05-12 PROCEDURE — 2500000003 HC RX 250 WO HCPCS: Performed by: STUDENT IN AN ORGANIZED HEALTH CARE EDUCATION/TRAINING PROGRAM

## 2022-05-12 PROCEDURE — 85025 COMPLETE CBC W/AUTO DIFF WBC: CPT

## 2022-05-12 RX ORDER — AMIODARONE HYDROCHLORIDE 400 MG/1
400 TABLET ORAL DAILY
Qty: 30 TABLET | Refills: 0 | Status: SHIPPED | OUTPATIENT
Start: 2022-05-13 | End: 2022-08-05

## 2022-05-12 RX ORDER — METOCLOPRAMIDE 10 MG/1
10 TABLET ORAL
Qty: 120 TABLET | Refills: 3 | Status: SHIPPED | OUTPATIENT
Start: 2022-05-12 | End: 2022-05-19

## 2022-05-12 RX ORDER — AMIODARONE HYDROCHLORIDE 200 MG/1
400 TABLET ORAL DAILY
Status: DISCONTINUED | OUTPATIENT
Start: 2022-05-12 | End: 2022-05-12 | Stop reason: HOSPADM

## 2022-05-12 RX ORDER — BUMETANIDE 1 MG/1
1 TABLET ORAL 2 TIMES DAILY
Status: DISCONTINUED | OUTPATIENT
Start: 2022-05-12 | End: 2022-05-12 | Stop reason: HOSPADM

## 2022-05-12 RX ORDER — BUMETANIDE 1 MG/1
1 TABLET ORAL 2 TIMES DAILY
Qty: 30 TABLET | Refills: 3 | Status: ON HOLD | OUTPATIENT
Start: 2022-05-12 | End: 2022-08-11 | Stop reason: HOSPADM

## 2022-05-12 RX ADMIN — METOCLOPRAMIDE 10 MG: 10 TABLET ORAL at 16:35

## 2022-05-12 RX ADMIN — BUMETANIDE 1 MG: 1 TABLET ORAL at 09:38

## 2022-05-12 RX ADMIN — FERROUS SULFATE TAB 325 MG (65 MG ELEMENTAL FE) 325 MG: 325 (65 FE) TAB at 06:45

## 2022-05-12 RX ADMIN — AMIODARONE HYDROCHLORIDE 1 MG/MIN: 1.8 INJECTION, SOLUTION INTRAVENOUS at 00:19

## 2022-05-12 RX ADMIN — METOCLOPRAMIDE 10 MG: 10 TABLET ORAL at 05:14

## 2022-05-12 RX ADMIN — DORZOLAMIDE HYDROCHLORIDE 1 DROP: 20 SOLUTION/ DROPS OPHTHALMIC at 09:41

## 2022-05-12 RX ADMIN — METOCLOPRAMIDE 10 MG: 10 TABLET ORAL at 11:56

## 2022-05-12 RX ADMIN — AMIODARONE HYDROCHLORIDE 1 MG/MIN: 1.8 INJECTION, SOLUTION INTRAVENOUS at 06:43

## 2022-05-12 RX ADMIN — ATENOLOL 100 MG: 50 TABLET ORAL at 09:38

## 2022-05-12 RX ADMIN — LEVOTHYROXINE SODIUM 100 MCG: 0.1 TABLET ORAL at 05:14

## 2022-05-12 RX ADMIN — CHOLESTYRAMINE 4 G: 4 POWDER, FOR SUSPENSION ORAL at 16:35

## 2022-05-12 RX ADMIN — APIXABAN 5 MG: 5 TABLET, FILM COATED ORAL at 09:38

## 2022-05-12 RX ADMIN — AMIODARONE HYDROCHLORIDE 400 MG: 200 TABLET ORAL at 09:38

## 2022-05-12 RX ADMIN — ACETAMINOPHEN 650 MG: 325 TABLET ORAL at 00:17

## 2022-05-12 RX ADMIN — TIMOLOL MALEATE 1 DROP: 5 SOLUTION OPHTHALMIC at 09:41

## 2022-05-12 ASSESSMENT — PAIN SCALES - GENERAL: PAINLEVEL_OUTOF10: 5

## 2022-05-12 NOTE — DISCHARGE SUMMARY
Hospitalist Discharge Summary    Cristo Calvillo  :  1940  MRN:  [id number]    Admit date:  2022  Discharge date:  22    Admitting Physician:  Korina Joya MD    Discharge Diagnoses:   Patient Active Problem List   Diagnosis    Mixed hyperlipidemia    Gastroesophageal reflux disease without esophagitis    Onychomycosis    Diverticulosis    Chronic back pain    Hypothyroidism    Essential hypertension    Right bundle branch block    Chronic pulmonary embolism ([facility] [address]    Type 2 diabetes mellitus without complication, without long-term current use of insulin ([facility])    Obesity (BMI 30.0-34. 9)    Tracheobronchitis    Allergic rhinitis    Glaucoma    S/p bilateral myringotomy with tube placement    GI bleed    Iron deficiency anemia    Paroxysmal atrial fibrillation ([facility])    Hemorrhage of rectum and anus    Intractable nausea and vomiting    Diastolic CHF, acute on chronic ([facility])    Atrial fibrillation with RVR ([facility])    Class 2 obesity due to excess calories in adult        Admission Condition:  fair      Discharged Condition:  good    Hospital Course/Treatments   Admitted with sob ,pt also was having some chest pain, pt was experiencing some chest pain, pt was seen in ER,  Pt was found to be in a fib with rvr, pt was placed on amio and eliquis and pt was alos in HFpEF, pt was diuresedd, pt did well, pt will be d/c with following meds  Discharge Medications:         Medication List      START taking these medications    amiodarone 400 MG tablet  Commonly known as: PACERONE  Take 1 tablet by mouth daily  Start taking on:  May 13, 2022     bumetanide 1 MG tablet  Commonly known as: BUMEX  Take 1 tablet by mouth 2 times daily     metoclopramide 10 MG tablet  Commonly known as: REGLAN  Take 1 tablet by mouth 3 times daily (before meals)        CONTINUE taking these medications    acetaminophen 500 MG tablet  Commonly known as: TYLENOL     apixaban 5 MG Tabs tablet  Commonly known as: ELIQUIS     atenolol 100 MG tablet  Commonly known as: TENORMIN  TAKE 1 TABLET BY MOUTH EVERY DAY     cholestyramine 4 g packet  Commonly known as: QUESTRAN     dorzolamide 2 % ophthalmic solution  Commonly known as: TRUSOPT     doxazosin 4 MG tablet  Commonly known as: CARDURA  TAKE 1 TABLET BY MOUTH EVERY DAY     ferrous sulfate 325 (65 Fe) MG tablet  Commonly known as: IRON 325  Take 1 tablet by mouth daily (with breakfast)     latanoprost 0.005 % ophthalmic solution  Commonly known as: XALATAN     levothyroxine 100 MCG tablet  Commonly known as: SYNTHROID  TAKE 1 TABLET BY MOUTH EVERY DAY     Rhopressa 0.02 % Soln  Generic drug: Netarsudil Dimesylate     timolol 0.5 % ophthalmic solution  Commonly known as: TIMOPTIC     Vitamin D3 125 MCG (5000 UT) Tabs        STOP taking these medications    spironolactone 25 MG tablet  Commonly known as: ALDACTONE           Where to Get Your Medications      These medications were sent to  [facility]  [address] [place] [address]    Phone: [phone]   · amiodarone 400 MG tablet  · bumetanide 1 MG tablet  · metoclopramide 10 MG tablet         Consults:  IP CONSULT TO INTERNAL MEDICINE  IP CONSULT TO CARDIOLOGY    Significant Diagnostic Studies:  XR CHEST (2 VW)    Result Date: 5/9/2022  EXAMINATION: TWO XRAY VIEWS OF THE CHEST 5/9/2022 3:49 pm COMPARISON: December 3, 2021 HISTORY: ORDERING SYSTEM PROVIDED HISTORY: shortness of breath, lower chest pain TECHNOLOGIST PROVIDED HISTORY: shortness of breath, lower chest pain FINDINGS: Small bilateral pleural effusions. Cardiomegaly. Mediastinum normal.  Lungs clear. Bony thorax intact. Small bilateral effusions. Disposition:   home    Discharge Instructions: Activity: activity as tolerated  Diet:  regular diet    Follow up with Marijane Canavan, MD in 1 weeks.     Signed:  Ryan Chin MD  5/12/2022, 4:16 PM    Time spent in discharge of this pt is more than 30 minutes in examination,evaluvation,  counseling and review of medication and discharge plan

## 2022-05-12 NOTE — PROGRESS NOTES
Patient presented with AF RVR  On eliquis chronically no interrupted doses  When I saw patient she had scantly diuresed on lasix daily IV, outpt did not diurese with orals for 2 days  She was on amio gtt     Yesterday increased lasix to 60iv bid, I/Os are not accurate but her JVP and LE edema are improved    She is very concerned she will go home and \"not pee\"    Blood pressure 107/67, pulse 106, temperature 97.7 °F (36.5 °C), temperature source Oral, resp. rate 18, height 5' 5\" (1.651 m), weight 228 lb 12.8 oz (103.8 kg), SpO2 94 %.    nad  jvp normal  Ext 2+ edema ble  Skin intact  irreg irreg    Impression  1. Acute HFPEF  2.  AF RVR on presentation which exacerbated CHF    Cont eliquis at current dose, creat 1.4 today, baseline 1.0-1.5  Switch to oral bumex, she is worried about \"peeing\" with orals, would observe after morning bumex to make sure she diureses   Switch to oral amiodarone 400mg   On f/u 200mg amiodarone and check EKG to see if she converts  No objection to d/c later today  She just had ischemic eval 2021 with echo/stress, negative for major issues

## 2022-05-12 NOTE — CARE COORDINATION
ONGOING DISCHARGE PLAN:    Patient is alert and oriented x4. Spoke with patient regarding discharge plan and patient confirms that plan is still to return home with SREE Fonseca. Pt switched from IV amiodarone to PO amio today. Eliquis PTA 5 mg BID. Creat 1.48, BUN 13    Will continue to follow for additional discharge needs.     Electronically signed by Celi Malone RN on 5/12/2022 at 1:52 PM

## 2022-05-12 NOTE — PROGRESS NOTES
Physical Therapy  Facility/Department: [address] [place] [address] CARE  Physical Therapy Initial Assessment    Name: Stephany Marroquin  : 1940  MRN: [id number]  Date of Service: 2022    Discharge Recommendations:  Home with assist PRN   PT Equipment Recommendations  Equipment Needed: No      Patient Diagnosis(es): The primary encounter diagnosis was Congestive heart failure, unspecified HF chronicity, unspecified heart failure type ([facility] [address] Diagnoses of Leg swelling and Shortness of breath were also pertinent to this visit. Past Medical History:  has a past medical history of Atrial fibrillation ([facility] [address] Chronic back pain, Diverticulosis, GERD (gastroesophageal reflux disease), Hyperlipidemia, Hypertension, Hypothyroidism, Obesity (BMI 30.0-34.9), Onychomycosis, PE (pulmonary embolism), and Type II or unspecified type diabetes mellitus without mention of complication, not stated as uncontrolled. Past Surgical History:  has a past surgical history that includes Colon surgery; Vena Cava Filter Placement; Cholecystectomy; Hysterectomy; Tympanostomy tube placement (Bilateral, 2019); Colonoscopy (N/A, 2021); sigmoidoscopy (N/A, 2021); and Upper gastrointestinal endoscopy (N/A, 2021). Assessment   Body Structures, Functions, Activity Limitations Requiring Skilled Therapeutic Intervention: Decreased functional mobility ; Decreased endurance  Assessment: Progressing towards goals  Specific Instructions for Next Treatment: Monitor HR/sao2 with activity  Requires PT Follow-Up: Yes  Activity Tolerance  Activity Tolerance: Patient tolerated evaluation without incident     Plan   Plan  Plan: 5-7 times per week  Specific Instructions for Next Treatment: Monitor HR/sao2 with activity  Current Treatment Recommendations: Strengthening,ROM,Balance training,Functional mobility training,Transfer training,Endurance training,Gait training,Stair training,Safety education & training,Home exercise program,Patient/Caregiver education & training,Therapeutic activities  Safety Devices  Type of Devices: Left in chair,Call light within reach,Nurse notified (son present)     Restrictions  Restrictions/Precautions  Restrictions/Precautions: Fall Risk,General Precautions  Required Braces or Orthoses?: No  Implants present? :  (denies)     Subjective   Pain: pt denies any pain  General  Family / Caregiver Present: Yes  Follows Commands: Within Functional Limits  Subjective  Subjective: pt reports being able to move and breath better than when she came in to the hospital         Social/Functional History  Social/Functional History  Lives With: Alone  Type of Home: [facility] [employer]: One level  Home Access: Stairs to enter with rails  Entrance Stairs - Number of Steps: 4 GALE with R HR; 4 steps inside home with B HR   Entrance Stairs - Rails: Right  Bathroom Shower/Tub: Walk-in shower,Doors  Bathroom Toilet: Handicap height (Support nearby for support)  Bathroom Equipment: Built-in shower seat,Hand-held shower,Grab bars in shower  Bathroom Accessibility: Not accessible  Home Equipment: Cane,Walker, rolling,Reacher,Wheelchair-manual,Grab bars  Has the patient had two or more falls in the past year or any fall with injury in the past year?: No  Receives Help From: Family  ADL Assistance: [address]: Needs assistance (Ptr cooks/cleans; dtr completes grocery shopping)  Homemaking Responsibilities: Yes  Ambulation Assistance: Independent (with cane)  Transfer Assistance: Independent  Active : No  Patient's  Info: Son/dtr drive  Mode of Transportation: Truck  IADL Comments: sleeps in a flat bed  Additional Comments: Pt reports that her son and dtr live nearby, able to assist as needed. Objective   SpO2: 96 %  O2 Device: None (Room air)     Bed mobility  Supine to Sit: Stand by assistance  Sit to Supine: Stand by assistance  Scooting: Stand by assistance  Bed Mobility Comments: Bed flat.  No complaints of lightheadedness/dizziness. Transfers  Sit to Stand: Stand by assistance  Stand to sit: Stand by assistance  Bed to Chair: Stand by assistance (with RW)  Ambulation  Device: Rolling Walker  Assistance: Stand by assistance  Gait Deviations: Slow Nadya  Distance: 50ft  More Ambulation?: No  Stairs/Curb  Stairs?: Yes  Stairs  # Steps : 3  Stairs Height: 8\"  Rails: Right ascending  Device: No Device  Assistance: Contact guard assistance        Exercise Treatment: seated AROM B LE        Goals  Short Term Goals  Time Frame for Short term goals: 5 to 6 visits  Short term goal 1: Pt able to demonstarte mod-I bed mobility  Short term goal 2:  Pt able to perform sit<>stand mod-I  Short term goal 3: Pt able to transfer at sueprvison level with rolling walker  Short term goal 4: Pt mayur to ambualte 60 to 80 ft x 2 with rolling walker at supervsion level.    Short term goal 5: Pt mayur to perform 6\" step up x 6 to 8 reps with 1 UE support, SBA  Patient Goals   Patient goals : Breath better, feel stronger         Therapy Time   Individual Concurrent Group Co-treatment   Time In 1426         Time Out 1450         Minutes 53 Annette Puga, PT

## 2022-05-12 NOTE — PLAN OF CARE
Problem: Discharge Planning  Goal: Discharge to home or other facility with appropriate resources  5/12/2022 0444 by Claudia Mayberry RN  Outcome: Progressing     Problem: Safety - Adult  Goal: Free from fall injury  5/12/2022 0444 by Claudia Mayberry RN  Outcome: Progressing     Problem: ABCDS Injury Assessment  Goal: Absence of physical injury  5/12/2022 0444 by Claudia Mayberry RN  Outcome: Progressing     Problem: Skin/Tissue Integrity  Goal: Absence of new skin breakdown  Description: 1. Monitor for areas of redness and/or skin breakdown  2. Assess vascular access sites hourly  3. Every 4-6 hours minimum:  Change oxygen saturation probe site  4. Every 4-6 hours:  If on nasal continuous positive airway pressure, respiratory therapy assess nares and determine need for appliance change or resting period.   5/12/2022 0444 by Claudia Mayberry RN  Outcome: Progressing     Problem: Respiratory - Adult  Goal: Achieves optimal ventilation and oxygenation  5/12/2022 0444 by Claudia Mayberry RN  Outcome: Progressing     Problem: Cardiovascular - Adult  Goal: Maintains optimal cardiac output and hemodynamic stability  5/12/2022 0444 by Claudia Mayberry RN  Outcome: Progressing     Problem: Cardiovascular - Adult  Goal: Absence of cardiac dysrhythmias or at baseline  5/12/2022 0444 by Claudia Mayberry RN  Outcome: Progressing     Problem: Pain  Goal: Verbalizes/displays adequate comfort level or baseline comfort level  5/12/2022 0444 by Claudia Mayberry RN  Outcome: Progressing

## 2022-05-12 NOTE — DISCHARGE INSTR - COC
Continuity of Care Form    Patient Name: Stephany Marroquin   :  1940  MRN:  [id number]    Admit date:  2022  Discharge date:  2022    Code Status Order: DNR-CCA   Advance Directives:      Admitting Physician:  Eleazar Simmons MD  PCP: Andrew Booth MD    Discharging Nurse: [facility] Unit/Room#: 2121/2121-01  Discharging Unit Phone Number: [phone]    Emergency Contact:   Extended Emergency Contact Information  Primary Emergency Contact: StaffordsvilleCaleb de la garza  Address: [address] [facility], [address] [place] [address] Phone: [phone]  Relation: Child  Secondary Emergency Contact: [address] [place] [address] Phone: [phone]  Relation: Child    Past Surgical History:  Past Surgical History:   Procedure Laterality Date    CHOLECYSTECTOMY      COLON SURGERY      s/p sigmoid cloectomy    COLONOSCOPY N/A 2021    COLONOSCOPY DIAGNOSTIC performed by Austin Ramirez MD at [address] [place] [address] N/A 2021    [address] [place] [address] performed by Austin Ramirez MD at [facility] 2019    DR IMAN GAUTHIER    UPPER GASTROINTESTINAL ENDOSCOPY N/A 2021    EGD DIAGNOSTIC ONLY performed by Austin Ramirez MD at [address] [place] [address]      s/p       Immunization History:   Immunization History   Administered Date(s) Administered    COVID-19, Pfizer Purple top, DILUTE for use, 12+ yrs, 30mcg/0.3mL dose 2021, 2021, 10/26/2021    Influenza 2013    Influenza Virus Vaccine 10/16/2014, 10/23/2015    Influenza, High Dose (Fluzone 65 yrs and older) 2021    Influenza, High-dose, Quadv, 65 yrs +, IM (Fluzone) 10/14/2020    Influenza, Quadv, IM, PF (6 mo and older Fluzone, Flulaval, Fluarix, and 3 yrs and older Afluria) 2016, 10/12/2017, 10/22/2018    Influenza, Triv, inactivated, subunit, adjuvanted, IM (Fluad 65 yrs and older) 10/07/2019    Pneumococcal Conjugate 13-valent ([id number]) 04/11/2016    Pneumococcal Polysaccharide ([id number]) 10/01/2002, 02/13/2014    Td, unspecified formulation 10/01/2002       Active Problems:  Patient Active Problem List   Diagnosis Code    Mixed hyperlipidemia E78.2    Gastroesophageal reflux disease without esophagitis K21.9    Onychomycosis B35.1    Diverticulosis K57.90    Chronic back pain M54.9, G89.29    Hypothyroidism E03.9    Essential hypertension I10    Right bundle branch block I45.10    Chronic pulmonary embolism (HCC) I27.82    Type 2 diabetes mellitus without complication, without long-term current use of insulin (HCC) E11.9    Obesity (BMI 30.0-34. 9) E66.9    Tracheobronchitis J40    Allergic rhinitis J30.9    Glaucoma H40.9    S/p bilateral myringotomy with tube placement Z96.22    GI bleed K92.2    Iron deficiency anemia D50.9    Paroxysmal atrial fibrillation (HCC) I48.0    Hemorrhage of rectum and anus K62.5    Intractable nausea and vomiting K32.0    Diastolic CHF, acute on chronic (HCC) I50.33    Atrial fibrillation with RVR ([facility]) I48.91    Class 2 obesity due to excess calories in adult E66.09       Isolation/Infection:   Isolation            Contact          Patient Infection Status       Infection Onset Added Last Indicated Last Indicated By Review Planned Expiration Resolved Resolved By    ESBL (Extended Spectrum Beta Lactamase)  10/17/16 10/17/16 Eliezer Mancuso RN        Klebsiella - Urine 10/2016      Resolved    COVID-19 (Rule Out) 05/09/22 05/09/22 05/09/22 COVID-19 & Influenza Combo (Ordered)   05/09/22 Rule-Out Test Resulted    C-diff Rule Out 11/30/21 11/30/21 12/01/21 C DIFF TOXIN/ANTIGEN (Ordered)   12/01/21 Rule-Out Test Resulted    COVID-19 (Rule Out) 11/29/21 11/29/21 11/29/21 COVID-19 (Ordered)   11/30/21 Rule-Out Test Resulted    COVID-19 (Rule Out) 11/29/21 11/29/21 11/29/21 SARS-CoV-2 NAAT (Rapid) (Ordered)   11/29/21 Rule-Out Test Resulted            Nurse Assessment:  Last Vital Signs: /80   Pulse 102   Temp 98.2 °F (36.8 °C) (Oral)   Resp 18   Ht 5' 5\" (1.651 m)   Wt 228 lb 12.8 oz (103.8 kg)   SpO2 96%   BMI 38.07 kg/m²     Last documented pain score (0-10 scale): Pain Level: 5  Last Weight:   Wt Readings from Last 1 Encounters:   05/12/22 228 lb 12.8 oz (103.8 kg)     Mental Status:  oriented and alert    IV Access:  - None    Nursing Mobility/ADLs:  Walking   Assisted  Transfer  Independent  Bathing  Independent  Dressing  Independent  [address]  Independent  Med Delivery   whole    Wound Care Documentation and Therapy:        Elimination:  Continence: Bowel: Yes  Bladder: Yes  Urinary Catheter: None   Colostomy/Ileostomy/Ileal Conduit: No       Date of Last BM:     Intake/Output Summary (Last 24 hours) at 5/12/2022 1358  Last data filed at 5/12/2022 1210  Gross per 24 hour   Intake 400 ml   Output 250 ml   Net 150 ml     I/O last 3 completed shifts: In: 1104 [P.O.:350; I.V.:754]  Out: 180 [Urine:180]    Safety Concerns:     None    Impairments/Disabilities:      Vision    Nutrition Therapy:  Current Nutrition Therapy:   - Oral Diet:  General    Routes of Feeding: Oral  Liquids: No Restrictions  Daily Fluid Restriction: no  Last Modified Barium Swallow with Video (Video Swallowing Test): not done    Treatments at the Time of Hospital Discharge:   Respiratory Treatments: none  Oxygen Therapy:  is not on home oxygen therapy. Ventilator:    - No ventilator support    Rehab Therapies: Physical Therapy and Occupational Therapy  Weight Bearing Status/Restrictions: No weight bearing restrictions  Other Medical Equipment (for information only, NOT a DME order):  walker  Other Treatments:Skilled Nursing assessment and monitoring. Medication education and monitoring per protocol.        Patient's personal belongings (please select all that are sent with patient):  Glasses    RN SIGNATURE:  Electronically signed by Monty Ruiz RN on 5/12/22 at 4:26 PM EDT    CASE MANAGEMENT/SOCIAL WORK SECTION    Inpatient Status Date: 5/9/2022    Readmission Risk Assessment Score:  Readmission Risk              Risk of Unplanned Readmission:  18           Discharging to Facility/ [address]  P: [phone]  F: [phone]     Dialysis Facility (if applicable)   Name:  Address:  Dialysis Schedule:  Phone:  Fax:    / signature: Electronically signed by Monty Ruiz RN on 5/12/22 at 1:58 PM EDT    PHYSICIAN SECTION    Prognosis: Fair    Condition at Discharge: Stable    Rehab Potential (if transferring to Rehab): Good    Recommended Labs or Other Treatments After Discharge: none    Physician Certification: I certify the above information and transfer of Carlie Jacobs  is necessary for the continuing treatment of the diagnosis listed and that she requires Home Care for greater 30 days.      Update Admission H&P: No change in H&P    PHYSICIAN SIGNATURE:  Electronically signed by Jessica Abrams MD on 5/12/22 at 4:15 PM EDT

## 2022-05-12 NOTE — PROGRESS NOTES
Hospitalist Progress Note  5/11/2022 10:20 PM  Subjective:   Admit Date: 5/9/2022  PCP: Joe Valencia MD     DNR-CCA      C/c:  Chief Complaint   Patient presents with    Shortness of Breath    Leg Swelling         Interval History: pt doing better,good urine output    Diet: ADULT DIET; Regular                                ip days:2  Medications:   Scheduled Meds:   furosemide  60 mg IntraVENous BID    cholestyramine light  1 packet Oral QPM    Netarsudil Dimesylate  1 drop Both Eyes Nightly    metoclopramide  10 mg Oral TID AC    sodium chloride  80 mL IntraVENous Once    apixaban  5 mg Oral BID    atenolol  100 mg Oral Daily    dorzolamide  1 drop Both Eyes BID    ferrous sulfate  325 mg Oral Daily with breakfast    latanoprost  1 drop Both Eyes Nightly    levothyroxine  100 mcg Oral QAM AC    timolol  1 drop Both Eyes BID    sodium chloride flush  10 mL IntraVENous 2 times per day     Continuous Infusions:   sodium chloride      Amiodarone 1 mg/min (05/11/22 1852)     PRN Meds:. iopamidol, sodium chloride flush, sodium chloride flush, sodium chloride, potassium chloride **OR** potassium alternative oral replacement **OR** potassium chloride, magnesium sulfate, ondansetron **OR** ondansetron, magnesium hydroxide, acetaminophen **OR** acetaminophen     CBC:   Recent Labs     05/09/22  1622 05/10/22  1429 05/11/22  0909   WBC 4.4 6.0 4.9   HGB 11.7* 12.1 11.7*   * 116* 101*     BMP:    Recent Labs     05/09/22  1622 05/10/22  1429 05/11/22  0909    135 133*   K 4.6 4.2 3.5*    97* 96*   CO2 26 26 25   BUN 11 14 13   CREATININE 1.05* 1.50* 1.33*   GLUCOSE 105* 117* 162*     Hepatic:   Recent Labs     05/09/22  1622 05/10/22  1429 05/11/22  0909   AST 19 20 18   ALT 9 10 8   BILITOT 0.57 0.42 0.41   ALKPHOS 87 92 75     Troponin: No results for input(s): TROPONINI in the last 72 hours. BNP: No results for input(s): BNP in the last 72 hours.   Lipids: No results for input(s): CHOL, HDL in the last 72 hours. Invalid input(s): LDLCALCU  INR: No results for input(s): INR in the last 72 hours. Objective:   Vitals: /76   Pulse 103   Temp 98.4 °F (36.9 °C) (Oral)   Resp 18   Ht 5' 5\" (1.651 m)   Wt 231 lb 6.4 oz (105 kg)   SpO2 96%   BMI 38.51 kg/m²   General appearance: alert, appears stated age and cooperative  Skin: Skin color, texture, turgor normal. No rashes or lesions  Lungs: clear to auscultation bilaterally  Heart: regular rate and rhythm, S1, S2 normal, no murmur, click, rub or gallop  Abdomen: soft, non-tender; bowel sounds normal; no masses,  no organomegaly  Extremities: extremities normal, atraumatic, no cyanosis or edema  Neurologic: Mental status: Alert, oriented, thought content appropriate    Prophylaxis:   DVT with  [] lovenox        [] heparin        [] Scd        [x] eliquis     Radiology:  XR CHEST (2 VW)    Result Date: 5/9/2022  EXAMINATION: TWO XRAY VIEWS OF THE CHEST 5/9/2022 3:49 pm COMPARISON: December 3, 2021 HISTORY: ORDERING SYSTEM PROVIDED HISTORY: shortness of breath, lower chest pain TECHNOLOGIST PROVIDED HISTORY: shortness of breath, lower chest pain FINDINGS: Small bilateral pleural effusions. Cardiomegaly. Mediastinum normal.  Lungs clear. Bony thorax intact. Small bilateral effusions. Assessment :   1. Ac heart failure with preserved ejection fraction  2. A f with rvr  Controled with amio     Plan:   1. Continue present plan  2. See order    Patient Active Problem List:     Mixed hyperlipidemia     Gastroesophageal reflux disease without esophagitis     Onychomycosis     Diverticulosis     Chronic back pain     Hypothyroidism     Essential hypertension     Right bundle branch block     Chronic pulmonary embolism (HCC)     Type 2 diabetes mellitus without complication, without long-term current use of insulin (HCC)     Obesity (BMI 30.0-34. 9)     Tracheobronchitis     Allergic rhinitis     Glaucoma     S/p bilateral

## 2022-05-12 NOTE — CARE COORDINATION
Continuity of Care Form    Patient Name: Stephany Marroquin   :  1940  MRN:  [id number]    Admit date:  2022  Discharge date:  2022    Code Status Order: DNR-CCA   Advance Directives:      Admitting Physician:  Eleazar Simmons MD  PCP: Andrew Booth MD    Discharging Nurse: [facility] Unit/Room#: 2121/2121-01  Discharging Unit Phone Number: [phone]    Emergency Contact:   Extended Emergency Contact Information  Primary Emergency Contact: [place]aleb de la garza  Address: [address] [facility], [address] [place] [address] [address]  Phone: [phone]  Relation: Child  Secondary Emergency Contact: [address] Phone: [phone]  Relation: Child    Past Surgical History:  Past Surgical History:   Procedure Laterality Date    CHOLECYSTECTOMY      COLON SURGERY      s/p sigmoid cloectomy    COLONOSCOPY N/A 2021    COLONOSCOPY DIAGNOSTIC performed by Austin Ramirez MD at [address] [facility] N/A 2021    [facility] performed by Austin Ramirez MD at [facility] 2019    DR IMAN GAUTHIER    UPPER GASTROINTESTINAL ENDOSCOPY N/A 2021    EGD DIAGNOSTIC ONLY performed by Austin Ramirez MD at [address]      s/p       Immunization History:   Immunization History   Administered Date(s) Administered    COVID-19, Pfizer Purple top, DILUTE for use, 12+ yrs, 30mcg/0.3mL dose 2021, 2021, 10/26/2021    Influenza 2013    Influenza Virus Vaccine 10/16/2014, 10/23/2015    Influenza, High Dose (Fluzone 65 yrs and older) 2021    Influenza, High-dose, Quadv, 65 yrs +, IM (Fluzone) 10/14/2020    Influenza, Quadv, IM, PF (6 mo and older Fluzone, Flulaval, Fluarix, and 3 yrs and older Afluria) 2016, 10/12/2017, 10/22/2018    Influenza, Triv, inactivated, subunit, adjuvanted, IM (Fluad 65 yrs and older) 10/07/2019    Pneumococcal Conjugate 13-valent ([id number]) 04/11/2016    Pneumococcal Polysaccharide ([id number]) 10/01/2002, 02/13/2014    Td, unspecified formulation 10/01/2002       Active Problems:  Patient Active Problem List   Diagnosis Code    Mixed hyperlipidemia E78.2    Gastroesophageal reflux disease without esophagitis K21.9    Onychomycosis B35.1    Diverticulosis K57.90    Chronic back pain M54.9, G89.29    Hypothyroidism E03.9    Essential hypertension I10    Right bundle branch block I45.10    Chronic pulmonary embolism (HCC) I27.82    Type 2 diabetes mellitus without complication, without long-term current use of insulin (HCC) E11.9    Obesity (BMI 30.0-34. 9) E66.9    Tracheobronchitis J40    Allergic rhinitis J30.9    Glaucoma H40.9    S/p bilateral myringotomy with tube placement Z96.22    GI bleed K92.2    Iron deficiency anemia D50.9    Paroxysmal atrial fibrillation (HCC) I48.0    Hemorrhage of rectum and anus K62.5    Intractable nausea and vomiting O99.9    Diastolic CHF, acute on chronic (HCC) I50.33    Atrial fibrillation with RVR ([facility]) I48.91    Class 2 obesity due to excess calories in adult E66.09       Isolation/Infection:   Isolation            Contact          Patient Infection Status       Infection Onset Added Last Indicated Last Indicated By Review Planned Expiration Resolved Resolved By    ESBL (Extended Spectrum Beta Lactamase)  10/17/16 10/17/16 Juan Colon RN        Klebsiella - Urine 10/2016      Resolved    COVID-19 (Rule Out) 05/09/22 05/09/22 05/09/22 COVID-19 & Influenza Combo (Ordered)   05/09/22 Rule-Out Test Resulted    C-diff Rule Out 11/30/21 11/30/21 12/01/21 C DIFF TOXIN/ANTIGEN (Ordered)   12/01/21 Rule-Out Test Resulted    COVID-19 (Rule Out) 11/29/21 11/29/21 11/29/21 COVID-19 (Ordered)   11/30/21 Rule-Out Test Resulted    COVID-19 (Rule Out) 11/29/21 11/29/21 11/29/21 SARS-CoV-2 NAAT (Rapid) (Ordered)   11/29/21 Rule-Out Test Resulted            Nurse Assessment:  Last Vital Signs: /80   Pulse 102   Temp 98.2 °F (36.8 °C) (Oral)   Resp 18   Ht 5' 5\" (1.651 m)   Wt 228 lb 12.8 oz (103.8 kg)   SpO2 96%   BMI 38.07 kg/m²     Last documented pain score (0-10 scale): Pain Level: 5  Last Weight:   Wt Readings from Last 1 Encounters:   05/12/22 228 lb 12.8 oz (103.8 kg)     Mental Status:  oriented and alert    IV Access:  - None    Nursing Mobility/ADLs:  Walking   Assisted  Transfer  Independent  Bathing  Independent  Dressing  Independent  [address]  Independent  Med Delivery   whole    Wound Care Documentation and Therapy:        Elimination:  Continence:   · Bowel: Yes  · Bladder: Yes  Urinary Catheter: None   Colostomy/Ileostomy/Ileal Conduit: No       Date of Last BM:     Intake/Output Summary (Last 24 hours) at 5/12/2022 1358  Last data filed at 5/12/2022 1210  Gross per 24 hour   Intake 400 ml   Output 250 ml   Net 150 ml     I/O last 3 completed shifts: In: 1104 [P.O.:350; I.V.:754]  Out: 180 [Urine:180]    Safety Concerns:     None    Impairments/Disabilities:      Vision    Nutrition Therapy:  Current Nutrition Therapy:   - Oral Diet:  General    Routes of Feeding: Oral  Liquids: No Restrictions  Daily Fluid Restriction: no  Last Modified Barium Swallow with Video (Video Swallowing Test): not done    Treatments at the Time of Hospital Discharge:   Respiratory Treatments: none  Oxygen Therapy:  is not on home oxygen therapy. Ventilator:    - No ventilator support    Rehab Therapies: Physical Therapy and Occupational Therapy  Weight Bearing Status/Restrictions: No weight bearing restrictions  Other Medical Equipment (for information only, NOT a DME order):  walker  Other Treatments:Skilled Nursing assessment and monitoring. Medication education and monitoring per protocol.        Patient's personal belongings (please select all that are sent with patient):  Charlotte ANDINO SIGNATURE:  Electronically signed by Winnie Mcclellan RN on 5/12/22 at 4:26 PM EDT    CASE MANAGEMENT/SOCIAL WORK SECTION    Inpatient Status Date: 5/9/2022    Readmission Risk Assessment Score:  Readmission Risk              Risk of Unplanned Readmission:  18           Discharging to Facility/ [address] [place] [address] Street: [phone]  F: [phone]     Dialysis Facility (if applicable)   · Name:  · Address:  · Dialysis Schedule:  · Phone:  · Fax:    / signature: Electronically signed by Winnie Mcclellan RN on 5/12/22 at 1:58 PM EDT    PHYSICIAN SECTION    Prognosis: Fair    Condition at Discharge: Stable    Rehab Potential (if transferring to Rehab): Good    Recommended Labs or Other Treatments After Discharge: none    Physician Certification: I certify the above information and transfer of Ha Dang  is necessary for the continuing treatment of the diagnosis listed and that she requires Home Care for greater 30 days. Update Admission H&P: No change in H&P    PHYSICIAN SIGNATURE:  Electronically signed by Marlyn Colunga MD on 5/12/22 at 4:15 PM EDT        START taking these medications    START taking these medications   amiodarone 400 MG tablet  Commonly known as: PACERONE  Take 1 tablet by mouth daily  Start taking on:  May 13, 2022   bumetanide 1 MG tablet  Commonly known as: BUMEX  Take 1 tablet by mouth 2 times daily   metoclopramide 10 MG tablet  Commonly known as: REGLAN  Take 1 tablet by mouth 3 times daily (before meals)     CONTINUE taking these medications    CONTINUE taking these medications   acetaminophen 500 MG tablet  Commonly known as: TYLENOL  Take 500 mg by mouth at bedtime   apixaban 5 MG Tabs tablet  Commonly known as: ELIQUIS  Take 5 mg by mouth 2 times daily   atenolol 100 MG tablet  Commonly known as: TENORMIN  TAKE 1 TABLET BY MOUTH EVERY DAY   cholestyramine 4 g packet  Commonly known as: QUESTRAN  Take 1 packet by mouth every evening   dorzolamide 2 % ophthalmic solution  Commonly known as: TRUSOPT  Place 1 drop into both eyes 2 times daily   doxazosin 4 MG tablet  Commonly known as: CARDURA  TAKE 1 TABLET BY MOUTH EVERY DAY   ferrous sulfate 325 (65 Fe) MG tablet  Commonly known as: IRON 325  Take 1 tablet by mouth daily (with breakfast)   latanoprost 0.005 % ophthalmic solution  Commonly known as: XALATAN  Place 1 drop into both eyes nightly   levothyroxine 100 MCG tablet  Commonly known as: SYNTHROID  TAKE 1 TABLET BY MOUTH EVERY DAY   Rhopressa 0.02 % Soln  Generic drug: Netarsudil Dimesylate  Place 1 drop into both eyes nightly   timolol 0.5 % ophthalmic solution  Commonly known as: TIMOPTIC  Place 1 drop into both eyes 2 times daily   Vitamin D3 125 MCG (5000 UT) Tabs  Take 1 tablet by mouth daily     STOP taking these medications    STOP taking these medications   spironolactone 25 MG tablet  Commonly known as: ALDACTONE

## 2022-05-12 NOTE — PROGRESS NOTES
Per pharmacist, no need to wait an extended amount of time for amio IV drip switch to oral amio. Recommended 30 min after oral to stop IV drip.

## 2022-05-12 NOTE — FLOWSHEET NOTE
05/11/22 2325   Handoff   Communication Given Transfer Handoff   Handoff Given To [address] From [address] At bedside; Face to Face   Time Handoff Given 2320   Report received from [facility] and care assumed at this time.

## 2022-05-13 ENCOUNTER — CARE COORDINATION (OUTPATIENT)
Dept: CASE MANAGEMENT | Age: 82
End: 2022-05-13

## 2022-05-13 DIAGNOSIS — I50.33 DIASTOLIC CHF, ACUTE ON CHRONIC (HCC): Primary | ICD-10-CM

## 2022-05-13 PROCEDURE — 1111F DSCHRG MED/CURRENT MED MERGE: CPT | Performed by: FAMILY MEDICINE

## 2022-05-13 NOTE — CARE COORDINATION
[facility] Transitions Initial Follow Up Call    Call within 2 business days of discharge: Yes    Patient: Keaton Short   Patient : 1940   MRN: [id number]    Reason for Admission: CHF, AF RVR  Discharge Date: 22   RARS: Readmission Risk Score: 14.2 ( )      Last Discharge [facility]       Complaint Diagnosis Description Type Department Provider    22 Shortness of Breath; Leg Swelling Congestive heart failure, unspecified HF chronicity, unspecified heart failure type ([address] . .. ED to Hosp-Admission (Discharged) (ADMITTED) Beula Baumgarten, MD; Philippe Hathaway... [facility]    Facility: [facility]  Non-face-to-face services provided:  Scheduled appointment with PCP-  [facility]   Obtained and reviewed discharge summary and/or continuity of care documents     Spoke with: patient, [facility]. - in process of setting up     Patient reports doing OK since DC - she was a bit upset that she did not get her new meds at [facility] - her local pharmacy had to order both bumex + amiodarone - son picking them up today. CASANOVA improved - led edema persists, but improved. Does not weigh daily - can't see scale she says - suggested a talking scale. Referrals to dietician + clinical pharmacy as per CHF protocol  Reviewed upcoming appts with PCP & cardio. Informed of care transition & CTN contact #      Plan for next call: symptom management-reassess leg edema - CASANOVA  follow up appointment-review  PCP appt,  cardio  medication management-review if bumex working better than spironolactone did.       Care Transitions 24 Hour Call    Schedule Follow Up Appointment with PCP: Completed  Do you have a copy of your discharge instructions?: Yes  Do you have all of your prescriptions and are they filled?: Yes (Comment: pharmacy had to order amio + bumex, but son is picking them up today)  Have you been contacted by sergio Nelson?: No  Have you scheduled your follow up appointment?: Yes (Comment: PCP - , cardio )  How are you going to get to your appointment?: Car - family or friend to transport  Post Acute Services: [address] (Comment: Madeline Díaz)  Patient DME: Angela Covert you feel like you have everything you need to keep you well at home?:  (Comment: as soon as medications are ready today - home care is in process of setting up timing of visit)  Are you an active caregiver in your home?: No  Care Transitions Interventions     Other Services:  (Comment: Franciso Schaumann)          Was this an external facility discharge? No     Challenges to be reviewed by the provider   Additional needs identified to be addressed with provider: No  none             Method of communication with provider : none    Advance Care Planning:   Does patient have an Advance Directive: reviewed and needs to be updated. Care Transition Nurse (CTN) contacted the patient by telephone to perform post hospital discharge assessment. Verified name and  with patient as identifiers. Provided introduction to self, and explanation of the CTN role. CTN reviewed discharge instructions, medical action plan and red flags with patient who verbalized understanding. Patient given an opportunity to ask questions and does not have any further questions or concerns at this time. Were discharge instructions available to patient? Yes. Reviewed appropriate site of care based on symptoms and resources available to patient including: PCP  Specialist  Home health  CTN. The patient agrees to contact the PCP office for questions related to their healthcare. Medication reconciliation was performed with patient, who verbalizes understanding of administration of home medications. Was patient discharged with a pulse oximeter? no    CTN provided contact information. Plan for follow-up call in 5-7 days based on severity of symptoms and risk factors.   Plan for next call: symptom management-reassess leg edema - CASANOVA  follow up appointment-review 5/19 PCP appt, 5/23 cardio  medication management-review if bumex working better than spironolactone did          Follow Up  Future Appointments   Date Time Provider David Shen   5/19/2022 11:00 AM Lazaro Levi, [address] [place] [address]   9/6/2022  2:30 PM MD Marjan Kapoor, RN

## 2022-05-16 ENCOUNTER — TELEPHONE (OUTPATIENT)
Dept: PHARMACY | Facility: CLINIC | Age: 82
End: 2022-05-16

## 2022-05-16 NOTE — TELEPHONE ENCOUNTER
Received a referral:  from Care Coordinator to review patients medications. Elijah Medrano RN  [facility] Clinical Staff       Referral for med review as per CHF protocol. Thanks   [employer] patient to schedule a time to speak with a pharmacist over the telephone. Spoke to patient and advised them of the above message. Patient verified understanding and scheduled their appointment: tomorrow at [facility].    [facility] free: [address] [place] [address] Ext in place:  No   Recommendation Provided To: Patient/Caregiver: 1 via Telephone   Intervention Detail: Scheduled Appointment   Gap Closed?: Yes    Intervention Accepted By: Patient/Caregiver: 1   Time Spent (min): 10

## 2022-05-17 ENCOUNTER — TELEPHONE (OUTPATIENT)
Dept: PHARMACY | Facility: CLINIC | Age: 82
End: 2022-05-17

## 2022-05-17 NOTE — TELEPHONE ENCOUNTER
CLINICAL PHARMACY NOTE - Medication Review  Patient outreach to review medications - Spoke with patient. SUBJECTIVE/OBJECTIVE:   Kristyn Cramer is a 79 y.o. female referred to a clinical pharmacy specialist by care coordination. Medications:  Medication Sig    amiodarone (PACERONE) 400 MG tablet Take 1 tablet by mouth daily    metoclopramide (REGLAN) 10 MG tablet Take 1 tablet by mouth 3 times daily (before meals)  - states heartburn is gone and she stopped taking but she will ask pcp about it at     bumetanide (BUMEX) 1 MG tablet Take 1 tablet by mouth 2 times daily    acetaminophen (TYLENOL) 500 MG tablet Take 500 mg by mouth at bedtime    levothyroxine (SYNTHROID) 100 MCG tablet TAKE 1 TABLET BY MOUTH EVERY DAY  - was not taking in AM on empty stomach, educated     doxazosin (CARDURA) 4 MG tablet TAKE 1 TABLET BY MOUTH EVERY DAY    ferrous sulfate (IRON 325) 325 (65 Fe) MG tablet Take 1 tablet by mouth daily (with breakfast)    atenolol (TENORMIN) 100 MG tablet TAKE 1 TABLET BY MOUTH EVERY DAY    apixaban (ELIQUIS) 5 MG TABS tablet Take 5 mg by mouth 2 times daily    RHOPRESSA 0.02 % SOLN Place 1 drop into both eyes nightly     timolol (TIMOPTIC) 0.5 % ophthalmic solution Place 1 drop into both eyes 2 times daily     dorzolamide (TRUSOPT) 2 % ophthalmic solution Place 1 drop into both eyes 2 times daily     Cholecalciferol (VITAMIN D3) 5000 UNITS TABS Take 1 tablet by mouth daily  - educated to ask pcp to check vit D level bc if high can cause nausea and some of the symptoms she was having     cholestyramine (QUESTRAN) 4 G packet Take 1 packet by mouth every evening     latanoprost (XALATAN) 0.005 % ophthalmic solution Place 1 drop into both eyes nightly     STOP taking these medications          spironolactone 25 MG tablet       Additional Medications (including OTC/Herbal Supplements):  · na    Allergies:    Allergies   Allergen Reactions    Avapro [Irbesartan]     Ciprofloxacin Hcl Nausea Only    Cozaar [Losartan Potassium]     Diovan [Valsartan]     Lipitor [Atorvastatin Calcium]     Lisinopril     Pcn [Penicillins]     Pravachol [Pravastatin Sodium]      myalgias    Tape [Adhesive Tape] Dermatitis    Tramadol Swelling    Zetia [Ezetimibe]     Morphine Nausea And Vomiting       Pertinent Labs/Vitals:  BP Readings from Last 3 Encounters:   05/12/22 107/80   03/04/22 122/66   12/10/21 124/80     Lab Results   Component Value Date    LABMICR CANNOT BE CALCULATED 08/11/2020     Lab Results   Component Value Date    LABA1C 4.9 02/21/2022    LABA1C 5.3 07/12/2021    LABA1C 4.9 01/12/2021     Lab Results   Component Value Date    CHOL 133 02/21/2022    TRIG 79 02/21/2022    HDL 65 02/21/2022    LDLCHOLESTEROL 52 02/21/2022     ALT   Date Value Ref Range Status   05/12/2022 8 5 - 33 U/L Final     AST   Date Value Ref Range Status   05/12/2022 15 <32 U/L Final     The ASCVD Risk score (Maribel Car, et al., 2013) failed to calculate for the following reasons: The 2013 ASCVD risk score is only valid for ages 36 to 78     Lab Results   Component Value Date    CREATININE 1.48 (H) 05/12/2022       Estimated Creatinine Clearance: 35 mL/min (A) (based on SCr of 1.48 mg/dL (H)).     Social History:   Social History     Tobacco Use    Smoking status: Never Smoker    Smokeless tobacco: Never Used   Substance Use Topics    Alcohol use: No       Immunizations:   Most Recent Immunizations   Administered Date(s) Administered    COVID-19, Pfizer Purple top, DILUTE for use, 12+ yrs, 30mcg/0.3mL dose 10/26/2021    Influenza 11/12/2013    Influenza Virus Vaccine 10/23/2015    Influenza, High Dose (Fluzone 65 yrs and older) 09/01/2021    Influenza, High-dose, Quadv, 65 yrs +, IM (Fluzone) 10/14/2020    Influenza, Quadv, IM, PF (6 mo and older Fluzone, Flulaval, Fluarix, and 3 yrs and older Afluria) 10/22/2018    Influenza, Triv, inactivated, subunit, adjuvanted, IM (Fluad 65 yrs and older) 10/07/2019    Pneumococcal Conjugate 13-valent ([id number]) 04/11/2016    Pneumococcal Polysaccharide ([id number]) 02/13/2014    Td, unspecified formulation 10/01/2002     Last Echo:  12/5/21:  Left ventricle is normal in size. Mild to moderate left ventricular  hypertrophy. Global left ventricular systolic function is normal. Estimated LV EF 60-65  %. No obvious wall motion abnormality seen. Left atrium is mildly dilated. Difficult visualization of the right ventricle. MPI and TAPSE values suggest  normal RV systolic function. No significant structural valvular abnormality noted  Mild mitral regurgitation. Normal aortic root dimensions  No significant pericardial effusion seen  IVC not well visualizes    ASSESSMENT/PLAN:   - General Assessment:   · Adherence: does use pill box, denies issues  · Drug interactions: The following clinically significant interactions were identified via Sitemasher Interaction Analysis as category D or higher: should take levothyroxine on empty stomach, amiodarone and questran- separate 1 hr before or 4 hrs after and same for iron and other meds. discussed all these drug interreactions with patient and patient is  meds. · Renal dosing: No renal adjustments necessary. · Medication monitoring: needs to monitor potassium with bumex and not being prescribed potassium. Patient states she did get some potassium in the hospital but she does eat a banana everyday at home. · Recent admission for Afib, discussed diagnosis and her treatment for this. She has follow up with cardio soon. - Heart Failure, preserved:    Ejection Fraction/classification: EF 60-65%   HF Questionnaire recommendations: states she can not weight herself bc cant see but she does keep track of how she feels and looks for edema.  If she feels she is retaining she does call her son over who will weigh her    Drugs to AVOID: discussed no NSAIDs, she does not take   Following low salt diet: trying to follow low salt diet    - Upcoming appointments:   Future Appointments   Date Time Provider David Shen   5/17/2022 10:00 AM SCHEDULE, [facility] None   5/19/2022 11:00 AM Andrew Booth, [address] [place] [address]   9/6/2022  2:30 PM Andrew Booth MD [address], PharmD, [address] [place] Pharmacist  Department: [phone]    For Pharmacy Admin Tracking Only     Gap Closed?: Yes    Time Spent (min): 30

## 2022-05-18 ENCOUNTER — CARE COORDINATION (OUTPATIENT)
Dept: CASE MANAGEMENT | Age: 82
End: 2022-05-18

## 2022-05-18 NOTE — CARE COORDINATION
[facility] Transitions Follow Up Call    2022    Patient: Kristyn Cramer  Patient : 1940   MRN: [id number]  Reason for Admission: Acute CHF  Discharge Date: 22 RARS: Readmission Risk Score: 14.2 ( )         Spoke with:Genie      Angelique Persaud stated she is doing OK, has PCP appt tomorrow and her son will take her. Verified Cardiology on 22. Pt continues to have JAYCEE. Elevating legs with rest. Monitors for increase swelling, has her son check her weight if concerned. Stated she cannot get compression stockings on. Discussed compression hose with velcro or zippers as an option. Pt is limiting sodium intake. Pt had review of meds with Pharmacist yesterday. Stated she will request Vit D labwork as recommended by pharmacist, is taking synthroid medication on empty stomach now. Continues to have CASANOVA, stated she takes her time getting around, rests b/w activity. Denies worsening SOB. Continues with HHC. Needs to be reviewed by the provider   Additional needs identified to be addressed with provider: No  none             Method of communication with provider : none      Care Transition Nurse contacted the patient by telephone to follow up after admission on 22. Verified name and  with patient as identifiers. Addressed changes since last contact: PCP appt tomorrow. EDILBERTO, [address]  Discussed follow-up appointments. CTN reviewed discharge instructions, medical action plan and red flags with patient and discussed any barriers to care and/or understanding of plan of care after discharge. Discussed appropriate site of care based on symptoms and resources available to patient including: PCP  Specialist  Home health  When to call [address] The patient agrees to contact the PCP office for questions related to their healthcare.      Patients top risk factors for readmission: medical condition-CHF, DM2  Interventions to address risk factors: Obtained and reviewed discharge summary and/or continuity of care documents        CTN provided contact information for future needs. Plan for follow-up call in 5-7 days based on severity of symptoms and risk factors. Plan for next call: symptom management-CASANOVA, Leg swelling, weight  follow up appointment-Review PCP appt from 5/19, Card appt 5/23        Care Transitions Subsequent and Final Call    Subsequent and Final Calls  Do you have any ongoing symptoms?: Yes  Onset of Patient-reported symptoms: Other  Patient-reported symptoms: Other, Shortness of Breath  Interventions for patient-reported symptoms: Other  Have your medications changed?: No  Do you have any questions related to your medications?: No  Do you currently have any active services?: Yes  Are you currently active with any services?: Home Health  Do you have any needs or concerns that I can assist you with?: No  Identified Barriers: None  Care Transitions Interventions     Other Services:  (Comment: Alva)   Other Interventions:            Follow Up  Future Appointments   Date Time Provider David Shen   5/19/2022 11:00 AM MD Toby Ayala   9/6/2022  2:30 PM MD Toby Ayala RN

## 2022-05-19 ENCOUNTER — OFFICE VISIT (OUTPATIENT)
Dept: FAMILY MEDICINE CLINIC | Age: 82
End: 2022-05-19
Payer: MEDICARE

## 2022-05-19 VITALS
RESPIRATION RATE: 16 BRPM | SYSTOLIC BLOOD PRESSURE: 106 MMHG | TEMPERATURE: 97 F | WEIGHT: 193 LBS | HEART RATE: 76 BPM | DIASTOLIC BLOOD PRESSURE: 72 MMHG | BODY MASS INDEX: 32.12 KG/M2

## 2022-05-19 DIAGNOSIS — E78.2 MIXED HYPERLIPIDEMIA: ICD-10-CM

## 2022-05-19 DIAGNOSIS — I48.91 ATRIAL FIBRILLATION WITH RVR (HCC): ICD-10-CM

## 2022-05-19 DIAGNOSIS — Z09 HOSPITAL DISCHARGE FOLLOW-UP: ICD-10-CM

## 2022-05-19 DIAGNOSIS — E11.9 TYPE 2 DIABETES MELLITUS WITHOUT COMPLICATION, WITHOUT LONG-TERM CURRENT USE OF INSULIN (HCC): ICD-10-CM

## 2022-05-19 DIAGNOSIS — I10 ESSENTIAL HYPERTENSION: Primary | ICD-10-CM

## 2022-05-19 PROCEDURE — 99495 TRANSJ CARE MGMT MOD F2F 14D: CPT | Performed by: FAMILY MEDICINE

## 2022-05-19 PROCEDURE — 1111F DSCHRG MED/CURRENT MED MERGE: CPT | Performed by: FAMILY MEDICINE

## 2022-05-19 ASSESSMENT — ENCOUNTER SYMPTOMS
ABDOMINAL PAIN: 0
BLOOD IN STOOL: 0
CHEST TIGHTNESS: 0
SHORTNESS OF BREATH: 0

## 2022-05-19 NOTE — PROGRESS NOTES
[facility]    Obesity (BMI 30.0-34. 9)    Tracheobronchitis    Allergic rhinitis    Glaucoma    S/p bilateral myringotomy with tube placement    GI bleed    Iron deficiency anemia    Paroxysmal atrial fibrillation (HCC)    Hemorrhage of rectum and anus    Intractable nausea and vomiting    Diastolic CHF, acute on chronic (HCC)    Atrial fibrillation with RVR ([facility])    Class 2 obesity due to excess calories in adult       Medications listed as ordered at the time of discharge from hospital     Medication List          Accurate as of May 19, 2022 11:18 AM. If you have any questions, ask your nurse or doctor.             CONTINUE taking these medications    acetaminophen 500 MG tablet  Commonly known as: TYLENOL     amiodarone 400 MG tablet  Commonly known as: PACERONE  Take 1 tablet by mouth daily     apixaban 5 MG Tabs tablet  Commonly known as: ELIQUIS     atenolol 100 MG tablet  Commonly known as: TENORMIN  TAKE 1 TABLET BY MOUTH EVERY DAY     bumetanide 1 MG tablet  Commonly known as: BUMEX  Take 1 tablet by mouth 2 times daily     cholestyramine 4 g packet  Commonly known as: QUESTRAN     dorzolamide 2 % ophthalmic solution  Commonly known as: TRUSOPT     doxazosin 4 MG tablet  Commonly known as: CARDURA  TAKE 1 TABLET BY MOUTH EVERY DAY     ferrous sulfate 325 (65 Fe) MG tablet  Commonly known as: IRON 325  Take 1 tablet by mouth daily (with breakfast)     latanoprost 0.005 % ophthalmic solution  Commonly known as: XALATAN     levothyroxine 100 MCG tablet  Commonly known as: SYNTHROID  TAKE 1 TABLET BY MOUTH EVERY DAY     Rhopressa 0.02 % Soln  Generic drug: Netarsudil Dimesylate     timolol 0.5 % ophthalmic solution  Commonly known as: TIMOPTIC        STOP taking these medications    metoclopramide 10 MG tablet  Commonly known as: REGLAN  Stopped by: Jamia eZlaya MD             Medications marked \"taking\" at this time  Outpatient Medications Marked as Taking for the 5/19/22 encounter (Office Visit) with Nabil Mcdonald MD   Medication Sig Dispense Refill    amiodarone (PACERONE) 400 MG tablet Take 1 tablet by mouth daily 30 tablet 0    bumetanide (BUMEX) 1 MG tablet Take 1 tablet by mouth 2 times daily 30 tablet 3    acetaminophen (TYLENOL) 500 MG tablet Take 500 mg by mouth at bedtime      levothyroxine (SYNTHROID) 100 MCG tablet TAKE 1 TABLET BY MOUTH EVERY DAY 90 tablet 1    doxazosin (CARDURA) 4 MG tablet TAKE 1 TABLET BY MOUTH EVERY DAY 90 tablet 1    ferrous sulfate (IRON 325) 325 (65 Fe) MG tablet Take 1 tablet by mouth daily (with breakfast) 30 tablet 3    atenolol (TENORMIN) 100 MG tablet TAKE 1 TABLET BY MOUTH EVERY DAY 90 tablet 1    apixaban (ELIQUIS) 5 MG TABS tablet Take 5 mg by mouth 2 times daily      RHOPRESSA 0.02 % SOLN Place 1 drop into both eyes nightly 10-130pm      timolol (TIMOPTIC) 0.5 % ophthalmic solution Place 1 drop into both eyes 2 times daily Give 15 min AFTER dorzolamide drops, 815am and 815pm      dorzolamide (TRUSOPT) 2 % ophthalmic solution Place 1 drop into both eyes 2 times daily 8am and 8pm  10    cholestyramine (QUESTRAN) 4 G packet Take 1 packet by mouth every evening   3    latanoprost (XALATAN) 0.005 % ophthalmic solution Place 1 drop into both eyes nightly 1230am          Medications patient taking as of now reconciled against medications ordered at time of hospital discharge: Yes    Review of Systems   Constitutional: Negative for chills and fever. HENT: Negative for congestion. Respiratory: Negative for chest tightness and shortness of breath. Cardiovascular: Negative for chest pain. Gastrointestinal: Negative for abdominal pain and blood in stool. Genitourinary: Negative for dysuria and hematuria. Skin: Negative for rash. Neurological: Negative for dizziness. Psychiatric/Behavioral: Negative for confusion.        Objective:    /72   Pulse 76   Temp 97 °F (36.1 °C)   Resp 16   Wt 193 lb (87.5 kg)   BMI 32.12 kg/m² Physical Exam  Vitals and nursing note reviewed. Constitutional:       General: She is not in acute distress. Appearance: She is well-developed. HENT:      Head: Normocephalic and atraumatic. Right Ear: Tympanic membrane, ear canal and external ear normal.      Left Ear: Tympanic membrane, ear canal and external ear normal.      Nose: Nose normal.      Mouth/Throat:      Mouth: Mucous membranes are moist.      Pharynx: Oropharynx is clear. Eyes:      General: No scleral icterus. Right eye: No discharge. Left eye: No discharge. Conjunctiva/sclera: Conjunctivae normal.   Cardiovascular:      Rate and Rhythm: Normal rate. Rhythm irregular. Heart sounds: Normal heart sounds. Pulmonary:      Effort: Pulmonary effort is normal. No respiratory distress. Breath sounds: Normal breath sounds. No wheezing. Abdominal:      General: There is no distension. Palpations: Abdomen is soft. Tenderness: There is no abdominal tenderness. Musculoskeletal:      Cervical back: Neck supple. Skin:     General: Skin is warm and dry. Findings: No rash. Neurological:      Mental Status: She is alert and oriented to person, place, and time. Psychiatric:         Mood and Affect: Mood normal.         Behavior: Behavior normal.         An electronic signature was used to authenticate this note.   --Parminder Ceja MD

## 2022-05-25 ENCOUNTER — CARE COORDINATION (OUTPATIENT)
Dept: CASE MANAGEMENT | Age: 82
End: 2022-05-25

## 2022-05-25 NOTE — CARE COORDINATION
[facility] Transitions Follow Up Call    2022    Patient: Sophie Boothe  Patient : 1940   MRN: [id number]  Reason for Admission: CHF, DM II, Atrial Fib  Discharge Date: 22 RARS: Readmission Risk Score: 14.2 ( )         Spoke with: Bal Chinchilla  Denies Chest pain, palpitations, nausea, sweating, dizziness. Is eating and sleeping OK. Lower extremetie edema has, \"gone down quite a bit-mostly my ankles\". Instructed to elevate above heart level when in her easy chair-V/U. Does not weigh herself daily, states that her,\"son will have to get the scale out from under the bed\". States that she weighed 190# at the doctor's office. Has constant sinus drainage that makes her cough occasionally-dry and non-productive. Feels like she is getting stronger-less SOB. Does not monitor her blood sugars. Care Transitions Subsequent and Final Call    Subsequent and Final Calls  Do you have any ongoing symptoms?: Yes  Onset of Patient-reported symptoms: Other  Patient-reported symptoms: Shortness of Breath, Weakness, Cough  Interventions for patient-reported symptoms: Other  Have your medications changed?: No  Do you have any questions related to your medications?: No  Do you currently have any active services?: Yes  Are you currently active with any services?: Home Health  Do you have any needs or concerns that I can assist you with?: No  Care Transitions Interventions     Other Services:  (Comment: [facility])   Other Interventions:         Care Transitions Follow Up Call    Needs to be reviewed by the provider   Additional needs identified to be addressed with provider: No  none             Method of communication with provider : none      Care Transition Nurse contacted the patient by telephone to follow up after admission. Verified name and  with patient as identifiers. Addressed changes since last contact: getting stronger, feeling better  Discussed follow-up appointments.  If no appointment was previously scheduled, appointment scheduling offered: completed. Is follow up appointment scheduled within 7 days of discharge? Yes. Advance Care Planning:   Does patient have an Advance Directive: reviewed and current, reviewed and needs to be updated, not on file; education provided, not on file, patient declined education, decision maker updated and referral to internal ACP facilitator. CTN reviewed discharge instructions, medical action plan and red flags with patient and discussed any barriers to care and/or understanding of plan of care after discharge. Discussed appropriate site of care based on symptoms and resources available to patient including: PCP  Specialist  When to call 911. The patient agrees to contact the PCP office for questions related to their healthcare. Patients top risk factors for readmission: medical condition-CHF, DM II, Atrial Fib  Interventions to address risk factors: Education of patient/family/caregiver/guardian to support self-management-as above and Assessment and support for treatment adherence and medication management-assessed for needs    Plan for follow-up call in 5-7 days based on severity of symptoms and risk factors.   Plan for next call: symptom management-assess  self management-assess          Follow Up  Future Appointments   Date Time Provider David Shen   9/6/2022  2:30 PM Marijane Canavan, MD [address] [place] [address] Street, RN

## 2022-06-01 ENCOUNTER — CARE COORDINATION (OUTPATIENT)
Dept: CASE MANAGEMENT | Age: 82
End: 2022-06-01

## 2022-06-01 NOTE — CARE COORDINATION
[facility] Transitions Follow Up Call    2022    Patient: Shine Coffey    Patient : 1940   MRN: [id number]    Reason for Admission:CHF, AF RVR  Discharge Date: 22   RARS: Readmission Risk Score: 14.2 ( )         Spoke with: patient - reviewed hospital f/u appt from , cardio appt     Care Transitions Subsequent and Final Call    Schedule Follow Up Appointment with PCP: Completed  Subsequent and Final Calls  Do you have any ongoing symptoms?: Yes  Interventions for patient-reported symptoms: Other  Do you have any questions related to your medications?: No  Do you currently have any active services?: Yes  Are you currently active with any services?: Home Health  Do you have any needs or concerns that I can assist you with?: No  Care Transitions Interventions    Pharmacist: Completed       Other Services:  (Comment: [facility])     Registered Dietician: Completed    Other Interventions:            Follow Up  Future Appointments   Date Time Provider David Shen   2022  2:30 PM MD Marjan Short RN

## 2022-06-07 ENCOUNTER — HOSPITAL ENCOUNTER (OUTPATIENT)
Age: 82
Discharge: HOME | End: 2022-06-07
Payer: MEDICARE

## 2022-06-07 LAB
ANION GAP SERPL CALCULATED.3IONS-SCNC: 10 MMOL/L (ref 9–17)
BUN BLDV-MCNC: 19 MG/DL (ref 8–23)
CALCIUM SERPL-MCNC: 9.1 MG/DL (ref 8.6–10.4)
CHLORIDE BLD-SCNC: 95 MMOL/L (ref 98–107)
CO2: 25 MMOL/L (ref 20–31)
CREAT SERPL-MCNC: 1.43 MG/DL (ref 0.5–0.9)
GFR AFRICAN AMERICAN: 42 ML/MIN
GFR NON-AFRICAN AMERICAN: 35 ML/MIN
GFR SERPL CREATININE-BSD FRML MDRD: ABNORMAL ML/MIN/{1.73_M2}
GLUCOSE BLD-MCNC: 104 MG/DL (ref 70–99)
POTASSIUM SERPL-SCNC: 4.4 MMOL/L (ref 3.7–5.3)
SODIUM BLD-SCNC: 130 MMOL/L (ref 135–144)

## 2022-06-07 PROCEDURE — 80048 BASIC METABOLIC PNL TOTAL CA: CPT

## 2022-06-07 PROCEDURE — 36415 COLL VENOUS BLD VENIPUNCTURE: CPT

## 2022-06-08 ENCOUNTER — CARE COORDINATION (OUTPATIENT)
Dept: CASE MANAGEMENT | Age: 82
End: 2022-06-08

## 2022-06-08 NOTE — CARE COORDINATION
[facility] Transitions Follow Up Call    2022    Patient: Diane Lancaster    Patient : 1940   MRN: [id number]    Reason for Admission: CHF, DM II, Atrial Fib  Discharge Date: 22   RARS: Readmission Risk Score: 14.2 ( )       Spoke with: patient - reports being slightly more SOB - no increase in weight or edema - says her right leg always is somewhat swollen. Continues  to urinate same amount on bumex. Weight same - 187. Patient is agreeable for CT to check for an appointment with PCP office  - she did see her [address] cardiologist  after discharge. Current with [facility] - will check for home visit. Care Transitions Subsequent and Final Call    Schedule Follow Up Appointment with PCP: Completed  Subsequent and Final Calls  Do you have any ongoing symptoms?: Yes  Patient-reported symptoms: Shortness of Breath  Interventions for patient-reported symptoms: Other  Have your medications changed?: No  Do you have any questions related to your medications?: No  Do you currently have any active services?: Yes  Are you currently active with any services?: Home Health  Care Transitions Interventions    Pharmacist: Completed       Other Services:  (Comment: [facility])     Registered Dietician: Completed    Other Interventions:             Needs to be reviewed by the provider   Additional needs identified to be addressed with provider: Yes  checking for appt             Method of communication with provider : checking appt       Care Transition Nurse contacted the patient by telephone to follow up after admission Verified name and  with patient as identifiers. Addressed changes since last contact: increased CASANOVA  Discussed follow-up appointments. If no appointment was previously scheduled, appointment scheduling offered: Yes. Is follow up appointment scheduled within 7 days of discharge? Yes.       Patients top risk factors for readmission: medical condition-CHF, PAF  medication management  multiple health system providers  Interventions to address risk factors: Scheduled appointment with PCP-5/19, Scheduled appointment with Lori Landry, Obtained and reviewed discharge summary and/or continuity of care documents and Communication with home health agencies or other community services the patient is currently using-[facility]      Non-[facility] follow up appointment(s): [facility] 5/23    CTN provided contact information for future needs. Plan for follow-up call in 1-2 days based on severity of symptoms and risk factors.   Plan for next call: symptom management-check weight, check CASANOVA, edema  follow up appointment-check appt PCP timing        Follow Up  Future Appointments   Date Time Provider David Shen   9/6/2022  2:30 PM MD Marjan Muse, RN

## 2022-06-09 ENCOUNTER — CARE COORDINATION (OUTPATIENT)
Dept: CASE MANAGEMENT | Age: 82
End: 2022-06-09

## 2022-06-09 NOTE — CARE COORDINATION
[facility] Transitions Follow Up    6/9/2022    Received order back from PCP for patient to take extra bumex dose today. Notified patient who verbalizes understanding & will have f/u with PCP tomorrow, 6/10.

## 2022-06-09 NOTE — CARE COORDINATION
[facility] Trans[facility] Follow Up Call    2022    Patient: Kristyn Cramer    Patient : 1940   MRN: [id number]    Reason for Admission: CHF, DM II, Atrial Fib  Discharge Date: 22   RARS: Readmission Risk Score: 14.2 ( )    Spoke with: patient, MackSana Hunt, ACM (plan to refer at end of transition), PCP office. CTN called patient this AM to reassess increased CASANOVA & swelling as of yesterday. Patient did inform that she gained one lb since yesterday - weight 188 today. She reports that she has gained a total of 3 lbs since her discharge. She is able to sit in chair with minimal SOB, but notices a significant increase of CASANOVA last couple days when walking across room with walker - has to stop & pace her activity. Some tightness described when taking deep breath. Contacted ACM that covers PCP office to request appt - (plan to refer to ACM once transition is completed) - will plan at this time to extend transition episode as related to change in symptoms. Lacy Masterson was able to schedule appt for tomorrow with PCP, 6/10 2pm.  Patient has had hospital f/u with PCP on  within 7 days from hospital DC - had  cardio appt with [facility]. Patient/son plan to call for another cardio appt - she is not clear on the \"breathing test\" that is ordered by [facility] & she was waiting to be contacted - will assist in checking this out - she says testing is related to amiodarone - assume PFT. In discussing recent change in symptoms with patient & clarifying medications - patient informed me that her bumex was decreased to once daily at her cardio appt on , but new dosage was not transferred to med list since cardio is in another hospital system. Noted added on med list & dosage will be clarified at PCP appt on 6/10.   Patient is also no longer on aldactone as of hospital DC on   I did clarify with patient that she is still on amiodarone & that refills were obtained at cardio appt since she was discharged from hospital with zero refills. Called patient back with appt time & date with PCP for tomorrow - verbalizes understanding. I left message on her (POA) son Deng's voicemail re: appt and any questions. Contacted [facility] to request same day visit today - they will call patient & arrange. Plan for next call: symptom management-review same CHF s/s - CASANOVA, edema - weight  follow up appointment-f/u PCP appt 6/10  medication management-reassess if any diuretic change      Care Transitions Subsequent and Final Call    Schedule Follow Up Appointment with PCP: Completed  Subsequent and Final Calls  Do you have any ongoing symptoms?: Yes  Patient-reported symptoms: Shortness of Breath - increased CASANOVA, ankle edema - right > left  Interventions for patient-reported symptoms: Notified Home Care, Notified PCP/Physician for appt  Have your medications changed?: Yes - patient informed me that as of 5/23 cardio appt - her bumex was decreased to once per day as opposed to twice daily as at hospital discharge. I updated medication note to indicate when bumex was decreased  Patient Reports: Patient informed me that her bumex was decreased to once daily from twice daily per [address] [place] @ 5/23 appt.    Do you have any questions related to your medications?: Yes  Patient Reports: bumex decreased 5/23 + aldactone was discontinued at discharge 5/12  Do you currently have any active services?: Yes  Are you currently active with any services?: Home Health  Do you have any needs or concerns that I can assist you with?: Yes (Comment: checking for appt & f/u for increased CASANOVA, weight)  Care Transitions Interventions    Pharmacist: Completed       Other Services:  (Comment: [facility])     Registered Dietician: Completed    Other Interventions:             Needs to be reviewed by the provider   Additional needs identified to be addressed with provider: Yes  increased CASANOVA & slight increase weight & SOB at rest on reduced bumex              Method of communication with provider : chart routing      Care Transition Nurse contacted the patient by telephone to follow up after admission. Verified name and  with patient as identifiers. Addressed changes since last contact: increased CASANOVA  Discussed follow-up appointments. If no appointment was previously scheduled, appointment scheduling offered: Yes. Is follow up appointment scheduled within 7 days of discharge? Yes. CTN reviewed discharge instructions, medical action plan and red flags with patient and discussed any barriers to care and/or understanding of plan of care after discharge. Discussed appropriate site of care based on symptoms and resources available to patient including: PCP  Specialist  Home health  CTN. The patient agrees to contact the PCP office for questions related to their healthcare. Patients top risk factors for readmission: medical condition-CHF, PAF  medication management  multiple health system providers  Interventions to address risk factors: Scheduled appointment with PCP- + 6/10, Scheduled appointment with Specialist-cardio, Obtained and reviewed discharge summary and/or continuity of care documents and Communication with home health agencies or other community services the patient is currently using-[facility]     CTN provided contact information for future needs. Plan for follow-up call in 1-2 days based on severity of symptoms and risk factors.   Plan for next call: symptom management-review same CHF s/s - CASANOVA, edema - weight  follow up appointment-f/u PCP appt 6/10  medication management-reassess if any diuretic change            Follow Up  Future Appointments   Date Time Provider David Hermelinda   6/10/2022  2:00 PM Suman Vásquez, [facility]   2022  2:30 PM Suman Vásquez MD [facility] Lloyd Gonzalez RN

## 2022-06-10 ENCOUNTER — OFFICE VISIT (OUTPATIENT)
Dept: FAMILY MEDICINE CLINIC | Age: 82
End: 2022-06-10
Payer: MEDICARE

## 2022-06-10 ENCOUNTER — CARE COORDINATION (OUTPATIENT)
Dept: CASE MANAGEMENT | Age: 82
End: 2022-06-10

## 2022-06-10 VITALS
DIASTOLIC BLOOD PRESSURE: 80 MMHG | OXYGEN SATURATION: 100 % | WEIGHT: 187.6 LBS | BODY MASS INDEX: 31.22 KG/M2 | HEART RATE: 58 BPM | SYSTOLIC BLOOD PRESSURE: 132 MMHG | RESPIRATION RATE: 22 BRPM

## 2022-06-10 DIAGNOSIS — E11.9 TYPE 2 DIABETES MELLITUS WITHOUT COMPLICATION, WITHOUT LONG-TERM CURRENT USE OF INSULIN (HCC): Primary | ICD-10-CM

## 2022-06-10 DIAGNOSIS — I50.33 DIASTOLIC CHF, ACUTE ON CHRONIC (HCC): ICD-10-CM

## 2022-06-10 DIAGNOSIS — I10 ESSENTIAL HYPERTENSION: ICD-10-CM

## 2022-06-10 DIAGNOSIS — E78.2 MIXED HYPERLIPIDEMIA: ICD-10-CM

## 2022-06-10 DIAGNOSIS — E03.9 HYPOTHYROIDISM, UNSPECIFIED TYPE: ICD-10-CM

## 2022-06-10 PROCEDURE — 1036F TOBACCO NON-USER: CPT | Performed by: FAMILY MEDICINE

## 2022-06-10 PROCEDURE — 3044F HG A1C LEVEL LT 7.0%: CPT | Performed by: FAMILY MEDICINE

## 2022-06-10 PROCEDURE — G8417 CALC BMI ABV UP PARAM F/U: HCPCS | Performed by: FAMILY MEDICINE

## 2022-06-10 PROCEDURE — 1090F PRES/ABSN URINE INCON ASSESS: CPT | Performed by: FAMILY MEDICINE

## 2022-06-10 PROCEDURE — 99214 OFFICE O/P EST MOD 30 MIN: CPT | Performed by: FAMILY MEDICINE

## 2022-06-10 PROCEDURE — G8400 PT W/DXA NO RESULTS DOC: HCPCS | Performed by: FAMILY MEDICINE

## 2022-06-10 PROCEDURE — G8427 DOCREV CUR MEDS BY ELIG CLIN: HCPCS | Performed by: FAMILY MEDICINE

## 2022-06-10 PROCEDURE — 1111F DSCHRG MED/CURRENT MED MERGE: CPT | Performed by: FAMILY MEDICINE

## 2022-06-10 PROCEDURE — 1123F ACP DISCUSS/DSCN MKR DOCD: CPT | Performed by: FAMILY MEDICINE

## 2022-06-10 ASSESSMENT — ENCOUNTER SYMPTOMS
BLOOD IN STOOL: 0
SHORTNESS OF BREATH: 1
ABDOMINAL PAIN: 0

## 2022-06-10 NOTE — CARE COORDINATION
[facility] Trans[facility] Follow Up Call    6/10/2022    Patient: Kyle Spangler    Patient : 1940   MRN: [id number]    Reason for Admission: CHF, DM II, Atrial Fib  Discharge Date: 22   RARS: Readmission Risk Score: 14.2 ( )       Spoke with: patient - had PCP appt earlier today. Reports that her CASANOVA is improved after increasing bumex to twice daily. No longer feels \"tight\" in ABD. She is requesting assistance to schedule with cardiologist - Dr Brenna Ontiveros - will assist.  PCP instructed patient to continue twice daily bumex for next 5 days, then alternate days with once per day doses & twice per day doses until seen by cardiology. Today's weight is 189, but patient says feels much better that ABD area is more comfortable & not bloated feeling. Plan for next call: symptom management-reassess weight, CASANOVA, ABD fullness, swelling on readjusted dose bumex  follow up appointment-check cardio appt - move up - Dr Chelsie Jorge  medication management-review bumex dose        Care Transitions Subsequent and Final Call    Schedule Follow Up Appointment with PCP: Completed  Subsequent and Final Calls  Do you have any ongoing symptoms?: Yes  Have your medications changed?: Yes  Patient Reports: increased bumex dose at PCP appt today  Do you currently have any active services?: Yes  Are you currently active with any services?: Home Health  Do you have any needs or concerns that I can assist you with?: Yes (Comment: wants help scheduling with cardiologist - will assist)  Care Transitions Interventions    Pharmacist: Completed       Other Services:  (Comment: [facility])     Registered Dietician: Completed    Other Interventions:             Needs to be reviewed by the provider   Additional needs identified to be addressed with provider: Yes  patient was seen by provider today who adjusted bumex dose.              Method of communication with provider : noted orders in chart      Care Transition Nurse contacted the patient by telephone to follow up after admission  Verified name and  with patient as identifiers. Addressed changes since last contact: medications-increased bumex  Discussed follow-up appointments. If no appointment was previously scheduled, appointment scheduling offered: Yes. Is follow up appointment scheduled within 7 days of discharge? Yes. CTN reviewed discharge instructions, medical action plan and red flags with patient and discussed any barriers to care and/or understanding of plan of care after discharge. Discussed appropriate site of care based on symptoms and resources available to patient including: PCP  Specialist  Home health  CTN. The patient agrees to contact the PCP office for questions related to their healthcare. Patients top risk factors for readmission: medical condition-CHF  medication management  multiple health system providers  Interventions to address risk factors: Scheduled appointment with PCP-completed, Scheduled appointment with Specialist-cardio, Obtained and reviewed discharge summary and/or continuity of care documents and Communication with home health agencies or other community services the patient is currently using-[facility]    CTN provided contact information for future needs. Plan for follow-up call in 5-7 days based on severity of symptoms and risk factors.   Plan for next call: symptom management-reassess weight, CASANOVA, ABD fullness, swelling on readjusted dose bumex  follow up appointment-check cardio appt - move up - Dr Mireya Cheney  medication management-review bumex dose        Follow Up  Future Appointments   Date Time Provider David Shen   2022  2:30 PM Roxy Clifton MD [facility] Wade, RN

## 2022-06-10 NOTE — PROGRESS NOTES
Subjective:      Patient ID: Carlie Jacobs is a 79 y.o. female. HPI   Visit Information    Have you changed or started any medications since your last visit including any over-the-counter medicines, vitamins, or herbal medicines? yes - bumex increased by pcp   Are you having any side effects from any of your medications? -  no  Have you stopped taking any of your medications? Is so, why? -  no    Have you seen any other physician or provider since your last visit? Yes - Records Obtained  Have you had any other diagnostic tests since your last visit? Yes - Records Obtained  Have you been seen in the emergency room and/or had an admission to a hospital since we last saw you? No  Have you had your routine dental cleaning in the past 6 months? no    Have you activated your [employer] account? If not, what are your barriers?  No     Patient Care Team:  Parminder Ceja MD as PCP - General (Family Medicine)  Parminder Ceja MD as PCP - [facility] Provider  Jeanmarie Virgen MD as Consulting Physician (Pulmonology)  Awa Rice MD as Surgeon (General Surgery)  Imtiaz Gilmore MD as Consulting Physician (Pain Management)  Allie Pollack MD as Surgeon (Orthopedic Surgery)  Lidya Acevedo MD as Consulting Physician (Gastroenterology)  Jenae Osborne RN as Care Transitions Nurse    Medical History Review  Past Medical, Family, and Social History reviewed and does contribute to the patient presenting condition    Health Maintenance   Topic Date Due    Annual Wellness Visit (AWV)  Never done    Shingles vaccine (1 of 2) Never done    DTaP/Tdap/Td vaccine (1 - Tdap) 10/02/2002    Depression Screen  03/04/2023    Flu vaccine  Completed    Pneumococcal 65+ years Vaccine  Completed    COVID-19 Vaccine  Completed    DEXA (modify frequency per FRAX score)  Addressed    Hepatitis A vaccine  Aged Out    Hib vaccine  Aged Out    Meningococcal (ACWY) vaccine  Aged Out     Patient is an 50-year-old obese white female who presents for diabetes, hypertension, hyperlipidemia, hypothyroidism. She was also hospitalized about a month ago for acute on chronic CHF and atrial fibrillation with RVR. She states about 3 weeks ago at her cardiologist's office, her Bumex dose was decreased to 1 mg daily. She states that since then she has been gradually becoming more short of breath until yesterday when she went back to taking Bumex 1 mg twice daily. She states that today she is  less short of breath and is feeling better and no longer feels like there is a band around her chest.  She denies any fever, chills, abdominal pain or chest pain. She is taking and tolerating her routine medication. Review of Systems   Constitutional: Negative for chills and fever. HENT: Negative for congestion. Respiratory: Positive for shortness of breath (  Improved since increasing her Bumex back to 1 mg twice daily). Cardiovascular: Negative for chest pain. Gastrointestinal: Negative for abdominal pain and blood in stool. Genitourinary: Negative for dysuria and hematuria. Skin: Negative for rash. Neurological: Negative for dizziness. Psychiatric/Behavioral: Negative for confusion and dysphoric mood. Objective:   Physical Exam  Vitals and nursing note reviewed. Constitutional:       General: She is not in acute distress. Appearance: She is well-developed. HENT:      Head: Normocephalic and atraumatic. Right Ear: Tympanic membrane, ear canal and external ear normal.      Left Ear: Tympanic membrane, ear canal and external ear normal.      Nose: Nose normal.      Mouth/Throat:      Mouth: Mucous membranes are moist.      Pharynx: Oropharynx is clear. Eyes:      General: No scleral icterus. Right eye: No discharge. Left eye: No discharge. Conjunctiva/sclera: Conjunctivae normal.   Neck:      Vascular: No JVD. Cardiovascular:      Rate and Rhythm: Normal rate. Rhythm irregular. Heart sounds: Normal heart sounds. Pulmonary:      Effort: Pulmonary effort is normal. No respiratory distress. Breath sounds: No wheezing. Comments: Bibasilar crackles auscultated  Abdominal:      General: There is no distension. Palpations: Abdomen is soft. Tenderness: There is no abdominal tenderness. Musculoskeletal:      Cervical back: Neck supple. Skin:     General: Skin is warm and dry. Findings: No rash. Neurological:      Mental Status: She is alert and oriented to person, place, and time. Psychiatric:         Mood and Affect: Mood normal.         Behavior: Behavior normal.         Assessment:       Diagnosis Orders   1. Type 2 diabetes mellitus without complication, without long-term current use of insulin ([address]     2. Diastolic CHF, acute on chronic (HCC)     3. Essential hypertension     4. Mixed hyperlipidemia     5.  Hypothyroidism, unspecified type             Plan:       Patient to resume Bumex 1 mg twice daily and follow-up with her cardiologist.  Continue other routine medication  Follow-up in 3 months

## 2022-06-13 ENCOUNTER — CARE COORDINATION (OUTPATIENT)
Dept: CASE MANAGEMENT | Age: 82
End: 2022-06-13

## 2022-06-14 NOTE — CARE COORDINATION
[facility] Transitions Follow Up Call    2022    Patient: Carmen Huff    Patient : 1940   MRN: [id number]    Reason for Admission: CHF, DM II, Atrial Fib  Discharge Date: 22   RARS: Readmission Risk Score: 14.2 ( )         Spoke with: patient on  &  - patient requested assistance to get appt scheduled with Cardiology - this time with Dr Salena Abdul, her primary cardiologist @ [address] [place] [address]. Denies any new symptoms of CHF back on bumex twice daily (increased last week per PCP) - later this week she was instructed by PCP to alternate doses on daily basis - one day twice daily Bumex, following day once daily & repeat pattern until cardiology gives other instructions. Noted that aldactone was discontinued when patient was discharged last month, . Increased weight & symptoms when cardio decreased bumex to once daily as of appt . PCP increased back to twice daily at urgent appt   6/10 & patient has been doing better ever since. Current weight & symptoms are stable. CTN contacted [facility] cardio today - scheduled appt with Dr Jacquelyn Villalpando this Friday,  2:15. Plan is to review diuretics, plan for patient to find out about pulmonary testing which was mentioned to her at last appt - related to amiodarone, but patient wasn't clear on what she needs to do. Informed patient of time,date of cardio appt - she plans to have her son take her to appt this Friday.     Plan for next call: symptom management-review CHF - s/s - weight, edema, ABD  follow up appointment-check cardio appt outcome  medication management-check bumex dose or any other cardio changes        Care Transitions Subsequent and Final Call    Schedule Follow Up Appointment with PCP: Completed  Subsequent and Final Calls  Do you have any ongoing symptoms?: No  Have your medications changed?: No  Do you have any questions related to your medications?: No  Do you currently have any active services?: Yes  Are you currently active with any services?: Home Health  Do you have any needs or concerns that I can assist you with?: Yes (Comment: I scheduled cardio appt for patient this week re: diuretic doses)  Care Transitions Interventions    Pharmacist: Completed       Other Services:  (Comment: [facility])     Registered Dietician: Completed    Other Interventions:             Needs to be reviewed by the provider   Additional needs identified to be addressed with provider: Yes  CTN contacted cardio - appt scheduled for this week             Method of communication with provider : phone      Care Transition Nurse contacted the patient by telephone to follow up after admission . Verified name and  with patient as identifiers. Addressed changes since last contact: cardio appt scheduled for   Discussed follow-up appointments. If no appointment was previously scheduled, appointment scheduling offered: Yes. Is follow up appointment scheduled within 7 days of discharge? Yes. CTN reviewed discharge instructions, medical action plan and red flags with patient and discussed any barriers to care and/or understanding of plan of care after discharge. Discussed appropriate site of care based on symptoms and resources available to patient including: PCP  Specialist  Home health  CTN. The patient agrees to contact the PCP office for questions related to their healthcare. Patients top risk factors for readmission: medical condition-CHF  medication management  multiple health system providers  Interventions to address risk factors: Obtained and reviewed discharge summary and/or continuity of care documents - appts scheduled      Non-[facility] follow up appointment(s): cardio Sally mujica   CTN provided contact information for future needs. Plan for follow-up call in 5-7 days based on severity of symptoms and risk factors.   Plan for next call: symptom management-review CHF - s/s - weight, edema, ABD  follow up appointment-check cardio appt outcome  medication management-check bumex dose or any other cardio changes        Follow Up  Future Appointments   Date Time Provider David Shen   9/6/2022  2:30 PM MD Marjan Lucas, RN

## 2022-06-17 ENCOUNTER — HOSPITAL ENCOUNTER (EMERGENCY)
Age: 82
Discharge: HOME | End: 2022-06-17
Attending: EMERGENCY MEDICINE
Payer: MEDICARE

## 2022-06-17 ENCOUNTER — APPOINTMENT (OUTPATIENT)
Dept: GENERAL RADIOLOGY | Age: 82
End: 2022-06-17
Payer: MEDICARE

## 2022-06-17 VITALS
HEIGHT: 65 IN | DIASTOLIC BLOOD PRESSURE: 55 MMHG | HEART RATE: 66 BPM | WEIGHT: 183 LBS | RESPIRATION RATE: 19 BRPM | BODY MASS INDEX: 30.49 KG/M2 | TEMPERATURE: 97.3 F | SYSTOLIC BLOOD PRESSURE: 127 MMHG | OXYGEN SATURATION: 96 %

## 2022-06-17 DIAGNOSIS — J18.9 PNEUMONIA OF RIGHT LOWER LOBE DUE TO INFECTIOUS ORGANISM: Primary | ICD-10-CM

## 2022-06-17 LAB
ABSOLUTE EOS #: 0 K/UL (ref 0–0.4)
ABSOLUTE LYMPH #: 0.6 K/UL (ref 1–4.8)
ABSOLUTE MONO #: 0.6 K/UL (ref 0.1–1.3)
ALBUMIN SERPL-MCNC: 3.6 G/DL (ref 3.5–5.2)
ALP BLD-CCNC: 94 U/L (ref 35–104)
ALT SERPL-CCNC: 9 U/L (ref 5–33)
ANION GAP SERPL CALCULATED.3IONS-SCNC: 12 MMOL/L (ref 9–17)
AST SERPL-CCNC: 17 U/L
BASOPHILS # BLD: 1 % (ref 0–2)
BASOPHILS ABSOLUTE: 0.1 K/UL (ref 0–0.2)
BILIRUB SERPL-MCNC: 1.24 MG/DL (ref 0.3–1.2)
BUN BLDV-MCNC: 15 MG/DL (ref 8–23)
CALCIUM SERPL-MCNC: 9.1 MG/DL (ref 8.6–10.4)
CHLORIDE BLD-SCNC: 97 MMOL/L (ref 98–107)
CO2: 24 MMOL/L (ref 20–31)
CREAT SERPL-MCNC: 1.28 MG/DL (ref 0.5–0.9)
EOSINOPHILS RELATIVE PERCENT: 1 % (ref 0–4)
GFR AFRICAN AMERICAN: 48 ML/MIN
GFR NON-AFRICAN AMERICAN: 40 ML/MIN
GFR SERPL CREATININE-BSD FRML MDRD: ABNORMAL ML/MIN/{1.73_M2}
GLUCOSE BLD-MCNC: 118 MG/DL (ref 70–99)
HCT VFR BLD CALC: 34.3 % (ref 36–46)
HEMOGLOBIN: 11.4 G/DL (ref 12–16)
INFLUENZA A: NOT DETECTED
INFLUENZA B: NOT DETECTED
LYMPHOCYTES # BLD: 8 % (ref 24–44)
MCH RBC QN AUTO: 32.1 PG (ref 26–34)
MCHC RBC AUTO-ENTMCNC: 33.3 G/DL (ref 31–37)
MCV RBC AUTO: 96.1 FL (ref 80–100)
MONOCYTES # BLD: 9 % (ref 1–7)
PDW BLD-RTO: 15 % (ref 11.5–14.9)
PLATELET # BLD: 84 K/UL (ref 150–450)
PMV BLD AUTO: 9.7 FL (ref 6–12)
POTASSIUM SERPL-SCNC: 4 MMOL/L (ref 3.7–5.3)
RBC # BLD: 3.56 M/UL (ref 4–5.2)
SARS-COV-2 RNA, RT PCR: NOT DETECTED
SEG NEUTROPHILS: 81 % (ref 36–66)
SEGMENTED NEUTROPHILS ABSOLUTE COUNT: 5.7 K/UL (ref 1.3–9.1)
SODIUM BLD-SCNC: 133 MMOL/L (ref 135–144)
SOURCE: NORMAL
SPECIMEN DESCRIPTION: NORMAL
TOTAL PROTEIN: 6.9 G/DL (ref 6.4–8.3)
WBC # BLD: 7 K/UL (ref 3.5–11)

## 2022-06-17 PROCEDURE — 80053 COMPREHEN METABOLIC PANEL: CPT

## 2022-06-17 PROCEDURE — 6370000000 HC RX 637 (ALT 250 FOR IP): Performed by: EMERGENCY MEDICINE

## 2022-06-17 PROCEDURE — 36415 COLL VENOUS BLD VENIPUNCTURE: CPT

## 2022-06-17 PROCEDURE — 71045 X-RAY EXAM CHEST 1 VIEW: CPT

## 2022-06-17 PROCEDURE — 87636 SARSCOV2 & INF A&B AMP PRB: CPT

## 2022-06-17 PROCEDURE — 99284 EMERGENCY DEPT VISIT MOD MDM: CPT

## 2022-06-17 PROCEDURE — 85025 COMPLETE CBC W/AUTO DIFF WBC: CPT

## 2022-06-17 RX ORDER — DOXYCYCLINE 100 MG/1
100 CAPSULE ORAL ONCE
Status: COMPLETED | OUTPATIENT
Start: 2022-06-17 | End: 2022-06-17

## 2022-06-17 RX ORDER — DOXYCYCLINE HYCLATE 100 MG
100 TABLET ORAL 2 TIMES DAILY
Qty: 14 TABLET | Refills: 0 | Status: SHIPPED | OUTPATIENT
Start: 2022-06-17 | End: 2022-06-24

## 2022-06-17 RX ADMIN — DOXYCYCLINE 100 MG: 100 CAPSULE ORAL at 18:02

## 2022-06-17 ASSESSMENT — ENCOUNTER SYMPTOMS
COUGH: 1
ABDOMINAL PAIN: 0
SHORTNESS OF BREATH: 0

## 2022-06-17 ASSESSMENT — PAIN - FUNCTIONAL ASSESSMENT: PAIN_FUNCTIONAL_ASSESSMENT: NONE - DENIES PAIN

## 2022-06-17 NOTE — ED PROVIDER NOTES
[facility]    Pt Name: Amador Hein  MRN: [id number]  [place] 1940  Date of evaluation: 6/17/22  CHIEF COMPLAINT       Chief Complaint   Patient presents with    Hemoptysis     HISTORY OF PRESENT ILLNESS     URI  Presenting symptoms: congestion and cough    Presenting symptoms: no fatigue and no fever    Presenting symptoms comment:  Laryngitis, cough mild blood tinged sputum  Severity:  Mild  Onset quality:  Gradual  Duration:  4 days  Timing:  Constant  Progression:  Unchanged  Chronicity:  New  Relieved by:  Nothing  Worsened by:  Nothing  Ineffective treatments:  None tried  having \"laryngitis\" symptoms and URI symptoms  Rapid covid neg last night at home  On xarelto for afib, PE, DVT      REVIEW OF SYSTEMS     Review of Systems   Constitutional: Negative for activity change, appetite change, diaphoresis, fatigue and fever. HENT: Positive for congestion. Respiratory: Positive for cough. Negative for shortness of breath. Cardiovascular: Negative for chest pain, palpitations and leg swelling. Gastrointestinal: Negative for abdominal pain. All other systems reviewed and are negative. PASTMEDICAL HISTORY     Past Medical History:   Diagnosis Date    Atrial fibrillation ([address] 3/28/2014    Chronic back pain     with rt. leg pain    Diverticulosis     GERD (gastroesophageal reflux disease)     Hyperlipidemia     Hypertension     Hypothyroidism     Obesity (BMI 30.0-34. 9) 8/12/2016    Onychomycosis     PE (pulmonary embolism)     H/O DVT of rt leg    Type II or unspecified type diabetes mellitus without mention of complication, not stated as uncontrolled      Past Problem List  Patient Active Problem List   Diagnosis Code    Mixed hyperlipidemia E78.2    Gastroesophageal reflux disease without esophagitis K21.9    Onychomycosis B35.1    Diverticulosis K57.90    Chronic back pain M54.9, G89.29    Hypothyroidism E03.9    Essential hypertension I10    Right bundle branch block I45.10    Chronic pulmonary embolism (HCC) I27.82    Type 2 diabetes mellitus without complication, without long-term current use of insulin (HCC) E11.9    Obesity (BMI 30.0-34. 9) E66.9    Tracheobronchitis J40    Allergic rhinitis J30.9    Glaucoma H40.9    S/p bilateral myringotomy with tube placement Z96.22    GI bleed K92.2    Iron deficiency anemia D50.9    Paroxysmal atrial fibrillation (HCC) I48.0    Hemorrhage of rectum and anus K62.5    Intractable nausea and vomiting B52.7    Diastolic CHF, acute on chronic (HCC) I50.33    Atrial fibrillation with RVR (HCC) I48.91    Class 2 obesity due to excess calories in adult E66.09     SURGICAL HISTORY       Past Surgical History:   Procedure Laterality Date    CHOLECYSTECTOMY      COLON SURGERY      s/p sigmoid cloectomy    COLONOSCOPY N/A 1/25/2021    COLONOSCOPY DIAGNOSTIC performed by Roberto Valerio MD at [facility] ([facility])      SIGMOIDOSCOPY N/A 12/2/2021    SIGMOIDOSCOPY DIAGNOSTIC FLEXIBLE performed by Roberto Valerio MD at [facility] 09/20/2019    DR IMAN GAUTHIER    UPPER GASTROINTESTINAL ENDOSCOPY N/A 12/2/2021    EGD DIAGNOSTIC ONLY performed by Roberto Valerio MD at [facility]      s/p     CURRENT MEDICATIONS       Previous Medications    ACETAMINOPHEN (TYLENOL) 500 MG TABLET    Take 500 mg by mouth at bedtime    AMIODARONE (PACERONE) 400 MG TABLET    Take 1 tablet by mouth daily    APIXABAN (ELIQUIS) 5 MG TABS TABLET    Take 5 mg by mouth 2 times daily    ATENOLOL (TENORMIN) 100 MG TABLET    TAKE 1 TABLET BY MOUTH EVERY DAY    BUMETANIDE (BUMEX) 1 MG TABLET    Take 1 tablet by mouth 2 times daily    CHOLESTYRAMINE (QUESTRAN) 4 G PACKET    Take 1 packet by mouth every evening     DORZOLAMIDE (TRUSOPT) 2 % OPHTHALMIC SOLUTION    Place 1 drop into both eyes 2 times daily 8am and 8pm    DOXAZOSIN (CARDURA) 4 MG TABLET    TAKE 1 TABLET BY MOUTH EVERY DAY    FERROUS SULFATE (IRON 325) 325 (65 FE) MG TABLET    Take 1 tablet by mouth daily (with breakfast)    LATANOPROST (XALATAN) 0.005 % OPHTHALMIC SOLUTION    Place 1 drop into both eyes nightly 1230am    LEVOTHYROXINE (SYNTHROID) 100 MCG TABLET    TAKE 1 TABLET BY MOUTH EVERY DAY    RHOPRESSA 0.02 % SOLN    Place 1 drop into both eyes nightly 10-130pm    TIMOLOL (TIMOPTIC) 0.5 % OPHTHALMIC SOLUTION    Place 1 drop into both eyes 2 times daily Give 15 min AFTER dorzolamide drops, 815am and 815pm     ALLERGIES     is allergic to avapro [irbesartan], ciprofloxacin hcl, cozaar [losartan potassium], diovan [valsartan], lipitor [atorvastatin calcium], lisinopril, pcn [penicillins], pravachol [pravastatin sodium], tape [adhesive tape], tramadol, zetia [ezetimibe], and morphine. FAMILY HISTORY     is adopted. SOCIAL HISTORY       Social History     Tobacco Use    Smoking status: Never Smoker    Smokeless tobacco: Never Used   Vaping Use    Vaping Use: Never used   Substance Use Topics    Alcohol use: No    Drug use: No     PHYSICAL EXAM     INITIAL VITALS: BP (!) 127/55   Pulse 66   Temp 97.3 °F (36.3 °C) (Temporal)   Resp 19   Ht 5' 5\" (1.651 m)   Wt 183 lb (83 kg)   SpO2 96%   BMI 30.45 kg/m²    Physical Exam  Constitutional:       General: She is not in acute distress. Appearance: Normal appearance. She is well-developed. She is not diaphoretic. HENT:      Head: Normocephalic and atraumatic. Right Ear: External ear normal.      Left Ear: External ear normal.      Nose: Nose normal. No congestion. Mouth/Throat:      Mouth: Mucous membranes are moist.      Pharynx: Oropharynx is clear. No oropharyngeal exudate or posterior oropharyngeal erythema.       Comments: No trismus  No drooling  Not in sniffing position  Phonating well  Tolerating secretions  Airway open and clear  No stridor  No edema in oral cavity or post pharynx    Eyes: General:         Right eye: No discharge. Left eye: No discharge. Conjunctiva/sclera: Conjunctivae normal.      Pupils: Pupils are equal, round, and reactive to light. Neck:      Trachea: No tracheal deviation. Cardiovascular:      Rate and Rhythm: Normal rate and regular rhythm. Pulses: Normal pulses. Heart sounds: Normal heart sounds. Pulmonary:      Effort: Pulmonary effort is normal. No respiratory distress. Breath sounds: Normal breath sounds. No stridor. No wheezing or rales. Abdominal:      Palpations: Abdomen is soft. Tenderness: There is no abdominal tenderness. There is no guarding or rebound. Musculoskeletal:         General: No tenderness or deformity. Normal range of motion. Cervical back: Normal range of motion and neck supple. Skin:     General: Skin is warm and dry. Capillary Refill: Capillary refill takes less than 2 seconds. Findings: No erythema or rash. Neurological:      General: No focal deficit present. Mental Status: She is alert and oriented to person, place, and time. Coordination: Coordination normal.   Psychiatric:         Mood and Affect: Mood normal.         Behavior: Behavior normal.         Thought Content:  Thought content normal.         Judgment: Judgment normal.         MEDICAL DECISION MAKING:       ED Course as of 06/17/22 1748   Fri Jun 17, 2022   1747 CURB 64 1 due to age  She has pcn allergy  Cannot give levaquin due to side effects with amiodarone  Will go with 7 days doxycycline bid  Discussed with patient anticipatory guidance, discharge instructions, follow up PCP 24 hours  Nontoxic well appearing, doesn't need admission  Do not suspect PE or sepsis or ami or acs [WM]      ED Course User Index  [WM] Polina Zaidi MD       Procedures    DIAGNOSTIC RESULTS     RADIOLOGY:All plain film, CT, MRI, and formal ultrasound images (except ED bedside ultrasound) are read by the radiologist, see reports below, unless otherwisenoted in MDM or here. XR CHEST PORTABLE   Final Result   Patchy right lower lobe airspace infiltrates. This could represent   atelectasis or pneumonia in the appropriate clinical setting. LABS: All lab results were reviewed by myself, and all abnormals are listed below. Labs Reviewed   CBC WITH AUTO DIFFERENTIAL - Abnormal; Notable for the following components:       Result Value    RBC 3.56 (*)     Hemoglobin 11.4 (*)     Hematocrit 34.3 (*)     RDW 15.0 (*)     Platelets 84 (*)     Seg Neutrophils 81 (*)     Lymphocytes 8 (*)     Monocytes 9 (*)     Absolute Lymph # 0.60 (*)     All other components within normal limits   COMPREHENSIVE METABOLIC PANEL - Abnormal; Notable for the following components:    Glucose 118 (*)     CREATININE 1.28 (*)     Sodium 133 (*)     Chloride 97 (*)     Total Bilirubin 1.24 (*)     GFR Non- 40 (*)     GFR  48 (*)     All other components within normal limits   COVID-19 & INFLUENZA COMBO       EMERGENCY DEPARTMENTCOURSE:         Vitals:    Vitals:    06/17/22 1518 06/17/22 1727   BP: (!) 122/49 (!) 127/55   Pulse: 55 66   Resp: 19    Temp: 97.3 °F (36.3 °C)    TempSrc: Temporal    SpO2: 95% 96%   Weight: 183 lb (83 kg)    Height: 5' 5\" (1.651 m)        The patient was given the following medications while in the emergency department:  Orders Placed This Encounter   Medications    doxycycline hyclate (VIBRA-TABS) 100 MG tablet     Sig: Take 1 tablet by mouth 2 times daily for 7 days     Dispense:  14 tablet     Refill:  0    doxycycline monohydrate (MONODOX) capsule 100 mg     Order Specific Question:   Antimicrobial Indications     Answer:   Pneumonia (CAP)       FINAL IMPRESSION      1.  Pneumonia of right lower lobe due to infectious organism          DISPOSITION/PLAN   DISPOSITION Decision To Discharge 06/17/2022 05:39:28 PM      PATIENT REFERRED TO:  Parminder Ceja MD  [facility] 1941

## 2022-06-21 ENCOUNTER — CARE COORDINATION (OUTPATIENT)
Dept: CASE MANAGEMENT | Age: 82
End: 2022-06-21

## 2022-06-21 NOTE — CARE COORDINATION
Nurse contacted the patient by telephone to follow up after admission . Verified name and  with patient as identifiers. Addressed changes since last contact: ED for RLL PNE  - on vibratabs for one week  Discussed follow-up appointments. If no appointment was previously scheduled, appointment scheduling offered: Yes. CTN reviewed discharge instructions, medical action plan and red flags with patient and discussed any barriers to care and/or understanding of plan of care after discharge. Discussed appropriate site of care based on symptoms and resources available to patient including: PCP  Specialist  Home health  CTN. The patient agrees to contact the PCP office for questions related to their healthcare. Patients top risk factors for readmission: medical condition-CHF, PNE  medication management  multiple health system providers  Interventions to address risk factors: Scheduled appointment with PCP-another PCP this week for ED f/u, Scheduled appointment with Specialist-cardio - will be rescheduled and Obtained and reviewed discharge summary and/or continuity of care documents      CTN provided contact information for future needs. Plan for follow-up call in 5-7 days based on severity of symptoms and risk factors.   Plan for next call: symptom management-reassess pulmonary - PNE, CHF  follow up appointment-check  appt f/u  referral to ambulatory care manager-plan to refer        Follow Up  Future Appointments   Date Time Provider David Shen   2022  3:30 PM MD Toby Luque   2022  2:30 PM MD Marjan Luque RN

## 2022-06-23 ENCOUNTER — OFFICE VISIT (OUTPATIENT)
Dept: FAMILY MEDICINE CLINIC | Age: 82
End: 2022-06-23
Payer: MEDICARE

## 2022-06-23 VITALS
TEMPERATURE: 97.8 F | RESPIRATION RATE: 16 BRPM | HEART RATE: 54 BPM | DIASTOLIC BLOOD PRESSURE: 64 MMHG | WEIGHT: 177.4 LBS | SYSTOLIC BLOOD PRESSURE: 132 MMHG | BODY MASS INDEX: 29.52 KG/M2 | OXYGEN SATURATION: 96 %

## 2022-06-23 DIAGNOSIS — R05.9 COUGH: ICD-10-CM

## 2022-06-23 DIAGNOSIS — I10 ESSENTIAL HYPERTENSION: ICD-10-CM

## 2022-06-23 DIAGNOSIS — J18.9 PNEUMONIA OF RIGHT LOWER LOBE DUE TO INFECTIOUS ORGANISM: Primary | ICD-10-CM

## 2022-06-23 PROCEDURE — G8427 DOCREV CUR MEDS BY ELIG CLIN: HCPCS | Performed by: FAMILY MEDICINE

## 2022-06-23 PROCEDURE — G8400 PT W/DXA NO RESULTS DOC: HCPCS | Performed by: FAMILY MEDICINE

## 2022-06-23 PROCEDURE — 1123F ACP DISCUSS/DSCN MKR DOCD: CPT | Performed by: FAMILY MEDICINE

## 2022-06-23 PROCEDURE — 99213 OFFICE O/P EST LOW 20 MIN: CPT | Performed by: FAMILY MEDICINE

## 2022-06-23 PROCEDURE — 1090F PRES/ABSN URINE INCON ASSESS: CPT | Performed by: FAMILY MEDICINE

## 2022-06-23 PROCEDURE — 1036F TOBACCO NON-USER: CPT | Performed by: FAMILY MEDICINE

## 2022-06-23 PROCEDURE — G8417 CALC BMI ABV UP PARAM F/U: HCPCS | Performed by: FAMILY MEDICINE

## 2022-06-23 RX ORDER — GUAIFENESIN AND CODEINE PHOSPHATE 100; 10 MG/5ML; MG/5ML
5 SOLUTION ORAL 4 TIMES DAILY PRN
Qty: 120 ML | Refills: 0 | Status: SHIPPED | OUTPATIENT
Start: 2022-06-23 | End: 2022-06-29

## 2022-06-23 SDOH — ECONOMIC STABILITY: FOOD INSECURITY: WITHIN THE PAST 12 MONTHS, YOU WORRIED THAT YOUR FOOD WOULD RUN OUT BEFORE YOU GOT MONEY TO BUY MORE.: NEVER TRUE

## 2022-06-23 SDOH — ECONOMIC STABILITY: FOOD INSECURITY: WITHIN THE PAST 12 MONTHS, THE FOOD YOU BOUGHT JUST DIDN'T LAST AND YOU DIDN'T HAVE MONEY TO GET MORE.: NEVER TRUE

## 2022-06-23 ASSESSMENT — PATIENT HEALTH QUESTIONNAIRE - PHQ9
SUM OF ALL RESPONSES TO PHQ QUESTIONS 1-9: 0
SUM OF ALL RESPONSES TO PHQ9 QUESTIONS 1 & 2: 0
2. FEELING DOWN, DEPRESSED OR HOPELESS: 0
1. LITTLE INTEREST OR PLEASURE IN DOING THINGS: 0

## 2022-06-23 ASSESSMENT — ENCOUNTER SYMPTOMS
ABDOMINAL PAIN: 0
CHEST TIGHTNESS: 0
WHEEZING: 0
COUGH: 1
BLOOD IN STOOL: 0
SHORTNESS OF BREATH: 0

## 2022-06-23 ASSESSMENT — SOCIAL DETERMINANTS OF HEALTH (SDOH): HOW HARD IS IT FOR YOU TO PAY FOR THE VERY BASICS LIKE FOOD, HOUSING, MEDICAL CARE, AND HEATING?: NOT HARD AT ALL

## 2022-06-23 ASSESSMENT — LIFESTYLE VARIABLES: HOW OFTEN DO YOU HAVE A DRINK CONTAINING ALCOHOL: NEVER

## 2022-06-23 NOTE — PROGRESS NOTES
Subjective:      Patient ID: Stephany Marroquin is a 79 y.o. female. HPI   Chronic Disease Visit Information    BP Readings from Last 3 Encounters:   06/23/22 132/64   06/17/22 (!) 127/55   06/10/22 132/80          Hemoglobin A1C (%)   Date Value   02/21/2022 4.9   07/12/2021 5.3   01/12/2021 4.9     Microalb/Crt. Ratio (mcg/mg creat)   Date Value   08/11/2020 CANNOT BE CALCULATED     LDL Cholesterol (mg/dL)   Date Value   02/21/2022 52     HDL (mg/dL)   Date Value   02/21/2022 65     BUN (mg/dL)   Date Value   06/17/2022 15     CREATININE (mg/dL)   Date Value   06/17/2022 1.28 (H)     Glucose (mg/dL)   Date Value   06/17/2022 118 (H)   03/17/2012 123 (H)            Have you changed or started any medications since your last visit including any over-the-counter medicines, vitamins, or herbal medicines? yes - DOXYCYCLINE   Are you having any side effects from any of your medications? -  yes - QUESTIONS DOXY AND EYE ISSSUES  Have you stopped taking any of your medications? Is so, why? -  no    Have you seen any other physician or provider since your last visit? Yes - Records Obtained  Have you had any other diagnostic tests since your last visit? Yes - Records Obtained  Have you been seen in the emergency room and/or had an admission to a hospital since we last saw you? Yes - Records Obtained  Have you had your annual diabetic retinal (eye) exam? Yes - Records Obtained 6-24-22 APPT  Have you had your routine dental cleaning in the past 6 months? yes     Have you activated your [employer] account? If not, what are your barriers?  No     Patient Care Team:  Andrew Booth MD as PCP - General (Family Medicine)  Andrew Booth MD as PCP - [address] [place] [address] [facility]  Christophe Hernández MD as Consulting Physician (Pulmonology)  Luz Blanco MD as Surgeon (General Surgery)  Corey Funez MD as Consulting Physician (Pain Management)  Kuldip Segovia MD as Surgeon (Orthopedic Surgery)  Austin Ramirez MD as Consulting Physician (Gastroenterology)  Kelsey Person RN as Care Transitions Nurse         Medical History Review  Past Medical, Family, and Social History reviewed and does contribute to the patient presenting condition    Health Maintenance   Topic Date Due    Annual Wellness Visit (AWV)  Never done    Shingles vaccine (1 of 2) Never done    DTaP/Tdap/Td vaccine (1 - Tdap) 10/02/2002    Depression Screen  03/04/2023    Flu vaccine  Completed    Pneumococcal 65+ years Vaccine  Completed    COVID-19 Vaccine  Completed    DEXA (modify frequency per FRAX score)  Addressed    Hepatitis A vaccine  Aged Out    Hib vaccine  Aged Out    Meningococcal (ACWY) vaccine  Aged Out   Patient is an 53-year-old white female with hypertension who presents for follow-up of recent ER visit on 6/17/2022 for right lower lobe pneumonia. Patient was discharged home on doxycycline which she has been taking and she states that she is feeling much better but still has a cough. She denies any fever, chills, chest pain, abdominal pain, shortness of breath. She states she is taking and tolerating her routine medications. Review of Systems   Constitutional: Negative for chills and fever. Respiratory: Positive for cough. Negative for chest tightness, shortness of breath and wheezing. Cardiovascular: Negative for chest pain. Gastrointestinal: Negative for abdominal pain and blood in stool. Genitourinary: Negative for dysuria and hematuria. Skin: Negative for rash. Objective:   Physical Exam  Vitals and nursing note reviewed. Constitutional:       General: She is not in acute distress. Appearance: She is well-developed. HENT:      Head: Normocephalic and atraumatic. Right Ear: External ear normal.      Left Ear: External ear normal.      Nose: Nose normal.      Mouth/Throat:      Mouth: Mucous membranes are moist.      Pharynx: Oropharynx is clear. Eyes:      General: No scleral icterus. Right eye: No discharge. Left eye: No discharge. Conjunctiva/sclera: Conjunctivae normal.   Cardiovascular:      Rate and Rhythm: Normal rate and regular rhythm. Heart sounds: Normal heart sounds. Pulmonary:      Effort: Pulmonary effort is normal. No respiratory distress. Breath sounds: Rales (  Few rales right lower lung) present. No wheezing. Abdominal:      General: There is no distension. Palpations: Abdomen is soft. Tenderness: There is no abdominal tenderness. Musculoskeletal:      Cervical back: Neck supple. Skin:     General: Skin is warm and dry. Findings: No rash. Neurological:      Mental Status: She is alert and oriented to person, place, and time. Psychiatric:         Mood and Affect: Mood normal.         Behavior: Behavior normal.         Assessment:       Diagnosis Orders   1. Pneumonia of right lower lobe due to infectious organism  XR CHEST (2 VW)   2. Cough  guaiFENesin-codeine (TUSSI-ORGANIDIN NR) 100-10 MG/5ML syrup   3. Essential hypertension             Plan:      Orders Placed This Encounter   Procedures    XR CHEST (2 VW)     Standing Status:   Future     Standing Expiration Date:   12/23/2022     Order Specific Question:   Reason for exam:     Answer:   Cough     Orders Placed This Encounter   Medications    guaiFENesin-codeine (TUSSI-ORGANIDIN NR) 100-10 MG/5ML syrup     Sig: Take 5 mLs by mouth 4 times daily as needed for Cough for up to 6 days.      Dispense:  120 mL     Refill:  0     Reduce doses taken as pain becomes manageable      Continue doxycycline as prescribed by ER physician   Continue routine medications  Follow-up in 2 to 3 months or sooner if needed

## 2022-06-28 ENCOUNTER — CARE COORDINATION (OUTPATIENT)
Dept: CASE MANAGEMENT | Age: 82
End: 2022-06-28

## 2022-06-28 NOTE — CARE COORDINATION
[facility] Transitions Final Follow Up Call    2022    Patient: Remington Zaman    Patient : 1940   MRN: [id number]    Reason for Admission: CHF, DM II, Atrial Fib - then RLL PNE (dx in ED )  Discharge Date: 22   RARS: Readmission Risk Score: 14.2 ( )    Spoke with: patient - reports doing better -voice is not as hoarse or congested. Cough improved - no longer taking cough medication. Patient was seen by PCP on  after ED visit on  with new diagnosis of RLL PNE. Completed course of vibratabs - still waiting to get her \"appetite back\" since that antibiotic caused some nausea. Weight is stable at 176 now - informs me that she has cut her bumex back to once per day for the most part now - swelling is minimal.  Patient follows with [facility] - Dr Alexsander Demarco. Receptive to [facility] referral.  CT episode resolved. Care Transitions Subsequent and Final Call    Schedule Follow Up Appointment with PCP: Completed  Subsequent and Final Calls  Do you have any ongoing symptoms?: Yes  Patient-reported symptoms: Other  Have your medications changed?: Yes  Patient Reports: completed course of vibratabs for RLL PNE  Do you have any questions related to your medications?: No  Do you currently have any active services?: Yes  Are you currently active with any services?: Home Health  Care Transitions Interventions    Pharmacist: Completed       Other Services: Completed (Comment: [facility] referral)     Registered Dietician: Completed    Other Interventions:         Patient has completed the Care Transitions program on 22  Patient/family has the ability to self-manage at this time. Patient accepted referral to the [facility] team for further management. Patient has Care Transition Nurse's contact information for any further questions, concerns, or needs.   Patients upcoming visits:    Future Appointments   Date Time Provider David Shen   2022  2:30 PM Nabil Mcdonald MD [address] [facility] Transition Summary:   [facility] admission - for CHF, AF RVR - amiodarone + eliquis +bumex. ED  for RLL PNE - vibratabs + repeat chest xray in early July. CT episode was extended b/o PNE. Patient: Kristyn Cramer   Patient : 1940        Is patient active with support services? yes [facility]  Continued Care Coordination Recommended:  yes - referral.      Problem List:   Patient Active Problem List   Diagnosis    Mixed hyperlipidemia    Gastroesophageal reflux disease without esophagitis    Onychomycosis    Diverticulosis    Chronic back pain    Hypothyroidism    Essential hypertension    Right bundle branch block    Chronic pulmonary embolism (HCC)    Type 2 diabetes mellitus without complication, without long-term current use of insulin (HCC)    Obesity (BMI 30.0-34. 9)    Tracheobronchitis    Allergic rhinitis    Glaucoma    S/p bilateral myringotomy with tube placement    GI bleed    Iron deficiency anemia    Paroxysmal atrial fibrillation (HCC)    Hemorrhage of rectum and anus    Intractable nausea and vomiting    Diastolic CHF, acute on chronic (HCC)    Atrial fibrillation with RVR (HCC)    Class 2 obesity due to excess calories in adult         Tate Spears RN

## 2022-07-01 ENCOUNTER — CARE COORDINATION (OUTPATIENT)
Dept: CARE COORDINATION | Age: 82
End: 2022-07-01

## 2022-07-01 ENCOUNTER — HOSPITAL ENCOUNTER (OUTPATIENT)
Age: 82
Discharge: HOME | End: 2022-07-01
Payer: MEDICARE

## 2022-07-01 LAB
ANION GAP SERPL CALCULATED.3IONS-SCNC: 7 MMOL/L (ref 9–17)
BUN BLDV-MCNC: 13 MG/DL (ref 8–23)
CALCIUM SERPL-MCNC: 9.7 MG/DL (ref 8.6–10.4)
CHLORIDE BLD-SCNC: 97 MMOL/L (ref 98–107)
CO2: 31 MMOL/L (ref 20–31)
CREAT SERPL-MCNC: 1.2 MG/DL (ref 0.5–0.9)
GFR AFRICAN AMERICAN: 52 ML/MIN
GFR NON-AFRICAN AMERICAN: 43 ML/MIN
GFR SERPL CREATININE-BSD FRML MDRD: ABNORMAL ML/MIN/{1.73_M2}
GLUCOSE BLD-MCNC: 118 MG/DL (ref 70–99)
POTASSIUM SERPL-SCNC: 4.4 MMOL/L (ref 3.7–5.3)
SODIUM BLD-SCNC: 135 MMOL/L (ref 135–144)

## 2022-07-01 PROCEDURE — 80048 BASIC METABOLIC PNL TOTAL CA: CPT

## 2022-07-01 PROCEDURE — 36415 COLL VENOUS BLD VENIPUNCTURE: CPT

## 2022-07-01 NOTE — CARE COORDINATION
Attempted to reach Kulwinder Ashok for ACM enrollment. No answer and the phone just rings. Will try to reach her on Tuesday.

## 2022-07-07 ENCOUNTER — CARE COORDINATION (OUTPATIENT)
Dept: CARE COORDINATION | Age: 82
End: 2022-07-07

## 2022-07-07 NOTE — LETTER
7/8/2022    [address] [place] [address] [address] [address]    Dear Jose Elias Amado,    I enjoyed speaking with you and wanted to send some additional information. Mason Prather MD and I will work together to ensure your needs are met and help you achieve your health goals. We are committed to walk with you on this journey and look forward to working with you. Please feel free to contact me with any questions or concerns. I am available by phone or for appointments at the office. You can reach me at [phone].       In good health,     Dwayne Giles RN

## 2022-07-11 ENCOUNTER — HOSPITAL ENCOUNTER (OUTPATIENT)
Age: 82
Discharge: HOME | End: 2022-07-13
Payer: MEDICARE

## 2022-07-11 ENCOUNTER — HOSPITAL ENCOUNTER (OUTPATIENT)
Age: 82
Discharge: HOME | End: 2022-07-11
Payer: MEDICARE

## 2022-07-11 ENCOUNTER — HOSPITAL ENCOUNTER (OUTPATIENT)
Dept: GENERAL RADIOLOGY | Age: 82
Discharge: HOME | End: 2022-07-13
Payer: MEDICARE

## 2022-07-11 DIAGNOSIS — J18.9 PNEUMONIA OF RIGHT LOWER LOBE DUE TO INFECTIOUS ORGANISM: ICD-10-CM

## 2022-07-11 LAB
ALBUMIN SERPL-MCNC: 3.5 G/DL (ref 3.5–5.2)
ALP BLD-CCNC: 88 U/L (ref 35–104)
ALT SERPL-CCNC: 20 U/L (ref 5–33)
AST SERPL-CCNC: 30 U/L
BILIRUB SERPL-MCNC: 0.57 MG/DL (ref 0.3–1.2)
BILIRUBIN DIRECT: 0.2 MG/DL
BILIRUBIN, INDIRECT: 0.37 MG/DL (ref 0–1)
THYROXINE, FREE: 1.73 NG/DL (ref 0.93–1.7)
TOTAL PROTEIN: 6.9 G/DL (ref 6.4–8.3)
TSH SERPL DL<=0.05 MIU/L-ACNC: 4.37 UIU/ML (ref 0.3–5)

## 2022-07-11 PROCEDURE — 84443 ASSAY THYROID STIM HORMONE: CPT

## 2022-07-11 PROCEDURE — 36415 COLL VENOUS BLD VENIPUNCTURE: CPT

## 2022-07-11 PROCEDURE — 71046 X-RAY EXAM CHEST 2 VIEWS: CPT

## 2022-07-11 PROCEDURE — 84439 ASSAY OF FREE THYROXINE: CPT

## 2022-07-11 PROCEDURE — 80076 HEPATIC FUNCTION PANEL: CPT

## 2022-07-11 RX ORDER — ATENOLOL 100 MG/1
TABLET ORAL
Qty: 90 TABLET | Refills: 1 | Status: SHIPPED | OUTPATIENT
Start: 2022-07-11 | End: 2022-08-11

## 2022-07-12 ENCOUNTER — CARE COORDINATION (OUTPATIENT)
Dept: CARE COORDINATION | Age: 82
End: 2022-07-12

## 2022-07-12 DIAGNOSIS — J18.9 PNEUMONIA OF RIGHT LOWER LOBE DUE TO INFECTIOUS ORGANISM: Primary | ICD-10-CM

## 2022-07-12 NOTE — CARE COORDINATION
Ambulatory Care Coordination Note  7/7/2022  CM Risk Score: 10  Charlson 10 Year Mortality Risk Score: 100%     ACC: Petey Frausto, RN    Summary Note: Екатерина Wisdom was a referral from [address] [place] [address] Drive team. Called and introduced self and reason for call. She was agreeable to talk and enrollment process started. She has a history of A. Fib, HTN, CHF, and hypothyroid. She follows [facility]- Dr. Usha Andersen. She states she is feeling pretty good. She denies any swelling, chest pain, or shortness of breath. Her weight this morning was 176.6 lbs, which is stable. Was 176 lbs on 6/28. Continues with the Bumex daily. She is able to afford her medications. She has [employer] Leong for her Part D plan. She lives alone, but states her son and daughter live close. Her son checks on her frequently and drives her to her appts. She said her daughter does her grocery shopping, but she cooks her own meals. The kids help with her housework. She denies any falls, and has a cane, walker, and wheelchair if needed. She said she has someone, through the [employer], that takes care of her yard. It is income based and she said very affordable. She said she has a cardiology follow up on 7/13, and she has lab work and a CXR to get done before. She denies any other needs today. PLAN:  Will follow up with Екатерина Wisdom next week to complete SDOH and med rec. Will discuss health goals.  to REMOTV letter and CHF Zone tool.        Ambulatory Care Coordination Assessment    Care Coordination Protocol  Referral from Primary Care Provider: No  Week 1 - Initial Assessment     Do you have all of your prescriptions and are they filled?: Yes (Comment: pharmacy had to order amio + bumex, but son is picking them up today)  Barriers to medication adherence: None  Are you able to afford your medications?: Yes  How often do you have trouble taking your medications the way you have been told to take them?: I always take them as prescribed. Do you have Home O2 Therapy?: No      Ability to seek help/take action for Emergent Urgent situations i.e. fire, crime, inclement weather or health crisis. : Independent  Ability to ambulate to restroom: Independent  Ability handle personal hygeine needs (bathing/dressing/grooming): Independent  Ability to manage Medications: Independent  Ability to prepare Food Preparation: Independent  Ability to maintain home (clean home, laundry): Needs Assistance  Ability to drive and/or has transportation: Dependent  Ability to do shopping: Dependent  Ability to manage finances: Needs Assistance  Is patient able to live independently?: Yes     Current Housing: Private Residence  Does the person that you care for see a [facility] PCP?: No        Per the Fall Risk Screening, did the patient have 2 or more falls or 1 fall with injury in the past year?: No     Frequent urination at night?: No  Do you use rails/bars?: Yes  Do you have a non-slip tub mat?: No     Are you experiencing loss of meaning?: No  Are you experiencing loss of hope and peace?: No     Thinking about your patient's physical health needs, are there any symptoms or problems (risk indicators) you are unsure about that require further investigation?: No identified areas of uncertainly or problems already being investigated   Are the patients physical health problems impacting on their mental well-being?: No identified areas of concern   Are there any problems with your patients lifestyle behaviors (alcohol, drugs, diet, exercise) that are impacting on physical or mental well-being?: No identified areas of concern   Do you have any other concerns about your patients mental well-being?  How would you rate their severity and impact on the patient?: No identified areas of concern   How would you rate their home environment in terms of safety and stability (including domestic violence, insecure housing, neighbor harassment)?: Consistently safe, supportive, stable, no identified problems   How do daily activities impact on the patient's well-being? (include current or anticipated unemployment, work, caregiving, access to transportation or other): No identified problems or perceived positive benefits   How would you rate their social network (family, work, friends)?: Adequate participation with social networks   How would you rate their financial resources (including ability to afford all required medical care)?: Financially secure, resources adequate, no identified problems   How wells does the patient now understand their health and well-being (symptoms, signs or risk factors) and what they need to do to manage their health?: Reasonable to good understanding and already engages in managing health or is willing to undertake better management   How well do you think your patient can engage in healthcare discussions? (Barriers include language, deafness, aphasia, alcohol or drug problems, learning difficulties, concentration): Clear and open communication, no identified barriers   Do other services need to be involved to help this patient?: Other care/services in place and adequate   Are current services involved with this patient well-coordinated? (Include coordination with other services you are now recommendation): Required care/services in place and adequately coordinated   Suggested Interventions and Community Resources   Zone Management Tools: In Process                  Prior to Admission medications    Medication Sig Start Date End Date Taking?  Authorizing Provider   atenolol (TENORMIN) 100 MG tablet TAKE 1 TABLET BY MOUTH EVERY DAY 7/11/22   Morales Stephens MD   amiodarone (PACERONE) 400 MG tablet Take 1 tablet by mouth daily 5/13/22 6/23/22  Pedro Verduzco MD   bumetanide (BUMEX) 1 MG tablet Take 1 tablet by mouth 2 times daily  Patient taking differently: Take 1 mg by mouth 2 times daily Pt states 6-9-22 dr jasper guajardo increased bumex 1 mg back to bid 5/12/22   Isis Deshpande MD   acetaminophen (TYLENOL) 500 MG tablet Take 500 mg by mouth at bedtime    Historical Provider, MD   levothyroxine (SYNTHROID) 100 MCG tablet TAKE 1 TABLET BY MOUTH EVERY DAY 4/11/22   Montse De La Paz MD   doxazosin (CARDURA) 4 MG tablet TAKE 1 TABLET BY MOUTH EVERY DAY 4/11/22   Montse De La Paz MD   ferrous sulfate (IRON 325) 325 (65 Fe) MG tablet Take 1 tablet by mouth daily (with breakfast) 4/5/22   Montse De La Paz MD   apixaban (ELIQUIS) 5 MG TABS tablet Take 5 mg by mouth 2 times daily 2/2/21   Historical Provider, MD   RHOPRESSA 0.02 % SOLN Place 1 drop into both eyes nightly 10-130pm 7/24/20   Historical Provider, MD   timolol (TIMOPTIC) 0.5 % ophthalmic solution Place 1 drop into both eyes 2 times daily Give 15 min AFTER dorzolamide drops, 815am and 815pm 8/3/20   Historical Provider, MD   dorzolamide (TRUSOPT) 2 % ophthalmic solution Place 1 drop into both eyes 2 times daily 8am and 8pm 11/9/18   Historical Provider, MD   cholestyramine Camille Schmitt) 4 G packet Take 1 packet by mouth every evening  4/14/16   Historical Provider, MD   latanoprost (XALATAN) 0.005 % ophthalmic solution Place 1 drop into both eyes nightly 1230am    Historical Provider, MD       Future Appointments   Date Time Provider David Hermelinda   9/6/2022  2:30 PM Montse De La Paz MD [facility] [place]      and   Congestive Heart Failure Assessment    Do you understand a low sodium diet?: Yes     No patient-reported symptoms      Symptoms:  CHF associated dyspnea on exertion: Neg, CHF associated leg swelling: Neg      Symptom course: stable  Patient-reported weight (lb): 176.6  Weight trend: stable  Salt intake watch compared to last visit: stable

## 2022-07-12 NOTE — CARE COORDINATION
CC introduction letter, CHF Zone tool, My [address] and Walk In Care information mailed to patient.

## 2022-07-18 ENCOUNTER — CARE COORDINATION (OUTPATIENT)
Dept: CARE COORDINATION | Age: 82
End: 2022-07-18

## 2022-07-18 ENCOUNTER — HOSPITAL ENCOUNTER (OUTPATIENT)
Age: 82
Discharge: HOME | End: 2022-07-18
Payer: MEDICARE

## 2022-07-18 PROCEDURE — 93005 ELECTROCARDIOGRAM TRACING: CPT | Performed by: INTERNAL MEDICINE

## 2022-07-19 LAB
EKG ATRIAL RATE: 62 BPM
EKG P AXIS: 84 DEGREES
EKG P-R INTERVAL: 236 MS
EKG Q-T INTERVAL: 474 MS
EKG QRS DURATION: 132 MS
EKG QTC CALCULATION (BAZETT): 481 MS
EKG R AXIS: -143 DEGREES
EKG T AXIS: 20 DEGREES
EKG VENTRICULAR RATE: 62 BPM

## 2022-07-19 PROCEDURE — 93010 ELECTROCARDIOGRAM REPORT: CPT | Performed by: INTERNAL MEDICINE

## 2022-07-22 NOTE — CARE COORDINATION
Ambulatory Care Coordination Note  7/18/2022    ACC: Marah Lewis, RN    Summary Note: Spoke with Teetee Chaparro for follow up. She said she was doing ok. She was upset that she went to have her EKG done at [facility] and the orders hadn't been sent from her cardiologist. She had to wait 30 minutes for them to get the orders sent over. She is scheduled for her echo on 7/29; as well as a PFT. She denies any symptoms today- no swelling, no chest pain or shortness of breath. Her weight is 175 lbs, which is stable. She said she has bilateral cataracts, the left worse than the right. She is having cataract surgery late August/early September. She had a f/u with cardiology on 7/13. She was instructed to take an extra dose of Bumex when her weight is 180 lbs or more. If she has to take more than 2 extra doses in a week, she is to call the cardiology office. PLAN:  Will follow up next week to assess for symptoms  Complete SDOH  Discuss health goals  Review Zone tools. Lab Results       None            Care Coordination Interventions    Referral from Primary Care Provider: No  Suggested Interventions and Community Resources  Zone Management Tools: Completed          Goals Addressed    None         Prior to Admission medications    Medication Sig Start Date End Date Taking?  Authorizing Provider   atenolol (TENORMIN) 100 MG tablet TAKE 1 TABLET BY MOUTH EVERY DAY 7/11/22   Lars Ortez MD   amiodarone (PACERONE) 400 MG tablet Take 1 tablet by mouth daily 5/13/22 6/23/22  Donna Goff MD   bumetanide (BUMEX) 1 MG tablet Take 1 tablet by mouth 2 times daily  Patient taking differently: Take 1 mg by mouth 2 times daily Pt states 6-9-22 dr jasper guajardo increased bumex 1 mg back to bid 5/12/22   Donna Goff MD   acetaminophen (TYLENOL) 500 MG tablet Take 500 mg by mouth at bedtime    Historical Provider, MD   levothyroxine (SYNTHROID) 100 MCG tablet TAKE 1 TABLET BY MOUTH EVERY DAY 4/11/22 Riddhi Klein MD   doxazosin (CARDURA) 4 MG tablet TAKE 1 TABLET BY MOUTH EVERY DAY 4/11/22   Riddhi Klein MD   ferrous sulfate (IRON 325) 325 (65 Fe) MG tablet Take 1 tablet by mouth daily (with breakfast) 4/5/22   Riddhi Klein MD   apixaban (ELIQUIS) 5 MG TABS tablet Take 5 mg by mouth 2 times daily 2/2/21   Historical Provider, MD   RHOPRESSA 0.02 % SOLN Place 1 drop into both eyes nightly 10-130pm 7/24/20   Historical Provider, MD   timolol (TIMOPTIC) 0.5 % ophthalmic solution Place 1 drop into both eyes 2 times daily Give 15 min AFTER dorzolamide drops, 815am and 815pm 8/3/20   Historical Provider, MD   dorzolamide (TRUSOPT) 2 % ophthalmic solution Place 1 drop into both eyes 2 times daily 8am and 8pm 11/9/18   Historical Provider, MD   cholestyramine Faith Manuel) 4 G packet Take 1 packet by mouth every evening  4/14/16   Historical Provider, MD   latanoprost (XALATAN) 0.005 % ophthalmic solution Place 1 drop into both eyes nightly 1230am    Historical Provider, MD       Future Appointments   Date Time Provider David Shen   7/29/2022  1:30 PM [facility] ECHO RM 1 [facility] ECHO  [place]   7/29/2022  2:45 PM [facility] RESP THERAPY  [facility] PFT [place]   9/6/2022  2:30 PM Riddhi Klein MD [place]    and   Congestive Heart Failure Assessment    Are you currently restricting fluids?: 2000cc  Do you understand a low sodium diet?: Yes     No patient-reported symptoms      Symptoms:  None: Yes      Symptom course: stable  Patient-reported weight (lb): 175  Weight trend: decreasing steadily  Salt intake watch compared to last visit: stable

## 2022-07-29 ENCOUNTER — HOSPITAL ENCOUNTER (OUTPATIENT)
Dept: PULMONOLOGY | Age: 82
Discharge: HOME | End: 2022-07-29
Payer: MEDICARE

## 2022-07-29 ENCOUNTER — HOSPITAL ENCOUNTER (OUTPATIENT)
Dept: NON INVASIVE DIAGNOSTICS | Age: 82
Discharge: HOME | End: 2022-07-29
Payer: MEDICARE

## 2022-07-29 DIAGNOSIS — T46.2X5A ADVERSE EFFECT OF AMIODARONE, INITIAL ENCOUNTER: ICD-10-CM

## 2022-07-29 DIAGNOSIS — R06.02 SHORTNESS OF BREATH: ICD-10-CM

## 2022-07-29 DIAGNOSIS — Z51.81 MEDICATION MONITORING ENCOUNTER: ICD-10-CM

## 2022-07-29 LAB
DLCO %PRED: NORMAL
DLCO PRED: NORMAL
DLCO/VA %PRED: NORMAL
DLCO/VA PRED: NORMAL
DLCO/VA: NORMAL
DLCO: NORMAL
EXPIRATORY TIME: NORMAL
FEF 25-75% %PRED-PRE: NORMAL
FEF 25-75% PRED: NORMAL
FEF 25-75%-PRE: NORMAL
FEV1 %PRED-PRE: NORMAL
FEV1 PRED: NORMAL
FEV1/FVC %PRED-PRE: NORMAL
FEV1/FVC PRED: NORMAL
FEV1/FVC: NORMAL
FEV1: NORMAL
FVC %PRED-PRE: NORMAL
FVC PRED: NORMAL
FVC: NORMAL
GAW %PRED: NORMAL
GAW PRED: NORMAL
GAW: NORMAL
IC %PRED: NORMAL
IC PRED: NORMAL
IC: NORMAL
LV EF: 58 %
LVEF MODALITY: NORMAL
MVV %PRED-PRE: NORMAL
MVV PRED: NORMAL
MVV-PRE: NORMAL
PEF %PRED-PRE: NORMAL
PEF PRED: NORMAL
PEF-PRE: NORMAL
RAW %PRED: NORMAL
RAW PRED: NORMAL
RAW: NORMAL
RV %PRED: NORMAL
RV PRED: NORMAL
RV: NORMAL
SVC %PRED: NORMAL
SVC PRED: NORMAL
SVC: NORMAL
TLC %PRED: NORMAL
TLC PRED: NORMAL
TLC: NORMAL
VA %PRED: NORMAL
VA PRED: NORMAL
VA: NORMAL
VTG %PRED: NORMAL
VTG PRED: NORMAL
VTG: NORMAL

## 2022-07-29 PROCEDURE — 94729 DIFFUSING CAPACITY: CPT

## 2022-07-29 PROCEDURE — 94060 EVALUATION OF WHEEZING: CPT

## 2022-07-29 PROCEDURE — 94726 PLETHYSMOGRAPHY LUNG VOLUMES: CPT

## 2022-07-29 PROCEDURE — 94375 RESPIRATORY FLOW VOLUME LOOP: CPT

## 2022-07-29 PROCEDURE — 94664 DEMO&/EVAL PT USE INHALER: CPT

## 2022-07-29 PROCEDURE — 93306 TTE W/DOPPLER COMPLETE: CPT

## 2022-08-02 RX ORDER — FERROUS SULFATE 325(65) MG
TABLET ORAL
Qty: 30 TABLET | Refills: 0 | Status: SHIPPED | OUTPATIENT
Start: 2022-08-02 | End: 2022-11-03

## 2022-08-05 ENCOUNTER — APPOINTMENT (OUTPATIENT)
Dept: CT IMAGING | Age: 82
DRG: 871 | End: 2022-08-05
Payer: MEDICARE

## 2022-08-05 ENCOUNTER — APPOINTMENT (OUTPATIENT)
Dept: GENERAL RADIOLOGY | Age: 82
DRG: 871 | End: 2022-08-05
Payer: MEDICARE

## 2022-08-05 ENCOUNTER — HOSPITAL ENCOUNTER (INPATIENT)
Age: 82
LOS: 6 days | Discharge: NURSING, REHAB OR LONG-TERM CARE | DRG: 871 | End: 2022-08-11
Attending: EMERGENCY MEDICINE | Admitting: FAMILY MEDICINE
Payer: MEDICARE

## 2022-08-05 DIAGNOSIS — N30.01 ACUTE CYSTITIS WITH HEMATURIA: ICD-10-CM

## 2022-08-05 DIAGNOSIS — R53.83 FATIGUE, UNSPECIFIED TYPE: ICD-10-CM

## 2022-08-05 DIAGNOSIS — N17.9 AKI (ACUTE KIDNEY INJURY) (HCC): Primary | ICD-10-CM

## 2022-08-05 PROBLEM — A41.9 SEPSIS (HCC): Status: ACTIVE | Noted: 2022-08-05

## 2022-08-05 PROBLEM — R55 SYNCOPE: Status: ACTIVE | Noted: 2022-08-05

## 2022-08-05 LAB
ABSOLUTE BANDS #: 0.08 K/UL (ref 0–1)
ABSOLUTE EOS #: 0 K/UL (ref 0–0.4)
ABSOLUTE LYMPH #: 0.23 K/UL (ref 1–4.8)
ABSOLUTE MONO #: 0.53 K/UL (ref 0.1–1.3)
ALBUMIN SERPL-MCNC: 3 G/DL (ref 3.5–5.2)
ALP BLD-CCNC: 90 U/L (ref 35–104)
ALT SERPL-CCNC: 15 U/L (ref 5–33)
ANION GAP SERPL CALCULATED.3IONS-SCNC: 12 MMOL/L (ref 9–17)
AST SERPL-CCNC: 30 U/L
BACTERIA: ABNORMAL
BANDS: 1 % (ref 0–10)
BASOPHILS # BLD: 0 % (ref 0–2)
BASOPHILS ABSOLUTE: 0 K/UL (ref 0–0.2)
BILIRUB SERPL-MCNC: 1.6 MG/DL (ref 0.3–1.2)
BILIRUBIN DIRECT: 0.91 MG/DL
BILIRUBIN URINE: NEGATIVE
BILIRUBIN, INDIRECT: 0.69 MG/DL (ref 0–1)
BUN BLDV-MCNC: 34 MG/DL (ref 8–23)
CALCIUM SERPL-MCNC: 8.6 MG/DL (ref 8.6–10.4)
CASTS UA: ABNORMAL /LPF
CHLORIDE BLD-SCNC: 93 MMOL/L (ref 98–107)
CO2: 22 MMOL/L (ref 20–31)
COLOR: YELLOW
CREAT SERPL-MCNC: 2.13 MG/DL (ref 0.5–0.9)
CREATININE URINE: 80 MG/DL (ref 28–217)
EOSINOPHILS RELATIVE PERCENT: 0 % (ref 0–4)
EPITHELIAL CELLS UA: ABNORMAL /HPF
GFR AFRICAN AMERICAN: 27 ML/MIN
GFR NON-AFRICAN AMERICAN: 22 ML/MIN
GFR SERPL CREATININE-BSD FRML MDRD: ABNORMAL ML/MIN/{1.73_M2}
GLUCOSE BLD-MCNC: 123 MG/DL (ref 70–99)
GLUCOSE URINE: NEGATIVE
HCT VFR BLD CALC: 29.4 % (ref 36–46)
HEMOGLOBIN: 9.7 G/DL (ref 12–16)
KETONES, URINE: NEGATIVE
LACTIC ACID: 1.1 MMOL/L (ref 0.5–2.2)
LACTIC ACID: 2.1 MMOL/L (ref 0.5–2.2)
LEUKOCYTE ESTERASE, URINE: ABNORMAL
LYMPHOCYTES # BLD: 3 % (ref 24–44)
MCH RBC QN AUTO: 32.3 PG (ref 26–34)
MCHC RBC AUTO-ENTMCNC: 33.1 G/DL (ref 31–37)
MCV RBC AUTO: 97.4 FL (ref 80–100)
MONOCYTES # BLD: 7 % (ref 1–7)
MORPHOLOGY: ABNORMAL
NITRITE, URINE: NEGATIVE
PARTIAL THROMBOPLASTIN TIME: 40.1 SEC (ref 24–36)
PDW BLD-RTO: 15.6 % (ref 11.5–14.9)
PH UA: 5 (ref 5–8)
PLATELET # BLD: 87 K/UL (ref 150–450)
PMV BLD AUTO: 10 FL (ref 6–12)
POTASSIUM SERPL-SCNC: 4.2 MMOL/L (ref 3.7–5.3)
PRO-BNP: 4215 PG/ML
PROTEIN UA: NEGATIVE
RBC # BLD: 3.01 M/UL (ref 4–5.2)
RBC UA: ABNORMAL /HPF
SARS-COV-2, RAPID: NOT DETECTED
SEG NEUTROPHILS: 89 % (ref 36–66)
SEGMENTED NEUTROPHILS ABSOLUTE COUNT: 6.76 K/UL (ref 1.3–9.1)
SODIUM BLD-SCNC: 127 MMOL/L (ref 135–144)
SODIUM,UR: <20 MMOL/L
SPECIFIC GRAVITY UA: 1.01 (ref 1–1.03)
SPECIMEN DESCRIPTION: NORMAL
TOTAL CK: 309 U/L (ref 26–192)
TOTAL PROTEIN: 6 G/DL (ref 6.4–8.3)
TROPONIN, HIGH SENSITIVITY: 24 NG/L (ref 0–14)
TROPONIN, HIGH SENSITIVITY: 27 NG/L (ref 0–14)
TURBIDITY: ABNORMAL
URINE HGB: ABNORMAL
UROBILINOGEN, URINE: NORMAL
WBC # BLD: 7.6 K/UL (ref 3.5–11)
WBC UA: ABNORMAL /HPF

## 2022-08-05 PROCEDURE — 83880 ASSAY OF NATRIURETIC PEPTIDE: CPT

## 2022-08-05 PROCEDURE — 71045 X-RAY EXAM CHEST 1 VIEW: CPT

## 2022-08-05 PROCEDURE — 87088 URINE BACTERIA CULTURE: CPT

## 2022-08-05 PROCEDURE — 2580000003 HC RX 258: Performed by: EMERGENCY MEDICINE

## 2022-08-05 PROCEDURE — 96366 THER/PROPH/DIAG IV INF ADDON: CPT

## 2022-08-05 PROCEDURE — 2500000003 HC RX 250 WO HCPCS

## 2022-08-05 PROCEDURE — 83605 ASSAY OF LACTIC ACID: CPT

## 2022-08-05 PROCEDURE — 87040 BLOOD CULTURE FOR BACTERIA: CPT

## 2022-08-05 PROCEDURE — 36415 COLL VENOUS BLD VENIPUNCTURE: CPT

## 2022-08-05 PROCEDURE — 80076 HEPATIC FUNCTION PANEL: CPT

## 2022-08-05 PROCEDURE — 2580000003 HC RX 258: Performed by: FAMILY MEDICINE

## 2022-08-05 PROCEDURE — 72125 CT NECK SPINE W/O DYE: CPT

## 2022-08-05 PROCEDURE — 6360000002 HC RX W HCPCS

## 2022-08-05 PROCEDURE — 96365 THER/PROPH/DIAG IV INF INIT: CPT

## 2022-08-05 PROCEDURE — 6370000000 HC RX 637 (ALT 250 FOR IP)

## 2022-08-05 PROCEDURE — 84484 ASSAY OF TROPONIN QUANT: CPT

## 2022-08-05 PROCEDURE — 6370000000 HC RX 637 (ALT 250 FOR IP): Performed by: FAMILY MEDICINE

## 2022-08-05 PROCEDURE — 93005 ELECTROCARDIOGRAM TRACING: CPT

## 2022-08-05 PROCEDURE — 87086 URINE CULTURE/COLONY COUNT: CPT

## 2022-08-05 PROCEDURE — 85730 THROMBOPLASTIN TIME PARTIAL: CPT

## 2022-08-05 PROCEDURE — 70450 CT HEAD/BRAIN W/O DYE: CPT

## 2022-08-05 PROCEDURE — 99285 EMERGENCY DEPT VISIT HI MDM: CPT

## 2022-08-05 PROCEDURE — 96375 TX/PRO/DX INJ NEW DRUG ADDON: CPT

## 2022-08-05 PROCEDURE — 87635 SARS-COV-2 COVID-19 AMP PRB: CPT

## 2022-08-05 PROCEDURE — 87186 SC STD MICRODIL/AGAR DIL: CPT

## 2022-08-05 PROCEDURE — 84300 ASSAY OF URINE SODIUM: CPT

## 2022-08-05 PROCEDURE — 85025 COMPLETE CBC W/AUTO DIFF WBC: CPT

## 2022-08-05 PROCEDURE — 72100 X-RAY EXAM L-S SPINE 2/3 VWS: CPT

## 2022-08-05 PROCEDURE — 73502 X-RAY EXAM HIP UNI 2-3 VIEWS: CPT

## 2022-08-05 PROCEDURE — 82570 ASSAY OF URINE CREATININE: CPT

## 2022-08-05 PROCEDURE — 80048 BASIC METABOLIC PNL TOTAL CA: CPT

## 2022-08-05 PROCEDURE — 2580000003 HC RX 258: Performed by: INTERNAL MEDICINE

## 2022-08-05 PROCEDURE — 06HY33Z INSERTION OF INFUSION DEVICE INTO LOWER VEIN, PERCUTANEOUS APPROACH: ICD-10-PCS | Performed by: FAMILY MEDICINE

## 2022-08-05 PROCEDURE — 81001 URINALYSIS AUTO W/SCOPE: CPT

## 2022-08-05 PROCEDURE — 6360000002 HC RX W HCPCS: Performed by: INTERNAL MEDICINE

## 2022-08-05 PROCEDURE — 36556 INSERT NON-TUNNEL CV CATH: CPT

## 2022-08-05 PROCEDURE — 2000000000 HC ICU R&B

## 2022-08-05 PROCEDURE — 2580000003 HC RX 258

## 2022-08-05 PROCEDURE — 82550 ASSAY OF CK (CPK): CPT

## 2022-08-05 PROCEDURE — 2500000003 HC RX 250 WO HCPCS: Performed by: EMERGENCY MEDICINE

## 2022-08-05 RX ORDER — ACETAMINOPHEN 650 MG/1
650 SUPPOSITORY RECTAL EVERY 6 HOURS PRN
Status: DISCONTINUED | OUTPATIENT
Start: 2022-08-05 | End: 2022-08-11 | Stop reason: HOSPADM

## 2022-08-05 RX ORDER — ACETAMINOPHEN 500 MG
1000 TABLET ORAL ONCE
Status: COMPLETED | OUTPATIENT
Start: 2022-08-05 | End: 2022-08-05

## 2022-08-05 RX ORDER — NOREPINEPHRINE BIT/0.9 % NACL 16MG/250ML
1-100 INFUSION BOTTLE (ML) INTRAVENOUS CONTINUOUS
Status: DISCONTINUED | OUTPATIENT
Start: 2022-08-05 | End: 2022-08-09

## 2022-08-05 RX ORDER — MAGNESIUM SULFATE HEPTAHYDRATE 40 MG/ML
2000 INJECTION, SOLUTION INTRAVENOUS ONCE
Status: COMPLETED | OUTPATIENT
Start: 2022-08-05 | End: 2022-08-05

## 2022-08-05 RX ORDER — MAGNESIUM SULFATE 1 G/100ML
1000 INJECTION INTRAVENOUS PRN
Status: DISCONTINUED | OUTPATIENT
Start: 2022-08-05 | End: 2022-08-05

## 2022-08-05 RX ORDER — SIMETHICONE 80 MG
80 TABLET,CHEWABLE ORAL EVERY 6 HOURS PRN
COMMUNITY

## 2022-08-05 RX ORDER — AMIODARONE HYDROCHLORIDE 400 MG/1
400 TABLET ORAL DAILY
COMMUNITY
End: 2022-08-11

## 2022-08-05 RX ORDER — ACETAMINOPHEN 325 MG/1
650 TABLET ORAL EVERY 6 HOURS PRN
Status: DISCONTINUED | OUTPATIENT
Start: 2022-08-05 | End: 2022-08-11 | Stop reason: HOSPADM

## 2022-08-05 RX ORDER — SODIUM CHLORIDE 0.9 % (FLUSH) 0.9 %
10 SYRINGE (ML) INJECTION PRN
Status: DISCONTINUED | OUTPATIENT
Start: 2022-08-05 | End: 2022-08-11 | Stop reason: HOSPADM

## 2022-08-05 RX ORDER — SODIUM CHLORIDE 9 MG/ML
INJECTION, SOLUTION INTRAVENOUS CONTINUOUS
Status: DISCONTINUED | OUTPATIENT
Start: 2022-08-05 | End: 2022-08-06

## 2022-08-05 RX ORDER — POTASSIUM CHLORIDE 20 MEQ/1
40 TABLET, EXTENDED RELEASE ORAL PRN
Status: DISCONTINUED | OUTPATIENT
Start: 2022-08-05 | End: 2022-08-05

## 2022-08-05 RX ORDER — POTASSIUM CHLORIDE 7.45 MG/ML
10 INJECTION INTRAVENOUS PRN
Status: DISCONTINUED | OUTPATIENT
Start: 2022-08-05 | End: 2022-08-05

## 2022-08-05 RX ORDER — SODIUM CHLORIDE 9 MG/ML
INJECTION, SOLUTION INTRAVENOUS PRN
Status: DISCONTINUED | OUTPATIENT
Start: 2022-08-05 | End: 2022-08-11 | Stop reason: HOSPADM

## 2022-08-05 RX ORDER — SODIUM CHLORIDE 0.9 % (FLUSH) 0.9 %
10 SYRINGE (ML) INJECTION EVERY 12 HOURS SCHEDULED
Status: DISCONTINUED | OUTPATIENT
Start: 2022-08-05 | End: 2022-08-11 | Stop reason: HOSPADM

## 2022-08-05 RX ORDER — ONDANSETRON 4 MG/1
4 TABLET, ORALLY DISINTEGRATING ORAL EVERY 8 HOURS PRN
Status: DISCONTINUED | OUTPATIENT
Start: 2022-08-05 | End: 2022-08-11 | Stop reason: HOSPADM

## 2022-08-05 RX ORDER — ONDANSETRON 2 MG/ML
4 INJECTION INTRAMUSCULAR; INTRAVENOUS EVERY 6 HOURS PRN
Status: DISCONTINUED | OUTPATIENT
Start: 2022-08-05 | End: 2022-08-11 | Stop reason: HOSPADM

## 2022-08-05 RX ORDER — 0.9 % SODIUM CHLORIDE 0.9 %
1000 INTRAVENOUS SOLUTION INTRAVENOUS ONCE
Status: COMPLETED | OUTPATIENT
Start: 2022-08-05 | End: 2022-08-05

## 2022-08-05 RX ORDER — SODIUM CHLORIDE 9 MG/ML
30 INJECTION, SOLUTION INTRAVENOUS ONCE
Status: COMPLETED | OUTPATIENT
Start: 2022-08-05 | End: 2022-08-05

## 2022-08-05 RX ORDER — SODIUM CHLORIDE, SODIUM LACTATE, POTASSIUM CHLORIDE, AND CALCIUM CHLORIDE .6; .31; .03; .02 G/100ML; G/100ML; G/100ML; G/100ML
1000 INJECTION, SOLUTION INTRAVENOUS ONCE
Status: COMPLETED | OUTPATIENT
Start: 2022-08-05 | End: 2022-08-05

## 2022-08-05 RX ORDER — FERROUS SULFATE 325(65) MG
325 TABLET ORAL 2 TIMES DAILY WITH MEALS
Status: DISCONTINUED | OUTPATIENT
Start: 2022-08-06 | End: 2022-08-11 | Stop reason: HOSPADM

## 2022-08-05 RX ORDER — LATANOPROST 50 UG/ML
1 SOLUTION/ DROPS OPHTHALMIC NIGHTLY
Status: DISCONTINUED | OUTPATIENT
Start: 2022-08-05 | End: 2022-08-11 | Stop reason: HOSPADM

## 2022-08-05 RX ORDER — ZINC OXIDE 13 %
1 CREAM (GRAM) TOPICAL DAILY
COMMUNITY

## 2022-08-05 RX ORDER — LEVOTHYROXINE SODIUM 0.1 MG/1
100 TABLET ORAL DAILY
Status: DISCONTINUED | OUTPATIENT
Start: 2022-08-06 | End: 2022-08-11 | Stop reason: HOSPADM

## 2022-08-05 RX ADMIN — SODIUM CHLORIDE, POTASSIUM CHLORIDE, SODIUM LACTATE AND CALCIUM CHLORIDE 1000 ML: 600; 310; 30; 20 INJECTION, SOLUTION INTRAVENOUS at 21:45

## 2022-08-05 RX ADMIN — MAGNESIUM SULFATE HEPTAHYDRATE 2000 MG: 40 INJECTION, SOLUTION INTRAVENOUS at 16:17

## 2022-08-05 RX ADMIN — SODIUM CHLORIDE, PRESERVATIVE FREE 10 ML: 5 INJECTION INTRAVENOUS at 21:30

## 2022-08-05 RX ADMIN — CEFTRIAXONE SODIUM 1000 MG: 1 INJECTION, POWDER, FOR SOLUTION INTRAMUSCULAR; INTRAVENOUS at 19:09

## 2022-08-05 RX ADMIN — SODIUM CHLORIDE 1000 ML: 9 INJECTION, SOLUTION INTRAVENOUS at 16:06

## 2022-08-05 RX ADMIN — ACETAMINOPHEN 1000 MG: 500 TABLET ORAL at 16:17

## 2022-08-05 RX ADMIN — ACETAMINOPHEN 650 MG: 325 TABLET, FILM COATED ORAL at 21:53

## 2022-08-05 RX ADMIN — Medication 5 MCG/MIN: at 19:08

## 2022-08-05 RX ADMIN — SODIUM CHLORIDE: 9 INJECTION, SOLUTION INTRAVENOUS at 17:39

## 2022-08-05 RX ADMIN — MEROPENEM 1000 MG: 1 INJECTION, POWDER, FOR SOLUTION INTRAVENOUS at 23:00

## 2022-08-05 RX ADMIN — SODIUM CHLORIDE 30 ML/KG/HR: 9 INJECTION, SOLUTION INTRAVENOUS at 20:14

## 2022-08-05 ASSESSMENT — ENCOUNTER SYMPTOMS
NAUSEA: 0
PHOTOPHOBIA: 0
RHINORRHEA: 1
SHORTNESS OF BREATH: 0
BACK PAIN: 1
COUGH: 0
VOMITING: 0
WHEEZING: 0

## 2022-08-05 ASSESSMENT — PAIN SCALES - GENERAL: PAINLEVEL_OUTOF10: 6

## 2022-08-05 ASSESSMENT — PAIN DESCRIPTION - LOCATION: LOCATION: BACK;LEG

## 2022-08-05 ASSESSMENT — PAIN DESCRIPTION - DESCRIPTORS: DESCRIPTORS: ACHING;DISCOMFORT

## 2022-08-05 NOTE — ED PROVIDER NOTES
EMERGENCY DEPARTMENT ENCOUNTER   ATTENDING ATTESTATION     Pt Name: Phuong Villaseñor  MRN: [id number]  [place] 1940  Date of evaluation: 8/5/22       Phuong Villaseñor is a 79 y.o. female who presents with Dizziness and Fatigue      MDM: 79-year-old female presents for complaints of dizziness and fatigue. Patient reportedly has had 2 different falls in the last 24 hours, and reportedly has been having increased fatigue and generalized weakness since that time. Patient denies hitting her head, reports she is unsure if she lost consciousness, denies any new numbness tingling or weakness in the upper or lower extremities, denies any neck or back pain. On initial exam patient in no acute distress, is noted to be hypotensive, no focal neurodeficits on exam, will check labs and imaging, patient was also given fluid boluses without significant improvement of her blood pressure, sepsis work-up was started as well patient was started on broad-spectrum antibiotics, blood pressure did not respond to fluids and decision was made to place central line patient needed to be started on vasopressors as well    Labs reviewed patient was noted to have an JOSH, UA is concerning for possible urinary tract infection was started on Rocephin, blood pressure improved after vasopressor initiation, will admit for further treatment, discussed results with patient and family, discussed need for admission and they are agreeable    Critical care: 33 min    Spoke with Dr. Veena Cui who accepts admission with critical care consult. Patient demonstrates understanding and agreement with the plan, was given the opportunity to ask questions, and these questions were answered to the best of the provided information at this time. VS stable for transfer. This dictation was prepared using [facility] [facility] [employer] voice recognition software.        Vitals:   Vitals:    08/05/22 1930 08/05/22 2015 08/05/22 2030 08/05/22 2100   BP: (!) 86/49 (!) 97/51 95/60 (!) 129/54   Pulse: 69 70 70 63   Resp: 17 22 17 17   Temp:    97.5 °F (36.4 °C)   TempSrc:    Oral   SpO2: 98% 99% 98% 90%   Weight:    183 lb 3.2 oz (83.1 kg)         I personally saw and examined the patient. I have reviewed and agree with the resident's findings, including all diagnostic interpretations and treatment plan as written. I was present for the key portions of any procedures performed and the inclusive time noted for any critical care statement. The care is provided during an unprecedented national emergency due to the novel coronavirus, COVID 19.   Amanda Goetz DO  Attending Emergency Physician           Amanda Goetz DO  08/05/22 0052

## 2022-08-05 NOTE — ED TRIAGE NOTES
Mode of arrival (squad #, walk in, police, etc) : [place] [facility]        Chief complaint(s): Weakness, dizziness's        Arrival Note (brief scenario, treatment PTA, etc). : Pt arrives to ED c/o dizziness and generalized weakness. Patient reports dark stool that started this morning.

## 2022-08-05 NOTE — ED PROVIDER NOTES
[facility]  Emergency Department Encounter  Emergency Medicine Resident     Pt Name:Genie Mchugh  MRN: [id number]  [place] 1940  Date of evaluation: 8/5/22  PCP:  Steven Elizabeth MD      [address] [place] [address]       Chief Complaint   Patient presents with    Dizziness    Fatigue       HISTORY OF PRESENT ILLNESS  (Location/Symptom, Timing/Onset, Context/Setting, Quality, Duration, Modifying Factors, Severity.)      Lora Santoro is a 79 y.o. female who presents with complaints of lightheadedness and 2 falls over the past 1 day where patient was down for approximately 2 hours of each fall. Notes her family member had to come over and assist her up but the second fall should have paramedics assist her up. Patient notes she thinks she fell onto her right side with both falls. No chest pain or shortness of breath. Was recently treated for pneumonia within the last couple weeks. Denies any cough or fever. Does note a couple episodes of dark stools but takes an iron supplement. Does take Eliquis and has not missed any doses. Uses a walker at baseline. Notes some burning with urination over the past 1 day. Denies any cough but does note some nasal congestion and sore throat for the past few days. Notes she has required blood transfusion in past.  Denies any current lightheadedness. No numbness, weakness, tingling. Pain in right hip is 5 out of 10. PAST MEDICAL / SURGICAL / SOCIAL / FAMILY HISTORY      has a past medical history of Atrial fibrillation ([address] Chronic back pain, Diverticulosis, GERD (gastroesophageal reflux disease), Hyperlipidemia, Hypertension, Hypothyroidism, Obesity (BMI 30.0-34.9), Onychomycosis, PE (pulmonary embolism), and Type II or unspecified type diabetes mellitus without mention of complication, not stated as uncontrolled. Reviewed with patient     has a past surgical history that includes Colon surgery; Vena Cava Filter Placement; Cholecystectomy; Hysterectomy;  Tympanostomy tube placement (Bilateral, 09/20/2019); Colonoscopy (N/A, 1/25/2021); sigmoidoscopy (N/A, 12/2/2021); and Upper gastrointestinal endoscopy (N/A, 12/2/2021). Reviewed with patient    Social History     Socioeconomic History    Marital status:      Spouse name: Not on file    Number of children: Not on file    Years of education: Not on file    Highest education level: Not on file   Occupational History    Not on file   Tobacco Use    Smoking status: Never    Smokeless tobacco: Never   Vaping Use    Vaping Use: Never used   Substance and Sexual Activity    Alcohol use: No    Drug use: No    Sexual activity: Not on file   Other Topics Concern    Not on file   Social History Narrative    Not on file     Social Determinants of Health     Financial Resource Strain: Low Risk     Difficulty of Paying Living Expenses: Not hard at all   Food Insecurity: No Food Insecurity    Worried About Running Out of Food in the Last Year: Never true    Ran Out of Food in the Last Year: Never true   Transportation Needs: Not on file   Physical Activity: Not on file   Stress: Not on file   Social Connections: Not on file   Intimate Partner Violence: Not on file   Housing Stability: Not on file       Family History   Adopted: Yes   Problem Relation Age of Onset    No Known Problems Mother     No Known Problems Father        Allergies: Avapro [irbesartan], Ciprofloxacin hcl, Cozaar [losartan potassium], Diovan [valsartan], Lipitor [atorvastatin calcium], Lisinopril, Pcn [penicillins], Pravachol [pravastatin sodium], Tape [adhesive tape], Tramadol, Zetia [ezetimibe], and Morphine    Home Medications:  Prior to Admission medications    Medication Sig Start Date End Date Taking?  Authorizing Provider   simethicone (MYLICON) 80 MG chewable tablet Take 80 mg by mouth every 6 hours as needed for Flatulence   Yes Historical Provider, MD   Probiotic Product (PROBIOTIC DAILY) CAPS Take 1 capsule by mouth daily   Yes Historical Provider, MD amiodarone (PACERONE) 400 MG tablet Take 400 mg by mouth in the morning. Yes Historical Provider, MD   FEROSUL 325 (65 Fe) MG tablet TAKE 1 TABLET BY MOUTH IN THE MORNING WITH FOOD 8/2/22   Andrew Kang MD   atenolol (TENORMIN) 100 MG tablet TAKE 1 TABLET BY MOUTH EVERY DAY 7/11/22   Andrew Kang MD   bumetanide (BUMEX) 1 MG tablet Take 1 tablet by mouth 2 times daily 5/12/22   Lev Knight MD   acetaminophen (TYLENOL) 500 MG tablet Take 500 mg by mouth at bedtime    Historical Provider, MD   levothyroxine (SYNTHROID) 100 MCG tablet TAKE 1 TABLET BY MOUTH EVERY DAY 4/11/22   Andrew Kang MD   doxazosin (CARDURA) 4 MG tablet TAKE 1 TABLET BY MOUTH EVERY DAY 4/11/22   Andrew Kang MD   apixaban (ELIQUIS) 5 MG TABS tablet Take 5 mg by mouth 2 times daily 2/2/21   Historical Provider, MD   RHOPRESSA 0.02 % SOLN Place 1 drop into both eyes nightly 10-130pm 7/24/20   Historical Provider, MD   timolol (TIMOPTIC) 0.5 % ophthalmic solution Place 1 drop into both eyes 2 times daily Give 15 min AFTER dorzolamide drops, 815am and 815pm 8/3/20   Historical Provider, MD   dorzolamide (TRUSOPT) 2 % ophthalmic solution Place 1 drop into both eyes 2 times daily 8am and 8pm 11/9/18   Historical Provider, MD   cholestyramine Mellody Roots) 4 G packet Take 1 packet by mouth every evening  4/14/16   Historical Provider, MD   latanoprost (XALATAN) 0.005 % ophthalmic solution Place 1 drop into both eyes nightly 1230am    Historical Provider, MD       REVIEW OF SYSTEMS    (2-9 systems for level 4, 10 or more for level 5)      Review of Systems   Constitutional:  Positive for fatigue. Negative for chills and fever. HENT:  Positive for congestion and rhinorrhea. Eyes:  Negative for photophobia and visual disturbance. Respiratory:  Negative for cough, shortness of breath and wheezing. Cardiovascular:  Negative for chest pain and palpitations. Gastrointestinal:  Negative for nausea and vomiting. Dark stools   Genitourinary:  Positive for dysuria. Negative for frequency. Musculoskeletal:  Positive for back pain. Hip pain   Skin:  Negative for rash and wound. Neurological:  Positive for light-headedness. Negative for syncope. PHYSICAL EXAM   (up to 7 for level 4, 8 or more for level 5)      INITIAL VITALS:   BP (!) 109/41   Pulse 61   Temp 97.8 °F (36.6 °C) (Oral)   Resp 19   Wt 183 lb 3.2 oz (83.1 kg)   SpO2 94%   BMI 30.49 kg/m²     Physical Exam  Vitals and nursing note reviewed. Constitutional:       General: She is not in acute distress. HENT:      Head: Atraumatic. Right Ear: External ear normal.      Left Ear: External ear normal.      Nose: Nose normal.      Mouth/Throat:      Mouth: Mucous membranes are moist.      Pharynx: Oropharynx is clear. Eyes:      Conjunctiva/sclera: Conjunctivae normal.   Cardiovascular:      Rate and Rhythm: Normal rate and regular rhythm. Pulmonary:      Effort: Pulmonary effort is normal. No respiratory distress. Breath sounds: Normal breath sounds. Abdominal:      Palpations: Abdomen is soft. Tenderness: There is no abdominal tenderness. There is no guarding or rebound. Musculoskeletal:         General: Tenderness present. Cervical back: Normal range of motion. No tenderness. Comments: Right hip tenderness, ecchymosis to lateral right hip   Skin:     General: Skin is warm and dry. Capillary Refill: Capillary refill takes less than 2 seconds. Neurological:      General: No focal deficit present. Mental Status: She is alert and oriented to person, place, and time.        DIFFERENTIAL  DIAGNOSIS     PLAN (LABS / IMAGING / EKG):  Orders Placed This Encounter   Procedures    COVID-19, Rapid    Culture, Blood 1    Culture, Urine    Culture, Blood 1    Culture, Blood 1    Culture, Blood 1    XR HIP 2-3 VW W PELVIS RIGHT    XR LUMBAR SPINE (2-3 VIEWS)    XR CHEST PORTABLE    CT CSpine W/O Contrast    CT Head W/O Contrast    VL Carotid Bilateral    XR CHEST PORTABLE    CBC with Auto Differential    APTT    Urinalysis with Reflex to Culture    Troponin    Troponin    Brain Natriuretic Peptide    Basic Metabolic Panel    Lactic Acid    CK    Hepatic Function Panel    CBC auto differential    Comprehensive Metabolic Panel w/ Reflex to MG    Lactic Acid    Microscopic Urinalysis    Sodium, Random Ur    Creatinine,Random Ur    Lactic Acid    Lactic Acid    ADULT DIET; Regular    Vital signs per unit routine    Notify physician    Up as tolerated    Daily weights    Intake and output    Telemetry monitoring - continuous duration    Neuro checks    Turn or assist with turn approximately every 2 hours if patient is unable to turn self.  Remind patient to turn if necessary    Maintain HOB at the lowest elevation consistent with medical plan of care    Assess skin per unit guidelines    Maintain heels off of bed at all times    Pad/offload medical devices    Use lift equipment for lifting patient    Insert indwelling urinary catheter    Discontinue indwelling urinary catheter when documented indications no longer apply per hospital policy    Full Code    Inpatient consult to [id number] [facility] [address] to Dose: merrem    Inpatient consult to Nephrology    Inpatient consult to Infectious Diseases    Contact isolation    OT eval and treat    PT evaluation and treat    Initiate Oxygen Therapy Protocol    Pulse Oximetry Spot Check    SLP clinical swallow evaluation    EKG 12 Lead    ADMIT TO INPATIENT       MEDICATIONS ORDERED:  Orders Placed This Encounter   Medications    0.9 % sodium chloride bolus    acetaminophen (TYLENOL) tablet 1,000 mg    magnesium sulfate 2000 mg in water 50 mL IVPB    0.9 % sodium chloride infusion    cefTRIAXone (ROCEPHIN) 1,000 mg in dextrose 5 % 50 mL IVPB mini-bag     Order Specific Question:   Antimicrobial Indications     Answer:   Sepsis of Unknown Etiology    norepinephrine (LEVOPHED) 16 mg in sodium chloride 0.9 % 250 mL infusion     Order Specific Question:   Titrate Infusion? Answer:   Yes     Order Specific Question:   Initial Infusion Dose: Answer:   5 mcg/min     Order Specific Question:   Goal of Therapy is:      Answer:   MAP greater than 65 mmHg     Order Specific Question:   Contact Provider if:     Answer:   Patient is receiving the maximum dose and is not achieving the goal of therapy    0.9 % sodium chloride infusion    DISCONTD: apixaban (ELIQUIS) tablet 5 mg     Order Specific Question:   Indication of Use     Answer:   A Fib/A Flutter    latanoprost (XALATAN) 0.005 % ophthalmic solution 1 drop    levothyroxine (SYNTHROID) tablet 100 mcg    sodium chloride flush 0.9 % injection 10 mL    sodium chloride flush 0.9 % injection 10 mL    0.9 % sodium chloride infusion    DISCONTD: potassium chloride (KLOR-CON M) extended release tablet 40 mEq    DISCONTD: potassium bicarb-citric acid (EFFER-K) effervescent tablet 40 mEq    DISCONTD: potassium chloride 10 mEq/100 mL IVPB (Peripheral Line)    DISCONTD: magnesium sulfate 1000 mg in dextrose 5% 100 mL IVPB    OR Linked Order Group     ondansetron (ZOFRAN-ODT) disintegrating tablet 4 mg     ondansetron (ZOFRAN) injection 4 mg    magnesium hydroxide (MILK OF MAGNESIA) 400 MG/5ML suspension 30 mL    OR Linked Order Group     acetaminophen (TYLENOL) tablet 650 mg     acetaminophen (TYLENOL) suppository 650 mg    ferrous sulfate (IRON 325) tablet 325 mg    DISCONTD: cefTRIAXone (ROCEPHIN) 1,000 mg in dextrose 5 % 50 mL IVPB mini-bag     Order Specific Question:   Antimicrobial Indications     Answer:   Sepsis of Unknown Etiology     Order Specific Question:   Sepsis duration of therapy     Answer:   7 days    apixaban (ELIQUIS) tablet 2.5 mg     Order Specific Question:   Indication of Use     Answer:   A Fib/A Flutter    lactated ringers bolus    FOLLOWED BY Linked Order Group     meropenem (MERREM) 1,000 mg in sodium chloride 0.9 % 100 mL IVPB (mini-bag)      Order Specific Question:   Antimicrobial Indications      Answer:   Urinary Tract Infection      Order Specific Question:   UTI duration of therapy      Answer:   5 days     meropenem (MERREM) 500 mg IVPB extended (mini-bag)      Order Specific Question:   Antimicrobial Indications      Answer:   Urinary Tract Infection      Order Specific Question:   UTI duration of therapy      Answer:   5 days       DDX: Anemia, arrhythmia, hip fracture, UTI, COVID, pneumonia    DIAGNOSTIC RESULTS / EMERGENCY DEPARTMENT COURSE / MDM   LAB RESULTS:  Results for orders placed or performed during the hospital encounter of 08/05/22   COVID-19, Rapid    Specimen: Nasopharyngeal Swab   Result Value Ref Range    Specimen Description . NASOPHARYNGEAL SWAB     SARS-CoV-2, Rapid Not Detected Not Detected   Culture, Blood 1    Specimen: Blood   Result Value Ref Range    Specimen Description . BLOOD LINE     Culture NO GROWTH <24 HRS    CBC with Auto Differential   Result Value Ref Range    WBC 7.6 3.5 - 11.0 k/uL    RBC 3.01 (L) 4.0 - 5.2 m/uL    Hemoglobin 9.7 (L) 12.0 - 16.0 g/dL    Hematocrit 29.4 (L) 36 - 46 %    MCV 97.4 80 - 100 fL    MCH 32.3 26 - 34 pg    MCHC 33.1 31 - 37 g/dL    RDW 15.6 (H) 11.5 - 14.9 %    Platelets 87 (L) 632 - 450 k/uL    MPV 10.0 6.0 - 12.0 fL    Seg Neutrophils 89 (H) 36 - 66 %    Lymphocytes 3 (L) 24 - 44 %    Monocytes 7 1 - 7 %    Eosinophils % 0 0 - 4 %    Basophils 0 0 - 2 %    Bands 1 0 - 10 %    Segs Absolute 6.76 1.3 - 9.1 k/uL    Absolute Lymph # 0.23 (L) 1.0 - 4.8 k/uL    Absolute Mono # 0.53 0.1 - 1.3 k/uL    Absolute Eos # 0.00 0.0 - 0.4 k/uL    Basophils Absolute 0.00 0.0 - 0.2 k/uL    Absolute Bands # 0.08 0.0 - 1.0 k/uL    Morphology ANISOCYTOSIS PRESENT     Morphology POLYCHROMASIA     Morphology FEW GIANT PLATELETS     Morphology 1+ ELLIPTOCYTES     Morphology HYPOCHROMIA PRESENT    APTT   Result Value Ref Range    PTT 40.1 (H) 24.0 - 36.0 sec   Urinalysis with Reflex to Culture    Specimen: Urine   Result Value Ref Range    Color, UA Yellow Yellow    Turbidity UA Cloudy (A) Clear    Glucose, Ur NEGATIVE NEGATIVE    Bilirubin Urine NEGATIVE NEGATIVE    Ketones, Urine NEGATIVE NEGATIVE    Specific Gravity, UA 1.012 1.000 - 1.030    Urine Hgb MOD (A) NEGATIVE    pH, UA 5.0 5.0 - 8.0    Protein, UA NEGATIVE NEGATIVE    Urobilinogen, Urine Normal Normal    Nitrite, Urine NEGATIVE NEGATIVE    Leukocyte Esterase, Urine SMALL (A) NEGATIVE   Troponin   Result Value Ref Range    Troponin, High Sensitivity 27 (H) 0 - 14 ng/L   Troponin   Result Value Ref Range    Troponin, High Sensitivity 24 (H) 0 - 14 ng/L   Brain Natriuretic Peptide   Result Value Ref Range    Pro-BNP 4,215 (H) <300 pg/mL   Basic Metabolic Panel   Result Value Ref Range    Glucose 123 (H) 70 - 99 mg/dL    BUN 34 (H) 8 - 23 mg/dL    Creatinine 2.13 (H) 0.50 - 0.90 mg/dL    Calcium 8.6 8.6 - 10.4 mg/dL    Sodium 127 (L) 135 - 144 mmol/L    Potassium 4.2 3.7 - 5.3 mmol/L    Chloride 93 (L) 98 - 107 mmol/L    CO2 22 20 - 31 mmol/L    Anion Gap 12 9 - 17 mmol/L    GFR Non-African American 22 (L) >60 mL/min    GFR  27 (L) >60 mL/min    GFR Comment         Lactic Acid   Result Value Ref Range    Lactic Acid 1.1 0.5 - 2.2 mmol/L   CK   Result Value Ref Range    Total  (H) 26 - 192 U/L   Hepatic Function Panel   Result Value Ref Range    Albumin 3.0 (L) 3.5 - 5.2 g/dL    Alkaline Phosphatase 90 35 - 104 U/L    ALT 15 5 - 33 U/L    AST 30 <32 U/L    Total Bilirubin 1.60 (H) 0.3 - 1.2 mg/dL    Bilirubin, Direct 0.91 (H) <0.31 mg/dL    Bilirubin, Indirect 0.69 0.00 - 1.00 mg/dL    Total Protein 6.0 (L) 6.4 - 8.3 g/dL   Microscopic Urinalysis   Result Value Ref Range    WBC, UA 6 TO 9 /HPF    RBC, UA 10 TO 20 /HPF    Casts UA 3 to 5 /LPF    Epithelial Cells UA 0 TO 2 /HPF    Bacteria, UA MANY (A) None   Lactic Acid   Result Value Ref Range    Lactic Acid 2.1 0.5 - 2.2 mmol/L   Sodium, Random Ur   Result Value Ref Range    Sodium,Ur <20 mmol/L   Creatinine,Random Ur   Result Value Ref Range    Creatinine, Ur 80.0 28.0 - 217.0 mg/dL   Lactic Acid   Result Value Ref Range    Lactic Acid 1.6 0.5 - 2.2 mmol/L   EKG 12 Lead   Result Value Ref Range    Ventricular Rate 70 BPM    Atrial Rate 70 BPM    P-R Interval 218 ms    QRS Duration 130 ms    Q-T Interval 468 ms    QTc Calculation (Bazett) 505 ms    P Axis 83 degrees    R Axis 48 degrees    T Axis 38 degrees       IMPRESSION: UTI, hypotension    RADIOLOGY:  CT CSpine W/O Contrast   Final Result      Cervical spine CT: No acute abnormality of the cervical spine. No fracture. Head CT: No evidence for acute intracranial hemorrhage, territorial   infarction or intracranial mass lesion. Mild chronic microangiopathic ischemic disease. Mild generalized volume loss. XR HIP 2-3 VW W PELVIS RIGHT   Final Result   No evident fracture or dislocation. Mild degenerative osteophytes at the hips. XR LUMBAR SPINE (2-3 VIEWS)   Final Result   Advanced degenerative changes without acute radiographic abnormality of the   lumbar spine. Gaseous distension of a colonic loop in the left lower quadrant up to 9.8 cm. Correlate for any referable clinical symptoms. CT Head W/O Contrast   Final Result      Cervical spine CT: No acute abnormality of the cervical spine. No fracture. Head CT: No evidence for acute intracranial hemorrhage, territorial   infarction or intracranial mass lesion. Mild chronic microangiopathic ischemic disease. Mild generalized volume loss. XR CHEST PORTABLE   Final Result   No acute abnormality.          VL Carotid Bilateral    (Results Pending)   XR CHEST PORTABLE    (Results Pending)         EKG  EKG Interpretation    Interpreted by emergency department physician    Rhythm: normal sinus   Rate: normal  Axis: left  Ectopy: none  Conduction: 1st degree AV block  ST Segments: normal  T Waves: normal  Q Waves: none    Clinical Impression: non-specific EKG, prolonged qtc 505    Matt Peters MD     All EKG's are interpreted by the Emergency Department Physician who either signs or Co-signs this chart in the absence of a cardiologist.    EMERGENCY DEPARTMENT COURSE:  25-year-old female, history of recent pneumonia over the past couple weeks, A. fib and on Eliquis without any missed doses, presented to ED with complaints of 2 falls over the past 1 day with first fall around midnight and downtime of 2 hours and family member was able to get patient up. Second fall was around noon and patient was down for about half and paramedics removed with patient. Patient was ambulatory after second fall about 40 feet. No LOC. Denies any current lightheadedness on arrival.  Notes she has had a couple episodes of dark stools and has required blood transfusion in past.  On exam, afebrile, hypotensive, nontachycardic, abdomen soft nontender, right hip ecchymosis and tenderness. Plan to get cardiac work-up, CT head, C-spine, x-rays of right hip and L-spine. Septic work-up added due to persistent hypotension and patient given dose of Rocephin in ED for sepsis of unknown etiology. Also noted some residual nasal congestion, diarrhea, dysuria. Due to EKG with prolonged QTC was given mag. Patient was found to have JOSH. Troponins downtrending from 27-24. Patient was given IV fluids without improvement in hypotension. Chest x-ray clear. Central line was placed in due to persistent hypotension and started on levo. UA did come back just prior to admission and concerning for possible UTI. Blood pressure improved status post initiation of Levophed. Admitted to ICU. ED Course as of 08/06/22 0412   Fri Aug 05, 2022   1517 Last echo 7/2022 with EF 55 to 60% [AR]   1528 EKG without any new ST or T wave changes but prolonged QTC at 505. RBBB noted. Ordered mag [AR]   [id number] Patient still denies any lightheadedness.   Blood pressure still visualized using ultrasound. Through the anesthetized region, the introducer needle was inserted into the femoral vein returning dark red non pulsatile blood. A guidewire was placed through the center of the needle with no resistance. Ultrasound confirmed presence of wire in the vein. A small incision made in the skin with a #11 scalpel blade. The dilator was inserted into the skin and vein over guidewire using Seldinger technique. The dilator was then removed and the 7F 20cm catheter was placed in the vein over the guidewire using Seldinger technique. The guidewire was then removed and all ports aspirated and flushed appropriately. The catheter then secured using silk suture and a temporary sterile dressing was applied. No immediate complication was evident. All sponge, instrument and needle counts were correct at the completion of the procedure. The patient tolerated the procedure well with no immediate complication evident. Asia Hinton MD  4:12 AM, 8/6/22      CONSULTS:  IP CONSULT TO CRITICAL CARE  PHARMACY TO DOSE MEDICATION  IP CONSULT TO NEPHROLOGY  IP CONSULT TO INFECTIOUS DISEASES    FINAL IMPRESSION      1. JOSH (acute kidney injury) ([facility] [address]    2. Fatigue, unspecified type    3. Acute cystitis with hematuria          DISPOSITION / PLAN     DISPOSITION Admitted 08/05/2022 07:34:18 PM      PATIENT REFERRED TO:  No follow-up provider specified.     DISCHARGE MEDICATIONS:  Current Discharge Medication List          Asia Hinton MD  Emergency Medicine Resident    (Please note that portions of thisnote were completed with a voice recognition program.  Efforts were made to edit the dictations but occasionally words are mis-transcribed.)      Ariel Maharaj MD  Resident  08/06/22 0550

## 2022-08-06 ENCOUNTER — APPOINTMENT (OUTPATIENT)
Dept: GENERAL RADIOLOGY | Age: 82
DRG: 871 | End: 2022-08-06
Payer: MEDICARE

## 2022-08-06 ENCOUNTER — APPOINTMENT (OUTPATIENT)
Dept: VASCULAR LAB | Age: 82
DRG: 871 | End: 2022-08-06
Payer: MEDICARE

## 2022-08-06 LAB
ABSOLUTE BANDS #: 0.23 K/UL (ref 0–1)
ABSOLUTE EOS #: 0 K/UL (ref 0–0.4)
ABSOLUTE LYMPH #: 0.23 K/UL (ref 1–4.8)
ABSOLUTE MONO #: 0.6 K/UL (ref 0.1–1.3)
ALBUMIN SERPL-MCNC: 2.5 G/DL (ref 3.5–5.2)
ALP BLD-CCNC: 77 U/L (ref 35–104)
ALT SERPL-CCNC: 13 U/L (ref 5–33)
ANION GAP SERPL CALCULATED.3IONS-SCNC: 12 MMOL/L (ref 9–17)
AST SERPL-CCNC: 24 U/L
BANDS: 3 % (ref 0–10)
BASOPHILS # BLD: 0 % (ref 0–2)
BASOPHILS ABSOLUTE: 0 K/UL (ref 0–0.2)
BILIRUB SERPL-MCNC: 1.07 MG/DL (ref 0.3–1.2)
BUN BLDV-MCNC: 27 MG/DL (ref 8–23)
CALCIUM SERPL-MCNC: 8 MG/DL (ref 8.6–10.4)
CHLORIDE BLD-SCNC: 99 MMOL/L (ref 98–107)
CHLORIDE, UR: <20 MMOL/L
CO2: 21 MMOL/L (ref 20–31)
CREAT SERPL-MCNC: 1.9 MG/DL (ref 0.5–0.9)
CREATININE URINE: 34.2 MG/DL (ref 28–217)
EKG ATRIAL RATE: 70 BPM
EKG P AXIS: 83 DEGREES
EKG P-R INTERVAL: 218 MS
EKG Q-T INTERVAL: 468 MS
EKG QRS DURATION: 130 MS
EKG QTC CALCULATION (BAZETT): 505 MS
EKG R AXIS: 48 DEGREES
EKG T AXIS: 38 DEGREES
EKG VENTRICULAR RATE: 70 BPM
EOSINOPHILS RELATIVE PERCENT: 0 % (ref 0–4)
GFR AFRICAN AMERICAN: 31 ML/MIN
GFR NON-AFRICAN AMERICAN: 25 ML/MIN
GFR SERPL CREATININE-BSD FRML MDRD: ABNORMAL ML/MIN/{1.73_M2}
GLUCOSE BLD-MCNC: 127 MG/DL (ref 70–99)
HCT VFR BLD CALC: 26.1 % (ref 36–46)
HEMOGLOBIN: 8.7 G/DL (ref 12–16)
LACTIC ACID: 1.1 MMOL/L (ref 0.5–2.2)
LACTIC ACID: 1.6 MMOL/L (ref 0.5–2.2)
LYMPHOCYTES # BLD: 3 % (ref 24–44)
MCH RBC QN AUTO: 32.5 PG (ref 26–34)
MCHC RBC AUTO-ENTMCNC: 33.2 G/DL (ref 31–37)
MCV RBC AUTO: 97.8 FL (ref 80–100)
MONOCYTES # BLD: 8 % (ref 1–7)
MORPHOLOGY: ABNORMAL
PDW BLD-RTO: 15.5 % (ref 11.5–14.9)
PLATELET # BLD: 102 K/UL (ref 150–450)
PMV BLD AUTO: 9.2 FL (ref 6–12)
POTASSIUM SERPL-SCNC: 4 MMOL/L (ref 3.7–5.3)
POTASSIUM, UR: 11.5 MMOL/L
RBC # BLD: 2.67 M/UL (ref 4–5.2)
SEG NEUTROPHILS: 86 % (ref 36–66)
SEGMENTED NEUTROPHILS ABSOLUTE COUNT: 6.44 K/UL (ref 1.3–9.1)
SODIUM BLD-SCNC: 132 MMOL/L (ref 135–144)
SODIUM,UR: 20 MMOL/L
TOTAL PROTEIN: 5.1 G/DL (ref 6.4–8.3)
WBC # BLD: 7.5 K/UL (ref 3.5–11)

## 2022-08-06 PROCEDURE — 2000000000 HC ICU R&B

## 2022-08-06 PROCEDURE — 93880 EXTRACRANIAL BILAT STUDY: CPT

## 2022-08-06 PROCEDURE — 80053 COMPREHEN METABOLIC PANEL: CPT

## 2022-08-06 PROCEDURE — 83605 ASSAY OF LACTIC ACID: CPT

## 2022-08-06 PROCEDURE — 2580000003 HC RX 258: Performed by: INTERNAL MEDICINE

## 2022-08-06 PROCEDURE — 99222 1ST HOSP IP/OBS MODERATE 55: CPT | Performed by: INTERNAL MEDICINE

## 2022-08-06 PROCEDURE — 6360000002 HC RX W HCPCS: Performed by: INTERNAL MEDICINE

## 2022-08-06 PROCEDURE — 92610 EVALUATE SWALLOWING FUNCTION: CPT

## 2022-08-06 PROCEDURE — 6370000000 HC RX 637 (ALT 250 FOR IP): Performed by: FAMILY MEDICINE

## 2022-08-06 PROCEDURE — 87040 BLOOD CULTURE FOR BACTERIA: CPT

## 2022-08-06 PROCEDURE — 36415 COLL VENOUS BLD VENIPUNCTURE: CPT

## 2022-08-06 PROCEDURE — 85025 COMPLETE CBC W/AUTO DIFF WBC: CPT

## 2022-08-06 PROCEDURE — 6360000002 HC RX W HCPCS: Performed by: NURSE PRACTITIONER

## 2022-08-06 PROCEDURE — 2580000003 HC RX 258: Performed by: EMERGENCY MEDICINE

## 2022-08-06 PROCEDURE — 2580000003 HC RX 258: Performed by: FAMILY MEDICINE

## 2022-08-06 PROCEDURE — 82570 ASSAY OF URINE CREATININE: CPT

## 2022-08-06 PROCEDURE — 93010 ELECTROCARDIOGRAM REPORT: CPT | Performed by: INTERNAL MEDICINE

## 2022-08-06 PROCEDURE — 82436 ASSAY OF URINE CHLORIDE: CPT

## 2022-08-06 PROCEDURE — 71045 X-RAY EXAM CHEST 1 VIEW: CPT

## 2022-08-06 PROCEDURE — 84133 ASSAY OF URINE POTASSIUM: CPT

## 2022-08-06 PROCEDURE — 84300 ASSAY OF URINE SODIUM: CPT

## 2022-08-06 RX ORDER — IPRATROPIUM BROMIDE AND ALBUTEROL SULFATE 2.5; .5 MG/3ML; MG/3ML
1 SOLUTION RESPIRATORY (INHALATION) EVERY 4 HOURS PRN
Status: DISCONTINUED | OUTPATIENT
Start: 2022-08-06 | End: 2022-08-11 | Stop reason: HOSPADM

## 2022-08-06 RX ORDER — 0.9 % SODIUM CHLORIDE 0.9 %
1000 INTRAVENOUS SOLUTION INTRAVENOUS ONCE
Status: COMPLETED | OUTPATIENT
Start: 2022-08-06 | End: 2022-08-06

## 2022-08-06 RX ORDER — SODIUM CHLORIDE 9 MG/ML
INJECTION, SOLUTION INTRAVENOUS CONTINUOUS
Status: DISCONTINUED | OUTPATIENT
Start: 2022-08-06 | End: 2022-08-07

## 2022-08-06 RX ADMIN — HYDROCORTISONE SODIUM SUCCINATE 50 MG: 100 INJECTION, POWDER, FOR SOLUTION INTRAMUSCULAR; INTRAVENOUS at 22:24

## 2022-08-06 RX ADMIN — SODIUM CHLORIDE: 9 INJECTION, SOLUTION INTRAVENOUS at 01:26

## 2022-08-06 RX ADMIN — LATANOPROST 1 DROP: 50 SOLUTION OPHTHALMIC at 20:40

## 2022-08-06 RX ADMIN — APIXABAN 2.5 MG: 2.5 TABLET, FILM COATED ORAL at 19:39

## 2022-08-06 RX ADMIN — MEROPENEM 500 MG: 500 INJECTION, POWDER, FOR SOLUTION INTRAVENOUS at 22:25

## 2022-08-06 RX ADMIN — ACETAMINOPHEN 650 MG: 325 TABLET, FILM COATED ORAL at 18:12

## 2022-08-06 RX ADMIN — LEVOTHYROXINE SODIUM 100 MCG: 0.1 TABLET ORAL at 08:02

## 2022-08-06 RX ADMIN — APIXABAN 2.5 MG: 2.5 TABLET, FILM COATED ORAL at 08:03

## 2022-08-06 RX ADMIN — SODIUM CHLORIDE, PRESERVATIVE FREE 10 ML: 5 INJECTION INTRAVENOUS at 10:07

## 2022-08-06 RX ADMIN — FERROUS SULFATE TAB 325 MG (65 MG ELEMENTAL FE) 325 MG: 325 (65 FE) TAB at 08:03

## 2022-08-06 RX ADMIN — HYDROCORTISONE SODIUM SUCCINATE 50 MG: 100 INJECTION, POWDER, FOR SOLUTION INTRAMUSCULAR; INTRAVENOUS at 14:30

## 2022-08-06 RX ADMIN — MEROPENEM 500 MG: 500 INJECTION, POWDER, FOR SOLUTION INTRAVENOUS at 10:08

## 2022-08-06 RX ADMIN — SODIUM CHLORIDE 100 ML/HR: 9 INJECTION, SOLUTION INTRAVENOUS at 16:16

## 2022-08-06 RX ADMIN — SODIUM CHLORIDE: 9 INJECTION, SOLUTION INTRAVENOUS at 18:03

## 2022-08-06 RX ADMIN — SODIUM CHLORIDE 1000 ML: 9 INJECTION, SOLUTION INTRAVENOUS at 18:06

## 2022-08-06 RX ADMIN — ACETAMINOPHEN 650 MG: 325 TABLET, FILM COATED ORAL at 10:04

## 2022-08-06 ASSESSMENT — ENCOUNTER SYMPTOMS
COLOR CHANGE: 0
NAUSEA: 0
VOMITING: 0
BLOOD IN STOOL: 0
EYE REDNESS: 0
ABDOMINAL PAIN: 0
CHEST TIGHTNESS: 0
EYE ITCHING: 0
TROUBLE SWALLOWING: 0
SHORTNESS OF BREATH: 0
COUGH: 0

## 2022-08-06 ASSESSMENT — PAIN DESCRIPTION - DESCRIPTORS
DESCRIPTORS: ACHING
DESCRIPTORS: ACHING;DULL

## 2022-08-06 ASSESSMENT — PAIN SCALES - GENERAL
PAINLEVEL_OUTOF10: 0
PAINLEVEL_OUTOF10: 6
PAINLEVEL_OUTOF10: 0
PAINLEVEL_OUTOF10: 6
PAINLEVEL_OUTOF10: 0

## 2022-08-06 ASSESSMENT — PAIN DESCRIPTION - LOCATION
LOCATION: HEAD
LOCATION: LEG

## 2022-08-06 ASSESSMENT — PAIN DESCRIPTION - ORIENTATION: ORIENTATION: RIGHT

## 2022-08-06 NOTE — CONSULTS
PULMONARY  CONSULT NOTE      Date of Admission: 8/5/2022  2:49 PM    Reason for Consult: Hypotension, COPD    Referring Physician: Dr Carrie Hernadez  PCP: Candice Padron MD     History of Present Illness: Papa Knapp is a 79 y.o. White (non-)   female who presents via EMS after fall at home. This was her second fall in 24 hrs. Her son helped her the first time but family called EMS after the second fall. She normally walks with a walker at home. She has reportedly been having lightheadedness, fatigue and generalized weakness. Patient denies hitting her head, reports she is unsure if she lost consciousness, denies any new numbness tingling or weakness in the upper or lower extremities, denies any neck or back pain. On initial exam patient in no acute distress, is noted to be hypotensive, no focal neurodeficits on exam, will check labs and imaging, patient was also given fluid boluses without significant improvement of her blood pressure, sepsis work-up was started as well patient was started on broad-spectrum antibiotics, blood pressure did not respond to fluids and decision was made to place central line patient needed to be started on vasopressors as well. UA with mod amt hgb. UCx was sent. She was treated for PNA recently but there is no PNA on admission CXR. Problem:  Principal Problem: hypotension    PMH:   Past Medical History:   Diagnosis Date    Atrial fibrillation ([address] 3/28/2014    Chronic back pain     with rt. leg pain    Diverticulosis     GERD (gastroesophageal reflux disease)     Hyperlipidemia     Hypertension     Hypothyroidism     Obesity (BMI 30.0-34. 9) 8/12/2016    Onychomycosis     PE (pulmonary embolism)     H/O DVT of rt leg    Type II or unspecified type diabetes mellitus without mention of complication, not stated as uncontrolled        PSH:   Past Surgical History:   Procedure Laterality Date    CHOLECYSTECTOMY      COLON SURGERY      s/p sigmoid cloectomy    COLONOSCOPY N/A into both eyes 2 times daily 8am and 8pm  cholestyramine (QUESTRAN) 4 G packet, Take 1 packet by mouth every evening   latanoprost (XALATAN) 0.005 % ophthalmic solution, Place 1 drop into both eyes nightly 1230am    Social History:   Social History     Socioeconomic History    Marital status:       Spouse name: Not on file    Number of children: Not on file    Years of education: Not on file    Highest education level: Not on file   Occupational History    Not on file   Tobacco Use    Smoking status: Never    Smokeless tobacco: Never   Vaping Use    Vaping Use: Never used   Substance and Sexual Activity    Alcohol use: No    Drug use: No    Sexual activity: Not on file   Other Topics Concern    Not on file   Social History Narrative    Not on file     Social Determinants of Health     Financial Resource Strain: Low Risk     Difficulty of Paying Living Expenses: Not hard at all   Food Insecurity: No Food Insecurity    Worried About Running Out of Food in the Last Year: Never true    Ran Out of Food in the Last Year: Never true   Transportation Needs: Not on file   Physical Activity: Not on file   Stress: Not on file   Social Connections: Not on file   Intimate Partner Violence: Not on file   Housing Stability: Not on file       Family History:   Family History   Adopted: Yes   Problem Relation Age of Onset    No Known Problems Mother     No Known Problems Father        Review of Systems  Fever/ chills - no  Chest pain - no  Cough - no  Expectoration / hemoptysis - no  shortness of breath - +  Headache - no  Sinus drainage/ sore throat - no  abdominal pain - no  Swelling feet - no   diarrhea - no  constipation - +    Physical Exam  Vital Signs: BP (!) 108/32   Pulse 71   Temp (!) 100.6 °F (38.1 °C) (Oral)   Resp 18   Wt 183 lb 3.2 oz (83.1 kg)   SpO2 94%   BMI 30.49 kg/m²       Admission Weight: Weight: 176 lb (79.8 kg)  Levophed gtt at 6 mcg / min  General Appearance:  alert, cooperative, and in no distress  Head: Normocephalic, without obvious abnormality, atraumatic  Neck: no adenopathy, no JVD, supple, symmetrical, trachea midline  Lungs: fair air entry bilaterally; breath sounds- vesicular; with bibasilar coarseness, 100% on RA  Heart: : regular rate and rhythm, S1, S2 normal, no murmur, click, rub or gallop  Abdomen: soft, non-tender; bowel sounds normal; no masses,  no organomegaly  Extremities: extremities normal, atraumatic, no cyanosis or edema  Skin: skin color, texture, turgor normal. No rashes or lesions  Neurologic: Grossly normal      Recent labs, Imaging studies reviewed      Data ReviewCBC:   Recent Labs     08/05/22  1609 08/06/22  0434   WBC 7.6 7.5   RBC 3.01* 2.67*   HGB 9.7* 8.7*   HCT 29.4* 26.1*   PLT 87* 102*     BMP:   Recent Labs     08/05/22  1609 08/06/22  0434   GLUCOSE 123* 127*   * 132*   K 4.2 4.0   BUN 34* 27*   CREATININE 2.13* 1.90*   CALCIUM 8.6 8.0*     ABGs: No results for input(s): PHART, PO2ART, EKH6AMO, GTC1BXJ, BEART, F7CQALCB, NPF3ZYE in the last 72 hours. PT/INR:  No results found for: PTINR      ASSESSMENT / PLAN:    Hypotension - ? Related to dehydration / possible sepsis    Wean levophed to keep MAP 65    Add solucortef  UTI    ABx    UCx pending  Acute kidney injury - nephrology consult  Recent falls- OT, PT when stable  Recent PNA - appears resolved  Atelectasis - add IS  COPD- BD  DW RN    This is a late note on patient seen by me earlier today.   Electronically signed by Mattie Dao MD on 08/06/22 at 5:10 PM

## 2022-08-06 NOTE — PROGRESS NOTES
Speech Language Pathology  Facility/Department: [facility] ICU   CLINICAL BEDSIDE SWALLOW EVALUATION    NAME: Uzair Dixon  : 1940  MRN: [id number]    ADMISSION DATE: 2022  ADMITTING DIAGNOSIS: has Mixed hyperlipidemia; Gastroesophageal reflux disease without esophagitis; Onychomycosis; Diverticulosis; Chronic back pain; Hypothyroidism; Essential hypertension; Right bundle branch block; Chronic pulmonary embolism ([address] Type 2 diabetes mellitus without complication, without long-term current use of insulin ([address] Obesity (BMI 30.0-34.9); Tracheobronchitis; Allergic rhinitis; Glaucoma; S/p bilateral myringotomy with tube placement; GI bleed; Iron deficiency anemia; Paroxysmal atrial fibrillation ([address] Hemorrhage of rectum and anus; Intractable nausea and vomiting; Diastolic CHF, acute on chronic ([address] Atrial fibrillation with RVR ([address] Class 2 obesity due to excess calories in adult; Sepsis ([address] JOSH (acute kidney injury) ([address] and Syncope on their problem list.    Recent Chest Xray: 2022  Impression   No acute findings in the chest.       Date of Eval: 2022  Evaluating Therapist: ADENIKE Greenberg    Current Diet level:  Current Diet : Regular      Primary Complaint  Uzair Dixon is a  79 y.o.  female who presents with Dizziness and Fatigue   Patient states that had 2 falls in the last 2 days. Patient has lightheadedness and increased generalized weakness. Patient is unsure if she lost consciousness. Patient denies chest pain cough palpitations diaphoresis visual change headache nausea vomiting abdominal pain dysuria flank pain numbness and tingling in extremities fever or chills.  Patient was hypotensive in ER CV fluid bolus and was started on IV Levophed for vasopressor support    Pain:  Pain Assessment  Pain Assessment: 0-10  Pain Level: 6  Patient's Stated Pain Goal: 0 - No pain  Pain Location: Head  Pain Descriptors: Aching, Dull    Reason for Referral  Uzair Dixon was referred for a bedside swallow evaluation to assess the efficiency of her swallow function, identify signs and symptoms of aspiration and make recommendations regarding safe dietary consistencies, effective compensatory strategies, and safe eating environment. Impression  Dysphagia Diagnosis: Swallow function appears WFL  Dysphagia Impression : Pt presents with no overt s/s aspiration for regular textures/thin liquids and reports no difficulty chewing or swallowing. No dysphagia tx indicated at this time. Dysphagia Outcome Severity Scale: Level 7: Normal in all situations     Treatment Plan  Requires SLP Intervention: No     D/C Recommendations: No follow up therapy recommended post discharge       Recommended Diet and Intervention  Diet Solids Recommendation Regular   Liquid Consistency Recommendation Thin     Recommended Form of Meds: PO    General  Chart Reviewed: Yes  Behavior/Cognition: Alert; Cooperative  Respiratory Status: O2 via nasual cannula  Communication Observation: Functional  Follows Directions: Complex  Dentition: Adequate  Patient Positioning: Upright in bed  Baseline Vocal Quality: Normal  Volitional Cough: Strong    Vision/Hearing  Vision  Vision: Impaired  Vision Exceptions: Wears glasses at all times  Hearing  Hearing: Within functional limits    Oral Motor Deficits  Oral/Motor  Oral Hygiene: Moist;Clean    Oral/Pharyngeal Phase Dysfunction  Oral/Pharyngeal Phase Dysfunction     Oral Phase WFL   PO Trials     Vocal Quality No Impairment   Consistency Presented Regular; Thin   How Presented Self-fed/presented; Successive Swallows   Bolus Acceptance No impairment   Bolus Formation/Control No impairment   Propulsion No impairment   Oral Residue None   Initiation of Swallow No impairment   Aspiration Signs/Symptoms None   Pharyngeal Phase Characteristics No impairment, issues, or problems       Prognosis  Individuals consulted  Consulted and agree with results and recommendations: Patient; Family member    Education  Patient Education: yes  Patient Education Response: Verbalizes understanding             Therapy Time  SLP Individual Minutes  Time In: 7029  Time Out: 7367  Minutes: 745 Niharika Swartz M.S., Harles Ruck   8/6/2022 4:49 PM

## 2022-08-06 NOTE — CONSULTS
NEPHROLOGY CONSULT       Reason for Consult: Acute kidney injury      Chief Complaint: Fall at home  History Obtained From: Patient and EHR records    History of Present Illness: This is a 79 y.o. female with history of chronic atrial fibrillation, hypertension, type 2 diabetes mellitus, prior history of DVT who presents with fall at home. She reports falling twice at home but did not lose consciousness. She normally walks with a walker at home. She has been experiencing lightheadedness, fatigue generalized weakness for several days. She states her appetite has been poor. Patient denies nausea vomiting, flank pain or kidney stone she was brought to the ER by family after the second fall and upon presentation , patient was hypotensive with blood pressure of 86/43. He initially received sepsis protocol bolus of 2.3 L and additional 1 L yesterday . Laboratory studies showed a BUN/creatinine of 34 and 2.13 mg/dL with serum sodium of 127 and CPK level is mildly elevated at 309. Patient has  a history of UTI 1 month ago and was also taking Bumex 1 mg twice daily at home, doxazosin and atenolol 100 mg daily. Pt denies any history of  prolonged NSAID use. Patient denies dysuria, gross hematuria, flank pain, nocturia, urgency, passing frothy urine or urinary incontinence. Chest x-ray did not show any acute infiltrate. Past Medical History:        Diagnosis Date    Atrial fibrillation ([address] 3/28/2014    Chronic back pain     with rt. leg pain    Diverticulosis     GERD (gastroesophageal reflux disease)     Hyperlipidemia     Hypertension     Hypothyroidism     Obesity (BMI 30.0-34. 9) 8/12/2016    Onychomycosis     PE (pulmonary embolism)     H/O DVT of rt leg    Type II or unspecified type diabetes mellitus without mention of complication, not stated as uncontrolled        Past Surgical History:        Procedure Laterality Date    CHOLECYSTECTOMY      COLON SURGERY      s/p sigmoid cloectomy    COLONOSCOPY N/A 1/25/2021    COLONOSCOPY DIAGNOSTIC performed by Marques Pearl MD at [facility] ([facility])      SIGMOIDOSCOPY N/A 12/2/2021    SIGMOIDOSCOPY DIAGNOSTIC FLEXIBLE performed by Marques Pearl MD at [facility] 09/20/2019    DR IMAN GAUTHIER    UPPER GASTROINTESTINAL ENDOSCOPY N/A 12/2/2021    EGD DIAGNOSTIC ONLY performed by Marques Pearl MD at [facility]      s/p       Current Medications:    hydrocortisone sodium succinate PF (SOLU-CORTEF) injection 50 mg, Q8H  0.9 % sodium chloride infusion, Continuous  norepinephrine (LEVOPHED) 16 mg in sodium chloride 0.9 % 250 mL infusion, Continuous  latanoprost (XALATAN) 0.005 % ophthalmic solution 1 drop, Nightly  levothyroxine (SYNTHROID) tablet 100 mcg, Daily  sodium chloride flush 0.9 % injection 10 mL, 2 times per day  sodium chloride flush 0.9 % injection 10 mL, PRN  0.9 % sodium chloride infusion, PRN  ondansetron (ZOFRAN-ODT) disintegrating tablet 4 mg, Q8H PRN   Or  ondansetron (ZOFRAN) injection 4 mg, Q6H PRN  magnesium hydroxide (MILK OF MAGNESIA) 400 MG/5ML suspension 30 mL, Daily PRN  acetaminophen (TYLENOL) tablet 650 mg, Q6H PRN   Or  acetaminophen (TYLENOL) suppository 650 mg, Q6H PRN  ferrous sulfate (IRON 325) tablet 325 mg, BID WC  apixaban (ELIQUIS) tablet 2.5 mg, BID  meropenem (MERREM) 500 mg IVPB extended (mini-bag), Q12H        Allergies: Avapro [irbesartan], Ciprofloxacin hcl, Cozaar [losartan potassium], Diovan [valsartan], Lipitor [atorvastatin calcium], Lisinopril, Pcn [penicillins], Pravachol [pravastatin sodium], Tape [adhesive tape], Tramadol, Zetia [ezetimibe], and Morphine    Social History:   Social History     Socioeconomic History    Marital status:       Spouse name: Not on file    Number of children: Not on file    Years of education: Not on file    Highest education level: Not on file   Occupational History    Not on file   Tobacco Use    Smoking status: Never    Smokeless tobacco: Never   Vaping Use    Vaping Use: Never used   Substance and Sexual Activity    Alcohol use: No    Drug use: No    Sexual activity: Not on file   Other Topics Concern    Not on file   Social History Narrative    Not on file     Social Determinants of Health     Financial Resource Strain: Low Risk     Difficulty of Paying Living Expenses: Not hard at all   Food Insecurity: No Food Insecurity    Worried About Running Out of Food in the Last Year: Never true    Ran Out of Food in the Last Year: Never true   Transportation Needs: Not on file   Physical Activity: Not on file   Stress: Not on file   Social Connections: Not on file   Intimate Partner Violence: Not on file   Housing Stability: Not on file       Family History:   Family History   Adopted: Yes   Problem Relation Age of Onset    No Known Problems Mother     No Known Problems Father        Review of Systems:    Constitutional: No fever, no chills, no night sweats, fatigue, generalized weakness, loss of appetite  HEENT:  No headache, otalgia, itchy eyes, epistaxis, nasal discharge or sore throat. Cardiac:  No chest pain, dyspnea, orthopnea or PND, palpitations  Chest:  No cough, hemoptysis, pleuritic chest pain, wheezing,SOB  Abdomen:  No abdominal pain, nausea, vomiting, diarrhea, melena, dysphagia hematemesis,constipation, abdominal bloating, flank pain  Neuro:  No CVA, TIA or seizure like activity. Skin:   No rashes, no itching. :   No hematuria, no pyuria, no dysuria, no flank pain. Extremities:  No swelling or joint pains.       Objective:  CURRENT TEMPERATURE:  Temp: (!) 100.6 °F (38.1 °C)  MAXIMUM TEMPERATURE OVER 24HRS:  Temp (24hrs), Av.5 °F (36.9 °C), Min:97.5 °F (36.4 °C), Max:100.6 °F (38.1 °C)    CURRENT RESPIRATORY RATE:  Resp: 18  CURRENT PULSE:  Heart Rate: 71  CURRENT BLOOD PRESSURE:  BP: (!) 108/32  24HR BLOOD PRESSURE RANGE:  Systolic (08KGF), FMY:402 , Min:81 , Max:161 ; Diastolic (07IXX), GPS:82, Min:21, Max:97    24HR INTAKE/OUTPUT:    Intake/Output Summary (Last 24 hours) at 8/6/2022 1346  Last data filed at 8/6/2022 1009  Gross per 24 hour   Intake --   Output 1000 ml   Net -1000 ml     Patient Vitals for the past 96 hrs (Last 3 readings):   Weight   08/05/22 2100 183 lb 3.2 oz (83.1 kg)   08/05/22 1619 176 lb (79.8 kg)         Physical Exam:  GENERAL APPEARANCE: Alert and cooperative, and appears to be in no acute distress. HEAD: normocephalic  EYES: PERRL, EOMI. Not pale, anicteric   NOSE:  No nasal discharge. THROAT:  Oral cavity and pharynx normal. Moist  NECK: Neck supple, non-tender without lymphadenopathy, masses or thyromegaly. JVD-neg  CARDIAC: Normal S1 and S2. No S3, S4 or murmurs. Rhythm is regular. LUNGS: Clear to auscultation and percussion without rales, rhonchi, wheezing or diminished breath sounds. ABD-Soft non distended, BS+ Non tender no organomegally  BACK: Examination of the spine reveals  no spinal deformity, without tenderness,   MUSKULOSKELETAL: Adequately aligned spine. No joint erythema or tenderness. EXTREMITIES: No edema. Peripheral pulses intact. NEURO:Alert oriented x 3 ,power 5/5 in all extremities      Labs:   CBC:  Recent Labs     08/05/22  1609 08/06/22  0434   WBC 7.6 7.5   RBC 3.01* 2.67*   HGB 9.7* 8.7*   HCT 29.4* 26.1*   MCV 97.4 97.8   MCH 32.3 32.5   MCHC 33.1 33.2   RDW 15.6* 15.5*   PLT 87* 102*   MPV 10.0 9.2      BMP:   Recent Labs     08/05/22  1609 08/06/22  0434   * 132*   K 4.2 4.0   CL 93* 99   CO2 22 21   BUN 34* 27*   CREATININE 2.13* 1.90*   GLUCOSE 123* 127*   CALCIUM 8.6 8.0*        Phosphorus:  No results for input(s): PHOS in the last 72 hours. Magnesium: No results for input(s): MG in the last 72 hours.   Albumin:   Recent Labs     08/05/22  1609 08/06/22  0434   LABALBU 3.0* 2.5*       IRON:    Lab Results   Component Value Date/Time    IRON 20 11/30/2021 12:30 PM     Iron Saturation:  No components found for: PERCENTFE  TIBC:    Lab Results   Component Value Date/Time    TIBC 445 11/30/2021 12:30 PM     FERRITIN:  No results found for: FERRITIN  SPEP:   Lab Results   Component Value Date/Time    PROT 5.1 08/06/2022 04:34 AM     UPEP: No results found for: TPU     C3: No results found for: C3  C4: No results found for: C4  MPO ANCA:  No results found for: MPO . PR3 ANCA:  No results found for: PR3  Urine Sodium:    Lab Results   Component Value Date/Time    STEPHANIE <20 08/05/2022 11:21 PM      Urine Potassium:  No results found for: KUR  Urine Chloride:  No results found for: CLU  Urine Ph:  No components found for: PO4U  Urine Osmolarity:  No results found for: OSMOU  Urine Creatinine:    Lab Results   Component Value Date/Time    LABCREA 80.0 08/05/2022 11:21 PM     Urine Eosinophils: No components found for: EOSU  Urine Protein:  No results found for: TPU  Urinalysis:  U/A:   Lab Results   Component Value Date/Time    NITRU NEGATIVE 08/05/2022 08:21 PM    COLORU Yellow 08/05/2022 08:21 PM    PHUR 5.0 08/05/2022 08:21 PM    WBCUA 6 TO 9 08/05/2022 08:21 PM    RBCUA 10 TO 20 08/05/2022 08:21 PM    MUCUS NOT REPORTED 12/28/2021 02:16 PM    TRICHOMONAS NOT REPORTED 12/28/2021 02:16 PM    YEAST NOT REPORTED 12/28/2021 02:16 PM    BACTERIA MANY 08/05/2022 08:21 PM    CLARITYU CLOUDY 03/04/2022 02:48 PM    SPECGRAV 1.012 08/05/2022 08:21 PM    LEUKOCYTESUR SMALL 08/05/2022 08:21 PM    UROBILINOGEN Normal 08/05/2022 08:21 PM    BILIRUBINUR NEGATIVE 08/05/2022 08:21 PM    BILIRUBINUR NEGATIVE 03/04/2022 02:48 PM    BILIRUBINUR NEGATIVE 10/25/2011 07:53 AM    BLOODU TRACE 03/04/2022 02:48 PM    GLUCOSEU NEGATIVE 08/05/2022 08:21 PM    GLUCOSEU NEGATIVE 10/25/2011 07:53 AM    KETUA NEGATIVE 08/05/2022 08:21 PM    AMORPHOUS NOT REPORTED 12/28/2021 02:16 PM         Radiology:  Reviewed as available. Assessment:  1.   Acute kidney injury nonoliguric with prerenal azotemia secondary to intravascular volume depletion and hypotension further worsened by Bumex. [Urine sodium less than 20 Fena less than 1 supports prerenal  No evidence of acute rhabdomyolysis-patient nonoliguric creatinine 1.90 from a peak of 2.13     2. Hypotension shock with suspected sepsis possibly UTI    3. Hypovolemic hyponatremia    4. Atrial fibrillation    5. Urinary tract infection    6. Essential hypertension       Plan:  1. Continue IV hydration with 0.9 saline at 100 cc/h  Titrate Levophed to mean arterial pressure of 65 or greater and gradually wean. 2.  Patient started on IV Merrem -follow urine culture   3. Patient on IV Solu-Cortef. 4.  Check SPEP, free light chains, urine protein creatinine ratio  5. Recheck CPK level in a.m. 6.  Renal ultrasound to rule out any kidney stone or obstruction  7. Childers catheter for strict I's and O's   8. Basic metabolic panel daily  9. We will hold Bumex  10. We will not restart doxazosin as this may cause orthostatic hypotension. Thank you for the consultation.       Electronically signed by Sacha Monteiro MD on 8/6/2022 at 1:46 PM

## 2022-08-06 NOTE — CONSULTS
[facility] -   Infectious diseases evaluation  admission date 8/5/2022    reason for consultation:   UTI    Impression :   Current:  UTI  Sepsis picture  Hypotension  Atrial fibrillation  History of PE/DVT  Diabetes mellitus  History of hypertension  Hyperlipidemia    Other:  Penicillin and Cipro allergy  History of ESBL    Recommendations   IV meropenem  Follow-up blood and urine cultures and adjust antibiotics as needed  Follow CBC and renal function  Supportive care    History of Present Illness:   Initial history:  Karlee Walden is a 79y.o.-year-old female was brought to the hospital by EMS after a fall at home. The patient had generalized weakness for 1 day prior to admission associated with lightheadedness. Symptoms moderate to severe, no alleviating or aggravating factors. The patient fell twice at home. The patient was hypotensive on arrival to the ER, received fluid boluses and was started on Levophed. Childers catheter was placed. She is awake, denied cough or shortness of breath,, denied nausea or vomiting, no abdominal pain.   8/5/2022 lactic acid 1.1, urinalysis showed 6-9 WBC, cloudy, small leukocyte esterase  Chest x-ray showed no acute abnormality  COVID test was negative  Interval changes  8/6/2022   Patient Vitals for the past 8 hrs:   BP Temp Temp src Pulse Resp SpO2   08/06/22 1115 (!) 108/36 -- -- 70 24 --   08/06/22 1100 (!) 111/37 -- -- 71 25 --   08/06/22 1045 (!) 104/35 -- -- 69 19 --   08/06/22 1030 (!) 108/36 -- -- 70 24 --   08/06/22 1015 (!) 110/30 -- -- 79 22 --   08/06/22 1000 (!) 105/34 -- -- 72 25 --   08/06/22 0945 (!) 114/41 -- -- 74 25 --   08/06/22 0930 (!) 115/45 -- -- 76 26 --   08/06/22 0915 (!) 119/41 -- -- 79 19 --   08/06/22 0900 (!) 127/43 -- -- 69 26 --   08/06/22 0845 (!) 118/29 -- -- 82 30 --   08/06/22 0830 (!) 138/29 -- -- 73 28 --   08/06/22 0815 (!) 137/35 -- -- 82 26 --   08/06/22 0800 (!) 146/75 (!) 100.6 °F (38.1 °C) Oral 72 20 --   08/06/22 0745 (!) 142/39 -- -- 68 22 --   08/06/22 0730 (!) 150/56 -- -- 70 26 --   08/06/22 0630 130/64 -- -- 71 24 94 %   08/06/22 0615 (!) 146/56 -- -- 66 26 95 %   08/06/22 0600 (!) 134/42 -- -- 65 24 96 %   08/06/22 0545 (!) 118/56 -- -- 66 18 97 %   08/06/22 0530 (!) 135/42 -- -- 64 19 97 %   08/06/22 0515 (!) 119/47 -- -- 62 19 96 %   08/06/22 0500 (!) 112/37 -- -- 61 20 95 %   08/06/22 0445 (!) 120/43 -- -- 64 22 96 %   08/06/22 0430 (!) 118/41 -- -- 61 20 95 %   08/06/22 0415 (!) 114/36 -- -- 61 17 95 %   08/06/22 0400 (!) 109/41 98.5 °F (36.9 °C) Axillary 61 19 94 %   08/06/22 0345 (!) 106/30 -- -- 61 19 94 %   08/06/22 0330 (!) 102/29 -- -- 61 18 93 %       Summary of relevant labs:  Labs:    Micro:    Imaging:      I have personally reviewed the past medical history, past surgical history, medications, social history, and family history, and I haveupdated the database accordingly. Allergies: Avapro [irbesartan], Ciprofloxacin hcl, Cozaar [losartan potassium], Diovan [valsartan], Lipitor [atorvastatin calcium], Lisinopril, Pcn [penicillins], Pravachol [pravastatin sodium], Tape [adhesive tape], Tramadol, Zetia [ezetimibe], and Morphine     Review of Systems:     Review of Systems  As per history present illness, other than above 12 system review was negative  Physical Examination :       Physical Exam  Constitutional:       General: She is not in acute distress. HENT:      Head: Atraumatic. Right Ear: External ear normal.      Left Ear: External ear normal.   Eyes:      General: No scleral icterus. Conjunctiva/sclera: Conjunctivae normal.   Cardiovascular:      Rate and Rhythm: Normal rate and regular rhythm. Heart sounds: No murmur heard. Pulmonary:      Effort: Pulmonary effort is normal.      Breath sounds: Normal breath sounds. Abdominal:      General: There is no distension. Palpations: Abdomen is soft. Tenderness:  There is no abdominal tenderness. Genitourinary:     Comments: Childers catheter in place  Musculoskeletal:      Cervical back: Neck supple. No rigidity. Right lower leg: No edema. Left lower leg: No edema. Skin:     General: Skin is warm. Coloration: Skin is not jaundiced. Neurological:      Mental Status: She is alert and oriented to person, place, and time. Mental status is at baseline. Past Medical History:     Past Medical History:   Diagnosis Date    [facility]2014    Chronic back pain     with rt. leg pain    Diverticulosis     GERD (gastroesophageal reflux disease)     Hyperlipidemia     Hypertension     Hypothyroidism     Obesity (BMI 30.0-34. 9) 8/12/2016    Onychomycosis     PE (pulmonary embolism)     H/O DVT of rt leg    Type II or unspecified type diabetes mellitus without mention of complication, not stated as uncontrolled        Past Surgical  History:     Past Surgical History:   Procedure Laterality Date    CHOLECYSTECTOMY      COLON SURGERY      s/p sigmoid cloectomy    COLONOSCOPY N/A 1/25/2021    COLONOSCOPY DIAGNOSTIC performed by Anju Han MD at [facility]. ([address] [place] [address])      SIGMOIDOSCOPY N/A 12/2/2021    SIGMOIDOSCOPY DIAGNOSTIC FLEXIBLE performed by Anju Han MD at [facility] 09/20/2019    DR IMAN GAUTHIER    UPPER GASTROINTESTINAL ENDOSCOPY N/A 12/2/2021    EGD DIAGNOSTIC ONLY performed by Anju Han MD at [address] [place] [address]      s/p       Medications:      latanoprost  1 drop Both Eyes Nightly    levothyroxine  100 mcg Oral Daily    sodium chloride flush  10 mL IntraVENous 2 times per day    ferrous sulfate  325 mg Oral BID WC    apixaban  2.5 mg Oral BID    meropenem  500 mg IntraVENous Q12H       Social History:     Social History     Socioeconomic History    Marital status:       Spouse name: Not on file    Number of children: Not on file    Years of education: Not on file    Highest education level: Not on file   Occupational History    Not on file   Tobacco Use    Smoking status: Never    Smokeless tobacco: Never   Vaping Use    Vaping Use: Never used   Substance and Sexual Activity    Alcohol use: No    Drug use: No    Sexual activity: Not on file   Other Topics Concern    Not on file   Social History Narrative    Not on file     Social Determinants of Health     Financial Resource Strain: Low Risk     Difficulty of Paying Living Expenses: Not hard at all   Food Insecurity: No Food Insecurity    Worried About Running Out of Food in the Last Year: Never true    Ran Out of Food in the Last Year: Never true   Transportation Needs: Not on file   Physical Activity: Not on file   Stress: Not on file   Social Connections: Not on file   Intimate Partner Violence: Not on file   Housing Stability: Not on file       Family History:     Family History   Adopted: Yes   Problem Relation Age of Onset    No Known Problems Mother     No Known Problems Father       Medical Decision Making:   I have independently reviewed/ordered the following labs:    CBC with Differential:   Recent Labs     08/05/22  1609 08/06/22  0434   WBC 7.6 7.5   HGB 9.7* 8.7*   HCT 29.4* 26.1*   PLT 87* 102*   LYMPHOPCT 3* 3*   MONOPCT 7 8*     BMP:  Recent Labs     08/05/22  1609 08/06/22  0434   * 132*   K 4.2 4.0   CL 93* 99   CO2 22 21   BUN 34* 27*   CREATININE 2.13* 1.90*     Hepatic Function Panel:   Recent Labs     08/05/22  1609 08/06/22  0434   PROT 6.0* 5.1*   LABALBU 3.0* 2.5*   BILIDIR 0.91*  --    IBILI 0.69  --    BILITOT 1.60* 1.07   ALKPHOS 90 77   ALT 15 13   AST 30 24     No results for input(s): RPR in the last 72 hours. No results for input(s): HIV in the last 72 hours. No results for input(s): BC in the last 72 hours.   Lab Results   Component Value Date/Time    CREATININE 1.90 08/06/2022 04:34 AM    GLUCOSE 127 08/06/2022 04:34 AM    GLUCOSE 123 03/17/2012 07:18 AM Detailed results: Thank you for allowing us to participate in the care of this patient. Please call with questions. This note is created with the assistance of a speech recognition program.  While intending to generate adocument that actually reflects the content of the visit, the document can still have some errors including those of syntax and sound a like substitutions which may escape proof reading. It such instances, actual meaningcan be extrapolated by contextual diversion.     Ivana Carolina MD  Office: [phone]  Perfect serve / office [phone]

## 2022-08-06 NOTE — PLAN OF CARE
Problem: Discharge Planning  Goal: Discharge to home or other facility with appropriate resources  Outcome: Progressing     Problem: Safety - Adult  Goal: Free from fall injury  Outcome: Progressing     Problem: ABCDS Injury Assessment  Goal: Absence of physical injury  Outcome: Progressing     Problem: Skin/Tissue Integrity  Goal: Absence of new skin breakdown  Description: 1. Monitor for areas of redness and/or skin breakdown  2. Assess vascular access sites hourly  3. Every 4-6 hours minimum:  Change oxygen saturation probe site  4. Every 4-6 hours:  If on nasal continuous positive airway pressure, respiratory therapy assess nares and determine need for appliance change or resting period.   Outcome: Progressing     Problem: Pain  Goal: Verbalizes/displays adequate comfort level or baseline comfort level  Outcome: Progressing

## 2022-08-06 NOTE — PROGRESS NOTES
New order obtained to consult Dr. Halima Garcia per Dr. Ramses Pyle. RN spoke with Dr. Halima Garcia who is familiar with patient and will see later. He's agreeable with all of the orders that have been previously placed. RN will notify Dr. Larisa Knapp of this.

## 2022-08-06 NOTE — ED NOTES
Report given to [facility]. All questions answered at this time. Patient ready for transport to floor.       Kevin Acosta RN  08/05/22 [id number]

## 2022-08-06 NOTE — ACP (ADVANCE CARE PLANNING)
Outcomes:  [] ACP discussion completed  [] Existing advance directive reviewed with patient; no changes to patient's previously recorded wishes  [] New Advance Directive completed  [] Portable Do Not Rescitate prepared for Provider review and signature  [] POLST/POST/MOLST/MOST prepared for Provider review and signature      Follow-up plan:    [] Schedule follow-up conversation to continue planning  [] Referred individual to Provider for additional questions/concerns   [] Advised patient/agent/surrogate to review completed ACP document and update if needed with changes in condition, patient preferences or care setting    [] This note routed to one or more involved healthcare providers        Notified Dr. Lizeth Liriano

## 2022-08-06 NOTE — PROGRESS NOTES
Dr. Madai Montelongo notified of new patient consult. New orders obtained (see orders) and consults to nephrology and infectious disease.

## 2022-08-06 NOTE — PROGRESS NOTES
Upon further research, it seems that patient sees Dr. Chantale Burnham outpatient. This was confirmed with patient. Dr. Paola Parker paged to get a consult to Dr. Chantale Burnham and RN will notify Dr. Bryan Moyer of this.

## 2022-08-06 NOTE — H&P
History and Physical      Name: Jose Elias Amado  MRN: [id number]     Acct: [de-identified]  Room: 2004/2004-01    Admit Date: 8/5/2022  PCP: Maryam Bernal MD      Chief Complaint:     Chief Complaint   Patient presents with    Dizziness    Fatigue       History Obtained From:     patient, electronic medical record    History of Present Illness:      Jose Elias Amado is a  79 y.o.  female who presents with Dizziness and Fatigue   Patient states that had 2 falls in the last 2 days. Patient has lightheadedness and increased generalized weakness. Patient is unsure if she lost consciousness. Patient denies chest pain cough palpitations diaphoresis visual change headache nausea vomiting abdominal pain dysuria flank pain numbness and tingling in extremities fever or chills. Patient was hypotensive in ER CV fluid bolus and was started on IV Levophed for vasopressor support    Past Medical History:     Past Medical History:   Diagnosis Date    [facility] 3/28/2014    Chronic back pain     with rt. leg pain    Diverticulosis     GERD (gastroesophageal reflux disease)     Hyperlipidemia     Hypertension     Hypothyroidism     Obesity (BMI 30.0-34. 9) 8/12/2016    Onychomycosis     PE (pulmonary embolism)     H/O DVT of rt leg    Type II or unspecified type diabetes mellitus without mention of complication, not stated as uncontrolled         Past Surgical History:     Past Surgical History:   Procedure Laterality Date    CHOLECYSTECTOMY      COLON SURGERY      s/p sigmoid cloectomy    COLONOSCOPY N/A 1/25/2021    COLONOSCOPY DIAGNOSTIC performed by Cassie Torrez MD at [facility]. ([address] [place] [address])      SIGMOIDOSCOPY N/A 12/2/2021    SIGMOIDOSCOPY DIAGNOSTIC FLEXIBLE performed by Cassie Torrez MD at [facility] 09/20/2019    DR IMAN GAUTHIER    UPPER GASTROINTESTINAL ENDOSCOPY N/A 12/2/2021    EGD DIAGNOSTIC ONLY performed by Jason Rutledge Kim Rojo MD at [address] [facility]      s/p        Medications Prior to Admission:       Prior to Admission medications    Medication Sig Start Date End Date Taking? Authorizing Provider   simethicone (MYLICON) 80 MG chewable tablet Take 80 mg by mouth every 6 hours as needed for Flatulence   Yes Historical Provider, MD   Probiotic Product (PROBIOTIC DAILY) CAPS Take 1 capsule by mouth daily   Yes Historical Provider, MD   amiodarone (PACERONE) 400 MG tablet Take 400 mg by mouth in the morning.    Yes Historical Provider, MD   FEROSUL 325 (65 Fe) MG tablet TAKE 1 TABLET BY MOUTH IN THE MORNING WITH FOOD 8/2/22   Alexis Sol MD   atenolol (TENORMIN) 100 MG tablet TAKE 1 TABLET BY MOUTH EVERY DAY 7/11/22   Alexis Sol MD   bumetanide (BUMEX) 1 MG tablet Take 1 tablet by mouth 2 times daily 5/12/22   Homer Dooley MD   acetaminophen (TYLENOL) 500 MG tablet Take 500 mg by mouth at bedtime    Historical Provider, MD   levothyroxine (SYNTHROID) 100 MCG tablet TAKE 1 TABLET BY MOUTH EVERY DAY 4/11/22   Alexis Sol MD   doxazosin (CARDURA) 4 MG tablet TAKE 1 TABLET BY MOUTH EVERY DAY 4/11/22   Alexis Sol MD   apixaban (ELIQUIS) 5 MG TABS tablet Take 5 mg by mouth 2 times daily 2/2/21   Historical Provider, MD   RHOPRESSA 0.02 % SOLN Place 1 drop into both eyes nightly 10-130pm 7/24/20   Historical Provider, MD   timolol (TIMOPTIC) 0.5 % ophthalmic solution Place 1 drop into both eyes 2 times daily Give 15 min AFTER dorzolamide drops, 815am and 815pm 8/3/20   Historical Provider, MD   dorzolamide (TRUSOPT) 2 % ophthalmic solution Place 1 drop into both eyes 2 times daily 8am and 8pm 11/9/18   Historical Provider, MD   cholestyramine Surya Kil) 4 G packet Take 1 packet by mouth every evening  4/14/16   Historical Provider, MD   latanoprost (XALATAN) 0.005 % ophthalmic solution Place 1 drop into both eyes nightly 1230am    Historical Provider, MD        Allergies: Avapro [irbesartan], Ciprofloxacin hcl, Cozaar [losartan potassium], Diovan [valsartan], Lipitor [atorvastatin calcium], Lisinopril, Pcn [penicillins], Pravachol [pravastatin sodium], Tape [adhesive tape], Tramadol, Zetia [ezetimibe], and Morphine    Social History:     Tobacco:    reports that she has never smoked. She has never used smokeless tobacco.  Alcohol:      reports no history of alcohol use. Drug Use:  reports no history of drug use. Family History:     Family History   Adopted: Yes   Problem Relation Age of Onset    No Known Problems Mother     No Known Problems Father        Review of Systems:     Positive and Negative as described in HPI   all 10 systems are reviewed and negative except as Noted      Review of Systems   Constitutional:  Positive for fever. Negative for chills and fatigue. HENT:  Negative for drooling, mouth sores, sneezing and trouble swallowing. Eyes:  Negative for redness and itching. Respiratory:  Negative for cough, chest tightness and shortness of breath. Cardiovascular:  Negative for chest pain, palpitations and leg swelling. Gastrointestinal:  Negative for abdominal pain, blood in stool, nausea and vomiting. Endocrine: Negative for heat intolerance and polyphagia. Genitourinary:  Negative for difficulty urinating, flank pain and pelvic pain. Musculoskeletal:  Negative for arthralgias, joint swelling and neck stiffness. Skin:  Negative for color change and pallor. Allergic/Immunologic: Negative for food allergies. Neurological:  Positive for light-headedness. Negative for dizziness, tremors, seizures, facial asymmetry, speech difficulty, numbness and headaches. Hematological:  Does not bruise/bleed easily. Psychiatric/Behavioral:  Negative for agitation, behavioral problems and suicidal ideas. The patient is not hyperactive.       Code Status:  DNR-CCA    Physical Exam:     Vitals:  BP (!) 124/47   Pulse 70   Temp (!) 100.6 °F (38.1 °C) (Oral) Resp 24   Wt 183 lb 3.2 oz (83.1 kg)   SpO2 94%   BMI 30.49 kg/m²   Temp (24hrs), Av.6 °F (37 °C), Min:97.5 °F (36.4 °C), Max:100.6 °F (38.1 °C)        Physical Exam  Vitals reviewed. HENT:      Head: Normocephalic. Right Ear: External ear normal.      Left Ear: External ear normal.      Nose: Nose normal.      Mouth/Throat:      Mouth: Mucous membranes are moist.      Pharynx: Oropharynx is clear. Eyes:      Conjunctiva/sclera: Conjunctivae normal.   Cardiovascular:      Rate and Rhythm: Normal rate and regular rhythm. Pulses: Normal pulses. Heart sounds: Normal heart sounds. Pulmonary:      Effort: Pulmonary effort is normal.      Breath sounds: Normal breath sounds. No wheezing or rales. Abdominal:      General: Bowel sounds are normal.      Palpations: Abdomen is soft. Tenderness: There is no abdominal tenderness. There is no right CVA tenderness or left CVA tenderness. Musculoskeletal:         General: No tenderness (bilateral hip nontender) or deformity. Cervical back: Normal range of motion and neck supple. No tenderness. Right lower leg: No edema. Left lower leg: No edema. Skin:     General: Skin is warm. Capillary Refill: Capillary refill takes less than 2 seconds. Coloration: Skin is not jaundiced. Neurological:      Mental Status: She is alert. Cranial Nerves: No cranial nerve deficit. Sensory: No sensory deficit. Motor: No weakness.       Coordination: Coordination normal.   Psychiatric:         Mood and Affect: Mood normal.         Behavior: Behavior normal.             Data:     Recent Results (from the past 24 hour(s))   Troponin    Collection Time: 22  6:33 PM   Result Value Ref Range    Troponin, High Sensitivity 24 (H) 0 - 14 ng/L   Lactic Acid    Collection Time: 22  7:15 PM   Result Value Ref Range    Lactic Acid 1.1 0.5 - 2.2 mmol/L   Urinalysis with Reflex to Culture    Collection Time: 22  8:21 PM    Specimen: Urine   Result Value Ref Range    Color, UA Yellow Yellow    Turbidity UA Cloudy (A) Clear    Glucose, Ur NEGATIVE NEGATIVE    Bilirubin Urine NEGATIVE NEGATIVE    Ketones, Urine NEGATIVE NEGATIVE    Specific Gravity, UA 1.012 1.000 - 1.030    Urine Hgb MOD (A) NEGATIVE    pH, UA 5.0 5.0 - 8.0    Protein, UA NEGATIVE NEGATIVE    Urobilinogen, Urine Normal Normal    Nitrite, Urine NEGATIVE NEGATIVE    Leukocyte Esterase, Urine SMALL (A) NEGATIVE   Microscopic Urinalysis    Collection Time: 08/05/22  8:21 PM   Result Value Ref Range    WBC, UA 6 TO 9 /HPF    RBC, UA 10 TO 20 /HPF    Casts UA 3 to 5 /LPF    Epithelial Cells UA 0 TO 2 /HPF    Bacteria, UA MANY (A) None   Culture, Blood 1    Collection Time: 08/05/22 10:09 PM    Specimen: Blood   Result Value Ref Range    Specimen Description . BLOOD LINE     Culture NO GROWTH 14 HOURS    Lactic Acid    Collection Time: 08/05/22 10:10 PM   Result Value Ref Range    Lactic Acid 2.1 0.5 - 2.2 mmol/L   Sodium, Random Ur    Collection Time: 08/05/22 11:21 PM   Result Value Ref Range    Sodium,Ur <20 mmol/L   Creatinine,Random Ur    Collection Time: 08/05/22 11:21 PM   Result Value Ref Range    Creatinine, Ur 80.0 28.0 - 217.0 mg/dL   Lactic Acid    Collection Time: 08/05/22 11:47 PM   Result Value Ref Range    Lactic Acid 1.6 0.5 - 2.2 mmol/L   Culture, Blood 1    Collection Time: 08/06/22  4:33 AM    Specimen: Blood   Result Value Ref Range    Specimen Description . BLOOD     Culture NO GROWTH <24 HRS    CBC auto differential    Collection Time: 08/06/22  4:34 AM   Result Value Ref Range    WBC 7.5 3.5 - 11.0 k/uL    RBC 2.67 (L) 4.0 - 5.2 m/uL    Hemoglobin 8.7 (L) 12.0 - 16.0 g/dL    Hematocrit 26.1 (L) 36 - 46 %    MCV 97.8 80 - 100 fL    MCH 32.5 26 - 34 pg    MCHC 33.2 31 - 37 g/dL    RDW 15.5 (H) 11.5 - 14.9 %    Platelets 134 (L) 362 - 450 k/uL    MPV 9.2 6.0 - 12.0 fL    Seg Neutrophils 86 (H) 36 - 66 %    Lymphocytes 3 (L) 24 - 44 %    Monocytes 8 (H) 1 - 7 %    Eosinophils % 0 0 - 4 %    Basophils 0 0 - 2 %    Bands 3 0 - 10 %    Segs Absolute 6.44 1.3 - 9.1 k/uL    Absolute Lymph # 0.23 (L) 1.0 - 4.8 k/uL    Absolute Mono # 0.60 0.1 - 1.3 k/uL    Absolute Eos # 0.00 0.0 - 0.4 k/uL    Basophils Absolute 0.00 0.0 - 0.2 k/uL    Absolute Bands # 0.23 0.0 - 1.0 k/uL    Morphology ANISOCYTOSIS PRESENT     Morphology HYPOCHROMIA PRESENT     Morphology 1+ POLYCHROMASIA    Comprehensive Metabolic Panel w/ Reflex to MG    Collection Time: 08/06/22  4:34 AM   Result Value Ref Range    Glucose 127 (H) 70 - 99 mg/dL    BUN 27 (H) 8 - 23 mg/dL    Creatinine 1.90 (H) 0.50 - 0.90 mg/dL    Calcium 8.0 (L) 8.6 - 10.4 mg/dL    Sodium 132 (L) 135 - 144 mmol/L    Potassium 4.0 3.7 - 5.3 mmol/L    Chloride 99 98 - 107 mmol/L    CO2 21 20 - 31 mmol/L    Anion Gap 12 9 - 17 mmol/L    Alkaline Phosphatase 77 35 - 104 U/L    ALT 13 5 - 33 U/L    AST 24 <32 U/L    Total Bilirubin 1.07 0.3 - 1.2 mg/dL    Total Protein 5.1 (L) 6.4 - 8.3 g/dL    Albumin 2.5 (L) 3.5 - 5.2 g/dL    GFR Non- 25 (L) >60 mL/min    GFR  31 (L) >60 mL/min    GFR Comment         Lactic Acid    Collection Time: 08/06/22  4:34 AM   Result Value Ref Range    Lactic Acid 1.1 0.5 - 2.2 mmol/L       Assesment:     Primary Problem  Sepsis (HCC)    Principal Problem:    Sepsis (HCC)  Active Problems:    JOSH (acute kidney injury) (HCC)    Syncope    Hypothyroidism    Iron deficiency anemia    Paroxysmal atrial fibrillation (HCC)  Resolved Problems:    * No resolved hospital problems. *      Plan:      IV meropenem   IV Levophed to keep map above 65   Blood cultures x2   Urine culture   CBC, CMP   Carotid Dopplers   Consult pulmonology   2D echo report 07/29/2022 shows an ejection fraction of 55%   Consult ID   12 lead EKG shows sinus rhythm with first-degree AV block, consult cardiology   CT head report is negative for intracranial abnormality.   CT cervical spine report does not show any fracture   DVT prophylaxis Eliquis 2.5 mg p.o. twice daily   EPCs   check and replace electrolytes per sliding scale   restart home medications        Electronically signed by Brian Flynn MD     Copy sent to Dr. Lisa Jackson MD

## 2022-08-06 NOTE — CARE COORDINATION
CASE MANAGEMENT NOTE:    Admission Date:  8/5/2022 Karlee Walden is a 79 y.o.  female    Admitted for : JOSH (acute kidney injury) ([facility] [address] [N17.9]  Sepsis ([facility] [address] [A41.9]    Met with:  Patient    PCP:  Giselle Sullivan MD                                Insurance:  MEDICARE/BCBS      Is patient alert and oriented at time of discussion:  Yes    Current Residence/ Living Arrangements:  independently at home             Current Services PTA:  Yes    Does patient go to outpatient dialysis: No  If yes, location and chair time:     Is patient agreeable to [facility]: Yes    Freedom of choice provided:  Yes    List of [address] provided: No    [facility]NS chosen:  Current with [facility], referral sent    DME:  walker, cane, SC,GB    Home Oxygen: No    Nebulizer: No    CPAP/BIPAP: No    Supplier: N/A    Potential Assistance Needed: Yes    SNF needed: Yes    Freedom of choice and list provided: Yes    Pharmacy:  [facility]       Is patient currently receiving oral anticoagulation therapy? Yes - Eliquis PTA (A.Andrzej)    Is the Patient an [facility] with Readmission Risk Score greater than 14%? Yes  If yes, pt needs a follow up appointment made within 7 days. Family Members/Caregivers that pt would like involved in their care:    Yes    If yes, list name here:  Carla Tubbs and Shaun Mcintosh    Transportation Provider:  Family             Discharge Plan:  08/06. MEDICARE/BCBS. Pt from home alone, family transports. DME: walker, cane, SC,GB. Current with [facility]. Pt wants SNF provided list - awaiting family input. Justin PTA. IV Merrem, levophed gtt,. Nephro Consulted.  ACACIA WILL NEED SIGNED/COMPLETED.//SS               Electronically signed by: Lonny Garcia RN on 8/6/2022 at 2:25 PM

## 2022-08-06 NOTE — PROGRESS NOTES
Pt arrived to unit from ED. Pt placed on telemerty, vitals taken. Call light given to pt, pt oriented to room. Safety measures in place.

## 2022-08-07 ENCOUNTER — APPOINTMENT (OUTPATIENT)
Dept: GENERAL RADIOLOGY | Age: 82
DRG: 871 | End: 2022-08-07
Payer: MEDICARE

## 2022-08-07 PROBLEM — N30.00 ACUTE CYSTITIS WITHOUT HEMATURIA: Status: ACTIVE | Noted: 2022-08-07

## 2022-08-07 LAB
ABSOLUTE BANDS #: 0.26 K/UL (ref 0–1)
ABSOLUTE EOS #: 0 K/UL (ref 0–0.4)
ABSOLUTE EOS #: 0 K/UL (ref 0–0.4)
ABSOLUTE LYMPH #: 0.2 K/UL (ref 1–4.8)
ABSOLUTE LYMPH #: 0.32 K/UL (ref 1–4.8)
ABSOLUTE MONO #: 0.45 K/UL (ref 0.1–1.3)
ABSOLUTE MONO #: 0.46 K/UL (ref 0.1–1.3)
ALBUMIN SERPL-MCNC: 2.6 G/DL (ref 3.5–5.2)
ALP BLD-CCNC: 93 U/L (ref 35–104)
ALT SERPL-CCNC: 14 U/L (ref 5–33)
ANION GAP SERPL CALCULATED.3IONS-SCNC: 7 MMOL/L (ref 9–17)
ANION GAP SERPL CALCULATED.3IONS-SCNC: 8 MMOL/L (ref 9–17)
AST SERPL-CCNC: 23 U/L
BANDS: 4 % (ref 0–10)
BASOPHILS # BLD: 0 % (ref 0–2)
BASOPHILS # BLD: 0 % (ref 0–2)
BASOPHILS ABSOLUTE: 0 K/UL (ref 0–0.2)
BASOPHILS ABSOLUTE: 0 K/UL (ref 0–0.2)
BILIRUB SERPL-MCNC: 0.55 MG/DL (ref 0.3–1.2)
BUN BLDV-MCNC: 18 MG/DL (ref 8–23)
BUN BLDV-MCNC: 21 MG/DL (ref 8–23)
CALCIUM SERPL-MCNC: 8.2 MG/DL (ref 8.6–10.4)
CALCIUM SERPL-MCNC: 8.6 MG/DL (ref 8.6–10.4)
CHLORIDE BLD-SCNC: 102 MMOL/L (ref 98–107)
CHLORIDE BLD-SCNC: 105 MMOL/L (ref 98–107)
CO2: 20 MMOL/L (ref 20–31)
CO2: 20 MMOL/L (ref 20–31)
CREAT SERPL-MCNC: 1.35 MG/DL (ref 0.5–0.9)
CREAT SERPL-MCNC: 1.35 MG/DL (ref 0.5–0.9)
CULTURE: ABNORMAL
EOSINOPHILS RELATIVE PERCENT: 0 % (ref 0–4)
EOSINOPHILS RELATIVE PERCENT: 0 % (ref 0–4)
FREE KAPPA/LAMBDA RATIO: 0.67 (ref 0.26–1.65)
GFR AFRICAN AMERICAN: 45 ML/MIN
GFR AFRICAN AMERICAN: 45 ML/MIN
GFR NON-AFRICAN AMERICAN: 37 ML/MIN
GFR NON-AFRICAN AMERICAN: 37 ML/MIN
GFR SERPL CREATININE-BSD FRML MDRD: ABNORMAL ML/MIN/{1.73_M2}
GFR SERPL CREATININE-BSD FRML MDRD: ABNORMAL ML/MIN/{1.73_M2}
GLUCOSE BLD-MCNC: 150 MG/DL (ref 70–99)
GLUCOSE BLD-MCNC: 151 MG/DL (ref 70–99)
HCT VFR BLD CALC: 25.8 % (ref 36–46)
HCT VFR BLD CALC: 27.1 % (ref 36–46)
HEMOGLOBIN: 8.3 G/DL (ref 12–16)
HEMOGLOBIN: 9.1 G/DL (ref 12–16)
KAPPA FREE LIGHT CHAINS QNT: 4.25 MG/DL (ref 0.37–1.94)
LAMBDA FREE LIGHT CHAINS QNT: 6.38 MG/DL (ref 0.57–2.63)
LYMPHOCYTES # BLD: 3 % (ref 24–44)
LYMPHOCYTES # BLD: 5 % (ref 24–44)
MCH RBC QN AUTO: 32 PG (ref 26–34)
MCH RBC QN AUTO: 33.1 PG (ref 26–34)
MCHC RBC AUTO-ENTMCNC: 32.3 G/DL (ref 31–37)
MCHC RBC AUTO-ENTMCNC: 33.3 G/DL (ref 31–37)
MCV RBC AUTO: 99 FL (ref 80–100)
MCV RBC AUTO: 99.2 FL (ref 80–100)
MONOCYTES # BLD: 7 % (ref 1–7)
MONOCYTES # BLD: 7 % (ref 1–7)
MORPHOLOGY: ABNORMAL
PDW BLD-RTO: 15.9 % (ref 11.5–14.9)
PDW BLD-RTO: 16.1 % (ref 11.5–14.9)
PLATELET # BLD: 112 K/UL (ref 150–450)
PLATELET # BLD: 121 K/UL (ref 150–450)
PMV BLD AUTO: 9.3 FL (ref 6–12)
PMV BLD AUTO: 9.7 FL (ref 6–12)
POTASSIUM SERPL-SCNC: 4.4 MMOL/L (ref 3.7–5.3)
POTASSIUM SERPL-SCNC: 4.5 MMOL/L (ref 3.7–5.3)
RBC # BLD: 2.61 M/UL (ref 4–5.2)
RBC # BLD: 2.74 M/UL (ref 4–5.2)
SEG NEUTROPHILS: 84 % (ref 36–66)
SEG NEUTROPHILS: 90 % (ref 36–66)
SEGMENTED NEUTROPHILS ABSOLUTE COUNT: 5.37 K/UL (ref 1.3–9.1)
SEGMENTED NEUTROPHILS ABSOLUTE COUNT: 5.94 K/UL (ref 1.3–9.1)
SODIUM BLD-SCNC: 130 MMOL/L (ref 135–144)
SODIUM BLD-SCNC: 132 MMOL/L (ref 135–144)
SPECIMEN DESCRIPTION: ABNORMAL
TOTAL CK: 65 U/L (ref 26–192)
TOTAL PROTEIN: 5.5 G/DL (ref 6.4–8.3)
TROPONIN, HIGH SENSITIVITY: 23 NG/L (ref 0–14)
WBC # BLD: 6.4 K/UL (ref 3.5–11)
WBC # BLD: 6.6 K/UL (ref 3.5–11)

## 2022-08-07 PROCEDURE — 2500000003 HC RX 250 WO HCPCS: Performed by: EMERGENCY MEDICINE

## 2022-08-07 PROCEDURE — 6370000000 HC RX 637 (ALT 250 FOR IP): Performed by: FAMILY MEDICINE

## 2022-08-07 PROCEDURE — 84155 ASSAY OF PROTEIN SERUM: CPT

## 2022-08-07 PROCEDURE — 36415 COLL VENOUS BLD VENIPUNCTURE: CPT

## 2022-08-07 PROCEDURE — 2700000000 HC OXYGEN THERAPY PER DAY

## 2022-08-07 PROCEDURE — 94640 AIRWAY INHALATION TREATMENT: CPT

## 2022-08-07 PROCEDURE — 6370000000 HC RX 637 (ALT 250 FOR IP): Performed by: NURSE PRACTITIONER

## 2022-08-07 PROCEDURE — 84484 ASSAY OF TROPONIN QUANT: CPT

## 2022-08-07 PROCEDURE — 84165 PROTEIN E-PHORESIS SERUM: CPT

## 2022-08-07 PROCEDURE — 6360000002 HC RX W HCPCS: Performed by: FAMILY MEDICINE

## 2022-08-07 PROCEDURE — 2580000003 HC RX 258: Performed by: INTERNAL MEDICINE

## 2022-08-07 PROCEDURE — 86225 DNA ANTIBODY NATIVE: CPT

## 2022-08-07 PROCEDURE — 94761 N-INVAS EAR/PLS OXIMETRY MLT: CPT

## 2022-08-07 PROCEDURE — 82550 ASSAY OF CK (CPK): CPT

## 2022-08-07 PROCEDURE — 80048 BASIC METABOLIC PNL TOTAL CA: CPT

## 2022-08-07 PROCEDURE — 83883 ASSAY NEPHELOMETRY NOT SPEC: CPT

## 2022-08-07 PROCEDURE — 99233 SBSQ HOSP IP/OBS HIGH 50: CPT | Performed by: INTERNAL MEDICINE

## 2022-08-07 PROCEDURE — 86038 ANTINUCLEAR ANTIBODIES: CPT

## 2022-08-07 PROCEDURE — 2000000000 HC ICU R&B

## 2022-08-07 PROCEDURE — 80053 COMPREHEN METABOLIC PANEL: CPT

## 2022-08-07 PROCEDURE — 71045 X-RAY EXAM CHEST 1 VIEW: CPT

## 2022-08-07 PROCEDURE — 85025 COMPLETE CBC W/AUTO DIFF WBC: CPT

## 2022-08-07 PROCEDURE — 2580000003 HC RX 258: Performed by: FAMILY MEDICINE

## 2022-08-07 PROCEDURE — 6360000002 HC RX W HCPCS: Performed by: NURSE PRACTITIONER

## 2022-08-07 PROCEDURE — 6360000002 HC RX W HCPCS: Performed by: INTERNAL MEDICINE

## 2022-08-07 RX ORDER — TIMOLOL MALEATE 5 MG/ML
1 SOLUTION/ DROPS OPHTHALMIC 2 TIMES DAILY
Status: DISCONTINUED | OUTPATIENT
Start: 2022-08-07 | End: 2022-08-11 | Stop reason: HOSPADM

## 2022-08-07 RX ORDER — DORZOLAMIDE HCL 20 MG/ML
1 SOLUTION/ DROPS OPHTHALMIC 2 TIMES DAILY
Status: DISCONTINUED | OUTPATIENT
Start: 2022-08-07 | End: 2022-08-11 | Stop reason: HOSPADM

## 2022-08-07 RX ORDER — FUROSEMIDE 10 MG/ML
20 INJECTION INTRAMUSCULAR; INTRAVENOUS ONCE
Status: COMPLETED | OUTPATIENT
Start: 2022-08-07 | End: 2022-08-07

## 2022-08-07 RX ORDER — IPRATROPIUM BROMIDE AND ALBUTEROL SULFATE 2.5; .5 MG/3ML; MG/3ML
1 SOLUTION RESPIRATORY (INHALATION)
Status: DISCONTINUED | OUTPATIENT
Start: 2022-08-07 | End: 2022-08-11 | Stop reason: HOSPADM

## 2022-08-07 RX ADMIN — FERROUS SULFATE TAB 325 MG (65 MG ELEMENTAL FE) 325 MG: 325 (65 FE) TAB at 08:31

## 2022-08-07 RX ADMIN — DORZOLAMIDE HYDROCHLORIDE 1 DROP: 20 SOLUTION/ DROPS OPHTHALMIC at 19:32

## 2022-08-07 RX ADMIN — LATANOPROST 1 DROP: 50 SOLUTION OPHTHALMIC at 19:32

## 2022-08-07 RX ADMIN — HYDROCORTISONE SODIUM SUCCINATE 50 MG: 100 INJECTION, POWDER, FOR SOLUTION INTRAMUSCULAR; INTRAVENOUS at 22:37

## 2022-08-07 RX ADMIN — MEROPENEM 500 MG: 500 INJECTION, POWDER, FOR SOLUTION INTRAVENOUS at 10:30

## 2022-08-07 RX ADMIN — SODIUM CHLORIDE, PRESERVATIVE FREE 10 ML: 5 INJECTION INTRAVENOUS at 08:33

## 2022-08-07 RX ADMIN — ACETAMINOPHEN 650 MG: 325 TABLET, FILM COATED ORAL at 08:31

## 2022-08-07 RX ADMIN — HYDROCORTISONE SODIUM SUCCINATE 50 MG: 100 INJECTION, POWDER, FOR SOLUTION INTRAMUSCULAR; INTRAVENOUS at 06:32

## 2022-08-07 RX ADMIN — HYDROCORTISONE SODIUM SUCCINATE 50 MG: 100 INJECTION, POWDER, FOR SOLUTION INTRAMUSCULAR; INTRAVENOUS at 13:54

## 2022-08-07 RX ADMIN — SODIUM CHLORIDE, PRESERVATIVE FREE 10 ML: 5 INJECTION INTRAVENOUS at 19:35

## 2022-08-07 RX ADMIN — IPRATROPIUM BROMIDE AND ALBUTEROL SULFATE 1 AMPULE: 2.5; .5 SOLUTION RESPIRATORY (INHALATION) at 14:55

## 2022-08-07 RX ADMIN — IPRATROPIUM BROMIDE AND ALBUTEROL SULFATE 1 AMPULE: 2.5; .5 SOLUTION RESPIRATORY (INHALATION) at 19:41

## 2022-08-07 RX ADMIN — TIMOLOL MALEATE 1 DROP: 5 SOLUTION OPHTHALMIC at 19:32

## 2022-08-07 RX ADMIN — ACETAMINOPHEN 650 MG: 325 TABLET, FILM COATED ORAL at 20:01

## 2022-08-07 RX ADMIN — LEVOTHYROXINE SODIUM 100 MCG: 0.1 TABLET ORAL at 06:32

## 2022-08-07 RX ADMIN — FUROSEMIDE 20 MG: 10 INJECTION, SOLUTION INTRAMUSCULAR; INTRAVENOUS at 16:49

## 2022-08-07 RX ADMIN — Medication 2 MCG/MIN: at 14:04

## 2022-08-07 RX ADMIN — APIXABAN 2.5 MG: 2.5 TABLET, FILM COATED ORAL at 08:31

## 2022-08-07 RX ADMIN — FERROUS SULFATE TAB 325 MG (65 MG ELEMENTAL FE) 325 MG: 325 (65 FE) TAB at 16:50

## 2022-08-07 RX ADMIN — Medication 1 MCG/MIN: at 19:56

## 2022-08-07 RX ADMIN — APIXABAN 5 MG: 5 TABLET, FILM COATED ORAL at 19:43

## 2022-08-07 RX ADMIN — MEROPENEM 1000 MG: 1 INJECTION, POWDER, FOR SOLUTION INTRAVENOUS at 19:28

## 2022-08-07 ASSESSMENT — ENCOUNTER SYMPTOMS
COUGH: 0
CHEST TIGHTNESS: 0
VOMITING: 0
ABDOMINAL PAIN: 0
EYE ITCHING: 0
COLOR CHANGE: 0
TROUBLE SWALLOWING: 0
SHORTNESS OF BREATH: 1
BLOOD IN STOOL: 0
NAUSEA: 0
EYE REDNESS: 0

## 2022-08-07 ASSESSMENT — PAIN SCALES - GENERAL
PAINLEVEL_OUTOF10: 3
PAINLEVEL_OUTOF10: 0
PAINLEVEL_OUTOF10: 3
PAINLEVEL_OUTOF10: 7
PAINLEVEL_OUTOF10: 0

## 2022-08-07 ASSESSMENT — PAIN DESCRIPTION - LOCATION
LOCATION: BACK
LOCATION: BACK;LEG

## 2022-08-07 ASSESSMENT — PAIN DESCRIPTION - DESCRIPTORS: DESCRIPTORS: ACHING;DISCOMFORT

## 2022-08-07 ASSESSMENT — PAIN DESCRIPTION - FREQUENCY: FREQUENCY: CONTINUOUS

## 2022-08-07 ASSESSMENT — PAIN DESCRIPTION - ONSET: ONSET: ON-GOING

## 2022-08-07 ASSESSMENT — PAIN - FUNCTIONAL ASSESSMENT: PAIN_FUNCTIONAL_ASSESSMENT: PREVENTS OR INTERFERES SOME ACTIVE ACTIVITIES AND ADLS

## 2022-08-07 ASSESSMENT — PAIN DESCRIPTION - PAIN TYPE: TYPE: ACUTE PAIN

## 2022-08-07 NOTE — CARE COORDINATION
DISCHARGE PLANNING NOTE:    Spoke with patient interested in [facility] - referral sent.     Electronically signed by Tosin Barnett RN on 8/7/2022 at 1:54 PM

## 2022-08-07 NOTE — PROGRESS NOTES
PULMONARY PROGRESS NOTE:    Interval History: COPD, hypotension, UTI    Shortness of Breath: +  Cough: +  Sputum: none  Hemoptysis: no  Chest Pain: no  Fever: no  Swelling Feet: no  Headache: no  Nausea, Emesis, Abdominal Pain: no  Diarrhea: no  Constipation: no    Events since last visit: increased WOB and wheezes this morning. Slowly coming down on levophed    PAST MEDICAL HISTORY:     Smoking:     PHYSICAL EXAMINATION:  Tmax 100.6, RR 21, HR 65, 135/46  General : Awake, alert, oriented to time, place, and person  Neck - supple, no lymphadenopathy, JVD not raised  Heart - regular rhythm, S1 and S2 normal; no additional sounds heard  Lungs - Air Entry- fair bilaterally; breath sounds : wheezing, 95% on ra  Abdomen - soft, no tenderness  Upper Extremities  - no cyanosis, mottling; edema : absent  Lower Extremities: no cyanosis, mottling; edema : absent    Current Laboratory, Radiologic, Microbiologic, and Diagnostic studies reviewed    ASSESSMENT / PLAN:    Hypotension - ? Related to dehydration / possible sepsis    Wean levophed to keep MAP 65    solucortef  UTI    UCx ecoli -- abx  Acute kidney injury - nephrology consult  Recent falls-  OT, PT when stable  Recent PNA - appears resolved  CXR today/ DC IV fluid  Atelectasis - add IS  COPD- schedule BD Q4 wa    This is a late note on patient seen by me earlier today.   Electronically signed by Kip Darby MD on 08/07/22 at 7:51 PM

## 2022-08-07 NOTE — PLAN OF CARE
Problem: Discharge Planning  Goal: Discharge to home or other facility with appropriate resources  8/7/2022 0551 by Gabbie Yeboah RN  Outcome: Progressing  8/6/2022 1840 by Monae Brown RN  Outcome: Progressing     Problem: Safety - Adult  Goal: Free from fall injury  8/7/2022 0551 by Gabbie Yeboah RN  Outcome: Progressing  Flowsheets (Taken 8/6/2022 2122)  Free From Fall Injury: Instruct family/caregiver on patient safety  8/6/2022 1840 by Monae Brown RN  Outcome: Progressing     Problem: ABCDS Injury Assessment  Goal: Absence of physical injury  8/7/2022 0551 by Gabbie Yeboah RN  Outcome: Progressing  Flowsheets (Taken 8/6/2022 2122)  Absence of Physical Injury: Implement safety measures based on patient assessment  8/6/2022 1840 by Monae Brown RN  Outcome: Progressing     Problem: Skin/Tissue Integrity  Goal: Absence of new skin breakdown  8/7/2022 0551 by Gabbie Yeboah RN  Outcome: Progressing  8/6/2022 1840 by Monae Brown RN  Outcome: Progressing     Problem: Pain  Goal: Verbalizes/displays adequate comfort level or baseline comfort level  8/7/2022 0551 by Gabbie Yeboah RN  Outcome: Progressing  8/6/2022 1840 by Monae Brown RN  Outcome: Progressing

## 2022-08-07 NOTE — PROGRESS NOTES
Progress Note    8/7/2022   4:33 PM    Name:  Lissa Howard  MRN:    [id number]     Acct:     [de-identified]   Room:  2004/2004-01  IP Day: 2     Admit Date: 8/5/2022  2:49 PM  PCP: Lisa Jackson MD    Subjective:     C/C:   Chief Complaint   Patient presents with    Dizziness    Fatigue       Interval History: Status: not changed. Patient has increased shortness of breath unable to complete sentences. Denies chest pain abdominal pain dysuria. On Levophed for vasopressor support. Patient oxygen saturation is 97% on 2 L of oxygen via nasal cannula afebrile. Recent labs reviewed creatinine improving at 1.35. Hemoglobin 8.3 platelets 129. Chest x-ray shows left lung base opacity    ROS:   all 10 systems reviewed and are negative except as noted    Review of Systems   Constitutional:  Negative for chills and fatigue. HENT:  Negative for drooling, mouth sores, sneezing and trouble swallowing. Eyes:  Negative for redness and itching. Respiratory:  Positive for shortness of breath. Negative for cough and chest tightness. Cardiovascular:  Negative for chest pain, palpitations and leg swelling. Gastrointestinal:  Negative for abdominal pain, blood in stool, nausea and vomiting. Endocrine: Negative for heat intolerance and polyphagia. Genitourinary:  Negative for difficulty urinating, flank pain and pelvic pain. Musculoskeletal:  Negative for arthralgias, joint swelling and neck stiffness. Skin:  Negative for color change and pallor. Allergic/Immunologic: Negative for food allergies. Neurological:  Negative for dizziness, seizures and headaches. Hematological:  Does not bruise/bleed easily. Psychiatric/Behavioral:  Negative for agitation, behavioral problems and suicidal ideas. The patient is not hyperactive. Medications: Allergies:    Allergies   Allergen Reactions    Avapro [Irbesartan]     Ciprofloxacin Hcl Nausea Only    Cozaar [Losartan Potassium]     Diovan [Valsartan] tobacco: Never   Vaping Use    Vaping Use: Never used   Substance and Sexual Activity    Alcohol use: No    Drug use: No    Sexual activity: Not on file   Other Topics Concern    Not on file   Social History Narrative    Not on file     Social Determinants of Health     Financial Resource Strain: Low Risk     Difficulty of Paying Living Expenses: Not hard at all   Food Insecurity: No Food Insecurity    Worried About Running Out of Food in the Last Year: Never true    Ran Out of Food in the Last Year: Never true   Transportation Needs: Not on file   Physical Activity: Not on file   Stress: Not on file   Social Connections: Not on file   Intimate Partner Violence: Not on file   Housing Stability: Not on file       I/O (24Hr): Intake/Output Summary (Last 24 hours) at 8/7/2022 1633  Last data filed at 8/7/2022 6360  Gross per 24 hour   Intake 2610.67 ml   Output 2100 ml   Net 510.67 ml     Radiology:  XR LUMBAR SPINE (2-3 VIEWS)    Result Date: 8/5/2022  Advanced degenerative changes without acute radiographic abnormality of the lumbar spine. Gaseous distension of a colonic loop in the left lower quadrant up to 9.8 cm. Correlate for any referable clinical symptoms. CT Head W/O Contrast    Result Date: 8/5/2022  Cervical spine CT: No acute abnormality of the cervical spine. No fracture. Head CT: No evidence for acute intracranial hemorrhage, territorial infarction or intracranial mass lesion. Mild chronic microangiopathic ischemic disease. Mild generalized volume loss. CT CSpine W/O Contrast    Result Date: 8/5/2022  Cervical spine CT: No acute abnormality of the cervical spine. No fracture. Head CT: No evidence for acute intracranial hemorrhage, territorial infarction or intracranial mass lesion. Mild chronic microangiopathic ischemic disease. Mild generalized volume loss.      XR CHEST PORTABLE    Result Date: 8/6/2022  No acute findings in the chest.     XR CHEST PORTABLE    Result Date: 8/5/2022  No acute abnormality. XR HIP 2-3 VW W PELVIS RIGHT    Result Date: 8/5/2022  No evident fracture or dislocation. Mild degenerative osteophytes at the hips.        Labs:  Recent Results (from the past 24 hour(s))   CBC auto differential    Collection Time: 08/07/22  5:11 AM   Result Value Ref Range    WBC 6.6 3.5 - 11.0 k/uL    RBC 2.74 (L) 4.0 - 5.2 m/uL    Hemoglobin 9.1 (L) 12.0 - 16.0 g/dL    Hematocrit 27.1 (L) 36 - 46 %    MCV 99.2 80 - 100 fL    MCH 33.1 26 - 34 pg    MCHC 33.3 31 - 37 g/dL    RDW 15.9 (H) 11.5 - 14.9 %    Platelets 523 (L) 137 - 450 k/uL    MPV 9.7 6.0 - 12.0 fL    Seg Neutrophils 90 (H) 36 - 66 %    Lymphocytes 3 (L) 24 - 44 %    Monocytes 7 1 - 7 %    Eosinophils % 0 0 - 4 %    Basophils 0 0 - 2 %    Segs Absolute 5.94 1.3 - 9.1 k/uL    Absolute Lymph # 0.20 (L) 1.0 - 4.8 k/uL    Absolute Mono # 0.46 0.1 - 1.3 k/uL    Absolute Eos # 0.00 0.0 - 0.4 k/uL    Basophils Absolute 0.00 0.0 - 0.2 k/uL    Morphology ANISOCYTOSIS PRESENT     Morphology HYPOCHROMIA PRESENT    Comprehensive Metabolic Panel w/ Reflex to MG    Collection Time: 08/07/22  5:11 AM   Result Value Ref Range    Glucose 150 (H) 70 - 99 mg/dL    BUN 21 8 - 23 mg/dL    Creatinine 1.35 (H) 0.50 - 0.90 mg/dL    Calcium 8.6 8.6 - 10.4 mg/dL    Sodium 132 (L) 135 - 144 mmol/L    Potassium 4.4 3.7 - 5.3 mmol/L    Chloride 105 98 - 107 mmol/L    CO2 20 20 - 31 mmol/L    Anion Gap 7 (L) 9 - 17 mmol/L    Alkaline Phosphatase 93 35 - 104 U/L    ALT 14 5 - 33 U/L    AST 23 <32 U/L    Total Bilirubin 0.55 0.3 - 1.2 mg/dL    Total Protein 5.5 (L) 6.4 - 8.3 g/dL    Albumin 2.6 (L) 3.5 - 5.2 g/dL    GFR Non- 37 (L) >60 mL/min    GFR  45 (L) >60 mL/min    GFR Comment         Electrophoresis Protein, Serum    Collection Time: 08/07/22  5:11 AM   Result Value Ref Range    Total Protein 5.0 (L) 6.4 - 8.3 g/dL    Albumin (calculated) PENDING g/dL    Albumin % PENDING %    Alpha-1-Globulin PENDING g/dL    Alpha 1 % PENDING %    Alpha-2-Globulin PENDING g/dL    Alpha 2 % PENDING %    Beta Globulin PENDING g/dL    Beta Percent PENDING %    Gamma Globulin PENDING g/dL    Gamma Globulin % PENDING %    Total Prot. Sum PENDING g/dL    Total Prot.  Sum,% PENDING %    Protein Electrophoresis, Serum PENDING     Pathologist PENDING    Kappa/Lambda Quantitative Free Light Chains, Serum    Collection Time: 08/07/22  5:11 AM   Result Value Ref Range    Kappa Free Light Chains QNT 4.25 (H) 0.37 - 1.94 mg/dL    Lambda Free Light Chains QNT 6.38 (H) 0.57 - 2.63 mg/dL    Free Kappa/Lambda Ratio 0.67 0.26 - 1.65   CK    Collection Time: 08/07/22  5:11 AM   Result Value Ref Range    Total CK 65 26 - 192 U/L   Troponin    Collection Time: 08/07/22  5:11 AM   Result Value Ref Range    Troponin, High Sensitivity 23 (H) 0 - 14 ng/L   CBC with Auto Differential    Collection Time: 08/07/22  1:28 PM   Result Value Ref Range    WBC 6.4 3.5 - 11.0 k/uL    RBC 2.61 (L) 4.0 - 5.2 m/uL    Hemoglobin 8.3 (L) 12.0 - 16.0 g/dL    Hematocrit 25.8 (L) 36 - 46 %    MCV 99.0 80 - 100 fL    MCH 32.0 26 - 34 pg    MCHC 32.3 31 - 37 g/dL    RDW 16.1 (H) 11.5 - 14.9 %    Platelets 619 (L) 969 - 450 k/uL    MPV 9.3 6.0 - 12.0 fL    Seg Neutrophils 84 (H) 36 - 66 %    Lymphocytes 5 (L) 24 - 44 %    Monocytes 7 1 - 7 %    Eosinophils % 0 0 - 4 %    Basophils 0 0 - 2 %    Bands 4 0 - 10 %    Segs Absolute 5.37 1.3 - 9.1 k/uL    Absolute Lymph # 0.32 (L) 1.0 - 4.8 k/uL    Absolute Mono # 0.45 0.1 - 1.3 k/uL    Absolute Eos # 0.00 0.0 - 0.4 k/uL    Basophils Absolute 0.00 0.0 - 0.2 k/uL    Absolute Bands # 0.26 0.0 - 1.0 k/uL    Morphology ANISOCYTOSIS PRESENT     Morphology HYPOCHROMIA PRESENT     Morphology BASOPHILIC STIPPLING PRESENT     Morphology 1+ POLYCHROMASIA    Basic Metabolic Panel    Collection Time: 08/07/22  1:28 PM   Result Value Ref Range    Glucose 151 (H) 70 - 99 mg/dL    BUN 18 8 - 23 mg/dL    Creatinine 1.35 (H) 0.50 - 0.90 mg/dL    Calcium 8.2 (L) 8.6 - 10.4 mg/dL    Sodium 130 (L) 135 - 144 mmol/L    Potassium 4.5 3.7 - 5.3 mmol/L    Chloride 102 98 - 107 mmol/L    CO2 20 20 - 31 mmol/L    Anion Gap 8 (L) 9 - 17 mmol/L    GFR Non-African American 37 (L) >60 mL/min    GFR  45 (L) >60 mL/min    GFR Comment             Physical Examination:        Vitals:  BP (!) 121/46   Pulse 65   Temp 98.2 °F (36.8 °C) (Oral)   Resp 18   Wt 192 lb 3.9 oz (87.2 kg)   SpO2 97%   BMI 31.99 kg/m²   Temp (24hrs), Av.4 °F (36.9 °C), Min:97.6 °F (36.4 °C), Max:100.3 °F (37.9 °C)    No results for input(s): POCGLU in the last 72 hours. Physical Exam  Vitals reviewed. HENT:      Head: Normocephalic. Right Ear: External ear normal.      Left Ear: External ear normal.      Nose: Nose normal.      Mouth/Throat:      Mouth: Mucous membranes are moist.      Pharynx: Oropharynx is clear. Eyes:      Conjunctiva/sclera: Conjunctivae normal.   Cardiovascular:      Rate and Rhythm: Normal rate and regular rhythm. Pulses: Normal pulses. Heart sounds: Normal heart sounds. Pulmonary:      Effort: Pulmonary effort is normal.      Breath sounds: Examination of the right-lower field reveals rales. Examination of the left-lower field reveals rhonchi and rales. Rhonchi and rales present. Abdominal:      General: Bowel sounds are normal.      Palpations: Abdomen is soft. Comments: Childers catheter with clear urine in bag   Musculoskeletal:         General: No deformity. Cervical back: Normal range of motion and neck supple. Right lower leg: Edema (1+) present. Left lower leg: Edema (1+) present. Skin:     General: Skin is warm. Capillary Refill: Capillary refill takes less than 2 seconds. Coloration: Skin is not jaundiced. Neurological:      General: No focal deficit present. Mental Status: She is alert. Mental status is at baseline.    Psychiatric:         Mood and Affect: Mood normal.         Behavior: Behavior normal. Assessment:        Primary Problem  Sepsis ([facility] [address]     Principal Problem:    Sepsis ([facility] [address]  Active Problems:    JOSH (acute kidney injury) ([facility] [address]    Syncope    Acute cystitis without hematuria    Hypothyroidism    Iron deficiency anemia    Paroxysmal atrial fibrillation (HCC)  Resolved Problems:    * No resolved hospital problems. *      Past Medical History:   Diagnosis Date    Atrial fibrillation ([facility] [address] 3/28/2014    Chronic back pain     with rt. leg pain    Diverticulosis     GERD (gastroesophageal reflux disease)     Hyperlipidemia     Hypertension     Hypothyroidism     Obesity (BMI 30.0-34. 9) 8/12/2016    Onychomycosis     PE (pulmonary embolism)     H/O DVT of rt leg    Type II or unspecified type diabetes mellitus without mention of complication, not stated as uncontrolled         Plan:         IV meropenem   IV Levophed to keep map above 65  IV Lasix 20 mg now  Decrease IV fluid to 20 mL/h  Hydrocortisone 50 mg every 8 hours   Blood cultures pending    Urine culture shows E. coli   CBC, CMP   Carotid Dopplers report is unremarkable  Cardiology input noted   2D echo report 07/29/2022 shows an ejection fraction of 55%   12 lead EKG shows sinus rhythm with first-degree AV block, consult cardiology   CT head report is negative for intracranial abnormality.   CT cervical spine report does not show any fracture   DVT prophylaxis Eliquis 2.5 mg p.o. twice daily      Electronically signed by Susen Dandy, MD

## 2022-08-07 NOTE — PROGRESS NOTES
Nephrology Progress Note    Subjective/   79y.o. year old female who we are seeing in consultation for acute kidney injury. Interval history:  Patient became more short of breath overnight. Denies chest pain fever nausea or cough. She did receive fluid bolus yesterday. She is off Levophed. Serum creatinine improving at 1.35 mg/dL. History of Present Illness: This is a 79 y.o. female with history of chronic atrial fibrillation, hypertension, type 2 diabetes mellitus, prior history of DVT who presents with fall at home. She reports falling twice at home but did not lose consciousness. She normally walks with a walker at home. She has been experiencing lightheadedness, fatigue generalized weakness for several days. She states her appetite has been poor. Patient denies nausea vomiting, flank pain or kidney stone she was brought to the ER by family after the second fall and upon presentation , patient was hypotensive with blood pressure of 86/43. He initially received sepsis protocol bolus of 2.3 L and additional 1 L yesterday . Laboratory studies showed a BUN/creatinine of 34 and 2.13 mg/dL with serum sodium of 127 and CPK level is mildly elevated at 309. Patient has  a history of UTI 1 month ago and was also taking Bumex 1 mg twice daily at home, doxazosin and atenolol 100 mg daily. Pt denies any history of  prolonged NSAID use. Patient denies dysuria, gross hematuria, flank pain, nocturia, urgency, passing frothy urine or urinary incontinence. Chest x-ray did not show any acute infiltrate.      Objective/     Vitals:    08/07/22 1230 08/07/22 1245 08/07/22 1300 08/07/22 1315   BP: (!) 109/40 (!) 106/39 (!) 114/42 (!) 116/42   Pulse: 64 64 65 66   Resp: 20 (!) 42 24 25   Temp:       TempSrc:       SpO2:       Weight:         24HR INTAKE/OUTPUT:    Intake/Output Summary (Last 24 hours) at 8/7/2022 1448  Last data filed at 8/7/2022 0527  Gross per 24 hour   Intake 2610.67 ml   Output 2100 ml Net 510.67 ml     Patient Vitals for the past 96 hrs (Last 3 readings):   Weight   08/07/22 0515 192 lb 3.9 oz (87.2 kg)   08/05/22 2100 183 lb 3.2 oz (83.1 kg)   08/05/22 1619 176 lb (79.8 kg)       Constitutional:  Alert, awake, no apparent distress  Cardiovascular:  S1, S2 without m/r/g  Respiratory:  CTA B without w/r/r  Abdomen: +bs, soft, nt  Ext: 1+LE edema    Data/  Recent Labs     08/06/22  0434 08/07/22  0511 08/07/22  1328   WBC 7.5 6.6 6.4   HGB 8.7* 9.1* 8.3*   HCT 26.1* 27.1* 25.8*   MCV 97.8 99.2 99.0   * 121* 112*     Recent Labs     08/06/22  0434 08/07/22  0511 08/07/22  1328   * 132* 130*   K 4.0 4.4 4.5   CL 99 105 102   CO2 21 20 20   GLUCOSE 127* 150* 151*   BUN 27* 21 18   CREATININE 1.90* 1.35* 1.35*   LABGLOM 25* 37* 37*   GFRAA 31* 45* 45*       Assessment/   1. Acute kidney injury nonoliguric with prerenal azotemia secondary to intravascular volume depletion and hypotension further worsened by Bumex. [Urine sodium less than 20 -renal function improving    2. Hypotension shock with suspected sepsis possibly UTI-off Levophed     3. Hypovolemic hyponatremia     4. Atrial fibrillation     5. Urinary tract infection E. coli greater than 100,000 IU     6. Essential hypertension     7. Shortness of breath-we will obtain a stat chest x-ray    Plan/   1. IV hydration with 0.9 saline at 100 cc/h  2.  Patient started on IV Merrem -follow urine culture   3. Follow serologies  4. Renal ultrasound to rule out any kidney stone or obstruction  5. Childers catheter for strict I's and O's   6  Basic metabolic panel daily  7.  hold Bumex  8. We will not restart doxazosin as this may cause orthostatic hypotension.          Warden Vinod MD

## 2022-08-07 NOTE — PLAN OF CARE
Problem: Discharge Planning  Goal: Discharge to home or other facility with appropriate resources  8/7/2022 1937 by Ryan West RN  Outcome: Progressing     Problem: Safety - Adult  Goal: Free from fall injury  8/7/2022 1937 by Ryan West RN  Outcome: Progressing   Pt free from falls this shift, uses call light and assistive devices appropriately. Problem: ABCDS Injury Assessment  Goal: Absence of physical injury  8/7/2022 1937 by Ryan West RN  Outcome: Progressing     Problem: Skin/Tissue Integrity  Goal: Absence of new skin breakdown  Description: 1. Monitor for areas of redness and/or skin breakdown  2. Assess vascular access sites hourly  3. Every 4-6 hours minimum:  Change oxygen saturation probe site  4. Every 4-6 hours:  If on nasal continuous positive airway pressure, respiratory therapy assess nares and determine need for appliance change or resting period.   8/7/2022 1937 by Ryan West RN  Outcome: Progressing     Problem: Pain  Goal: Verbalizes/displays adequate comfort level or baseline comfort level  8/7/2022 1937 by Ryan West RN  Outcome: Progressing

## 2022-08-07 NOTE — PROGRESS NOTES
Pharmacy Consult      Monique Camarillo is a 79 y.o. female for whom pharmacy has been consulted to dose Meropenem. Patient Active Problem List   Diagnosis    Mixed hyperlipidemia    Gastroesophageal reflux disease without esophagitis    Onychomycosis    Diverticulosis    Chronic back pain    Hypothyroidism    Essential hypertension    Right bundle branch block    Chronic pulmonary embolism (HCC)    Type 2 diabetes mellitus without complication, without long-term current use of insulin ([facility])    Obesity (BMI 30.0-34. 9)    Tracheobronchitis    Allergic rhinitis    Glaucoma    S/p bilateral myringotomy with tube placement    GI bleed    Iron deficiency anemia    Paroxysmal atrial fibrillation ([facility])    Hemorrhage of rectum and anus    Intractable nausea and vomiting    Diastolic CHF, acute on chronic ([facility])    Atrial fibrillation with RVR ([facility])    Class 2 obesity due to excess calories in adult    Sepsis ([facility])    JOSH (acute kidney injury) ([facility] [address]    Syncope       Allergies: Avapro [irbesartan], Ciprofloxacin hcl, Cozaar [losartan potassium], Diovan [valsartan], Lipitor [atorvastatin calcium], Lisinopril, Pcn [penicillins], Pravachol [pravastatin sodium], Tape [adhesive tape], Tramadol, Zetia [ezetimibe], and Morphine     Recent Labs     08/06/22  0434 08/07/22  0511   CREATININE 1.90* 1.35*       Ht/Wt:   Ht Readings from Last 1 Encounters:   06/17/22 5' 5\" (1.651 m)        Wt Readings from Last 1 Encounters:   08/07/22 192 lb 3.9 oz (87.2 kg)         Estimated Creatinine Clearance: 34 mL/min (A) (based on SCr of 1.35 mg/dL (H)). Assessment/Plan:  a decrease in serum creatinine has occurred, so will adjust the meropenem to 1000 mg every 12 hours. Thank you for the consult. Will continue to follow. Sandra Houston. Ph.  8/7/2022  11:23 AM

## 2022-08-07 NOTE — CONSULTS
[facility] PHYSICIANS CARDIOLOGY CONSULT NOTE      Date of Admission:  8/5/2022    Date of Consultation:  8/7/2022    PCP:  Jose Knott MD      REASON FOR CONSULT: Hypotension. Abnormal EKG, falls. HISTORY OF PRESENT ILLNESS:  Shreya Sainz is a 79 y.o. female with right bundle branch block on abnormal EKG, normal LVEF, chronic shortness of breath on exertion, history of recurrent pulmonary emboli and chronic Eliquis therapy, hypercholesterolemia, statin intolerance, obesity who presents with 2 falls at home. Initially her son helped her to get up. She fell second time in the bathroom and called EMS and was brought into the emergency room. She did not pass out. No anginal chest pain. Admitted to ICU. When I was called last evening patient was hypotensive and being treated for sepsis. Was on IV Levophed. I gave IV fluids as ordered with improvement of blood pressure subsequently. Denies any anginal chest pain or palpitation or lightheadedness or dizziness or leg swelling or bleeding or PND or orthopnea or carole syncope. I reviewed my last office visit note dated 1/10/2022 in the EHR as documented. Please refer to admitting history and physical and other providers notes for further details. PMH:   has a past medical history of Atrial fibrillation ([address] Chronic back pain, Diverticulosis, GERD (gastroesophageal reflux disease), Hyperlipidemia, Hypertension, Hypothyroidism, Obesity (BMI 30.0-34.9), Onychomycosis, PE (pulmonary embolism), and Type II or unspecified type diabetes mellitus without mention of complication, not stated as uncontrolled. PSH:   has a past surgical history that includes Colon surgery; Vena Cava Filter Placement; Cholecystectomy; Hysterectomy; Tympanostomy tube placement (Bilateral, 09/20/2019); Colonoscopy (N/A, 1/25/2021); sigmoidoscopy (N/A, 12/2/2021); and Upper gastrointestinal endoscopy (N/A, 12/2/2021).     Allergies: Allergies   Allergen Reactions    Avapro [Irbesartan]     Ciprofloxacin Hcl Nausea Only    Cozaar [Losartan Potassium]     Diovan [Valsartan]     Lipitor [Atorvastatin Calcium]     Lisinopril     Pcn [Penicillins]     Pravachol [Pravastatin Sodium]      myalgias    Tape [Adhesive Tape] Dermatitis    Tramadol Swelling    Zetia [Ezetimibe]     Morphine Nausea And Vomiting        Home Meds:    Prior to Admission medications    Medication Sig Start Date End Date Taking? Authorizing Provider   simethicone (MYLICON) 80 MG chewable tablet Take 80 mg by mouth every 6 hours as needed for Flatulence   Yes Historical Provider, MD   Probiotic Product (PROBIOTIC DAILY) CAPS Take 1 capsule by mouth daily   Yes Historical Provider, MD   amiodarone (PACERONE) 400 MG tablet Take 400 mg by mouth in the morning.    Yes Historical Provider, MD   FEROSUL 325 (65 Fe) MG tablet TAKE 1 TABLET BY MOUTH IN THE MORNING WITH FOOD 8/2/22   Leoncio Beltran MD   atenolol (TENORMIN) 100 MG tablet TAKE 1 TABLET BY MOUTH EVERY DAY 7/11/22   Leoncio Beltran MD   bumetanide (BUMEX) 1 MG tablet Take 1 tablet by mouth 2 times daily 5/12/22   Lisa Arriola MD   acetaminophen (TYLENOL) 500 MG tablet Take 500 mg by mouth at bedtime    Historical Provider, MD   levothyroxine (SYNTHROID) 100 MCG tablet TAKE 1 TABLET BY MOUTH EVERY DAY 4/11/22   Leoncio Beltran MD   doxazosin (CARDURA) 4 MG tablet TAKE 1 TABLET BY MOUTH EVERY DAY 4/11/22   Leoncio Beltran MD   apixaban (ELIQUIS) 5 MG TABS tablet Take 5 mg by mouth 2 times daily 2/2/21   Historical Provider, MD   RHOPRESSA 0.02 % SOLN Place 1 drop into both eyes nightly 10-130pm 7/24/20   Historical Provider, MD   timolol (TIMOPTIC) 0.5 % ophthalmic solution Place 1 drop into both eyes 2 times daily Give 15 min AFTER dorzolamide drops, 815am and 815pm 8/3/20   Historical Provider, MD   dorzolamide (TRUSOPT) 2 % ophthalmic solution Place 1 drop into both eyes 2 times daily 8am and 8pm 11/9/18   Historical Provider, MD   cholestyramine Bebe Pritchard) 4 G packet Take 1 packet by mouth every evening  4/14/16   Historical Provider, MD   latanoprost (XALATAN) 0.005 % ophthalmic solution Place 1 drop into both eyes nightly 1230am    Historical Provider, MD        [facility] Meds:    Current Facility-Administered Medications   Medication Dose Route Frequency Provider Last Rate Last Admin    meropenem (MERREM) 1,000 mg in sodium chloride 0.9 % 100 mL IVPB (mini-bag)  1,000 mg IntraVENous Q12H Rosa Maria Antoine MD        apixaban Pieter Phalen) tablet 5 mg  5 mg Oral BID Guanakito Tsang MD        ipratropium-albuterol (DUONEB) nebulizer solution 1 ampule  1 ampule Inhalation Q4H WA Atlee Sheldon APRN - CNP        hydrocortisone sodium succinate PF (SOLU-CORTEF) injection 50 mg  50 mg IntraVENous Q8H Atlee Sheldon, APRN - CNP   50 mg at 08/07/22 0993    ipratropium-albuterol (DUONEB) nebulizer solution 1 ampule  1 ampule Inhalation Q4H PRN Guanakito Tsang MD        0.9 % sodium chloride infusion   IntraVENous Continuous Buren Eun Lobo  mL/hr at 08/06/22 2254 Rate Change at 08/06/22 2254    norepinephrine (LEVOPHED) 16 mg in sodium chloride 0.9 % 250 mL infusion  1-100 mcg/min IntraVENous Continuous Dustinld Chano Varma, DO 2.8 mL/hr at 08/07/22 0834 3 mcg/min at 08/07/22 0834    latanoprost (XALATAN) 0.005 % ophthalmic solution 1 drop  1 drop Both Eyes Nightly Guanakito Tsang MD   1 drop at 08/06/22 2040    levothyroxine (SYNTHROID) tablet 100 mcg  100 mcg Oral Daily Guanakito Tsang MD   100 mcg at 08/07/22 8107    sodium chloride flush 0.9 % injection 10 mL  10 mL IntraVENous 2 times per day Guanakito Tsang MD   10 mL at 08/07/22 0833    sodium chloride flush 0.9 % injection 10 mL  10 mL IntraVENous PRN Guanakito Tsang MD        0.9 % sodium chloride infusion   IntraVENous PRN Guanakito Tsang MD        ondansetron (ZOFRAN-ODT) disintegrating tablet 4 mg  4 mg Oral Q8H PRN Guanakito Tsang MD Or    ondansetron (ZOFRAN) injection 4 mg  4 mg IntraVENous Q6H PRN Warden Arcelia MD        magnesium hydroxide (MILK OF MAGNESIA) 400 MG/5ML suspension 30 mL  30 mL Oral Daily PRN Warden Arcelia MD        acetaminophen (TYLENOL) tablet 650 mg  650 mg Oral Q6H PRN Warden Arcelia MD   650 mg at 08/07/22 0831    Or    acetaminophen (TYLENOL) suppository 650 mg  650 mg Rectal Q6H PRN Warden Arcelia MD        ferrous sulfate (IRON 325) tablet 325 mg  325 mg Oral BID WC Warden Arcelia MD   325 mg at 08/07/22 0831       Social History:    Social History     Socioeconomic History    Marital status:      Spouse name: None    Number of children: None    Years of education: None    Highest education level: None   Tobacco Use    Smoking status: Never    Smokeless tobacco: Never   Vaping Use    Vaping Use: Never used   Substance and Sexual Activity    Alcohol use: No    Drug use: No     Social Determinants of Health     Financial Resource Strain: Low Risk     Difficulty of Paying Living Expenses: Not hard at all   Food Insecurity: No Food Insecurity    Worried About Running Out of Food in the Last Year: Never true    Ran Out of Food in the Last Year: Never true       Family History:    Family History   Adopted: Yes   Problem Relation Age of Onset    No Known Problems Mother     No Known Problems Father        Review of Systems:      Constitutional: no weight loss or gain. Eyes: No new visual changes. ENT: No new hearing loss. Cardiovascular: see HPI. Respiratory: No cough. No hemoptysis. Gastrointestinal: No abdominal pain, no blood in stools. Genitourinary: No hematuria or increased frequency. Musculoskeletal:  No new gait disturbance. Integumentary: No rash or pruritis. Neurological: No headache. No seizures or recent CVA/TIA. Psychiatric: No anxiety or depression. Hematologic/Lymphatic: No abnormal bruising or bleeding. Allergic/Immunologic: No new allergies.        Physical Exam Vital Signs: BP (!) 141/54   Pulse 65   Temp 98.2 °F (36.8 °C) (Oral)   Resp 21   Wt 192 lb 3.9 oz (87.2 kg)   SpO2 95%   BMI 31.99 kg/m²        Admission Weight: 176 lb (79.8 kg)     General appearance: Awake, Alert, obese, well developed, well nourished, pleasant. Cooperative. Head: Normocephalic, atraumatic. Neck: no JVD, no thyromegaly. Chest: Scattered rhonchi bilaterally. No chest wall tenderness. No wheezing. Cardiac: Regular rate and rhythm, no S3 or S4 gallop, no significant audible murmur or rubs or clicks. Abdomen: Soft, non-tender. Extremities: no cyanosis or clubbing or leg edema. No calf tenderness. Pulses:  Bilaterally symmetrical and intact radial femoral pulses. Neurologic:  Able to move all 4 extremities. No new gross focal deficits. Skin:  No rashes. Warm and dry.       EKG:     Sinus rhythm with 1st degree A-V block  Right bundle branch block  Abnormal ECG      Labs:      CBC:   Recent Labs     08/05/22  1609 08/06/22  0434 08/07/22  0511   WBC 7.6 7.5 6.6   HGB 9.7* 8.7* 9.1*   HCT 29.4* 26.1* 27.1*   MCV 97.4 97.8 99.2   PLT 87* 102* 121*     BMP:   Recent Labs     08/05/22  1609 08/06/22  0434 08/07/22  0511   * 132* 132*   K 4.2 4.0 4.4   CL 93* 99 105   CO2 22 21 20   BUN 34* 27* 21   CREATININE 2.13* 1.90* 1.35*       Recent Labs     08/05/22  1609   APTT 40.1*       Recent Results (from the past 24 hour(s))   Electrolytes Urine Random    Collection Time: 08/06/22  4:15 PM   Result Value Ref Range    Chloride, Ur <20 mmol/L    Potassium, Ur 11.5 mmol/L    Sodium,Ur 20 mmol/L   Creatinine, Random Urine    Collection Time: 08/06/22  4:15 PM   Result Value Ref Range    Creatinine, Ur 34.2 28.0 - 217.0 mg/dL   CBC auto differential    Collection Time: 08/07/22  5:11 AM   Result Value Ref Range    WBC 6.6 3.5 - 11.0 k/uL    RBC 2.74 (L) 4.0 - 5.2 m/uL    Hemoglobin 9.1 (L) 12.0 - 16.0 g/dL    Hematocrit 27.1 (L) 36 - 46 %    MCV 99.2 80 - 100 fL    MCH 4.25 (H) 0.37 - 1.94 mg/dL    Lambda Free Light Chains QNT 6.38 (H) 0.57 - 2.63 mg/dL    Free Kappa/Lambda Ratio 0.67 0.26 - 1.65   CK    Collection Time: 08/07/22  5:11 AM   Result Value Ref Range    Total CK 65 26 - 192 U/L   Troponin    Collection Time: 08/07/22  5:11 AM   Result Value Ref Range    Troponin, High Sensitivity 23 (H) 0 - 14 ng/L         2 D ECHOCARDIOGRAM July 29, 2022:    Summary  Left ventricle is normal in size and wall thickness. Global left ventricular systolic function is normal. Estimated LV EF 55-60  %. No obvious wall motion abnormality seen. Left atrium is mildly dilated. Mild mitral regurgitation. IMPRESSION:    Idiopathic hypotension-question due to sepsis. Improved with IV hydration. Required IV Levophed. No evidence of acute myocardial infarction by serial high-sensitivity troponin levels the peak level only 23 range. History of paroxysmal atrial fibrillation-on Eliquis therapy. Chronic right bundle branch block and abnormal EKG. Normal LV systolic function. Chronic shortness of breath on exertion most likely due to chronic recurrent pulmonary emboli and on chronic oral anticoagulation using Eliquis therapy. Acute urinary tract infection. History of recurrent falls. No carole syncope by history. Obesity with BMI 32 range. Other problems as charted. REC/PLAN:    As ordered. Due to hypertension requiring IV fluids and IV vasopressors as charted, will hold off on home blood pressure lowering medications including atenolol, Cardura, Bumex etc.    Continue on Eliquis if no plans for any procedures. Continued supportive care as you are doing. No plans for any inpatient invasive or noninvasive cardiac work-up. Discussed with the nurse at bedside at the time of my rounds in the ICU. No family members available at bedside to discuss. Will follow. Thank you.     Electronically signed by Lisa Cantu MD, [facility] [place]      PLEASE NOTE: This progress note was completed using a voice transcription system. Every effort was made to ensure accuracy. However, inadvertent computerized transcription errors may be present.

## 2022-08-08 ENCOUNTER — APPOINTMENT (OUTPATIENT)
Dept: ULTRASOUND IMAGING | Age: 82
DRG: 871 | End: 2022-08-08
Payer: MEDICARE

## 2022-08-08 ENCOUNTER — CARE COORDINATION (OUTPATIENT)
Dept: CARE COORDINATION | Age: 82
End: 2022-08-08

## 2022-08-08 LAB
ABSOLUTE BANDS #: 0.1 K/UL (ref 0–1)
ABSOLUTE EOS #: 0 K/UL (ref 0–0.4)
ABSOLUTE LYMPH #: 0.2 K/UL (ref 1–4.8)
ABSOLUTE MONO #: 0.2 K/UL (ref 0.1–1.3)
ALBUMIN SERPL-MCNC: 2.4 G/DL (ref 3.5–5.2)
ALP BLD-CCNC: 88 U/L (ref 35–104)
ALT SERPL-CCNC: 14 U/L (ref 5–33)
ANION GAP SERPL CALCULATED.3IONS-SCNC: 7 MMOL/L (ref 9–17)
AST SERPL-CCNC: 19 U/L
BANDS: 2 % (ref 0–10)
BASOPHILS # BLD: 0 % (ref 0–2)
BASOPHILS ABSOLUTE: 0 K/UL (ref 0–0.2)
BILIRUB SERPL-MCNC: 0.43 MG/DL (ref 0.3–1.2)
BUN BLDV-MCNC: 19 MG/DL (ref 8–23)
CALCIUM SERPL-MCNC: 8.5 MG/DL (ref 8.6–10.4)
CHLORIDE BLD-SCNC: 105 MMOL/L (ref 98–107)
CO2: 23 MMOL/L (ref 20–31)
CREAT SERPL-MCNC: 1.23 MG/DL (ref 0.5–0.9)
EOSINOPHILS RELATIVE PERCENT: 0 % (ref 0–4)
GFR AFRICAN AMERICAN: 51 ML/MIN
GFR NON-AFRICAN AMERICAN: 42 ML/MIN
GFR SERPL CREATININE-BSD FRML MDRD: ABNORMAL ML/MIN/{1.73_M2}
GLUCOSE BLD-MCNC: 145 MG/DL (ref 70–99)
HCT VFR BLD CALC: 25.1 % (ref 36–46)
HEMOGLOBIN: 8.2 G/DL (ref 12–16)
LYMPHOCYTES # BLD: 4 % (ref 24–44)
MCH RBC QN AUTO: 32.1 PG (ref 26–34)
MCHC RBC AUTO-ENTMCNC: 32.5 G/DL (ref 31–37)
MCV RBC AUTO: 98.8 FL (ref 80–100)
MONOCYTES # BLD: 4 % (ref 1–7)
MORPHOLOGY: ABNORMAL
MORPHOLOGY: ABNORMAL
PDW BLD-RTO: 16.5 % (ref 11.5–14.9)
PLATELET # BLD: 104 K/UL (ref 150–450)
PMV BLD AUTO: 9.4 FL (ref 6–12)
POTASSIUM SERPL-SCNC: 4 MMOL/L (ref 3.7–5.3)
RBC # BLD: 2.54 M/UL (ref 4–5.2)
SEG NEUTROPHILS: 90 % (ref 36–66)
SEGMENTED NEUTROPHILS ABSOLUTE COUNT: 4.5 K/UL (ref 1.3–9.1)
SODIUM BLD-SCNC: 135 MMOL/L (ref 135–144)
TOTAL PROTEIN: 5.2 G/DL (ref 6.4–8.3)
WBC # BLD: 5 K/UL (ref 3.5–11)

## 2022-08-08 PROCEDURE — 2700000000 HC OXYGEN THERAPY PER DAY

## 2022-08-08 PROCEDURE — 97166 OT EVAL MOD COMPLEX 45 MIN: CPT

## 2022-08-08 PROCEDURE — 6360000002 HC RX W HCPCS: Performed by: INTERNAL MEDICINE

## 2022-08-08 PROCEDURE — 6370000000 HC RX 637 (ALT 250 FOR IP): Performed by: NURSE PRACTITIONER

## 2022-08-08 PROCEDURE — 80053 COMPREHEN METABOLIC PANEL: CPT

## 2022-08-08 PROCEDURE — 6360000002 HC RX W HCPCS: Performed by: NURSE PRACTITIONER

## 2022-08-08 PROCEDURE — 2580000003 HC RX 258: Performed by: INTERNAL MEDICINE

## 2022-08-08 PROCEDURE — 94664 DEMO&/EVAL PT USE INHALER: CPT

## 2022-08-08 PROCEDURE — 94640 AIRWAY INHALATION TREATMENT: CPT

## 2022-08-08 PROCEDURE — 94761 N-INVAS EAR/PLS OXIMETRY MLT: CPT

## 2022-08-08 PROCEDURE — 97162 PT EVAL MOD COMPLEX 30 MIN: CPT

## 2022-08-08 PROCEDURE — 97530 THERAPEUTIC ACTIVITIES: CPT

## 2022-08-08 PROCEDURE — 6370000000 HC RX 637 (ALT 250 FOR IP): Performed by: INTERNAL MEDICINE

## 2022-08-08 PROCEDURE — 6370000000 HC RX 637 (ALT 250 FOR IP): Performed by: FAMILY MEDICINE

## 2022-08-08 PROCEDURE — 85025 COMPLETE CBC W/AUTO DIFF WBC: CPT

## 2022-08-08 PROCEDURE — 2580000003 HC RX 258: Performed by: FAMILY MEDICINE

## 2022-08-08 PROCEDURE — 99233 SBSQ HOSP IP/OBS HIGH 50: CPT | Performed by: INTERNAL MEDICINE

## 2022-08-08 PROCEDURE — 2000000000 HC ICU R&B

## 2022-08-08 PROCEDURE — 76770 US EXAM ABDO BACK WALL COMP: CPT

## 2022-08-08 RX ORDER — MIDODRINE HYDROCHLORIDE 2.5 MG/1
2.5 TABLET ORAL 3 TIMES DAILY PRN
Status: DISCONTINUED | OUTPATIENT
Start: 2022-08-08 | End: 2022-08-11 | Stop reason: HOSPADM

## 2022-08-08 RX ORDER — SODIUM CHLORIDE 9 MG/ML
INJECTION, SOLUTION INTRAVENOUS CONTINUOUS
Status: DISCONTINUED | OUTPATIENT
Start: 2022-08-08 | End: 2022-08-09

## 2022-08-08 RX ORDER — METHYLPREDNISOLONE SODIUM SUCCINATE 40 MG/ML
40 INJECTION, POWDER, LYOPHILIZED, FOR SOLUTION INTRAMUSCULAR; INTRAVENOUS EVERY 8 HOURS
Status: DISCONTINUED | OUTPATIENT
Start: 2022-08-08 | End: 2022-08-10

## 2022-08-08 RX ADMIN — DORZOLAMIDE HYDROCHLORIDE 1 DROP: 20 SOLUTION/ DROPS OPHTHALMIC at 19:25

## 2022-08-08 RX ADMIN — IPRATROPIUM BROMIDE AND ALBUTEROL SULFATE 1 AMPULE: 2.5; .5 SOLUTION RESPIRATORY (INHALATION) at 19:50

## 2022-08-08 RX ADMIN — FERROUS SULFATE TAB 325 MG (65 MG ELEMENTAL FE) 325 MG: 325 (65 FE) TAB at 08:04

## 2022-08-08 RX ADMIN — IPRATROPIUM BROMIDE AND ALBUTEROL SULFATE 1 AMPULE: 2.5; .5 SOLUTION RESPIRATORY (INHALATION) at 07:25

## 2022-08-08 RX ADMIN — LEVOTHYROXINE SODIUM 100 MCG: 0.1 TABLET ORAL at 05:20

## 2022-08-08 RX ADMIN — IPRATROPIUM BROMIDE AND ALBUTEROL SULFATE 1 AMPULE: 2.5; .5 SOLUTION RESPIRATORY (INHALATION) at 14:56

## 2022-08-08 RX ADMIN — MIDODRINE HYDROCHLORIDE 2.5 MG: 2.5 TABLET ORAL at 08:04

## 2022-08-08 RX ADMIN — METHYLPREDNISOLONE SODIUM SUCCINATE 40 MG: 40 INJECTION, POWDER, FOR SOLUTION INTRAMUSCULAR; INTRAVENOUS at 10:20

## 2022-08-08 RX ADMIN — METHYLPREDNISOLONE SODIUM SUCCINATE 40 MG: 40 INJECTION, POWDER, FOR SOLUTION INTRAMUSCULAR; INTRAVENOUS at 17:55

## 2022-08-08 RX ADMIN — TIMOLOL MALEATE 1 DROP: 5 SOLUTION OPHTHALMIC at 08:06

## 2022-08-08 RX ADMIN — APIXABAN 5 MG: 5 TABLET, FILM COATED ORAL at 08:04

## 2022-08-08 RX ADMIN — ACETAMINOPHEN 650 MG: 325 TABLET, FILM COATED ORAL at 09:32

## 2022-08-08 RX ADMIN — HYDROCORTISONE SODIUM SUCCINATE 50 MG: 100 INJECTION, POWDER, FOR SOLUTION INTRAMUSCULAR; INTRAVENOUS at 05:20

## 2022-08-08 RX ADMIN — TIMOLOL MALEATE 1 DROP: 5 SOLUTION OPHTHALMIC at 20:02

## 2022-08-08 RX ADMIN — MEROPENEM 1000 MG: 1 INJECTION, POWDER, FOR SOLUTION INTRAVENOUS at 05:20

## 2022-08-08 RX ADMIN — APIXABAN 5 MG: 5 TABLET, FILM COATED ORAL at 19:22

## 2022-08-08 RX ADMIN — DORZOLAMIDE HYDROCHLORIDE 1 DROP: 20 SOLUTION/ DROPS OPHTHALMIC at 08:06

## 2022-08-08 RX ADMIN — FERROUS SULFATE TAB 325 MG (65 MG ELEMENTAL FE) 325 MG: 325 (65 FE) TAB at 17:55

## 2022-08-08 RX ADMIN — MEROPENEM 1000 MG: 1 INJECTION, POWDER, FOR SOLUTION INTRAVENOUS at 17:56

## 2022-08-08 RX ADMIN — IPRATROPIUM BROMIDE AND ALBUTEROL SULFATE 1 AMPULE: 2.5; .5 SOLUTION RESPIRATORY (INHALATION) at 10:50

## 2022-08-08 RX ADMIN — LATANOPROST 1 DROP: 50 SOLUTION OPHTHALMIC at 22:12

## 2022-08-08 RX ADMIN — SODIUM CHLORIDE, PRESERVATIVE FREE 10 ML: 5 INJECTION INTRAVENOUS at 08:06

## 2022-08-08 ASSESSMENT — PAIN DESCRIPTION - LOCATION
LOCATION: HIP;BACK
LOCATION: BACK;LEG
LOCATION: BACK;LEG
LOCATION: BACK;HIP

## 2022-08-08 ASSESSMENT — PAIN DESCRIPTION - PAIN TYPE
TYPE: ACUTE PAIN
TYPE: ACUTE PAIN

## 2022-08-08 ASSESSMENT — PAIN SCALES - GENERAL
PAINLEVEL_OUTOF10: 3
PAINLEVEL_OUTOF10: 4
PAINLEVEL_OUTOF10: 6
PAINLEVEL_OUTOF10: 2
PAINLEVEL_OUTOF10: 0

## 2022-08-08 ASSESSMENT — PAIN - FUNCTIONAL ASSESSMENT
PAIN_FUNCTIONAL_ASSESSMENT: PREVENTS OR INTERFERES SOME ACTIVE ACTIVITIES AND ADLS

## 2022-08-08 ASSESSMENT — PAIN DESCRIPTION - DESCRIPTORS
DESCRIPTORS: ACHING;DISCOMFORT

## 2022-08-08 ASSESSMENT — PAIN DESCRIPTION - ORIENTATION: ORIENTATION: RIGHT

## 2022-08-08 ASSESSMENT — PAIN DESCRIPTION - FREQUENCY: FREQUENCY: CONTINUOUS

## 2022-08-08 ASSESSMENT — PAIN DESCRIPTION - ONSET: ONSET: ON-GOING

## 2022-08-08 NOTE — FLOWSHEET NOTE
Pt was in good spirits, eating her supper. She updated writer and said her  was here this morning. Daughter Glenn Archer was in room as well. 08/08/22 1914   Encounter Summary   Encounter Overview/Reason  Spiritual/Emotional Needs   Service Provided For: Patient and family together   Referral/Consult From: [address] [place] [address]/[facility]; Children   Last Encounter  08/08/22   Complexity of Encounter Moderate   Spiritual/Emotional needs   Type Spiritual Support   Assessment/Intervention/Outcome   Assessment Coping;Calm   Intervention Active listening;Discussed illness injury and its impact; Discussed belief system/[employer] practices/[facility]; Explored/Affirmed feelings, thoughts, concerns;Explored Coping Skills/Resources;Prayer (assurance of)/[place];Sustaining Presence/Ministry of presence   Outcome Engaged in conversation;Expressed feelings, needs, and concerns;Expressed Gratitude;Receptive

## 2022-08-08 NOTE — CARE COORDINATION
ONGOING DISCHARGE PLAN:    Patient is alert and oriented x4. Spoke with patient regarding discharge plan and patient confirms that plan is still to go to SNF [address] [place] [address]. LSW following referral [facility] also referred in case patient decides to go home. IV merrem IV solumedrol 40 mg Q8. Eliquis PTA . Will continue to follow for additional discharge needs.     Electronically signed by Daryl Ruff RN on 8/8/2022 at 11:36 AM

## 2022-08-08 NOTE — PROGRESS NOTES
PULMONARY PROGRESS NOTE:    Interval History: COPD, hypotension, UTI    Shortness of Breath: +  Cough: +  Sputum: none  Hemoptysis: no  Chest Pain: no  Fever: no  Swelling Feet: no  Headache: no  Nausea, Emesis, Abdominal Pain: no  Diarrhea: no  Constipation: no    Events since last visit: wheezes continue this morning; Off levophed. PAST MEDICAL HISTORY:     Smoking:     PHYSICAL EXAMINATION:  afebrile, RR 18, HR 67, 111/34  General : Awake, alert,   Neck - supple, no lymphadenopathy, JVD not raised  Heart - regular rhythm, S1 and S2 normal; no additional sounds heard  Lungs - Air Entry- fair bilaterally; breath sounds : wheezing, 100% on 2l  Abdomen - soft, no tenderness  Upper Extremities  - no cyanosis, mottling; edema : absent  Lower Extremities: no cyanosis, mottling; edema : absent    Current Laboratory, Radiologic, Microbiologic, and Diagnostic studies reviewed    ASSESSMENT / PLAN:    Hypotension - ? Related to dehydration / possible sepsis - resolved    Monitor off pressors  UTI    UCx ecoli -- abx  Acute kidney injury - improving nephrology following  Recent falls-  OT, PT when stable  Recent PNA - appears resolved  CXR  - poss small new Left base infiltrate vs atelectasis     IS  COPD - BD Q4 wa, add methylprednisone    This is a late note on patient seen by me earlier today.   Electronically signed by Amanda Cates MD on 08/08/22 at 8:25 PM

## 2022-08-08 NOTE — PLAN OF CARE
Problem: Discharge Planning  Goal: Discharge to home or other facility with appropriate resources  8/8/2022 1530 by Luisa Valerio RN  Outcome: Progressing  Flowsheets (Taken 8/8/2022 0400 by Miguel Robles RN)  Discharge to home or other facility with appropriate resources: Identify barriers to discharge with patient and caregiver  8/8/2022 0200 by Miguel Robles RN  Outcome: Progressing  Flowsheets  Taken 8/8/2022 0000  Discharge to home or other facility with appropriate resources: Identify barriers to discharge with patient and caregiver  Taken 8/7/2022 2000  Discharge to home or other facility with appropriate resources: Identify barriers to discharge with patient and caregiver     Problem: Safety - Adult  Goal: Free from fall injury  8/8/2022 1530 by Luisa Valerio RN  Outcome: Progressing  Flowsheets (Taken 8/8/2022 0800)  Free From Fall Injury: Instruct family/caregiver on patient safety  8/8/2022 0200 by Miguel Robles RN  Outcome: Progressing  Flowsheets (Taken 8/7/2022 2000)  Free From Fall Injury: Instruct family/caregiver on patient safety  Note: Patient remains free from falls during shift. Problem: ABCDS Injury Assessment  Goal: Absence of physical injury  8/8/2022 1530 by Luisa Valerio RN  Outcome: Progressing  Flowsheets (Taken 8/8/2022 0800)  Absence of Physical Injury: Implement safety measures based on patient assessment  8/8/2022 0200 by Miguel Robles RN  Outcome: Progressing  Flowsheets (Taken 8/7/2022 2000)  Absence of Physical Injury: Implement safety measures based on patient assessment     Problem: Skin/Tissue Integrity  Goal: Absence of new skin breakdown  Description: 1. Monitor for areas of redness and/or skin breakdown  2. Assess vascular access sites hourly  3. Every 4-6 hours minimum:  Change oxygen saturation probe site  4.   Every 4-6 hours:  If on nasal continuous positive airway pressure, respiratory therapy assess nares and determine need for appliance change or resting period. 8/8/2022 1530 by Adriana Ling RN  Outcome: Progressing  Note: No evidence of new breakdown at this time    8/8/2022 0200 by Myra Wild RN  Outcome: Progressing  Note: Patient turned and repositioned every two hours. Cream applied to bottom. No new breakdown noted. Problem: Pain  Goal: Verbalizes/displays adequate comfort level or baseline comfort level  8/8/2022 1530 by Adriana Ling RN  Outcome: Progressing  Flowsheets  Taken 8/8/2022 0800 by Adriana Ling RN  Verbalizes/displays adequate comfort level or baseline comfort level:   Encourage patient to monitor pain and request assistance   Assess pain using appropriate pain scale  Taken 8/8/2022 0400 by Myra Wild RN  Verbalizes/displays adequate comfort level or baseline comfort level: Assess pain using appropriate pain scale  8/8/2022 0200 by Myra Wild RN  Outcome: Progressing  Flowsheets (Taken 8/7/2022 2000)  Verbalizes/displays adequate comfort level or baseline comfort level: Assess pain using appropriate pain scale  Note: Tylenol given as shown in [facility] for leg and back pain.

## 2022-08-08 NOTE — DISCHARGE INSTR - COC
Continuity of Care Form    Patient Name: Naresh Medrano   :  1940  MRN:  [id number]    Admit date:  2022  Discharge date:  2022    Code Status Order: DNR-CCA   Advance Directives:     Admitting Physician:  Kaley Peraza MD  PCP: Bryson Seip, MD    Discharging Nurse: Geri Figueroa, [address] [place] [address] Unit/Room#:   Discharging Unit Phone Number: [phone]    Emergency Contact:   Extended Emergency Contact Information  Primary Emergency Contact: Caleb Lama  Address: [address] [facility], [address] [facility] of [address] [place] [address] Phone: [phone]  Relation: Child  Secondary Emergency Contact: [address] [facility] Phone: [phone]  Relation: Child    Past Surgical History:  Past Surgical History:   Procedure Laterality Date    CHOLECYSTECTOMY      COLON SURGERY      s/p sigmoid cloectomy    COLONOSCOPY N/A 2021    COLONOSCOPY DIAGNOSTIC performed by Tyler Hawkins MD at [address] ([address] [place] [address])      SIGMOIDOSCOPY N/A 2021    SIGMOIDOSCOPY DIAGNOSTIC FLEXIBLE performed by Tyler Hawkins MD at [facility] 2019    DR IMAN GAUTHIER    UPPER GASTROINTESTINAL ENDOSCOPY N/A 2021    EGD DIAGNOSTIC ONLY performed by Tyler Hawkins MD at [address] [place] [address]      s/p       Immunization History:   Immunization History   Administered Date(s) Administered    COVID-19, PFIZER PURPLE top, DILUTE for use, (age 15 y+), 30mcg/0.3mL 2021, 2021, 10/26/2021    Influenza 2013    Influenza Virus Vaccine 10/16/2014, 10/23/2015    Influenza, High Dose (Fluzone 65 yrs and older) 2021    Influenza, High-dose, Quadv, 65 yrs +, IM (Fluzone) 10/14/2020    Influenza, Quadv, IM, PF (6 mo and older Fluzone, Flulaval, Fluarix, and 3 yrs and older Afluria) 2016, 10/12/2017, 10/22/2018    Influenza, Triv, inactivated, subunit, adjuvanted, IM (Fluad 65 yrs and older) 10/07/2019    Pneumococcal Conjugate 13-valent ([id number]) 04/11/2016    Pneumococcal Polysaccharide ([id number]) 10/01/2002, 02/13/2014    Td, unspecified formulation 10/01/2002       Active Problems:  Patient Active Problem List   Diagnosis Code    Mixed hyperlipidemia E78.2    Gastroesophageal reflux disease without esophagitis K21.9    Onychomycosis B35.1    Diverticulosis K57.90    Chronic back pain M54.9, G89.29    Hypothyroidism E03.9    Essential hypertension I10    Right bundle branch block I45.10    Chronic pulmonary embolism (HCC) I27.82    Type 2 diabetes mellitus without complication, without long-term current use of insulin (HCC) E11.9    Obesity (BMI 30.0-34. 9) E66.9    Tracheobronchitis J40    Allergic rhinitis J30.9    Glaucoma H40.9    S/p bilateral myringotomy with tube placement Z96.22    GI bleed K92.2    Iron deficiency anemia D50.9    Paroxysmal atrial fibrillation (HCC) I48.0    Hemorrhage of rectum and anus K62.5    Intractable nausea and vomiting T62.1    Diastolic CHF, acute on chronic (HCC) I50.33    Atrial fibrillation with RVR (HCC) I48.91    Class 2 obesity due to excess calories in adult E66.09    Sepsis ([facility] A41.9    JOSH (acute kidney injury) ([facility] N17.9    Syncope R55    Acute cystitis without hematuria N30.00       Isolation/Infection:   Isolation            Contact          Patient Infection Status       Infection Onset Added Last Indicated Last Indicated By Review Planned Expiration Resolved Resolved By    ESBL (Extended Spectrum Beta Lactamase)  10/17/16 10/17/16 Steven Kim RN        Klebsiella - Urine 10/2016      Resolved    COVID-19 (Rule Out) 08/05/22 08/05/22 08/05/22 COVID-19, Rapid (Ordered)   08/05/22 Rule-Out Test Resulted    COVID-19 (Rule Out) 06/17/22 06/17/22 06/17/22 COVID-19 & Influenza Combo (Ordered)   06/17/22 Rule-Out Test Resulted    COVID-19 (Rule Out) 05/09/22 05/09/22 05/09/22 COVID-19 & Influenza Combo (Ordered)   05/09/22 Rule-Out Test Resulted    C-diff Rule Out 11/30/21 11/30/21 12/01/21 C DIFF TOXIN/ANTIGEN (Ordered)   12/01/21 Rule-Out Test Resulted    COVID-19 (Rule Out) 11/29/21 11/29/21 11/29/21 COVID-19 (Ordered)   11/30/21 Rule-Out Test Resulted    COVID-19 (Rule Out) 11/29/21 11/29/21 11/29/21 SARS-CoV-2 NAAT (Rapid) (Ordered)   11/29/21 Rule-Out Test Resulted            Nurse Assessment:  Last Vital Signs: BP (!) 119/39   Pulse 55   Temp 97.3 °F (36.3 °C) (Oral)   Resp 11   Wt 189 lb 6 oz (85.9 kg)   SpO2 96%   BMI 31.51 kg/m²     Last documented pain score (0-10 scale): Pain Level: 3  Last Weight:   Wt Readings from Last 1 Encounters:   08/08/22 189 lb 6 oz (85.9 kg)     Mental Status:  oriented and alert    IV Access:  - None    Nursing Mobility/ADLs:  Walking   Assisted  Transfer  Assisted  Bathing  Assisted  Dressing  Assisted  Toileting  Assisted  Feeding  Independent  Med Admin  Independent  Med Delivery   whole    Wound Care Documentation and Therapy:        Elimination:  Continence: Bowel: Yes  Bladder: Yes  Urinary Catheter: None   Colostomy/Ileostomy/Ileal Conduit: No       Date of Last BM: 08/10/22    Intake/Output Summary (Last 24 hours) at 8/8/2022 0813  Last data filed at 8/8/2022 0400  Gross per 24 hour   Intake 1188.66 ml   Output 1900 ml   Net -711.34 ml     I/O last 3 completed shifts: In: 3799.3 [I.V.:2604.5; IV Piggyback:1194.9]  Out: 4000 [Urine:4000]    Safety Concerns:     None    Impairments/Disabilities:      None    Nutrition Therapy:  Current Nutrition Therapy:   - Oral Diet:  General    Routes of Feeding: Oral  Liquids: No Restrictions  Daily Fluid Restriction: no  Last Modified Barium Swallow with Video (Video Swallowing Test): not done    Treatments at the Time of Hospital Discharge:   Respiratory Treatments: none  Oxygen Therapy:  is not on home oxygen therapy.   Ventilator:    - No ventilator support    Rehab Therapies: Physical Therapy and Occupational Therapy  Weight Bearing Status/Restrictions: No weight bearing restrictions  Other Medical Equipment (for information only, NOT a DME order):  walker  Other Treatments: Skilled Nursing assessment and monitoring. Medication education and monitoring per protocol. Patient's personal belongings (please select all that are sent with patient):  Hearing Aides bilateral    RN SIGNATURE:  Electronically signed by Logan Waters RN on 8/11/22 at 3:38 PM EDT    CASE MANAGEMENT/SOCIAL WORK SECTION    Inpatient Status Date: 8/5/22    Readmission Risk Assessment Score:  Readmission Risk              Risk of Unplanned Readmission:  [id number]           Discharging to Facility/ Agency   [facility] [place] [facility], [place], [address] [facility] [address]  Phone: [phone]  Fax: [phone]     Dialysis Facility (if applicable)   Name:  Address:  Dialysis Schedule:  Phone:  Fax:    / signature: Electronically signed by Kerwin Silverman RN on 8/8/22 at 8:13 AM EDT    PHYSICIAN SECTION    Prognosis: Fair    Condition at Discharge: Stable    Rehab Potential (if transferring to Rehab): Good    Recommended Labs or Other Treatments After Discharge: none      Physician Certification: I certify the above information and transfer of Lissa Howard  is necessary for the continuing treatment of the diagnosis listed and that she requires Sabino Leonardo for greater 30 days.      Update Admission H&P: No change in H&P    PHYSICIAN SIGNATURE:  Electronically signed by Selvin Langley MD on 8/11/22 at 3:19 PM EDT

## 2022-08-08 NOTE — PROGRESS NOTES
Nephrology Progress Note    Subjective/   79y.o. year old female who we are seeing in consultation for acute kidney injury. Interval history:  Patient examined in ICU, denies any fever chills nausea vomiting. Childers catheter was removed this morning, patient has not made any urine since Childers catheter has been removed  Serum creatinine stable at 1.2 mg/dL  Blood pressure stable    History of Present Illness: This is a 79 y.o. female with history of chronic atrial fibrillation, hypertension, type 2 diabetes mellitus, prior history of DVT who presents with fall at home. She reports falling twice at home but did not lose consciousness. She normally walks with a walker at home. She has been experiencing lightheadedness, fatigue generalized weakness for several days. She states her appetite has been poor. Patient denies nausea vomiting, flank pain or kidney stone she was brought to the ER by family after the second fall and upon presentation , patient was hypotensive with blood pressure of 86/43. He initially received sepsis protocol bolus of 2.3 L and additional 1 L yesterday . Laboratory studies showed a BUN/creatinine of 34 and 2.13 mg/dL with serum sodium of 127 and CPK level is mildly elevated at 309. Patient has  a history of UTI 1 month ago and was also taking Bumex 1 mg twice daily at home, doxazosin and atenolol 100 mg daily. Pt denies any history of  prolonged NSAID use. Patient denies dysuria, gross hematuria, flank pain, nocturia, urgency, passing frothy urine or urinary incontinence. Chest x-ray did not show any acute infiltrate.      Objective/     Vitals:    08/08/22 1050 08/08/22 1100 08/08/22 1145 08/08/22 1157   BP:  (!) 96/36 (!) 117/35    Pulse: 64 61 62    Resp: 18 23 20    Temp:    97.8 °F (36.6 °C)   TempSrc:    Axillary   SpO2: 100% 97% 97%    Weight:         24HR INTAKE/OUTPUT:    Intake/Output Summary (Last 24 hours) at 8/8/2022 1254  Last data filed at 8/8/2022 0400  Gross per 24 hour   Intake 1188.66 ml   Output 1900 ml   Net -711.34 ml     Patient Vitals for the past 96 hrs (Last 3 readings):   Weight   08/08/22 0520 189 lb 6 oz (85.9 kg)   08/07/22 0515 192 lb 3.9 oz (87.2 kg)   08/05/22 2100 183 lb 3.2 oz (83.1 kg)       Constitutional:  Alert, awake, no apparent distress  Cardiovascular:  S1, S2 without m/r/g  Respiratory:  CTA B without w/r/r  Abdomen: +bs, soft, nt  Ext: 1+LE edema    Data/  Recent Labs     08/07/22  0511 08/07/22  1328 08/08/22  0350   WBC 6.6 6.4 5.0   HGB 9.1* 8.3* 8.2*   HCT 27.1* 25.8* 25.1*   MCV 99.2 99.0 98.8   * 112* 104*     Recent Labs     08/07/22  0511 08/07/22  1328 08/08/22  0350   * 130* 135   K 4.4 4.5 4.0    102 105   CO2 20 20 23   GLUCOSE 150* 151* 145*   BUN 21 18 19   CREATININE 1.35* 1.35* 1.23*   LABGLOM 37* 37* 42*   GFRAA 45* 45* 51*       Assessment/   1. Acute kidney injury nonoliguric with prerenal azotemia secondary to intravascular volume depletion and hypotension further worsened by Bumex. [Urine sodium less than 20 -renal function improving  Serum creatinine stable 1.2 mg/dL    2. Hypotension shock with suspected sepsis possibly UTI-off Levophed     3. Hypovolemic hyponatremia     4. Atrial fibrillation     5. Urinary tract infection E. coli greater than 100,000 IU     6. Essential hypertension     7. Shortness of breath-we will obtain a stat chest x-ray    Plan/   1. IV hydration with 0.9 saline at 50 cc/h  2.  Patient started on IV Merrem -follow urine culture   3. Follow serologies  4. Renal ultrasound to rule out any kidney stone or obstruction  5.   Childers catheter removed, voiding trial today if continue to have urinary retention then Childers catheter will be placed back  6  Basic metabolic panel daily  7.  hold Jenn Choe MD

## 2022-08-08 NOTE — PROGRESS NOTES
Physical Therapy  Facility/Department: [facility]  Physical Therapy Initial Assessment    Name: Phuong Villaseñor  : 1940  MRN: [id number]  Date of Service: 2022    Discharge Recommendations:  Patient would benefit from continued therapy after discharge          Patient Diagnosis(es): The primary encounter diagnosis was JOSH (acute kidney injury) ([facility] [address] Diagnoses of Fatigue, unspecified type and Acute cystitis with hematuria were also pertinent to this visit. Past Medical History:  has a past medical history of Atrial fibrillation ([facility] [address] Chronic back pain, Diverticulosis, GERD (gastroesophageal reflux disease), Hyperlipidemia, Hypertension, Hypothyroidism, Obesity (BMI 30.0-34.9), Onychomycosis, PE (pulmonary embolism), and Type II or unspecified type diabetes mellitus without mention of complication, not stated as uncontrolled. Past Surgical History:  has a past surgical history that includes Colon surgery; Vena Cava Filter Placement; Cholecystectomy; Hysterectomy; Tympanostomy tube placement (Bilateral, 2019); Colonoscopy (N/A, 2021); sigmoidoscopy (N/A, 2021); and Upper gastrointestinal endoscopy (N/A, 2021). Assessment   Assessment: Pt admitted after a fall at home, being teated for UTI/Sepsis. Pt requires min/mod A for bed mobility, min to stand from higher surfaces with rolling walker. Ptreports increased pain with mobility due to soreness/pain from fall at home. Has multiple areas of bruising noted. Will increase activity as able. Pt will benefit from continued therapy at discharge.   Therapy Prognosis: Good  Decision Making: Medium Complexity  Clinical Presentation: pain/weakness limiting activity  Requires PT Follow-Up: Yes  Activity Tolerance  Activity Tolerance: Patient limited by fatigue;Patient limited by endurance     Plan   Plan  Plan: 5-7 times per week  Current Treatment Recommendations: Strengthening, Balance training, Functional mobility training, Transfer training, Endurance training, Gait training, Safety education & training, Patient/Caregiver education & training, Equipment evaluation, education, & procurement, Therapeutic activities  Safety Devices  Type of Devices: Left in bed, Gait belt, Call light within reach, Nurse notified     Restrictions  Restrictions/Precautions  Restrictions/Precautions: Fall Risk, General Precautions  Required Braces or Orthoses?: No  Position Activity Restriction  Other position/activity restrictions: Triple lumen catheter in R laura-femoral access. Subjective   General  Patient assessed for rehabilitation services?: Yes  Referral Date : 08/05/22  Diagnosis: Sepsis, JOSH  Follows Commands: Within Functional Limits         Social/Functional History  Social/Functional History  Lives With: Alone  Type of Home: House  Home Layout: One level  Home Access: Stairs to enter with rails  Entrance Stairs - Number of Steps: Number of Steps: 4 GALE with R HR; + 4 steps inside home with B HR  Entrance Stairs - Rails: Right  Bathroom Shower/Tub: Walk-in shower, Doors  Bathroom Toilet: Handicap height  Bathroom Equipment:  (Uses sink for support to raise from toilet)  Bathroom Accessibility:  Raheem Kincaid does not fit in the bathroom)  Home Equipment: Walker, rolling, Wheelchair-manual, Cane, Grab bars  Has the patient had two or more falls in the past year or any fall with injury in the past year?: Yes  Receives Help From: Home health  ADL Assistance: [address]: Needs assistance  Homemaking Responsibilities:  ((Pt does own cooking/laundry; dtr completes grocery shopping, some assist from son/daughter)  Ambulation Assistance: Independent  Transfer Assistance: Independent (Rolling walker)  Active : No  Patient's  Info: Son/dtr drive  Additional Comments: Pt reports that her son and dtr live nearby, able to assist as needed. Both works.   Vision/Hearing  Vision  Vision: Impaired  Vision Exceptions: Wears glasses at all times  Hearing  Hearing: Exceptions to [facility]  Hearing Exceptions: Bilateral hearing aid    Cognition   Orientation  Overall Orientation Status: Within Functional Limits  Cognition  Overall Cognitive Status: WFL     Objective   Heart Rate: 62  Heart Rate Source: Monitor  BP: (!) 117/35  BP Location: Left upper arm  BP Method: Automatic  Patient Position: Semi fowlers  MAP (Calculated): 62.33  Resp: 20  SpO2: 97 %  O2 Device: Nasal cannula  Comment: 2 lt     Observation/Palpation  Observation: Bruises around knee joint Left, R posterioe thigh/above knee joint, Bruised Left shoulder and and top of L buttock  Edema: Swelling in BUE, L>R - Pt reports that this is not typical        AROM RLE (degrees)  RLE AROM: WFL  AROM LLE (degrees)  LLE AROM : WFL  Strength RLE  Comment: grossly 3 to 3+/5  Strength LLE  Comment: grossly 3 to 3+/5     Sensation  Overall Sensation Status: Impaired (Tingling in her feet)     Bed mobility  Supine to Sit: Minimal assistance  Sit to Supine: Moderate assistance  Bed Mobility Comments: Pt reportsing dizzyness with change in positon, BP sitting 116/36. Pt given time to acclamate with positon for ~ 8 to 9 minutes, reproted symptoms decreasing. Transfers  Sit to Stand: Minimal Assistance (High ICU bed)  Stand to sit: Minimal Assistance  Comment: Stood with Rolling walker, no dizzyness while standing, pt took one side step towards HOB at min A. Pt reports extreme fatique with activity.         Balance  Posture: Fair  Sitting - Static: Fair;+  Sitting - Dynamic: Fair  Standing - Static: Fair (RW)  Standing - Dynamic: Fair;- (RW)           OutComes Score                                                  AM-PAC Score  AM-PAC Inpatient Mobility Raw Score : 13 (08/08/22 1203)  AM-PAC Inpatient T-Scale Score : 36.74 (08/08/22 1203)  Mobility Inpatient CMS 0-100% Score: 64.91 (08/08/22 1203)  Mobility Inpatient CMS G-Code Modifier : CL (08/08/22 1203)          Tinneti Score       Goals  Short Term Goals  Time Frame for Short term goals: 10 visits  Short term goal 1: Pt able to perform supine to sit SBA with bed rails  Short term goal 2: Pt able to perform sit to supien min A  Short term goal 3: Pt able to perform sit <.stnad and pivot transfers with Rolling walker CGA  Short term goal 4: Pt able to ambulalte with rolling walker 20 ft x 2, CGA, sao2 > 88%  Short term goal 5: Pt to tolerate activity for 20 to 30 minutes with rest breaks to improve endurance. Patient Goals   Patient goals : I want to get stronger       Education  Patient Education  Education Given To: Patient  Education Provided: Role of Therapy;Plan of Care;Precautions; Fall Prevention Strategies;Transfer Training  Education Method: Demonstration;Verbal  Education Outcome: Verbalized understanding;Demonstrated understanding;Continued education needed      Therapy Time   Individual Concurrent Group Co-treatment   Time In 0933         Time Out 1009         Minutes 36         Timed Code Treatment Minutes: 20 Minutes       Dori Thayer, PT

## 2022-08-08 NOTE — PLAN OF CARE
Problem: Discharge Planning  Goal: Discharge to home or other facility with appropriate resources  8/8/2022 0200 by Mitzy Ley RN  Outcome: Progressing  Flowsheets  Taken 8/8/2022 0000  Discharge to home or other facility with appropriate resources: Identify barriers to discharge with patient and caregiver  Taken 8/7/2022 2000  Discharge to home or other facility with appropriate resources: Identify barriers to discharge with patient and caregiver     Problem: Safety - Adult  Goal: Free from fall injury  8/8/2022 0200 by Mitzy Ley RN  Outcome: Progressing  Flowsheets (Taken 8/7/2022 2000)  Free From Fall Injury: Instruct family/caregiver on patient safety  Note: Patient remains free from falls during shift. Problem: ABCDS Injury Assessment  Goal: Absence of physical injury  8/8/2022 0200 by Mitzy Ley RN  Outcome: Progressing  Flowsheets (Taken 8/7/2022 2000)  Absence of Physical Injury: Implement safety measures based on patient assessment  Problem: Skin/Tissue Integrity  Goal: Absence of new skin breakdown  Description: 1. Monitor for areas of redness and/or skin breakdown  2. Assess vascular access sites hourly  3. Every 4-6 hours minimum:  Change oxygen saturation probe site  4. Every 4-6 hours:  If on nasal continuous positive airway pressure, respiratory therapy assess nares and determine need for appliance change or resting period. 8/8/2022 0200 by Mitzy Ley RN  Outcome: Progressing  Note: Patient turned and repositioned every two hours. Cream applied to bottom. No new breakdown noted. Problem: Pain  Goal: Verbalizes/displays adequate comfort level or baseline comfort level  8/8/2022 0200 by Mitzy eLy RN  Outcome: Progressing  Flowsheets (Taken 8/7/2022 2000)  Verbalizes/displays adequate comfort level or baseline comfort level: Assess pain using appropriate pain scale  Note: Tylenol given as shown in [facility] for leg and back pain. Detail Level: Simple Render Risk Assessment In Note?: no Additional Notes: Patient advised she can take up to 8 mg. Patient advised to slowly increase from 3 tablets to 4 tablets, if no improvement can increase to 5 tablets daily, etc etc. until the max being 8mg daily. Additional Notes: Patient reports dry skin on temples. Patient was given samples of eucrisa to apply twice daily to dry patches on face.

## 2022-08-08 NOTE — PROGRESS NOTES
Occupational Therapy  Facility/Department: [facility] ICU  Occupational Therapy Initial Assessment    Name: Krys Umaña  : 1940  MRN: [id number]  Date of Service: 2022    Discharge Recommendations:  Patient would benefit from continued therapy after discharge  OT Equipment Recommendations  Other: TBD       Patient Diagnosis(es): The primary encounter diagnosis was JOSH (acute kidney injury) ([facility] [address] Diagnoses of Fatigue, unspecified type and Acute cystitis with hematuria were also pertinent to this visit. Past Medical History:  has a past medical history of Atrial fibrillation ([facility] [address] Chronic back pain, Diverticulosis, GERD (gastroesophageal reflux disease), Hyperlipidemia, Hypertension, Hypothyroidism, Obesity (BMI 30.0-34.9), Onychomycosis, PE (pulmonary embolism), and Type II or unspecified type diabetes mellitus without mention of complication, not stated as uncontrolled. Past Surgical History:  has a past surgical history that includes Colon surgery; Vena Cava Filter Placement; Cholecystectomy; Hysterectomy; Tympanostomy tube placement (Bilateral, 2019); Colonoscopy (N/A, 2021); sigmoidoscopy (N/A, 2021); and Upper gastrointestinal endoscopy (N/A, 2021). Treatment Diagnosis: Impaired self-care status    Assessment   Performance deficits / Impairments: Decreased functional mobility ; Decreased ADL status; Decreased endurance;Decreased balance;Decreased high-level IADLs;Decreased posture  Treatment Diagnosis: Impaired self-care status  Prognosis: Good  Decision Making: Medium Complexity  REQUIRES OT FOLLOW-UP: Yes  Activity Tolerance  Activity Tolerance: Treatment limited secondary to medical complications (free text)  Activity Tolerance Comments: Pt reported c/o dizziness when sitting EOB, improved with time but never resolved completely.         Plan   Plan  Times per Week: 5  Times per Day: Daily  Current Treatment Recommendations: Balance training, Functional mobility training, Endurance training, Pain management, Safety education & training, Patient/Caregiver education & training, Equipment evaluation, education, & procurement, Self-Care / ADL     Restrictions  Restrictions/Precautions  Restrictions/Precautions: Fall Risk, General Precautions  Required Braces or Orthoses?: No  Position Activity Restriction  Other position/activity restrictions: Triple lumen catheter in R laura-femoral access. Subjective   General  Chart Reviewed: Yes, Orders, Progress Notes, Previous Admission  Patient assessed for rehabilitation services?: Yes  Additional Pertinent Hx: Pt had 2 falls at home PTA and was found to be hypotensive by EMS  Family / Caregiver Present: No  Referring Practitioner: Dr. Sammie Nolasco  Diagnosis: JOSH, Sepsis  Subjective  Subjective: \"I am not going home, I am going to a rehab\"     Social/Functional History  Social/Functional History  Lives With: Alone  Type of Home: House  Home Layout: One level  Home Access: Stairs to enter with rails  Entrance Stairs - Number of Steps: Number of Steps: 4 GALE with R HR; + 4 steps inside home with B HR  Entrance Stairs - Rails: Right  Bathroom Shower/Tub: Walk-in shower, Doors  Bathroom Toilet: Handicap height  Bathroom Equipment:  (Uses sink for support to raise from toilet)  Bathroom Accessibility:  Yusuf Cárdenas does not fit in the bathroom)  Home Equipment: Rosalva Kya, rolling, Wheelchair-manual, Cane, Grab bars  Has the patient had two or more falls in the past year or any fall with injury in the past year?: Yes  Receives Help From: Home health  ADL Assistance: [address]: Needs assistance  Homemaking Responsibilities:  ((Pt does own cooking/laundry; dtr completes grocery shopping, some assist from son/daughter)  Ambulation Assistance: Independent  Transfer Assistance: Independent (Rolling walker)  Active : No  Patient's  Info:  Son/dtr drive  Additional Comments: Pt reports that her son and dtr live nearby, able to self-care and mobility. Patient Goals   Patient goals : Pt reports that she would like to go to a SNF for rehab prior to returning home.        Therapy Time   Individual Concurrent Group Co-treatment   Time In 0936         Time Out 1009         Minutes 33         Timed Code Treatment Minutes: 2096 Harshad Ramirez OTR/L

## 2022-08-08 NOTE — PROGRESS NOTES
[address], [address]  [phone]    PROGRESS NOTE     Phuong Villaseñor  was seen examined at bedside this morning. No reported cardiac events overnight. Still on low-dose Levophed. Telemetry reviewed and showing patient is sinus rhythm, rate controlled. No significance noted overnight. SUBJECTIVE     Allergies:     Allergies   Allergen Reactions    Avapro [Irbesartan]     Ciprofloxacin Hcl Nausea Only    Cozaar [Losartan Potassium]     Diovan [Valsartan]     Lipitor [Atorvastatin Calcium]     Lisinopril     Pcn [Penicillins]     Pravachol [Pravastatin Sodium]      myalgias    Tape [Adhesive Tape] Dermatitis    Tramadol Swelling    Zetia [Ezetimibe]     Morphine Nausea And Vomiting        CURRENT MEDICATIONS   methylPREDNISolone  40 mg IntraVENous Q8H    meropenem  1,000 mg IntraVENous Q12H    apixaban  5 mg Oral BID    ipratropium-albuterol  1 ampule Inhalation Q4H WA    Netarsudil Dimesylate  1 drop Both Eyes Nightly    timolol  1 drop Both Eyes BID    dorzolamide  1 drop Both Eyes BID    latanoprost  1 drop Both Eyes Nightly    levothyroxine  100 mcg Oral Daily    sodium chloride flush  10 mL IntraVENous 2 times per day    ferrous sulfate  325 mg Oral BID WC     CONTINUOUS INFUSIONS   sodium chloride      norepinephrine 1 mcg/min (08/08/22 0721)    sodium chloride             OBJECTIVE     CBC: @LABRCNTIP(WBC:3,HGB:3,HCT:3,MCV:3,PLT:3)@      [email or web address]  PT/INR: @LABRCNTIP(PROTIME:3,INR:3)@  APTT: @LABRCNTIP(aPTT:3)@  MAG: @LABRCNTIP(MG:3)@  D Dimer: @LABRCNTIP(DDIMER:3)@  Troponin I @LABRCNTIP(TROPONINI:3)@  ProBNP @LABRCNTIP(BNP:3)@  Lipid Panel:    Lab Results   Component Value Date/Time    CHOL 133 02/21/2022 09:38 AM    TRIG 79 02/21/2022 09:38 AM    HDL 65 02/21/2022 09:38 AM     Liver Panel: No results found for: ALB  HgA1C:  No components found for: HGBA1C    ABG: No components found for: ABGSAMPLETYP, ABGBODYTEMP, ABGPHCORRFOR, BYZOXZ6GJHCCD, ABGPHCORRFOOR, ABGPH, ABGPCO2, ABGPO2, ABGBASEEXCES, ABGBASEDEFIC, ABGHCO3, VRML4WBC, ABGENDTIDALC, ABGALLENSTES, ABGSPO2, ABGSAMEPLESIT, [id number], ABGOXYGENSOUE      LAST ECHO (Within 2 Years)   No results found for this or any previous visit. RADIOLOGY:  [unfilled]    PHYSICAL EXAM  Admission Weight: Weight: 176 lb (79.8 kg)  I/O last 3 completed shifts: In: 3799.3 [I.V.:2604.5; IV Piggyback:1194.9]  Out: 4000 [Urine:4000]  Weight change: -2 lb 13.9 oz (-1.3 kg)  Wt Readings from Last 3 Encounters:   08/08/22 189 lb 6 oz (85.9 kg)   06/23/22 177 lb 6.4 oz (80.5 kg)   06/17/22 183 lb (83 kg)       Vitals:  Vitals:    08/08/22 0700 08/08/22 0800 08/08/22 0900 08/08/22 1000   BP: (!) 119/39 (!) 127/46 (!) 113/39 124/60   Pulse: 55 64 69 70   Resp: 11 14 21 15   Temp:  97.7 °F (36.5 °C)     TempSrc:  Axillary     SpO2: 96% 98% 98% 91%   Weight:           Admit Weight  Weight: 176 lb (79.8 kg)  Last 3 Weights  [unfilled]  Body mass index is 31.51 kg/m². INTAKE/OUTPUT  I/O last 3 completed shifts: In: 3799.3 [I.V.:2604.5; IV Piggyback:1194.9]  Out: 4000 [Urine:4000]    Intake/Output Summary (Last 24 hours) at 8/8/2022 1004  Last data filed at 8/8/2022 0400  Gross per 24 hour   Intake 1188.66 ml   Output 1900 ml   Net -711.34 ml       General appearance: Alert oriented and cooperative, In no acute distress  Lungs: Clear to ausculation bilaterally, no use of accessory muscles  Heart[de-identified] RRR with normal S1 and S2, no murmurs and no gallops. Abdomen: Soft, non-tender, bowel sounds normal.  Extremities: No edema    ASSESSMENT      1. Septic shock /UTI   2. Normal LVEF TTE 07/29/2022   3. 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 Paroxysmal atrial fibrillation on chronic Eliquis   5. Diabetes mellitus type 2   6. CKD   7. Hypothyroidism    PLAN     -  Patient was stable at the time of my visit.   Still requiring low-dose Levophed. Will start on gentle IV hydration, try to wean off pressors as blood pressure can tolerate. P.r.n. midodrine also added. -  Continue Eliquis for prevention of thromboembolic events. -  Continue to hold amiodarone and atenolol.  -   Will follow-up. Thanh Carrillo MD      This note was completed using a voice transcription system. Every effort was made to ensure accuracy. However, inadvertent computerized transcription errors may be present.

## 2022-08-08 NOTE — CARE COORDINATION
daily 8am and 8pm 11/9/18   Historical Provider, MD   cholestyramine Nj Robb) 4 G packet Take 1 packet by mouth every evening  4/14/16   Historical Provider, MD   latanoprost (XALATAN) 0.005 % ophthalmic solution Place 1 drop into both eyes nightly 1230am    Historical Provider, MD       Future Appointments   Date Time Provider David Reinai   9/6/2022  2:30 PM Natalee Sullivan MD [facility]

## 2022-08-09 LAB
ABSOLUTE EOS #: 0 K/UL (ref 0–0.4)
ABSOLUTE LYMPH #: 0.05 K/UL (ref 1–4.8)
ABSOLUTE MONO #: 0.05 K/UL (ref 0.1–1.3)
ANION GAP SERPL CALCULATED.3IONS-SCNC: 6 MMOL/L (ref 9–17)
ANTI DNA DOUBLE STRANDED: 1.3 IU/ML
ANTI-NUCLEAR ANTIBODY (ANA): NEGATIVE
BASOPHILS # BLD: 0 % (ref 0–2)
BASOPHILS ABSOLUTE: 0 K/UL (ref 0–0.2)
BUN BLDV-MCNC: 19 MG/DL (ref 8–23)
CALCIUM SERPL-MCNC: 8.9 MG/DL (ref 8.6–10.4)
CHLORIDE BLD-SCNC: 106 MMOL/L (ref 98–107)
CO2: 26 MMOL/L (ref 20–31)
CREAT SERPL-MCNC: 1.05 MG/DL (ref 0.5–0.9)
ENA ANTIBODIES SCREEN: 0.4 U/ML
EOSINOPHILS RELATIVE PERCENT: 0 % (ref 0–4)
GFR AFRICAN AMERICAN: >60 ML/MIN
GFR NON-AFRICAN AMERICAN: 50 ML/MIN
GFR SERPL CREATININE-BSD FRML MDRD: ABNORMAL ML/MIN/{1.73_M2}
GLUCOSE BLD-MCNC: 149 MG/DL (ref 70–99)
HCT VFR BLD CALC: 24.6 % (ref 36–46)
HEMOGLOBIN: 7.9 G/DL (ref 12–16)
LYMPHOCYTES # BLD: 1 % (ref 24–44)
MCH RBC QN AUTO: 31.9 PG (ref 26–34)
MCHC RBC AUTO-ENTMCNC: 32.2 G/DL (ref 31–37)
MCV RBC AUTO: 98.9 FL (ref 80–100)
MONOCYTES # BLD: 1 % (ref 1–7)
MORPHOLOGY: ABNORMAL
PDW BLD-RTO: 16.4 % (ref 11.5–14.9)
PLATELET # BLD: 121 K/UL (ref 150–450)
PMV BLD AUTO: 8.8 FL (ref 6–12)
POTASSIUM SERPL-SCNC: 4.4 MMOL/L (ref 3.7–5.3)
RBC # BLD: 2.48 M/UL (ref 4–5.2)
SEG NEUTROPHILS: 98 % (ref 36–66)
SEGMENTED NEUTROPHILS ABSOLUTE COUNT: 4.6 K/UL (ref 1.3–9.1)
SODIUM BLD-SCNC: 138 MMOL/L (ref 135–144)
WBC # BLD: 4.7 K/UL (ref 3.5–11)

## 2022-08-09 PROCEDURE — 6370000000 HC RX 637 (ALT 250 FOR IP): Performed by: FAMILY MEDICINE

## 2022-08-09 PROCEDURE — 36415 COLL VENOUS BLD VENIPUNCTURE: CPT

## 2022-08-09 PROCEDURE — 2700000000 HC OXYGEN THERAPY PER DAY

## 2022-08-09 PROCEDURE — 97530 THERAPEUTIC ACTIVITIES: CPT

## 2022-08-09 PROCEDURE — 6370000000 HC RX 637 (ALT 250 FOR IP): Performed by: NURSE PRACTITIONER

## 2022-08-09 PROCEDURE — 94761 N-INVAS EAR/PLS OXIMETRY MLT: CPT

## 2022-08-09 PROCEDURE — 99232 SBSQ HOSP IP/OBS MODERATE 35: CPT | Performed by: INTERNAL MEDICINE

## 2022-08-09 PROCEDURE — 85025 COMPLETE CBC W/AUTO DIFF WBC: CPT

## 2022-08-09 PROCEDURE — 2580000003 HC RX 258: Performed by: INTERNAL MEDICINE

## 2022-08-09 PROCEDURE — 2060000000 HC ICU INTERMEDIATE R&B

## 2022-08-09 PROCEDURE — 80048 BASIC METABOLIC PNL TOTAL CA: CPT

## 2022-08-09 PROCEDURE — 94640 AIRWAY INHALATION TREATMENT: CPT

## 2022-08-09 PROCEDURE — 6360000002 HC RX W HCPCS: Performed by: NURSE PRACTITIONER

## 2022-08-09 PROCEDURE — 6360000002 HC RX W HCPCS: Performed by: INTERNAL MEDICINE

## 2022-08-09 PROCEDURE — 97110 THERAPEUTIC EXERCISES: CPT

## 2022-08-09 RX ADMIN — DORZOLAMIDE HYDROCHLORIDE 1 DROP: 20 SOLUTION/ DROPS OPHTHALMIC at 22:12

## 2022-08-09 RX ADMIN — TIMOLOL MALEATE 1 DROP: 5 SOLUTION OPHTHALMIC at 08:10

## 2022-08-09 RX ADMIN — ACETAMINOPHEN 650 MG: 325 TABLET, FILM COATED ORAL at 02:00

## 2022-08-09 RX ADMIN — TIMOLOL MALEATE 1 DROP: 5 SOLUTION OPHTHALMIC at 22:12

## 2022-08-09 RX ADMIN — METHYLPREDNISOLONE SODIUM SUCCINATE 40 MG: 40 INJECTION, POWDER, FOR SOLUTION INTRAMUSCULAR; INTRAVENOUS at 00:55

## 2022-08-09 RX ADMIN — METHYLPREDNISOLONE SODIUM SUCCINATE 40 MG: 40 INJECTION, POWDER, FOR SOLUTION INTRAMUSCULAR; INTRAVENOUS at 17:46

## 2022-08-09 RX ADMIN — LATANOPROST 1 DROP: 50 SOLUTION OPHTHALMIC at 22:12

## 2022-08-09 RX ADMIN — LEVOTHYROXINE SODIUM 100 MCG: 0.1 TABLET ORAL at 05:54

## 2022-08-09 RX ADMIN — IPRATROPIUM BROMIDE AND ALBUTEROL SULFATE 1 AMPULE: 2.5; .5 SOLUTION RESPIRATORY (INHALATION) at 19:24

## 2022-08-09 RX ADMIN — APIXABAN 5 MG: 5 TABLET, FILM COATED ORAL at 22:12

## 2022-08-09 RX ADMIN — MEROPENEM 1000 MG: 1 INJECTION, POWDER, FOR SOLUTION INTRAVENOUS at 05:55

## 2022-08-09 RX ADMIN — ACETAMINOPHEN 650 MG: 325 TABLET, FILM COATED ORAL at 23:37

## 2022-08-09 RX ADMIN — FERROUS SULFATE TAB 325 MG (65 MG ELEMENTAL FE) 325 MG: 325 (65 FE) TAB at 08:09

## 2022-08-09 RX ADMIN — METHYLPREDNISOLONE SODIUM SUCCINATE 40 MG: 40 INJECTION, POWDER, FOR SOLUTION INTRAMUSCULAR; INTRAVENOUS at 08:09

## 2022-08-09 RX ADMIN — IPRATROPIUM BROMIDE AND ALBUTEROL SULFATE 1 AMPULE: 2.5; .5 SOLUTION RESPIRATORY (INHALATION) at 07:24

## 2022-08-09 RX ADMIN — APIXABAN 5 MG: 5 TABLET, FILM COATED ORAL at 08:09

## 2022-08-09 RX ADMIN — FERROUS SULFATE TAB 325 MG (65 MG ELEMENTAL FE) 325 MG: 325 (65 FE) TAB at 17:46

## 2022-08-09 RX ADMIN — CEFTRIAXONE SODIUM 1000 MG: 1 INJECTION, POWDER, FOR SOLUTION INTRAMUSCULAR; INTRAVENOUS at 10:37

## 2022-08-09 RX ADMIN — DORZOLAMIDE HYDROCHLORIDE 1 DROP: 20 SOLUTION/ DROPS OPHTHALMIC at 08:54

## 2022-08-09 ASSESSMENT — PAIN DESCRIPTION - LOCATION: LOCATION: BACK

## 2022-08-09 ASSESSMENT — PAIN SCALES - GENERAL
PAINLEVEL_OUTOF10: 4
PAINLEVEL_OUTOF10: 0
PAINLEVEL_OUTOF10: 6

## 2022-08-09 ASSESSMENT — PAIN DESCRIPTION - DESCRIPTORS: DESCRIPTORS: ACHING

## 2022-08-09 NOTE — PROGRESS NOTES
[facility] -   Infectious diseases evaluation  admission date 8/5/2022    reason for consultation:   UTI    Impression :   Current:  UTI/E. coli grew on urine culture that was pansensitive  Sepsis picture  Hypotension  Atrial fibrillation  History of PE/DVT  Diabetes mellitus  History of hypertension  Hyperlipidemia    Other:  Penicillin and Cipro allergy  History of ESBL    Recommendations   IV meropenem discontinued  IV ceftriaxone  Likely oral antibiotics on discharge  Follow-up blood and urine cultures and adjust antibiotics as needed  Follow CBC and renal function  Supportive care    History of Present Illness:   Initial history:  Kalyani Gibbs is a 79y.o.-year-old female was brought to the hospital by EMS after a fall at home. The patient had generalized weakness for 1 day prior to admission associated with lightheadedness. Symptoms moderate to severe, no alleviating or aggravating factors. The patient fell twice at home. The patient was hypotensive on arrival to the ER, received fluid boluses and was started on Levophed. Childers catheter was placed. She is awake, denied cough or shortness of breath,, denied nausea or vomiting, no abdominal pain. 8/5/2022 lactic acid 1.1, urinalysis showed 6-9 WBC, cloudy, small leukocyte esterase  Chest x-ray showed no acute abnormality  COVID test was negative  Interval changes  8/9/2022   She continues to improve, Childers catheter was removed yesterday, denied significant pain, denied fever or chills, no other complaints          I have personally reviewed the past medical history, past surgical history, medications, social history, and family history, and I haveupdated the database accordingly. Allergies:    Avapro [irbesartan], Ciprofloxacin hcl, Cozaar [losartan potassium], Diovan [valsartan], Lipitor [atorvastatin calcium], Lisinopril, Pcn [penicillins], Pravachol [pravastatin sodium], Tape [adhesive tape], Tramadol, Zetia [ezetimibe], and Morphine     Review of Systems:     Review of Systems  As per history present illness, other than above 12 system review was negative  Physical Examination :       Physical Exam  Constitutional:       General: She is not in acute distress. HENT:      Head: Atraumatic. Right Ear: External ear normal.      Left Ear: External ear normal.   Eyes:      General: No scleral icterus. Conjunctiva/sclera: Conjunctivae normal.   Cardiovascular:      Rate and Rhythm: Normal rate and regular rhythm. Heart sounds: No murmur heard. Pulmonary:      Effort: Pulmonary effort is normal.      Breath sounds: Normal breath sounds. Abdominal:      General: There is no distension. Palpations: Abdomen is soft. Tenderness: There is no abdominal tenderness. Genitourinary:     Comments: Childers catheter in place  Musculoskeletal:      Cervical back: Neck supple. No rigidity. Right lower leg: No edema. Left lower leg: No edema. Skin:     General: Skin is warm. Coloration: Skin is not jaundiced. Neurological:      Mental Status: She is alert and oriented to person, place, and time. Mental status is at baseline. Past Medical History:     Past Medical History:   Diagnosis Date    Atrial fibrillation ([facility] 3/28/2014    Chronic back pain     with rt. leg pain    Diverticulosis     GERD (gastroesophageal reflux disease)     Hyperlipidemia     Hypertension     Hypothyroidism     Obesity (BMI 30.0-34. 9) 8/12/2016    Onychomycosis     PE (pulmonary embolism)     H/O DVT of rt leg    Type II or unspecified type diabetes mellitus without mention of complication, not stated as uncontrolled        Past Surgical  History:     Past Surgical History:   Procedure Laterality Date    CHOLECYSTECTOMY      COLON SURGERY      s/p sigmoid cloectomy    COLONOSCOPY N/A 1/25/2021    COLONOSCOPY DIAGNOSTIC performed by Evita Salas MD at [facility] ([address] UNKNOWN)      SIGMOIDOSCOPY N/A 12/2/2021    SIGMOIDOSCOPY DIAGNOSTIC FLEXIBLE performed by Frankei Marcelino MD at [facility] 09/20/2019    DR IMAN GAUTHIER    UPPER GASTROINTESTINAL ENDOSCOPY N/A 12/2/2021    EGD DIAGNOSTIC ONLY performed by Frankie Marcelino MD at [address] [place] [address]      s/p       Medications:      cefTRIAXone (ROCEPHIN) IV  1,000 mg IntraVENous Q24H    methylPREDNISolone  40 mg IntraVENous Q8H    apixaban  5 mg Oral BID    ipratropium-albuterol  1 ampule Inhalation Q4H WA    Netarsudil Dimesylate  1 drop Both Eyes Nightly    timolol  1 drop Both Eyes BID    dorzolamide  1 drop Both Eyes BID    latanoprost  1 drop Both Eyes Nightly    levothyroxine  100 mcg Oral Daily    sodium chloride flush  10 mL IntraVENous 2 times per day    ferrous sulfate  325 mg Oral BID WC       Social History:     Social History     Socioeconomic History    Marital status:       Spouse name: Not on file    Number of children: Not on file    Years of education: Not on file    Highest education level: Not on file   Occupational History    Not on file   Tobacco Use    Smoking status: Never    Smokeless tobacco: Never   Vaping Use    Vaping Use: Never used   Substance and Sexual Activity    Alcohol use: No    Drug use: No    Sexual activity: Not on file   Other Topics Concern    Not on file   Social History Narrative    Not on file     Social Determinants of Health     Financial Resource Strain: Low Risk     Difficulty of Paying Living Expenses: Not hard at all   Food Insecurity: No Food Insecurity    Worried About Running Out of Food in the Last Year: Never true    Ran Out of Food in the Last Year: Never true   Transportation Needs: Not on file   Physical Activity: Not on file   Stress: Not on file   Social Connections: Not on file   Intimate Partner Violence: Not on file   Housing Stability: Not on file       Family History:     Family History   Adopted: Yes   Problem Relation Age of Onset    No Known Problems Mother     No Known Problems Father       Medical Decision Making:   I have independently reviewed/ordered the following labs:    CBC with Differential:   Recent Labs     08/08/22  0350    WBC 5.0    HGB 8.2*    HCT 25.1*    *    LYMPHOPCT 4*    MONOPCT 4        BMP:  Recent Labs     08/08/22  0350        K 4.0        CO2 23    BUN 19    CREATININE 1.23*        Hepatic Function Panel:   Recent Labs     08/07/22  0511 08/08/22  0350   PROT 5.0*  5.5* 5.2*   LABALBU 2.6* 2.4*   BILITOT 0.55 0.43   ALKPHOS 93 88   ALT 14 14   AST 23 19           Detailed results: Thank you for allowing us to participate in the care of this patient. Please call with questions. This note is created with the assistance of a speech recognition program.  While intending to generate adocument that actually reflects the content of the visit, the document can still have some errors including those of syntax and sound a like substitutions which may escape proof reading. It such instances, actual meaningcan be extrapolated by contextual diversion.     Shavon Roberts MD  Office: [phone]  Perfect serve / office [phone]

## 2022-08-09 NOTE — PROGRESS NOTES
Physician Progress Note      PATIENT:               Cynthia Galicia  CSN #:                  [id number]  :                       1940  ADMIT DATE:       2022 2:49 PM  [address] [place] DATE:  RESPONDING  PROVIDER #:        Douglas Duke MD          QUERY TEXT:    Patient admitted with UTI and JOSH. Noted documentation of sepsis in H/P. In   order to support the diagnosis of sepsis, please include additional clinical   indicators in your documentation. Or please document if the diagnosis of   sepsis has been ruled out after further study    The medical record reflects the following:  Risk Factors: recent PNA  Clinical Indicators: UA+ small LE, many bacteria, WBC 6-9; UC+ Escherichia   Coli >405633; WBC 7.6; Temp 100.6F; HR 60-80; BPs as low as 81/40 MAP 53; LA   1.1-->2.1-->1.6; per pulm consult-->Hypotension - ? Related to dehydration /   possible sepsis; Renal consult--> Hypotension - ? Related to dehydration /   possible sepsis; ID consult--> UTI, sepsis picture;  Treatment: 4394ml IVF bolus, maintenance IVF @ 100ml/hr, IV Levophed infusion,   LA level, IV Rocephin x1, IV Merrem Q12H, monitoring, hospital admission,  Options provided:  -- Sepsis present as evidenced by, Please document evidence.   -- Sepsis was ruled out after study  -- Other - I will add my own diagnosis  -- Disagree - Not applicable / Not valid  -- Disagree - Clinically unable to determine / Unknown  -- Refer to Clinical Documentation Reviewer    PROVIDER RESPONSE TEXT:    Sepsis is present as evidenced by acute cystitis, hypotension,fever    Query created by: Jeannette Avila on 2022 3:22 PM      Electronically signed by:  Douglas Duke MD 2022 12:22 PM

## 2022-08-09 NOTE — PLAN OF CARE
Problem: Discharge Planning  Goal: Discharge to home or other facility with appropriate resources  8/9/2022 0416 by Nilson Lewis RN  Outcome: Progressing     Problem: Safety - Adult  Goal: Free from fall injury  8/9/2022 0416 by Nilson Lewis RN  Outcome: Progressing  Flowsheets  Taken 8/9/2022 0416 by Nilson Lewis RN  Free From Fall Injury: Instruct family/caregiver on patient safety  Taken 8/8/2022 2028 by Meggan Saba RN  Free From Fall Injury: Instruct family/caregiver on patient safety  Note: Safety maintained this shift     Problem: ABCDS Injury Assessment  Goal: Absence of physical injury  8/9/2022 0416 by Nilson Lewis RN  Outcome: Progressing  Flowsheets (Taken 8/8/2022 2028 by Meggan Saba RN)  Absence of Physical Injury: Implement safety measures based on patient assessment     Problem: Pain  Goal: Verbalizes/displays adequate comfort level or baseline comfort level  8/9/2022 0416 by Nilson Lewis RN  Outcome: Progressing  Flowsheets (Taken 8/9/2022 0416)  Verbalizes/displays adequate comfort level or baseline comfort level:   Assess pain using appropriate pain scale   Administer analgesics based on type and severity of pain and evaluate response  Note: Tylenol given once this shift for low back discomfort and pt rests after dose received     Problem: Chronic Conditions and Co-morbidities  Goal: Patient's chronic conditions and co-morbidity symptoms are monitored and maintained or improved  Outcome: Progressing     Problem: Skin/Tissue Integrity  Goal: Absence of new skin breakdown  Description: 1. Monitor for areas of redness and/or skin breakdown  2. Assess vascular access sites hourly  3. Every 4-6 hours minimum:  Change oxygen saturation probe site  4. Every 4-6 hours:  If on nasal continuous positive airway pressure, respiratory therapy assess nares and determine need for appliance change or resting period.   8/9/2022 0416 by Nilson Lewis RN  Outcome: Adequate for Discharge  Note: No new skin breakdown noted

## 2022-08-09 NOTE — PLAN OF CARE
Problem: Discharge Planning  Goal: Discharge to home or other facility with appropriate resources  8/9/2022 1800 by Viky Tuttle RN  Outcome: Progressing  Flowsheets  Taken 8/9/2022 1400  Discharge to home or other facility with appropriate resources: Identify barriers to discharge with patient and caregiver  Taken 8/9/2022 0800  Discharge to home or other facility with appropriate resources: Identify barriers to discharge with patient and caregiver  8/9/2022 0416 by Stephanie Coon RN  Outcome: Progressing     Problem: Safety - Adult  Goal: Free from fall injury  8/9/2022 1800 by Viky Tuttle RN  Outcome: Progressing  Flowsheets (Taken 8/9/2022 0800)  Free From Fall Injury: Instruct family/caregiver on patient safety  8/9/2022 0416 by Stephanie Coon RN  Outcome: Progressing  Flowsheets  Taken 8/9/2022 0416 by Stephanie Coon RN  Free From Fall Injury: Instruct family/caregiver on patient safety  Taken 8/8/2022 2028 by Lazarus Palmer, RN  Free From Fall Injury: Instruct family/caregiver on patient safety  Note: Safety maintained this shift     Problem: ABCDS Injury Assessment  Goal: Absence of physical injury  8/9/2022 1800 by Viky Tuttle RN  Outcome: Progressing  Flowsheets (Taken 8/9/2022 0800)  Absence of Physical Injury: Implement safety measures based on patient assessment  8/9/2022 0416 by Stephanie Coon RN  Outcome: Progressing  Flowsheets (Taken 8/8/2022 2028 by Lazarus Palmer, RN)  Absence of Physical Injury: Implement safety measures based on patient assessment     Problem: Skin/Tissue Integrity  Goal: Absence of new skin breakdown  Description: 1. Monitor for areas of redness and/or skin breakdown  2. Assess vascular access sites hourly  3. Every 4-6 hours minimum:  Change oxygen saturation probe site  4. Every 4-6 hours:  If on nasal continuous positive airway pressure, respiratory therapy assess nares and determine need for appliance change or resting period.   8/9/2022 1800 by Carlos Luna Charan RN  Outcome: Progressing  8/9/2022 0416 by Sheeba Mejía RN  Outcome: Adequate for Discharge  Note: No new skin breakdown noted

## 2022-08-09 NOTE — PROGRESS NOTES
Nephrology Progress Note    Subjective/   79y.o. year old female who we are seeing in consultation for acute kidney injury. Interval history:  Patient examined in ICU, denies any fever chills nausea vomiting. Childers catheter was removed 8/8/2022  Good uop  Serum creatinine stable at 1.0 mg/dL  Blood pressure stable    History of Present Illness: This is a 79 y.o. female with history of chronic atrial fibrillation, hypertension, type 2 diabetes mellitus, prior history of DVT who presents with fall at home. She reports falling twice at home but did not lose consciousness. She normally walks with a walker at home. She has been experiencing lightheadedness, fatigue generalized weakness for several days. She states her appetite has been poor. Patient denies nausea vomiting, flank pain or kidney stone she was brought to the ER by family after the second fall and upon presentation , patient was hypotensive with blood pressure of 86/43. He initially received sepsis protocol bolus of 2.3 L and additional 1 L yesterday . Laboratory studies showed a BUN/creatinine of 34 and 2.13 mg/dL with serum sodium of 127 and CPK level is mildly elevated at 309. Patient has  a history of UTI 1 month ago and was also taking Bumex 1 mg twice daily at home, doxazosin and atenolol 100 mg daily. Pt denies any history of  prolonged NSAID use. Patient denies dysuria, gross hematuria, flank pain, nocturia, urgency, passing frothy urine or urinary incontinence. Chest x-ray did not show any acute infiltrate.      Objective/     Vitals:    08/09/22 0500 08/09/22 0600 08/09/22 0700 08/09/22 0724   BP: (!) 112/36 (!) 136/55 (!) 139/45    Pulse: 62 69 66 70   Resp: 10 16 18 20   Temp:       TempSrc:       SpO2: 97% 97% 96% 98%   Weight: 191 lb 12.8 oz (87 kg)        24HR INTAKE/OUTPUT:    Intake/Output Summary (Last 24 hours) at 8/9/2022 1019  Last data filed at 8/9/2022 0555  Gross per 24 hour   Intake 120 ml   Output 1300 ml   Net -1180 ml     Patient Vitals for the past 96 hrs (Last 3 readings):   Weight   08/09/22 0500 191 lb 12.8 oz (87 kg)   08/08/22 0520 189 lb 6 oz (85.9 kg)   08/07/22 0515 192 lb 3.9 oz (87.2 kg)       Constitutional:  Alert, awake, no apparent distress  Cardiovascular:  S1, S2 without m/r/g  Respiratory:  CTA B without w/r/r  Abdomen: +bs, soft, nt  Ext: 1+LE edema    Data/  Recent Labs     08/07/22  1328 08/08/22  0350 08/09/22  0539   WBC 6.4 5.0 4.7   HGB 8.3* 8.2* 7.9*   HCT 25.8* 25.1* 24.6*   MCV 99.0 98.8 98.9   * 104* 121*     Recent Labs     08/07/22  1328 08/08/22  0350 08/09/22  0539   * 135 138   K 4.5 4.0 4.4    105 106   CO2 20 23 26   GLUCOSE 151* 145* 149*   BUN 18 19 19   CREATININE 1.35* 1.23* 1.05*   LABGLOM 37* 42* 50*   GFRAA 45* 51* >60       Assessment/   1. Acute kidney injury nonoliguric with prerenal azotemia secondary to intravascular volume depletion and hypotension further worsened by Bumex. [Urine sodium less than 20 -renal function improving  Serum creatinine stable 1.0 mg/dL    2. Hypotension shock with suspected sepsis possibly UTI-off Levophed     3. Hypovolemic hyponatremia     4. Atrial fibrillation     5. Urinary tract infection E. coli greater than 100,000 IU     6. Essential hypertension     7. Shortness of breath-we will obtain a stat chest x-ray    Plan/   1. KVO  2. Patient started on IV Merrem -follow urine culture   3. Follow serologies  6  Basic metabolic panel daily  7.   Bumex on hold    Will follow on as needed basis      Uliess Zelaya MD

## 2022-08-09 NOTE — PROGRESS NOTES
[facility] -   Infectious diseases evaluation  admission date 8/5/2022    reason for consultation:   UTI    Impression :   Current:  UTI/E. coli grew on urine culture pending sensitivity  Sepsis picture  Hypotension  Atrial fibrillation  History of PE/DVT  Diabetes mellitus  History of hypertension  Hyperlipidemia    Other:  Penicillin and Cipro allergy  History of ESBL    Recommendations   IV meropenem  Follow-up blood and urine cultures and adjust antibiotics as needed  Follow CBC and renal function  Supportive care    History of Present Illness:   Initial history:  Gildardo Martin is a 79y.o.-year-old female was brought to the hospital by EMS after a fall at home. The patient had generalized weakness for 1 day prior to admission associated with lightheadedness. Symptoms moderate to severe, no alleviating or aggravating factors. The patient fell twice at home. The patient was hypotensive on arrival to the ER, received fluid boluses and was started on Levophed. Childers catheter was placed. She is awake, denied cough or shortness of breath,, denied nausea or vomiting, no abdominal pain. 8/5/2022 lactic acid 1.1, urinalysis showed 6-9 WBC, cloudy, small leukocyte esterase  Chest x-ray showed no acute abnormality  COVID test was negative  Interval changes  8/9/2022   She is feeling better, denied significant pain, off Levophed, on oxygen per nasal cannula, complaining of mild nausea, denied fever or chills, no other complaints. I have personally reviewed the past medical history, past surgical history, medications, social history, and family history, and I haveupdated the database accordingly. Allergies:    Avapro [irbesartan], Ciprofloxacin hcl, Cozaar [losartan potassium], Diovan [valsartan], Lipitor [atorvastatin calcium], Lisinopril, Pcn [penicillins], Pravachol [pravastatin sodium], Tape [adhesive tape], Tramadol, Zetia [ezetimibe], and Morphine Review of Systems:     Review of Systems  As per history present illness, other than above 12 system review was negative  Physical Examination :       Physical Exam  Constitutional:       General: She is not in acute distress. HENT:      Head: Atraumatic. Right Ear: External ear normal.      Left Ear: External ear normal.   Eyes:      General: No scleral icterus. Conjunctiva/sclera: Conjunctivae normal.   Cardiovascular:      Rate and Rhythm: Normal rate and regular rhythm. Heart sounds: No murmur heard. Pulmonary:      Effort: Pulmonary effort is normal.      Breath sounds: Normal breath sounds. Abdominal:      General: There is no distension. Palpations: Abdomen is soft. Tenderness: There is no abdominal tenderness. Genitourinary:     Comments: Childers catheter in place  Musculoskeletal:      Cervical back: Neck supple. No rigidity. Right lower leg: No edema. Left lower leg: No edema. Skin:     General: Skin is warm. Coloration: Skin is not jaundiced. Neurological:      Mental Status: She is alert and oriented to person, place, and time. Mental status is at baseline. Past Medical History:     Past Medical History:   Diagnosis Date    [facility]    Chronic back pain     with rt. leg pain    Diverticulosis     GERD (gastroesophageal reflux disease)     Hyperlipidemia     Hypertension     Hypothyroidism     Obesity (BMI 30.0-34. 9) 8/12/2016    Onychomycosis     PE (pulmonary embolism)     H/O DVT of rt leg    Type II or unspecified type diabetes mellitus without mention of complication, not stated as uncontrolled        Past Surgical  History:     Past Surgical History:   Procedure Laterality Date    CHOLECYSTECTOMY      COLON SURGERY      s/p sigmoid cloectomy    COLONOSCOPY N/A 1/25/2021    COLONOSCOPY DIAGNOSTIC performed by Marques Pearl MD at [facility]. ([address] [place] [address])      SIGMOIDOSCOPY N/A 12/2/2021 SIGMOIDOSCOPY DIAGNOSTIC FLEXIBLE performed by Ruth Rodrigez MD at [facility] 09/20/2019    DR IMAN GAUTHIER    UPPER GASTROINTESTINAL ENDOSCOPY N/A 12/2/2021    EGD DIAGNOSTIC ONLY performed by Ruth Rodrigez MD at [facility]      s/p       Medications:      methylPREDNISolone  40 mg IntraVENous Q8H    meropenem  1,000 mg IntraVENous Q12H    apixaban  5 mg Oral BID    ipratropium-albuterol  1 ampule Inhalation Q4H WA    Netarsudil Dimesylate  1 drop Both Eyes Nightly    timolol  1 drop Both Eyes BID    dorzolamide  1 drop Both Eyes BID    latanoprost  1 drop Both Eyes Nightly    levothyroxine  100 mcg Oral Daily    sodium chloride flush  10 mL IntraVENous 2 times per day    ferrous sulfate  325 mg Oral BID WC       Social History:     Social History     Socioeconomic History    Marital status:       Spouse name: Not on file    Number of children: Not on file    Years of education: Not on file    Highest education level: Not on file   Occupational History    Not on file   Tobacco Use    Smoking status: Never    Smokeless tobacco: Never   Vaping Use    Vaping Use: Never used   Substance and Sexual Activity    Alcohol use: No    Drug use: No    Sexual activity: Not on file   Other Topics Concern    Not on file   Social History Narrative    Not on file     Social Determinants of Health     Financial Resource Strain: Low Risk     Difficulty of Paying Living Expenses: Not hard at all   Food Insecurity: No Food Insecurity    Worried About Running Out of Food in the Last Year: Never true    Ran Out of Food in the Last Year: Never true   Transportation Needs: Not on file   Physical Activity: Not on file   Stress: Not on file   Social Connections: Not on file   Intimate Partner Violence: Not on file   Housing Stability: Not on file       Family History:     Family History   Adopted: Yes   Problem Relation Age of Onset    No Known Problems Mother     No Known Problems Father       Medical Decision Making:   I have independently reviewed/ordered the following labs:    CBC with Differential:   Recent Labs     08/08/22  0350    WBC 5.0    HGB 8.2*    HCT 25.1*    *    LYMPHOPCT 4*    MONOPCT 4        BMP:  Recent Labs     08/08/22  0350        K 4.0        CO2 23    BUN 19    CREATININE 1.23*        Hepatic Function Panel:   Recent Labs     08/07/22  0511 08/08/22  0350   PROT 5.0*  5.5* 5.2*   LABALBU 2.6* 2.4*   BILITOT 0.55 0.43   ALKPHOS 93 88   ALT 14 14   AST 23 19           Detailed results: Thank you for allowing us to participate in the care of this patient. Please call with questions. This note is created with the assistance of a speech recognition program.  While intending to generate adocument that actually reflects the content of the visit, the document can still have some errors including those of syntax and sound a like substitutions which may escape proof reading. It such instances, actual meaningcan be extrapolated by contextual diversion.     Tushar Maldonado MD  Office: [phone]  Perfect serve / office [phone]

## 2022-08-09 NOTE — PROGRESS NOTES
Hospitalist Progress Note  8/8/2022 9:16 PM  Subjective:   Admit Date: 8/5/2022  PCP: Ritesh Phillips MD     DNR-CCA      C/c:  Chief Complaint   Patient presents with    Dizziness    Fatigue         Interval History: pt doing slightly better,solorzano was removed,pt has h/o of a fib, no fever, no chills, pt has had some hypovolemia and was seen ER    Diet: ADULT DIET; Regular                                ip days:3  Medications:   Scheduled Meds:   methylPREDNISolone  40 mg IntraVENous Q8H    meropenem  1,000 mg IntraVENous Q12H    apixaban  5 mg Oral BID    ipratropium-albuterol  1 ampule Inhalation Q4H WA    Netarsudil Dimesylate  1 drop Both Eyes Nightly    timolol  1 drop Both Eyes BID    dorzolamide  1 drop Both Eyes BID    latanoprost  1 drop Both Eyes Nightly    levothyroxine  100 mcg Oral Daily    sodium chloride flush  10 mL IntraVENous 2 times per day    ferrous sulfate  325 mg Oral BID WC     Continuous Infusions:   [Held by provider] sodium chloride      norepinephrine Stopped (08/08/22 1900)    sodium chloride       PRN Meds:.midodrine, ipratropium-albuterol, sodium chloride flush, sodium chloride, ondansetron **OR** ondansetron, magnesium hydroxide, acetaminophen **OR** acetaminophen     CBC:   Recent Labs     08/07/22  0511 08/07/22  1328 08/08/22  0350   WBC 6.6 6.4 5.0   HGB 9.1* 8.3* 8.2*   * 112* 104*     BMP:    Recent Labs     08/07/22  0511 08/07/22  1328 08/08/22  0350   * 130* 135   K 4.4 4.5 4.0    102 105   CO2 20 20 23   BUN 21 18 19   CREATININE 1.35* 1.35* 1.23*   GLUCOSE 150* 151* 145*     Hepatic:   Recent Labs     08/06/22  0434 08/07/22  0511 08/08/22  0350   AST 24 23 19   ALT 13 14 14   BILITOT 1.07 0.55 0.43   ALKPHOS 77 93 88     Troponin: No results for input(s): TROPONINI in the last 72 hours. BNP: No results for input(s): BNP in the last 72 hours. Lipids: No results for input(s): CHOL, HDL in the last 72 hours.     Invalid input(s): LDLCALCU  INR: No results for input(s): INR in the last 72 hours. Objective:   Vitals: BP (!) 124/44   Pulse 67   Temp 97.6 °F (36.4 °C) (Oral)   Resp 19   Wt 189 lb 6 oz (85.9 kg)   SpO2 96%   BMI 31.51 kg/m²   General appearance: alert, appears stated age and cooperative  Skin: Skin color, texture, turgor normal. No rashes or lesions  Lungs: clear to auscultation bilaterally  Heart: regular rate and rhythm, S1, S2 normal, no murmur, click, rub or gallop  Abdomen: soft, non-tender; bowel sounds normal; no masses,  no organomegaly  Extremities: extremities normal, atraumatic, edema 1 #  Neurologic: Mental status: Alert, oriented, thought content appropriate    Prophylaxis:   DVT with  [] lovenox        [] heparin        [] Scd        [x] apixaban    Radiology:  XR LUMBAR SPINE (2-3 VIEWS)    Result Date: 8/5/2022  EXAMINATION: THREE XRAY VIEWS OF THE LUMBAR SPINE 8/5/2022 2:39 pm COMPARISON: CT abdomen pelvis 11/29/2021 HISTORY: ORDERING SYSTEM PROVIDED HISTORY: L spine tenderness s/p fall TECHNOLOGIST PROVIDED HISTORY: L spine tenderness s/p fall Reason for Exam: Pt states she fell right hip pain and lumbar pain. FINDINGS: The bones are demineralized. Vertebral body heights are unchanged and height from prior comparison with ankylosis across the L1-L2 and L5-S1 disc spaces. Alignment is unchanged with minimal dextroconvex upper lumbar curvature and 4 mm grade 1 degenerative anterolisthesis at L3 on L4. Moderate to severe disc height loss throughout the lower thoracic and lumbar levels with advanced facet arthrosis in the mid to lower lumbar spine. Mild degenerative spurring at the sacroiliac joints. Inferior vena cava filter. Cholecystectomy. Surgical clips and anastomotic sutures over the central abdomen with a dilated loop of likely colon in the left lower quadrant measuring up to 9.8 cm. Advanced degenerative changes without acute radiographic abnormality of the lumbar spine.  Gaseous distension of a colonic loop in the left lower quadrant up to 9.8 cm. Correlate for any referable clinical symptoms. CT Head W/O Contrast    Result Date: 8/5/2022  EXAMINATION: CT OF THE HEAD WITHOUT CONTRAST; CT OF THE CERVICAL SPINE WITHOUT CONTRAST 8/5/2022 5:00 pm TECHNIQUE: CT of the head was performed without the administration of intravenous contrast. Automated exposure control, iterative reconstruction, and/or weight based adjustment of the mA/kV was utilized to reduce the radiation dose to as low as reasonably achievable.; CT of the cervical spine was performed without the administration of intravenous contrast. Multiplanar reformatted images are provided for review. Automated exposure control, iterative reconstruction, and/or weight based adjustment of the mA/kV was utilized to reduce the radiation dose to as low as reasonably achievable. COMPARISON: None. HISTORY: ORDERING SYSTEM PROVIDED HISTORY: fall TECHNOLOGIST PROVIDED HISTORY: fall Decision Support Exception - unselect if not a suspected or confirmed emergency medical condition->Emergency Medical Condition (MA) Reason for Exam: fall, unsure if pt hit head, pt states she got lightheaded FINDINGS: Cervical spine: BONES/ALIGNMENT: There is no acute fracture or traumatic malalignment. DEGENERATIVE CHANGES: Multilevel disc and facet degenerative disease most pronounced at C4-C5, C5-C6 and C6-C7. Posey Chime SOFT TISSUES: There is no prevertebral soft tissue swelling. Head CT: There is no acute infarction, intracranial hemorrhage or intracranial mass lesion. No mass effect, midline shift or extra-axial collection is noted. There are mild nonspecific foci of periventricular and subcortical cerebral white matter hypodensity, most likely representing chronic microangiopathic disease in this age group. The brain parenchyma is otherwise normal. The cerebellar tonsils are in normal position. The ventricles, sulci, and cisterns are mildly prominent suggestive of mild generalized volume loss.  The globes and orbits are within normal limits. The visualized extracranial structures including paranasal sinuses and mastoid air cells are unremarkable. No fracture is identified. Cervical spine CT: No acute abnormality of the cervical spine. No fracture. Head CT: No evidence for acute intracranial hemorrhage, territorial infarction or intracranial mass lesion. Mild chronic microangiopathic ischemic disease. Mild generalized volume loss. CT CSpine W/O Contrast    Result Date: 8/5/2022  EXAMINATION: CT OF THE HEAD WITHOUT CONTRAST; CT OF THE CERVICAL SPINE WITHOUT CONTRAST 8/5/2022 5:00 pm TECHNIQUE: CT of the head was performed without the administration of intravenous contrast. Automated exposure control, iterative reconstruction, and/or weight based adjustment of the mA/kV was utilized to reduce the radiation dose to as low as reasonably achievable.; CT of the cervical spine was performed without the administration of intravenous contrast. Multiplanar reformatted images are provided for review. Automated exposure control, iterative reconstruction, and/or weight based adjustment of the mA/kV was utilized to reduce the radiation dose to as low as reasonably achievable. COMPARISON: None. HISTORY: ORDERING SYSTEM PROVIDED HISTORY: fall TECHNOLOGIST PROVIDED HISTORY: fall Decision Support Exception - unselect if not a suspected or confirmed emergency medical condition->Emergency Medical Condition (MA) Reason for Exam: fall, unsure if pt hit head, pt states she got lightheaded FINDINGS: Cervical spine: BONES/ALIGNMENT: There is no acute fracture or traumatic malalignment. DEGENERATIVE CHANGES: Multilevel disc and facet degenerative disease most pronounced at C4-C5, C5-C6 and C6-C7. Mariela Bloodgood SOFT TISSUES: There is no prevertebral soft tissue swelling. Head CT: There is no acute infarction, intracranial hemorrhage or intracranial mass lesion. No mass effect, midline shift or extra-axial collection is noted.  There are mild nonspecific foci of periventricular and subcortical cerebral white matter hypodensity, most likely representing chronic microangiopathic disease in this age group. The brain parenchyma is otherwise normal. The cerebellar tonsils are in normal position. The ventricles, sulci, and cisterns are mildly prominent suggestive of mild generalized volume loss. The globes and orbits are within normal limits. The visualized extracranial structures including paranasal sinuses and mastoid air cells are unremarkable. No fracture is identified. Cervical spine CT: No acute abnormality of the cervical spine. No fracture. Head CT: No evidence for acute intracranial hemorrhage, territorial infarction or intracranial mass lesion. Mild chronic microangiopathic ischemic disease. Mild generalized volume loss. XR CHEST PORTABLE    Result Date: 8/6/2022  EXAMINATION: ONE XRAY VIEW OF THE CHEST 8/6/2022 5:55 am COMPARISON: 8/5/2022 HISTORY: ORDERING SYSTEM PROVIDED HISTORY:  Recent pneumonia TECHNOLOGIST PROVIDED HISTORY: Recent pneumonia Reason for Exam: recent pneumonia Additional signs and symptoms: recent pneumonia Relevant Medical/Surgical History: recent pneumonia FINDINGS: Cardiomediastinal silhouette is stable. There is scattered interstitial prominence in the lungs similar to previous radiographs. No focal airspace consolidation, pneumothorax, or pleural effusion. No free air beneath the diaphragm. No acute findings in the chest.     XR CHEST PORTABLE    Result Date: 8/5/2022  EXAMINATION: ONE XRAY VIEW OF THE CHEST 8/5/2022 12:33 pm COMPARISON: 07/11/2022, 06/17/2022, 05/09/2022 HISTORY: ORDERING SYSTEM PROVIDED HISTORY: fall TECHNOLOGIST PROVIDED HISTORY: fall FINDINGS: Pulmonary parenchymal assessment is degraded by respiratory motion, but the lungs appear grossly clear. No pneumothorax or sizable effusion. Cardiomediastinal silhouette is unchanged from prior.   Inferior vena cava filter are partially imaged in the upper abdomen. No acute displaced fracture is seen, though osseous demineralization does degrade radiographic sensitivity. No acute abnormality. VL Carotid Bilateral    Result Date: 8/6/2022    [address] [place] [address]  Vascular Carotid Procedure   Patient Name   SALVADOR        Date of Study           08/06/2022                 Giovanni Umanzor   Date of Birth  1940  Gender                  Female   Age            79 year(s)  Race                       Room Number    2004   Corporate ID # [id number]   Patient Acct # [de-identified]   MR #           [id number]      [facility]   Accession #    [id number]  Interpreting Physician  Claudia Moore   Referring                  Referring Physician     Diana Vences  Nurse  Practitioner  Procedure Type of Study:   Cerebral: Carotid, Carotid Scan Bilateral.  Indications for Study:Syncope. Patient Status: In Patient. Technical Quality:Adequate visualization. Comments:Basic Classification of ICA Stenosis: PSV - Peak Systolic Velocity Normal: No plaque or calcification identified, no elevation of PSV Mild: <50% spectral broadening without increased PSV Moderate: 50 - 69% PSV >125 - <230 cm/sec Severe: 70 - 99% PSV >230 cm/sec Critical: 80 - 99% PSV >230cm/sec and/or End Diastolic Velocities >919IF/QJQ. Conclusions   Summary   Simultaneous real time imaging utilizing B-Mode, color flow doppler and  spectral waveform analysis was performed on the bilateral extracerebral  vascular system. The study demonstrates:   Right:  The internal carotid artery is normal.  The vertebral artery is patent with antegrade flow. Left:  The internal carotid artery is normal.  The vertebral artery is patent with antegrade flow.    Signature   ----------------------------------------------------------------  Electronically signed by Yunior Petersen RVT(Sonographer) on  08/06/2022 11:53 AM  ---------------------------------------------------------------- ----------------------------------------------------------------  Electronically signed by Sean Cooper(Interpreting  physician) on 08/06/2022 04:38 PM  ----------------------------------------------------------------  Findings:   Right Impression:                    Left Impression:  The common carotid artery is normal. The common carotid artery is normal.   The carotid bulb is normal.          The carotid bulb is normal.   The internal carotid artery is       The internal carotid artery is  normal.                              normal.  Elevated velocities due to           There is tortuosity of the internal  tortuosity. carotid artery. The external carotid artery is       The external carotid artery is  normal.                              normal.   The vertebral artery is patent with  The vertebral artery is patent with  antegrade flow. antegrade flow. Velocities are measured in cm/s ; Diameters are measured in cm Carotid Right Measurements +-----------+--------+-------+-------+------+-----------+------------------+ ! Location   ! PSV     ! EDV    ! Angle  ! RI    !%Stenosis  ! Tortuosity        ! +-----------+--------+-------+-------+------+-----------+------------------+ ! Prox CCA   !136.05  !25.47  !       !0.81  !           !                  ! +-----------+--------+-------+-------+------+-----------+------------------+ ! Mid CCA    !110.38  !29.42  !       !0.73  !           !                  ! +-----------+--------+-------+-------+------+-----------+------------------+ ! Dist CCA   !90.63   !15.6   !       !0.83  !           !                  ! +-----------+--------+-------+-------+------+-----------+------------------+ ! Bulb       !44.99   !7.59   !       !0.83  !           !                  ! +-----------+--------+-------+-------+------+-----------+------------------+ ! Prox ICA   !114.54  !27.3   !       !0.76  !           !                  ! +-----------+--------+-------+-------+------+-----------+------------------+ ! Mid ICA    !160.09  !36.94  !       !0.77  !           !                  ! +-----------+--------+-------+-------+------+-----------+------------------+ ! Dist ICA   !124.2   !33.37  !       !0.73  !           !                  ! +-----------+--------+-------+-------+------+-----------+------------------+ ! Prox ECA   !88.69   !0      !       !1     !           !                  ! +-----------+--------+-------+-------+------+-----------+------------------+ ! Vertebral  !100.5   !21.52  !       !0.79  !           !                  ! +-----------+--------+-------+-------+------+-----------+------------------+   - Additional Measurements:ICAPSV/CCAPSV 1.18. ICAEDV/CCAEDV 1.45. Carotid Left Measurements +-----------+--------+-------+-------+------+-----------+------------------+ ! Location   ! PSV     ! EDV    ! Angle  ! RI    !%Stenosis  ! Tortuosity        ! +-----------+--------+-------+-------+------+-----------+------------------+ ! Prox CCA   !127.47  !22.46  !       !0.82  !           !                  ! +-----------+--------+-------+-------+------+-----------+------------------+ ! Mid CCA    !103.23  !20.84  !       !0.8   ! !                  ! +-----------+--------+-------+-------+------+-----------+------------------+ ! Dist CCA   !100     !17.61  !       !0.82  !           !                  ! +-----------+--------+-------+-------+------+-----------+------------------+ ! Bulb       !101.62  !15.99  !       !0.84  !           !                  ! +-----------+--------+-------+-------+------+-----------+------------------+ ! Prox ICA   !125.85  !24.07  !       !0.81  !           !                  ! +-----------+--------+-------+-------+------+-----------+------------------+ ! Mid ICA    !73.95   !16.68  !       !0.77  !           !                  ! +-----------+--------+-------+-------+------+-----------+------------------+ ! Dist ICA   !61.11   !17.67  !       !0.71  !           !                  ! +-----------+--------+-------+-------+------+-----------+------------------+ ! Prox ECA   !93.54   !15.99  !       !0.83  !           !                  ! +-----------+--------+-------+-------+------+-----------+------------------+ ! Vertebral  !44.41   !11.26  !       !0.75  !           !                  ! +-----------+--------+-------+-------+------+-----------+------------------+   - Additional Measurements:ICAPSV/CCAPSV 0.99. ICAEDV/CCAEDV 1.07. XR HIP 2-3 VW W PELVIS RIGHT    Result Date: 8/5/2022  EXAMINATION: ONE XRAY VIEW OF THE PELVIS AND TWO XRAY VIEWS RIGHT HIP 8/5/2022 2:39 pm COMPARISON: None. HISTORY: ORDERING SYSTEM PROVIDED HISTORY: R hip pain s/p fall TECHNOLOGIST PROVIDED HISTORY: R hip pain s/p fall Reason for Exam: Pt states she fell right hip pain and lumbar pain. FINDINGS: Mineralization is the lower limit of normal.  Mild osteophytes involve the hips. No fracture or dislocation is identified. No evident fracture or dislocation. Mild degenerative osteophytes at the hips. Assessment :   Uti/ e coli/iv cefepime  A fib/a flutter/eliquis  Ch back pain  Ac kidney injury  Hypovolemia/hypotension     Plan:   1. Continue present care  2. Will follow closely I/o      Patient Active Problem List:     Mixed hyperlipidemia     Gastroesophageal reflux disease without esophagitis     Onychomycosis     Diverticulosis     Chronic back pain     Hypothyroidism     Essential hypertension     Right bundle branch block     Chronic pulmonary embolism (HCC)     Type 2 diabetes mellitus without complication, without long-term current use of insulin (HCC)     Obesity (BMI 30.0-34. 9)     Tracheobronchitis     Allergic rhinitis     Glaucoma     S/p bilateral myringotomy with tube placement     GI bleed     Iron deficiency anemia     Paroxysmal atrial fibrillation (HCC)     Hemorrhage of rectum and anus     Intractable nausea and vomiting     Diastolic CHF, acute on chronic (HCC)     Atrial fibrillation with RVR ([facility])     Class 2 obesity due to excess calories in adult     Sepsis ([facility] [address]     JOSH (acute kidney injury) ([facility] [address]     Syncope     Acute cystitis without hematuria      Anticipated Disposition upon discharge: [] Home                                                                         [] Home with Home Health                                                                         [] Sabino Leonardo                                                                         [] [address] [facility] [address] [facility]      Patient is admitted as inpatient status because of co-morbidities listed above, severity of signs and symptoms as outlined, requirement for current medical therapies and most importantly because of direct risk to patient if care not provided in a hospital setting.           Jesse Cody MD, MD  Rounding Hospitalist

## 2022-08-09 NOTE — PROGRESS NOTES
PULMONARY PROGRESS NOTE:    REASON FOR VISIT:  Interval History:COPD, hypotension, UTI    Shortness of Breath: no  Cough: no  Sputum: no          Hemoptysis: no  Chest Pain: no  Fever: no                   Swelling Feet: no  Headache: no                                           Nausea, Emesis, Abdominal Pain: no  Diarrhea: no         Constipation: no    Events since last visit: Levophed has been off since yesterday afternoon. Denies shortness of breath but has some conversational dyspnea.      PAST MEDICAL HISTORY:      Scheduled Meds:   cefTRIAXone (ROCEPHIN) IV  1,000 mg IntraVENous Q24H    methylPREDNISolone  40 mg IntraVENous Q8H    apixaban  5 mg Oral BID    ipratropium-albuterol  1 ampule Inhalation Q4H WA    Netarsudil Dimesylate  1 drop Both Eyes Nightly    timolol  1 drop Both Eyes BID    dorzolamide  1 drop Both Eyes BID    latanoprost  1 drop Both Eyes Nightly    levothyroxine  100 mcg Oral Daily    sodium chloride flush  10 mL IntraVENous 2 times per day    ferrous sulfate  325 mg Oral BID WC     Continuous Infusions:   [Held by provider] sodium chloride      norepinephrine Stopped (08/08/22 1900)    sodium chloride       PRN Meds:midodrine, ipratropium-albuterol, sodium chloride flush, sodium chloride, ondansetron **OR** ondansetron, magnesium hydroxide, acetaminophen **OR** acetaminophen        PHYSICAL EXAMINATION:  BP (!) 139/45   Pulse 70   Temp 97.7 °F (36.5 °C) (Oral)   Resp 20   Wt 191 lb 12.8 oz (87 kg)   SpO2 98%   BMI 31.92 kg/m²     General : Awake, alert, conversational dyspnea   Neck - supple, no lymphadenopathy, JVD not raised  Heart - regular rhythm, S1 and S2 normal; no additional sounds heard  Lungs - Air Entry- fair bilaterally; breath sounds : vesicular;   rales/crackles - absent, on RA   Abdomen - soft, no tenderness  Upper Extremities  - no cyanosis, mottling; edema : absent  Lower Extremities: no cyanosis, mottling; edema : absent    Current Laboratory, Radiologic, Microbiologic, and Diagnostic studies reviewed  Data ReviewCBC:   Recent Labs     08/07/22  1328 08/08/22  0350 08/09/22  0539   WBC 6.4 5.0 4.7   RBC 2.61* 2.54* 2.48*   HGB 8.3* 8.2* 7.9*   HCT 25.8* 25.1* 24.6*   * 104* 121*     BMP:   Recent Labs     08/07/22  1328 08/08/22  0350 08/09/22  0539   GLUCOSE 151* 145* 149*   * 135 138   K 4.5 4.0 4.4   BUN 18 19 19   CREATININE 1.35* 1.23* 1.05*   CALCIUM 8.2* 8.5* 8.9     ABGs: No results for input(s): PHART, PO2ART, TNJ2HGL, UOS7RYZ, BEART, L5ILFCQM, TWQ4QPG in the last 72 hours. PT/INR:  No results found for: PTINR    ASSESSMENT / PLAN:    Hypotension - ? Related to dehydration / possible sepsis - resolved    Monitor off pressors  UTI    UCx ecoli -- abx  Acute kidney injury - improving nephrology following  Recent falls-  OT, PT when stable  Recent PNA - appears resolved  CXR  - poss small new Left base infiltrate vs atelectasis     IS  COPD - BD Q4 wa, add methylprednisone  No objection to transfer out of ICU     This is a late note on patient seen by me earlier today.   Electronically signed by Alia Wesley MD on 08/09/22 at 7:46 PM

## 2022-08-09 NOTE — PROGRESS NOTES
Physical Therapy  Facility/Department: [facility] ICU  Physical Therapy Daily Note  NAME: Myrna Thompson  : 1940 (79 y.o.)  MRN: [id number]  CODE STATUS: DNR-CCA    Date of Service: 22      Past Medical History:   Diagnosis Date    Atrial fibrillation ([facility] 3/28/2014    Chronic back pain     with rt. leg pain    Diverticulosis     GERD (gastroesophageal reflux disease)     Hyperlipidemia     Hypertension     Hypothyroidism     Obesity (BMI 30.0-34. 9) 2016    Onychomycosis     PE (pulmonary embolism)     H/O DVT of rt leg    Type II or unspecified type diabetes mellitus without mention of complication, not stated as uncontrolled      Past Surgical History:   Procedure Laterality Date    CHOLECYSTECTOMY      COLON SURGERY      s/p sigmoid cloectomy    COLONOSCOPY N/A 2021    COLONOSCOPY DIAGNOSTIC performed by Arelis Mauro MD at [facility]. ([address] [place] [address])      SIGMOIDOSCOPY N/A 2021    SIGMOIDOSCOPY DIAGNOSTIC FLEXIBLE performed by Arelis Mauro MD at [facility] 2019    DR IMAN GAUTHIER    UPPER GASTROINTESTINAL ENDOSCOPY N/A 2021    EGD DIAGNOSTIC ONLY performed by Arelis Mauro MD at [address] [place] [address]      s/p       Chart Reviewed: Yes  Patient assessed for rehabilitation services?: Yes  Referral Date : 22  Diagnosis: Sepsis, JOSH  General Comment  Comments: SINA butcher for PT. Restrictions:  Restrictions/Precautions: Fall Risk;General Precautions  Position Activity Restriction  Other position/activity restrictions: Triple lumen catheter in R laura-femoral access.      SUBJECTIVE  Subjective: Patient agreeable to PT.  OBJECTIVE  Vision  Vision: Impaired  Vision Exceptions: Wears glasses at all times (Pt reports B glaucoma)    Hearing  Hearing: Exceptions to [facility]  Hearing Exceptions: Bilateral hearing aid    Cognition  Overall Cognitive Status: [facility]  Sensation  Overall Sensation Status: Impaired (Tingling in her feet)    Functional Mobility  Bed mobility  Supine to Sit: Minimal assistance  Scooting: Minimal assistance  Bed Mobility Comments: Patient required trunk assistace during bed mobility and min A for scooting towards EOB. Bed slightly elevated and use of handrails required. Transfers  Sit to Stand: Minimal Assistance;Contact guard assistance (High ICU bed)  Stand to sit: Contact guard assistance  Bed to Chair: Minimal assistance  Comment: Stood with Rolling walker, no dizzyness while standing. MAX VC for pursed lip breathing during transfers. SP02 decreased to 78 during bed>chair transfer. Educated patient on side effects of SP02 desats, patient understood. Balance  Posture: Fair  Sitting - Static: Fair;+  Sitting - Dynamic: Fair  Standing - Static: Fair (RW)  Standing - Dynamic: Fair;- (RW)  Comments: Patient sat EOB completing dynamic seated exercises with B LE for 11 min. PT Exercises  Exercise Treatment: seated AROM B LE 15x    ASSESSMENT  Vitals  BP: (!) 144/54  BP Location: Left lower arm  MAP (Calculated): 84  SpO2: 95 %  O2 Device: Nasal cannula  Comment: 2L    Activity Tolerance  Activity Tolerance: Patient limited by fatigue;Patient limited by endurance    Assessment  Performance Deficits/Impairments: Decreased functional mobility ; Decreased strength;Decreased endurance;Decreased balance; Increased pain;Decreased posture  Therapy Prognosis: Good  Decision Making: Medium Complexity  Clinical Presentation: pain/weakness limiting activity  Discharge Recommendations: Patient would benefit from continued therapy after discharge    AM-PAC Mobility Inpatient    How much difficulty turning over in bed? 3   How much difficulty sitting down on / standing up from a chair with arms? 3   How much difficulty moving from lying on back to sitting on side of bed? 3   How much help from another person moving to and from a bed to a chair?  2   How much help from another person needed to walk in hospital room? 1   How much help from another person for climbing 3-5 steps with a railing? 1   AM-PAC Inpatient Mobility Raw Score  13   AM-PAC Inpatient T-Scale Score  36.74   Mobility Inpatient CMS 0-100% Score 64.91   Mobility Inpatient CMS G-Code Modifier  CL       GOALS  Patient Goals   Patient goals : I want to get stronger  Short Term Goals  Time Frame for Short term goals: 10 visits  Short term goal 1: Pt able to perform supine to sit SBA with bed rails  Short term goal 2: Pt able to perform sit to supien min A  Short term goal 3: Pt able to perform sit <.stnad and pivot transfers with Rolling walker CGA  Short term goal 4: Pt able to ambulalte with rolling walker 20 ft x 2, CGA, sao2 > 88%  Short term goal 5: Pt to tolerate activity for 20 to 30 minutes with rest breaks to improve endurance. PLAN OF CARE  Frequency: 1-2 treatment sessions per day, 5-7 days per week  Plan  Plan: 5-7 times per week  Current Treatment Recommendations: Strengthening;Balance training;Functional mobility training;Transfer training; Endurance training;Gait training; Safety education & training;Patient/Caregiver education & training;Equipment evaluation, education, & procurement; Therapeutic activities  Safety Devices  Type of Devices: Patient at risk for falls;Gait belt;Call light within reach;Nurse notified; Left in chair;Chair alarm in place    Therapy Time   Individual Concurrent Group Co-treatment   Time In 1114         Time Out 1137         Minutes [address] [place], [place], 08/09/22 at 11:49 AM

## 2022-08-09 NOTE — PROGRESS NOTES
[facility] CARDIOLOGY Progress Note    2022 12:04 PM      Subjective:  Ms. Nedra Mohs states she is feeling better. Patient denies any chest pain or worsening shortness of breath or palpitations or lightheadedness or dizziness. Review of systems:  No fever or chills. No cough. No diarrhea. No headaches. Reviewed prior entries. LABS:     Recent Results (from the past 24 hour(s))   CBC with Auto Differential    Collection Time: 22  5:39 AM   Result Value Ref Range    WBC 4.7 3.5 - 11.0 k/uL    RBC 2.48 (L) 4.0 - 5.2 m/uL    Hemoglobin 7.9 (L) 12.0 - 16.0 g/dL    Hematocrit 24.6 (L) 36 - 46 %    MCV 98.9 80 - 100 fL    MCH 31.9 26 - 34 pg    MCHC 32.2 31 - 37 g/dL    RDW 16.4 (H) 11.5 - 14.9 %    Platelets 620 (L) 129 - 450 k/uL    MPV 8.8 6.0 - 12.0 fL    Seg Neutrophils 98 (H) 36 - 66 %    Lymphocytes 1 (L) 24 - 44 %    Monocytes 1 1 - 7 %    Eosinophils % 0 0 - 4 %    Basophils 0 0 - 2 %    Segs Absolute 4.60 1.3 - 9.1 k/uL    Absolute Lymph # 0.05 (L) 1.0 - 4.8 k/uL    Absolute Mono # 0.05 (L) 0.1 - 1.3 k/uL    Absolute Eos # 0.00 0.0 - 0.4 k/uL    Basophils Absolute 0.00 0.0 - 0.2 k/uL    Morphology ANISOCYTOSIS PRESENT    Basic Metabolic Panel w/ Reflex to MG    Collection Time: 22  5:39 AM   Result Value Ref Range    Glucose 149 (H) 70 - 99 mg/dL    BUN 19 8 - 23 mg/dL    Creatinine 1.05 (H) 0.50 - 0.90 mg/dL    Calcium 8.9 8.6 - 10.4 mg/dL    Sodium 138 135 - 144 mmol/L    Potassium 4.4 3.7 - 5.3 mmol/L    Chloride 106 98 - 107 mmol/L    CO2 26 20 - 31 mmol/L    Anion Gap 6 (L) 9 - 17 mmol/L    GFR Non-African American 50 (L) >60 mL/min    GFR African American >60 >60 mL/min    GFR Comment             Pulse Ox:  SpO2  Av.2 %  Min: 89 %  Max: 100 %    Supplemental O2: O2 Flow Rate (L/min): 2 L/min     Current Meds:    cefTRIAXone (ROCEPHIN) IV  1,000 mg IntraVENous Q24H    methylPREDNISolone  40 mg IntraVENous Q8H    apixaban  5 mg Oral BID ipratropium-albuterol  1 ampule Inhalation Q4H WA    Netarsudil Dimesylate  1 drop Both Eyes Nightly    timolol  1 drop Both Eyes BID    dorzolamide  1 drop Both Eyes BID    latanoprost  1 drop Both Eyes Nightly    levothyroxine  100 mcg Oral Daily    sodium chloride flush  10 mL IntraVENous 2 times per day    ferrous sulfate  325 mg Oral BID WC            VITAL SIGNS:    BP (!) 144/54   Pulse 70   Temp 98 °F (36.7 °C) (Oral)   Resp 20   Wt 191 lb 12.8 oz (87 kg)   SpO2 95%   BMI 31.92 kg/m²  2 L/min      Admit Weight:  176 lb (79.8 kg)    Last 3 weights: Wt Readings from Last 3 Encounters:   08/09/22 191 lb 12.8 oz (87 kg)   06/23/22 177 lb 6.4 oz (80.5 kg)   06/17/22 183 lb (83 kg)       BMI: Body mass index is 31.92 kg/m². INPUT/OUTPUT:        Intake/Output Summary (Last 24 hours) at 8/9/2022 1204  Last data filed at 8/9/2022 0555  Gross per 24 hour   Intake 120 ml   Output 1300 ml   Net -1180 ml         Telemetry shows sinus rhythm. EXAM:     General appearance: awake, alert. Sitting up in chair and in good spirits. Pleasant. Neck: No JVD. Chest: clearer bilaterally. No tenderness. No rhonchi or wheezing. Cardiac: Regular rate and rhythm. No G0yncrbj or rubs. Extremities: No worsening bilateral lower leg edema, except for trace pretibial edema. Skin:  warm and dry. Neuro:  Able to move all 4 extremities. 2 D ECHOCARDIOGRAM July 29, 2022:     Summary  Left ventricle is normal in size and wall thickness. Global left ventricular systolic function is normal. Estimated LV EF 55-60  %. No obvious wall motion abnormality seen. Left atrium is mildly dilated. Mild mitral regurgitation. ASSESSMENT:    Idiopathic hypotension-improved clinically. Off of all IV vasopressors. History of paroxysmal atrial fibrillation-on Eliquis therapy. Chronic right bundle branch block. Abnormal EKG. Normal LV systolic function.     Chronic shortness of breath on exertion most likely due to chronic recurrent pulmonary emboli and on chronic oral anticoagulation using Eliquis therapy. Acute urinary tract infection. History of recurrent falls. No carole syncope by history. Obesity with BMI 32 range. REC/PLAN:    Improved clinically. Discontinue IV fluids given mild leg edema. Okay to transfer out of ICU from cardiac standpoint. Continue other current medications along with supportive care as you are doing. Will follow. Electronically signed by Lyubov Harry MD, [address] [facility]        PLEASE NOTE:  This progress note was completed using a voice transcription system. Every effort was made to ensure accuracy. However, inadvertent computerized transcription errors may be present.

## 2022-08-10 ENCOUNTER — APPOINTMENT (OUTPATIENT)
Dept: GENERAL RADIOLOGY | Age: 82
DRG: 871 | End: 2022-08-10
Payer: MEDICARE

## 2022-08-10 ENCOUNTER — APPOINTMENT (OUTPATIENT)
Dept: CT IMAGING | Age: 82
DRG: 871 | End: 2022-08-10
Payer: MEDICARE

## 2022-08-10 LAB
ALBUMIN (CALCULATED): 2.6 G/DL (ref 3.2–5.2)
ALBUMIN PERCENT: 52 % (ref 45–65)
ALPHA 1 PERCENT: 6 % (ref 3–6)
ALPHA 2 PERCENT: 15 % (ref 6–13)
ALPHA-1-GLOBULIN: 0.3 G/DL (ref 0.1–0.4)
ALPHA-2-GLOBULIN: 0.7 G/DL (ref 0.5–0.9)
ANION GAP SERPL CALCULATED.3IONS-SCNC: 8 MMOL/L (ref 9–17)
BETA GLOBULIN: 0.7 G/DL (ref 0.5–1.1)
BETA PERCENT: 15 % (ref 11–19)
BUN BLDV-MCNC: 22 MG/DL (ref 8–23)
CALCIUM SERPL-MCNC: 9.1 MG/DL (ref 8.6–10.4)
CHLORIDE BLD-SCNC: 103 MMOL/L (ref 98–107)
CO2: 25 MMOL/L (ref 20–31)
CREAT SERPL-MCNC: 1 MG/DL (ref 0.5–0.9)
GAMMA GLOBULIN %: 13 % (ref 9–20)
GAMMA GLOBULIN: 0.6 G/DL (ref 0.5–1.5)
GFR AFRICAN AMERICAN: >60 ML/MIN
GFR NON-AFRICAN AMERICAN: 53 ML/MIN
GFR SERPL CREATININE-BSD FRML MDRD: ABNORMAL ML/MIN/{1.73_M2}
GLUCOSE BLD-MCNC: 223 MG/DL (ref 70–99)
HCT VFR BLD CALC: 30.4 % (ref 36–46)
HEMOGLOBIN: 9.8 G/DL (ref 12–16)
MCH RBC QN AUTO: 32.3 PG (ref 26–34)
MCHC RBC AUTO-ENTMCNC: 32.4 G/DL (ref 31–37)
MCV RBC AUTO: 99.8 FL (ref 80–100)
PATHOLOGIST: ABNORMAL
PDW BLD-RTO: 16.4 % (ref 11.5–14.9)
PLATELET # BLD: 179 K/UL (ref 150–450)
PMV BLD AUTO: 8.5 FL (ref 6–12)
POTASSIUM SERPL-SCNC: 4.5 MMOL/L (ref 3.7–5.3)
PRO-BNP: 9246 PG/ML
PROTEIN ELECTROPHORESIS, SERUM: ABNORMAL
RBC # BLD: 3.05 M/UL (ref 4–5.2)
SODIUM BLD-SCNC: 136 MMOL/L (ref 135–144)
TOTAL PROT. SUM,%: 101 % (ref 98–102)
TOTAL PROT. SUM: 4.9 G/DL (ref 6.3–8.2)
TOTAL PROTEIN: 5 G/DL (ref 6.4–8.3)
WBC # BLD: 7.4 K/UL (ref 3.5–11)

## 2022-08-10 PROCEDURE — 6360000002 HC RX W HCPCS: Performed by: NURSE PRACTITIONER

## 2022-08-10 PROCEDURE — 83880 ASSAY OF NATRIURETIC PEPTIDE: CPT

## 2022-08-10 PROCEDURE — 94761 N-INVAS EAR/PLS OXIMETRY MLT: CPT

## 2022-08-10 PROCEDURE — 71250 CT THORAX DX C-: CPT

## 2022-08-10 PROCEDURE — 6370000000 HC RX 637 (ALT 250 FOR IP): Performed by: NURSE PRACTITIONER

## 2022-08-10 PROCEDURE — 71045 X-RAY EXAM CHEST 1 VIEW: CPT

## 2022-08-10 PROCEDURE — 94640 AIRWAY INHALATION TREATMENT: CPT

## 2022-08-10 PROCEDURE — 36415 COLL VENOUS BLD VENIPUNCTURE: CPT

## 2022-08-10 PROCEDURE — 80048 BASIC METABOLIC PNL TOTAL CA: CPT

## 2022-08-10 PROCEDURE — 2580000003 HC RX 258: Performed by: INTERNAL MEDICINE

## 2022-08-10 PROCEDURE — 6370000000 HC RX 637 (ALT 250 FOR IP): Performed by: INTERNAL MEDICINE

## 2022-08-10 PROCEDURE — 99233 SBSQ HOSP IP/OBS HIGH 50: CPT | Performed by: INTERNAL MEDICINE

## 2022-08-10 PROCEDURE — 6370000000 HC RX 637 (ALT 250 FOR IP): Performed by: FAMILY MEDICINE

## 2022-08-10 PROCEDURE — 2060000000 HC ICU INTERMEDIATE R&B

## 2022-08-10 PROCEDURE — 87641 MR-STAPH DNA AMP PROBE: CPT

## 2022-08-10 PROCEDURE — 2580000003 HC RX 258: Performed by: FAMILY MEDICINE

## 2022-08-10 PROCEDURE — 6360000002 HC RX W HCPCS: Performed by: INTERNAL MEDICINE

## 2022-08-10 PROCEDURE — 85027 COMPLETE CBC AUTOMATED: CPT

## 2022-08-10 RX ORDER — METOPROLOL SUCCINATE 25 MG/1
25 TABLET, EXTENDED RELEASE ORAL DAILY
Status: DISCONTINUED | OUTPATIENT
Start: 2022-08-10 | End: 2022-08-11 | Stop reason: HOSPADM

## 2022-08-10 RX ORDER — DOXYCYCLINE 100 MG/1
100 CAPSULE ORAL EVERY 12 HOURS SCHEDULED
Status: DISCONTINUED | OUTPATIENT
Start: 2022-08-10 | End: 2022-08-11 | Stop reason: HOSPADM

## 2022-08-10 RX ORDER — METHYLPREDNISOLONE SODIUM SUCCINATE 40 MG/ML
40 INJECTION, POWDER, LYOPHILIZED, FOR SOLUTION INTRAMUSCULAR; INTRAVENOUS EVERY 12 HOURS
Status: DISCONTINUED | OUTPATIENT
Start: 2022-08-10 | End: 2022-08-11

## 2022-08-10 RX ADMIN — IPRATROPIUM BROMIDE AND ALBUTEROL SULFATE 1 AMPULE: 2.5; .5 SOLUTION RESPIRATORY (INHALATION) at 19:29

## 2022-08-10 RX ADMIN — DORZOLAMIDE HYDROCHLORIDE 1 DROP: 20 SOLUTION/ DROPS OPHTHALMIC at 20:13

## 2022-08-10 RX ADMIN — LEVOTHYROXINE SODIUM 100 MCG: 0.1 TABLET ORAL at 05:31

## 2022-08-10 RX ADMIN — IPRATROPIUM BROMIDE AND ALBUTEROL SULFATE 1 AMPULE: 2.5; .5 SOLUTION RESPIRATORY (INHALATION) at 11:20

## 2022-08-10 RX ADMIN — TIMOLOL MALEATE 1 DROP: 5 SOLUTION OPHTHALMIC at 21:17

## 2022-08-10 RX ADMIN — METHYLPREDNISOLONE SODIUM SUCCINATE 40 MG: 40 INJECTION, POWDER, FOR SOLUTION INTRAMUSCULAR; INTRAVENOUS at 22:11

## 2022-08-10 RX ADMIN — ACETAMINOPHEN 650 MG: 325 TABLET, FILM COATED ORAL at 14:43

## 2022-08-10 RX ADMIN — SODIUM CHLORIDE, PRESERVATIVE FREE 10 ML: 5 INJECTION INTRAVENOUS at 20:11

## 2022-08-10 RX ADMIN — METHYLPREDNISOLONE SODIUM SUCCINATE 40 MG: 40 INJECTION, POWDER, FOR SOLUTION INTRAMUSCULAR; INTRAVENOUS at 10:04

## 2022-08-10 RX ADMIN — DORZOLAMIDE HYDROCHLORIDE 1 DROP: 20 SOLUTION/ DROPS OPHTHALMIC at 08:12

## 2022-08-10 RX ADMIN — CEFEPIME 2000 MG: 2 INJECTION, POWDER, FOR SOLUTION INTRAVENOUS at 14:51

## 2022-08-10 RX ADMIN — METHYLPREDNISOLONE SODIUM SUCCINATE 40 MG: 40 INJECTION, POWDER, FOR SOLUTION INTRAMUSCULAR; INTRAVENOUS at 04:07

## 2022-08-10 RX ADMIN — TIMOLOL MALEATE 1 DROP: 5 SOLUTION OPHTHALMIC at 10:05

## 2022-08-10 RX ADMIN — METOPROLOL SUCCINATE 25 MG: 25 TABLET, EXTENDED RELEASE ORAL at 10:04

## 2022-08-10 RX ADMIN — FERROUS SULFATE TAB 325 MG (65 MG ELEMENTAL FE) 325 MG: 325 (65 FE) TAB at 17:09

## 2022-08-10 RX ADMIN — CEFTRIAXONE SODIUM 1000 MG: 1 INJECTION, POWDER, FOR SOLUTION INTRAMUSCULAR; INTRAVENOUS at 10:00

## 2022-08-10 RX ADMIN — APIXABAN 5 MG: 5 TABLET, FILM COATED ORAL at 20:11

## 2022-08-10 RX ADMIN — FERROUS SULFATE TAB 325 MG (65 MG ELEMENTAL FE) 325 MG: 325 (65 FE) TAB at 08:09

## 2022-08-10 RX ADMIN — APIXABAN 5 MG: 5 TABLET, FILM COATED ORAL at 08:09

## 2022-08-10 RX ADMIN — LATANOPROST 1 DROP: 50 SOLUTION OPHTHALMIC at 21:19

## 2022-08-10 RX ADMIN — IPRATROPIUM BROMIDE AND ALBUTEROL SULFATE 1 AMPULE: 2.5; .5 SOLUTION RESPIRATORY (INHALATION) at 07:27

## 2022-08-10 RX ADMIN — DOXYCYCLINE 100 MG: 100 CAPSULE ORAL at 20:11

## 2022-08-10 NOTE — PROGRESS NOTES
PULMONARY PROGRESS NOTE:    REASON FOR VISIT:  Interval History:COPD, hypotension, UTI    Shortness of Breath: no  Cough: no  Sputum: no          Hemoptysis: no  Chest Pain: no  Fever: no                   Swelling Feet: no  Headache: no                                           Nausea, Emesis, Abdominal Pain: no  Diarrhea: no         Constipation: no    Events since last visit: episodic cough; crescencio 1-2 h after aerosol Rx    PAST MEDICAL HISTORY:      Scheduled Meds:   metoprolol succinate  25 mg Oral Daily    cefTRIAXone (ROCEPHIN) IV  1,000 mg IntraVENous Q24H    methylPREDNISolone  40 mg IntraVENous Q8H    apixaban  5 mg Oral BID    ipratropium-albuterol  1 ampule Inhalation Q4H WA    timolol  1 drop Both Eyes BID    dorzolamide  1 drop Both Eyes BID    latanoprost  1 drop Both Eyes Nightly    levothyroxine  100 mcg Oral Daily    sodium chloride flush  10 mL IntraVENous 2 times per day    ferrous sulfate  325 mg Oral BID WC     Continuous Infusions:   sodium chloride       PRN Meds:midodrine, ipratropium-albuterol, sodium chloride flush, sodium chloride, ondansetron **OR** ondansetron, magnesium hydroxide, acetaminophen **OR** acetaminophen        PHYSICAL EXAMINATION:  BP (!) 145/69   Pulse 71   Temp 98.1 °F (36.7 °C) (Oral)   Resp 16   Wt 194 lb 3.6 oz (88.1 kg)   SpO2 94%   BMI 32.32 kg/m²     General : Awake, alert, conversational dyspnea   Neck - supple, no lymphadenopathy, JVD not raised  Heart - regular rhythm, S1 and S2 normal; no additional sounds heard  Lungs - Air Entry- fair bilaterally; breath sounds : vesicular;   rales/crackles - absent, on RA   Abdomen - soft, no tenderness  Upper Extremities  - no cyanosis, mottling; edema : absent  Lower Extremities: no cyanosis, mottling; edema : absent    Current Laboratory, Radiologic, Microbiologic, and Diagnostic studies reviewed  Data ReviewCBC:   Recent Labs     08/08/22  0350 08/09/22  0539 08/10/22  0945   WBC 5.0 4.7 7.4   RBC 2.54* 2.48* 3.05*   HGB 8.2* 7.9* 9.8*   HCT 25.1* 24.6* 30.4*   * 121* 179       BMP:   Recent Labs     08/08/22  0350 08/09/22  0539 08/10/22  0945   GLUCOSE 145* 149* 223*    138 136   K 4.0 4.4 4.5   BUN 19 19 22   CREATININE 1.23* 1.05* 1.00*   CALCIUM 8.5* 8.9 9.1       ABGs: No results for input(s): PHART, PO2ART, FFM3GZQ, HHF5KSP, BEART, B0BAMYWV, FWK7PCH in the last 72 hours. PT/INR:  No results found for: PTINR    ASSESSMENT / PLAN:    Hypotension - ?  Related to dehydration / possible sepsis - resolved    Monitor off pressors  UTI    UCx ecoli -- abx  Acute kidney injury - improving nephrology following  Recent falls-  OT, PT when stable  Recent PNA - appears resolved  CXR  8/10/22- RUL infiltrate -check CT chest     IS  COPD - BD Q4 wa, wean IV steroids      Electronically signed by Maeve Travis MD on 08/10/22 at 11:34 AM

## 2022-08-10 NOTE — PROGRESS NOTES
[facility] -   Infectious diseases evaluation  admission date 8/5/2022    reason for consultation:   UTI    Impression :   Current:  UTI/E. coli grew on urine culture that was pansensitive  Cough /possible developing pneumonia  Sepsis picture  Hypotension  Atrial fibrillation  History of PE/DVT  Diabetes mellitus  History of hypertension  Hyperlipidemia    Other:  Penicillin and Cipro allergy  History of ESBL    Recommendations     Discontinue IV ceftriaxone  IV cefepime and oral doxycycline  Procalcitonin level  Nasal swab for MRSA  CT chest was ordered by pulmonary  Follow final blood cultures, no growth to date  Follow CBC and renal function  Supportive care    History of Present Illness:   Initial history:  Annabelle Stephens is a 79y.o.-year-old female was brought to the hospital by EMS after a fall at home. The patient had generalized weakness for 1 day prior to admission associated with lightheadedness. Symptoms moderate to severe, no alleviating or aggravating factors. The patient fell twice at home. The patient was hypotensive on arrival to the ER, received fluid boluses and was started on Levophed. Childers catheter was placed. She is awake, denied cough or shortness of breath,, denied nausea or vomiting, no abdominal pain. 8/5/2022 lactic acid 1.1, urinalysis showed 6-9 WBC, cloudy, small leukocyte esterase  Chest x-ray showed no acute abnormality  COVID test was negative  Interval changes  8/10/2022   She is complaining of dry cough associated with chest pain, denied fever or chills, denied nausea or vomiting, no urinary symptoms, no other complaints. Chest x-ray from earlier today showed increased right upper lung opacity with unchanged hazy opacity at the left lower lung.         I have personally reviewed the past medical history, past surgical history, medications, social history, and family history, and I haveupdated the database accordingly. Allergies: Avapro [irbesartan], Ciprofloxacin hcl, Cozaar [losartan potassium], Diovan [valsartan], Lipitor [atorvastatin calcium], Lisinopril, Pcn [penicillins], Pravachol [pravastatin sodium], Tape [adhesive tape], Tramadol, Zetia [ezetimibe], and Morphine     Review of Systems:     Review of Systems  As per history present illness, other than above 12 system review was negative  Physical Examination :       Physical Exam  Constitutional:       General: She is not in acute distress. HENT:      Head: Atraumatic. Right Ear: External ear normal.      Left Ear: External ear normal.   Eyes:      General: No scleral icterus. Conjunctiva/sclera: Conjunctivae normal.   Cardiovascular:      Rate and Rhythm: Normal rate and regular rhythm. Heart sounds: No murmur heard. Pulmonary:      Effort: Pulmonary effort is normal.      Breath sounds: Rhonchi present. Abdominal:      General: There is no distension. Palpations: Abdomen is soft. Tenderness: There is no abdominal tenderness. Musculoskeletal:      Cervical back: Neck supple. No rigidity. Right lower leg: No edema. Left lower leg: No edema. Skin:     General: Skin is warm. Coloration: Skin is not jaundiced. Neurological:      Mental Status: She is alert and oriented to person, place, and time. Mental status is at baseline. Past Medical History:     Past Medical History:   Diagnosis Date    Atrial fibrillation ([address] 3/28/2014    Chronic back pain     with rt. leg pain    Diverticulosis     GERD (gastroesophageal reflux disease)     Hyperlipidemia     Hypertension     Hypothyroidism     Obesity (BMI 30.0-34. 9) 8/12/2016    Onychomycosis     PE (pulmonary embolism)     H/O DVT of rt leg    Type II or unspecified type diabetes mellitus without mention of complication, not stated as uncontrolled        Past Surgical  History:     Past Surgical History:   Procedure Laterality Date    CHOLECYSTECTOMY COLON SURGERY      s/p sigmoid cloectomy    COLONOSCOPY N/A 1/25/2021    COLONOSCOPY DIAGNOSTIC performed by Harriett Millard MD at [facility]. ([facility])      SIGMOIDOSCOPY N/A 12/2/2021    SIGMOIDOSCOPY DIAGNOSTIC FLEXIBLE performed by Harriett Millard MD at [facility] Bilateral 09/20/2019    DR IMAN GAUTHIER    UPPER GASTROINTESTINAL ENDOSCOPY N/A 12/2/2021    EGD DIAGNOSTIC ONLY performed by Harriett Millard MD at [facility]      s/p       Medications:      metoprolol succinate  25 mg Oral Daily    methylPREDNISolone  40 mg IntraVENous Q12H    cefTRIAXone (ROCEPHIN) IV  1,000 mg IntraVENous Q24H    apixaban  5 mg Oral BID    ipratropium-albuterol  1 ampule Inhalation Q4H WA    timolol  1 drop Both Eyes BID    dorzolamide  1 drop Both Eyes BID    latanoprost  1 drop Both Eyes Nightly    levothyroxine  100 mcg Oral Daily    sodium chloride flush  10 mL IntraVENous 2 times per day    ferrous sulfate  325 mg Oral BID        Social History:     Social History     Socioeconomic History    Marital status:       Spouse name: Not on file    Number of children: Not on file    Years of education: Not on file    Highest education level: Not on file   Occupational History    Not on file   Tobacco Use    Smoking status: Never    Smokeless tobacco: Never   Vaping Use    Vaping Use: Never used   Substance and Sexual Activity    Alcohol use: No    Drug use: No    Sexual activity: Not on file   Other Topics Concern    Not on file   Social History Narrative    Not on file     Social Determinants of Health     Financial Resource Strain: Low Risk     Difficulty of Paying Living Expenses: Not hard at all   Food Insecurity: No Food Insecurity    Worried About Running Out of Food in the Last Year: Never true    Ran Out of Food in the Last Year: Never true   Transportation Needs: Not on file   Physical Activity: Not on file   Stress: Not on file   Social Connections: Not on file   Intimate Partner Violence: Not on file   Housing Stability: Not on file       Family History:     Family History   Adopted: Yes   Problem Relation Age of Onset    No Known Problems Mother     No Known Problems Father       Medical Decision Making:   I have independently reviewed/ordered the following labs:    CBC with Differential:   Recent Labs     08/08/22 0350    WBC 5.0    HGB 8.2*    HCT 25.1*    *    LYMPHOPCT 4*    MONOPCT 4        BMP:  Recent Labs     08/08/22 0350        K 4.0        CO2 23    BUN 19    CREATININE 1.23*        Hepatic Function Panel:   Recent Labs     08/08/22 0350   PROT 5.2*   LABALBU 2.4*   BILITOT 0.43   ALKPHOS 88   ALT 14   AST 19           Detailed results: Thank you for allowing us to participate in the care of this patient. Please call with questions. This note is created with the assistance of a speech recognition program.  While intending to generate adocument that actually reflects the content of the visit, the document can still have some errors including those of syntax and sound a like substitutions which may escape proof reading. It such instances, actual meaningcan be extrapolated by contextual diversion.     Marcy Pascual MD  Office: [phone]  Perfect serve / office [phone]

## 2022-08-10 NOTE — PROGRESS NOTES
[facility] CARDIOLOGY Progress Note    8/10/2022 7:36 AM      Subjective:  Ms. Lewis Flank of deep dry cough with associated chest pain and thinks it is bronchitis. Also asking that she did not get her eye and pill with her breakfast this morning. Patient denies any chest pain or shortness of breath or palpitations or lightheadedness or dizziness. Review of systems:  No fever or chills. +cough. No diarrhea. No headaches. LABS:     No results found for this or any previous visit (from the past 24 hour(s)). Pulse Ox: SpO2  Av %  Min: 90 %  Max: 97 %    Supplemental O2: O2 Flow Rate (L/min): 0 L/min     Current Meds:    cefTRIAXone (ROCEPHIN) IV  1,000 mg IntraVENous Q24H    methylPREDNISolone  40 mg IntraVENous Q8H    apixaban  5 mg Oral BID    ipratropium-albuterol  1 ampule Inhalation Q4H WA    Netarsudil Dimesylate  1 drop Both Eyes Nightly    timolol  1 drop Both Eyes BID    dorzolamide  1 drop Both Eyes BID    latanoprost  1 drop Both Eyes Nightly    levothyroxine  100 mcg Oral Daily    sodium chloride flush  10 mL IntraVENous 2 times per day    ferrous sulfate  325 mg Oral BID WC                VITAL SIGNS:    BP (!) 145/69   Pulse 68   Temp 98.1 °F (36.7 °C) (Oral)   Resp 16   Wt 194 lb 3.6 oz (88.1 kg)   SpO2 94%   BMI 32.32 kg/m²  0 L/min      Admit Weight:  176 lb (79.8 kg)    Last 3 weights: Wt Readings from Last 3 Encounters:   08/10/22 194 lb 3.6 oz (88.1 kg)   22 177 lb 6.4 oz (80.5 kg)   22 183 lb (83 kg)       BMI: Body mass index is 32.32 kg/m². INPUT/OUTPUT:      No intake or output data in the 24 hours ending 08/10/22 0736      Telemetry shows sinus rhythm. EXAM:     General appearance: awake, alert. Resting in bed and eating breakfast comfortably. Pleasant. Neck: No JVD or thyromegaly  Chest: clearer bilaterally. No tenderness. No rhonchi or wheezing. Cardiac: Regular rate and rhythm. No S3 gallop or rubs.   Abdomen: soft, non-tender. Extremities: no cyanosis, no clubbing, no calf tenderness, no leg edema. Skin:  warm and dry. Neuro:  Able to move all 4 extremities. No new gross focal deficits. 2 D ECHOCARDIOGRAM July 29, 2022:     Summary  Left ventricle is normal in size and wall thickness. Global left ventricular systolic function is normal. Estimated LV EF 55-60  %. No obvious wall motion abnormality seen. Left atrium is mildly dilated. Mild mitral regurgitation. ASSESSMENT:    Paroxysmal atrial fibrillation-on Eliquis therapy. Chronic right bundle branch block. Abnormal EKG. Normal LV systolic function. Idiopathic hypotension - resolved. Chronic shortness of breath on exertion most likely due to chronic recurrent pulmonary emboli and on chronic oral anticoagulation using Eliquis therapy. Dry cough with associated atypical chest pain. Rule out bronchitis or pneumonia. Acute urinary tract infection. History of recurrent falls. No carole syncope by history. Obesity with BMI 32 range. REC/PLAN:    Improving gradually. Given cough with the associated atypical chest pain, ordered a portable chest x-ray today. As blood pressure improved, will add metoprolol XL 25 mg daily with holding parameters. See orders. Continue on other current cardiac medications. Discussed the charge nurse and patient's nurse at bedside at the time of my evaluation. Will follow. Electronically signed by Ileana Zaman MD, [facility]        PLEASE NOTE:  This progress note was completed using a voice transcription system. Every effort was made to ensure accuracy. However, inadvertent computerized transcription errors may be present.

## 2022-08-10 NOTE — CARE COORDINATION
Patient can go to [facility] once medically ready. No authorization.  Electronically signed by TASHA Pleitez on 8/10/2022 at 2:47 PM

## 2022-08-10 NOTE — PROGRESS NOTES
Hospitalist Progress Note  8/9/2022 8:19 PM  Subjective:   Admit Date: 8/5/2022  PCP: Don Morrison MD     DNR-CCA      C/c:  Chief Complaint   Patient presents with    Dizziness    Fatigue         Interval History: pt doing slightly better after diuresis,no chest pain/no palpitation, h/o of a fib    Diet: ADULT DIET; Regular                                ip days:4  Medications:   Scheduled Meds:   cefTRIAXone (ROCEPHIN) IV  1,000 mg IntraVENous Q24H    methylPREDNISolone  40 mg IntraVENous Q8H    apixaban  5 mg Oral BID    ipratropium-albuterol  1 ampule Inhalation Q4H WA    Netarsudil Dimesylate  1 drop Both Eyes Nightly    timolol  1 drop Both Eyes BID    dorzolamide  1 drop Both Eyes BID    latanoprost  1 drop Both Eyes Nightly    levothyroxine  100 mcg Oral Daily    sodium chloride flush  10 mL IntraVENous 2 times per day    ferrous sulfate  325 mg Oral BID WC     Continuous Infusions:   sodium chloride       PRN Meds:.midodrine, ipratropium-albuterol, sodium chloride flush, sodium chloride, ondansetron **OR** ondansetron, magnesium hydroxide, acetaminophen **OR** acetaminophen     CBC:   Recent Labs     08/07/22  1328 08/08/22  0350 08/09/22  0539   WBC 6.4 5.0 4.7   HGB 8.3* 8.2* 7.9*   * 104* 121*     BMP:    Recent Labs     08/07/22  1328 08/08/22  0350 08/09/22  0539   * 135 138   K 4.5 4.0 4.4    105 106   CO2 20 23 26   BUN 18 19 19   CREATININE 1.35* 1.23* 1.05*   GLUCOSE 151* 145* 149*     Hepatic:   Recent Labs     08/07/22  0511 08/08/22  0350   AST 23 19   ALT 14 14   BILITOT 0.55 0.43   ALKPHOS 93 88     Troponin: No results for input(s): TROPONINI in the last 72 hours. BNP: No results for input(s): BNP in the last 72 hours. Lipids: No results for input(s): CHOL, HDL in the last 72 hours. Invalid input(s): LDLCALCU  INR: No results for input(s): INR in the last 72 hours.     Objective:   Vitals: /63   Pulse 79   Temp 97.8 °F (36.6 °C) (Oral)   Resp 20   Wt 191 lb 12.8 oz (87 kg)   SpO2 95%   BMI 31.92 kg/m²   General appearance: alert, appears stated age and cooperative  Skin: Skin color, texture, turgor normal. No rashes or lesions  Lungs: clear to auscultation bilaterally  Heart: regular rate and rhythm, S1, S2 normal, no murmur, click, rub or gallop  Abdomen: soft, non-tender; bowel sounds normal; no masses,  no organomegaly  Extremities: extremities normal, atraumatic, no cyanosis or edema  Neurologic: Mental status: Alert, oriented, thought content appropriate    Prophylaxis:   DVT with  [] lovenox        [] heparin        [] Scd        [x] apixaban    Radiology:  XR LUMBAR SPINE (2-3 VIEWS)    Result Date: 8/5/2022  EXAMINATION: THREE XRAY VIEWS OF THE LUMBAR SPINE 8/5/2022 2:39 pm COMPARISON: CT abdomen pelvis 11/29/2021 HISTORY: ORDERING SYSTEM PROVIDED HISTORY: L spine tenderness s/p fall TECHNOLOGIST PROVIDED HISTORY: L spine tenderness s/p fall Reason for Exam: Pt states she fell right hip pain and lumbar pain. FINDINGS: The bones are demineralized. Vertebral body heights are unchanged and height from prior comparison with ankylosis across the L1-L2 and L5-S1 disc spaces. Alignment is unchanged with minimal dextroconvex upper lumbar curvature and 4 mm grade 1 degenerative anterolisthesis at L3 on L4. Moderate to severe disc height loss throughout the lower thoracic and lumbar levels with advanced facet arthrosis in the mid to lower lumbar spine. Mild degenerative spurring at the sacroiliac joints. Inferior vena cava filter. Cholecystectomy. Surgical clips and anastomotic sutures over the central abdomen with a dilated loop of likely colon in the left lower quadrant measuring up to 9.8 cm. Advanced degenerative changes without acute radiographic abnormality of the lumbar spine. Gaseous distension of a colonic loop in the left lower quadrant up to 9.8 cm. Correlate for any referable clinical symptoms.      CT Head W/O Contrast    Result Date: 8/5/2022  EXAMINATION: CT OF THE HEAD WITHOUT CONTRAST; CT OF THE CERVICAL SPINE WITHOUT CONTRAST 8/5/2022 5:00 pm TECHNIQUE: CT of the head was performed without the administration of intravenous contrast. Automated exposure control, iterative reconstruction, and/or weight based adjustment of the mA/kV was utilized to reduce the radiation dose to as low as reasonably achievable.; CT of the cervical spine was performed without the administration of intravenous contrast. Multiplanar reformatted images are provided for review. Automated exposure control, iterative reconstruction, and/or weight based adjustment of the mA/kV was utilized to reduce the radiation dose to as low as reasonably achievable. COMPARISON: None. HISTORY: ORDERING SYSTEM PROVIDED HISTORY: fall TECHNOLOGIST PROVIDED HISTORY: fall Decision Support Exception - unselect if not a suspected or confirmed emergency medical condition->Emergency Medical Condition (MA) Reason for Exam: fall, unsure if pt hit head, pt states she got lightheaded FINDINGS: Cervical spine: BONES/ALIGNMENT: There is no acute fracture or traumatic malalignment. DEGENERATIVE CHANGES: Multilevel disc and facet degenerative disease most pronounced at C4-C5, C5-C6 and C6-C7. Taina Stands SOFT TISSUES: There is no prevertebral soft tissue swelling. Head CT: There is no acute infarction, intracranial hemorrhage or intracranial mass lesion. No mass effect, midline shift or extra-axial collection is noted. There are mild nonspecific foci of periventricular and subcortical cerebral white matter hypodensity, most likely representing chronic microangiopathic disease in this age group. The brain parenchyma is otherwise normal. The cerebellar tonsils are in normal position. The ventricles, sulci, and cisterns are mildly prominent suggestive of mild generalized volume loss. The globes and orbits are within normal limits.  The visualized extracranial structures including paranasal sinuses and mastoid air sensitivity. No acute abnormality. VL Carotid Bilateral    Result Date: 8/6/2022    [facility]  Vascular Carotid Procedure   Patient Name   SALVADOR        Date of Study           08/06/2022                 Lan Amado   Date of Birth  1940  Gender                  Female   Age            79 year(s)  Race                       Room Number    2004   Corporate ID # [id number]   Patient Acct # [de-identified]   MR #           [id number]      [facility]   Accession #    [id number]  Interpreting Physician  Katiuska Joy   Referring                  Referring Physician     Billy Villeda  Nurse  Practitioner  Procedure Type of Study:   Cerebral: Carotid, Carotid Scan Bilateral.  Indications for Study:Syncope. Patient Status: In Patient. Technical Quality:Adequate visualization. Comments:Basic Classification of ICA Stenosis: PSV - Peak Systolic Velocity Normal: No plaque or calcification identified, no elevation of PSV Mild: <50% spectral broadening without increased PSV Moderate: 50 - 69% PSV >125 - <230 cm/sec Severe: 70 - 99% PSV >230 cm/sec Critical: 80 - 99% PSV >230cm/sec and/or End Diastolic Velocities >796FI/SAF. Conclusions   Summary   Simultaneous real time imaging utilizing B-Mode, color flow doppler and  spectral waveform analysis was performed on the bilateral extracerebral  vascular system. The study demonstrates:   Right:  The internal carotid artery is normal.  The vertebral artery is patent with antegrade flow. Left:  The internal carotid artery is normal.  The vertebral artery is patent with antegrade flow.    Signature   ----------------------------------------------------------------  Electronically signed by Cami Pop RVT(Sonographer) on  08/06/2022 11:53 AM  ----------------------------------------------------------------   ----------------------------------------------------------------  Electronically signed by Elida Phillips, Sean(Interpreting  physician) on 08/06/2022 04:38 PM  ----------------------------------------------------------------  Findings:   Right Impression:                    Left Impression:  The common carotid artery is normal. The common carotid artery is normal.   The carotid bulb is normal.          The carotid bulb is normal.   The internal carotid artery is       The internal carotid artery is  normal.                              normal.  Elevated velocities due to           There is tortuosity of the internal  tortuosity. carotid artery. The external carotid artery is       The external carotid artery is  normal.                              normal.   The vertebral artery is patent with  The vertebral artery is patent with  antegrade flow. antegrade flow. Velocities are measured in cm/s ; Diameters are measured in cm Carotid Right Measurements +-----------+--------+-------+-------+------+-----------+------------------+ ! Location   ! PSV     ! EDV    ! Angle  ! RI    !%Stenosis  ! Tortuosity        ! +-----------+--------+-------+-------+------+-----------+------------------+ ! Prox CCA   !136.05  !25.47  !       !0.81  !           !                  ! +-----------+--------+-------+-------+------+-----------+------------------+ ! Mid CCA    !110.38  !29.42  !       !0.73  !           !                  ! +-----------+--------+-------+-------+------+-----------+------------------+ ! Dist CCA   !90.63   !15.6   !       !0.83  !           !                  ! +-----------+--------+-------+-------+------+-----------+------------------+ ! Bulb       !44.99   !7.59   !       !0.83  !           !                  ! +-----------+--------+-------+-------+------+-----------+------------------+ ! Prox ICA   !114.54  !27.3   !       !0.76  !           !                  ! +-----------+--------+-------+-------+------+-----------+------------------+ ! Mid ICA    !160.09  !36.94  ! +-----------+--------+-------+-------+------+-----------+------------------+ ! Prox ECA   !93.54   !15.99  !       !0.83  !           !                  ! +-----------+--------+-------+-------+------+-----------+------------------+ ! Vertebral  !44.41   !11.26  !       !0.75  !           !                  ! +-----------+--------+-------+-------+------+-----------+------------------+   - Additional Measurements:ICAPSV/CCAPSV 0.99. ICAEDV/CCAEDV 1.07. XR HIP 2-3 VW W PELVIS RIGHT    Result Date: 8/5/2022  EXAMINATION: ONE XRAY VIEW OF THE PELVIS AND TWO XRAY VIEWS RIGHT HIP 8/5/2022 2:39 pm COMPARISON: None. HISTORY: ORDERING SYSTEM PROVIDED HISTORY: R hip pain s/p fall TECHNOLOGIST PROVIDED HISTORY: R hip pain s/p fall Reason for Exam: Pt states she fell right hip pain and lumbar pain. FINDINGS: Mineralization is the lower limit of normal.  Mild osteophytes involve the hips. No fracture or dislocation is identified. No evident fracture or dislocation. Mild degenerative osteophytes at the hips. Assessment :   Uti/ on iv cefepime  Hypotension/iv fluid  A fib/rate control     Plan:   1. Continue present care  2. Heddy Devoid for step down    Patient Active Problem List:     Mixed hyperlipidemia     Gastroesophageal reflux disease without esophagitis     Onychomycosis     Diverticulosis     Chronic back pain     Hypothyroidism     Essential hypertension     Right bundle branch block     Chronic pulmonary embolism (HCC)     Type 2 diabetes mellitus without complication, without long-term current use of insulin (HCC)     Obesity (BMI 30.0-34. 9)     Tracheobronchitis     Allergic rhinitis     Glaucoma     S/p bilateral myringotomy with tube placement     GI bleed     Iron deficiency anemia     Paroxysmal atrial fibrillation ([facility])     Hemorrhage of rectum and anus     Intractable nausea and vomiting     Diastolic CHF, acute on chronic ([facility])     Atrial fibrillation with RVR ([facility])     Class 2 obesity due to excess calories in adult     Sepsis ([facility] [address]     JOSH (acute kidney injury) ([facility] [address]     Syncope     Acute cystitis without hematuria      Anticipated Disposition upon discharge: [] Home                                                                         [] Home with Home Health                                                                         [] [facility]                                                                         [] [facility] [address]      Patient is admitted as inpatient status because of co-morbidities listed above, severity of signs and symptoms as outlined, requirement for current medical therapies and most importantly because of direct risk to patient if care not provided in a hospital setting.           Jay Martin MD, MD  Rounding Hospitalist

## 2022-08-10 NOTE — PLAN OF CARE
Problem: Discharge Planning  Goal: Discharge to home or other facility with appropriate resources  Outcome: Progressing     Problem: Safety - Adult  Goal: Free from fall injury  Outcome: Progressing     Problem: ABCDS Injury Assessment  Goal: Absence of physical injury  Outcome: Progressing     Problem: Skin/Tissue Integrity  Goal: Absence of new skin breakdown  Description: 1. Monitor for areas of redness and/or skin breakdown  2. Assess vascular access sites hourly  3. Every 4-6 hours minimum:  Change oxygen saturation probe site  4. Every 4-6 hours:  If on nasal continuous positive airway pressure, respiratory therapy assess nares and determine need for appliance change or resting period. Outcome: Progressing     Problem: Pain  Goal: Verbalizes/displays adequate comfort level or baseline comfort level  Outcome: Progressing     Problem: Chronic Conditions and Co-morbidities  Goal: Patient's chronic conditions and co-morbidity symptoms are monitored and maintained or improved  Outcome: Not Progressing  Note: Pneumonia discovered and being treated, symptoms manageable at this time. Problem: Chronic Conditions and Co-morbidities  Goal: Patient's chronic conditions and co-morbidity symptoms are monitored and maintained or improved  Outcome: Not Progressing  Note: Pneumonia discovered and being treated, symptoms manageable at this time.

## 2022-08-10 NOTE — PLAN OF CARE
Problem: Safety - Adult  Goal: Free from fall injury  8/10/2022 0213 by Danette Jara RN  Outcome: Progressing  Note: Pt free from falls      Problem: Skin/Tissue Integrity  Goal: Absence of new skin breakdown  Description: 1. Monitor for areas of redness and/or skin breakdown  2. Assess vascular access sites hourly  3. Every 4-6 hours minimum:  Change oxygen saturation probe site  4. Every 4-6 hours:  If on nasal continuous positive airway pressure, respiratory therapy assess nares and determine need for appliance change or resting period.   8/10/2022 0213 by Danette Jara RN  Outcome: Progressing  Note: Pt absent of any new skin breakdown     Problem: Pain  Goal: Verbalizes/displays adequate comfort level or baseline comfort level  Outcome: Progressing  Note: Pt able to verbalize comfort level

## 2022-08-11 VITALS
OXYGEN SATURATION: 93 % | HEIGHT: 65 IN | DIASTOLIC BLOOD PRESSURE: 60 MMHG | WEIGHT: 197.31 LBS | SYSTOLIC BLOOD PRESSURE: 138 MMHG | TEMPERATURE: 98.3 F | BODY MASS INDEX: 32.87 KG/M2 | HEART RATE: 76 BPM | RESPIRATION RATE: 16 BRPM

## 2022-08-11 LAB
CULTURE: NORMAL
CULTURE: NORMAL
MRSA, DNA, NASAL: NEGATIVE
SPECIMEN DESCRIPTION: NORMAL

## 2022-08-11 PROCEDURE — 6370000000 HC RX 637 (ALT 250 FOR IP): Performed by: INTERNAL MEDICINE

## 2022-08-11 PROCEDURE — 94640 AIRWAY INHALATION TREATMENT: CPT

## 2022-08-11 PROCEDURE — 2580000003 HC RX 258: Performed by: INTERNAL MEDICINE

## 2022-08-11 PROCEDURE — 99232 SBSQ HOSP IP/OBS MODERATE 35: CPT | Performed by: INTERNAL MEDICINE

## 2022-08-11 PROCEDURE — 6370000000 HC RX 637 (ALT 250 FOR IP): Performed by: NURSE PRACTITIONER

## 2022-08-11 PROCEDURE — 94761 N-INVAS EAR/PLS OXIMETRY MLT: CPT

## 2022-08-11 PROCEDURE — 97116 GAIT TRAINING THERAPY: CPT

## 2022-08-11 PROCEDURE — 6360000002 HC RX W HCPCS: Performed by: INTERNAL MEDICINE

## 2022-08-11 PROCEDURE — 97110 THERAPEUTIC EXERCISES: CPT

## 2022-08-11 PROCEDURE — 6370000000 HC RX 637 (ALT 250 FOR IP): Performed by: FAMILY MEDICINE

## 2022-08-11 RX ORDER — MIDODRINE HYDROCHLORIDE 2.5 MG/1
2.5 TABLET ORAL 3 TIMES DAILY PRN
Qty: 90 TABLET | Refills: 3 | Status: SHIPPED | OUTPATIENT
Start: 2022-08-11

## 2022-08-11 RX ORDER — DOXYCYCLINE 100 MG/1
100 CAPSULE ORAL EVERY 12 HOURS SCHEDULED
Qty: 12 CAPSULE | Refills: 0 | Status: SHIPPED | OUTPATIENT
Start: 2022-08-11 | End: 2022-08-17

## 2022-08-11 RX ORDER — METOPROLOL SUCCINATE 25 MG/1
25 TABLET, EXTENDED RELEASE ORAL DAILY
Qty: 30 TABLET | Refills: 3 | Status: SHIPPED | OUTPATIENT
Start: 2022-08-12 | End: 2022-10-31 | Stop reason: SDUPTHER

## 2022-08-11 RX ORDER — PREDNISONE 20 MG/1
20 TABLET ORAL DAILY
Qty: 10 TABLET | Refills: 0 | Status: SHIPPED | OUTPATIENT
Start: 2022-08-12 | End: 2022-08-22

## 2022-08-11 RX ORDER — BUMETANIDE 1 MG/1
1 TABLET ORAL DAILY
Qty: 30 TABLET | Refills: 3 | Status: SHIPPED | OUTPATIENT
Start: 2022-08-12 | End: 2022-10-31 | Stop reason: SDUPTHER

## 2022-08-11 RX ORDER — BUMETANIDE 1 MG/1
1 TABLET ORAL DAILY
Status: DISCONTINUED | OUTPATIENT
Start: 2022-08-11 | End: 2022-08-11 | Stop reason: HOSPADM

## 2022-08-11 RX ORDER — PREDNISONE 20 MG/1
20 TABLET ORAL DAILY
Status: DISCONTINUED | OUTPATIENT
Start: 2022-08-11 | End: 2022-08-11 | Stop reason: HOSPADM

## 2022-08-11 RX ADMIN — DOXYCYCLINE 100 MG: 100 CAPSULE ORAL at 08:42

## 2022-08-11 RX ADMIN — BUMETANIDE 1 MG: 1 TABLET ORAL at 12:38

## 2022-08-11 RX ADMIN — APIXABAN 5 MG: 5 TABLET, FILM COATED ORAL at 08:41

## 2022-08-11 RX ADMIN — FERROUS SULFATE TAB 325 MG (65 MG ELEMENTAL FE) 325 MG: 325 (65 FE) TAB at 17:18

## 2022-08-11 RX ADMIN — LEVOTHYROXINE SODIUM 100 MCG: 0.1 TABLET ORAL at 05:02

## 2022-08-11 RX ADMIN — DORZOLAMIDE HYDROCHLORIDE 1 DROP: 20 SOLUTION/ DROPS OPHTHALMIC at 08:38

## 2022-08-11 RX ADMIN — FERROUS SULFATE TAB 325 MG (65 MG ELEMENTAL FE) 325 MG: 325 (65 FE) TAB at 08:40

## 2022-08-11 RX ADMIN — IPRATROPIUM BROMIDE AND ALBUTEROL SULFATE 1 AMPULE: 2.5; .5 SOLUTION RESPIRATORY (INHALATION) at 15:24

## 2022-08-11 RX ADMIN — METOPROLOL SUCCINATE 25 MG: 25 TABLET, EXTENDED RELEASE ORAL at 08:42

## 2022-08-11 RX ADMIN — TIMOLOL MALEATE 1 DROP: 5 SOLUTION OPHTHALMIC at 08:37

## 2022-08-11 RX ADMIN — IPRATROPIUM BROMIDE AND ALBUTEROL SULFATE 1 AMPULE: 2.5; .5 SOLUTION RESPIRATORY (INHALATION) at 10:58

## 2022-08-11 RX ADMIN — CEFEPIME 2000 MG: 2 INJECTION, POWDER, FOR SOLUTION INTRAVENOUS at 02:45

## 2022-08-11 RX ADMIN — PREDNISONE 20 MG: 20 TABLET ORAL at 14:36

## 2022-08-11 RX ADMIN — ACETAMINOPHEN 650 MG: 325 TABLET, FILM COATED ORAL at 05:01

## 2022-08-11 RX ADMIN — IPRATROPIUM BROMIDE AND ALBUTEROL SULFATE 1 AMPULE: 2.5; .5 SOLUTION RESPIRATORY (INHALATION) at 07:04

## 2022-08-11 ASSESSMENT — PAIN DESCRIPTION - LOCATION: LOCATION: BACK

## 2022-08-11 ASSESSMENT — PAIN SCALES - GENERAL: PAINLEVEL_OUTOF10: 7

## 2022-08-11 NOTE — PROGRESS NOTES
Physical Therapy  Facility/Department: [facility] PROGRESSIVE CARE  Daily Treatment Note  NAME: Tommy Keating  : 1940  MRN: [id number]    Date of Service: 2022    Discharge Recommendations:  Patient would benefit from continued therapy after discharge        Patient Diagnosis(es): The primary encounter diagnosis was JOSH (acute kidney injury) ([facility] [address] Diagnoses of Fatigue, unspecified type and Acute cystitis with hematuria were also pertinent to this visit. Assessment   Activity Tolerance: Patient limited by fatigue;Patient limited by endurance     Plan    Plan  Plan: 5-7 times per week  Current Treatment Recommendations: Strengthening;Balance training;Functional mobility training;Transfer training; Endurance training;Gait training; Safety education & training;Patient/Caregiver education & training;Equipment evaluation, education, & procurement; Therapeutic activities     Restrictions  Restrictions/Precautions  Restrictions/Precautions: Fall Risk, General Precautions  Required Braces or Orthoses?: No  Position Activity Restriction  Other position/activity restrictions: Triple lumen catheter in R laura-femoral access. Subjective    Subjective  Subjective: Pt agreeable to PT, pleasant and motivated throughout. Pain: C/o 5/10 pain in bilat knees and back. Orientation  Overall Orientation Status: Within Functional Limits  Cognition  Overall Cognitive Status: WFL     Objective   Vitals     Bed Mobility Training  Bed Mobility Training: No (Pt seated on toilet upon arrival and seated in chair at exit.)  Balance  Sitting: Impaired  Sitting - Static: Fair (occasional); Unsupported  Sitting - Dynamic: Fair (occasional); Unsupported  Standing: Impaired  Standing - Static: Fair;Occasional  Standing - Dynamic: Fair;Occasional  Transfer Training  Transfer Training: Yes  Sit to Stand: Contact-guard assistance  Stand to Sit: Contact-guard assistance  Gait Training  Gait Training: Yes  Gait  Overall Level of Assistance: Contact-guard assistance  Base of Support: Widened  Speed/Nadya: Slow  Step Length: Right shortened;Left shortened  Distance (ft): 61 Feet (6ft from toilet to bed, 55ft from bed into hallway and back into room to chair.)  Assistive Device: Walker, rolling     PT Exercises  Exercise Treatment: Seated BLE exercises x15 each. (Required rest breaks during.)  Static Sitting Balance Exercises: Pt sat unsupported on toilet x5min  Dynamic Standing Balance Exercises: Pt performed self cleaning post-toileting. [facility] Mobility Inpatient   How much difficulty turning over in bed?: A Little  How much difficulty sitting down on / standing up from a chair with arms?: A Little  How much difficulty moving from lying on back to sitting on side of bed?: A Little  How much help from another person moving to and from a bed to a chair?: A Little  How much help from another person needed to walk in hospital room?: A Little  How much help from another person for climbing 3-5 steps with a railing?: A Lot  AM-PAC Inpatient Mobility Raw Score : 17  AM-PAC Inpatient T-Scale Score : 42.13  Mobility Inpatient CMS 0-100% Score: 50.57  Mobility Inpatient CMS G-Code Modifier : CK      Goals  Short Term Goals  Time Frame for Short term goals: 10 visits  Short term goal 1: Pt able to perform supine to sit SBA with bed rails  Short term goal 2: Pt able to perform sit to supien min A  Short term goal 3: Pt able to perform sit <.stnad and pivot transfers with Rolling walker CGA  Short term goal 4: Pt able to ambulalte with rolling walker 20 ft x 2, CGA, sao2 > 88%  Short term goal 5: Pt to tolerate activity for 20 to 30 minutes with rest breaks to improve endurance. Patient Goals   Patient goals : I want to get stronger    Education  Patient Education  Education Given To: Patient  Education Provided: Role of Therapy;Plan of Care;Precautions; Fall Prevention Strategies;Transfer Training;Energy Conservation  Education Method: Demonstration;Verbal  Education Outcome: Demonstrated understanding;Verbalized understanding;Continued education needed    Therapy Time   08/11/22 0904   PT Individual Minutes   Time In 0904   Time Out 0932   Minutes LARISA Wesley [address] [place] [address]

## 2022-08-11 NOTE — DISCHARGE INSTR - DIET

## 2022-08-11 NOTE — DISCHARGE SUMMARY
Hospitalist Discharge Summary    Uzair Dixon  :  1940  MRN:  [id number]    Admit date:  2022  Discharge date:  22    Admitting Physician:  Lokesh George MD    Discharge Diagnoses:   Patient Active Problem List   Diagnosis    Mixed hyperlipidemia    Gastroesophageal reflux disease without esophagitis    Onychomycosis    Diverticulosis    Chronic back pain    Hypothyroidism    Essential hypertension    Right bundle branch block    Chronic pulmonary embolism ([facility]    Type 2 diabetes mellitus without complication, without long-term current use of insulin ([facility])    Obesity (BMI 30.0-34. 9)    Tracheobronchitis    Allergic rhinitis    Glaucoma    S/p bilateral myringotomy with tube placement    GI bleed    Iron deficiency anemia    Paroxysmal atrial fibrillation ([facility])    Hemorrhage of rectum and anus    Intractable nausea and vomiting    Diastolic CHF, acute on chronic ([facility])    Atrial fibrillation with RVR ([facility])    Class 2 obesity due to excess calories in adult    Sepsis ([facility]    JOSH (acute kidney injury) ([facility]    Syncope    Acute cystitis without hematuria        Admission Condition:  fair      Discharged Condition:  good    Hospital Course/Treatments   admitted with h/o of sob, pt was seen in ER and admitted, pt was sob, pt had fluid overload, pt was given lasix and pt did better, pt also had uti and was rx with rocephin, . Pt had cough and was diagnosed with atypical pneumonia, pt rocephin was d/c and doxy was started, pt has paf, and pt is on eliquis, amio was d/c by other physician, pt did well, pt will be d/c with following meds,pt teted negative for covid during admission    Discharge Medications:         Medication List        START taking these medications      doxycycline monohydrate 100 MG capsule  Commonly known as: MONODOX  Take 1 capsule by mouth in the morning and 1 capsule before bedtime. Do all this for 12 doses.      metoprolol succinate 25 MG extended release tablet  Commonly known as: TOPROL XL  Take 1 tablet by mouth in the morning. Start taking on: August 12, 2022     midodrine 2.5 MG tablet  Commonly known as: PROAMATINE  Take 1 tablet by mouth 3 times daily as needed (low SBP)     predniSONE 20 MG tablet  Commonly known as: DELTASONE  Take 1 tablet by mouth in the morning for 10 days. Start taking on: August 12, 2022            CHANGE how you take these medications      bumetanide 1 MG tablet  Commonly known as: BUMEX  Take 1 tablet by mouth in the morning. Start taking on: August 12, 2022  What changed: when to take this            CONTINUE taking these medications      acetaminophen 500 MG tablet  Commonly known as: TYLENOL     apixaban 5 MG Tabs tablet  Commonly known as: ELIQUIS     cholestyramine 4 g packet  Commonly known as: QUESTRAN     dorzolamide 2 % ophthalmic solution  Commonly known as: TRUSOPT     doxazosin 4 MG tablet  Commonly known as: CARDURA  TAKE 1 TABLET BY MOUTH EVERY DAY     FeroSul 325 (65 Fe) MG tablet  Generic drug: ferrous sulfate  TAKE 1 TABLET BY MOUTH IN THE MORNING WITH FOOD     latanoprost 0.005 % ophthalmic solution  Commonly known as: XALATAN     levothyroxine 100 MCG tablet  Commonly known as: SYNTHROID  TAKE 1 TABLET BY MOUTH EVERY DAY     Probiotic Daily Caps     simethicone 80 MG chewable tablet  Commonly known as: MYLICON            STOP taking these medications      atenolol 100 MG tablet  Commonly known as: TENORMIN     Rhopressa 0.02 % Soln  Generic drug: Netarsudil Dimesylate     timolol 0.5 % ophthalmic solution  Commonly known as: TIMOPTIC            ASK your doctor about these medications      amiodarone 400 MG tablet  Commonly known as: PACERONE  Ask about: Which instructions should I use?                Where to Get Your Medications        These medications were sent to [facility], [address] [facility] [place] [address]  [address] [place] [address]      Phone: [phone]   bumetanide 1 MG tablet  doxycycline monohydrate 100 MG capsule  metoprolol succinate 25 MG extended release tablet  midodrine 2.5 MG tablet  predniSONE 20 MG tablet         Consults:  IP CONSULT TO CRITICAL CARE  PHARMACY TO DOSE MEDICATION  IP CONSULT TO NEPHROLOGY  IP CONSULT TO INFECTIOUS DISEASES  IP CONSULT TO CRITICAL CARE  IP CONSULT TO CARDIOLOGY    Significant Diagnostic Studies:  XR LUMBAR SPINE (2-3 VIEWS)    Result Date: 8/5/2022  EXAMINATION: THREE XRAY VIEWS OF THE LUMBAR SPINE 8/5/2022 2:39 pm COMPARISON: CT abdomen pelvis 11/29/2021 HISTORY: ORDERING SYSTEM PROVIDED HISTORY: L spine tenderness s/p fall TECHNOLOGIST PROVIDED HISTORY: L spine tenderness s/p fall Reason for Exam: Pt states she fell right hip pain and lumbar pain. FINDINGS: The bones are demineralized. Vertebral body heights are unchanged and height from prior comparison with ankylosis across the L1-L2 and L5-S1 disc spaces. Alignment is unchanged with minimal dextroconvex upper lumbar curvature and 4 mm grade 1 degenerative anterolisthesis at L3 on L4. Moderate to severe disc height loss throughout the lower thoracic and lumbar levels with advanced facet arthrosis in the mid to lower lumbar spine. Mild degenerative spurring at the sacroiliac joints. Inferior vena cava filter. Cholecystectomy. Surgical clips and anastomotic sutures over the central abdomen with a dilated loop of likely colon in the left lower quadrant measuring up to 9.8 cm. Advanced degenerative changes without acute radiographic abnormality of the lumbar spine. Gaseous distension of a colonic loop in the left lower quadrant up to 9.8 cm. Correlate for any referable clinical symptoms.      CT Head W/O Contrast    Result Date: 8/5/2022  EXAMINATION: CT OF THE HEAD WITHOUT CONTRAST; CT OF THE CERVICAL SPINE WITHOUT CONTRAST 8/5/2022 5:00 pm TECHNIQUE: CT of the head was performed without the administration of intravenous contrast. Automated exposure control, iterative reconstruction, and/or weight based adjustment of the mA/kV was utilized to reduce the radiation dose to as low as reasonably achievable.; CT of the cervical spine was performed without the administration of intravenous contrast. Multiplanar reformatted images are provided for review. Automated exposure control, iterative reconstruction, and/or weight based adjustment of the mA/kV was utilized to reduce the radiation dose to as low as reasonably achievable. COMPARISON: None. HISTORY: ORDERING SYSTEM PROVIDED HISTORY: fall TECHNOLOGIST PROVIDED HISTORY: fall Decision Support Exception - unselect if not a suspected or confirmed emergency medical condition->Emergency Medical Condition (MA) Reason for Exam: fall, unsure if pt hit head, pt states she got lightheaded FINDINGS: Cervical spine: BONES/ALIGNMENT: There is no acute fracture or traumatic malalignment. DEGENERATIVE CHANGES: Multilevel disc and facet degenerative disease most pronounced at C4-C5, C5-C6 and C6-C7. [place] SOFT TISSUES: There is no prevertebral soft tissue swelling. Head CT: There is no acute infarction, intracranial hemorrhage or intracranial mass lesion. No mass effect, midline shift or extra-axial collection is noted. There are mild nonspecific foci of periventricular and subcortical cerebral white matter hypodensity, most likely representing chronic microangiopathic disease in this age group. The brain parenchyma is otherwise normal. The cerebellar tonsils are in normal position. The ventricles, sulci, and cisterns are mildly prominent suggestive of mild generalized volume loss. The globes and orbits are within normal limits. The visualized extracranial structures including paranasal sinuses and mastoid air cells are unremarkable. No fracture is identified. Cervical spine CT: No acute abnormality of the cervical spine. No fracture. Head CT: No evidence for acute intracranial hemorrhage, territorial infarction or intracranial mass lesion.  Mild orbits are within normal limits. The visualized extracranial structures including paranasal sinuses and mastoid air cells are unremarkable. No fracture is identified. Cervical spine CT: No acute abnormality of the cervical spine. No fracture. Head CT: No evidence for acute intracranial hemorrhage, territorial infarction or intracranial mass lesion. Mild chronic microangiopathic ischemic disease. Mild generalized volume loss. XR CHEST PORTABLE    Result Date: 8/6/2022  EXAMINATION: ONE XRAY VIEW OF THE CHEST 8/6/2022 5:55 am COMPARISON: 8/5/2022 HISTORY: ORDERING SYSTEM PROVIDED HISTORY:  Recent pneumonia TECHNOLOGIST PROVIDED HISTORY: Recent pneumonia Reason for Exam: recent pneumonia Additional signs and symptoms: recent pneumonia Relevant Medical/Surgical History: recent pneumonia FINDINGS: Cardiomediastinal silhouette is stable. There is scattered interstitial prominence in the lungs similar to previous radiographs. No focal airspace consolidation, pneumothorax, or pleural effusion. No free air beneath the diaphragm. No acute findings in the chest.     XR CHEST PORTABLE    Result Date: 8/5/2022  EXAMINATION: ONE XRAY VIEW OF THE CHEST 8/5/2022 12:33 pm COMPARISON: 07/11/2022, 06/17/2022, 05/09/2022 HISTORY: ORDERING SYSTEM PROVIDED HISTORY: fall TECHNOLOGIST PROVIDED HISTORY: fall FINDINGS: Pulmonary parenchymal assessment is degraded by respiratory motion, but the lungs appear grossly clear. No pneumothorax or sizable effusion. Cardiomediastinal silhouette is unchanged from prior. Inferior vena cava filter are partially imaged in the upper abdomen. No acute displaced fracture is seen, though osseous demineralization does degrade radiographic sensitivity. No acute abnormality.      VL Carotid Bilateral    Result Date: 8/6/2022    [facility]  Vascular Carotid Procedure   Patient Name   SALVADOR        Date of Study           08/06/2022                 Alexandra Lewis   Date of Birth 1940  Gender                  Female   Age            79 year(s)  Race                       Room Number    2004   Corporate ID # [id number]   Patient Acct # [de-identified]   MR #           [id number]      Toby Taylor T   Accession #    [id number]  Interpreting Physician  Lito You   Referring                  Referring Physician     Laci Fortune  Nurse  Practitioner  Procedure Type of Study:   Cerebral: Carotid, Carotid Scan Bilateral.  Indications for Study:Syncope. Patient Status: In Patient. Technical Quality:Adequate visualization. Comments:Basic Classification of ICA Stenosis: PSV - Peak Systolic Velocity Normal: No plaque or calcification identified, no elevation of PSV Mild: <50% spectral broadening without increased PSV Moderate: 50 - 69% PSV >125 - <230 cm/sec Severe: 70 - 99% PSV >230 cm/sec Critical: 80 - 99% PSV >230cm/sec and/or End Diastolic Velocities >606IF/LOP. Conclusions   Summary   Simultaneous real time imaging utilizing B-Mode, color flow doppler and  spectral waveform analysis was performed on the bilateral extracerebral  vascular system. The study demonstrates:   Right:  The internal carotid artery is normal.  The vertebral artery is patent with antegrade flow. Left:  The internal carotid artery is normal.  The vertebral artery is patent with antegrade flow.    Signature   ----------------------------------------------------------------  Electronically signed by Argenis Odell RVT(Sonographer) on  08/06/2022 11:53 AM  ----------------------------------------------------------------   ----------------------------------------------------------------  Electronically signed by Sean Lauren(Interpreting  physician) on 08/06/2022 04:38 PM  ----------------------------------------------------------------  Findings:   Right Impression:                    Left Impression:  The common carotid artery is normal. The common carotid artery is normal. +-----------+--------+-------+-------+------+-----------+------------------+ ! Prox ECA   !88.69   !0      !       !1     !           !                  ! +-----------+--------+-------+-------+------+-----------+------------------+ ! Vertebral  !100.5   !21.52  !       !0.79  !           !                  ! +-----------+--------+-------+-------+------+-----------+------------------+   - Additional Measurements:ICAPSV/CCAPSV 1.18. ICAEDV/CCAEDV 1.45. Carotid Left Measurements +-----------+--------+-------+-------+------+-----------+------------------+ ! Location   ! PSV     ! EDV    ! Angle  ! RI    !%Stenosis  ! Tortuosity        ! +-----------+--------+-------+-------+------+-----------+------------------+ ! Prox CCA   !127.47  !22.46  !       !0.82  !           !                  ! +-----------+--------+-------+-------+------+-----------+------------------+ ! Mid CCA    !103.23  !20.84  !       !0.8   ! !                  ! +-----------+--------+-------+-------+------+-----------+------------------+ ! Dist CCA   !100     !17.61  !       !0.82  !           !                  ! +-----------+--------+-------+-------+------+-----------+------------------+ ! Bulb       !101.62  !15.99  !       !0.84  !           !                  ! +-----------+--------+-------+-------+------+-----------+------------------+ ! Prox ICA   !125.85  !24.07  !       !0.81  !           !                  ! +-----------+--------+-------+-------+------+-----------+------------------+ ! Mid ICA    !73.95   !16.68  !       !0.77  !           !                  ! +-----------+--------+-------+-------+------+-----------+------------------+ ! Dist ICA   !61.11   !17.67  !       !0.71  !           !                  ! +-----------+--------+-------+-------+------+-----------+------------------+ ! Prox ECA   !93.54   !15.99  !       !0.83  !           !                  ! +-----------+--------+-------+-------+------+-----------+------------------+ !Vertebral  !44.41   !11.26  !       !0.75  !           !                  ! +-----------+--------+-------+-------+------+-----------+------------------+   - Additional Measurements:ICAPSV/CCAPSV 0.99. ICAEDV/CCAEDV 1.07. XR HIP 2-3 VW W PELVIS RIGHT    Result Date: 8/5/2022  EXAMINATION: ONE XRAY VIEW OF THE PELVIS AND TWO XRAY VIEWS RIGHT HIP 8/5/2022 2:39 pm COMPARISON: None. HISTORY: ORDERING SYSTEM PROVIDED HISTORY: R hip pain s/p fall TECHNOLOGIST PROVIDED HISTORY: R hip pain s/p fall Reason for Exam: Pt states she fell right hip pain and lumbar pain. FINDINGS: Mineralization is the lower limit of normal.  Mild osteophytes involve the hips. No fracture or dislocation is identified. No evident fracture or dislocation. Mild degenerative osteophytes at the hips. Disposition:   SNF    Discharge Instructions: Activity: activity as tolerated  Diet:  regular diet    Follow up with Montse De La Paz MD in 1 weeks.     Signed:  Isis Deshpande MD  8/11/2022, 3:19 PM    Time spent in discharge of this pt is more than 30 minutes in examination,evaluvation,  counseling and review of medication and discharge plan

## 2022-08-11 NOTE — PROGRESS NOTES
Hospitalist Progress Note  8/10/2022 9:54 PM  Subjective:   Admit Date: 8/5/2022  PCP: Efe Roberts MD     DNR-CCA      C/c:  Chief Complaint   Patient presents with    Dizziness    Fatigue         Interval History: pt feeling weak, x ray show willi infiltrate/afebrile. covid negative at time of admission    Diet: ADULT DIET; Regular                                ip days:5  Medications:   Scheduled Meds:   metoprolol succinate  25 mg Oral Daily    methylPREDNISolone  40 mg IntraVENous Q12H    cefepime  2,000 mg IntraVENous Q12H    doxycycline monohydrate  100 mg Oral 2 times per day    apixaban  5 mg Oral BID    ipratropium-albuterol  1 ampule Inhalation Q4H WA    timolol  1 drop Both Eyes BID    dorzolamide  1 drop Both Eyes BID    latanoprost  1 drop Both Eyes Nightly    levothyroxine  100 mcg Oral Daily    sodium chloride flush  10 mL IntraVENous 2 times per day    ferrous sulfate  325 mg Oral BID WC     Continuous Infusions:   sodium chloride       PRN Meds:.midodrine, ipratropium-albuterol, sodium chloride flush, sodium chloride, ondansetron **OR** ondansetron, magnesium hydroxide, acetaminophen **OR** acetaminophen     CBC:   Recent Labs     08/08/22  0350 08/09/22  0539 08/10/22  0945   WBC 5.0 4.7 7.4   HGB 8.2* 7.9* 9.8*   * 121* 179     BMP:    Recent Labs     08/08/22  0350 08/09/22  0539 08/10/22  0945    138 136   K 4.0 4.4 4.5    106 103   CO2 23 26 25   BUN 19 19 22   CREATININE 1.23* 1.05* 1.00*   GLUCOSE 145* 149* 223*     Hepatic:   Recent Labs     08/08/22  0350   AST 19   ALT 14   BILITOT 0.43   ALKPHOS 88     Troponin: No results for input(s): TROPONINI in the last 72 hours. BNP: No results for input(s): BNP in the last 72 hours. Lipids: No results for input(s): CHOL, HDL in the last 72 hours. Invalid input(s): LDLCALCU  INR: No results for input(s): INR in the last 72 hours.     Objective:   Vitals: BP (!) 142/89   Pulse 75   Temp 98 °F (36.7 °C) (Oral)   Resp 18 Wt 194 lb 3.6 oz (88.1 kg)   SpO2 92%   BMI 32.32 kg/m²   General appearance: alert, appears stated age and cooperative  Skin: Skin color, texture, turgor normal. No rashes or lesions  Lungs: clear to auscultation bilaterally, with few rhonchi  Heart: regular rate and rhythm, S1, S2 normal, no murmur, click, rub or gallop  Abdomen: soft, non-tender; bowel sounds normal; no masses,  no organomegaly  Extremities: extremities normal, atraumatic, no cyanosis or edema  Neurologic: Mental status: Alert, oriented, thought content appropriate    Prophylaxis:   DVT with  [] lovenox        [] heparin        [] Scd        [x] apixaban    Radiology:  XR LUMBAR SPINE (2-3 VIEWS)    Result Date: 8/5/2022  EXAMINATION: THREE XRAY VIEWS OF THE LUMBAR SPINE 8/5/2022 2:39 pm COMPARISON: CT abdomen pelvis 11/29/2021 HISTORY: ORDERING SYSTEM PROVIDED HISTORY: L spine tenderness s/p fall TECHNOLOGIST PROVIDED HISTORY: L spine tenderness s/p fall Reason for Exam: Pt states she fell right hip pain and lumbar pain. FINDINGS: The bones are demineralized. Vertebral body heights are unchanged and height from prior comparison with ankylosis across the L1-L2 and L5-S1 disc spaces. Alignment is unchanged with minimal dextroconvex upper lumbar curvature and 4 mm grade 1 degenerative anterolisthesis at L3 on L4. Moderate to severe disc height loss throughout the lower thoracic and lumbar levels with advanced facet arthrosis in the mid to lower lumbar spine. Mild degenerative spurring at the sacroiliac joints. Inferior vena cava filter. Cholecystectomy. Surgical clips and anastomotic sutures over the central abdomen with a dilated loop of likely colon in the left lower quadrant measuring up to 9.8 cm. Advanced degenerative changes without acute radiographic abnormality of the lumbar spine. Gaseous distension of a colonic loop in the left lower quadrant up to 9.8 cm. Correlate for any referable clinical symptoms.      CT Head W/O Contrast    Result Date: 8/5/2022  EXAMINATION: CT OF THE HEAD WITHOUT CONTRAST; CT OF THE CERVICAL SPINE WITHOUT CONTRAST 8/5/2022 5:00 pm TECHNIQUE: CT of the head was performed without the administration of intravenous contrast. Automated exposure control, iterative reconstruction, and/or weight based adjustment of the mA/kV was utilized to reduce the radiation dose to as low as reasonably achievable.; CT of the cervical spine was performed without the administration of intravenous contrast. Multiplanar reformatted images are provided for review. Automated exposure control, iterative reconstruction, and/or weight based adjustment of the mA/kV was utilized to reduce the radiation dose to as low as reasonably achievable. COMPARISON: None. HISTORY: ORDERING SYSTEM PROVIDED HISTORY: fall TECHNOLOGIST PROVIDED HISTORY: fall Decision Support Exception - unselect if not a suspected or confirmed emergency medical condition->Emergency Medical Condition (MA) Reason for Exam: fall, unsure if pt hit head, pt states she got lightheaded FINDINGS: Cervical spine: BONES/ALIGNMENT: There is no acute fracture or traumatic malalignment. DEGENERATIVE CHANGES: Multilevel disc and facet degenerative disease most pronounced at C4-C5, C5-C6 and C6-C7. Joel Amend SOFT TISSUES: There is no prevertebral soft tissue swelling. Head CT: There is no acute infarction, intracranial hemorrhage or intracranial mass lesion. No mass effect, midline shift or extra-axial collection is noted. There are mild nonspecific foci of periventricular and subcortical cerebral white matter hypodensity, most likely representing chronic microangiopathic disease in this age group. The brain parenchyma is otherwise normal. The cerebellar tonsils are in normal position. The ventricles, sulci, and cisterns are mildly prominent suggestive of mild generalized volume loss. The globes and orbits are within normal limits.  The visualized extracranial structures including paranasal sinuses and mastoid air cells are unremarkable. No fracture is identified. Cervical spine CT: No acute abnormality of the cervical spine. No fracture. Head CT: No evidence for acute intracranial hemorrhage, territorial infarction or intracranial mass lesion. Mild chronic microangiopathic ischemic disease. Mild generalized volume loss. CT CSpine W/O Contrast    Result Date: 8/5/2022  EXAMINATION: CT OF THE HEAD WITHOUT CONTRAST; CT OF THE CERVICAL SPINE WITHOUT CONTRAST 8/5/2022 5:00 pm TECHNIQUE: CT of the head was performed without the administration of intravenous contrast. Automated exposure control, iterative reconstruction, and/or weight based adjustment of the mA/kV was utilized to reduce the radiation dose to as low as reasonably achievable.; CT of the cervical spine was performed without the administration of intravenous contrast. Multiplanar reformatted images are provided for review. Automated exposure control, iterative reconstruction, and/or weight based adjustment of the mA/kV was utilized to reduce the radiation dose to as low as reasonably achievable. COMPARISON: None. HISTORY: ORDERING SYSTEM PROVIDED HISTORY: fall TECHNOLOGIST PROVIDED HISTORY: fall Decision Support Exception - unselect if not a suspected or confirmed emergency medical condition->Emergency Medical Condition (MA) Reason for Exam: fall, unsure if pt hit head, pt states she got lightheaded FINDINGS: Cervical spine: BONES/ALIGNMENT: There is no acute fracture or traumatic malalignment. DEGENERATIVE CHANGES: Multilevel disc and facet degenerative disease most pronounced at C4-C5, C5-C6 and C6-C7. Naya Red SOFT TISSUES: There is no prevertebral soft tissue swelling. Head CT: There is no acute infarction, intracranial hemorrhage or intracranial mass lesion. No mass effect, midline shift or extra-axial collection is noted.  There are mild nonspecific foci of periventricular and subcortical cerebral white matter hypodensity, most likely representing chronic microangiopathic disease in this age group. The brain parenchyma is otherwise normal. The cerebellar tonsils are in normal position. The ventricles, sulci, and cisterns are mildly prominent suggestive of mild generalized volume loss. The globes and orbits are within normal limits. The visualized extracranial structures including paranasal sinuses and mastoid air cells are unremarkable. No fracture is identified. Cervical spine CT: No acute abnormality of the cervical spine. No fracture. Head CT: No evidence for acute intracranial hemorrhage, territorial infarction or intracranial mass lesion. Mild chronic microangiopathic ischemic disease. Mild generalized volume loss. XR CHEST PORTABLE    Result Date: 8/6/2022  EXAMINATION: ONE XRAY VIEW OF THE CHEST 8/6/2022 5:55 am COMPARISON: 8/5/2022 HISTORY: ORDERING SYSTEM PROVIDED HISTORY:  Recent pneumonia TECHNOLOGIST PROVIDED HISTORY: Recent pneumonia Reason for Exam: recent pneumonia Additional signs and symptoms: recent pneumonia Relevant Medical/Surgical History: recent pneumonia FINDINGS: Cardiomediastinal silhouette is stable. There is scattered interstitial prominence in the lungs similar to previous radiographs. No focal airspace consolidation, pneumothorax, or pleural effusion. No free air beneath the diaphragm. No acute findings in the chest.     XR CHEST PORTABLE    Result Date: 8/5/2022  EXAMINATION: ONE XRAY VIEW OF THE CHEST 8/5/2022 12:33 pm COMPARISON: 07/11/2022, 06/17/2022, 05/09/2022 HISTORY: ORDERING SYSTEM PROVIDED HISTORY: fall TECHNOLOGIST PROVIDED HISTORY: fall FINDINGS: Pulmonary parenchymal assessment is degraded by respiratory motion, but the lungs appear grossly clear. No pneumothorax or sizable effusion. Cardiomediastinal silhouette is unchanged from prior. Inferior vena cava filter are partially imaged in the upper abdomen.   No acute displaced fracture is seen, though osseous demineralization does degrade radiographic sensitivity. No acute abnormality. VL Carotid Bilateral    Result Date: 8/6/2022    [facility]  Vascular Carotid Procedure   Patient Name   SALVADOR        Date of Study           08/06/2022                 Pako Bourgeois   Date of Birth  1940  Gender                  Female   Age            79 year(s)  Race                       Room Number    2004   Corporate ID # [id number]   Patient Acct # [de-identified]   MR #           [id number]      [facility]   Accession #    [id number]  Interpreting Physician  Kendal Simon   Referring                  Referring Physician     Emily Ramos  Nurse  Practitioner  Procedure Type of Study:   Cerebral: Carotid, Carotid Scan Bilateral.  Indications for Study:Syncope. Patient Status: In Patient. Technical Quality:Adequate visualization. Comments:Basic Classification of ICA Stenosis: PSV - Peak Systolic Velocity Normal: No plaque or calcification identified, no elevation of PSV Mild: <50% spectral broadening without increased PSV Moderate: 50 - 69% PSV >125 - <230 cm/sec Severe: 70 - 99% PSV >230 cm/sec Critical: 80 - 99% PSV >230cm/sec and/or End Diastolic Velocities >174EW/OHM. Conclusions   Summary   Simultaneous real time imaging utilizing B-Mode, color flow doppler and  spectral waveform analysis was performed on the bilateral extracerebral  vascular system. The study demonstrates:   Right:  The internal carotid artery is normal.  The vertebral artery is patent with antegrade flow. Left:  The internal carotid artery is normal.  The vertebral artery is patent with antegrade flow.    Signature   ----------------------------------------------------------------  Electronically signed by Newton Mesa RVT(Sonographer) on  08/06/2022 11:53 AM  ----------------------------------------------------------------   ----------------------------------------------------------------  Electronically signed by Fede Vegas, Sean(Interpreting  physician) on 08/06/2022 04:38 PM  ----------------------------------------------------------------  Findings:   Right Impression:                    Left Impression:  The common carotid artery is normal. The common carotid artery is normal.   The carotid bulb is normal.          The carotid bulb is normal.   The internal carotid artery is       The internal carotid artery is  normal.                              normal.  Elevated velocities due to           There is tortuosity of the internal  tortuosity. carotid artery. The external carotid artery is       The external carotid artery is  normal.                              normal.   The vertebral artery is patent with  The vertebral artery is patent with  antegrade flow. antegrade flow. Velocities are measured in cm/s ; Diameters are measured in cm Carotid Right Measurements +-----------+--------+-------+-------+------+-----------+------------------+ ! Location   ! PSV     ! EDV    ! Angle  ! RI    !%Stenosis  ! Tortuosity        ! +-----------+--------+-------+-------+------+-----------+------------------+ ! Prox CCA   !136.05  !25.47  !       !0.81  !           !                  ! +-----------+--------+-------+-------+------+-----------+------------------+ ! Mid CCA    !110.38  !29.42  !       !0.73  !           !                  ! +-----------+--------+-------+-------+------+-----------+------------------+ ! Dist CCA   !90.63   !15.6   !       !0.83  !           !                  ! +-----------+--------+-------+-------+------+-----------+------------------+ ! Bulb       !44.99   !7.59   !       !0.83  !           !                  ! +-----------+--------+-------+-------+------+-----------+------------------+ ! Prox ICA   !114.54  !27.3   !       !0.76  !           !                  ! +-----------+--------+-------+-------+------+-----------+------------------+ ! Mid ICA    !160.09  !36.94  ! !0.77  !           !                  ! +-----------+--------+-------+-------+------+-----------+------------------+ ! Dist ICA   !124.2   !33.37  !       !0.73  !           !                  ! +-----------+--------+-------+-------+------+-----------+------------------+ ! Prox ECA   !88.69   !0      !       !1     !           !                  ! +-----------+--------+-------+-------+------+-----------+------------------+ ! Vertebral  !100.5   !21.52  !       !0.79  !           !                  ! +-----------+--------+-------+-------+------+-----------+------------------+   - Additional Measurements:ICAPSV/CCAPSV 1.18. ICAEDV/CCAEDV 1.45. Carotid Left Measurements +-----------+--------+-------+-------+------+-----------+------------------+ ! Location   ! PSV     ! EDV    ! Angle  ! RI    !%Stenosis  ! Tortuosity        ! +-----------+--------+-------+-------+------+-----------+------------------+ ! Prox CCA   !127.47  !22.46  !       !0.82  !           !                  ! +-----------+--------+-------+-------+------+-----------+------------------+ ! Mid CCA    !103.23  !20.84  !       !0.8   ! !                  ! +-----------+--------+-------+-------+------+-----------+------------------+ ! Dist CCA   !100     !17.61  !       !0.82  !           !                  ! +-----------+--------+-------+-------+------+-----------+------------------+ ! Bulb       !101.62  !15.99  !       !0.84  !           !                  ! +-----------+--------+-------+-------+------+-----------+------------------+ ! Prox ICA   !125.85  !24.07  !       !0.81  !           !                  ! +-----------+--------+-------+-------+------+-----------+------------------+ ! Mid ICA    !73.95   !16.68  !       !0.77  !           !                  ! +-----------+--------+-------+-------+------+-----------+------------------+ ! Dist ICA   !61.11   !17.67  !       !0.71  !           !                  ! [facility])     Syncope     Acute cystitis without hematuria      Anticipated Disposition upon discharge: [] Home                                                                         [] Home with Home Health                                                                         [] Merged with [facility]                                                                         [] [address] ,[address]      Patient is admitted as inpatient status because of co-morbidities listed above, severity of signs and symptoms as outlined, requirement for current medical therapies and most importantly because of direct risk to patient if care not provided in a hospital setting.           Gunnar Melendez MD, MD  Rounding Hospitalist

## 2022-08-11 NOTE — PROGRESS NOTES
Report called to [facility]. Report given to [facility].  Transport at [facility]

## 2022-08-11 NOTE — CARE COORDINATION
Transport arranged for patient to go to [facility]. Polly Mcburney. [facility] pickup at 7:00pm. Patient informed. Patient reported that her son is coming to hospital and she will inform him once he comes in. Number for Report: [phone] ask for [address]: A232     Fax: [phone] direct fax: [phone]([facility])  SENT ACACIA to BOTH fax numbers. [phone].     Electronically signed by TASHA Velez on 8/11/2022 at 3:26 PM

## 2022-08-11 NOTE — PROGRESS NOTES
Comprehensive Nutrition Assessment    Type and Reason for Visit:  Initial, RD Nutrition Re-Screen/LOS    Nutrition Recommendations/Plan:   Continue current diet  Monitor GI status and PO intake. If suboptimal po intakes and abdominal discomfort continues, notify RN. Malnutrition Assessment:  Malnutrition Status:  No malnutrition (08/11/22 1502)    Context:  Acute Illness     Findings of the 6 clinical characteristics of malnutrition:  Energy Intake:  No significant decrease in energy intake  Weight Loss:  No significant weight loss     Body Fat Loss:  No significant body fat loss     Muscle Mass Loss:  No significant muscle mass loss    Fluid Accumulation:  Mild     Strength:  Not Performed    Nutrition Assessment:    Patient seen for length of stay. Current admission is related to JOSH with sepsis, dizziness, and fatigue. Patient said appetite is good, she was eating % at most meals until yesterday. But today she ate less about 50%, got abdominal discomfort while eating. Patient said she informed charge nurse. Recommended patient if it continues to talk to doctor. Will continue current diet. Nutrition Related Findings:    Edema: +2 pitting BLE, +1 BUE, generalized. Bowel sounds active. Labs and meds reviewed. Blood glucose 223 Wound Type: None       Current Nutrition Intake & Therapies:    Average Meal Intake: %, 51-75%  Average Supplements Intake: None Ordered  ADULT DIET; Regular    Anthropometric Measures:  Height: 5' 5\" (165.1 cm)  Ideal Body Weight (IBW): 125 lbs (57 kg)    Admission Body Weight: 183 lb 3.2 oz (83.1 kg)  Current Body Weight: 197 lb 5 oz (89.5 kg), 157.9 % IBW. Weight Source: Bed Scale  Current BMI (kg/m2): 32.8  Usual Body Weight: 190 lb (86.2 kg) (estimated from past records)  % Weight Change (Calculated): 3.8  Weight Adjustment For: No Adjustment                 BMI Categories: Obese Class 1 (BMI 30.0-34. 9)    Estimated Daily Nutrient Needs:  Energy Requirements Based On: Kcal/kg  Weight Used for Energy Requirements: Current  Energy (kcal/day): 1970  Weight Used for Protein Requirements: Ideal  Protein (g/day): 91       Nutrition Diagnosis:    In context of acute illness or injury related to renal dysfunction as evidenced by intake 51-75%    Nutrition Interventions:   Food and/or Nutrient Delivery: Continue Current Diet  Nutrition Education/Counseling: Education not indicated  Coordination of Nutrition Care: Continue to monitor while inpatient       Goals:     Goals: Meet at least 75% of estimated needs, PO intake 75% or greater       Nutrition Monitoring and Evaluation:   Behavioral-Environmental Outcomes: None Identified  Food/Nutrient Intake Outcomes: Food and Nutrient Intake  Physical Signs/Symptoms Outcomes: Biochemical Data, Skin, Weight, GI Status    Discharge Planning:    Continue current diet       Some areas of assessment may be incomplete due to COVID-19 precautions    Jeannine Milligan RD, LD  Office phone [phone]

## 2022-08-11 NOTE — PROGRESS NOTES
[facility] CARDIOLOGY Progress Note    2022 8:47 AM      Subjective:  Ms. Martha Brandt that she was diagnosed with pneumonia on CT of the chest performed on 08/10/2022 after abnormal chest x-ray ordered by me. Still has dry cough but better. No expectorations. Patient denies any anginal chest pain or shortness of breath or palpitations or lightheadedness or dizziness. Review of systems:  No fever or chills. +cough. No diarrhea. No headaches. Complains of mild leg edema. LABS:     Recent Results (from the past 24 hour(s))   CBC    Collection Time: 08/10/22  9:45 AM   Result Value Ref Range    WBC 7.4 3.5 - 11.0 k/uL    RBC 3.05 (L) 4.0 - 5.2 m/uL    Hemoglobin 9.8 (L) 12.0 - 16.0 g/dL    Hematocrit 30.4 (L) 36 - 46 %    MCV 99.8 80 - 100 fL    MCH 32.3 26 - 34 pg    MCHC 32.4 31 - 37 g/dL    RDW 16.4 (H) 11.5 - 14.9 %    Platelets 857 933 - 405 k/uL    MPV 8.5 6.0 - 12.0 fL   Basic Metabolic Panel    Collection Time: 08/10/22  9:45 AM   Result Value Ref Range    Glucose 223 (H) 70 - 99 mg/dL    BUN 22 8 - 23 mg/dL    Creatinine 1.00 (H) 0.50 - 0.90 mg/dL    Calcium 9.1 8.6 - 10.4 mg/dL    Sodium 136 135 - 144 mmol/L    Potassium 4.5 3.7 - 5.3 mmol/L    Chloride 103 98 - 107 mmol/L    CO2 25 20 - 31 mmol/L    Anion Gap 8 (L) 9 - 17 mmol/L    GFR Non-African American 53 (L) >60 mL/min    GFR African American >60 >60 mL/min    GFR Comment         Brain Natriuretic Peptide    Collection Time: 08/10/22  9:45 AM   Result Value Ref Range    Pro-BNP 9,246 (H) <300 pg/mL       Pulse Ox:  SpO2  Av.6 %  Min: 90 %  Max: 94 %    Supplemental O2: O2 Flow Rate (L/min): 0 L/min     Current Meds:    metoprolol succinate  25 mg Oral Daily    methylPREDNISolone  40 mg IntraVENous Q12H    cefepime  2,000 mg IntraVENous Q12H    doxycycline monohydrate  100 mg Oral 2 times per day    apixaban  5 mg Oral BID    ipratropium-albuterol  1 ampule Inhalation Q4H WA    timolol  1 drop Both Eyes BID per pulmonary and PCP services. Acute urinary tract infection. History of recurrent falls. No carole syncope by history. Obesity with BMI 32 range. REC/PLAN:    Improving. Given mild leg edema, will resume Bumex 1 mg daily ( was on 1 mg b.I.d. at home). Check daily BMP. Discussed with the nephrologist, Dr. Cristhian Garza in the hallways and he told me that he signed off. I was going to leave the diuretics management to nephrologist.      Continue on other current cardiac medications. Hopefully discharge soon. Discussed with the charge nurse at the time of my rounds. Will follow. Electronically signed by Roselyn Rincon MD, [facility]        PLEASE NOTE:  This progress note was completed using a voice transcription system. Every effort was made to ensure accuracy. However, inadvertent computerized transcription errors may be present.

## 2022-08-11 NOTE — PROGRESS NOTES
RN put a page out to Dr. Thiago Melendrez regarding pt loosing IV access and 2 unsuccessful attempts of placing a new line

## 2022-08-11 NOTE — PLAN OF CARE
Problem: Discharge Planning  Goal: Discharge to home or other facility with appropriate resources  8/11/2022 1555 by Christie Castillo RN  Outcome: Progressing  8/11/2022 0257 by Yudi Jorgensen RN  Outcome: Progressing     Problem: Safety - Adult  Goal: Free from fall injury  8/11/2022 1555 by Christie Castillo RN  Outcome: Progressing  8/11/2022 0257 by Yudi Jorgensen RN  Outcome: Progressing  Note: Pt free of falls     Problem: ABCDS Injury Assessment  Goal: Absence of physical injury  8/11/2022 1555 by Christie Castillo RN  Outcome: Progressing  8/11/2022 0257 by Yudi Jorgensen RN  Outcome: Progressing  Note: Pt absent of physical injury     Problem: Skin/Tissue Integrity  Goal: Absence of new skin breakdown  Description: 1. Monitor for areas of redness and/or skin breakdown  2. Assess vascular access sites hourly  3. Every 4-6 hours minimum:  Change oxygen saturation probe site  4. Every 4-6 hours:  If on nasal continuous positive airway pressure, respiratory therapy assess nares and determine need for appliance change or resting period.   8/11/2022 1555 by Christie Castillo RN  Outcome: Progressing  8/11/2022 0257 by Yudi Jorgensen RN  Outcome: Progressing  Note: Pt absent of any new skin breakdown     Problem: Pain  Goal: Verbalizes/displays adequate comfort level or baseline comfort level  8/11/2022 1555 by Christie Castillo RN  Outcome: Progressing  8/11/2022 0257 by Yudi Jorgensen RN  Outcome: Progressing     Problem: Chronic Conditions and Co-morbidities  Goal: Patient's chronic conditions and co-morbidity symptoms are monitored and maintained or improved  8/11/2022 1555 by Christie Castillo RN  Outcome: Progressing  8/11/2022 0257 by Yudi Jorgensen RN  Outcome: Progressing     Problem: Nutrition Deficit:  Goal: Optimize nutritional status  Outcome: Progressing

## 2022-08-11 NOTE — PLAN OF CARE
Problem: Safety - Adult  Goal: Free from fall injury  8/11/2022 0257 by Maxine Casey RN  Outcome: Progressing  Note: Pt free of falls     Problem: ABCDS Injury Assessment  Goal: Absence of physical injury  8/11/2022 0257 by Maxine Casey RN  Outcome: Progressing  Note: Pt absent of physical injury     Problem: Skin/Tissue Integrity  Goal: Absence of new skin breakdown  Description: 1. Monitor for areas of redness and/or skin breakdown  2. Assess vascular access sites hourly  3. Every 4-6 hours minimum:  Change oxygen saturation probe site  4. Every 4-6 hours:  If on nasal continuous positive airway pressure, respiratory therapy assess nares and determine need for appliance change or resting period.   8/11/2022 0257 by Maxine Casey RN  Outcome: Progressing  Note: Pt absent of any new skin breakdown

## 2022-08-11 NOTE — PROGRESS NOTES
PULMONARY PROGRESS NOTE:    REASON FOR VISIT:  Interval History:COPD, hypotension, UTI    Shortness of Breath: yes with exertion   Cough: no  Sputum: no          Hemoptysis: no  Chest Pain: no  Fever: no                   Swelling Feet: yes   Headache: no                                           Nausea, Emesis, Abdominal Pain: no  Diarrhea: no         Constipation: no    Events since last visit: Reports SOB with exertion.  Bumex restarted     PAST MEDICAL HISTORY:      Scheduled Meds:   metoprolol succinate  25 mg Oral Daily    methylPREDNISolone  40 mg IntraVENous Q12H    cefepime  2,000 mg IntraVENous Q12H    doxycycline monohydrate  100 mg Oral 2 times per day    apixaban  5 mg Oral BID    ipratropium-albuterol  1 ampule Inhalation Q4H WA    timolol  1 drop Both Eyes BID    dorzolamide  1 drop Both Eyes BID    latanoprost  1 drop Both Eyes Nightly    levothyroxine  100 mcg Oral Daily    sodium chloride flush  10 mL IntraVENous 2 times per day    ferrous sulfate  325 mg Oral BID WC     Continuous Infusions:   sodium chloride       PRN Meds:midodrine, ipratropium-albuterol, sodium chloride flush, sodium chloride, ondansetron **OR** ondansetron, magnesium hydroxide, acetaminophen **OR** acetaminophen        PHYSICAL EXAMINATION:  BP (!) 155/69   Pulse 72   Temp 97.6 °F (36.4 °C) (Oral)   Resp 18   Wt 197 lb 5 oz (89.5 kg)   SpO2 92%   BMI 32.83 kg/m²     General : Awake, alert,   Neck - supple, no lymphadenopathy, JVD not raised  Heart - regular rhythm, S1 and S2 normal; no additional sounds heard  Lungs - Air Entry- fair bilaterally; breath sounds : vesicular;   rales/crackles - absent  Abdomen - soft, no tenderness  Upper Extremities  - no cyanosis, mottling; edema : absent  Lower Extremities: no cyanosis, mottling; edema : BLE pitting 2+    Current Laboratory, Radiologic, Microbiologic, and Diagnostic studies reviewed  Data ReviewCBC:   Recent Labs     08/09/22  0539 08/10/22  0945   WBC 4.7 7.4   RBC 2.48* 3.05*   HGB 7.9* 9.8*   HCT 24.6* 30.4*   * 179     BMP:   Recent Labs     08/09/22  0539 08/10/22  0945   GLUCOSE 149* 223*    136   K 4.4 4.5   BUN 19 22   CREATININE 1.05* 1.00*   CALCIUM 8.9 9.1     ABGs: No results for input(s): PHART, PO2ART, YPJ0COL, OWF7TXP, BEART, Q8VNWLND, NIR6TMR in the last 72 hours. PT/INR:  No results found for: PTINR    ASSESSMENT / PLAN:  Hypotension - ? Related to dehydration / possible sepsis - resolved    Monitor off pressors  UTI    UCx ecoli -- abx  Acute kidney injury - improving nephrology following  Recent falls-  OT, PT when stable  Recent PNA - appears resolved  CXR  8/10/22- RUL infiltrate -check CT chest     IS  COPD - BD Q4 wa, steroids to po   Bumex restarted  No objection to discharge from pulmonary standpoint  Discussed with Dr. Keary Holstein      Electronically signed by MINH Cain - CNP on 08/11/22 at 11:57 AM.     ATTESTATION STATEMENT:     Patient was seen by  NP. I have discussed case with NP and current management plan is based on my discussion with NP.     Patient with COPD - overall stable, OK for SNF    Electronically signed by Grace George MD on 08/11/22 at 8:14 PM.

## 2022-08-11 NOTE — PROGRESS NOTES
[irbesartan], Ciprofloxacin hcl, Cozaar [losartan potassium], Diovan [valsartan], Lipitor [atorvastatin calcium], Lisinopril, Pcn [penicillins], Pravachol [pravastatin sodium], Tape [adhesive tape], Tramadol, Zetia [ezetimibe], and Morphine     Review of Systems:     Review of Systems  As per history present illness, other than above 12 system review was negative  Physical Examination :       Physical Exam  Constitutional:       General: She is not in acute distress. HENT:      Head: Atraumatic. Right Ear: External ear normal.      Left Ear: External ear normal.   Eyes:      General: No scleral icterus. Conjunctiva/sclera: Conjunctivae normal.   Cardiovascular:      Rate and Rhythm: Normal rate and regular rhythm. Heart sounds: No murmur heard. Pulmonary:      Effort: Pulmonary effort is normal.      Breath sounds: Rhonchi present. Abdominal:      General: There is no distension. Palpations: Abdomen is soft. Tenderness: There is no abdominal tenderness. Musculoskeletal:      Cervical back: Neck supple. No rigidity. Right lower leg: No edema. Left lower leg: No edema. Skin:     General: Skin is warm. Coloration: Skin is not jaundiced. Neurological:      Mental Status: She is alert and oriented to person, place, and time. Mental status is at baseline. Past Medical History:     Past Medical History:   Diagnosis Date    Atrial fibrillation ([address] 3/28/2014    Chronic back pain     with rt. leg pain    Diverticulosis     GERD (gastroesophageal reflux disease)     Hyperlipidemia     Hypertension     Hypothyroidism     Obesity (BMI 30.0-34. 9) 8/12/2016    Onychomycosis     PE (pulmonary embolism)     H/O DVT of rt leg    Type II or unspecified type diabetes mellitus without mention of complication, not stated as uncontrolled        Past Surgical  History:     Past Surgical History:   Procedure Laterality Date    CHOLECYSTECTOMY      COLON SURGERY      s/p sigmoid cloectomy    COLONOSCOPY N/A 1/25/2021    COLONOSCOPY DIAGNOSTIC performed by Jennifer Fong MD at [facility] ([facility])      SIGMOIDOSCOPY N/A 12/2/2021    SIGMOIDOSCOPY DIAGNOSTIC FLEXIBLE performed by Jennifer Fong MD at [facility] 09/20/2019    DR IMAN GAUTHIER    UPPER GASTROINTESTINAL ENDOSCOPY N/A 12/2/2021    EGD DIAGNOSTIC ONLY performed by Jennifer Fong MD at [facility]      s/p       Medications:      metoprolol succinate  25 mg Oral Daily    methylPREDNISolone  40 mg IntraVENous Q12H    cefepime  2,000 mg IntraVENous Q12H    doxycycline monohydrate  100 mg Oral 2 times per day    apixaban  5 mg Oral BID    ipratropium-albuterol  1 ampule Inhalation Q4H WA    timolol  1 drop Both Eyes BID    dorzolamide  1 drop Both Eyes BID    latanoprost  1 drop Both Eyes Nightly    levothyroxine  100 mcg Oral Daily    sodium chloride flush  10 mL IntraVENous 2 times per day    ferrous sulfate  325 mg Oral BID        Social History:     Social History     Socioeconomic History    Marital status:       Spouse name: Not on file    Number of children: Not on file    Years of education: Not on file    Highest education level: Not on file   Occupational History    Not on file   Tobacco Use    Smoking status: Never    Smokeless tobacco: Never   Vaping Use    Vaping Use: Never used   Substance and Sexual Activity    Alcohol use: No    Drug use: No    Sexual activity: Not on file   Other Topics Concern    Not on file   Social History Narrative    Not on file     Social Determinants of Health     Financial Resource Strain: Low Risk     Difficulty of Paying Living Expenses: Not hard at all   Food Insecurity: No Food Insecurity    Worried About Running Out of Food in the Last Year: Never true    Ran Out of Food in the Last Year: Never true   Transportation Needs: Not on file   Physical Activity: Not on file Stress: Not on file   Social Connections: Not on file   Intimate Partner Violence: Not on file   Housing Stability: Not on file       Family History:     Family History   Adopted: Yes   Problem Relation Age of Onset    No Known Problems Mother     No Known Problems Father       Medical Decision Making:   I have independently reviewed/ordered the following labs:    CBC with Differential:   Recent Labs     08/08/22  0350    WBC 5.0    HGB 8.2*    HCT 25.1*    *    LYMPHOPCT 4*    MONOPCT 4        BMP:  Recent Labs     08/08/22  0350        K 4.0        CO2 23    BUN 19    CREATININE 1.23*        Hepatic Function Panel:   No results for input(s): PROT, LABALBU, BILIDIR, IBILI, BILITOT, ALKPHOS, ALT, AST in the last 72 hours. Detailed results: Thank you for allowing us to participate in the care of this patient. Please call with questions. This note is created with the assistance of a speech recognition program.  While intending to generate adocument that actually reflects the content of the visit, the document can still have some errors including those of syntax and sound a like substitutions which may escape proof reading. It such instances, actual meaningcan be extrapolated by contextual diversion.     Zenia Conley MD  Office: [phone]  Perfect serve / office [phone]

## 2022-08-12 ENCOUNTER — CARE COORDINATION (OUTPATIENT)
Dept: CARE COORDINATION | Age: 82
End: 2022-08-12

## 2022-08-12 NOTE — CARE COORDINATION
Ambulatory Care Coordination Note  8/12/2022    ACC: Reji Rincon, RN    Summary Note: Kian Olivas was discharged to [facility] last evening- [facility]. [address]; [id number]  Will call the facility in two weeks to check progress and discharge plan. Lab Results       None            Care Coordination Interventions    Referral from Primary Care Provider: No  Suggested Interventions and Community Resources  Zone Management Tools: Completed          Goals Addressed    None         Prior to Admission medications    Medication Sig Start Date End Date Taking? Authorizing Provider   metoprolol succinate (TOPROL XL) 25 MG extended release tablet Take 1 tablet by mouth in the morning. 8/12/22   Roscoe Reeves MD   predniSONE (DELTASONE) 20 MG tablet Take 1 tablet by mouth in the morning for 10 days. 8/12/22 8/22/22  Roscoe Reeves MD   bumetanide (BUMEX) 1 MG tablet Take 1 tablet by mouth in the morning. 8/12/22   Roscoe Reeves MD   midodrine (PROAMATINE) 2.5 MG tablet Take 1 tablet by mouth 3 times daily as needed (low SBP) 8/11/22   Roscoe Reeves MD   doxycycline monohydrate (MONODOX) 100 MG capsule Take 1 capsule by mouth in the morning and 1 capsule before bedtime. Do all this for 12 doses. 8/11/22 8/17/22  Roscoe Reeves MD   simethicone (MYLICON) 80 MG chewable tablet Take 80 mg by mouth every 6 hours as needed for Flatulence    Historical Provider, MD   Probiotic Product (PROBIOTIC DAILY) CAPS Take 1 capsule by mouth daily    Historical Provider, MD   amiodarone (PACERONE) 400 MG tablet Take 400 mg by mouth in the morning.   8/11/22  Historical Provider, MD   FEROSUL 325 (65 Fe) MG tablet TAKE 1 TABLET BY MOUTH IN THE MORNING WITH FOOD 8/2/22   Mena Almaraz MD   atenolol (TENORMIN) 100 MG tablet TAKE 1 TABLET BY MOUTH EVERY DAY 7/11/22 8/11/22  Mena Almaraz MD   acetaminophen (TYLENOL) 500 MG tablet Take 500 mg by mouth at bedtime Historical Provider, MD   levothyroxine (SYNTHROID) 100 MCG tablet TAKE 1 TABLET BY MOUTH EVERY DAY 4/11/22   Romina Dye MD   doxazosin (CARDURA) 4 MG tablet TAKE 1 TABLET BY MOUTH EVERY DAY 4/11/22   Romina Dye MD   apixaban (ELIQUIS) 5 MG TABS tablet Take 5 mg by mouth 2 times daily 2/2/21   Historical Provider, MD   dorzolamide (TRUSOPT) 2 % ophthalmic solution Place 1 drop into both eyes 2 times daily 8am and 8pm 11/9/18   Historical Provider, MD   cholestyramine Lendaysi Devin) 4 G packet Take 1 packet by mouth every evening  4/14/16   Historical Provider, MD   latanoprost (XALATAN) 0.005 % ophthalmic solution Place 1 drop into both eyes nightly 1230am    Historical Provider, MD       Future Appointments   Date Time Provider David Shen   9/6/2022  2:30 PM Romina Dye, [facility] oral

## 2022-08-15 ENCOUNTER — HOSPITAL ENCOUNTER (OUTPATIENT)
Age: 82
Setting detail: SPECIMEN
Discharge: HOME | End: 2022-08-15

## 2022-08-15 LAB
ABSOLUTE EOS #: 0.19 K/UL (ref 0–0.44)
ABSOLUTE IMMATURE GRANULOCYTE: 0.05 K/UL (ref 0–0.3)
ABSOLUTE LYMPH #: 1.02 K/UL (ref 1.1–3.7)
ABSOLUTE MONO #: 0.44 K/UL (ref 0.1–1.2)
ALBUMIN SERPL-MCNC: 2.8 G/DL (ref 3.5–5.2)
ALBUMIN/GLOBULIN RATIO: 1 (ref 1–2.5)
ALP BLD-CCNC: 101 U/L (ref 35–104)
ALT SERPL-CCNC: 29 U/L (ref 5–33)
ANION GAP SERPL CALCULATED.3IONS-SCNC: 11 MMOL/L (ref 9–17)
AST SERPL-CCNC: 23 U/L
BASOPHILS # BLD: 0 % (ref 0–2)
BASOPHILS ABSOLUTE: <0.03 K/UL (ref 0–0.2)
BILIRUB SERPL-MCNC: 0.94 MG/DL (ref 0.3–1.2)
BUN BLDV-MCNC: 17 MG/DL (ref 8–23)
CALCIUM SERPL-MCNC: 8.7 MG/DL (ref 8.6–10.4)
CHLORIDE BLD-SCNC: 98 MMOL/L (ref 98–107)
CO2: 32 MMOL/L (ref 20–31)
CREAT SERPL-MCNC: 0.99 MG/DL (ref 0.5–0.9)
EOSINOPHILS RELATIVE PERCENT: 2 % (ref 1–4)
GFR AFRICAN AMERICAN: >60 ML/MIN
GFR NON-AFRICAN AMERICAN: 54 ML/MIN
GFR SERPL CREATININE-BSD FRML MDRD: ABNORMAL ML/MIN/{1.73_M2}
GLUCOSE BLD-MCNC: 103 MG/DL (ref 70–99)
HCT VFR BLD CALC: 31.2 % (ref 36.3–47.1)
HEMOGLOBIN: 9.4 G/DL (ref 11.9–15.1)
IMMATURE GRANULOCYTES: 1 %
LYMPHOCYTES # BLD: 12 % (ref 24–43)
MCH RBC QN AUTO: 31 PG (ref 25.2–33.5)
MCHC RBC AUTO-ENTMCNC: 30.1 G/DL (ref 28.4–34.8)
MCV RBC AUTO: 103 FL (ref 82.6–102.9)
MONOCYTES # BLD: 5 % (ref 3–12)
NRBC AUTOMATED: 0 PER 100 WBC
PDW BLD-RTO: 15.4 % (ref 11.8–14.4)
PLATELET # BLD: 144 K/UL (ref 138–453)
PMV BLD AUTO: 10.6 FL (ref 8.1–13.5)
POTASSIUM SERPL-SCNC: 3.6 MMOL/L (ref 3.7–5.3)
RBC # BLD: 3.03 M/UL (ref 3.95–5.11)
RBC # BLD: ABNORMAL 10*6/UL
SEG NEUTROPHILS: 80 % (ref 36–65)
SEGMENTED NEUTROPHILS ABSOLUTE COUNT: 6.59 K/UL (ref 1.5–8.1)
SODIUM BLD-SCNC: 141 MMOL/L (ref 135–144)
TOTAL PROTEIN: 5.5 G/DL (ref 6.4–8.3)
TSH SERPL DL<=0.05 MIU/L-ACNC: 3.02 UIU/ML (ref 0.3–5)
WBC # BLD: 8.3 K/UL (ref 3.5–11.3)

## 2022-08-15 PROCEDURE — 85025 COMPLETE CBC W/AUTO DIFF WBC: CPT

## 2022-08-15 PROCEDURE — 36415 COLL VENOUS BLD VENIPUNCTURE: CPT

## 2022-08-15 PROCEDURE — 80053 COMPREHEN METABOLIC PANEL: CPT

## 2022-08-15 PROCEDURE — P9603 ONE-WAY ALLOW PRORATED MILES: HCPCS

## 2022-08-15 PROCEDURE — 84443 ASSAY THYROID STIM HORMONE: CPT

## 2022-08-23 ENCOUNTER — HOSPITAL ENCOUNTER (OUTPATIENT)
Age: 82
Setting detail: SPECIMEN
Discharge: HOME | End: 2022-08-23

## 2022-08-23 LAB
ABSOLUTE EOS #: 0.16 K/UL (ref 0–0.44)
ABSOLUTE IMMATURE GRANULOCYTE: 0.04 K/UL (ref 0–0.3)
ABSOLUTE LYMPH #: 1.4 K/UL (ref 1.1–3.7)
ABSOLUTE MONO #: 0.38 K/UL (ref 0.1–1.2)
ALBUMIN SERPL-MCNC: 2.9 G/DL (ref 3.5–5.2)
ALBUMIN/GLOBULIN RATIO: 1.2 (ref 1–2.5)
ALP BLD-CCNC: 90 U/L (ref 35–104)
ALT SERPL-CCNC: 22 U/L (ref 5–33)
ANION GAP SERPL CALCULATED.3IONS-SCNC: 11 MMOL/L (ref 9–17)
AST SERPL-CCNC: 21 U/L
BASOPHILS # BLD: 1 % (ref 0–2)
BASOPHILS ABSOLUTE: 0.05 K/UL (ref 0–0.2)
BILIRUB SERPL-MCNC: 0.55 MG/DL (ref 0.3–1.2)
BUN BLDV-MCNC: 14 MG/DL (ref 8–23)
CALCIUM SERPL-MCNC: 8.7 MG/DL (ref 8.6–10.4)
CHLORIDE BLD-SCNC: 103 MMOL/L (ref 98–107)
CO2: 27 MMOL/L (ref 20–31)
CREAT SERPL-MCNC: 1.25 MG/DL (ref 0.5–0.9)
EOSINOPHILS RELATIVE PERCENT: 3 % (ref 1–4)
GFR AFRICAN AMERICAN: 50 ML/MIN
GFR NON-AFRICAN AMERICAN: 41 ML/MIN
GFR SERPL CREATININE-BSD FRML MDRD: ABNORMAL ML/MIN/{1.73_M2}
GLUCOSE BLD-MCNC: 85 MG/DL (ref 70–99)
HCT VFR BLD CALC: 31.7 % (ref 36.3–47.1)
HEMOGLOBIN: 9.9 G/DL (ref 11.9–15.1)
IMMATURE GRANULOCYTES: 1 %
LYMPHOCYTES # BLD: 22 % (ref 24–43)
MCH RBC QN AUTO: 31.7 PG (ref 25.2–33.5)
MCHC RBC AUTO-ENTMCNC: 31.2 G/DL (ref 28.4–34.8)
MCV RBC AUTO: 101.6 FL (ref 82.6–102.9)
MONOCYTES # BLD: 6 % (ref 3–12)
NRBC AUTOMATED: 0 PER 100 WBC
PDW BLD-RTO: 16.1 % (ref 11.8–14.4)
PLATELET # BLD: 135 K/UL (ref 138–453)
PMV BLD AUTO: 11.4 FL (ref 8.1–13.5)
POTASSIUM SERPL-SCNC: 4.1 MMOL/L (ref 3.7–5.3)
RBC # BLD: 3.12 M/UL (ref 3.95–5.11)
RBC # BLD: ABNORMAL 10*6/UL
SEG NEUTROPHILS: 67 % (ref 36–65)
SEGMENTED NEUTROPHILS ABSOLUTE COUNT: 4.27 K/UL (ref 1.5–8.1)
SODIUM BLD-SCNC: 141 MMOL/L (ref 135–144)
TOTAL PROTEIN: 5.4 G/DL (ref 6.4–8.3)
WBC # BLD: 6.3 K/UL (ref 3.5–11.3)

## 2022-08-23 PROCEDURE — 85025 COMPLETE CBC W/AUTO DIFF WBC: CPT

## 2022-08-23 PROCEDURE — 80053 COMPREHEN METABOLIC PANEL: CPT

## 2022-08-23 PROCEDURE — P9603 ONE-WAY ALLOW PRORATED MILES: HCPCS

## 2022-08-23 PROCEDURE — 36415 COLL VENOUS BLD VENIPUNCTURE: CPT

## 2022-09-06 ENCOUNTER — CARE COORDINATION (OUTPATIENT)
Dept: CARE COORDINATION | Age: 82
End: 2022-09-06

## 2022-09-06 NOTE — CARE COORDINATION
Per AC request- writer reached out to the SNF for follow up on patient's discharge date. Writer spoke to nurse at [place]. Nayeli Magdaleno. She states the patient is still there at the facility and is still doing PT. At this time she is unsure when the patient will be discharging to home. - Writer will update ACM.

## 2022-09-23 ENCOUNTER — CARE COORDINATION (OUTPATIENT)
Dept: CARE COORDINATION | Age: 82
End: 2022-09-23

## 2022-09-23 NOTE — CARE COORDINATION
Ambulatory Care Coordination Note  9/23/2022    ACC: Angelia Mercado, RN    Summary Note: Called the [facility] for update. Roger Merchant is still in their skilled care unit. No discharge date as of yet. Will remove from [facility] panel at this time. Lab Results       None            Care Coordination Interventions    Referral from Primary Care Provider: No  Suggested Interventions and Community Resources  Zone Management Tools: Completed          Goals Addressed    None         Prior to Admission medications    Medication Sig Start Date End Date Taking? Authorizing Provider   metoprolol succinate (TOPROL XL) 25 MG extended release tablet Take 1 tablet by mouth in the morning. 8/12/22   Jay Martin MD   bumetanide (BUMEX) 1 MG tablet Take 1 tablet by mouth in the morning. 8/12/22   Jay Martin MD   midodrine (PROAMATINE) 2.5 MG tablet Take 1 tablet by mouth 3 times daily as needed (low SBP) 8/11/22   Jay Martin MD   simethicone (MYLICON) 80 MG chewable tablet Take 80 mg by mouth every 6 hours as needed for Flatulence    Historical Provider, MD   Probiotic Product (PROBIOTIC DAILY) CAPS Take 1 capsule by mouth daily    Historical Provider, MD   amiodarone (PACERONE) 400 MG tablet Take 400 mg by mouth in the morning.   8/11/22  Historical Provider, MD   FEROSUL 325 (65 Fe) MG tablet TAKE 1 TABLET BY MOUTH IN THE MORNING WITH FOOD 8/2/22   Steven Elizabeth MD   atenolol (TENORMIN) 100 MG tablet TAKE 1 TABLET BY MOUTH EVERY DAY 7/11/22 8/11/22  Steven Elizabeth MD   acetaminophen (TYLENOL) 500 MG tablet Take 500 mg by mouth at bedtime    Historical Provider, MD   levothyroxine (SYNTHROID) 100 MCG tablet TAKE 1 TABLET BY MOUTH EVERY DAY 4/11/22   Steven Elizabeth MD   doxazosin (CARDURA) 4 MG tablet TAKE 1 TABLET BY MOUTH EVERY DAY 4/11/22   Steven Elizabeth MD   apixaban (ELIQUIS) 5 MG TABS tablet Take 5 mg by mouth 2 times daily 2/2/21   Historical Provider, MD dorzolamide (TRUSOPT) 2 % ophthalmic solution Place 1 drop into both eyes 2 times daily 8am and 8pm 11/9/18   Historical Provider, MD   cholestyramine Camillesergio Schmitt) 4 G packet Take 1 packet by mouth every evening  4/14/16   Historical Provider, MD   latanoprost (XALATAN) 0.005 % ophthalmic solution Place 1 drop into both eyes nightly 1230am    Historical Provider, MD       No future appointments.

## 2022-10-03 ENCOUNTER — HOSPITAL ENCOUNTER (OUTPATIENT)
Age: 82
Setting detail: SPECIMEN
Discharge: HOME | End: 2022-10-03

## 2022-10-14 ENCOUNTER — TELEPHONE (OUTPATIENT)
Dept: FAMILY MEDICINE CLINIC | Age: 82
End: 2022-10-14

## 2022-10-14 NOTE — TELEPHONE ENCOUNTER
Todd Friends    Just wanted to let us know that patient will be receiving visits   2 visits weekly for 2 weeks and then  1 visit a week for 2 weeks.     HERMAN

## 2022-10-31 DIAGNOSIS — I10 ESSENTIAL HYPERTENSION: Primary | ICD-10-CM

## 2022-10-31 DIAGNOSIS — K52.9 CHRONIC DIARRHEA: ICD-10-CM

## 2022-10-31 RX ORDER — BUMETANIDE 1 MG/1
1 TABLET ORAL DAILY
Qty: 30 TABLET | Refills: 3 | Status: SHIPPED | OUTPATIENT
Start: 2022-10-31

## 2022-10-31 RX ORDER — METOPROLOL SUCCINATE 25 MG/1
25 TABLET, EXTENDED RELEASE ORAL DAILY
Qty: 30 TABLET | Refills: 3 | Status: SHIPPED | OUTPATIENT
Start: 2022-10-31

## 2022-10-31 RX ORDER — CHOLESTYRAMINE 4 G/9G
1 POWDER, FOR SUSPENSION ORAL EVERY EVENING
Qty: 90 PACKET | Refills: 3 | Status: SHIPPED | OUTPATIENT
Start: 2022-10-31

## 2022-11-03 RX ORDER — FERROUS SULFATE 325(65) MG
TABLET ORAL
Qty: 30 TABLET | Refills: 0 | Status: SHIPPED | OUTPATIENT
Start: 2022-11-03 | End: 2022-12-01

## 2022-11-04 ENCOUNTER — TELEPHONE (OUTPATIENT)
Dept: FAMILY MEDICINE CLINIC | Age: 82
End: 2022-11-04

## 2022-11-04 RX ORDER — SULFAMETHOXAZOLE AND TRIMETHOPRIM 800; 160 MG/1; MG/1
1 TABLET ORAL 2 TIMES DAILY
Qty: 20 TABLET | Refills: 0 | Status: SHIPPED | OUTPATIENT
Start: 2022-11-04 | End: 2022-11-14

## 2022-11-29 ENCOUNTER — HOSPITAL ENCOUNTER (OUTPATIENT)
Dept: PREADMISSION TESTING | Age: 82
Discharge: HOME | End: 2022-12-03

## 2022-11-29 VITALS — WEIGHT: 181 LBS | BODY MASS INDEX: 30.16 KG/M2 | HEIGHT: 65 IN

## 2022-11-29 RX ORDER — RIVAROXABAN 10 MG/1
10 TABLET, FILM COATED ORAL
COMMUNITY

## 2022-11-29 RX ORDER — NETARSUDIL 0.2 MG/ML
SOLUTION/ DROPS OPHTHALMIC; TOPICAL
COMMUNITY
End: 2022-11-29

## 2022-11-29 RX ORDER — PREDNISONE 10 MG/1
10 TABLET ORAL DAILY
COMMUNITY
End: 2022-11-29

## 2022-11-29 RX ORDER — SPIRONOLACTONE 25 MG/1
25 TABLET ORAL DAILY
COMMUNITY
End: 2022-11-29

## 2022-11-29 RX ORDER — ONDANSETRON 4 MG/1
4 TABLET, FILM COATED ORAL EVERY 8 HOURS PRN
COMMUNITY

## 2022-11-29 RX ORDER — METOCLOPRAMIDE 10 MG/1
10 TABLET ORAL 4 TIMES DAILY
COMMUNITY
End: 2022-11-29

## 2022-11-29 RX ORDER — AMIODARONE HYDROCHLORIDE 200 MG/1
200 TABLET ORAL DAILY
COMMUNITY
End: 2022-11-29

## 2022-11-29 RX ORDER — DOXYCYCLINE HYCLATE 100 MG
100 TABLET ORAL 2 TIMES DAILY
COMMUNITY
End: 2022-11-29 | Stop reason: ALTCHOICE

## 2022-11-29 NOTE — PROGRESS NOTES
Pre-op Instructions For Out-Patient Surgery    Medication Instructions:  Please stop herbs and any supplements now (includes vitamins and minerals). Please contact your surgeon and prescribing physician for pre-op instructions for any blood thinners. Xarelto    If you have inhalers/aerosol treatments at home, please use them the morning of your surgery and bring the inhalers with you to the hospital.    Please take the following medications the morning of your surgery with a sip of water:    Metoprolol, Levothyroxine and Midodrine ( If needed)     Surgery Instructions:  After midnight before surgery:  Do not eat or drink anything, including water, mints, gum, and hard candy. You may brush your teeth without swallowing. No smoking, chewing tobacco, or street drugs. Please shower or bathe before surgery. Please do not wear any cologne, lotion, powder, deodorant, jewelry, piercings, perfume, makeup, nail polish, hair accessories, or hair spray on the day of surgery. Wear loose comfortable clothing. Leave your valuables at home. Bring a storage case for any glasses/contacts. An adult who is responsible for you MUST drive you home and should be with you for the first 24 hours after surgery. If having out-patient knee and foot surgeries, please arrange for planned crutches, walker, or wheelchair before arriving to the hospital.    The Day of Surgery:  Arrive at [address] Surgery Entrance at the time directed by your surgeon and check in at the desk. If you have a living will or healthcare power of , please bring a copy. You will be taken to the pre-op holding area where you will be prepared for surgery. A physical assessment will be performed by a nurse practitioner or house officer.   Your IV will be started and you will meet your anesthesiologist.    When you go to surgery, your family will be directed to the surgical waiting room, where the doctor should speak with them after your surgery. After surgery, you will be taken to the recovery room then when you are awake and stable you will go to the short stay unit for preparation to be discharged. If you use a Bi-PAP or C-PAP machine, please bring it with you and leave it in the car in case it is needed in recovery room. Instructions read to Antione Paul and understanding verbalized. 12/6/22 Left eye cataract.

## 2022-11-30 NOTE — DISCHARGE INSTRUCTIONS
1.  Remove the shield at 1:00 p.m. today and begin all the eye drops. 2.  Repeat the eye drops at 5:00 p.m. and before bedtime one drop per bottle, any order. Wait 3-4 minutes between drops. The drops are:    ANTIBIOTIC:   []   Vigamox   [x]  Tobramycin   []  Zymaxid     []  Gatifloxacin     STEROID:     [x]   Prednisolone Acetate        []   Durezol    ANTI-INFLAMMATORY     []    Nevenac    []   Ketorolac    []  Ilevro    []  Prolensa    []         3. Place one drop from each bottle by looking up, pull the lower lid down gently and let the drop fall in.    4. You may wipe the eyelid gently with a clean tissue or cotton. 5. Place only the shield over the eye when sleeping for (4) four days:  Fix with tape    6. In the morning remove the shield and start putting in the drops, one drop from each bottle. Replace the shield or wear glasses during the day. 7. Please call [facility] if you have any problems:    Office [phone]     Cell [phone]    8. Continue all your previous medications. You may use Aspirin, Tylenol, or Advil if needed. 9.  You have an appointment in the office: today at 4:00 PM    10. Please bring your drops into the office at your next visit. Sedation or General Anesthesia, Adult  Care After  Refer to this sheet in the next 24 hours. These instructions provide you with information on caring for yourself after your procedure. Your caregiver may also give you more specific instructions. Your treatment has been planned according to current medical practices, but problems sometimes occur. Call your caregiver if you have any problems or questions after your procedure. HOME CARE INSTRUCTIONS   Do not participate in any activities that require you to be alert or coordinated. Do not:  Drive. Swim. Ride a bicycle. Operate heavy machinery. [place]. Use power tools. Climb ladders. Work at [employer]. Take a bath. Do not drink alcohol.   Do not make any important decisions or sign

## 2022-12-01 RX ORDER — FERROUS SULFATE 325(65) MG
TABLET ORAL
Qty: 30 TABLET | Refills: 0 | Status: SHIPPED | OUTPATIENT
Start: 2022-12-01

## 2022-12-05 ENCOUNTER — ANESTHESIA EVENT (OUTPATIENT)
Dept: OPERATING ROOM | Age: 82
End: 2022-12-05
Payer: MEDICARE

## 2022-12-05 NOTE — PRE-PROCEDURE INSTRUCTIONS
No answer, left message ? Unable to leave message ? When were you told to arrive at hospital ?  0700-instructed to be here at 0630 but will have to check with ,    Do you have a  ? yes    Are you on any blood thinners ? yes                If yes when did you stop taking ? Stopped Dec. 2, 2022    Do you have your prep Rx filled and instruction ? N/a    Nothing to eat the day before , only clear liquids. n/a    Are you experiencing any covid symptoms ? no    Do you have any infections or rash we should be aware of ? no      Do you have the Hibiclens soap to use the night before and the morning of surgery ? N/a    Nothing to eat or drink after midnight, only a sip of water to take any medication instructed to take the night before. yes  Wear comfortable clothing, leave any valuables at home, remove any jewelry and body piercing .  yes

## 2022-12-06 ENCOUNTER — HOSPITAL ENCOUNTER (OUTPATIENT)
Age: 82
Setting detail: OUTPATIENT SURGERY
Discharge: HOME | End: 2022-12-06
Attending: OPHTHALMOLOGY | Admitting: OPHTHALMOLOGY
Payer: MEDICARE

## 2022-12-06 ENCOUNTER — ANESTHESIA (OUTPATIENT)
Dept: OPERATING ROOM | Age: 82
End: 2022-12-06
Payer: MEDICARE

## 2022-12-06 VITALS
TEMPERATURE: 97.5 F | HEART RATE: 92 BPM | OXYGEN SATURATION: 95 % | WEIGHT: 177 LBS | SYSTOLIC BLOOD PRESSURE: 119 MMHG | BODY MASS INDEX: 29.49 KG/M2 | DIASTOLIC BLOOD PRESSURE: 57 MMHG | HEIGHT: 65 IN | RESPIRATION RATE: 15 BRPM

## 2022-12-06 PROBLEM — H25.12 AGE-RELATED NUCLEAR CATARACT, LEFT: Status: RESOLVED | Noted: 2022-12-06 | Resolved: 2022-12-06

## 2022-12-06 PROCEDURE — 2500000003 HC RX 250 WO HCPCS: Performed by: OPHTHALMOLOGY

## 2022-12-06 PROCEDURE — 3600000002 HC SURGERY LEVEL 2 BASE: Performed by: OPHTHALMOLOGY

## 2022-12-06 PROCEDURE — 7100000010 HC PHASE II RECOVERY - FIRST 15 MIN: Performed by: OPHTHALMOLOGY

## 2022-12-06 PROCEDURE — 2709999900 HC NON-CHARGEABLE SUPPLY: Performed by: OPHTHALMOLOGY

## 2022-12-06 PROCEDURE — 6370000000 HC RX 637 (ALT 250 FOR IP): Performed by: OPHTHALMOLOGY

## 2022-12-06 PROCEDURE — 6360000002 HC RX W HCPCS: Performed by: OPHTHALMOLOGY

## 2022-12-06 PROCEDURE — 3700000000 HC ANESTHESIA ATTENDED CARE: Performed by: OPHTHALMOLOGY

## 2022-12-06 PROCEDURE — 3600000012 HC SURGERY LEVEL 2 ADDTL 15MIN: Performed by: OPHTHALMOLOGY

## 2022-12-06 PROCEDURE — 6360000002 HC RX W HCPCS: Performed by: NURSE ANESTHETIST, CERTIFIED REGISTERED

## 2022-12-06 PROCEDURE — 3700000001 HC ADD 15 MINUTES (ANESTHESIA): Performed by: OPHTHALMOLOGY

## 2022-12-06 PROCEDURE — 7100000011 HC PHASE II RECOVERY - ADDTL 15 MIN: Performed by: OPHTHALMOLOGY

## 2022-12-06 PROCEDURE — 2580000003 HC RX 258: Performed by: ANESTHESIOLOGY

## 2022-12-06 DEVICE — LENS IOL SN60WF 20.5D: Type: IMPLANTABLE DEVICE | Site: EYE | Status: FUNCTIONAL

## 2022-12-06 RX ORDER — PHENYLEPHRINE HCL 2.5 %
1 DROPS OPHTHALMIC (EYE) EVERY 5 MIN PRN
Status: COMPLETED | OUTPATIENT
Start: 2022-12-06 | End: 2022-12-06

## 2022-12-06 RX ORDER — KETOROLAC TROMETHAMINE 5 MG/ML
1 SOLUTION OPHTHALMIC EVERY 5 MIN PRN
Status: COMPLETED | OUTPATIENT
Start: 2022-12-06 | End: 2022-12-06

## 2022-12-06 RX ORDER — BUPIVACAINE HYDROCHLORIDE 5 MG/ML
INJECTION, SOLUTION EPIDURAL; INTRACAUDAL PRN
Status: DISCONTINUED | OUTPATIENT
Start: 2022-12-06 | End: 2022-12-06 | Stop reason: ALTCHOICE

## 2022-12-06 RX ORDER — SODIUM CHLORIDE 0.9 % (FLUSH) 0.9 %
5-40 SYRINGE (ML) INJECTION EVERY 12 HOURS SCHEDULED
Status: DISCONTINUED | OUTPATIENT
Start: 2022-12-06 | End: 2022-12-06 | Stop reason: HOSPADM

## 2022-12-06 RX ORDER — SODIUM CHLORIDE 9 MG/ML
INJECTION, SOLUTION INTRAVENOUS PRN
Status: DISCONTINUED | OUTPATIENT
Start: 2022-12-06 | End: 2022-12-06 | Stop reason: HOSPADM

## 2022-12-06 RX ORDER — TOBRAMYCIN 3 MG/ML
SOLUTION/ DROPS OPHTHALMIC PRN
Status: DISCONTINUED | OUTPATIENT
Start: 2022-12-06 | End: 2022-12-06 | Stop reason: ALTCHOICE

## 2022-12-06 RX ORDER — SODIUM CHLORIDE 0.9 % (FLUSH) 0.9 %
5-40 SYRINGE (ML) INJECTION PRN
Status: DISCONTINUED | OUTPATIENT
Start: 2022-12-06 | End: 2022-12-06 | Stop reason: HOSPADM

## 2022-12-06 RX ORDER — SODIUM CHLORIDE, SODIUM LACTATE, POTASSIUM CHLORIDE, CALCIUM CHLORIDE 600; 310; 30; 20 MG/100ML; MG/100ML; MG/100ML; MG/100ML
INJECTION, SOLUTION INTRAVENOUS CONTINUOUS
Status: DISCONTINUED | OUTPATIENT
Start: 2022-12-06 | End: 2022-12-06 | Stop reason: HOSPADM

## 2022-12-06 RX ORDER — LIDOCAINE HYDROCHLORIDE 40 MG/ML
INJECTION, SOLUTION RETROBULBAR; TOPICAL PRN
Status: DISCONTINUED | OUTPATIENT
Start: 2022-12-06 | End: 2022-12-06 | Stop reason: ALTCHOICE

## 2022-12-06 RX ORDER — CYCLOPENTOLATE HYDROCHLORIDE 10 MG/ML
1 SOLUTION/ DROPS OPHTHALMIC EVERY 5 MIN PRN
Status: COMPLETED | OUTPATIENT
Start: 2022-12-06 | End: 2022-12-06

## 2022-12-06 RX ORDER — BUPIVACAINE HYDROCHLORIDE 5 MG/ML
1 INJECTION, SOLUTION EPIDURAL; INTRACAUDAL EVERY 5 MIN PRN
Status: COMPLETED | OUTPATIENT
Start: 2022-12-06 | End: 2022-12-06

## 2022-12-06 RX ORDER — MIDAZOLAM HYDROCHLORIDE 1 MG/ML
INJECTION INTRAMUSCULAR; INTRAVENOUS PRN
Status: DISCONTINUED | OUTPATIENT
Start: 2022-12-06 | End: 2022-12-06 | Stop reason: SDUPTHER

## 2022-12-06 RX ORDER — TIMOLOL MALEATE 5 MG/ML
SOLUTION/ DROPS OPHTHALMIC PRN
Status: DISCONTINUED | OUTPATIENT
Start: 2022-12-06 | End: 2022-12-06 | Stop reason: ALTCHOICE

## 2022-12-06 RX ORDER — NEOMYCIN SULFATE, POLYMYXIN B SULFATE, AND DEXAMETHASONE 3.5; 10000; 1 MG/G; [USP'U]/G; MG/G
OINTMENT OPHTHALMIC PRN
Status: DISCONTINUED | OUTPATIENT
Start: 2022-12-06 | End: 2022-12-06 | Stop reason: ALTCHOICE

## 2022-12-06 RX ORDER — TOBRAMYCIN 3 MG/ML
1 SOLUTION/ DROPS OPHTHALMIC EVERY 5 MIN PRN
Status: COMPLETED | OUTPATIENT
Start: 2022-12-06 | End: 2022-12-06

## 2022-12-06 RX ORDER — BALANCED SALT SOLUTION ENRICHED WITH BICARBONATE, DEXTROSE, AND GLUTATHIONE
KIT INTRAOCULAR PRN
Status: DISCONTINUED | OUTPATIENT
Start: 2022-12-06 | End: 2022-12-06 | Stop reason: ALTCHOICE

## 2022-12-06 RX ORDER — LIDOCAINE HYDROCHLORIDE 10 MG/ML
1 INJECTION, SOLUTION EPIDURAL; INFILTRATION; INTRACAUDAL; PERINEURAL
Status: DISCONTINUED | OUTPATIENT
Start: 2022-12-06 | End: 2022-12-06 | Stop reason: HOSPADM

## 2022-12-06 RX ORDER — SODIUM CHLORIDE 9 MG/ML
INJECTION, SOLUTION INTRAVENOUS CONTINUOUS
Status: DISCONTINUED | OUTPATIENT
Start: 2022-12-06 | End: 2022-12-06 | Stop reason: HOSPADM

## 2022-12-06 RX ADMIN — PHENYLEPHRINE HYDROCHLORIDE 1 DROP: 25 SOLUTION/ DROPS OPHTHALMIC at 07:45

## 2022-12-06 RX ADMIN — BUPIVACAINE HYDROCHLORIDE 2.5 MG: 5 INJECTION, SOLUTION EPIDURAL; INTRACAUDAL; PERINEURAL at 07:44

## 2022-12-06 RX ADMIN — SODIUM CHLORIDE: 9 INJECTION, SOLUTION INTRAVENOUS at 07:43

## 2022-12-06 RX ADMIN — CYCLOPENTOLATE HYDROCHLORIDE 1 DROP: 10 SOLUTION OPHTHALMIC at 07:27

## 2022-12-06 RX ADMIN — MIDAZOLAM 1 MG: 1 INJECTION INTRAMUSCULAR; INTRAVENOUS at 09:26

## 2022-12-06 RX ADMIN — CYCLOPENTOLATE HYDROCHLORIDE 1 DROP: 10 SOLUTION OPHTHALMIC at 07:45

## 2022-12-06 RX ADMIN — TOBRAMYCIN OPHTHALMIC SOLUTION 1 DROP: 3 SOLUTION/ DROPS OPHTHALMIC at 07:28

## 2022-12-06 RX ADMIN — BUPIVACAINE HYDROCHLORIDE 2.5 MG: 5 INJECTION, SOLUTION EPIDURAL; INTRACAUDAL; PERINEURAL at 07:27

## 2022-12-06 RX ADMIN — TOBRAMYCIN OPHTHALMIC SOLUTION 1 DROP: 3 SOLUTION/ DROPS OPHTHALMIC at 07:45

## 2022-12-06 RX ADMIN — KETOROLAC TROMETHAMINE 1 DROP: 5 SOLUTION/ DROPS OPHTHALMIC at 07:27

## 2022-12-06 RX ADMIN — KETOROLAC TROMETHAMINE 1 DROP: 5 SOLUTION/ DROPS OPHTHALMIC at 07:45

## 2022-12-06 RX ADMIN — PHENYLEPHRINE HYDROCHLORIDE 1 DROP: 25 SOLUTION/ DROPS OPHTHALMIC at 07:28

## 2022-12-06 ASSESSMENT — PAIN - FUNCTIONAL ASSESSMENT
PAIN_FUNCTIONAL_ASSESSMENT: 0-10
PAIN_FUNCTIONAL_ASSESSMENT: PREVENTS OR INTERFERES SOME ACTIVE ACTIVITIES AND ADLS

## 2022-12-06 NOTE — ANESTHESIA PRE PROCEDURE
Department of Anesthesiology  Preprocedure Note       Name:  Alecia Miller   Age:  79 y.o.  :  1940                                          MRN:  [id number]         Date:  2022      Surgeon: Abhishek Devi):  Hailey Tian MD    Procedure: Procedure(s):  EYE CATARACT EMULSIFICATION IOL IMPLANT    Medications prior to admission:   Prior to Admission medications    Medication Sig Start Date End Date Taking?  Authorizing Provider   FEROSUL 325 (65 Fe) MG tablet TAKE 1 TABLET BY MOUTH IN THE MORNING WITH FOOD 22   Carmelo Hester MD   ondansetron [facility]) 4 MG tablet Take 4 mg by mouth every 8 hours as needed for Nausea or Vomiting  Patient not taking: Reported on 2022    Historical Provider, MD   rivaroxaban (XARELTO) 10 MG TABS tablet Take 10 mg by mouth    Historical Provider, MD   metoprolol succinate (TOPROL XL) 25 MG extended release tablet Take 1 tablet by mouth daily 10/31/22   Carmelo Hester MD   bumetanide [facility]) 1 MG tablet Take 1 tablet by mouth daily 10/31/22   Carmelo Hester MD   cholestyramine Ken[facility] Spindle) 4 g packet Take 1 packet by mouth every evening 10/31/22   Carmelo Hester MD   midodrine (PROAMATINE) 2.5 MG tablet Take 1 tablet by mouth 3 times daily as needed (low SBP) 22   Mara Heart, MD   simethicone (MYLICON) 80 MG chewable tablet Take 80 mg by mouth every 6 hours as needed for Flatulence    Historical Provider, MD   Probiotic Product (PROBIOTIC DAILY) CAPS Take 1 capsule by mouth daily    Historical Provider, MD   atenolol (TENORMIN) 100 MG tablet TAKE 1 TABLET BY MOUTH EVERY DAY 22  Carmelo Hester MD   acetaminophen (TYLENOL) 500 MG tablet Take 500 mg by mouth at bedtime    Historical Provider, MD   levothyroxine (SYNTHROID) 100 MCG tablet TAKE 1 TABLET BY MOUTH EVERY DAY 22   Carmelo Hester MD   apixaban (ELIQUIS) 5 MG TABS tablet Take 5 mg by mouth 2 times daily  Patient not taking: Reported on 2022 Historical Provider, MD   dorzolamide (TRUSOPT) 2 % ophthalmic solution Place 1 drop into both eyes 2 times daily 8am and 8pm 11/9/18   Historical Provider, MD   latanoprost (XALATAN) 0.005 % ophthalmic solution Place 1 drop into both eyes nightly 1230am    Historical Provider, MD       Current medications:    Current Facility-Administered Medications   Medication Dose Route Frequency Provider Last Rate Last Admin    bupivacaine (PF) (MARCAINE) 0.5 % injection 2.5 mg  1 drop Ophthalmic Q5 Min PRN Marifer Larsen MD        lactated ringers infusion   IntraVENous Continuous Marifer Larsen MD        sodium chloride flush 0.9 % injection 5-40 mL  5-40 mL IntraVENous 2 times per day Marifer Larsen MD        sodium chloride flush 0.9 % injection 5-40 mL  5-40 mL IntraVENous PRN Marifer Larsen MD        0.9 % sodium chloride infusion   IntraVENous PRN Marifer Larsen MD        phenylephrine (MYDFRIN) 2.5 % ophthalmic solution 1 drop  1 drop Left Eye Q5 Min PRN Marifer Larsen MD        cyclopentolate (CYCLOGYL) 1 % ophthalmic solution 1 drop  1 drop Left Eye Q5 Min PRN Marifer Larsen MD        tobramycin (TOBREX) 0.3 % ophthalmic solution 1 drop  1 drop Left Eye Q5 Min PRN Marifer Larsen MD        ketorolac (ACULAR) 0.5 % ophthalmic solution 1 drop  1 drop Left Eye Q5 Min PRN Marifer Larsen MD        lidocaine PF 1 % injection 1 mL  1 mL IntraDERmal Once PRN Dionne Yusuf MD        0.9 % sodium chloride infusion   IntraVENous Continuous Dionne Yusuf MD        sodium chloride flush 0.9 % injection 5-40 mL  5-40 mL IntraVENous 2 times per day Dionne Yusuf MD        sodium chloride flush 0.9 % injection 5-40 mL  5-40 mL IntraVENous PRN Dionne Yusuf MD        0.9 % sodium chloride infusion   IntraVENous PRN Dionne Yusuf MD           Allergies:     Allergies   Allergen Reactions    Avapro [Irbesartan]     Ciprofloxacin Hcl Nausea Only    Cozaar [Losartan Potassium]     Diovan [Valsartan]     Lipitor [Atorvastatin Calcium]     Lisinopril     Pcn [Penicillins]     Pravachol [Pravastatin Sodium]      myalgias    Tape [Adhesive Tape] Dermatitis    Tramadol Swelling    Zetia [Ezetimibe]     Morphine Nausea And Vomiting       Problem List:    Patient Active Problem List   Diagnosis Code    Mixed hyperlipidemia E78.2    Gastroesophageal reflux disease without esophagitis K21.9    Onychomycosis B35.1    Diverticulosis K57.90    Chronic back pain M54.9, G89.29    Hypothyroidism E03.9    Essential hypertension I10    Right bundle branch block I45.10    Chronic pulmonary embolism (HCC) I27.82    Type 2 diabetes mellitus without complication, without long-term current use of insulin (HCC) E11.9    Obesity (BMI 30.0-34. 9) E66.9    Tracheobronchitis J40    Allergic rhinitis J30.9    Glaucoma H40.9    S/p bilateral myringotomy with tube placement Z96.22    GI bleed K92.2    Iron deficiency anemia D50.9    Paroxysmal atrial fibrillation (HCC) I48.0    Hemorrhage of rectum and anus K62.5    Intractable nausea and vomiting V58.9    Diastolic CHF, acute on chronic (HCC) I50.33    Atrial fibrillation with RVR ([facility]) I48.91    Class 2 obesity due to excess calories in adult E66.09    Sepsis ([facility]) A41.9    JOSH (acute kidney injury) ([facility] N17.9    Syncope R55    Acute cystitis without hematuria N30.00       Past Medical History:        Diagnosis Date    Atrial fibrillation ([facility] 03/28/2014    Chronic back pain     with rt. leg pain    Diverticulosis     GERD (gastroesophageal reflux disease)     Hyperlipidemia     Hypertension     Hypothyroidism     Obesity (BMI 30.0-34.9) 08/12/2016    Onychomycosis     PE (pulmonary embolism)     H/O DVT of rt leg    Type II or unspecified type diabetes mellitus without mention of complication, not stated as uncontrolled        Past Surgical History:        Procedure Laterality Date    ARM SURGERY Right     Growth taken off per pt.    CARPAL TUNNEL RELEASE Bilateral     Per pt.  CHOLECYSTECTOMY      COLON SURGERY      s/p sigmoid cloectomy    COLONOSCOPY N/A 01/25/2021    COLONOSCOPY DIAGNOSTIC performed by Lauren Tomlin MD at [facility] ([facility])      SIGMOIDOSCOPY N/A 12/02/2021    SIGMOIDOSCOPY DIAGNOSTIC FLEXIBLE performed by Lauren Tomlin MD at [facility] Bilateral 09/20/2019    DR IMAN GAUTHIER    UPPER GASTROINTESTINAL ENDOSCOPY N/A 12/02/2021    EGD DIAGNOSTIC ONLY performed by Lauren Tomlin MD at [facility]      s/p       Social History:    Social History     Tobacco Use    Smoking status: Never    Smokeless tobacco: Never   Substance Use Topics    Alcohol use: No                                Counseling given: Not Answered      Vital Signs (Current): There were no vitals filed for this visit.                                            BP Readings from Last 3 Encounters:   08/11/22 138/60   06/23/22 132/64   06/17/22 (!) 127/55       NPO Status:                                                                                 BMI:   Wt Readings from Last 3 Encounters:   11/29/22 181 lb (82.1 kg)   08/10/22 197 lb 5 oz (89.5 kg)   06/23/22 177 lb 6.4 oz (80.5 kg)     There is no height or weight on file to calculate BMI.    CBC:   Lab Results   Component Value Date/Time    WBC 6.3 08/23/2022 07:55 AM    RBC 3.12 08/23/2022 07:55 AM    RBC 4.19 03/17/2012 07:18 AM    HGB 9.9 08/23/2022 07:55 AM    HCT 31.7 08/23/2022 07:55 AM    .6 08/23/2022 07:55 AM    RDW 16.1 08/23/2022 07:55 AM     08/23/2022 07:55 AM     03/17/2012 07:18 AM       CMP:   Lab Results   Component Value Date/Time     08/23/2022 07:55 AM    K 4.1 08/23/2022 07:55 AM     08/23/2022 07:55 AM    CO2 27 08/23/2022 07:55 AM    BUN 14 08/23/2022 07:55 AM    CREATININE 1.25 08/23/2022 07:55 AM    GFRAA 50 08/23/2022 07:55 AM LABGLOM 41 08/23/2022 07:55 AM    GLUCOSE 85 08/23/2022 07:55 AM    GLUCOSE 123 03/17/2012 07:18 AM    PROT 5.4 08/23/2022 07:55 AM    CALCIUM 8.7 08/23/2022 07:55 AM    BILITOT 0.55 08/23/2022 07:55 AM    ALKPHOS 90 08/23/2022 07:55 AM    AST 21 08/23/2022 07:55 AM    ALT 22 08/23/2022 07:55 AM       POC Tests: No results for input(s): POCGLU, POCNA, POCK, POCCL, POCBUN, POCHEMO, POCHCT in the last 72 hours. Coags:   Lab Results   Component Value Date/Time    PROTIME 15.2 11/29/2021 06:35 PM    PROTIME 22.2 04/17/2018 02:15 PM    PROTIME 31.1 03/17/2014 07:12 AM    INR 1.2 11/29/2021 06:35 PM    APTT 40.1 08/05/2022 04:09 PM       HCG (If Applicable): No results found for: PREGTESTUR, PREGSERUM, HCG, HCGQUANT     ABGs:   Lab Results   Component Value Date/Time    PO2ART 110.0 10/03/2012 05:10 AM    TCQ9AWC 22.2 10/03/2012 05:10 AM    G7WICXSO 95.4 10/03/2012 05:10 AM        Type & Screen (If Applicable):  No results found for: LABABO, LABRH    Drug/Infectious Status (If Applicable):  No results found for: HIV, HEPCAB    COVID-19 Screening (If Applicable):   Lab Results   Component Value Date/Time    COVID19 Not Detected 08/05/2022 04:09 PM    COVID19 Not Detected 06/17/2022 05:00 PM           Anesthesia Evaluation  Patient summary reviewed and Nursing notes reviewed no history of anesthetic complications:   Airway: Mallampati: II  TM distance: >3 FB   Neck ROM: full  Mouth opening: > = 3 FB   Dental:    (+) bridge      Pulmonary:Negative Pulmonary ROS breath sounds clear to auscultation                             Cardiovascular:    (+) hypertension:, dysrhythmias: atrial fibrillation, CHF:,         Rhythm: regular  Rate: normal                    Neuro/Psych:   Negative Neuro/Psych ROS              GI/Hepatic/Renal:   (+) GERD:,           Endo/Other:    (+) DiabetesType II DM, , hypothyroidism::., .                 Abdominal:             Vascular: negative vascular ROS.  + DVT, PE.        Other Findings: Anesthesia Plan      MAC     ASA 3       Induction: intravenous. Anesthetic plan and risks discussed with patient. Plan discussed with CRNA.                     Leoncio Melton MD   12/6/2022

## 2022-12-06 NOTE — OP NOTE
Edith Boston [id number] 79 y.o. OPERATIVE NOTE    Preoperative Diagnosis: Cataract the left eye    Postoperative Diagnosis: Cataract the left eye     Procedure: Phacoemulsification with intraocular lens implantation, the left eye    Surgeon: Jenifer Reyes MD    Anesthesia: peribulbar  With monitored sedation      Complications: none    Specimens: none    EBL: none    Indications for procedure: The patient is a 79y.o. year old with decreased vision, glare and halos around lights, and trouble with activities of daily living. Examination revealed a visually significant cataract in the the left eye. Risks, benefits, and alternatives to surgery were discussed with the patient and the patient elected to proceed with phacoemulsification with lens implantation. Details of the procedure: Following informed consent, the patient was taken to the operating room and placed in the supine position. A 50/50 mixture of .5% Marcaine and 4% Xylocaine with Wydase was given in a peribulbar block. The eye was prepped and draped in the usual sterile fashion using aseptic technique for cataract surgery and a lid speculum was placed between the lids. Several drops of topical .5% Marcaine were place on the eye. A crescent blade was use to make a 2 mm tunnel at the limbus into clear cornea and a keratome blade was used to enter the eye at the 3 o'clock postion. A side port incision was made in the    5 o'clock position. A small amount of preservative-free epishugarcaine was placed in the eye. The eye was filled with viscoelastic and a continuous tear capsulorhexis was performed. The lens was hydrodissected and hydrodilineated with balanced salt solution. Phacoemulsification was performed in a divide and conquer technique. Irrigation/aspiration was used to remove all cortical material from the capsular bag.   The eye was filled with viscoelastic and a foldable posterior chamber intraocular lens was injected into the capsular bag and rotated into position model SN60WF 20.5 diopter power. Irrigation/aspiration was used to remove all excess viscoelastic. The eye was pressurized and the wounds were checked for leaks and none were found. The patient had antibiotic, steroid, timoptic and antibiotic/steroid ointment placed in the eye. The lid speculum was removed. The eye was covered with a shield. The patient went to the PACU in excellent condition, having tolerated the procedure extremely well and will follow up with me tomorrow for postop day 1 care. Post-op instructions were discussed with patient and family. Lorene Saavedra MD       Implants:  Implant Name Type Inv.  Item Serial No.  Lot No. LRB No. Used Action   LENS IOL SN60WF 20.5D - [id number]  LENS IOL SN60WF 20.5D [id number] [employer]-  Left 1 Implanted

## 2022-12-06 NOTE — H&P
Lucie Velasquez is a 79y.o. year old who presents for elective outpatient ophthalmic surgery with Caryl Hathaway MD.  The patient complains of decreased vision, glare and halos around lights, and trouble with vision for activities of daily living. Past Medical History:   Diagnosis Date    Atrial fibrillation ([address] 03/28/2014    Chronic back pain     with rt. leg pain    Diverticulosis     GERD (gastroesophageal reflux disease)     Hyperlipidemia     Hypertension     Hypothyroidism     Obesity (BMI 30.0-34.9) 08/12/2016    Onychomycosis     PE (pulmonary embolism)     H/O DVT of rt leg    Type II or unspecified type diabetes mellitus without mention of complication, not stated as uncontrolled        Past Surgical History:   Procedure Laterality Date    ARM SURGERY Right     Growth taken off per pt. CARPAL TUNNEL RELEASE Bilateral     Per pt.     CHOLECYSTECTOMY      COLON SURGERY      s/p sigmoid cloectomy    COLONOSCOPY N/A 01/25/2021    COLONOSCOPY DIAGNOSTIC performed by Judit Pollack MD at [facility] ([address] [place] [address])      SIGMOIDOSCOPY N/A 12/02/2021    SIGMOIDOSCOPY DIAGNOSTIC FLEXIBLE performed by Judit Pollack MD at [facility] 09/20/2019    DR IMAN GAUTHIER    UPPER GASTROINTESTINAL ENDOSCOPY N/A 12/02/2021    EGD DIAGNOSTIC ONLY performed by Judit Pollack MD at [address] [place] [address]      s/p         Current Facility-Administered Medications:     lactated ringers infusion, , IntraVENous, Continuous, Ramesh Almanza MD    sodium chloride flush 0.9 % injection 5-40 mL, 5-40 mL, IntraVENous, 2 times per day, Caryl Hathaway MD    sodium chloride flush 0.9 % injection 5-40 mL, 5-40 mL, IntraVENous, PRN, Ramesh Almanza MD    0.9 % sodium chloride infusion, , IntraVENous, PRN, Ramesh Almanza MD    lidocaine PF 1 % injection 1 mL, 1 mL, IntraDERmal, Once PRN, Britton Crenshaw MD 0.9 % sodium chloride infusion, , IntraVENous, Continuous, Fred Reed MD, Last Rate: 125 mL/hr at 12/06/22 0804, NoRateChange at 12/06/22 0804    sodium chloride flush 0.9 % injection 5-40 mL, 5-40 mL, IntraVENous, 2 times per day, Fred Reed MD    sodium chloride flush 0.9 % injection 5-40 mL, 5-40 mL, IntraVENous, PRN, Fred Reed MD    0.9 % sodium chloride infusion, , IntraVENous, PRN, Fred Reed MD      Allergies   Allergen Reactions    Avapro [Irbesartan]     Ciprofloxacin Hcl Nausea Only    Cozaar [Losartan Potassium]     Diovan [Valsartan]     Lipitor [Atorvastatin Calcium]     Lisinopril     Pcn [Penicillins]     Pravachol [Pravastatin Sodium]      myalgias    Tape [Adhesive Tape] Dermatitis    Tramadol Swelling    Zetia [Ezetimibe]     Morphine Nausea And Vomiting         PHYSICAL EXAMINATION    Vitals:    12/06/22 0713   BP: 132/65   Pulse: 95   Resp: 18   Temp: 97.3 °F (36.3 °C)   SpO2: 96%       Gen: NAD  HEENT: Visually significant cataract   Pulm: CTA Bilaterally  Heart: RRR, no murmurs  Abd: soft, non-tender  Ext: no edema  Neuro: no focal deficits    Assessment:   1. Visually significant cataract   2. See preoperative ophthalmology notes    Plan:   1. Risks, benefits, alternatives to surgery discussed with the patient and family. 2. All questions were answered to their satisfaction. 3. Ok to proceed with surgery as planned.     Yolanda Trevino MD

## 2022-12-06 NOTE — ANESTHESIA POSTPROCEDURE EVALUATION
POST- ANESTHESIA EVALUATION       Pt Name: Tessie Blanco  MRN: [id number]  YOB: 1940  Date of evaluation: 12/6/2022  Time:  10:47 AM      BP (!) 119/57   Pulse 92   Temp 97.5 °F (36.4 °C) (Infrared)   Resp 15   Ht 5' 5\" (1.651 m)   Wt 177 lb (80.3 kg)   SpO2 95%   BMI 29.45 kg/m²      Consciousness Level  Awake  Cardiopulmonary Status  Stable  Pain Adequately Treated YES  Nausea / Vomiting  NO  Adequate Hydration  YES  Anesthesia Related Complications NONE      Electronically signed by Adriana Matt MD on 12/6/2022 at 10:47 AM       Department of Anesthesiology  Postprocedure Note    Patient: Tessie Blanco  MRN: [id number]  YOB: 1940  Date of evaluation: 12/6/2022      Procedure Summary     Date: 12/06/22 Room / Location: [address] [place] [address] Rachel Dawn  / [facility]: [facility]    Anesthesia Start: 8858 Anesthesia Stop: 3937    Procedure: EYE CATARACT EMULSIFICATION IOL IMPLANT (Left: Eye) Diagnosis:       Nuclear sclerotic cataract of left eye      (LEFT EYE CATARACT)    Surgeons: Shasha Guardado MD Responsible Provider: Adriana Matt MD    Anesthesia Type: MAC ASA Status: 3          Anesthesia Type: MAC    Chuck Phase I:      Chuck Phase II: Chuck Score: 10      Anesthesia Post Evaluation

## 2023-01-03 RX ORDER — FERROUS SULFATE 325(65) MG
TABLET ORAL
Qty: 30 TABLET | Refills: 0 | Status: SHIPPED | OUTPATIENT
Start: 2023-01-03

## 2023-01-23 ENCOUNTER — HOSPITAL ENCOUNTER (OUTPATIENT)
Age: 83
Discharge: HOME | End: 2023-01-23
Payer: MEDICARE

## 2023-01-23 ENCOUNTER — TELEPHONE (OUTPATIENT)
Dept: FAMILY MEDICINE CLINIC | Age: 83
End: 2023-01-23

## 2023-01-23 DIAGNOSIS — R30.0 DYSURIA: Primary | ICD-10-CM

## 2023-01-23 DIAGNOSIS — R30.0 DYSURIA: ICD-10-CM

## 2023-01-23 LAB
BACTERIA: ABNORMAL
BILIRUBIN URINE: NEGATIVE
CASTS UA: ABNORMAL /LPF
COLOR: YELLOW
EPITHELIAL CELLS UA: ABNORMAL /HPF
GLUCOSE URINE: NEGATIVE
KETONES, URINE: NEGATIVE
LEUKOCYTE ESTERASE, URINE: ABNORMAL
NITRITE, URINE: NEGATIVE
PH UA: 5.5 (ref 5–8)
PROTEIN UA: NEGATIVE
RBC UA: ABNORMAL /HPF
SPECIFIC GRAVITY UA: 1.01 (ref 1–1.03)
TURBIDITY: CLEAR
URINE HGB: NEGATIVE
UROBILINOGEN, URINE: NORMAL
WBC UA: ABNORMAL /HPF

## 2023-01-23 PROCEDURE — 87186 SC STD MICRODIL/AGAR DIL: CPT

## 2023-01-23 PROCEDURE — 87088 URINE BACTERIA CULTURE: CPT

## 2023-01-23 PROCEDURE — 81001 URINALYSIS AUTO W/SCOPE: CPT

## 2023-01-23 PROCEDURE — 87086 URINE CULTURE/COLONY COUNT: CPT

## 2023-01-23 RX ORDER — SULFAMETHOXAZOLE AND TRIMETHOPRIM 800; 160 MG/1; MG/1
1 TABLET ORAL 2 TIMES DAILY
Qty: 20 TABLET | Refills: 0 | Status: SHIPPED | OUTPATIENT
Start: 2023-01-23 | End: 2023-01-24

## 2023-01-23 NOTE — TELEPHONE ENCOUNTER
Patient called complaining of a UTI,  burning upon urination and when she stops hurts badly since last week. She hasn't seen Dr. Fred Vanessa recently. Please advise on getting urine specimen and treating.

## 2023-01-23 NOTE — TELEPHONE ENCOUNTER
UA with reflex culture ordered. Patient is to go to any [address] [place] [address] lab to have this done. Bactrim DS ordered, started on Bactrim after giving urine sample.

## 2023-01-23 NOTE — TELEPHONE ENCOUNTER
Message conveyed to patient. She is going to [facility] to do UA/culture.

## 2023-01-24 NOTE — TELEPHONE ENCOUNTER
Patient called stating she started Bactrim (3 doses), she now has rash on arms and red blotches today. Culture not back yet. I told her to stop the Bactrim and I will call her back as to what to do. Please advise.

## 2023-01-25 LAB
CULTURE: ABNORMAL
SPECIMEN DESCRIPTION: ABNORMAL

## 2023-01-25 RX ORDER — NITROFURANTOIN 25; 75 MG/1; MG/1
100 CAPSULE ORAL 2 TIMES DAILY
Qty: 14 CAPSULE | Refills: 0 | Status: SHIPPED | OUTPATIENT
Start: 2023-01-25 | End: 2023-02-01

## 2023-01-30 RX ORDER — FERROUS SULFATE 325(65) MG
TABLET ORAL
Qty: 30 TABLET | Refills: 0 | Status: SHIPPED | OUTPATIENT
Start: 2023-01-30

## 2023-02-01 ENCOUNTER — HOSPITAL ENCOUNTER (OUTPATIENT)
Dept: PREADMISSION TESTING | Age: 83
Discharge: HOME | End: 2023-02-05

## 2023-02-01 VITALS — BODY MASS INDEX: 29.16 KG/M2 | HEIGHT: 65 IN | WEIGHT: 175 LBS

## 2023-02-01 NOTE — PROGRESS NOTES

## 2023-02-05 NOTE — DISCHARGE INSTRUCTIONS
1.  Remove the shield at 1:00 p.m. today and begin all the eye drops. 2.  Repeat the eye drops at 5:00 p.m. and before bedtime one drop per bottle, any order. Wait 3-4 minutes between drops. The drops are:    ANTIBIOTIC:   []   Vigamox   [x]  Tobramycin   []  Zymaxid     []  Gatifloxacin     STEROID:     [x]   Prednisolone Acetate        []   Durezol    ANTI-INFLAMMATORY     []    Nevenac    []   Ketorolac    []  Ilevro    []  Prolensa    []         3. Place one drop from each bottle by looking up, pull the lower lid down gently and let the drop fall in.    4. You may wipe the eyelid gently with a clean tissue or cotton. 5. Place only the shield over the eye when sleeping for (4) four days:  Fix with tape    6. In the morning remove the shield and start putting in the drops, one drop from each bottle. Replace the shield or wear glasses during the day. 7. Please call [facility] if you have any problems:    Office [phone]     Cell [phone]    8. Continue all your previous medications. You may use Aspirin, Tylenol, or Advil if needed. 9.  You have an appointment in the office:  Dr. Víctor Callejas will call this afternoon    10. Please bring your drops into the office at your next visit. Sedation or General Anesthesia, Adult  Care After  Refer to this sheet in the next 24 hours. These instructions provide you with information on caring for yourself after your procedure. Your caregiver may also give you more specific instructions. Your treatment has been planned according to current medical practices, but problems sometimes occur. Call your caregiver if you have any problems or questions after your procedure. HOME CARE INSTRUCTIONS   Do not participate in any activities that require you to be alert or coordinated. Do not:  Drive. Swim. Ride a bicycle. Operate heavy machinery. [place]. Use power tools. Climb ladders. Work at [employer]. Take a bath. Do not drink alcohol.   Do not make any important decisions or sign legal documents. Stay with an adult. The first meal following your procedure should be light and small. Avoid solid foods if you feel sick to your stomach (nauseous) or if you throw up (vomit). Drink enough fluids to keep your urine clear or pale yellow. Only take your usual medicines or new medicines if your caregiver approves them. Only take over-the-counter or prescription medicines for pain, discomfort, or fever as directed by your caregiver. Keep all follow-up appointments as directed by your caregiver. SEEK IMMEDIATE MEDICAL CARE IF:   You are not feeling normal or behaving normally after 24 hours. You have persistent nausea and vomiting. You are unable to drink fluids or eat food. You have difficulty urinating. You have difficulty breathing or speaking. You have blue or gray skin. There is difficulty waking or you cannot be woken up. You have heavy bleeding, redness, or a lot of swelling where the sedative or anesthesia entered your skin (intravenous site). You have a rash. MAKE SURE YOU:  Understand these instructions. Will watch your condition. Will get help right away if you are not doing well or get worse. Document Released: 12/18/2006 Document Revised: 06/18/2013 Document Reviewed: 04/17/2013  [facility] Patient Information ©2013 [employer].

## 2023-02-06 ENCOUNTER — ANESTHESIA EVENT (OUTPATIENT)
Dept: OPERATING ROOM | Age: 83
End: 2023-02-06
Payer: MEDICARE

## 2023-02-06 NOTE — PRE-PROCEDURE INSTRUCTIONS
No answer, left message ?                             Unable to leave message ?    When were you told to arrive at hospital ?  0730    Do you have a  ?y    Are you on any blood thinners ?          y           If yes when did you stop taking ?stopped fri    Do you have your prep Rx filled and instruction ?      Nothing to eat the day before , only clear liquids.    Are you experiencing any covid symptoms ? n    Do you have any infections or rash we should be aware of ?n      Do you have the Hibiclens soap to use the night before and the morning of surgery ?    Nothing to eat or drink after midnight, only a sip of water to take any medication instructed to take the night before.  Wear comfortable clothing, leave any valuables at home, remove any jewelry and body piercing . y

## 2023-02-07 ENCOUNTER — ANESTHESIA (OUTPATIENT)
Dept: OPERATING ROOM | Age: 83
End: 2023-02-07
Payer: MEDICARE

## 2023-02-07 ENCOUNTER — HOSPITAL ENCOUNTER (OUTPATIENT)
Age: 83
Setting detail: OUTPATIENT SURGERY
Discharge: HOME | End: 2023-02-07
Attending: OPHTHALMOLOGY | Admitting: OPHTHALMOLOGY
Payer: MEDICARE

## 2023-02-07 VITALS
SYSTOLIC BLOOD PRESSURE: 120 MMHG | TEMPERATURE: 98.2 F | BODY MASS INDEX: 29.16 KG/M2 | WEIGHT: 175 LBS | RESPIRATION RATE: 16 BRPM | HEART RATE: 78 BPM | HEIGHT: 65 IN | OXYGEN SATURATION: 96 % | DIASTOLIC BLOOD PRESSURE: 62 MMHG

## 2023-02-07 PROBLEM — H25.11 AGE-RELATED NUCLEAR CATARACT, RIGHT: Status: RESOLVED | Noted: 2023-02-07 | Resolved: 2023-02-07

## 2023-02-07 PROCEDURE — 3600000002 HC SURGERY LEVEL 2 BASE: Performed by: OPHTHALMOLOGY

## 2023-02-07 PROCEDURE — 3700000001 HC ADD 15 MINUTES (ANESTHESIA): Performed by: OPHTHALMOLOGY

## 2023-02-07 PROCEDURE — 2709999900 HC NON-CHARGEABLE SUPPLY: Performed by: OPHTHALMOLOGY

## 2023-02-07 PROCEDURE — 2500000003 HC RX 250 WO HCPCS: Performed by: OPHTHALMOLOGY

## 2023-02-07 PROCEDURE — 6360000002 HC RX W HCPCS: Performed by: OPHTHALMOLOGY

## 2023-02-07 PROCEDURE — 6370000000 HC RX 637 (ALT 250 FOR IP): Performed by: OPHTHALMOLOGY

## 2023-02-07 PROCEDURE — 3700000000 HC ANESTHESIA ATTENDED CARE: Performed by: OPHTHALMOLOGY

## 2023-02-07 PROCEDURE — 7100000010 HC PHASE II RECOVERY - FIRST 15 MIN: Performed by: OPHTHALMOLOGY

## 2023-02-07 PROCEDURE — 6360000002 HC RX W HCPCS: Performed by: NURSE ANESTHETIST, CERTIFIED REGISTERED

## 2023-02-07 PROCEDURE — 7100000011 HC PHASE II RECOVERY - ADDTL 15 MIN: Performed by: OPHTHALMOLOGY

## 2023-02-07 PROCEDURE — 2580000003 HC RX 258: Performed by: OPHTHALMOLOGY

## 2023-02-07 PROCEDURE — 3600000012 HC SURGERY LEVEL 2 ADDTL 15MIN: Performed by: OPHTHALMOLOGY

## 2023-02-07 DEVICE — LENS IOL SN60WF 20.5D: Type: IMPLANTABLE DEVICE | Site: EYE | Status: FUNCTIONAL

## 2023-02-07 RX ORDER — SODIUM CHLORIDE 0.9 % (FLUSH) 0.9 %
5-40 SYRINGE (ML) INJECTION PRN
Status: DISCONTINUED | OUTPATIENT
Start: 2023-02-07 | End: 2023-02-07 | Stop reason: HOSPADM

## 2023-02-07 RX ORDER — TOBRAMYCIN 3 MG/ML
SOLUTION/ DROPS OPHTHALMIC PRN
Status: DISCONTINUED | OUTPATIENT
Start: 2023-02-07 | End: 2023-02-07 | Stop reason: ALTCHOICE

## 2023-02-07 RX ORDER — SODIUM CHLORIDE 9 MG/ML
INJECTION, SOLUTION INTRAVENOUS CONTINUOUS
Status: DISCONTINUED | OUTPATIENT
Start: 2023-02-07 | End: 2023-02-07 | Stop reason: HOSPADM

## 2023-02-07 RX ORDER — SODIUM CHLORIDE 0.9 % (FLUSH) 0.9 %
5-40 SYRINGE (ML) INJECTION PRN
Status: CANCELLED | OUTPATIENT
Start: 2023-02-07

## 2023-02-07 RX ORDER — ONDANSETRON 2 MG/ML
4 INJECTION INTRAMUSCULAR; INTRAVENOUS
Status: CANCELLED | OUTPATIENT
Start: 2023-02-07 | End: 2023-02-08

## 2023-02-07 RX ORDER — FENTANYL CITRATE 0.05 MG/ML
25 INJECTION, SOLUTION INTRAMUSCULAR; INTRAVENOUS EVERY 5 MIN PRN
Status: CANCELLED | OUTPATIENT
Start: 2023-02-07

## 2023-02-07 RX ORDER — MEPERIDINE HYDROCHLORIDE 25 MG/ML
12.5 INJECTION INTRAMUSCULAR; INTRAVENOUS; SUBCUTANEOUS EVERY 5 MIN PRN
Status: CANCELLED | OUTPATIENT
Start: 2023-02-07

## 2023-02-07 RX ORDER — TOBRAMYCIN 3 MG/ML
1 SOLUTION/ DROPS OPHTHALMIC EVERY 5 MIN PRN
Status: COMPLETED | OUTPATIENT
Start: 2023-02-07 | End: 2023-02-07

## 2023-02-07 RX ORDER — NEOMYCIN SULFATE, POLYMYXIN B SULFATE, AND DEXAMETHASONE 3.5; 10000; 1 MG/G; [USP'U]/G; MG/G
OINTMENT OPHTHALMIC PRN
Status: DISCONTINUED | OUTPATIENT
Start: 2023-02-07 | End: 2023-02-07 | Stop reason: ALTCHOICE

## 2023-02-07 RX ORDER — SODIUM CHLORIDE 0.9 % (FLUSH) 0.9 %
5-40 SYRINGE (ML) INJECTION EVERY 12 HOURS SCHEDULED
Status: CANCELLED | OUTPATIENT
Start: 2023-02-07

## 2023-02-07 RX ORDER — SODIUM CHLORIDE 9 MG/ML
INJECTION, SOLUTION INTRAVENOUS PRN
Status: DISCONTINUED | OUTPATIENT
Start: 2023-02-07 | End: 2023-02-07 | Stop reason: HOSPADM

## 2023-02-07 RX ORDER — DIPHENHYDRAMINE HYDROCHLORIDE 50 MG/ML
12.5 INJECTION INTRAMUSCULAR; INTRAVENOUS
Status: CANCELLED | OUTPATIENT
Start: 2023-02-07 | End: 2023-02-08

## 2023-02-07 RX ORDER — LABETALOL HYDROCHLORIDE 5 MG/ML
10 INJECTION, SOLUTION INTRAVENOUS
Status: CANCELLED | OUTPATIENT
Start: 2023-02-07

## 2023-02-07 RX ORDER — BALANCED SALT SOLUTION ENRICHED WITH BICARBONATE, DEXTROSE, AND GLUTATHIONE
KIT INTRAOCULAR PRN
Status: DISCONTINUED | OUTPATIENT
Start: 2023-02-07 | End: 2023-02-07 | Stop reason: ALTCHOICE

## 2023-02-07 RX ORDER — BUPIVACAINE HYDROCHLORIDE 5 MG/ML
1 INJECTION, SOLUTION EPIDURAL; INTRACAUDAL EVERY 5 MIN PRN
Status: COMPLETED | OUTPATIENT
Start: 2023-02-07 | End: 2023-02-07

## 2023-02-07 RX ORDER — CYCLOPENTOLATE HYDROCHLORIDE 10 MG/ML
1 SOLUTION/ DROPS OPHTHALMIC EVERY 5 MIN PRN
Status: COMPLETED | OUTPATIENT
Start: 2023-02-07 | End: 2023-02-07

## 2023-02-07 RX ORDER — ACETAMINOPHEN 325 MG/1
650 TABLET ORAL
Status: CANCELLED | OUTPATIENT
Start: 2023-02-07 | End: 2023-02-08

## 2023-02-07 RX ORDER — TIMOLOL MALEATE 5 MG/ML
SOLUTION/ DROPS OPHTHALMIC PRN
Status: DISCONTINUED | OUTPATIENT
Start: 2023-02-07 | End: 2023-02-07 | Stop reason: ALTCHOICE

## 2023-02-07 RX ORDER — MIDAZOLAM HYDROCHLORIDE 1 MG/ML
INJECTION INTRAMUSCULAR; INTRAVENOUS PRN
Status: DISCONTINUED | OUTPATIENT
Start: 2023-02-07 | End: 2023-02-07 | Stop reason: SDUPTHER

## 2023-02-07 RX ORDER — KETOROLAC TROMETHAMINE 5 MG/ML
1 SOLUTION OPHTHALMIC EVERY 5 MIN PRN
Status: COMPLETED | OUTPATIENT
Start: 2023-02-07 | End: 2023-02-07

## 2023-02-07 RX ORDER — SODIUM CHLORIDE 0.9 % (FLUSH) 0.9 %
5-40 SYRINGE (ML) INJECTION EVERY 12 HOURS SCHEDULED
Status: DISCONTINUED | OUTPATIENT
Start: 2023-02-07 | End: 2023-02-07 | Stop reason: HOSPADM

## 2023-02-07 RX ORDER — PHENYLEPHRINE HCL 2.5 %
1 DROPS OPHTHALMIC (EYE) EVERY 5 MIN PRN
Status: COMPLETED | OUTPATIENT
Start: 2023-02-07 | End: 2023-02-07

## 2023-02-07 RX ORDER — SODIUM CHLORIDE 9 MG/ML
INJECTION, SOLUTION INTRAVENOUS PRN
Status: CANCELLED | OUTPATIENT
Start: 2023-02-07

## 2023-02-07 RX ORDER — METOCLOPRAMIDE HYDROCHLORIDE 5 MG/ML
10 INJECTION INTRAMUSCULAR; INTRAVENOUS
Status: CANCELLED | OUTPATIENT
Start: 2023-02-07 | End: 2023-02-08

## 2023-02-07 RX ORDER — SODIUM CHLORIDE, SODIUM LACTATE, POTASSIUM CHLORIDE, CALCIUM CHLORIDE 600; 310; 30; 20 MG/100ML; MG/100ML; MG/100ML; MG/100ML
INJECTION, SOLUTION INTRAVENOUS CONTINUOUS
Status: DISCONTINUED | OUTPATIENT
Start: 2023-02-07 | End: 2023-02-07 | Stop reason: HOSPADM

## 2023-02-07 RX ORDER — BUPIVACAINE HYDROCHLORIDE 5 MG/ML
INJECTION, SOLUTION EPIDURAL; INTRACAUDAL PRN
Status: DISCONTINUED | OUTPATIENT
Start: 2023-02-07 | End: 2023-02-07 | Stop reason: ALTCHOICE

## 2023-02-07 RX ORDER — HYDRALAZINE HYDROCHLORIDE 20 MG/ML
10 INJECTION INTRAMUSCULAR; INTRAVENOUS
Status: CANCELLED | OUTPATIENT
Start: 2023-02-07

## 2023-02-07 RX ORDER — FENTANYL CITRATE 50 UG/ML
INJECTION, SOLUTION INTRAMUSCULAR; INTRAVENOUS PRN
Status: DISCONTINUED | OUTPATIENT
Start: 2023-02-07 | End: 2023-02-07 | Stop reason: SDUPTHER

## 2023-02-07 RX ORDER — LIDOCAINE HYDROCHLORIDE 10 MG/ML
1 INJECTION, SOLUTION EPIDURAL; INFILTRATION; INTRACAUDAL; PERINEURAL
Status: DISCONTINUED | OUTPATIENT
Start: 2023-02-07 | End: 2023-02-07 | Stop reason: HOSPADM

## 2023-02-07 RX ORDER — DEXAMETHASONE SODIUM PHOSPHATE 4 MG/ML
INJECTION, SOLUTION INTRA-ARTICULAR; INTRALESIONAL; INTRAMUSCULAR; INTRAVENOUS; SOFT TISSUE PRN
Status: DISCONTINUED | OUTPATIENT
Start: 2023-02-07 | End: 2023-02-07 | Stop reason: SDUPTHER

## 2023-02-07 RX ORDER — ONDANSETRON 2 MG/ML
INJECTION INTRAMUSCULAR; INTRAVENOUS PRN
Status: DISCONTINUED | OUTPATIENT
Start: 2023-02-07 | End: 2023-02-07 | Stop reason: SDUPTHER

## 2023-02-07 RX ADMIN — SODIUM CHLORIDE, POTASSIUM CHLORIDE, SODIUM LACTATE AND CALCIUM CHLORIDE: 600; 310; 30; 20 INJECTION, SOLUTION INTRAVENOUS at 08:35

## 2023-02-07 RX ADMIN — Medication 2.5 MG: at 08:25

## 2023-02-07 RX ADMIN — CYCLOPENTOLATE HYDROCHLORIDE 1 DROP: 10 SOLUTION OPHTHALMIC at 08:39

## 2023-02-07 RX ADMIN — TOBRAMYCIN OPHTHALMIC SOLUTION 1 DROP: 3 SOLUTION/ DROPS OPHTHALMIC at 08:25

## 2023-02-07 RX ADMIN — FENTANYL CITRATE 50 MCG: 50 INJECTION, SOLUTION INTRAMUSCULAR; INTRAVENOUS at 11:03

## 2023-02-07 RX ADMIN — MIDAZOLAM 2 MG: 1 INJECTION INTRAMUSCULAR; INTRAVENOUS at 11:03

## 2023-02-07 RX ADMIN — CYCLOPENTOLATE HYDROCHLORIDE 1 DROP: 10 SOLUTION OPHTHALMIC at 08:25

## 2023-02-07 RX ADMIN — Medication 2.5 MG: at 08:39

## 2023-02-07 RX ADMIN — PHENYLEPHRINE HYDROCHLORIDE 1 DROP: 25 SOLUTION/ DROPS OPHTHALMIC at 08:39

## 2023-02-07 RX ADMIN — TOBRAMYCIN OPHTHALMIC SOLUTION 1 DROP: 3 SOLUTION/ DROPS OPHTHALMIC at 08:39

## 2023-02-07 RX ADMIN — KETOROLAC TROMETHAMINE 1 DROP: 5 SOLUTION/ DROPS OPHTHALMIC at 08:25

## 2023-02-07 RX ADMIN — DEXAMETHASONE SODIUM PHOSPHATE 4 MG: 4 INJECTION, SOLUTION INTRAMUSCULAR; INTRAVENOUS at 11:07

## 2023-02-07 RX ADMIN — KETOROLAC TROMETHAMINE 1 DROP: 5 SOLUTION/ DROPS OPHTHALMIC at 08:39

## 2023-02-07 RX ADMIN — ONDANSETRON 4 MG: 2 INJECTION INTRAMUSCULAR; INTRAVENOUS at 11:07

## 2023-02-07 RX ADMIN — PHENYLEPHRINE HYDROCHLORIDE 1 DROP: 25 SOLUTION/ DROPS OPHTHALMIC at 08:25

## 2023-02-07 ASSESSMENT — PAIN - FUNCTIONAL ASSESSMENT: PAIN_FUNCTIONAL_ASSESSMENT: 0-10

## 2023-02-07 ASSESSMENT — ENCOUNTER SYMPTOMS
SHORTNESS OF BREATH: 0
STRIDOR: 0

## 2023-02-07 ASSESSMENT — PAIN SCALES - GENERAL: PAINLEVEL_OUTOF10: 0

## 2023-02-07 ASSESSMENT — LIFESTYLE VARIABLES: SMOKING_STATUS: 0

## 2023-02-07 NOTE — OP NOTE
Pau Parrish [id number] 79 y.o. OPERATIVE NOTE    Preoperative Diagnosis: Cataract the right eye    Postoperative Diagnosis: Cataract the right eye     Procedure: Phacoemulsification with intraocular lens implantation, the right eye    Surgeon: Karla Love MD    Anesthesia: Topical  With monitored sedation      Complications: none    Specimens: none    EBL: none    Indications for procedure: The patient is a 79y.o. year old with decreased vision, glare and halos around lights, and trouble with activities of daily living. Examination revealed a visually significant cataract in the the right eye. Risks, benefits, and alternatives to surgery were discussed with the patient and the patient elected to proceed with phacoemulsification with lens implantation. Details of the procedure: Following informed consent, the patient was taken to the operating room and placed in the supine position. The eye was prepped and draped in the usual sterile fashion using aseptic technique for cataract surgery and a lid speculum was placed between the lids. Several drops of topical .5% Marcaine were place on the eye. A crescent blade was use to make a 2 mm tunnel at the limbus into clear cornea and a keratome blade was used to enter the eye at the 9 o'clock postion. A side port incision was made in the    2 o'clock position. A small amount of preservative-free epishugarcaine was placed in the eye. The eye was filled with viscoelastic and a continuous tear capsulorhexis was performed. The lens was hydrodissected and hydrodilineated with balanced salt solution. Phacoemulsification was performed in a divide and conquer technique. Irrigation/aspiration was used to remove all cortical material from the capsular bag. The eye was filled with viscoelastic and a foldable posterior chamber intraocular lens was injected into the capsular bag and rotated into position model SN60WF 20.5 diopter power. Irrigation/aspiration was used to remove all excess viscoelastic. The eye was pressurized and the wounds were checked for leaks and none were found. The patient had antibiotic, steroid, timoptic and antibiotic/steroid ointment placed in the eye. The lid speculum was removed. The eye was covered with a shield. The patient went to the PACU in excellent condition, having tolerated the procedure extremely well and will follow up with me tomorrow for postop day 1 care. Post-op instructions were discussed with patient and family. Camila Valles MD       Implants:  Implant Name Type Inv.  Item Serial No.  Lot No. LRB No. Used Action   LENS IOL SN60WF 20.5D - [id number]  LENS IOL SN60WF 20.5D [id number] [employer]-  Right 1 Implanted

## 2023-02-07 NOTE — ANESTHESIA POSTPROCEDURE EVALUATION
POST- ANESTHESIA EVALUATION       Pt Name: Pura Kaye  MRN: [id number]  YOB: 1940  Date of evaluation: 2/7/2023  Time:  12:37 PM      /62   Pulse 78   Temp 98.2 °F (36.8 °C)   Resp 16   Ht 5' 5\" (1.651 m)   Wt 175 lb (79.4 kg)   SpO2 96%   BMI 29.12 kg/m²      Consciousness Level  Awake  Cardiopulmonary Status  Stable  Pain Adequately Treated YES  Nausea / Vomiting  NO  Adequate Hydration  YES  Anesthesia Related Complications NONE      Electronically signed by Katiuska Negrete MD on 2/7/2023 at 12:37 PM       Department of Anesthesiology  Postprocedure Note    Patient: Pura Kaye  MRN: [id number]  YOB: 1940  Date of evaluation: 2/7/2023      Procedure Summary     Date: 02/07/23 Room / Location: [address] [place] [address] Rachel Saldaña / [facility]: [facility]    Anesthesia Start: 1873 Anesthesia Stop: 8929    Procedure: EYE CATARACT EMULSIFICATION IOL IMPLANT (Right: Eye) Diagnosis:       Nuclear sclerotic cataract of right eye      (RIGHT EYE CATARACT)    Surgeons: Lora Osborne MD Responsible Provider: Katiuska Negrete MD    Anesthesia Type: General ASA Status: 3          Anesthesia Type: General    Chuck Phase I:      Chuck Phase II: Chuck Score: 10      Anesthesia Post Evaluation

## 2023-02-07 NOTE — H&P
Victorino Juan is a 79y.o. year old who presents for elective outpatient ophthalmic surgery with Timothy Clark MD.  The patient complains of decreased vision, glare and halos around lights, and trouble with vision for activities of daily living. Past Medical History:   Diagnosis Date    Atrial fibrillation ([address] 03/28/2014    Chronic back pain     with rt. leg pain    Diverticulosis     GERD (gastroesophageal reflux disease)     Hearing aid worn     willi. ears. Hearing deficit, bilateral     Hyperlipidemia     Hypertension     Hypothyroidism     Obesity (BMI 30.0-34.9) 08/12/2016    Onychomycosis     PE (pulmonary embolism)     H/O DVT of rt leg    Poor venous access     Type II or unspecified type diabetes mellitus without mention of complication, not stated as uncontrolled        Past Surgical History:   Procedure Laterality Date    ARM SURGERY Right     Growth taken off per pt. CARPAL TUNNEL RELEASE Bilateral     Per pt.     CATARACT EXTRACTION EXTRACAPSULAR W/ INTRAOCULAR LENS IMPLANTATION Left 12/06/2022    [facility]    CHOLECYSTECTOMY      COLON SURGERY      s/p sigmoid cloectomy    COLONOSCOPY N/A 01/25/2021    COLONOSCOPY DIAGNOSTIC performed by Daylin Nieves MD at [facility] ([address] [place] [address])      INTRACAPSULAR CATARACT EXTRACTION Left 12/6/2022    EYE CATARACT EMULSIFICATION IOL IMPLANT performed by Timothy Clark MD at [address] [place] [address] N/A 12/02/2021    SIGMOIDOSCOPY DIAGNOSTIC FLEXIBLE performed by Daylin Nieves MD at [facility] Bilateral 09/20/2019    DR IMAN GAUTHIER    UPPER GASTROINTESTINAL ENDOSCOPY N/A 12/02/2021    EGD DIAGNOSTIC ONLY performed by Daylin Nieves MD at [address] [place] [address]      s/p         Current Facility-Administered Medications:     lactated ringers IV soln infusion, , IntraVENous, Continuous, Timothy Clark MD, Last Rate: 50 mL/hr at 02/07/23 0835, New Bag at 02/07/23 0835    sodium chloride flush 0.9 % injection 5-40 mL, 5-40 mL, IntraVENous, 2 times per day, Linsey Oconnor MD    sodium chloride flush 0.9 % injection 5-40 mL, 5-40 mL, IntraVENous, PRN, Luz Almanza MD    0.9 % sodium chloride infusion, , IntraVENous, PRN, Luz Almanza MD    lidocaine PF 1 % injection 1 mL, 1 mL, IntraDERmal, Once PRN, Yeny Bocanegra MD    0.9 % sodium chloride infusion, , IntraVENous, Continuous, Yeny Bocanegra MD    sodium chloride flush 0.9 % injection 5-40 mL, 5-40 mL, IntraVENous, 2 times per day, Yeny Bocanegra MD    sodium chloride flush 0.9 % injection 5-40 mL, 5-40 mL, IntraVENous, PRN, Yeny Bocanegra MD    0.9 % sodium chloride infusion, , IntraVENous, PRN, Yeny Bocanegra MD      Allergies   Allergen Reactions    Avapro [Irbesartan]     Bactrim [Sulfamethoxazole-Trimethoprim]      Rash    Ciprofloxacin Hcl Nausea Only    Cozaar [Losartan Potassium]     Diovan [Valsartan]     Lipitor [Atorvastatin Calcium]     Lisinopril     Pcn [Penicillins]     Pravachol [Pravastatin Sodium]      myalgias    Tape [Adhesive Tape] Dermatitis    Tramadol Swelling    Zetia [Ezetimibe]     Morphine Nausea And Vomiting         PHYSICAL EXAMINATION    Vitals:    02/07/23 0759   BP: 130/69   Pulse: 91   Resp: 16   Temp: 96.9 °F (36.1 °C)   SpO2: 99%       Gen: NAD  HEENT: Visually significant cataract   Pulm: CTA Bilaterally  Heart: RRR, no murmurs  Abd: soft, non-tender  Ext: no edema  Neuro: no focal deficits    Assessment:   1. Visually significant cataract   2. See preoperative ophthalmology notes    Plan:   1. Risks, benefits, alternatives to surgery discussed with the patient and family. 2. All questions were answered to their satisfaction. 3. Ok to proceed with surgery as planned.     Linsey Oconnor MD

## 2023-02-07 NOTE — ANESTHESIA PRE PROCEDURE
Department of Anesthesiology  Preprocedure Note       Name:  Tino Yeboah   Age:  79 y.o.  :  1940                                          MRN:  [id number]         Date:  2023      Surgeon: Linette Tilley):  Brian Christie MD    Procedure: Procedure(s):  EYE CATARACT EMULSIFICATION IOL IMPLANT    Medications prior to admission:   Prior to Admission medications    Medication Sig Start Date End Date Taking?  Authorizing Provider   FEROSUL 325 (65 Fe) MG tablet TAKE 1 TABLET BY MOUTH IN THE MORNING WITH FOOD 23   Siomara Wang MD   rivaroxaban (XARELTO) 10 MG TABS tablet Take 10 mg by mouth    Historical Provider, MD   metoprolol succinate (TOPROL XL) 25 MG extended release tablet Take 1 tablet by mouth daily 10/31/22   Siomara Wang MD   bumetanide [facility]) 1 MG tablet Take 1 tablet by mouth daily 10/31/22   Siomara Wang MD   cholestyramine Lorrene Raw) 4 g packet Take 1 packet by mouth every evening 10/31/22   Siomara Wang MD   midodrine (PROAMATINE) 2.5 MG tablet Take 1 tablet by mouth 3 times daily as needed (low SBP) 22   Malik Avila MD   simethicone (MYLICON) 80 MG chewable tablet Take 80 mg by mouth every 6 hours as needed for Flatulence    Historical Provider, MD   Probiotic Product (PROBIOTIC DAILY) CAPS Take 1 capsule by mouth daily    Historical Provider, MD   atenolol (TENORMIN) 100 MG tablet TAKE 1 TABLET BY MOUTH EVERY DAY 22  Siomara Wang MD   acetaminophen (TYLENOL) 500 MG tablet Take 500 mg by mouth at bedtime    Historical Provider, MD   levothyroxine (SYNTHROID) 100 MCG tablet TAKE 1 TABLET BY MOUTH EVERY DAY 22   Siomara Wang MD   dorzolamide (TRUSOPT) 2 % ophthalmic solution Place 1 drop into both eyes 2 times daily 8am and 8pm 18   Historical Provider, MD   latanoprost (XALATAN) 0.005 % ophthalmic solution Place 1 drop into both eyes nightly 1230am    Historical Provider, MD       Current medications: Current Outpatient Medications   Medication Sig Dispense Refill    FEROSUL 325 (65 Fe) MG tablet TAKE 1 TABLET BY MOUTH IN THE MORNING WITH FOOD 30 tablet 0    rivaroxaban (XARELTO) 10 MG TABS tablet Take 10 mg by mouth      metoprolol succinate (TOPROL XL) 25 MG extended release tablet Take 1 tablet by mouth daily 30 tablet 3    bumetanide (BUMEX) 1 MG tablet Take 1 tablet by mouth daily 30 tablet 3    cholestyramine (QUESTRAN) 4 g packet Take 1 packet by mouth every evening 90 packet 3    midodrine (PROAMATINE) 2.5 MG tablet Take 1 tablet by mouth 3 times daily as needed (low SBP) 90 tablet 3    simethicone (MYLICON) 80 MG chewable tablet Take 80 mg by mouth every 6 hours as needed for Flatulence      Probiotic Product (PROBIOTIC DAILY) CAPS Take 1 capsule by mouth daily      acetaminophen (TYLENOL) 500 MG tablet Take 500 mg by mouth at bedtime      levothyroxine (SYNTHROID) 100 MCG tablet TAKE 1 TABLET BY MOUTH EVERY DAY 90 tablet 1    dorzolamide (TRUSOPT) 2 % ophthalmic solution Place 1 drop into both eyes 2 times daily 8am and 8pm  10    latanoprost (XALATAN) 0.005 % ophthalmic solution Place 1 drop into both eyes nightly 1230am       No current facility-administered medications for this visit. Allergies:     Allergies   Allergen Reactions    Avapro [Irbesartan]     Bactrim [Sulfamethoxazole-Trimethoprim]      Rash    Ciprofloxacin Hcl Nausea Only    Cozaar [Losartan Potassium]     Diovan [Valsartan]     Lipitor [Atorvastatin Calcium]     Lisinopril     Pcn [Penicillins]     Pravachol [Pravastatin Sodium]      myalgias    Tape [Adhesive Tape] Dermatitis    Tramadol Swelling    Zetia [Ezetimibe]     Morphine Nausea And Vomiting       Problem List:    Patient Active Problem List   Diagnosis Code    Mixed hyperlipidemia E78.2    Gastroesophageal reflux disease without esophagitis K21.9    Onychomycosis B35.1    Diverticulosis K57.90    Chronic back pain M54.9, G89.29    Hypothyroidism E03.9    Essential hypertension I10    Right bundle branch block I45.10    Chronic pulmonary embolism (HCC) I27.82    Type 2 diabetes mellitus without complication, without long-term current use of insulin (HCC) E11.9    Obesity (BMI 30.0-34. 9) E66.9    Tracheobronchitis J40    Allergic rhinitis J30.9    Glaucoma H40.9    S/p bilateral myringotomy with tube placement Z96.22    GI bleed K92.2    Iron deficiency anemia D50.9    Paroxysmal atrial fibrillation (HCC) I48.0    Hemorrhage of rectum and anus K62.5    Intractable nausea and vomiting U42.1    Diastolic CHF, acute on chronic (HCC) I50.33    Atrial fibrillation with RVR ([facility]) I48.91    Class 2 obesity due to excess calories in adult E66.09    Sepsis ([facility]) A41.9    JOSH (acute kidney injury) ([facility] N17.9    Syncope R55    Acute cystitis without hematuria N30.00       Past Medical History:        Diagnosis Date    Atrial fibrillation ([facility] 03/28/2014    Chronic back pain     with rt. leg pain    Diverticulosis     GERD (gastroesophageal reflux disease)     Hearing aid worn     willi. ears.  Hearing deficit, bilateral     Hyperlipidemia     Hypertension     Hypothyroidism     Obesity (BMI 30.0-34.9) 08/12/2016    Onychomycosis     PE (pulmonary embolism)     H/O DVT of rt leg    Poor venous access     Type II or unspecified type diabetes mellitus without mention of complication, not stated as uncontrolled        Past Surgical History:        Procedure Laterality Date    ARM SURGERY Right     Growth taken off per pt.  CARPAL TUNNEL RELEASE Bilateral     Per pt.     CATARACT EXTRACTION EXTRACAPSULAR W/ INTRAOCULAR LENS IMPLANTATION [facility]    CHOLECYSTECTOMY      COLON SURGERY      s/p sigmoid cloectomy    COLONOSCOPY N/A 01/25/2021    COLONOSCOPY DIAGNOSTIC performed by Grace Barajas MD at [facility] ([address] [place] [address])      INTRACAPSULAR CATARACT EXTRACTION Left 12/6/2022    EYE CATARACT EMULSIFICATION IOL IMPLANT performed by Jojo Mckoy MD at [facility] N/A 12/02/2021    SIGMOIDOSCOPY DIAGNOSTIC FLEXIBLE performed by Shivani Nova MD at [address] Bilateral 09/20/2019    DR IMAN GAUTHIER    UPPER GASTROINTESTINAL ENDOSCOPY N/A 12/02/2021    EGD DIAGNOSTIC ONLY performed by Shivani Nova MD at [facility]      s/p       Social History:    Social History     Tobacco Use    Smoking status: Never    Smokeless tobacco: Never   Substance Use Topics    Alcohol use: No                                Counseling given: Not Answered      Vital Signs (Current): There were no vitals filed for this visit.                                            BP Readings from Last 3 Encounters:   12/06/22 (!) 119/57   08/11/22 138/60   06/23/22 132/64       NPO Status:                                                                                 BMI:   Wt Readings from Last 3 Encounters:   02/01/23 175 lb (79.4 kg)   12/06/22 177 lb (80.3 kg)   11/29/22 181 lb (82.1 kg)     There is no height or weight on file to calculate BMI.    CBC:   Lab Results   Component Value Date/Time    WBC 6.3 08/23/2022 07:55 AM    RBC 3.12 08/23/2022 07:55 AM    RBC 4.19 03/17/2012 07:18 AM    HGB 9.9 08/23/2022 07:55 AM    HCT 31.7 08/23/2022 07:55 AM    .6 08/23/2022 07:55 AM    RDW 16.1 08/23/2022 07:55 AM     08/23/2022 07:55 AM     03/17/2012 07:18 AM     HB 7.8    CMP:   Lab Results   Component Value Date/Time     08/23/2022 07:55 AM    K 4.1 08/23/2022 07:55 AM     08/23/2022 07:55 AM    CO2 27 08/23/2022 07:55 AM    BUN 14 08/23/2022 07:55 AM    CREATININE 1.25 08/23/2022 07:55 AM    GFRAA 50 08/23/2022 07:55 AM    LABGLOM 41 08/23/2022 07:55 AM    GLUCOSE 85 08/23/2022 07:55 AM    GLUCOSE 123 03/17/2012 07:18 AM    PROT 5.4 08/23/2022 07:55 AM    CALCIUM 8.7 08/23/2022 07:55 AM    BILITOT 0.55 08/23/2022 07:55 AM    ALKPHOS 90 08/23/2022 07:55 AM    AST 21 08/23/2022 07:55 AM    ALT 22 08/23/2022 07:55 AM       POC Tests:   No results for input(s): POCGLU, POCNA, POCK, POCCL, POCBUN, POCHEMO, POCHCT in the last 72 hours. Coags:   Lab Results   Component Value Date/Time    PROTIME 15.2 11/29/2021 06:35 PM    PROTIME 22.2 04/17/2018 02:15 PM    PROTIME 31.1 03/17/2014 07:12 AM    INR 1.2 11/29/2021 06:35 PM    APTT 40.1 08/05/2022 04:09 PM       HCG (If Applicable): No results found for: PREGTESTUR, PREGSERUM, HCG, HCGQUANT     ABGs:   Lab Results   Component Value Date/Time    PO2ART 110.0 10/03/2012 05:10 AM    VZN8IFG 22.2 10/03/2012 05:10 AM    G2KLRTVA 95.4 10/03/2012 05:10 AM        Type & Screen (If Applicable):  No results found for: LABABO, LABRH    Drug/Infectious Status (If Applicable):  No results found for: HIV, HEPCAB    COVID-19 Screening (If Applicable):   Lab Results   Component Value Date/Time    COVID19 Not Detected 08/05/2022 04:09 PM    COVID19 Not Detected 06/17/2022 05:00 PM           Anesthesia Evaluation  Patient summary reviewed and Nursing notes reviewed no history of anesthetic complications:   Airway: Mallampati: II  TM distance: >3 FB   Neck ROM: full  Mouth opening: > = 3 FB   Dental: normal exam         Pulmonary:Negative Pulmonary ROS and normal exam  breath sounds clear to auscultation      (-) pneumonia, COPD, asthma, shortness of breath, recent URI, sleep apnea, rhonchi, wheezes, rales, stridor, not a current smoker and no decreased breath sounds          Patient did not smoke on day of surgery.                  Cardiovascular:  Exercise tolerance: good (>4 METS),   (+) hypertension: no interval change, dysrhythmias: atrial fibrillation,     (-) pacemaker, valvular problems/murmurs, past MI, CAD, CABG/stent,  angina,  CHF, orthopnea, PND,  CASANOVA, murmur, weak pulses,  friction rub, systolic click, carotid bruit,  JVD, peripheral edema, no pulmonary hypertension and no hyperlipidemia    ECG reviewed  Rhythm: regular  Rate: normal  Echocardiogram reviewed         Beta Blocker:  Dose within 24 Hrs         Neuro/Psych:   Negative Neuro/Psych ROS     (-) seizures, neuromuscular disease, TIA, CVA, headaches, psychiatric history and depression/anxiety            GI/Hepatic/Renal:   (+) GERD: no interval change,      (-) hiatal hernia, PUD, hepatitis, liver disease, no renal disease, bowel prep and no morbid obesity       Endo/Other:    (+) DiabetesType II DM, no interval change, , hypothyroidism::., no malignancy/cancer. (-) hyperthyroidism, blood dyscrasia, arthritis, no electrolyte abnormalities, no malignancy/cancer               Abdominal:             Vascular: negative vascular ROS. - PVD, DVT and PE. Other Findings:             Anesthesia Plan      general     ASA 3       Induction: intravenous. MIPS: Postoperative opioids intended and Prophylactic antiemetics administered. Anesthetic plan and risks discussed with patient. Plan discussed with CRNA.                     Navi Agrawal MD   2/7/2023

## 2023-02-09 ENCOUNTER — OFFICE VISIT (OUTPATIENT)
Dept: FAMILY MEDICINE CLINIC | Age: 83
End: 2023-02-09

## 2023-02-09 VITALS
WEIGHT: 175 LBS | RESPIRATION RATE: 14 BRPM | SYSTOLIC BLOOD PRESSURE: 108 MMHG | BODY MASS INDEX: 29.12 KG/M2 | DIASTOLIC BLOOD PRESSURE: 60 MMHG | HEART RATE: 83 BPM | OXYGEN SATURATION: 100 %

## 2023-02-09 DIAGNOSIS — E11.9 TYPE 2 DIABETES MELLITUS WITHOUT COMPLICATION, WITHOUT LONG-TERM CURRENT USE OF INSULIN (HCC): Primary | ICD-10-CM

## 2023-02-09 DIAGNOSIS — I48.0 PAROXYSMAL ATRIAL FIBRILLATION (HCC): ICD-10-CM

## 2023-02-09 DIAGNOSIS — I10 ESSENTIAL HYPERTENSION: ICD-10-CM

## 2023-02-09 DIAGNOSIS — E03.9 HYPOTHYROIDISM, UNSPECIFIED TYPE: ICD-10-CM

## 2023-02-09 DIAGNOSIS — I50.22 CHRONIC SYSTOLIC (CONGESTIVE) HEART FAILURE (HCC): ICD-10-CM

## 2023-02-09 DIAGNOSIS — E78.2 MIXED HYPERLIPIDEMIA: ICD-10-CM

## 2023-02-09 DIAGNOSIS — I27.82 OTHER CHRONIC PULMONARY EMBOLISM WITHOUT ACUTE COR PULMONALE (HCC): ICD-10-CM

## 2023-02-09 PROBLEM — I50.33 DIASTOLIC CHF, ACUTE ON CHRONIC (HCC): Status: RESOLVED | Noted: 2022-05-09 | Resolved: 2023-02-09

## 2023-02-09 LAB — HBA1C MFR BLD: 5 %

## 2023-02-09 SDOH — ECONOMIC STABILITY: INCOME INSECURITY: HOW HARD IS IT FOR YOU TO PAY FOR THE VERY BASICS LIKE FOOD, HOUSING, MEDICAL CARE, AND HEATING?: NOT HARD AT ALL

## 2023-02-09 SDOH — ECONOMIC STABILITY: FOOD INSECURITY: WITHIN THE PAST 12 MONTHS, THE FOOD YOU BOUGHT JUST DIDN'T LAST AND YOU DIDN'T HAVE MONEY TO GET MORE.: NEVER TRUE

## 2023-02-09 SDOH — ECONOMIC STABILITY: FOOD INSECURITY: WITHIN THE PAST 12 MONTHS, YOU WORRIED THAT YOUR FOOD WOULD RUN OUT BEFORE YOU GOT MONEY TO BUY MORE.: NEVER TRUE

## 2023-02-09 SDOH — ECONOMIC STABILITY: HOUSING INSECURITY
IN THE LAST 12 MONTHS, WAS THERE A TIME WHEN YOU DID NOT HAVE A STEADY PLACE TO SLEEP OR SLEPT IN A SHELTER (INCLUDING NOW)?: NO

## 2023-02-09 ASSESSMENT — ENCOUNTER SYMPTOMS
SHORTNESS OF BREATH: 0
BLOOD IN STOOL: 0
CHEST TIGHTNESS: 0
ABDOMINAL PAIN: 0

## 2023-02-09 ASSESSMENT — PATIENT HEALTH QUESTIONNAIRE - PHQ9
1. LITTLE INTEREST OR PLEASURE IN DOING THINGS: 0
SUM OF ALL RESPONSES TO PHQ QUESTIONS 1-9: 0

## 2023-02-09 NOTE — PROGRESS NOTES
Subjective:      Patient ID: Vi Arevaol is a 79 y.o. female. HPI  Chronic Disease Visit Information    BP Readings from Last 3 Encounters:   02/07/23 120/62   12/06/22 (!) 119/57   08/11/22 138/60          Hemoglobin A1C (%)   Date Value   02/21/2022 4.9   07/12/2021 5.3   01/12/2021 4.9     Microalb/Crt. Ratio (mcg/mg creat)   Date Value   08/11/2020 CANNOT BE CALCULATED     LDL Cholesterol (mg/dL)   Date Value   02/21/2022 52     HDL (mg/dL)   Date Value   02/21/2022 65     BUN (mg/dL)   Date Value   08/23/2022 14     Creatinine (mg/dL)   Date Value   08/23/2022 1.25 (H)     Glucose (mg/dL)   Date Value   08/23/2022 85   03/17/2012 123 (H)            Have you changed or started any medications since your last visit including any over-the-counter medicines, vitamins, or herbal medicines? no   Are you having any side effects from any of your medications? -  no  Have you stopped taking any of your medications? Is so, why? -  no    Have you seen any other physician or provider since your last visit? Yes - Records Obtained  Have you had any other diagnostic tests since your last visit? Yes - Records Obtained  Have you been seen in the emergency room and/or had an admission to a hospital since we last saw you? Yes - Records Obtained  Have you had your annual diabetic retinal (eye) exam? Yes - Records Obtained cataract surgery dr Tyree Soto  Have you had your routine dental cleaning in the past 6 months? no    Have you activated your [employer] account? If not, what are your barriers?  Yes     Patient Care Team:  Pennelope Epley, MD as PCP - General (Family Medicine)  Pennelope Epley, MD as PCP - Empaneled Provider  María Mistry MD as Consulting Physician (Pulmonology)  Trudy Ramirez MD as Surgeon (General Surgery)  Bandar Jackson MD as Consulting Physician (Pain Management)  Marcelo Elias MD as Surgeon (Orthopedic Surgery)  Mariusz Henry MD as Consulting Physician (Gastroenterology) Medical History Review  Past Medical, Family, and Social History reviewed and does contribute to the patient presenting condition    Health Maintenance   Topic Date Due    Shingles vaccine (1 of 2) Never done    DTaP/Tdap/Td vaccine (1 - Tdap) 10/02/2002    Annual Wellness Visit (AWV)  Never done    COVID-19 Vaccine (4 - Booster for Pfizer series) 12/21/2021    Depression Screen  06/23/2023    Flu vaccine  Completed    Pneumococcal 65+ years Vaccine  Completed    DEXA (modify frequency per FRAX score)  Addressed    Hepatitis A vaccine  Aged Out    Hib vaccine  Aged Out    Meningococcal (ACWY) vaccine  Aged Out   Patient is an 79-year-old white female who presents for diabetes, hypertension, hyperlipidemia, hypothyroidism. She also has a history of chronic CHF atrial fibrillation and chronic PE. She states she is taking and tolerating her routine medications. She denies any fever, chills, chest pain, abdominal pain, shortness of breath. She has a good appetite and tries to remain active. Review of Systems   Constitutional:  Negative for chills and fever. HENT:  Negative for congestion. Respiratory:  Negative for chest tightness and shortness of breath. Cardiovascular:  Negative for chest pain. Gastrointestinal:  Negative for abdominal pain and blood in stool. Genitourinary:  Negative for dysuria and hematuria. Skin:  Negative for rash. Neurological:  Negative for dizziness. Psychiatric/Behavioral:  Negative for confusion and dysphoric mood. Objective:   Physical Exam  Vitals and nursing note reviewed. Constitutional:       General: She is not in acute distress. Appearance: She is well-developed. HENT:      Head: Normocephalic and atraumatic.       Right Ear: Tympanic membrane, ear canal and external ear normal.      Left Ear: Tympanic membrane, ear canal and external ear normal.      Nose: Nose normal.      Mouth/Throat:      Mouth: Mucous membranes are moist.      Pharynx: Oropharynx is clear. Eyes:      General: No scleral icterus. Right eye: No discharge. Left eye: No discharge. Conjunctiva/sclera: Conjunctivae normal.   Cardiovascular:      Rate and Rhythm: Normal rate and regular rhythm. Heart sounds: Normal heart sounds. Pulmonary:      Effort: Pulmonary effort is normal. No respiratory distress. Breath sounds: Normal breath sounds. No wheezing. Abdominal:      General: There is no distension. Palpations: Abdomen is soft. Tenderness: There is no abdominal tenderness. Musculoskeletal:      Cervical back: Neck supple. Skin:     General: Skin is warm and dry. Findings: No rash. Neurological:      Mental Status: She is alert and oriented to person, place, and time. Psychiatric:         Mood and Affect: Mood normal.         Behavior: Behavior normal.       Assessment:      1. Type 2 diabetes mellitus without complication, without long-term current use of insulin (HCC)    - POCT glycosylated hemoglobin (Hb A1C)  - Comprehensive Metabolic Panel; Future  - CBC with Auto Differential; Future  - Lipid Panel; Future  - Magnesium; Future    2. Essential hypertension    - Comprehensive Metabolic Panel; Future  - CBC with Auto Differential; Future  - Lipid Panel; Future  - Magnesium; Future    3. Mixed hyperlipidemia    - Comprehensive Metabolic Panel; Future  - Lipid Panel; Future    4. Hypothyroidism, unspecified type    - Comprehensive Metabolic Panel; Future  - Lipid Panel; Future    5. Chronic systolic (congestive) heart failure  Management by patient's cardiologist    6. Other chronic pulmonary embolism without acute cor pulmonale ([address]  Management by patient's pulmonologist  7.  Paroxysmal atrial fibrillation ([address]  Management by patient's cardiologist        Plan:      Orders Placed This Encounter   Procedures    Comprehensive Metabolic Panel     Fasting     Standing Status:   Future     Standing Expiration Date:   2/9/2024 CBC with Auto Differential     Standing Status:   Future     Standing Expiration Date:   2/9/2024    Lipid Panel     Standing Status:   Future     Standing Expiration Date:   2/9/2024     Order Specific Question:   Is Patient Fasting?/# of Hours     Answer:    Fast 8-10 hours    Magnesium     Standing Status:   Future     Standing Expiration Date:   2/9/2024    POCT glycosylated hemoglobin (Hb A1C)      Continue routine medications  Follow-up in 6 months or sooner if needed

## 2023-02-18 ENCOUNTER — APPOINTMENT (OUTPATIENT)
Dept: CT IMAGING | Age: 83
End: 2023-02-18
Payer: MEDICARE

## 2023-02-18 ENCOUNTER — HOSPITAL ENCOUNTER (EMERGENCY)
Age: 83
Discharge: HOME | End: 2023-02-18
Attending: EMERGENCY MEDICINE
Payer: MEDICARE

## 2023-02-18 VITALS
OXYGEN SATURATION: 99 % | TEMPERATURE: 98.5 F | RESPIRATION RATE: 13 BRPM | BODY MASS INDEX: 29.49 KG/M2 | HEART RATE: 73 BPM | WEIGHT: 177 LBS | HEIGHT: 65 IN | DIASTOLIC BLOOD PRESSURE: 74 MMHG | SYSTOLIC BLOOD PRESSURE: 150 MMHG

## 2023-02-18 DIAGNOSIS — K92.2 LOWER GI BLEED: Primary | ICD-10-CM

## 2023-02-18 LAB
ABO/RH: NORMAL
ABSOLUTE EOS #: 0.1 K/UL (ref 0–0.4)
ABSOLUTE LYMPH #: 1 K/UL (ref 1–4.8)
ABSOLUTE MONO #: 0.5 K/UL (ref 0.1–1.3)
ALBUMIN SERPL-MCNC: 3.5 G/DL (ref 3.5–5.2)
ALP SERPL-CCNC: 90 U/L (ref 35–104)
ALT SERPL-CCNC: 12 U/L (ref 5–33)
ANION GAP SERPL CALCULATED.3IONS-SCNC: 9 MMOL/L (ref 9–17)
ANTIBODY SCREEN: NEGATIVE
ARM BAND NUMBER: NORMAL
AST SERPL-CCNC: 20 U/L
BACTERIA: NORMAL
BASOPHILS # BLD: 1 % (ref 0–2)
BASOPHILS ABSOLUTE: 0.1 K/UL (ref 0–0.2)
BILIRUB DIRECT SERPL-MCNC: 0.1 MG/DL
BILIRUB INDIRECT SERPL-MCNC: 0.3 MG/DL (ref 0–1)
BILIRUB SERPL-MCNC: 0.4 MG/DL (ref 0.3–1.2)
BILIRUBIN URINE: NEGATIVE
BUN SERPL-MCNC: 14 MG/DL (ref 8–23)
CALCIUM SERPL-MCNC: 9.3 MG/DL (ref 8.6–10.4)
CASTS UA: NORMAL /LPF
CHLORIDE SERPL-SCNC: 104 MMOL/L (ref 98–107)
CO2 SERPL-SCNC: 26 MMOL/L (ref 20–31)
COLOR: YELLOW
CREAT SERPL-MCNC: 1.08 MG/DL (ref 0.5–0.9)
EOSINOPHILS RELATIVE PERCENT: 2 % (ref 0–4)
EPITHELIAL CELLS UA: NORMAL /HPF
EXPIRATION DATE: NORMAL
GFR SERPL CREATININE-BSD FRML MDRD: 51 ML/MIN/1.73M2
GLUCOSE SERPL-MCNC: 130 MG/DL (ref 70–99)
GLUCOSE UR STRIP.AUTO-MCNC: NEGATIVE MG/DL
HCT VFR BLD AUTO: 32.4 % (ref 36–46)
HGB BLD-MCNC: 10.7 G/DL (ref 12–16)
INR PPP: 1.9
KETONES UR STRIP.AUTO-MCNC: NEGATIVE MG/DL
LEUKOCYTE ESTERASE UR QL STRIP.AUTO: ABNORMAL
LIPASE SERPL-CCNC: 33 U/L (ref 13–60)
LYMPHOCYTES # BLD: 19 % (ref 24–44)
MCH RBC QN AUTO: 31.6 PG (ref 26–34)
MCHC RBC AUTO-ENTMCNC: 33.2 G/DL (ref 31–37)
MCV RBC AUTO: 95.3 FL (ref 80–100)
MONOCYTES # BLD: 10 % (ref 1–7)
NITRITE UR QL STRIP.AUTO: NEGATIVE
PARTIAL THROMBOPLASTIN TIME: 31.6 SEC (ref 24–36)
PDW BLD-RTO: 13.7 % (ref 11.5–14.9)
PLATELET # BLD AUTO: 171 K/UL (ref 150–450)
PMV BLD AUTO: 8.8 FL (ref 6–12)
POTASSIUM SERPL-SCNC: 3.7 MMOL/L (ref 3.7–5.3)
PROT SERPL-MCNC: 6.3 G/DL (ref 6.4–8.3)
PROT UR STRIP.AUTO-MCNC: 5 MG/DL (ref 5–8)
PROT UR STRIP.AUTO-MCNC: NEGATIVE MG/DL
PROTHROMBIN TIME: 21.3 SEC (ref 11.8–14.6)
RBC # BLD: 3.4 M/UL (ref 4–5.2)
RBC CLUMPS #/AREA URNS AUTO: NORMAL /HPF
SEG NEUTROPHILS: 68 % (ref 36–66)
SEGMENTED NEUTROPHILS ABSOLUTE COUNT: 3.6 K/UL (ref 1.3–9.1)
SODIUM SERPL-SCNC: 139 MMOL/L (ref 135–144)
SPECIFIC GRAVITY UA: 1.01 (ref 1–1.03)
TURBIDITY: CLEAR
URINE HGB: NEGATIVE
UROBILINOGEN, URINE: NORMAL
WBC # BLD AUTO: 5.2 K/UL (ref 3.5–11)
WBC UA: NORMAL /HPF

## 2023-02-18 PROCEDURE — 36415 COLL VENOUS BLD VENIPUNCTURE: CPT

## 2023-02-18 PROCEDURE — 80076 HEPATIC FUNCTION PANEL: CPT

## 2023-02-18 PROCEDURE — 99284 EMERGENCY DEPT VISIT MOD MDM: CPT

## 2023-02-18 PROCEDURE — 85730 THROMBOPLASTIN TIME PARTIAL: CPT

## 2023-02-18 PROCEDURE — 85025 COMPLETE CBC W/AUTO DIFF WBC: CPT

## 2023-02-18 PROCEDURE — 86900 BLOOD TYPING SEROLOGIC ABO: CPT

## 2023-02-18 PROCEDURE — 86850 RBC ANTIBODY SCREEN: CPT

## 2023-02-18 PROCEDURE — 74176 CT ABD & PELVIS W/O CONTRAST: CPT

## 2023-02-18 PROCEDURE — 81001 URINALYSIS AUTO W/SCOPE: CPT

## 2023-02-18 PROCEDURE — 85610 PROTHROMBIN TIME: CPT

## 2023-02-18 PROCEDURE — 80048 BASIC METABOLIC PNL TOTAL CA: CPT

## 2023-02-18 PROCEDURE — 86901 BLOOD TYPING SEROLOGIC RH(D): CPT

## 2023-02-18 PROCEDURE — 83690 ASSAY OF LIPASE: CPT

## 2023-02-18 RX ORDER — PANTOPRAZOLE SODIUM 40 MG/1
40 TABLET, DELAYED RELEASE ORAL
Qty: 30 TABLET | Refills: 0 | Status: SHIPPED | OUTPATIENT
Start: 2023-02-18

## 2023-02-18 RX ORDER — SODIUM CHLORIDE 0.9 % (FLUSH) 0.9 %
10 SYRINGE (ML) INJECTION PRN
Status: DISCONTINUED | OUTPATIENT
Start: 2023-02-18 | End: 2023-02-18

## 2023-02-18 RX ORDER — 0.9 % SODIUM CHLORIDE 0.9 %
100 INTRAVENOUS SOLUTION INTRAVENOUS ONCE
Status: DISCONTINUED | OUTPATIENT
Start: 2023-02-18 | End: 2023-02-18

## 2023-02-18 ASSESSMENT — ENCOUNTER SYMPTOMS
FACIAL SWELLING: 0
ABDOMINAL PAIN: 0
HEMATOCHEZIA: 1
SHORTNESS OF BREATH: 0
PHOTOPHOBIA: 0
TROUBLE SWALLOWING: 0
EYE PAIN: 0
NAUSEA: 0
BLOOD IN STOOL: 1
COLOR CHANGE: 0
VOICE CHANGE: 0
CHEST TIGHTNESS: 0
BACK PAIN: 0
VOMITING: 0

## 2023-02-18 ASSESSMENT — LIFESTYLE VARIABLES
HOW MANY STANDARD DRINKS CONTAINING ALCOHOL DO YOU HAVE ON A TYPICAL DAY: PATIENT DOES NOT DRINK
HOW OFTEN DO YOU HAVE A DRINK CONTAINING ALCOHOL: NEVER

## 2023-02-18 ASSESSMENT — PAIN - FUNCTIONAL ASSESSMENT: PAIN_FUNCTIONAL_ASSESSMENT: NONE - DENIES PAIN

## 2023-02-18 NOTE — ED TRIAGE NOTES
Mode of arrival (squad #, walk in, police, etc) : Walk In        Chief complaint(s): Rectal bleeding        Arrival Note (brief scenario, treatment PTA, etc). : Pt arrives to ED c/o rectal bleeding that started this morning. Patient reports blood in her stool.  Patient states that she has been seen before for this and bene diagnosed with \"something in the lining of her colon that seeps\"

## 2023-02-18 NOTE — ED NOTES
Dr. Mary Gomez reviewed patient's left upper arm where IV via ultrasound was placed. Area around IV is red, swollen and indurated with upper arm noted as edema +2. Ice and heat therapy discussed with application for 20 minutes per and for patient to alternate between ice and heat. Ice pack provided for patient to use on way home. Discharge instructions reviewed with patient, noting all directions and education by provider. Patient verbalizes understanding of all information reviewed, gathered personal items, and transferred under own power off unit to lobby without incident.       Xander Honeycutt RN  02/18/23 6440

## 2023-02-18 NOTE — ED PROVIDER NOTES
EMERGENCY DEPARTMENT ENCOUNTER    Pt Name: Helio Waters  MRN: [id number]  [place] 1940  Date of evaluation: 2/18/23  CHIEF COMPLAINT       Chief Complaint   Patient presents with    Rectal Bleeding     HISTORY OF PRESENT ILLNESS   71-year-old female presenting to the ER complaining of 2 episodes of GI bleed. Patient states she saw copious amounts of blood in the toilet twice today. Patient states she does have an underlying history of diverticulitis that required a colon resection. Patient does states she is on Xarelto therapy for multiple clots in the lower extremities as well as lungs in the past.  Patient denies any dizziness, lightheadedness, chest pain, abdominal pain. The history is provided by the patient and a relative. Rectal Bleeding  Quality:  Bright red  Amount: Moderate  Duration:  4 hours  Timing:  Constant  Chronicity:  New  Associated symptoms: no abdominal pain, no dizziness and no vomiting          REVIEW OF SYSTEMS     Review of Systems   Constitutional:  Negative for activity change, appetite change and fatigue. HENT:  Negative for facial swelling, trouble swallowing and voice change. Eyes:  Negative for photophobia and pain. Respiratory:  Negative for chest tightness and shortness of breath. Cardiovascular:  Negative for chest pain and palpitations. Gastrointestinal:  Positive for blood in stool and hematochezia. Negative for abdominal pain, nausea and vomiting. Genitourinary:  Negative for dysuria and urgency. Musculoskeletal:  Negative for arthralgias and back pain. Skin:  Negative for color change and rash. Neurological:  Negative for dizziness, syncope and headaches. Psychiatric/Behavioral:  Negative for behavioral problems and hallucinations.     PASTMEDICAL HISTORY     Past Medical History:   Diagnosis Date    Atrial fibrillation ([address] 03/28/2014    Chronic back pain     with rt. leg pain    Diverticulosis     GERD (gastroesophageal reflux disease) Hearing aid worn     willi. ears. Hearing deficit, bilateral     Hyperlipidemia     Hypertension     Hypothyroidism     Obesity (BMI 30.0-34.9) 08/12/2016    Onychomycosis     PE (pulmonary embolism)     H/O DVT of rt leg    Poor venous access     Type II or unspecified type diabetes mellitus without mention of complication, not stated as uncontrolled      Past Problem List  Patient Active Problem List   Diagnosis Code    Mixed hyperlipidemia E78.2    Gastroesophageal reflux disease without esophagitis K21.9    Onychomycosis B35.1    Diverticulosis K57.90    Chronic back pain M54.9, G89.29    Hypothyroidism E03.9    Essential hypertension I10    Right bundle branch block I45.10    Chronic pulmonary embolism (HCC) I27.82    Type 2 diabetes mellitus without complication, without long-term current use of insulin (HCC) E11.9    Obesity (BMI 30.0-34. 9) E66.9    Tracheobronchitis J40    Allergic rhinitis J30.9    Glaucoma H40.9    S/p bilateral myringotomy with tube placement Z96.22    GI bleed K92.2    Iron deficiency anemia D50.9    Paroxysmal atrial fibrillation (HCC) I48.0    Hemorrhage of rectum and anus K62.5    Intractable nausea and vomiting R11.2    Atrial fibrillation with RVR (HCC) I48.91    Class 2 obesity due to excess calories in adult E66.09    Sepsis (HCC) A41.9    JOSH (acute kidney injury) ([facility] N17.9    Syncope R55    Acute cystitis without hematuria N30.00    Chronic systolic (congestive) heart failure I50.22     SURGICAL HISTORY       Past Surgical History:   Procedure Laterality Date    ARM SURGERY Right     Growth taken off per pt. CARPAL TUNNEL RELEASE Bilateral     Per pt.     CATARACT EXTRACTION EXTRACAPSULAR W/ INTRAOCULAR LENS IMPLANTATION Left 12/06/2022    [facility]    CATARACT EXTRACTION EXTRACAPSULAR W/ INTRAOCULAR LENS IMPLANTATION Right 02/07/2023    [facility]    CHOLECYSTECTOMY      COLON SURGERY      s/p sigmoid cloectomy    COLONOSCOPY N/A 01/25/2021 COLONOSCOPY DIAGNOSTIC performed by Hannah Hopkins MD at [facility] ([facility])      INTRACAPSULAR CATARACT EXTRACTION Left 12/06/2022    EYE CATARACT EMULSIFICATION IOL IMPLANT performed by Tristan Stafford MD at [address] [facility] Right 2/7/2023    EYE CATARACT EMULSIFICATION IOL IMPLANT performed by Tristan Stafford MD at [address] N/A 12/02/2021    [address] performed by Hannah Hopkins MD at [facility] Bilateral 09/20/2019    DR IMAN GAUTHIER    UPPER GASTROINTESTINAL ENDOSCOPY N/A 12/02/2021    EGD DIAGNOSTIC ONLY performed by Hannah Hopkins MD at [address]      s/p     CURRENT MEDICATIONS       Previous Medications    ACETAMINOPHEN (TYLENOL) 500 MG TABLET    Take 500 mg by mouth at bedtime    BUMETANIDE (BUMEX) 1 MG TABLET    Take 1 tablet by mouth daily    CHOLESTYRAMINE (QUESTRAN) 4 G PACKET    Take 1 packet by mouth every evening    DORZOLAMIDE (TRUSOPT) 2 % OPHTHALMIC SOLUTION    Place 1 drop into both eyes 2 times daily 8am and 8pm    FEROSUL 325 (65 FE) MG TABLET    TAKE 1 TABLET BY MOUTH IN THE MORNING WITH FOOD    LATANOPROST (XALATAN) 0.005 % OPHTHALMIC SOLUTION    Place 1 drop into both eyes nightly 1230am    LEVOTHYROXINE (SYNTHROID) 100 MCG TABLET    TAKE 1 TABLET BY MOUTH EVERY DAY    METOPROLOL SUCCINATE (TOPROL XL) 25 MG EXTENDED RELEASE TABLET    Take 1 tablet by mouth daily    MIDODRINE (PROAMATINE) 2.5 MG TABLET    Take 1 tablet by mouth 3 times daily as needed (low SBP)    PROBIOTIC PRODUCT (PROBIOTIC DAILY) CAPS    Take 1 capsule by mouth daily    RIVAROXABAN (XARELTO) 10 MG TABS TABLET    Take 10 mg by mouth    SIMETHICONE (MYLICON) 80 MG CHEWABLE TABLET    Take 80 mg by mouth every 6 hours as needed for Flatulence     ALLERGIES     is allergic to avapro [irbesartan], bactrim [sulfamethoxazole-trimethoprim], ciprofloxacin hcl, cozaar [losartan potassium], diovan [valsartan], lipitor [atorvastatin calcium], lisinopril, pcn [penicillins], pravachol [pravastatin sodium], tape [adhesive tape], tramadol, zetia [ezetimibe], and morphine. FAMILY HISTORY     is adopted. SOCIAL HISTORY       Social History     Tobacco Use    Smoking status: Never    Smokeless tobacco: Never   Vaping Use    Vaping Use: Never used   Substance Use Topics    Alcohol use: No    Drug use: No     PHYSICAL EXAM     INITIAL VITALS: BP (!) 150/74   Pulse 73   Temp 98.5 °F (36.9 °C) (Oral)   Resp 13   Ht 5' 5\" (1.651 m)   Wt 177 lb (80.3 kg)   SpO2 99%   BMI 29.45 kg/m²    Physical Exam  Vitals reviewed. Constitutional:       General: She is not in acute distress. Appearance: Normal appearance. She is not ill-appearing or toxic-appearing. HENT:      Head: Normocephalic and atraumatic. Right Ear: External ear normal.      Left Ear: External ear normal.      Nose: No congestion or rhinorrhea. Eyes:      Extraocular Movements: Extraocular movements intact. Pupils: Pupils are equal, round, and reactive to light. Cardiovascular:      Rate and Rhythm: Normal rate and regular rhythm. Pulses: Normal pulses. Heart sounds: Normal heart sounds. Pulmonary:      Effort: Pulmonary effort is normal. No respiratory distress. Breath sounds: Normal breath sounds. No wheezing. Abdominal:      General: Bowel sounds are normal. There is no distension. Palpations: Abdomen is soft. Tenderness: There is abdominal tenderness in the left lower quadrant. Musculoskeletal:         General: No deformity or signs of injury. Normal range of motion. Cervical back: No rigidity or tenderness. Skin:     General: Skin is warm and dry. Neurological:      Mental Status: She is alert and oriented to person, place, and time. Mental status is at baseline.    Psychiatric:         Mood and Affect: Mood normal.         Behavior: Behavior normal.       MEDICAL DECISION MAKING / ED COURSE:         1)  Number and Complexity of Problems Addressed at this Encounter  Problem List This Visit: 2 episodes of GI bleed    Differential Diagnosis: Diverticulitis, anemia        Pertinent Comorbid Conditions: Patient is on Xarelto therapy for multiple clots in the past    2)  Data Reviewed and Analyzed  (Lab and radiology tests/orders below in next section)    Lab work did not display signs of acute abnormality. Imaging that is independently reviewed and interpreted by me as: CT abdomen pelvis did not display signs of acute pathology. 3)  Treatment and Disposition         Patient with 2 episodes of GI bleed. Lab work in the ER did not display signs of acute or normality. Patient prescribed Protonix for outpatient use. Lab and imaging in the ER did not display signs of acute pathology. Patient instructed to follow-up with the primary care physician as well as GI within 2 days and to return to the ER immediately if symptoms worsen or change. Patient and family understand and agree with the plan    CRITICAL CARE:       PROCEDURES:    Procedures      DATA FOR LAB AND RADIOLOGY TESTS ORDERED BELOW ARE REVIEWED BY THE ED CLINICIAN:    RADIOLOGY: All x-rays, CT, MRI, and formal ultrasound images (except ED bedside ultrasound) are read by the radiologist, see reports below, unless otherwise noted in MDM or here. Reports below are reviewed by myself. CT ABDOMEN PELVIS WO CONTRAST Additional Contrast? None   Final Result   Right middle lobe airspace disease could represent bronchiolitis and/or   pneumonia. Stable appearance of the bowel compared with prior CT from 2021. Sigmoid diverticulosis without definite acute diverticulitis. LABS: Lab orders shown below, the results are reviewed by myself, and all abnormals are listed below.   Labs Reviewed   URINALYSIS WITH REFLEX TO CULTURE - Abnormal; Notable for the following components:       Result Value Leukocyte Esterase, Urine SMALL (*)     All other components within normal limits   PROTIME-INR - Abnormal; Notable for the following components:    Protime 21.3 (*)     All other components within normal limits   HEPATIC FUNCTION PANEL - Abnormal; Notable for the following components: Total Protein 6.3 (*)     All other components within normal limits   BASIC METABOLIC PANEL - Abnormal; Notable for the following components:    Glucose 130 (*)     Creatinine 1.08 (*)     Est, Glom Filt Rate 51 (*)     All other components within normal limits   CBC WITH AUTO DIFFERENTIAL - Abnormal; Notable for the following components:    RBC 3.40 (*)     Hemoglobin 10.7 (*)     Hematocrit 32.4 (*)     Seg Neutrophils 68 (*)     Lymphocytes 19 (*)     Monocytes 10 (*)     All other components within normal limits   LIPASE   APTT   MICROSCOPIC URINALYSIS   TYPE AND SCREEN       Vitals Reviewed:    Vitals:    02/18/23 1048 02/18/23 1408   BP: (!) 146/65 (!) 150/74   Pulse: 100 73   Resp: 17 13   Temp: 98.5 °F (36.9 °C)    TempSrc: Oral    SpO2: 99% 99%   Weight: 177 lb (80.3 kg)    Height: 5' 5\" (1.651 m)      MEDICATIONS GIVEN TO PATIENT THIS ENCOUNTER:  Orders Placed This Encounter   Medications    DISCONTD: 0.9 % sodium chloride bolus    DISCONTD: iopamidol (ISOVUE-370) 76 % injection 75 mL    DISCONTD: sodium chloride flush 0.9 % injection 10 mL    pantoprazole (PROTONIX) 40 MG tablet     Sig: Take 1 tablet by mouth every morning (before breakfast)     Dispense:  30 tablet     Refill:  0     DISCHARGE PRESCRIPTIONS:  New Prescriptions    PANTOPRAZOLE (PROTONIX) 40 MG TABLET    Take 1 tablet by mouth every morning (before breakfast)     PHYSICIAN CONSULTS ORDERED THIS ENCOUNTER:  None  FINAL IMPRESSION      1.  Lower GI bleed          DISPOSITION/PLAN   DISPOSITION Decision To Discharge 02/18/2023 03:09:32 PM      OUTPATIENT FOLLOW UP THE PATIENT:  Monalisa Maxwell MD  [facility] [id number]  [phone]    Schedule an appointment as soon as possible for a visit in 2 days      [facility] ED  [facility] [address] [facility] [address]  [phone]  Go to   As needed, If symptoms worsen    Tulio Hurst MD  [address] [address]  [phone]    Schedule an appointment as soon as possible for a visit in 2 days      DO Feroz Birmingham DO  02/18/23 1522

## 2023-02-20 ENCOUNTER — HOSPITAL ENCOUNTER (OUTPATIENT)
Age: 83
Discharge: HOME | End: 2023-02-20
Payer: MEDICARE

## 2023-02-20 ENCOUNTER — CARE COORDINATION (OUTPATIENT)
Dept: CARE COORDINATION | Age: 83
End: 2023-02-20

## 2023-02-20 DIAGNOSIS — E03.9 HYPOTHYROIDISM, UNSPECIFIED TYPE: ICD-10-CM

## 2023-02-20 DIAGNOSIS — I10 ESSENTIAL HYPERTENSION: ICD-10-CM

## 2023-02-20 DIAGNOSIS — E11.9 TYPE 2 DIABETES MELLITUS WITHOUT COMPLICATION, WITHOUT LONG-TERM CURRENT USE OF INSULIN (HCC): ICD-10-CM

## 2023-02-20 DIAGNOSIS — D64.9 ANEMIA, UNSPECIFIED TYPE: Primary | ICD-10-CM

## 2023-02-20 DIAGNOSIS — E78.2 MIXED HYPERLIPIDEMIA: ICD-10-CM

## 2023-02-20 LAB
ABSOLUTE EOS #: 0.1 K/UL (ref 0–0.4)
ABSOLUTE LYMPH #: 1.2 K/UL (ref 1–4.8)
ABSOLUTE MONO #: 0.5 K/UL (ref 0.1–1.3)
ALBUMIN SERPL-MCNC: 3.4 G/DL (ref 3.5–5.2)
ALP SERPL-CCNC: 85 U/L (ref 35–104)
ALT SERPL-CCNC: 10 U/L (ref 5–33)
ANION GAP SERPL CALCULATED.3IONS-SCNC: 8 MMOL/L (ref 9–17)
AST SERPL-CCNC: 18 U/L
BASOPHILS # BLD: 1 % (ref 0–2)
BASOPHILS ABSOLUTE: 0.1 K/UL (ref 0–0.2)
BILIRUB SERPL-MCNC: 0.7 MG/DL (ref 0.3–1.2)
BUN SERPL-MCNC: 19 MG/DL (ref 8–23)
CALCIUM SERPL-MCNC: 9 MG/DL (ref 8.6–10.4)
CHLORIDE SERPL-SCNC: 103 MMOL/L (ref 98–107)
CHOLEST SERPL-MCNC: 126 MG/DL
CHOLESTEROL/HDL RATIO: 2.3
CO2 SERPL-SCNC: 27 MMOL/L (ref 20–31)
CREAT SERPL-MCNC: 1.23 MG/DL (ref 0.5–0.9)
EOSINOPHILS RELATIVE PERCENT: 1 % (ref 0–4)
GFR SERPL CREATININE-BSD FRML MDRD: 44 ML/MIN/1.73M2
GLUCOSE SERPL-MCNC: 112 MG/DL (ref 70–99)
HCT VFR BLD AUTO: 31.1 % (ref 36–46)
HDLC SERPL-MCNC: 55 MG/DL
HGB BLD-MCNC: 10 G/DL (ref 12–16)
LDLC SERPL CALC-MCNC: 55 MG/DL (ref 0–130)
LYMPHOCYTES # BLD: 19 % (ref 24–44)
MAGNESIUM SERPL-MCNC: 2 MG/DL (ref 1.6–2.6)
MCH RBC QN AUTO: 31.1 PG (ref 26–34)
MCHC RBC AUTO-ENTMCNC: 32.1 G/DL (ref 31–37)
MCV RBC AUTO: 96.7 FL (ref 80–100)
MONOCYTES # BLD: 8 % (ref 1–7)
PDW BLD-RTO: 14.2 % (ref 11.5–14.9)
PLATELET # BLD AUTO: 179 K/UL (ref 150–450)
PMV BLD AUTO: 9.5 FL (ref 6–12)
POTASSIUM SERPL-SCNC: 3.8 MMOL/L (ref 3.7–5.3)
PROT SERPL-MCNC: 6.4 G/DL (ref 6.4–8.3)
RBC # BLD: 3.21 M/UL (ref 4–5.2)
SEG NEUTROPHILS: 71 % (ref 36–66)
SEGMENTED NEUTROPHILS ABSOLUTE COUNT: 4.6 K/UL (ref 1.3–9.1)
SODIUM SERPL-SCNC: 138 MMOL/L (ref 135–144)
TRIGL SERPL-MCNC: 80 MG/DL
WBC # BLD AUTO: 6.5 K/UL (ref 3.5–11)

## 2023-02-20 PROCEDURE — 80061 LIPID PANEL: CPT

## 2023-02-20 PROCEDURE — 83735 ASSAY OF MAGNESIUM: CPT

## 2023-02-20 PROCEDURE — 36415 COLL VENOUS BLD VENIPUNCTURE: CPT

## 2023-02-20 PROCEDURE — 80053 COMPREHEN METABOLIC PANEL: CPT

## 2023-02-20 PROCEDURE — 85025 COMPLETE CBC W/AUTO DIFF WBC: CPT

## 2023-02-20 NOTE — CARE COORDINATION
Ambulatory Care Coordination  ED Follow up Call    Reason for ED visit:  Rectal Bleeding   Status:     improved    Did you call your PCP prior to going to the ED? No      Did you receive a discharge instructions from the Emergency Room? Yes  Review of Instructions:     Understands what to report/when to return?:  Yes   Understands discharge instructions?:  Yes   Following discharge instructions?:  Yes   If not why? Are there any new complaints of pain? No  New Pain Meds? No    Constipation prophylaxis needed? N/A    If you have a wound is the dressing clean, dry, and intact? N/A  Understands wound care regimen? N/A    Are there any other complaints/concerns that you wish to tell your provider? no    FU appts/Provider:    Future Appointments   Date Time Provider David Shen   2/27/2023  2:10 PM Bharti Chen MD [facility]   8/4/2023  1:00 PM Jennifer Giles MD [facility]           New Medications?:   Yes- Protonix 40 mg tablet daily      Medication Reconciliation by phone - Yes  Understands Medications? Yes  Taking Medications? Yes  Can you swallow your pills? Yes    Any further needs in the home i.e. Equipment? No    Link to services in community?:  No   Which services:      Pablo Mcintosh states she is doing better. She states the bleeding is lessened. She denies any pain, shortness of breath, or dizziness. She had labs drawn this morning. Hgb 10.0- other labs pending. She states she had a bleeding episode like this about 2 years ago. She has not changed her diet or had any issues with constipation. She called to get follow up with PCP and was placed on a waiting list. She called GI for follow up and is waiting on a return call. She said her son will take her back to the ED if the bleeding worsens, or her BP drops or she becomes dizzy. Discussed other needs for ACM enrollment. She denies any other needs.  She stated that she had surgery on both eyes for cataracts and they are doing better. She just needs to get a new eye glass prescription. Declines services at this time.

## 2023-02-21 ENCOUNTER — HOSPITAL ENCOUNTER (OUTPATIENT)
Age: 83
Discharge: HOME | End: 2023-02-21
Payer: MEDICARE

## 2023-02-21 DIAGNOSIS — D64.9 ANEMIA, UNSPECIFIED TYPE: ICD-10-CM

## 2023-02-21 LAB
FERRITIN SERPL-MCNC: 67 NG/ML (ref 13–150)
IRON SATURATION: 28 % (ref 20–55)
IRON SERPL-MCNC: 104 UG/DL (ref 37–145)
TIBC SERPL-MCNC: 371 UG/DL (ref 250–450)
UNSATURATED IRON BINDING CAPACITY: 267 UG/DL (ref 112–347)

## 2023-02-21 PROCEDURE — 82607 VITAMIN B-12: CPT

## 2023-02-21 PROCEDURE — 83550 IRON BINDING TEST: CPT

## 2023-02-21 PROCEDURE — 83540 ASSAY OF IRON: CPT

## 2023-02-21 PROCEDURE — 36415 COLL VENOUS BLD VENIPUNCTURE: CPT

## 2023-02-21 PROCEDURE — 82728 ASSAY OF FERRITIN: CPT

## 2023-02-21 PROCEDURE — 82746 ASSAY OF FOLIC ACID SERUM: CPT

## 2023-02-22 LAB
FOLATE SERPL-MCNC: 18.2 NG/ML
VIT B12 SERPL-MCNC: 239 PG/ML (ref 232–1245)

## 2023-02-23 ENCOUNTER — TELEPHONE (OUTPATIENT)
Dept: UROLOGY | Age: 83
End: 2023-02-23

## 2023-02-23 NOTE — TELEPHONE ENCOUNTER
Returned patient's voicemail. Unable to reach patient, phone just rang. Unable to leave voicemail for patient.

## 2023-02-27 ENCOUNTER — OFFICE VISIT (OUTPATIENT)
Dept: UROLOGY | Age: 83
End: 2023-02-27
Payer: MEDICARE

## 2023-02-27 VITALS
WEIGHT: 177 LBS | BODY MASS INDEX: 29.49 KG/M2 | DIASTOLIC BLOOD PRESSURE: 83 MMHG | SYSTOLIC BLOOD PRESSURE: 127 MMHG | HEIGHT: 65 IN | HEART RATE: 90 BPM

## 2023-02-27 DIAGNOSIS — N39.0 RECURRENT UTI: Primary | ICD-10-CM

## 2023-02-27 DIAGNOSIS — R35.0 FREQUENCY OF URINATION: ICD-10-CM

## 2023-02-27 DIAGNOSIS — R30.0 DYSURIA: ICD-10-CM

## 2023-02-27 PROBLEM — N18.30 CHRONIC RENAL DISEASE, STAGE III (HCC): Status: ACTIVE | Noted: 2023-02-27

## 2023-02-27 PROCEDURE — 1123F ACP DISCUSS/DSCN MKR DOCD: CPT | Performed by: UROLOGY

## 2023-02-27 PROCEDURE — G8427 DOCREV CUR MEDS BY ELIG CLIN: HCPCS | Performed by: UROLOGY

## 2023-02-27 PROCEDURE — G8400 PT W/DXA NO RESULTS DOC: HCPCS | Performed by: UROLOGY

## 2023-02-27 PROCEDURE — 1090F PRES/ABSN URINE INCON ASSESS: CPT | Performed by: UROLOGY

## 2023-02-27 PROCEDURE — G8417 CALC BMI ABV UP PARAM F/U: HCPCS | Performed by: UROLOGY

## 2023-02-27 PROCEDURE — 3079F DIAST BP 80-89 MM HG: CPT | Performed by: UROLOGY

## 2023-02-27 PROCEDURE — 3074F SYST BP LT 130 MM HG: CPT | Performed by: UROLOGY

## 2023-02-27 PROCEDURE — 1036F TOBACCO NON-USER: CPT | Performed by: UROLOGY

## 2023-02-27 PROCEDURE — G8484 FLU IMMUNIZE NO ADMIN: HCPCS | Performed by: UROLOGY

## 2023-02-27 PROCEDURE — 99204 OFFICE O/P NEW MOD 45 MIN: CPT | Performed by: UROLOGY

## 2023-02-27 ASSESSMENT — ENCOUNTER SYMPTOMS
NAUSEA: 0
GASTROINTESTINAL NEGATIVE: 1
BACK PAIN: 0
ABDOMINAL PAIN: 0
CONSTIPATION: 0
EYE PAIN: 0
EYE REDNESS: 0
WHEEZING: 0
DIARRHEA: 0
RESPIRATORY NEGATIVE: 1
EYES NEGATIVE: 1
VOMITING: 0
SHORTNESS OF BREATH: 0
COUGH: 0

## 2023-02-27 NOTE — PROGRESS NOTES
[address] [address]  Dept: 92 Matt Portillo [facility] Urology Office Note - New Patient    Patient:  Tran Perdomo  YOB: 1940  Date: 2/27/2023    The patient is a 79 y.o. female who presentstoday for evaluation of the following problems:   Chief Complaint   Patient presents with    Cystitis     New patient- bladder infection    referred by Shahid Vigil MD.    HPI  This is a very pleasant 28-year-old female who has a history of overactive bladder. She does have urgency and frequency but more bothersome is recurrent UTIs. We had actually seen her in 2015 but she never followed up after that. Over the last few years her infections have become more frequent. She does get bladder pain as well as some worsening urgency and frequency when she is infected. When she takes the antibiotics, her symptoms do improve but then recur fairly quickly shortly thereafter. (Patient's old records have been requested, reviewed and summarized in today's note.)    Summary of old records: N/A    History: N/A    ProceduresToday: N/A    Urinalysis today:  No results found for this visit on 02/27/23. AUA Symptom Score (2/27/2023): Last BUN andcreatinine:  Lab Results   Component Value Date    BUN 19 02/20/2023     Lab Results   Component Value Date    CREATININE 1.23 (H) 02/20/2023       Additional Lab/Culture results: none    Reviewed during this Office Visit: none  (results were independently reviewed byphysician and radiology report verified)    PAST MEDICAL, FAMILY AND SOCIAL HISTORY:  Past Medical History:   Diagnosis Date    Atrial fibrillation ([address] 03/28/2014    Chronic back pain     with rt. leg pain    Diverticulosis     GERD (gastroesophageal reflux disease)     Hearing aid worn     willi. ears.     Hearing deficit, bilateral     Hyperlipidemia     Hypertension Hypothyroidism     Obesity (BMI 30.0-34.9) 08/12/2016    Onychomycosis     PE (pulmonary embolism)     H/O DVT of rt leg    Poor venous access     Type II or unspecified type diabetes mellitus without mention of complication, not stated as uncontrolled      Past Surgical History:   Procedure Laterality Date    ARM SURGERY Right     Growth taken off per pt. CARPAL TUNNEL RELEASE Bilateral     Per pt.     CATARACT EXTRACTION EXTRACAPSULAR W/ INTRAOCULAR LENS IMPLANTATION Left 12/06/2022    [facility]    CATARACT EXTRACTION EXTRACAPSULAR W/ INTRAOCULAR LENS IMPLANTATION Right 02/07/2023    [facility]    CHOLECYSTECTOMY      COLON SURGERY      s/p sigmoid cloectomy    COLONOSCOPY N/A 01/25/2021    COLONOSCOPY DIAGNOSTIC performed by Baltazar Romberg, MD at [place] [address]. ([address] [place] [address])      INTRACAPSULAR CATARACT EXTRACTION Left 12/06/2022    EYE CATARACT EMULSIFICATION IOL IMPLANT performed by Fernie Neal MD at [address] [facility] Right 2/7/2023    EYE CATARACT EMULSIFICATION IOL IMPLANT performed by Fernie Neal MD at [address] [place] [address] N/A 12/02/2021    SIGMOIDOSCOPY DIAGNOSTIC FLEXIBLE performed by Baltazar Romberg, MD at [facility] Bilateral 09/20/2019    [facility]HI[facility]    UPPER GASTROINTESTINAL ENDOSCOPY N/A 12/02/2021    EGD DIAGNOSTIC ONLY performed by Baltazar Romberg, MD at [facility]      s/p     Family History   Adopted: Yes   Problem Relation Age of Onset    No Known Problems Mother     No Known Problems Father      Outpatient Medications Marked as Taking for the 2/27/23 encounter (Office Visit) with Phani Carrillo MD   Medication Sig Dispense Refill    pantoprazole (PROTONIX) 40 MG tablet Take 1 tablet by mouth every morning (before breakfast) 30 tablet 0    FEROSUL 325 (65 Fe) MG tablet TAKE 1 TABLET BY MOUTH IN THE MORNING WITH FOOD 30 tablet 0 rivaroxaban (XARELTO) 10 MG TABS tablet Take 10 mg by mouth      metoprolol succinate (TOPROL XL) 25 MG extended release tablet Take 1 tablet by mouth daily 30 tablet 3    bumetanide (BUMEX) 1 MG tablet Take 1 tablet by mouth daily 30 tablet 3    cholestyramine (QUESTRAN) 4 g packet Take 1 packet by mouth every evening 90 packet 3    midodrine (PROAMATINE) 2.5 MG tablet Take 1 tablet by mouth 3 times daily as needed (low SBP) 90 tablet 3    simethicone (MYLICON) 80 MG chewable tablet Take 80 mg by mouth every 6 hours as needed for Flatulence      Probiotic Product (PROBIOTIC DAILY) CAPS Take 1 capsule by mouth daily      acetaminophen (TYLENOL) 500 MG tablet Take 500 mg by mouth at bedtime      levothyroxine (SYNTHROID) 100 MCG tablet TAKE 1 TABLET BY MOUTH EVERY DAY 90 tablet 1    dorzolamide (TRUSOPT) 2 % ophthalmic solution Place 1 drop into both eyes 2 times daily 8am and 8pm  10    latanoprost (XALATAN) 0.005 % ophthalmic solution Place 1 drop into both eyes nightly 1230am         Avapro [irbesartan], Bactrim [sulfamethoxazole-trimethoprim], Ciprofloxacin hcl, Cozaar [losartan potassium], Diovan [valsartan], Lipitor [atorvastatin calcium], Lisinopril, Pcn [penicillins], Pravachol [pravastatin sodium], Tape [adhesive tape], Tramadol, Zetia [ezetimibe], and Morphine  Social History     Tobacco Use   Smoking Status Never   Smokeless Tobacco Never      (If patient a smoker, smoking cessation counseling offered)   Social History     Substance and Sexual Activity   Alcohol Use No       REVIEW OF SYSTEMS:  Review of Systems    Physical Exam:    This a 79 y.o. female      Vitals:    02/27/23 1420   BP: 127/83   Pulse: 90     Body mass index is 29.45 kg/m². Physical Exam  Constitutional: Patient in no acute distress, ggod grooming, appropriately dressed  Neuro: Alert and oriented to person, place and time.   Psych:Mood normal, affect normal  Skin: No rash noted  HEENT: Head: Normocephalic and atraumatic,Conjunctivae and EOM are normal,Nose- normal, Right/Left External Ear: normal, Mouth: Mucosa Moist  Neck: Supple  Lungs: Respiratory effort is normal  Cardiovascular: strong and regular, no lower leg edema  Abdomen: Soft, non-tender, non-distended with no CVA,    Lymphatics: No cervical palpable lymphadenopathy. Bladder non-tender and not distended. Musculoskeletal: Normal gait and station        Assessment and Plan      1. Recurrent UTI    2. Dysuria    3. Frequency of urination            Plan:   Cysto to complete rec uti w/u  Will consider vaginal estrogen       Prescriptions Ordered:  No orders of the defined types were placed in this encounter. Orders Placed:  No orders of the defined types were placed in this encounter. Jose Lynch MD    Agree with the ROS entered by the MA.

## 2023-02-28 ENCOUNTER — ANESTHESIA EVENT (OUTPATIENT)
Dept: ENDOSCOPY | Age: 83
End: 2023-02-28
Payer: MEDICARE

## 2023-02-28 ENCOUNTER — HOSPITAL ENCOUNTER (OUTPATIENT)
Dept: PREADMISSION TESTING | Age: 83
Setting detail: OUTPATIENT SURGERY
Discharge: HOME | End: 2023-03-04
Payer: MEDICARE

## 2023-02-28 ENCOUNTER — OFFICE VISIT (OUTPATIENT)
Dept: GASTROENTEROLOGY | Age: 83
End: 2023-02-28
Payer: MEDICARE

## 2023-02-28 VITALS
HEIGHT: 65 IN | BODY MASS INDEX: 29.32 KG/M2 | SYSTOLIC BLOOD PRESSURE: 139 MMHG | WEIGHT: 176 LBS | DIASTOLIC BLOOD PRESSURE: 87 MMHG

## 2023-02-28 VITALS — HEIGHT: 65 IN | BODY MASS INDEX: 29.32 KG/M2 | WEIGHT: 176 LBS

## 2023-02-28 DIAGNOSIS — K92.1 MELENA: Primary | ICD-10-CM

## 2023-02-28 PROCEDURE — 1123F ACP DISCUSS/DSCN MKR DOCD: CPT | Performed by: INTERNAL MEDICINE

## 2023-02-28 PROCEDURE — 99215 OFFICE O/P EST HI 40 MIN: CPT | Performed by: INTERNAL MEDICINE

## 2023-02-28 PROCEDURE — G8417 CALC BMI ABV UP PARAM F/U: HCPCS | Performed by: INTERNAL MEDICINE

## 2023-02-28 PROCEDURE — 1090F PRES/ABSN URINE INCON ASSESS: CPT | Performed by: INTERNAL MEDICINE

## 2023-02-28 PROCEDURE — G8484 FLU IMMUNIZE NO ADMIN: HCPCS | Performed by: INTERNAL MEDICINE

## 2023-02-28 PROCEDURE — G8427 DOCREV CUR MEDS BY ELIG CLIN: HCPCS | Performed by: INTERNAL MEDICINE

## 2023-02-28 PROCEDURE — 1036F TOBACCO NON-USER: CPT | Performed by: INTERNAL MEDICINE

## 2023-02-28 PROCEDURE — 3075F SYST BP GE 130 - 139MM HG: CPT | Performed by: INTERNAL MEDICINE

## 2023-02-28 PROCEDURE — 3079F DIAST BP 80-89 MM HG: CPT | Performed by: INTERNAL MEDICINE

## 2023-02-28 PROCEDURE — G8400 PT W/DXA NO RESULTS DOC: HCPCS | Performed by: INTERNAL MEDICINE

## 2023-02-28 ASSESSMENT — ENCOUNTER SYMPTOMS
ALLERGIC/IMMUNOLOGIC NEGATIVE: 1
RECTAL PAIN: 1
ANAL BLEEDING: 1

## 2023-02-28 NOTE — PROGRESS NOTES
GI OFFICE FOLLOW UP    INTERVAL HISTORY:   No referring provider defined for this encounter. Chief Complaint   Patient presents with    Other     Pt states she has a lot of gas and belching. States she was given protonix in the er     Rectal Bleeding     Pt states she was in ed for rectal bleeding, states it has since stopped. 1. Melena              HISTORY OF PRESENT ILLNESS:   Patient seen with the symptom of dark stools/hematochezia. Patient had this issue on February 18, 2019 and 20.  1 of those days she had a moderate amount of rectal bleeding. She denies constipation. Did not strain at bowel movements. She has been on anticoagulation therapy with Xarelto. Also examined constant nausea. Frequent belching's. Denies abdominal distention bloating fever chills etc.  Patient had similar issues in 2021 and at that time she was found to have friable mucosa at the anastomosis. Looks like patient had subtotal colectomy for diverticular disease in the past.  EGD was nonspecific on December 2, 2021. At present denies chest pain, shortness of breath, palpitations. She feels weak tired. Past Medical,Family, and Social History reviewed and does contribute to the patient presenting condition. CBC done 1 week ago revealed hemoglobin of 10. Iron folate levels within normal limits. B12 levels normal as well. Patient's PMH/PSH,SH,PSYCH Hx, MEDs, ALLERGIES, and ROS were all reviewed and updated in the appropriate sections. Yes      PAST MEDICAL HISTORY:  Past Medical History:   Diagnosis Date    Atrial fibrillation ([facility] [address] 03/28/2014    Chronic back pain     with rt. leg pain    Diverticulosis     GERD (gastroesophageal reflux disease)     Hearing aid worn     willi. ears. Hearing deficit, bilateral     Hyperlipidemia     Hypertension     Hypothyroidism     Obesity (BMI 30.0-34. 9) 08/12/2016    Onychomycosis     PE (pulmonary embolism)     H/O DVT of rt leg    Poor venous access     Type II or unspecified type diabetes mellitus without mention of complication, not stated as uncontrolled        Past Surgical History:   Procedure Laterality Date    ARM SURGERY Right     Growth taken off per pt. CARPAL TUNNEL RELEASE Bilateral     Per pt.     CATARACT EXTRACTION EXTRACAPSULAR W/ INTRAOCULAR LENS IMPLANTATION Left 12/06/2022    [facility]    CATARACT EXTRACTION EXTRACAPSULAR W/ INTRAOCULAR LENS IMPLANTATION Right 02/07/2023    [facility]    CHOLECYSTECTOMY      COLON SURGERY      s/p sigmoid cloectomy    COLONOSCOPY N/A 01/25/2021    COLONOSCOPY DIAGNOSTIC performed by Bao Yao MD at [facility] ([facility]      INTRACAPSULAR CATARACT EXTRACTION Left 12/06/2022    EYE CATARACT EMULSIFICATION IOL IMPLANT performed by Karla Love MD at [facility] Right 2/7/2023    EYE CATARACT EMULSIFICATION IOL IMPLANT performed by Karla Love MD at [facility] N/A 12/02/2021    [facility] performed by Bao Yao MD at [facility] Bilateral 09/20/2019    DR [facility]    UPPER GASTROINTESTINAL ENDOSCOPY N/A 12/02/2021    EGD DIAGNOSTIC ONLY performed by Bao Yao MD at [facility]      s/p       CURRENT MEDICATIONS:    Current Outpatient Medications:     pantoprazole (PROTONIX) 40 MG tablet, Take 1 tablet by mouth every morning (before breakfast), Disp: 30 tablet, Rfl: 0    FEROSUL 325 (65 Fe) MG tablet, TAKE 1 TABLET BY MOUTH IN THE MORNING WITH FOOD, Disp: 30 tablet, Rfl: 0    rivaroxaban (XARELTO) 10 MG TABS tablet, Take 10 mg by mouth, Disp: , Rfl:     metoprolol succinate (TOPROL XL) 25 MG extended release tablet, Take 1 tablet by mouth daily, Disp: 30 tablet, Rfl: 3    bumetanide (BUMEX) 1 MG tablet, Take 1 tablet by mouth daily, Disp: 30 tablet, Rfl: 3    cholestyramine (QUESTRAN) 4 g packet, Take 1 packet by mouth every evening, Disp: 90 packet, Rfl: 3    midodrine (PROAMATINE) 2.5 MG tablet, Take 1 tablet by mouth 3 times daily as needed (low SBP), Disp: 90 tablet, Rfl: 3    simethicone (MYLICON) 80 MG chewable tablet, Take 80 mg by mouth every 6 hours as needed for Flatulence (Patient not taking: Reported on 2/28/2023), Disp: , Rfl:     Probiotic Product (PROBIOTIC DAILY) CAPS, Take 1 capsule by mouth daily, Disp: , Rfl:     acetaminophen (TYLENOL) 500 MG tablet, Take 500 mg by mouth at bedtime, Disp: , Rfl:     levothyroxine (SYNTHROID) 100 MCG tablet, TAKE 1 TABLET BY MOUTH EVERY DAY, Disp: 90 tablet, Rfl: 1    dorzolamide (TRUSOPT) 2 % ophthalmic solution, Place 1 drop into both eyes 2 times daily 8am and 8pm, Disp: , Rfl: 10    latanoprost (XALATAN) 0.005 % ophthalmic solution, Place 1 drop into both eyes nightly 1230am, Disp: , Rfl:     ALLERGIES:   Allergies   Allergen Reactions    Avapro [Irbesartan]     Bactrim [Sulfamethoxazole-Trimethoprim]      Rash    Ciprofloxacin Hcl Nausea Only    Cozaar [Losartan Potassium]     Diovan [Valsartan]     Lipitor [Atorvastatin Calcium]     Lisinopril     Pcn [Penicillins]     Pravachol [Pravastatin Sodium]      myalgias    Tape [Adhesive Tape] Dermatitis    Tramadol Swelling    Zetia [Ezetimibe]     Morphine Nausea And Vomiting       FAMILY HISTORY:       Adopted: Yes   Problem Relation Age of Onset    No Known Problems Mother     No Known Problems Father          SOCIAL HISTORY:   Social History     Socioeconomic History    Marital status:       Spouse name: Not on file    Number of children: Not on file    Years of education: Not on file    Highest education level: Not on file   Occupational History    Not on file   Tobacco Use    Smoking status: Never    Smokeless tobacco: Never   Vaping Use    Vaping Use: Never used   Substance and Sexual Activity    Alcohol use: No    Drug use: No    Sexual activity: Not on file   Other Topics Concern    Not on file   Social History Narrative    Not on file     Social Determinants of Health     Financial Resource Strain: Low Risk     Difficulty of Paying Living Expenses: Not hard at all   Food Insecurity: No Food Insecurity    Worried About [address] in the Last Year: Never true    [address] in the Last Year: Never true   Transportation Needs: Unknown    Lack of Transportation (Medical): Not on file    Lack of Transportation (Non-Medical): No   Physical Activity: Not on file   Stress: Not on file   Social Connections: Not on file   Intimate Partner Violence: Not on file   Housing Stability: Unknown    Unable to Pay for Housing in the Last Year: Not on file    Number of Places Lived in the Last Year: Not on file    Unstable Housing in the Last Year: No         REVIEW OF SYSTEMS:         Review of Systems   Constitutional: Negative. HENT: Negative. Eyes:  Positive for visual disturbance (glasses). Cardiovascular: Negative. Gastrointestinal:  Positive for anal bleeding and rectal pain. Endocrine: Negative. Genitourinary: Negative. Musculoskeletal: Negative. Skin: Negative. Allergic/Immunologic: Negative. Neurological: Negative. Hematological: Negative. Psychiatric/Behavioral: Negative. PHYSICAL EXAMINATION:     Vital signs reviewed per the nursing documentation. /87   Ht 5' 5\" (1.651 m)   Wt 176 lb (79.8 kg)   BMI 29.29 kg/m²   Body mass index is 29.29 kg/m². Physical Exam  Constitutional:       Comments: Elderly patient looks stable. Genitourinary:     Rectum: Guaiac result positive. Comments: Dark stools positive for occult blood.         LABORATORY DATA: Reviewed  Lab Results   Component Value Date    WBC 6.5 02/20/2023    HGB 10.0 (L) 02/20/2023    HCT 31.1 (L) 02/20/2023    MCV 96.7 02/20/2023     02/20/2023     02/20/2023 K 3.8 02/20/2023     02/20/2023    CO2 27 02/20/2023    BUN 19 02/20/2023    CREATININE 1.23 (H) 02/20/2023    LABPROT 5.4 (L) 10/08/2012    LABALBU 3.4 (L) 02/20/2023    BILITOT 0.7 02/20/2023    ALKPHOS 85 02/20/2023    AST 18 02/20/2023    ALT 10 02/20/2023    INR 1.9 02/18/2023         Lab Results   Component Value Date    RBC 3.21 (L) 02/20/2023    HGB 10.0 (L) 02/20/2023    MCV 96.7 02/20/2023    MCH 31.1 02/20/2023    MCHC 32.1 02/20/2023    RDW 14.2 02/20/2023    MPV 9.5 02/20/2023    BASOPCT 1 02/20/2023    LYMPHSABS 1.20 02/20/2023    MONOSABS 0.50 02/20/2023    NEUTROABS 4.60 02/20/2023    EOSABS 0.10 02/20/2023    BASOSABS 0.10 02/20/2023         DIAGNOSTIC TESTING:     CT ABDOMEN PELVIS WO CONTRAST Additional Contrast? None    Result Date: 2/18/2023  EXAMINATION: CT OF THE ABDOMEN AND PELVIS WITHOUT CONTRAST 2/18/2023 1:36 pm TECHNIQUE: CT of the abdomen and pelvis was performed without the administration of intravenous contrast. Multiplanar reformatted images are provided for review. Automated exposure control, iterative reconstruction, and/or weight based adjustment of the mA/kV was utilized to reduce the radiation dose to as low as reasonably achievable. COMPARISON: 11/29/2021 HISTORY: GI bleeding. FINDINGS: Image quality degraded by motion artifact. Lower Chest: Increased right middle lobe airspace disease compared with chest CT on 08/10/2022. Dense mitral annular calcification. Organs: Cholecystectomy. Hepatosplenomegaly. Fatty infiltration of the pancreas. Stable cyst at the inferior pole of the left kidney. Remaining solid organs are unremarkable. GI/Bowel: Stable dilatation at the colonic anastomoses within the lower abdomen. Sigmoid diverticulosis without acute inflammation. Remainder of the gastrointestinal tract appears unremarkable. Pelvis: Bladder is unremarkable. Hysterectomy. No lymphadenopathy or free fluid.  Peritoneum/Retroperitoneum: No ascites or significant lymphadenopathy. IVC filter. Moderate atherosclerotic disease of the abdominal aorta without aneurysm. Bones/Soft Tissues: Osteopenia. No suspicious osseous lesions. Right middle lobe airspace disease could represent bronchiolitis and/or pneumonia. Stable appearance of the bowel compared with prior CT from 2021. Sigmoid diverticulosis without definite acute diverticulitis. Assessment  1. Melena        Plan  Patient is on Xarelto and her stool is positive for blood. At present no signs of active bleeding. She may need EGD and sigmoidoscopy and this are arranged. After discussion patient understood and verbalized her consent. I did discuss with surgery service, and they will do fleets enemas in holding area prior to the procedure. Also advised to have a CBC. We will do above procedures on anticoagulation therapy. Basing on the findings we will decide regarding further recommendation of continuing anticoagulation therapy  Thank you for allowing me to participate in the care of Ms. Lama. For any further questions please do not hesitate to contact me. I have reviewed and agree with the ROS entered by the MA/LPN. Note is dictated utilizing voice recognition software. Unfortunately this leads to occasional typographical errors.  Please contact our office if you have any questions        Loree Wylie MD,FACP, [facility]  Board Certified in Gastroenterology and [address] [place] [address] Gastroenterology  Office #: [phone]

## 2023-02-28 NOTE — PROGRESS NOTES
Pre-op Instructions For Out-Patient Endoscopy Surgery    Medication Instructions:  Please stop herbs and any supplements now (includes vitamins and minerals). Please contact your surgeon and prescribing physician for pre-op instructions for any blood thinners. Xarelto    If you have inhalers/aerosol treatments at home, please use them the morning of your surgery and bring the inhalers with you to the hospital.    Please take the following medications the morning of your surgery with a sip of water:    Metoprolol and Levothyroxine ( Midodrine if needed)     Surgery Instructions:  After midnight before surgery:  Do not eat or drink anything, including water, mints, gum, and hard candy. You may brush your teeth without swallowing. No smoking, chewing tobacco, or street drugs. Please shower or bathe before surgery. Please do not wear any cologne, lotion, powder, jewelry, piercings, perfume, makeup, nail polish, hair accessories, or hair spray on the day of surgery. Wear loose comfortable clothing. Leave your valuables at home. Bring a storage case for any glasses/contacts. An adult who is responsible for you MUST drive you home and should be with you for the first 24 hours after surgery. The Day of Surgery:  Arrive at [address] [place] [address] Surgery Entrance at the time directed by your surgeon and check in at the desk. If you have a living will or healthcare power of , please bring a copy. You will be taken to the pre-op holding area where you will be prepared for surgery. A physical assessment will be performed by a nurse practitioner or house officer. Your IV will be started and you will meet your anesthesiologist.    When you go to surgery, your family will be directed to the surgical waiting room, where the doctor should speak with them after your surgery.     After surgery, you will be taken to the recovery room then when you are awake and stable you will go to the short stay unit for preparation to be discharged. Instructions read to [facility] and understanding verbalized.      3/1/23 Procedure

## 2023-03-01 ENCOUNTER — ANESTHESIA (OUTPATIENT)
Dept: ENDOSCOPY | Age: 83
End: 2023-03-01
Payer: MEDICARE

## 2023-03-01 ENCOUNTER — HOSPITAL ENCOUNTER (OUTPATIENT)
Age: 83
Setting detail: OUTPATIENT SURGERY
Discharge: HOME | End: 2023-03-01
Attending: INTERNAL MEDICINE | Admitting: INTERNAL MEDICINE
Payer: MEDICARE

## 2023-03-01 VITALS
WEIGHT: 176 LBS | RESPIRATION RATE: 19 BRPM | TEMPERATURE: 97.5 F | OXYGEN SATURATION: 96 % | HEIGHT: 65 IN | SYSTOLIC BLOOD PRESSURE: 129 MMHG | HEART RATE: 76 BPM | BODY MASS INDEX: 29.32 KG/M2 | DIASTOLIC BLOOD PRESSURE: 72 MMHG

## 2023-03-01 LAB
GLUCOSE BLD-MCNC: 90 MG/DL (ref 65–105)
GLUCOSE BLD-MCNC: 95 MG/DL (ref 65–105)
HCT VFR BLD AUTO: 31.4 % (ref 36–46)
HGB BLD-MCNC: 10.7 G/DL (ref 12–16)
MCH RBC QN AUTO: 32.6 PG (ref 26–34)
MCHC RBC AUTO-ENTMCNC: 34.1 G/DL (ref 31–37)
MCV RBC AUTO: 95.6 FL (ref 80–100)
PDW BLD-RTO: 13.8 % (ref 11.5–14.9)
PLATELET # BLD AUTO: 140 K/UL (ref 150–450)
PMV BLD AUTO: 9.1 FL (ref 6–12)
RBC # BLD: 3.29 M/UL (ref 4–5.2)
WBC # BLD AUTO: 5.9 K/UL (ref 3.5–11)

## 2023-03-01 PROCEDURE — 3609017100 HC EGD: Performed by: INTERNAL MEDICINE

## 2023-03-01 PROCEDURE — 7100000010 HC PHASE II RECOVERY - FIRST 15 MIN: Performed by: INTERNAL MEDICINE

## 2023-03-01 PROCEDURE — 6370000000 HC RX 637 (ALT 250 FOR IP): Performed by: INTERNAL MEDICINE

## 2023-03-01 PROCEDURE — 7100000011 HC PHASE II RECOVERY - ADDTL 15 MIN: Performed by: INTERNAL MEDICINE

## 2023-03-01 PROCEDURE — 36415 COLL VENOUS BLD VENIPUNCTURE: CPT

## 2023-03-01 PROCEDURE — 85027 COMPLETE CBC AUTOMATED: CPT

## 2023-03-01 PROCEDURE — 43235 EGD DIAGNOSTIC BRUSH WASH: CPT | Performed by: INTERNAL MEDICINE

## 2023-03-01 PROCEDURE — 3609156700 HC PROCTOSIGMOIDOSCOPY DX: Performed by: INTERNAL MEDICINE

## 2023-03-01 PROCEDURE — 2500000003 HC RX 250 WO HCPCS: Performed by: NURSE ANESTHETIST, CERTIFIED REGISTERED

## 2023-03-01 PROCEDURE — 6360000002 HC RX W HCPCS: Performed by: NURSE ANESTHETIST, CERTIFIED REGISTERED

## 2023-03-01 PROCEDURE — 45330 DIAGNOSTIC SIGMOIDOSCOPY: CPT | Performed by: INTERNAL MEDICINE

## 2023-03-01 PROCEDURE — 2580000003 HC RX 258: Performed by: ANESTHESIOLOGY

## 2023-03-01 PROCEDURE — 3700000001 HC ADD 15 MINUTES (ANESTHESIA): Performed by: INTERNAL MEDICINE

## 2023-03-01 PROCEDURE — 2709999900 HC NON-CHARGEABLE SUPPLY: Performed by: INTERNAL MEDICINE

## 2023-03-01 PROCEDURE — 2500000003 HC RX 250 WO HCPCS: Performed by: ANESTHESIOLOGY

## 2023-03-01 PROCEDURE — 3700000000 HC ANESTHESIA ATTENDED CARE: Performed by: INTERNAL MEDICINE

## 2023-03-01 PROCEDURE — 82947 ASSAY GLUCOSE BLOOD QUANT: CPT

## 2023-03-01 RX ORDER — SODIUM PHOSPHATE, DIBASIC AND SODIUM PHOSPHATE, MONOBASIC 7; 19 G/133ML; G/133ML
1 ENEMA RECTAL
Status: COMPLETED | OUTPATIENT
Start: 2023-03-01 | End: 2023-03-01

## 2023-03-01 RX ORDER — MEPERIDINE HYDROCHLORIDE 25 MG/ML
12.5 INJECTION INTRAMUSCULAR; INTRAVENOUS; SUBCUTANEOUS EVERY 5 MIN PRN
Status: DISCONTINUED | OUTPATIENT
Start: 2023-03-01 | End: 2023-03-01 | Stop reason: HOSPADM

## 2023-03-01 RX ORDER — SODIUM CHLORIDE 9 MG/ML
INJECTION, SOLUTION INTRAVENOUS PRN
Status: DISCONTINUED | OUTPATIENT
Start: 2023-03-01 | End: 2023-03-01 | Stop reason: HOSPADM

## 2023-03-01 RX ORDER — LABETALOL HYDROCHLORIDE 5 MG/ML
10 INJECTION, SOLUTION INTRAVENOUS
Status: DISCONTINUED | OUTPATIENT
Start: 2023-03-01 | End: 2023-03-01 | Stop reason: HOSPADM

## 2023-03-01 RX ORDER — SODIUM CHLORIDE 0.9 % (FLUSH) 0.9 %
5-40 SYRINGE (ML) INJECTION PRN
Status: DISCONTINUED | OUTPATIENT
Start: 2023-03-01 | End: 2023-03-01 | Stop reason: HOSPADM

## 2023-03-01 RX ORDER — LIDOCAINE HYDROCHLORIDE 20 MG/ML
INJECTION, SOLUTION EPIDURAL; INFILTRATION; INTRACAUDAL; PERINEURAL PRN
Status: DISCONTINUED | OUTPATIENT
Start: 2023-03-01 | End: 2023-03-01 | Stop reason: SDUPTHER

## 2023-03-01 RX ORDER — ONDANSETRON 2 MG/ML
4 INJECTION INTRAMUSCULAR; INTRAVENOUS
Status: DISCONTINUED | OUTPATIENT
Start: 2023-03-01 | End: 2023-03-01 | Stop reason: HOSPADM

## 2023-03-01 RX ORDER — FENTANYL CITRATE 0.05 MG/ML
25 INJECTION, SOLUTION INTRAMUSCULAR; INTRAVENOUS EVERY 5 MIN PRN
Status: DISCONTINUED | OUTPATIENT
Start: 2023-03-01 | End: 2023-03-01 | Stop reason: HOSPADM

## 2023-03-01 RX ORDER — METOCLOPRAMIDE HYDROCHLORIDE 5 MG/ML
10 INJECTION INTRAMUSCULAR; INTRAVENOUS
Status: DISCONTINUED | OUTPATIENT
Start: 2023-03-01 | End: 2023-03-01 | Stop reason: HOSPADM

## 2023-03-01 RX ORDER — SODIUM CHLORIDE 0.9 % (FLUSH) 0.9 %
5-40 SYRINGE (ML) INJECTION EVERY 12 HOURS SCHEDULED
Status: DISCONTINUED | OUTPATIENT
Start: 2023-03-01 | End: 2023-03-01 | Stop reason: HOSPADM

## 2023-03-01 RX ORDER — SODIUM CHLORIDE 9 MG/ML
INJECTION, SOLUTION INTRAVENOUS CONTINUOUS
Status: DISCONTINUED | OUTPATIENT
Start: 2023-03-01 | End: 2023-03-01 | Stop reason: HOSPADM

## 2023-03-01 RX ORDER — PROPOFOL 10 MG/ML
INJECTION, EMULSION INTRAVENOUS PRN
Status: DISCONTINUED | OUTPATIENT
Start: 2023-03-01 | End: 2023-03-01 | Stop reason: SDUPTHER

## 2023-03-01 RX ORDER — DIPHENHYDRAMINE HYDROCHLORIDE 50 MG/ML
12.5 INJECTION INTRAMUSCULAR; INTRAVENOUS
Status: DISCONTINUED | OUTPATIENT
Start: 2023-03-01 | End: 2023-03-01 | Stop reason: HOSPADM

## 2023-03-01 RX ORDER — HYDRALAZINE HYDROCHLORIDE 20 MG/ML
10 INJECTION INTRAMUSCULAR; INTRAVENOUS
Status: DISCONTINUED | OUTPATIENT
Start: 2023-03-01 | End: 2023-03-01 | Stop reason: HOSPADM

## 2023-03-01 RX ORDER — ACETAMINOPHEN 325 MG/1
650 TABLET ORAL
Status: DISCONTINUED | OUTPATIENT
Start: 2023-03-01 | End: 2023-03-01 | Stop reason: HOSPADM

## 2023-03-01 RX ORDER — LIDOCAINE HYDROCHLORIDE 10 MG/ML
1 INJECTION, SOLUTION EPIDURAL; INFILTRATION; INTRACAUDAL; PERINEURAL
Status: COMPLETED | OUTPATIENT
Start: 2023-03-01 | End: 2023-03-01

## 2023-03-01 RX ADMIN — PROPOFOL 10 MG: 10 INJECTION, EMULSION INTRAVENOUS at 11:57

## 2023-03-01 RX ADMIN — SODIUM CHLORIDE: 9 INJECTION, SOLUTION INTRAVENOUS at 10:54

## 2023-03-01 RX ADMIN — PROPOFOL 20 MG: 10 INJECTION, EMULSION INTRAVENOUS at 12:07

## 2023-03-01 RX ADMIN — LIDOCAINE HYDROCHLORIDE 80 MG: 20 INJECTION, SOLUTION EPIDURAL; INFILTRATION; INTRACAUDAL; PERINEURAL at 11:51

## 2023-03-01 RX ADMIN — LIDOCAINE HYDROCHLORIDE 1 ML: 10 INJECTION, SOLUTION EPIDURAL; INFILTRATION; INTRACAUDAL; PERINEURAL at 10:53

## 2023-03-01 RX ADMIN — PROPOFOL 10 MG: 10 INJECTION, EMULSION INTRAVENOUS at 11:54

## 2023-03-01 RX ADMIN — PROPOFOL 10 MG: 10 INJECTION, EMULSION INTRAVENOUS at 12:00

## 2023-03-01 RX ADMIN — PROPOFOL 30 MG: 10 INJECTION, EMULSION INTRAVENOUS at 11:51

## 2023-03-01 RX ADMIN — SODIUM PHOSPHATE 1 ENEMA: 7; 19 ENEMA RECTAL at 10:29

## 2023-03-01 RX ADMIN — SODIUM PHOSPHATE 1 ENEMA: 7; 19 ENEMA RECTAL at 10:11

## 2023-03-01 ASSESSMENT — PAIN - FUNCTIONAL ASSESSMENT: PAIN_FUNCTIONAL_ASSESSMENT: 0-10

## 2023-03-01 ASSESSMENT — ENCOUNTER SYMPTOMS
STRIDOR: 0
SHORTNESS OF BREATH: 0

## 2023-03-01 ASSESSMENT — LIFESTYLE VARIABLES: SMOKING_STATUS: 0

## 2023-03-01 NOTE — OP NOTE
ESOPHAGOGASTRODUODENOSCOPY   ( EGD )  DATE OF PROCEDURE: 3/1/2023     SURGEON: Janeen Sinha MD    ASSISTANT: None    PREOPERATIVE DIAGNOSIS: Patient has melena. Procedure for prompt evaluate source of bleeding in the upper GI tract. Patient is on anticoagulation therapy with Xarelto  POSTOPERATIVE DIAGNOSIS: No lesions seen. OPERATION: Upper GI endoscopy   ANESTHESIA: MAC    ESTIMATED BLOOD LOSS: None    COMPLICATIONS: None. SPECIMENS:  Was Not Obtained    HISTORY: The patient is a 79y.o. year old female with history of above preop diagnosis. I recommended esophagogastroduodenoscopy with possible biopsy and I explained the risk, benefits, expected outcome, and alternatives to the procedure. Risks included but are not limited to bleeding, infection, respiratory distress, hypotension, and perforation of the esophagus, stomach, or duodenum. Patient understands and is in agreement. PROCEDURE: The patient was given IV conscious sedation. The patient's SPO2 remained above 90% throughout the procedure. Cetacaine spray given. Patient placed in left lateral position. Olympus  videogastroscope was inserted orally under vision into the esophagus without difficulty and advanced into the stomach then through the pylorus up to the second part of duodenum. Findings:    Retropharyngeal area was grossly normal appearing    Esophagus: normal    Stomach:    Fundus and Cardia Examined in Retroflexed View: normal    Body: normal    Antrum: normal    Duodenum:     Descending: normal    Bulb: normal    While withdrawing the scope the above findings were verified and the scope was removed. The patient has tolerated the procedure without unusual events.          Recommendations/Plan:     F/U In Office as instructed  Discussed with the family                   Electronically signed by Janeen Sinha MD  on 3/1/2023 at 12:16 PM

## 2023-03-01 NOTE — H&P
HISTORY and Michael [facility] [id number]       NAME:  Andrea Wilhelm  MRN: [id number]   YOB: 1940   Date: 3/1/2023   Age: 79 y.o. Gender: female       COMPLAINT AND PRESENT HISTORY:     Andrea Wilhelm is 79 y.o.,  female, will be having a   EGD ESOPHAGOGASTRODUODENOSCOPY, ANAL PROCTO SIGMOIDOSCOPY FLEXIBLE     Pt is being seen for hx of :MELENA     Prior Colonoscopy  and EGD was done last in 2019       Pt also has hx of Diverticulitis,  Patient is s/p Colon Surgery. Sigmoid Colectomy she states many years ago. Denies abdominal pain . She admits to having gas. often. she reports  heartburn, indigestion or acid reflux. Pt is on Protonix. Pt reports nausea without  vomiting. Pt has changes in bowel habits. she states that she has a problem with diarrhea. No constipation. Patient states that she was hospitalized on for 3 days for melena. . Pt states that she doesn't have an appetite and unintended weight loss. Approx 30 lbs/    Denies Family Hx of colon cancer  Any significant medical hx: Atrial Fib-on Xarelto, HTN, HLD, THYROID, PE, DM    Denies current chest pain,  SOB. No recent URI, fever or chills. Functional Capacity per patient:              1. Patient is not able to walk 2 city blocks on level ground without SOB. 2. Patient is not able to climb 2 flights of stairs without SOB. Any Anticoagulants or blood thinners: Xarelto    Patient has been NPO since midnight. No blood thinners in the past  24 days. Patient took bp and thyroid this am    Patient will be getting an enema here at this  facility for this procedure, as pt is unable to give it herself.      Patient denies any personal or family problems with anesthesia       PAST MEDICAL HISTORY     Past Medical History:   Diagnosis Date    Atrial fibrillation ([facility] [address] 03/28/2014    Chronic back pain     with rt. leg pain    Diverticulosis     GERD (gastroesophageal reflux disease)     Hearing aid worn willi. ears. Hearing deficit, bilateral     Hyperlipidemia     Hypertension     Hypothyroidism     Obesity (BMI 30.0-34.9) 08/12/2016    Onychomycosis     PE (pulmonary embolism)     H/O DVT of rt leg    Poor venous access     Type II or unspecified type diabetes mellitus without mention of complication, not stated as uncontrolled        SURGICAL HISTORY       Past Surgical History:   Procedure Laterality Date    ARM SURGERY Right     Growth taken off per pt. CARPAL TUNNEL RELEASE Bilateral     Per pt. CATARACT EXTRACTION EXTRACAPSULAR W/ INTRAOCULAR LENS IMPLANTATION Left 12/06/2022    [facility]    CATARACT EXTRACTION EXTRACAPSULAR W/ INTRAOCULAR LENS IMPLANTATION Right 02/07/2023    [facility]    CHOLECYSTECTOMY      COLON SURGERY      s/p sigmoid cloectomy    COLONOSCOPY N/A 01/25/2021    COLONOSCOPY DIAGNOSTIC performed by Flores Garcia MD at [place] [address]. ([address] [place] [address])      INTRACAPSULAR CATARACT EXTRACTION Left 12/06/2022    EYE CATARACT EMULSIFICATION IOL IMPLANT performed by Cirilo Billingsley MD at [address] [facility] Right 2/7/2023    EYE CATARACT EMULSIFICATION IOL IMPLANT performed by Cirilo Billingsley MD at [address] [place] [address] N/A 12/02/2021    SIGMOIDOSCOPY DIAGNOSTIC FLEXIBLE performed by Flores Garcia MD at [facility] Bilateral 09/20/2019    DR IMAN GAUTHIER    UPPER GASTROINTESTINAL ENDOSCOPY N/A 12/02/2021    EGD DIAGNOSTIC ONLY performed by Flores Garcia MD at [facility]      s/p       FAMILY HISTORY       Family History   Adopted: Yes   Problem Relation Age of Onset    No Known Problems Mother     No Known Problems Father        SOCIAL HISTORY       Social History     Socioeconomic History    Marital status:     Tobacco Use    Smoking status: Never    Smokeless tobacco: Never   Vaping Use    Vaping Use: Never used   Substance and Sexual Activity    Alcohol use: No    Drug use: No     Social Determinants of Health     Financial Resource Strain: Low Risk     Difficulty of Paying Living Expenses: Not hard at all   Food Insecurity: No Food Insecurity    Worried About [address] in the Last Year: Never true    Ran Out of Food in the Last Year: Never true   Transportation Needs: Unknown    Lack of Transportation (Non-Medical): No   Housing Stability: Unknown    Unstable Housing in the Last Year: No           REVIEW OF SYSTEMS      Allergies   Allergen Reactions    Avapro [Irbesartan]     Bactrim [Sulfamethoxazole-Trimethoprim]      Rash    Ciprofloxacin Hcl Nausea Only    Cozaar [Losartan Potassium]     Diovan [Valsartan]     Lipitor [Atorvastatin Calcium]     Lisinopril     Pcn [Penicillins]     Pravachol [Pravastatin Sodium]      myalgias    Tape [Adhesive Tape] Dermatitis    Tramadol Swelling    Zetia [Ezetimibe]     Morphine Nausea And Vomiting       No current facility-administered medications on file prior to encounter.      Current Outpatient Medications on File Prior to Encounter   Medication Sig Dispense Refill    pantoprazole (PROTONIX) 40 MG tablet Take 1 tablet by mouth every morning (before breakfast) 30 tablet 0    FEROSUL 325 (65 Fe) MG tablet TAKE 1 TABLET BY MOUTH IN THE MORNING WITH FOOD 30 tablet 0    rivaroxaban (XARELTO) 10 MG TABS tablet Take 10 mg by mouth      metoprolol succinate (TOPROL XL) 25 MG extended release tablet Take 1 tablet by mouth daily 30 tablet 3    bumetanide (BUMEX) 1 MG tablet Take 1 tablet by mouth daily 30 tablet 3    cholestyramine (QUESTRAN) 4 g packet Take 1 packet by mouth every evening 90 packet 3    midodrine (PROAMATINE) 2.5 MG tablet Take 1 tablet by mouth 3 times daily as needed (low SBP) 90 tablet 3    simethicone (MYLICON) 80 MG chewable tablet Take 80 mg by mouth every 6 hours as needed for Flatulence (Patient not taking: Reported on 2/28/2023)      Probiotic Product (PROBIOTIC DAILY) CAPS Take 1 capsule by mouth daily      [DISCONTINUED] atenolol (TENORMIN) 100 MG tablet TAKE 1 TABLET BY MOUTH EVERY DAY 90 tablet 1    acetaminophen (TYLENOL) 500 MG tablet Take 500 mg by mouth at bedtime      levothyroxine (SYNTHROID) 100 MCG tablet TAKE 1 TABLET BY MOUTH EVERY DAY 90 tablet 1    dorzolamide (TRUSOPT) 2 % ophthalmic solution Place 1 drop into both eyes 2 times daily 8am and 8pm  10    latanoprost (XALATAN) 0.005 % ophthalmic solution Place 1 drop into both eyes nightly 1230am         Negative except for what is mentioned in the HPI. GENERAL PHYSICAL EXAM     Vitals :   See vital signs in RN flow sheet. GENERAL APPEARANCE:   Vi Arevalo is 79 y.o., female, moderately obese, nourished, conscious, alert. Does not appear to be distress or pain at this time. SKIN:  Warm, dry, no cyanosis or jaundice. HEAD:  Normocephalic, atraumatic, no swelling or tenderness. EYES:  Pupils equal, reactive to light. EARS:  No discharge, marked hearing loss. DAVID. Pt is wearing hearing aides, david                NOSE:  No rhinorrhea, epistaxis or septal deformity. THROAT:  Not congested. No ulceration bleeding or discharge. NECK:  No stiffness, trachea central.  No palpable masses or L.N.                 CHEST:  Symmetrical and equal on expansion. HEART:  RRR . No audible murmurs or gallops. LUNGS:  Equal on expansion, normal breath sounds. No adventitious sounds. ABDOMEN:  Obese. Soft on palpation. No dysphagia, No localized tenderness. No guarding or rigidity. LYMPHATICS:  No palpable cervical lymphadenopathy. LOCOMOTOR, BACK AND SPINE:  No tenderness or deformities. Using rollated walker to aide in ambulation                EXTREMITIES:  Symmetrical, no pretibial edema. No discoloration or ulcerations.     NEUROLOGIC:  The patient is conscious, alert, oriented,Cranial nerve II-XII intact, taste and smell were not examined. No apparent focal sensory or motor deficits.              PROVISIONAL DIAGNOSES / SURGERY:        EGD BIOPSY, ANAL PROCTO SIGMOIDOSCOPY FLEXIBLE    MELENA      Patient Active Problem List    Diagnosis Date Noted    Chronic renal disease, stage III ([facility] [094130] 02/27/2023    Chronic systolic (congestive) heart failure 02/09/2023    Acute cystitis without hematuria 08/07/2022    Sepsis ([address] 08/05/2022    [facility]H [facility]acute kidney [facility]) ([facility] 08/05/2022    Syncope 08/05/2022    Class 2 obesity due to excess calories in adult 05/10/2022    Atrial fibrillation with RVR ([address] 05/09/2022    Intractable nausea and vomiting 11/29/2021    Hemorrhage of rectum and anus     GI bleed 01/23/2021    Iron deficiency anemia 01/23/2021    Paroxysmal atrial fibrillation ([address] 01/23/2021    S/p bilateral myringotomy with tube placement 10/07/2019    Glaucoma 05/31/2019    Allergic rhinitis 05/21/2018    Tracheobronchitis 09/25/2016    Type 2 diabetes mellitus without complication, without long-term current use of insulin ([address] 08/12/2016    Obesity (BMI 30.0-34.9) 08/12/2016    Essential hypertension 10/23/2015    Right bundle branch block 10/23/2015    Chronic pulmonary embolism ([address] 10/23/2015    Hypothyroidism 08/23/2012    Mixed hyperlipidemia     Gastroesophageal reflux disease without esophagitis     Onychomycosis     Diverticulosis     Chronic back pain            Note marked for cosign by provider      [place], APRN - CNP on 3/1/2023 at 9:34 AM

## 2023-03-01 NOTE — ANESTHESIA PRE PROCEDURE
Department of Anesthesiology  Preprocedure Note       Name:  Chapo Zazueta   Age:  79 y.o.  :  1940                                          MRN:  [id number]         Date:  3/1/2023      Surgeon: Estefanía Nicholas):  Ariel Torrez MD    Procedure: Procedure(s):  EGD BIOPSY  ANAL PROCTO SIGMOIDOSCOPY FLEXIBLE    Medications prior to admission:   Prior to Admission medications    Medication Sig Start Date End Date Taking?  Authorizing Provider   pantoprazole (PROTONIX) 40 MG tablet Take 1 tablet by mouth every morning (before breakfast) 23   Feroz Carias DO   FEROSUL 325 (65 Fe) MG tablet TAKE 1 TABLET BY MOUTH IN THE MORNING WITH FOOD 23   Shira Swanson MD   rivaroxaban (XARELTO) 10 MG TABS tablet Take 10 mg by mouth    Historical Provider, MD   metoprolol succinate (TOPROL XL) 25 MG extended release tablet Take 1 tablet by mouth daily 10/31/22   Shira Swanson MD   [facility]) 1 MG tablet Take 1 tablet by mouth daily 10/31/22   Shira Swanson MD   cholestyramine Kaylene Patricia) 4 g packet Take 1 packet by mouth every evening 10/31/22   Shira Swanson MD   midodrine (PROAMATINE) 2.5 MG tablet Take 1 tablet by mouth 3 times daily as needed (low SBP) 22   Va Tena MD   simethicone (MYLICON) 80 MG chewable tablet Take 80 mg by mouth every 6 hours as needed for Flatulence  Patient not taking: Reported on 2023    Historical Provider, MD   Probiotic Product (PROBIOTIC DAILY) CAPS Take 1 capsule by mouth daily    Historical Provider, MD   atenolol (TENORMIN) 100 MG tablet TAKE 1 TABLET BY MOUTH EVERY DAY 22  Shira Swanson MD   acetaminophen (TYLENOL) 500 MG tablet Take 500 mg by mouth at bedtime    Historical Provider, MD   levothyroxine (SYNTHROID) 100 MCG tablet TAKE 1 TABLET BY MOUTH EVERY DAY 22   Shira Swanson MD   dorzolamide (TRUSOPT) 2 % ophthalmic solution Place 1 drop into both eyes 2 times daily 8am and 8pm 18 Historical Provider, MD   latanoprost (XALATAN) 0.005 % ophthalmic solution Place 1 drop into both eyes nightly 1230am    Historical Provider, MD       Current medications:    Current Facility-Administered Medications   Medication Dose Route Frequency Provider Last Rate Last Admin    sodium chloride flush 0.9 % injection 5-40 mL  5-40 mL IntraVENous 2 times per day Shayla Finney MD        sodium chloride flush 0.9 % injection 5-40 mL  5-40 mL IntraVENous PRN Eden MD Donato        0.9 % sodium chloride infusion   IntraVENous PRN Eden MD Donato        0.9 % sodium chloride infusion   IntraVENous Continuous Shayla Finney  mL/hr at 03/01/23 1054 New Bag at 03/01/23 1054       Allergies: Allergies   Allergen Reactions    Avapro [Irbesartan]     Bactrim [Sulfamethoxazole-Trimethoprim]      Rash    Ciprofloxacin Hcl Nausea Only    Cozaar [Losartan Potassium]     Diovan [Valsartan]     Lipitor [Atorvastatin Calcium]     Lisinopril     Pcn [Penicillins]     Pravachol [Pravastatin Sodium]      myalgias    Tape [Adhesive Tape] Dermatitis    Tramadol Swelling    Zetia [Ezetimibe]     Morphine Nausea And Vomiting       Problem List:    Patient Active Problem List   Diagnosis Code    Mixed hyperlipidemia E78.2    Gastroesophageal reflux disease without esophagitis K21.9    Onychomycosis B35.1    Diverticulosis K57.90    Chronic back pain M54.9, G89.29    Hypothyroidism E03.9    Essential hypertension I10    Right bundle branch block I45.10    Chronic pulmonary embolism (HCC) I27.82    Type 2 diabetes mellitus without complication, without long-term current use of insulin (HCC) E11.9    Obesity (BMI 30.0-34. 9) E66.9    Tracheobronchitis J40    Allergic rhinitis J30.9    Glaucoma H40.9    S/p bilateral myringotomy with tube placement Z96.22    GI bleed K92.2    Iron deficiency anemia D50.9    Paroxysmal atrial fibrillation (HCC) I48.0    Hemorrhage of rectum and anus K62.5  Intractable nausea and vomiting R11.2    Atrial fibrillation with RVR (HCC) I48.91    Class 2 obesity due to excess calories in adult E66.09    Sepsis (HCC) A41.9    JOSH (acute kidney injury) ([facility] [address] N17.9    Syncope R55    Acute cystitis without hematuria N30.00    Chronic systolic (congestive) heart failure I50.22    Chronic renal disease, stage III ([facility] [address] [756850] N18.30       Past Medical History:        Diagnosis Date    Atrial fibrillation ([facility] [address] 03/28/2014    Chronic back pain     with rt. leg pain    Diverticulosis     GERD (gastroesophageal reflux disease)     Hearing aid worn     willi. ears.  Hearing deficit, bilateral     Hyperlipidemia     Hypertension     Hypothyroidism     Obesity (BMI 30.0-34.9) 08/12/2016    Onychomycosis     PE (pulmonary embolism)     H/O DVT of rt leg    Poor venous access     Type II or unspecified type diabetes mellitus without mention of complication, not stated as uncontrolled        Past Surgical History:        Procedure Laterality Date    ARM SURGERY Right     Growth taken off per pt.  CARPAL TUNNEL RELEASE Bilateral     Per pt.     CATARACT EXTRACTION EXTRACAPSULAR W/ INTRAOCULAR LENS IMPLANTATION Left 12/06/2022    [facility]    CATARACT EXTRACTION EXTRACAPSULAR W/ INTRAOCULAR LENS IMPLANTATION Right 02/07/2023    [facility]    CHOLECYSTECTOMY      COLON SURGERY      s/p sigmoid cloectomy    COLONOSCOPY N/A 01/25/2021    COLONOSCOPY DIAGNOSTIC performed by Juan Pablo Dumont MD at [facility] ([address] [place] [address])      INTRACAPSULAR CATARACT EXTRACTION Left 12/06/2022    EYE CATARACT EMULSIFICATION IOL IMPLANT performed by Annette Florez MD at [address] [place] [address] Right 2/7/2023    EYE CATARACT EMULSIFICATION IOL IMPLANT performed by Annette Florez MD at [address] [place] [address] N/A 12/02/2021    SIGMOIDOSCOPY DIAGNOSTIC FLEXIBLE performed by Juan Pablo Dumont MD at [facility] 09/20/2019    DR Mora Kim [place]    UPPER GASTROINTESTINAL ENDOSCOPY N/A 12/02/2021    EGD DIAGNOSTIC ONLY performed by Bishnu Angulo MD at [facility]      s/p       Social History:    Social History     Tobacco Use    Smoking status: Never    Smokeless tobacco: Never   Substance Use Topics    Alcohol use: No                                Counseling given: Not Answered      Vital Signs (Current):   Vitals:    03/01/23 0957   BP: 112/62   Pulse: 100   Resp: 18   Temp: 97.3 °F (36.3 °C)   TempSrc: Infrared   SpO2: 97%   Weight: 176 lb (79.8 kg)   Height: 5' 5\" (1.651 m)                                              BP Readings from Last 3 Encounters:   03/01/23 112/62   02/28/23 139/87   02/27/23 127/83       NPO Status: Time of last liquid consumption: 1745                        Time of last solid consumption: 1745                        Date of last liquid consumption: 02/28/23                        Date of last solid food consumption: 02/28/23    BMI:   Wt Readings from Last 3 Encounters:   03/01/23 176 lb (79.8 kg)   02/28/23 176 lb (79.8 kg)   02/28/23 176 lb (79.8 kg)     Body mass index is 29.29 kg/m².     CBC:   Lab Results   Component Value Date/Time    WBC 6.5 02/20/2023 09:22 AM    RBC 3.21 02/20/2023 09:22 AM    RBC 4.19 03/17/2012 07:18 AM    HGB 10.0 02/20/2023 09:22 AM    HCT 31.1 02/20/2023 09:22 AM    MCV 96.7 02/20/2023 09:22 AM    RDW 14.2 02/20/2023 09:22 AM     02/20/2023 09:22 AM     03/17/2012 07:18 AM     HB 10.0    CMP:   Lab Results   Component Value Date/Time     02/20/2023 09:22 AM    K 3.8 02/20/2023 09:22 AM     02/20/2023 09:22 AM    CO2 27 02/20/2023 09:22 AM    BUN 19 02/20/2023 09:22 AM    CREATININE 1.23 02/20/2023 09:22 AM    GFRAA 50 08/23/2022 07:55 AM    LABGLOM 44 02/20/2023 09:22 AM    GLUCOSE 112 02/20/2023 09:22 AM    GLUCOSE 123 03/17/2012 07:18 AM    PROT 6.4 02/20/2023 09:22 AM    CALCIUM 9.0 02/20/2023 09:22 AM    BILITOT 0.7 02/20/2023 09:22 AM    ALKPHOS 85 02/20/2023 09:22 AM    AST 18 02/20/2023 09:22 AM    ALT 10 02/20/2023 09:22 AM       POC Tests:   Recent Labs     03/01/23  1056   POCGLU 95       Coags:   Lab Results   Component Value Date/Time    PROTIME 21.3 02/18/2023 01:05 PM    PROTIME 22.2 04/17/2018 02:15 PM    PROTIME 31.1 03/17/2014 07:12 AM    INR 1.9 02/18/2023 01:05 PM    APTT 31.6 02/18/2023 01:05 PM       HCG (If Applicable): No results found for: PREGTESTUR, PREGSERUM, HCG, HCGQUANT     ABGs:   Lab Results   Component Value Date/Time    PO2ART 110.0 10/03/2012 05:10 AM    MSG1CGQ 22.2 10/03/2012 05:10 AM    L2PARGMB 95.4 10/03/2012 05:10 AM        Type & Screen (If Applicable):  No results found for: LABABO, LABRH    Drug/Infectious Status (If Applicable):  No results found for: HIV, HEPCAB    COVID-19 Screening (If Applicable):   Lab Results   Component Value Date/Time    COVID19 Not Detected 08/05/2022 04:09 PM    COVID19 Not Detected 06/17/2022 05:00 PM           Anesthesia Evaluation  Patient summary reviewed and Nursing notes reviewed no history of anesthetic complications:   Airway: Mallampati: II  TM distance: >3 FB   Neck ROM: full  Mouth opening: > = 3 FB   Dental: normal exam         Pulmonary:Negative Pulmonary ROS and normal exam        (-) pneumonia, COPD, asthma, shortness of breath, recent URI, sleep apnea, rhonchi, wheezes, rales, stridor, not a current smoker and no decreased breath sounds          Patient did not smoke on day of surgery.                  Cardiovascular:  Exercise tolerance: good (>4 METS),   (+) hypertension: no interval change, CHF: no interval change,     (-) pacemaker, valvular problems/murmurs, past MI, CAD, CABG/stent, dysrhythmias,  angina, orthopnea, PND,  CASANOVA, murmur, weak pulses,  friction rub, systolic click, carotid bruit,  JVD, peripheral edema, no pulmonary hypertension and no hyperlipidemia    ECG reviewed  Rhythm: regular  Rate: normal                    Neuro/Psych:   Negative Neuro/Psych ROS     (-) seizures, neuromuscular disease, TIA, CVA, headaches, psychiatric history and depression/anxiety            GI/Hepatic/Renal:   (+) GERD: no interval change,      (-) hiatal hernia, PUD, hepatitis, liver disease, no renal disease, bowel prep and no morbid obesity       Endo/Other:    (+) DiabetesType II DM, , hypothyroidism::., no malignancy/cancer. (-) hyperthyroidism, blood dyscrasia, arthritis, no electrolyte abnormalities, no malignancy/cancer               Abdominal:             Vascular: negative vascular ROS. - PVD and DVT. Other Findings:           Anesthesia Plan      general     ASA 3       Induction: intravenous. MIPS: Postoperative opioids intended and Prophylactic antiemetics administered. Anesthetic plan and risks discussed with patient. Plan discussed with CRNA.                     Suzanne Schmidt MD   3/1/2023

## 2023-03-01 NOTE — DISCHARGE INSTRUCTIONS
To hold Xarelto today and tomorrow    To see in the office on Friday    CBC today prior to discharge          [address] EGD    In order to continue your care at home, please follow the instructions below. For General Anesthesia:  Do not drink any alcoholic beverages or make any legal or important decisions for 24 hours. Do not drive or operate machinery for 24 hours. You may return to work after 24 hours. Diet    Drink plenty of fluids after surgery, unless you are on a fluid restriction. After general anesthesia, start out eating lightly (broth, soup, bread, etc.) advancing as tolerated to your usual diet. Try to avoid spicy or greasy/fatty foods for 48 hours due to the EGD. Avoid milk/milk product for several hours. If your gag reflex has not returned but you are able to swallow, cut food into small bites, chew food thoroughly and drink fluids carefully for the next 2 hours. Medications  Take medications as ordered by your surgeon. Activities  Limit your activities for 24 hours. Walk around to pass gas. You may shower. Call your surgeon for the following: If you have abdominal pain that is not relieved by passing gas. For an oral temperature (by mouth) is 101 degrees or higher, chills or excessive sweating. You have increasing and progressive bleeding or drainage from surgery area. Persistent nausea or vomiting  Vomiting blood (may be red or black)  Rectal bleeding (may be red, maroon, or black) or change in your bowel habits. Severe sore throat or neck pain  If you are unable to urinate within 8 hours of surgery  Redness or swelling at the IV site. For any questions or concerns you may have.

## 2023-03-01 NOTE — OP NOTE
[address]      Patient:   Ritesh Fu    :    1940    Facility:   [facility]   Referring/PCP: Michael Snow MD  Procedure:   Flexible Sigmoidoscopy --diagnostic  Date:     3/1/2023  Endoscopist:  Oliverio Luke MD     Preoperative Diagnosis: Stool positive for blood. Hematochezia by history. Procedure performed to evaluate source of bleeding    Postoperative Diagnosis: Minimal oozing of blood around the anastomosis may be related to anticoagulation therapy. Status post sigmoid resection    Anesthesia: MAC  Complications:  None  Estimated blood loss: None  Description of Procedure:  Informed consent was obtained from the patient after explanation of the procedure including indications, description of the procedure,  benefits and possible risks and complications of the procedure, and alternatives. Questions were answered. The patient's history was reviewed and a directed physical examination was performed prior to the procedure. Patient was monitored throughout the procedure with pulse oximetry and periodic assessment of vital signs. With the patient initially in the left lateral decubitus position, a digital rectal examination was performed and revealed negative without mass, lesions or tenderness. The Olympus video gastroscope  was placed in the patient's rectum and advanced without difficulty  to  a distance of 40 cm. The prep was poor. Examination of the mucosa was performed during both introduction and withdrawal of the colonoscope. Retroflexed view of the rectum was performed. During the procedure, the patient's blood pressure, pulse and oxygen saturation remained stable and documented. No unusual events occurred during the procedure. Patient was transferred to recovery room and will be discharged when criteria is met. Findings: At about 25 cm anastomosis seen and there was some oozing of blood around the rim of the anastomosis.   No accumulation of blood. Has a normal colored stool in the rectum and beyond the anastomosis up to the visualized area. Has a diverticulosis without bleeding. Recommendations: To hold his Xarelto for couple of days  To see in the office in the next 2 to 3 days    CBC  The patient has tolerated the procedure and conscious sedation without unusual events.      Electronically signed by Baltazar Romberg, MD on 3/1/2023 at 12:18 PM

## 2023-03-01 NOTE — PROGRESS NOTES
First fleets enema given, pt tolerated well. Formed stool and blood returned. Dr Dawkins Settler notified of results and stated okay to give 2nd fleets.

## 2023-03-02 DIAGNOSIS — I10 ESSENTIAL HYPERTENSION: ICD-10-CM

## 2023-03-02 RX ORDER — FERROUS SULFATE 325(65) MG
TABLET ORAL
Qty: 30 TABLET | Refills: 3 | Status: SHIPPED | OUTPATIENT
Start: 2023-03-02

## 2023-03-02 RX ORDER — METOPROLOL SUCCINATE 25 MG/1
25 TABLET, EXTENDED RELEASE ORAL DAILY
Qty: 90 TABLET | Refills: 1 | Status: SHIPPED | OUTPATIENT
Start: 2023-03-02

## 2023-03-02 RX ORDER — LEVOTHYROXINE SODIUM 0.1 MG/1
TABLET ORAL
Qty: 90 TABLET | Refills: 1 | Status: SHIPPED | OUTPATIENT
Start: 2023-03-02

## 2023-03-02 RX ORDER — BUMETANIDE 1 MG/1
1 TABLET ORAL DAILY
Qty: 30 TABLET | Refills: 3 | Status: SHIPPED | OUTPATIENT
Start: 2023-03-02

## 2023-03-02 NOTE — ANESTHESIA POSTPROCEDURE EVALUATION
POST- ANESTHESIA EVALUATION       Pt Name: Nery Courtney  MRN: [id number]  YOB: 1940  Date of evaluation: 3/1/2023  Time:  8:04 PM      /72   Pulse 76   Temp 97.5 °F (36.4 °C) (Infrared)   Resp 19   Ht 5' 5\" (1.651 m)   Wt 176 lb (79.8 kg)   SpO2 96%   BMI 29.29 kg/m²      Consciousness Level  Awake  Cardiopulmonary Status  Stable  Pain Adequately Treated YES  Nausea / Vomiting  NO  Adequate Hydration  YES  Anesthesia Related Complications NONE      Electronically signed by Gomez Juarez MD on 3/1/2023 at 8:04 PM       Department of Anesthesiology  Postprocedure Note    Patient: Nrey Courtney  MRN: [id number]  YOB: 1940  Date of evaluation: 3/1/2023      Procedure Summary     Date: 03/01/23 Room / Location: [facility] 01 / [facility]    Anesthesia Start: 1134 Anesthesia Stop: 1217    Procedures:       EGD ESOPHAGOGASTRODUODENOSCOPY (Esophagus)      ANAL PROCTO SIGMOIDOSCOPY FLEXIBLE (Anus) Diagnosis:       Melena      (MELENA)    Surgeons: Lionel Lemos MD Responsible Provider: Gomez Juarez MD    Anesthesia Type: General ASA Status: 3          Anesthesia Type: General    Chuck Phase I:      Chuck Phase II: Chuck Score: 10      Anesthesia Post Evaluation

## 2023-03-04 ENCOUNTER — HOSPITAL ENCOUNTER (EMERGENCY)
Age: 83
Discharge: HOME | End: 2023-03-04
Attending: EMERGENCY MEDICINE
Payer: MEDICARE

## 2023-03-04 VITALS
DIASTOLIC BLOOD PRESSURE: 57 MMHG | TEMPERATURE: 98.2 F | HEART RATE: 83 BPM | RESPIRATION RATE: 17 BRPM | SYSTOLIC BLOOD PRESSURE: 114 MMHG | OXYGEN SATURATION: 98 %

## 2023-03-04 DIAGNOSIS — R19.5 DARK STOOLS: Primary | ICD-10-CM

## 2023-03-04 LAB
ABO/RH: NORMAL
ABSOLUTE EOS #: 0.2 K/UL (ref 0–0.4)
ABSOLUTE LYMPH #: 0.9 K/UL (ref 1–4.8)
ABSOLUTE MONO #: 0.5 K/UL (ref 0.1–1.3)
ALBUMIN SERPL-MCNC: 3.1 G/DL (ref 3.5–5.2)
ALP SERPL-CCNC: 92 U/L (ref 35–104)
ALT SERPL-CCNC: 8 U/L (ref 5–33)
ANION GAP SERPL CALCULATED.3IONS-SCNC: 6 MMOL/L (ref 9–17)
ANTIBODY SCREEN: NEGATIVE
ARM BAND NUMBER: NORMAL
AST SERPL-CCNC: 14 U/L
BASOPHILS # BLD: 1 % (ref 0–2)
BASOPHILS ABSOLUTE: 0.1 K/UL (ref 0–0.2)
BILIRUB SERPL-MCNC: 0.8 MG/DL (ref 0.3–1.2)
BUN SERPL-MCNC: 11 MG/DL (ref 8–23)
CALCIUM SERPL-MCNC: 8.9 MG/DL (ref 8.6–10.4)
CHLORIDE SERPL-SCNC: 106 MMOL/L (ref 98–107)
CO2 SERPL-SCNC: 25 MMOL/L (ref 20–31)
CREAT SERPL-MCNC: 1.16 MG/DL (ref 0.5–0.9)
DATE, STOOL #1: NORMAL
EKG ATRIAL RATE: 90 BPM
EKG P AXIS: 63 DEGREES
EKG P-R INTERVAL: 204 MS
EKG Q-T INTERVAL: 410 MS
EKG QRS DURATION: 122 MS
EKG QTC CALCULATION (BAZETT): 501 MS
EKG R AXIS: -9 DEGREES
EKG T AXIS: 9 DEGREES
EKG VENTRICULAR RATE: 90 BPM
EOSINOPHILS RELATIVE PERCENT: 3 % (ref 0–4)
EXPIRATION DATE: NORMAL
GFR SERPL CREATININE-BSD FRML MDRD: 47 ML/MIN/1.73M2
GLUCOSE SERPL-MCNC: 108 MG/DL (ref 70–99)
HCT VFR BLD AUTO: 29.9 % (ref 36–46)
HEMOCCULT SP1 STL QL: NEGATIVE
HGB BLD-MCNC: 10 G/DL (ref 12–16)
INR PPP: 1.1
LYMPHOCYTES # BLD: 18 % (ref 24–44)
MAGNESIUM SERPL-MCNC: 2 MG/DL (ref 1.6–2.6)
MCH RBC QN AUTO: 31.9 PG (ref 26–34)
MCHC RBC AUTO-ENTMCNC: 33.5 G/DL (ref 31–37)
MCV RBC AUTO: 95.3 FL (ref 80–100)
MONOCYTES # BLD: 10 % (ref 1–7)
PDW BLD-RTO: 13.5 % (ref 11.5–14.9)
PLATELET # BLD AUTO: 160 K/UL (ref 150–450)
PMV BLD AUTO: 9.2 FL (ref 6–12)
POTASSIUM SERPL-SCNC: 4.1 MMOL/L (ref 3.7–5.3)
PROT SERPL-MCNC: 6.2 G/DL (ref 6.4–8.3)
PROTHROMBIN TIME: 14.7 SEC (ref 11.8–14.6)
RBC # BLD: 3.14 M/UL (ref 4–5.2)
SEG NEUTROPHILS: 68 % (ref 36–66)
SEGMENTED NEUTROPHILS ABSOLUTE COUNT: 3.2 K/UL (ref 1.3–9.1)
SODIUM SERPL-SCNC: 137 MMOL/L (ref 135–144)
TIME, STOOL #1: NORMAL
TROPONIN I SERPL DL<=0.01 NG/ML-MCNC: 13 NG/L (ref 0–14)
TROPONIN I SERPL DL<=0.01 NG/ML-MCNC: 14 NG/L (ref 0–14)
WBC # BLD AUTO: 4.8 K/UL (ref 3.5–11)

## 2023-03-04 PROCEDURE — 85025 COMPLETE CBC W/AUTO DIFF WBC: CPT

## 2023-03-04 PROCEDURE — 86850 RBC ANTIBODY SCREEN: CPT

## 2023-03-04 PROCEDURE — 99284 EMERGENCY DEPT VISIT MOD MDM: CPT

## 2023-03-04 PROCEDURE — 82270 OCCULT BLOOD FECES: CPT

## 2023-03-04 PROCEDURE — 93005 ELECTROCARDIOGRAM TRACING: CPT | Performed by: EMERGENCY MEDICINE

## 2023-03-04 PROCEDURE — 6360000002 HC RX W HCPCS: Performed by: EMERGENCY MEDICINE

## 2023-03-04 PROCEDURE — 36415 COLL VENOUS BLD VENIPUNCTURE: CPT

## 2023-03-04 PROCEDURE — 85610 PROTHROMBIN TIME: CPT

## 2023-03-04 PROCEDURE — 86900 BLOOD TYPING SEROLOGIC ABO: CPT

## 2023-03-04 PROCEDURE — 86901 BLOOD TYPING SEROLOGIC RH(D): CPT

## 2023-03-04 PROCEDURE — C9113 INJ PANTOPRAZOLE SODIUM, VIA: HCPCS | Performed by: EMERGENCY MEDICINE

## 2023-03-04 PROCEDURE — 2580000003 HC RX 258: Performed by: EMERGENCY MEDICINE

## 2023-03-04 PROCEDURE — 80053 COMPREHEN METABOLIC PANEL: CPT

## 2023-03-04 PROCEDURE — 84484 ASSAY OF TROPONIN QUANT: CPT

## 2023-03-04 PROCEDURE — 96365 THER/PROPH/DIAG IV INF INIT: CPT

## 2023-03-04 PROCEDURE — 83735 ASSAY OF MAGNESIUM: CPT

## 2023-03-04 RX ORDER — 0.9 % SODIUM CHLORIDE 0.9 %
1000 INTRAVENOUS SOLUTION INTRAVENOUS ONCE
Status: COMPLETED | OUTPATIENT
Start: 2023-03-04 | End: 2023-03-04

## 2023-03-04 RX ADMIN — SODIUM CHLORIDE 80 MG: 9 INJECTION, SOLUTION INTRAVENOUS at 14:13

## 2023-03-04 RX ADMIN — SODIUM CHLORIDE 1000 ML: 9 INJECTION, SOLUTION INTRAVENOUS at 14:10

## 2023-03-04 NOTE — ED NOTES
Patient to emergency department with complaints of black stools. Pt states she was tested positive for blood in her stool at the doctors, then followed up with GI and had scope done this week.  Pt was informed to resume her blood thinner and now states she has continued black stools      Tori Pruitt RN  03/04/23 0601

## 2023-03-04 NOTE — ED PROVIDER NOTES
EMERGENCY DEPARTMENT ENCOUNTER    Pt Name: Vi Arevalo  MRN: [id number]  [place] 1940  Date of evaluation: 3/4/23  CHIEF COMPLAINT       Chief Complaint   Patient presents with    Melena     HISTORY OF PRESENT ILLNESS   HPI  Dark stools. Just resumed her blood thinners on Friday started to have dark stools over the weekend. She is taking iron. Had a upper and lower scope last week that she states was told looked fine. She has a history of A-fib. No lightheadedness dizziness falls traumas or injuries, no nausea vomiting diarrhea. Here with her daughter. REVIEW OF SYSTEMS     Review of Systems   All other systems reviewed and are negative. PASTMEDICAL HISTORY     Past Medical History:   Diagnosis Date    Atrial fibrillation ([facility] [address] 03/28/2014    Chronic back pain     with rt. leg pain    Diverticulosis     GERD (gastroesophageal reflux disease)     Hearing aid worn     willi. ears. Hearing deficit, bilateral     Hyperlipidemia     Hypertension     Hypothyroidism     Obesity (BMI 30.0-34.9) 08/12/2016    Onychomycosis     PE (pulmonary embolism)     H/O DVT of rt leg    Poor venous access     Type II or unspecified type diabetes mellitus without mention of complication, not stated as uncontrolled      Past Problem List  Patient Active Problem List   Diagnosis Code    Mixed hyperlipidemia E78.2    Gastroesophageal reflux disease without esophagitis K21.9    Onychomycosis B35.1    Diverticulosis K57.90    Chronic back pain M54.9, G89.29    Hypothyroidism E03.9    Essential hypertension I10    Right bundle branch block I45.10    Chronic pulmonary embolism (HCC) I27.82    Type 2 diabetes mellitus without complication, without long-term current use of insulin (HCC) E11.9    Obesity (BMI 30.0-34. 9) E66.9    Tracheobronchitis J40    Allergic rhinitis J30.9    Glaucoma H40.9    S/p bilateral myringotomy with tube placement Z96.22    GI bleed K92.2    Iron deficiency anemia D50.9    Paroxysmal atrial fibrillation (HCC) I48.0    Hemorrhage of rectum and anus K62.5    Intractable nausea and vomiting R11.2    Atrial fibrillation with RVR (HCC) I48.91    Class 2 obesity due to excess calories in adult E66.09    Sepsis (HCC) A41.9    JOSH (acute kidney injury) ([facility] [address] N17.9    Syncope R55    Acute cystitis without hematuria N30.00    Chronic systolic (congestive) heart failure I50.22    Chronic renal disease, stage III ([facility] [address] [[id number]] N18.30     SURGICAL HISTORY       Past Surgical History:   Procedure Laterality Date    ARM SURGERY Right     Growth taken off per pt. CARPAL TUNNEL RELEASE Bilateral     Per pt.     CATARACT EXTRACTION EXTRACAPSULAR W/ INTRAOCULAR LENS IMPLANTATION Left 12/06/2022    [facility]    CATARACT EXTRACTION EXTRACAPSULAR W/ INTRAOCULAR LENS IMPLANTATION Right 02/07/2023    [facility]    CHOLECYSTECTOMY      COLON SURGERY      s/p sigmoid cloectomy    COLONOSCOPY N/A 01/25/2021    COLONOSCOPY DIAGNOSTIC performed by Shivani Nova MD at [facility] ([facility])      INTRACAPSULAR CATARACT EXTRACTION Left 12/06/2022    EYE CATARACT EMULSIFICATION IOL IMPLANT performed by Jojo Mckoy MD at [address] [facility] Right 2/7/2023    EYE CATARACT EMULSIFICATION IOL IMPLANT performed by Jojo Mckoy MD at [address] [facility] N/A 3/1/2023    [address] performed by Shivani Nova MD at [address] [place] [address] N/A 12/02/2021    SIGMOIDOSCOPY DIAGNOSTIC FLEXIBLE performed by Shivani Nova MD at [facility] 09/20/2019    DR IMAN GAUTHIER    UPPER GASTROINTESTINAL ENDOSCOPY N/A 12/02/2021    EGD DIAGNOSTIC ONLY performed by Shivani Nova MD at [facility] Pending sale to [facility] N/A 3/1/2023    EGD ESOPHAGOGASTRODUODENOSCOPY performed by Shivani Nova MD at [address] [facility]      s/p CURRENT MEDICATIONS       Discharge Medication List as of 3/4/2023  3:15 PM        CONTINUE these medications which have NOT CHANGED    Details   metoprolol succinate (TOPROL XL) 25 MG extended release tablet Take 1 tablet by mouth daily, Disp-90 tablet, R-1Normal      levothyroxine (SYNTHROID) 100 MCG tablet TAKE 1 TABLET BY MOUTH EVERY DAY, Disp-90 tablet, R-1Normal      FEROSUL 325 (65 Fe) MG tablet TAKE 1 TABLET BY MOUTH IN THE MORNING WITH FOOD, Disp-30 tablet, R-3Normal      bumetanide (BUMEX) 1 MG tablet Take 1 tablet by mouth daily, Disp-30 tablet, R-3Normal      pantoprazole (PROTONIX) 40 MG tablet Take 1 tablet by mouth every morning (before breakfast), Disp-30 tablet, R-0Normal      rivaroxaban (XARELTO) 10 MG TABS tablet Take 10 mg by mouthHistorical Med      cholestyramine (QUESTRAN) 4 g packet Take 1 packet by mouth every evening, Disp-90 packet, R-3Normal      midodrine (PROAMATINE) 2.5 MG tablet Take 1 tablet by mouth 3 times daily as needed (low SBP), Disp-90 tablet, R-3Normal      simethicone (MYLICON) 80 MG chewable tablet Take 80 mg by mouth every 6 hours as needed for FlatulenceHistorical Med      Probiotic Product (PROBIOTIC DAILY) CAPS Take 1 capsule by mouth dailyHistorical Med      acetaminophen (TYLENOL) 500 MG tablet Take 500 mg by mouth at bedtimeHistorical Med      dorzolamide (TRUSOPT) 2 % ophthalmic solution Place 1 drop into both eyes 2 times daily 8am and 8pm, R-10Historical Med      latanoprost (XALATAN) 0.005 % ophthalmic solution Place 1 drop into both eyes nightly 1230amHistorical Med           ALLERGIES     is allergic to avapro [irbesartan], bactrim [sulfamethoxazole-trimethoprim], ciprofloxacin hcl, cozaar [losartan potassium], diovan [valsartan], lipitor [atorvastatin calcium], lisinopril, pcn [penicillins], pravachol [pravastatin sodium], tape [adhesive tape], tramadol, zetia [ezetimibe], and morphine. FAMILY HISTORY     is adopted.       SOCIAL HISTORY       Social History     Tobacco Use    Smoking status: Never    Smokeless tobacco: Never   Vaping Use    Vaping Use: Never used   Substance Use Topics    Alcohol use: No    Drug use: No     PHYSICAL EXAM     INITIAL VITALS: BP (!) 114/57   Pulse 83   Temp 98.2 °F (36.8 °C)   Resp 17   SpO2 98%    Physical Exam  Constitutional:       General: She is not in acute distress. Appearance: Normal appearance. She is well-developed. She is not diaphoretic. HENT:      Head: Normocephalic and atraumatic. Right Ear: External ear normal.      Left Ear: External ear normal.      Nose: Nose normal. No congestion. Mouth/Throat:      Mouth: Mucous membranes are moist.      Pharynx: Oropharynx is clear. Eyes:      General:         Right eye: No discharge. Left eye: No discharge. Conjunctiva/sclera: Conjunctivae normal.      Pupils: Pupils are equal, round, and reactive to light. Neck:      Trachea: No tracheal deviation. Cardiovascular:      Rate and Rhythm: Tachycardia present. Rhythm irregular. Pulses: Normal pulses. Heart sounds: Normal heart sounds. Pulmonary:      Effort: Pulmonary effort is normal. No respiratory distress. Breath sounds: Normal breath sounds. No stridor. No wheezing or rales. Abdominal:      Palpations: Abdomen is soft. Tenderness: There is no abdominal tenderness. There is no guarding or rebound. Musculoskeletal:         General: No tenderness or deformity. Normal range of motion. Cervical back: Normal range of motion and neck supple. Skin:     General: Skin is warm and dry. Capillary Refill: Capillary refill takes less than 2 seconds. Findings: No erythema or rash. Neurological:      General: No focal deficit present. Mental Status: She is alert and oriented to person, place, and time.       Coordination: Coordination normal.   Psychiatric:         Mood and Affect: Mood normal.         Behavior: Behavior normal.         Thought Content: Thought content normal.         Judgment: Judgment normal.       MEDICAL DECISION MAKING / ED COURSE:         1)  Number and Complexity of Problems Addressed at this Encounter  Problem List This Visit:  melena, recurrent    Differential Diagnosis: gi bleed    Diagnoses Considered but Do Not Suspect:  sepsis    Pertinent Comorbid Conditions:  afib on anticoagulation    2)  Data Reviewed and Analyzed  (Lab and radiology tests/orders below in next section)    External Documents Reviewed: past egd and colonoscopy    My EKG interpretation:  NSR, RBBB, nonspecific changes, rat 90 bpm, pr 204, qrs 122, qtc 501    3)  Treatment and Disposition    ED Course as of 03/04/23 1531   Sat Mar 04, 2023   1224 Echo last year:  Left ventricle is normal in size and wall thickness. Global left ventricular systolic function is normal. Estimated LV EF 55-60  %. [WM]   1008 Flex sig 3/1:    At about 25 cm anastomosis seen and there was some oozing of blood around the rim of the anastomosis. No accumulation of blood. Has a normal colored stool in the rectum and beyond the anastomosis up to the visualized area. Has a diverticulosis without bleeding. [WM]      ED Course User Index  [WM] Alex Buchanan MD       Patient repeat assessment:  comfortable, no distress    Disposition discussion with patient/family, Shared Decision Making:  Discussed with patient anticipatory guidance, discharge instructions, follow up PCP 24 hours    Dw her and daughter Hb stable, no blood on ROSAMARIA, dark stools likely her iron supplementation  Do not suspect active hemorrhage  Patient hoping to go home  Procedures      DATA FOR LAB AND RADIOLOGY TESTS ORDERED BELOW ARE REVIEWED BY THE ED CLINICIAN:    LABS: Lab orders shown below, the results are reviewed by myself, and all abnormals are listed below.   Labs Reviewed   CBC WITH AUTO DIFFERENTIAL - Abnormal; Notable for the following components:       Result Value    RBC 3.14 (*)     Hemoglobin 10.0 (*) Hematocrit 29.9 (*)     Seg Neutrophils 68 (*)     Lymphocytes 18 (*)     Monocytes 10 (*)     Absolute Lymph # 0.90 (*)     All other components within normal limits   COMPREHENSIVE METABOLIC PANEL - Abnormal; Notable for the following components:    Glucose 108 (*)     Creatinine 1.16 (*)     Est, Glom Filt Rate 47 (*)     Anion Gap 6 (*)     Total Protein 6.2 (*)     Albumin 3.1 (*)     All other components within normal limits   PROTIME-INR - Abnormal; Notable for the following components:    Protime 14.7 (*)     All other components within normal limits   TROPONIN   TROPONIN   MAGNESIUM   OCCULT BLOOD SCREEN   TYPE AND SCREEN       Vitals Reviewed:    Vitals:    03/04/23 1221 03/04/23 1324 03/04/23 1400 03/04/23 1500   BP:  (!) 118/58 100/60 (!) 114/57   Pulse: (!) 112   83   Resp:       Temp:       SpO2:  98% 97% 98%     MEDICATIONS GIVEN TO PATIENT THIS ENCOUNTER:  Orders Placed This Encounter   Medications    0.9 % sodium chloride bolus    pantoprazole (PROTONIX) 80 mg in sodium chloride 0.9 % 50 mL bolus     DISCHARGE PRESCRIPTIONS:  Discharge Medication List as of 3/4/2023  3:15 PM        PHYSICIAN CONSULTS ORDERED THIS ENCOUNTER:  None  FINAL IMPRESSION      1.  Dark stools          DISPOSITION/PLAN   DISPOSITION Decision To Discharge 03/04/2023 03:15:12 PM      OUTPATIENT FOLLOW UP THE PATIENT:  MD Dwayne Martinez [address]  [phone]    Schedule an appointment as soon as possible for a visit in 1 day      [facility], [address] [facility]  [address] [facility] [address]  [phone]    Schedule an appointment as soon as possible for a visit in 1 day      MD Estela Uribe MD  03/04/23 4593

## 2023-03-06 LAB
EKG ATRIAL RATE: 90 BPM
EKG P AXIS: 63 DEGREES
EKG P-R INTERVAL: 204 MS
EKG Q-T INTERVAL: 410 MS
EKG QRS DURATION: 122 MS
EKG QTC CALCULATION (BAZETT): 501 MS
EKG R AXIS: -9 DEGREES
EKG T AXIS: 9 DEGREES
EKG VENTRICULAR RATE: 90 BPM

## 2023-03-06 PROCEDURE — 93010 ELECTROCARDIOGRAM REPORT: CPT | Performed by: INTERNAL MEDICINE

## 2023-03-07 ENCOUNTER — TELEPHONE (OUTPATIENT)
Dept: FAMILY MEDICINE CLINIC | Age: 83
End: 2023-03-07

## 2023-03-07 ENCOUNTER — OFFICE VISIT (OUTPATIENT)
Dept: GASTROENTEROLOGY | Age: 83
End: 2023-03-07
Payer: MEDICARE

## 2023-03-07 VITALS
BODY MASS INDEX: 29.32 KG/M2 | WEIGHT: 176 LBS | SYSTOLIC BLOOD PRESSURE: 149 MMHG | HEIGHT: 65 IN | DIASTOLIC BLOOD PRESSURE: 88 MMHG

## 2023-03-07 DIAGNOSIS — R19.5 OB + STOOL: Primary | ICD-10-CM

## 2023-03-07 PROCEDURE — G8427 DOCREV CUR MEDS BY ELIG CLIN: HCPCS | Performed by: INTERNAL MEDICINE

## 2023-03-07 PROCEDURE — 1090F PRES/ABSN URINE INCON ASSESS: CPT | Performed by: INTERNAL MEDICINE

## 2023-03-07 PROCEDURE — G8417 CALC BMI ABV UP PARAM F/U: HCPCS | Performed by: INTERNAL MEDICINE

## 2023-03-07 PROCEDURE — 3077F SYST BP >= 140 MM HG: CPT | Performed by: INTERNAL MEDICINE

## 2023-03-07 PROCEDURE — 99213 OFFICE O/P EST LOW 20 MIN: CPT | Performed by: INTERNAL MEDICINE

## 2023-03-07 PROCEDURE — G8484 FLU IMMUNIZE NO ADMIN: HCPCS | Performed by: INTERNAL MEDICINE

## 2023-03-07 PROCEDURE — 3079F DIAST BP 80-89 MM HG: CPT | Performed by: INTERNAL MEDICINE

## 2023-03-07 PROCEDURE — G8400 PT W/DXA NO RESULTS DOC: HCPCS | Performed by: INTERNAL MEDICINE

## 2023-03-07 PROCEDURE — 1036F TOBACCO NON-USER: CPT | Performed by: INTERNAL MEDICINE

## 2023-03-07 PROCEDURE — 1123F ACP DISCUSS/DSCN MKR DOCD: CPT | Performed by: INTERNAL MEDICINE

## 2023-03-07 ASSESSMENT — ENCOUNTER SYMPTOMS
ANAL BLEEDING: 1
ALLERGIC/IMMUNOLOGIC NEGATIVE: 1
RECTAL PAIN: 1

## 2023-03-07 NOTE — PROGRESS NOTES
GI OFFICE FOLLOW UP    INTERVAL HISTORY:   No referring provider defined for this encounter. Chief Complaint   Patient presents with    Melena     Pt here for egd/flex sig f/u. Pt states she still has a little bleeding and her stool is still black but she was told it was from iron        1. OB + stool              HISTORY OF PRESENT ILLNESS:   Patient seen along with her son. Recently patient was seen in the office and she had reynaldo colored stools. Subsequently patient had a EGD done and was nonspecific. Sigmoidoscopy revealed minimal oozing of blood at the anastomosis at about 35 cm from the anus. She had a normal colored stool proximal to the anastomosis. Patient has been on anticoagulation therapy. It was felt that oozing of blood from the anastomosis may be secondary to anticoagulation therapy. She did well for couple of days and again over the weekend she noticed dark stools visited emergency room. During that visit her stool was negative for occult blood and hemoglobin was stable and subsequently she was sent home. ER records reviewed. At present she denies symptoms. Has normal bowel movements. No abdominal pain bloating etc.  Appears to have mild constipation. Patient had similar issue of bleeding from the anastomotic area in the past.  Past Medical,Family, and Social History reviewed and does contribute to the patient presenting condition. Patient's PMH/PSH,SH,PSYCH Hx, MEDs, ALLERGIES, and ROS were all reviewed and updated in the appropriate sections. Yes      PAST MEDICAL HISTORY:  Past Medical History:   Diagnosis Date    Atrial fibrillation ([facility] [address] 03/28/2014    Chronic back pain     with rt. leg pain    Diverticulosis     GERD (gastroesophageal reflux disease)     Hearing aid worn     willi. ears.     Hearing deficit, bilateral     Hyperlipidemia     Hypertension Hypothyroidism     Obesity (BMI 30.0-34.9) 08/12/2016    Onychomycosis     PE (pulmonary embolism)     H/O DVT of rt leg    Poor venous access     Type II or unspecified type diabetes mellitus without mention of complication, not stated as uncontrolled        Past Surgical History:   Procedure Laterality Date    ARM SURGERY Right     Growth taken off per pt. CARPAL TUNNEL RELEASE Bilateral     Per pt.     CATARACT EXTRACTION EXTRACAPSULAR W/ INTRAOCULAR LENS IMPLANTATION Left 12/06/2022    [facility]    CATARACT EXTRACTION EXTRACAPSULAR W/ INTRAOCULAR LENS IMPLANTATION Right 02/07/2023    [facility]    CHOLECYSTECTOMY      COLON SURGERY      s/p sigmoid cloectomy    COLONOSCOPY N/A 01/25/2021    COLONOSCOPY DIAGNOSTIC performed by Nish Gordillo MD at [place] [address]. ([facility])      INTRACAPSULAR CATARACT EXTRACTION Left 12/06/2022    EYE CATARACT EMULSIFICATION IOL IMPLANT performed by John Hauser MD at [address] [email or web address] Right 2/7/2023    EYE CATARACT EMULSIFICATION IOL IMPLANT performed by John Hauser MD at [address] [place] [address] N/A 3/1/2023    [address] performed by Nish Gordillo MD at [address] [place] [address] N/A 12/02/2021    SIGMOIDOSCOPY DIAGNOSTIC FLEXIBLE performed by Nish Gordillo MD at [facility] 09/20/2019    DR IMAN GAUTHIER    UPPER GASTROINTESTINAL ENDOSCOPY N/A 12/02/2021    EGD DIAGNOSTIC ONLY performed by Nish Gordillo MD at [address] [place] [address] N/A 3/1/2023    EGD ESOPHAGOGASTRODUODENOSCOPY performed by Nish Gordillo MD at [facility]      s/p       CURRENT MEDICATIONS:    Current Outpatient Medications:     metoprolol succinate (TOPROL XL) 25 MG extended release tablet, Take 1 tablet by mouth daily, Disp: 90 tablet, Rfl: 1    levothyroxine (SYNTHROID) 100 MCG tablet, TAKE 1 TABLET BY MOUTH EVERY DAY, Disp: 90 tablet, Rfl: 1    FEROSUL 325 (65 Fe) MG tablet, TAKE 1 TABLET BY MOUTH IN THE MORNING WITH FOOD, Disp: 30 tablet, Rfl: 3    bumetanide (BUMEX) 1 MG tablet, Take 1 tablet by mouth daily, Disp: 30 tablet, Rfl: 3    pantoprazole (PROTONIX) 40 MG tablet, Take 1 tablet by mouth every morning (before breakfast), Disp: 30 tablet, Rfl: 0    rivaroxaban (XARELTO) 10 MG TABS tablet, Take 10 mg by mouth, Disp: , Rfl:     cholestyramine (QUESTRAN) 4 g packet, Take 1 packet by mouth every evening, Disp: 90 packet, Rfl: 3    midodrine (PROAMATINE) 2.5 MG tablet, Take 1 tablet by mouth 3 times daily as needed (low SBP), Disp: 90 tablet, Rfl: 3    Probiotic Product (PROBIOTIC DAILY) CAPS, Take 1 capsule by mouth daily, Disp: , Rfl:     acetaminophen (TYLENOL) 500 MG tablet, Take 500 mg by mouth at bedtime, Disp: , Rfl:     dorzolamide (TRUSOPT) 2 % ophthalmic solution, Place 1 drop into both eyes 2 times daily 8am and 8pm, Disp: , Rfl: 10    latanoprost (XALATAN) 0.005 % ophthalmic solution, Place 1 drop into both eyes nightly 1230am, Disp: , Rfl:     simethicone (MYLICON) 80 MG chewable tablet, Take 80 mg by mouth every 6 hours as needed for Flatulence (Patient not taking: No sig reported), Disp: , Rfl:     ALLERGIES:   Allergies   Allergen Reactions    Avapro [Irbesartan]     Bactrim [Sulfamethoxazole-Trimethoprim]      Rash    Ciprofloxacin Hcl Nausea Only    Cozaar [Losartan Potassium]     Diovan [Valsartan]     Lipitor [Atorvastatin Calcium]     Lisinopril     Pcn [Penicillins]     Pravachol [Pravastatin Sodium]      myalgias    Tape [Adhesive Tape] Dermatitis    Tramadol Swelling    Zetia [Ezetimibe]     Morphine Nausea And Vomiting       FAMILY HISTORY:       Adopted: Yes   Problem Relation Age of Onset    No Known Problems Mother     No Known Problems Father          SOCIAL HISTORY:   Social History     Socioeconomic History    Marital status:       Spouse name: Not on file    Number of children: Not on file    Years of education: Not on file    Highest education level: Not on file   Occupational History    Not on file   Tobacco Use    Smoking status: Never    Smokeless tobacco: Never   Vaping Use    Vaping Use: Never used   Substance and Sexual Activity    Alcohol use: No    Drug use: No    Sexual activity: Not on file   Other Topics Concern    Not on file   Social History Narrative    Not on file     Social Determinants of Health     Financial Resource Strain: Low Risk     Difficulty of Paying Living Expenses: Not hard at all   Food Insecurity: No Food Insecurity    Worried About [address] in the Last Year: Never true    [address] in the Last Year: Never true   Transportation Needs: Unknown    Lack of Transportation (Medical): Not on file    Lack of Transportation (Non-Medical): No   Physical Activity: Not on file   Stress: Not on file   Social Connections: Not on file   Intimate Partner Violence: Not on file   Housing Stability: Unknown    Unable to Pay for Housing in the Last Year: Not on file    Number of Places Lived in the Last Year: Not on file    Unstable Housing in the Last Year: No         REVIEW OF SYSTEMS:         Review of Systems   Constitutional: Negative. HENT: Negative. Eyes:  Positive for visual disturbance (glasses). Cardiovascular: Negative. Gastrointestinal:  Positive for anal bleeding and rectal pain. Endocrine: Negative. Genitourinary: Negative. Musculoskeletal: Negative. Skin: Negative. Allergic/Immunologic: Negative. Neurological: Negative. Hematological: Negative. Psychiatric/Behavioral: Negative. PHYSICAL EXAMINATION:     Vital signs reviewed per the nursing documentation. BP (!) 149/88   Ht 5' 5\" (1.651 m)   Wt 176 lb (79.8 kg)   BMI 29.29 kg/m²   Body mass index is 29.29 kg/m². Physical Exam  Vitals and nursing note reviewed.    Constitutional:       Appearance: She is well-developed. HENT:      Head: Normocephalic and atraumatic. Eyes:      General: No scleral icterus. Conjunctiva/sclera: Conjunctivae normal.      Pupils: Pupils are equal, round, and reactive to light. Neck:      Thyroid: No thyromegaly. Vascular: No hepatojugular reflux or JVD. Trachea: No tracheal deviation. Cardiovascular:      Rate and Rhythm: Normal rate and regular rhythm. Heart sounds: Normal heart sounds. Pulmonary:      Effort: Pulmonary effort is normal. No respiratory distress. Breath sounds: Normal breath sounds. No wheezing or rales. Abdominal:      General: Bowel sounds are normal. There is no distension. Palpations: Abdomen is soft. There is no hepatomegaly or mass. Tenderness: There is no abdominal tenderness. There is no rebound. Hernia: No hernia is present. Musculoskeletal:         General: No tenderness. Cervical back: Normal range of motion and neck supple. Comments: No joint swelling   Lymphadenopathy:      Cervical: No cervical adenopathy. Skin:     General: Skin is warm. Findings: No bruising, ecchymosis, erythema or rash. Neurological:      Mental Status: She is alert and oriented to person, place, and time. Cranial Nerves: No cranial nerve deficit. Psychiatric:         Thought Content:  Thought content normal.         LABORATORY DATA: Reviewed  Lab Results   Component Value Date    WBC 4.8 03/04/2023    HGB 10.0 (L) 03/04/2023    HCT 29.9 (L) 03/04/2023    MCV 95.3 03/04/2023     03/04/2023     03/04/2023    K 4.1 03/04/2023     03/04/2023    CO2 25 03/04/2023    BUN 11 03/04/2023    CREATININE 1.16 (H) 03/04/2023    LABPROT 5.4 (L) 10/08/2012    LABALBU 3.1 (L) 03/04/2023    BILITOT 0.8 03/04/2023    ALKPHOS 92 03/04/2023    AST 14 03/04/2023    ALT 8 03/04/2023    INR 1.1 03/04/2023         Lab Results   Component Value Date    RBC 3.14 (L) 03/04/2023    HGB 10.0 (L) 03/04/2023    MCV 95.3 03/04/2023    MCH 31.9 03/04/2023    MCHC 33.5 03/04/2023    RDW 13.5 03/04/2023    MPV 9.2 03/04/2023    BASOPCT 1 03/04/2023    LYMPHSABS 0.90 (L) 03/04/2023    MONOSABS 0.50 03/04/2023    NEUTROABS 3.20 03/04/2023    EOSABS 0.20 03/04/2023    BASOSABS 0.10 03/04/2023         DIAGNOSTIC TESTING:     CT ABDOMEN PELVIS WO CONTRAST Additional Contrast? None    Result Date: 2/18/2023  EXAMINATION: CT OF THE ABDOMEN AND PELVIS WITHOUT CONTRAST 2/18/2023 1:36 pm TECHNIQUE: CT of the abdomen and pelvis was performed without the administration of intravenous contrast. Multiplanar reformatted images are provided for review. Automated exposure control, iterative reconstruction, and/or weight based adjustment of the mA/kV was utilized to reduce the radiation dose to as low as reasonably achievable. COMPARISON: 11/29/2021 HISTORY: GI bleeding. FINDINGS: Image quality degraded by motion artifact. Lower Chest: Increased right middle lobe airspace disease compared with chest CT on 08/10/2022. Dense mitral annular calcification. Organs: Cholecystectomy. Hepatosplenomegaly. Fatty infiltration of the pancreas. Stable cyst at the inferior pole of the left kidney. Remaining solid organs are unremarkable. GI/Bowel: Stable dilatation at the colonic anastomoses within the lower abdomen. Sigmoid diverticulosis without acute inflammation. Remainder of the gastrointestinal tract appears unremarkable. Pelvis: Bladder is unremarkable. Hysterectomy. No lymphadenopathy or free fluid. Peritoneum/Retroperitoneum: No ascites or significant lymphadenopathy. IVC filter. Moderate atherosclerotic disease of the abdominal aorta without aneurysm. Bones/Soft Tissues: Osteopenia. No suspicious osseous lesions. Right middle lobe airspace disease could represent bronchiolitis and/or pneumonia. Stable appearance of the bowel compared with prior CT from 2021. Sigmoid diverticulosis without definite acute diverticulitis. Assessment  1. OB + stool        Plan  Patient is stable. Discussed with the patient and the patient's son regarding precautions to avoid constipation. To check with physician treating/managing anticoagulation therapy and decide whether she needs anticoagulation. Patient has a recurrent bleeding. At present she does not have signs of bleeding. Advised to see family physician for follow-up. If she has any further issues to contact me. Thank you for allowing me to participate in the care of Ms. Lama. For any further questions please do not hesitate to contact me. I have reviewed and agree with the ROS entered by the MA/LPN. Note is dictated utilizing voice recognition software. Unfortunately this leads to occasional typographical errors.  Please contact our office if you have any questions        Wilberto Elliott MD,FACP, [facility]  Board Certified in Gastroenterology and [address] [place] [address] Gastroenterology  Office #: [phone]

## 2023-03-16 DIAGNOSIS — K21.9 GASTROESOPHAGEAL REFLUX DISEASE WITHOUT ESOPHAGITIS: Primary | ICD-10-CM

## 2023-03-16 DIAGNOSIS — K57.90 DIVERTICULOSIS: ICD-10-CM

## 2023-03-16 RX ORDER — PANTOPRAZOLE SODIUM 40 MG/1
40 TABLET, DELAYED RELEASE ORAL
Qty: 30 TABLET | Refills: 3 | Status: SHIPPED | OUTPATIENT
Start: 2023-03-16

## 2023-03-18 ENCOUNTER — HOSPITAL ENCOUNTER (EMERGENCY)
Age: 83
Discharge: HOME | End: 2023-03-19
Attending: EMERGENCY MEDICINE
Payer: MEDICARE

## 2023-03-18 DIAGNOSIS — N30.00 ACUTE CYSTITIS WITHOUT HEMATURIA: Primary | ICD-10-CM

## 2023-03-18 LAB
ABSOLUTE EOS #: 0.3 K/UL (ref 0–0.4)
ABSOLUTE LYMPH #: 0.7 K/UL (ref 1–4.8)
ABSOLUTE MONO #: 0.6 K/UL (ref 0.1–1.3)
ALBUMIN SERPL-MCNC: 3.2 G/DL (ref 3.5–5.2)
ALP SERPL-CCNC: 120 U/L (ref 35–104)
ALT SERPL-CCNC: 9 U/L (ref 5–33)
ANION GAP SERPL CALCULATED.3IONS-SCNC: 9 MMOL/L (ref 9–17)
AST SERPL-CCNC: 23 U/L
BACTERIA: ABNORMAL
BASOPHILS # BLD: 1 % (ref 0–2)
BASOPHILS ABSOLUTE: 0.1 K/UL (ref 0–0.2)
BILIRUB SERPL-MCNC: 0.3 MG/DL (ref 0.3–1.2)
BILIRUBIN URINE: NEGATIVE
BUN SERPL-MCNC: 12 MG/DL (ref 8–23)
CALCIUM SERPL-MCNC: 8.8 MG/DL (ref 8.6–10.4)
CASTS UA: ABNORMAL /LPF
CHLORIDE SERPL-SCNC: 99 MMOL/L (ref 98–107)
CO2 SERPL-SCNC: 28 MMOL/L (ref 20–31)
COLOR: YELLOW
CREAT SERPL-MCNC: 1.11 MG/DL (ref 0.5–0.9)
EOSINOPHILS RELATIVE PERCENT: 5 % (ref 0–4)
EPITHELIAL CELLS UA: ABNORMAL /HPF
GFR SERPL CREATININE-BSD FRML MDRD: 49 ML/MIN/1.73M2
GLUCOSE SERPL-MCNC: 170 MG/DL (ref 70–99)
GLUCOSE UR STRIP.AUTO-MCNC: NEGATIVE MG/DL
HCT VFR BLD AUTO: 31.7 % (ref 36–46)
HGB BLD-MCNC: 10.6 G/DL (ref 12–16)
KETONES UR STRIP.AUTO-MCNC: ABNORMAL MG/DL
LEUKOCYTE ESTERASE UR QL STRIP.AUTO: ABNORMAL
LYMPHOCYTES # BLD: 14 % (ref 24–44)
MAGNESIUM SERPL-MCNC: 1.8 MG/DL (ref 1.6–2.6)
MCH RBC QN AUTO: 30.4 PG (ref 26–34)
MCHC RBC AUTO-ENTMCNC: 33.4 G/DL (ref 31–37)
MCV RBC AUTO: 91.1 FL (ref 80–100)
MONOCYTES # BLD: 12 % (ref 1–7)
NITRITE UR QL STRIP.AUTO: POSITIVE
PDW BLD-RTO: 12.9 % (ref 11.5–14.9)
PLATELET # BLD AUTO: 189 K/UL (ref 150–450)
PMV BLD AUTO: 9.1 FL (ref 6–12)
POTASSIUM SERPL-SCNC: 3.5 MMOL/L (ref 3.7–5.3)
PROT SERPL-MCNC: 6.5 G/DL (ref 6.4–8.3)
PROT UR STRIP.AUTO-MCNC: 5.5 MG/DL (ref 5–8)
PROT UR STRIP.AUTO-MCNC: ABNORMAL MG/DL
RBC # BLD: 3.48 M/UL (ref 4–5.2)
RBC CLUMPS #/AREA URNS AUTO: ABNORMAL /HPF
SEG NEUTROPHILS: 68 % (ref 36–66)
SEGMENTED NEUTROPHILS ABSOLUTE COUNT: 3.6 K/UL (ref 1.3–9.1)
SODIUM SERPL-SCNC: 136 MMOL/L (ref 135–144)
SPECIFIC GRAVITY UA: 1.02 (ref 1–1.03)
TROPONIN I SERPL DL<=0.01 NG/ML-MCNC: 14 NG/L (ref 0–14)
TURBIDITY: ABNORMAL
URINE HGB: ABNORMAL
UROBILINOGEN, URINE: NORMAL
WBC # BLD AUTO: 5.3 K/UL (ref 3.5–11)
WBC UA: ABNORMAL /HPF

## 2023-03-18 PROCEDURE — 87077 CULTURE AEROBIC IDENTIFY: CPT

## 2023-03-18 PROCEDURE — 36415 COLL VENOUS BLD VENIPUNCTURE: CPT

## 2023-03-18 PROCEDURE — 80053 COMPREHEN METABOLIC PANEL: CPT

## 2023-03-18 PROCEDURE — 84484 ASSAY OF TROPONIN QUANT: CPT

## 2023-03-18 PROCEDURE — 85025 COMPLETE CBC W/AUTO DIFF WBC: CPT

## 2023-03-18 PROCEDURE — 87186 SC STD MICRODIL/AGAR DIL: CPT

## 2023-03-18 PROCEDURE — 83735 ASSAY OF MAGNESIUM: CPT

## 2023-03-18 PROCEDURE — 93005 ELECTROCARDIOGRAM TRACING: CPT | Performed by: EMERGENCY MEDICINE

## 2023-03-18 PROCEDURE — 87088 URINE BACTERIA CULTURE: CPT

## 2023-03-18 PROCEDURE — 81001 URINALYSIS AUTO W/SCOPE: CPT

## 2023-03-18 PROCEDURE — 99284 EMERGENCY DEPT VISIT MOD MDM: CPT

## 2023-03-18 PROCEDURE — 6370000000 HC RX 637 (ALT 250 FOR IP): Performed by: EMERGENCY MEDICINE

## 2023-03-18 PROCEDURE — 87086 URINE CULTURE/COLONY COUNT: CPT

## 2023-03-18 RX ADMIN — METOPROLOL TARTRATE 25 MG: 25 TABLET, FILM COATED ORAL at 22:34

## 2023-03-18 ASSESSMENT — ENCOUNTER SYMPTOMS
RHINORRHEA: 1
BLOOD IN STOOL: 1
VOMITING: 0
COUGH: 1
DIARRHEA: 0

## 2023-03-18 ASSESSMENT — PAIN - FUNCTIONAL ASSESSMENT: PAIN_FUNCTIONAL_ASSESSMENT: NONE - DENIES PAIN

## 2023-03-19 VITALS
TEMPERATURE: 98.8 F | SYSTOLIC BLOOD PRESSURE: 131 MMHG | OXYGEN SATURATION: 96 % | HEART RATE: 93 BPM | BODY MASS INDEX: 29.32 KG/M2 | RESPIRATION RATE: 16 BRPM | WEIGHT: 176 LBS | HEIGHT: 65 IN | DIASTOLIC BLOOD PRESSURE: 72 MMHG

## 2023-03-19 PROCEDURE — 6370000000 HC RX 637 (ALT 250 FOR IP): Performed by: EMERGENCY MEDICINE

## 2023-03-19 RX ORDER — CEPHALEXIN 250 MG/1
500 CAPSULE ORAL ONCE
Status: COMPLETED | OUTPATIENT
Start: 2023-03-19 | End: 2023-03-19

## 2023-03-19 RX ORDER — CEPHALEXIN 500 MG/1
500 CAPSULE ORAL 4 TIMES DAILY
Qty: 20 CAPSULE | Refills: 0 | Status: SHIPPED | OUTPATIENT
Start: 2023-03-19 | End: 2023-03-24

## 2023-03-19 RX ADMIN — CEPHALEXIN 500 MG: 250 CAPSULE ORAL at 00:30

## 2023-03-19 NOTE — ED PROVIDER NOTES
(gastroesophageal reflux disease)     Hearing aid worn     willi. ears. Hearing deficit, bilateral     Hyperlipidemia     Hypertension     Hypothyroidism     Obesity (BMI 30.0-34.9) 08/12/2016    Onychomycosis     PE (pulmonary embolism)     H/O DVT of rt leg    Poor venous access     Type II or unspecified type diabetes mellitus without mention of complication, not stated as uncontrolled        SURGICAL HISTORY       Past Surgical History:   Procedure Laterality Date    ARM SURGERY Right     Growth taken off per pt. CARPAL TUNNEL RELEASE Bilateral     Per pt.     CATARACT EXTRACTION EXTRACAPSULAR W/ INTRAOCULAR LENS IMPLANTATION Left 12/06/2022    [facility]    CATARACT EXTRACTION EXTRACAPSULAR W/ INTRAOCULAR LENS IMPLANTATION Right 02/07/2023    [facility]    CHOLECYSTECTOMY      COLON SURGERY      s/p sigmoid cloectomy    COLONOSCOPY N/A 01/25/2021    COLONOSCOPY DIAGNOSTIC performed by Andrew Pagan MD at [facility] ([facility])      INTRACAPSULAR CATARACT EXTRACTION Left 12/06/2022    EYE CATARACT EMULSIFICATION IOL IMPLANT performed by Jame Gibbs MD at [address] [facility] Right 2/7/2023    EYE CATARACT EMULSIFICATION IOL IMPLANT performed by Jame Gibbs MD at [address] N/A 3/1/2023    [address] performed by Andrew Pagan MD at [address] [place] [address] N/A 12/02/2021    SIGMOIDOSCOPY DIAGNOSTIC FLEXIBLE performed by Andrew Pagan MD at [facility] 09/20/2019    DR IMAN GAUTHIER    UPPER GASTROINTESTINAL ENDOSCOPY N/A 12/02/2021    EGD DIAGNOSTIC ONLY performed by Andrew Pagan MD at [facility] N/A 3/1/2023    EGD ESOPHAGOGASTRODUODENOSCOPY performed by Andrew Pagan MD at [address] [facility]      s/p       [address]       Discharge Medication

## 2023-03-20 LAB
EKG ATRIAL RATE: 111 BPM
EKG P AXIS: 93 DEGREES
EKG P-R INTERVAL: 192 MS
EKG Q-T INTERVAL: 366 MS
EKG QRS DURATION: 122 MS
EKG QTC CALCULATION (BAZETT): 497 MS
EKG R AXIS: 26 DEGREES
EKG T AXIS: 28 DEGREES
EKG VENTRICULAR RATE: 111 BPM

## 2023-03-21 ENCOUNTER — HOSPITAL ENCOUNTER (OUTPATIENT)
Age: 83
Discharge: HOME | End: 2023-03-21
Payer: MEDICARE

## 2023-03-21 ENCOUNTER — INITIAL CONSULT (OUTPATIENT)
Dept: ONCOLOGY | Age: 83
End: 2023-03-21
Payer: MEDICARE

## 2023-03-21 ENCOUNTER — TELEPHONE (OUTPATIENT)
Dept: ONCOLOGY | Age: 83
End: 2023-03-21

## 2023-03-21 ENCOUNTER — TELEPHONE (OUTPATIENT)
Dept: UROLOGY | Age: 83
End: 2023-03-21

## 2023-03-21 VITALS
WEIGHT: 171 LBS | DIASTOLIC BLOOD PRESSURE: 80 MMHG | TEMPERATURE: 98.1 F | HEART RATE: 106 BPM | BODY MASS INDEX: 28.49 KG/M2 | HEIGHT: 65 IN | SYSTOLIC BLOOD PRESSURE: 124 MMHG

## 2023-03-21 DIAGNOSIS — D50.0 IRON DEFICIENCY ANEMIA DUE TO CHRONIC BLOOD LOSS: ICD-10-CM

## 2023-03-21 DIAGNOSIS — D64.89 ANEMIA DUE TO OTHER CAUSE, NOT CLASSIFIED: ICD-10-CM

## 2023-03-21 DIAGNOSIS — D50.0 IRON DEFICIENCY ANEMIA DUE TO CHRONIC BLOOD LOSS: Primary | ICD-10-CM

## 2023-03-21 LAB
ABSOLUTE EOS #: 0.35 K/UL (ref 0–0.44)
ABSOLUTE IMMATURE GRANULOCYTE: <0.03 K/UL (ref 0–0.3)
ABSOLUTE LYMPH #: 1.32 K/UL (ref 1.1–3.7)
ABSOLUTE MONO #: 0.83 K/UL (ref 0.1–1.2)
ABSOLUTE RETIC #: 0.09 M/UL (ref 0.03–0.08)
BASOPHILS # BLD: 2 % (ref 0–2)
BASOPHILS ABSOLUTE: 0.12 K/UL (ref 0–0.2)
EOSINOPHILS RELATIVE PERCENT: 5 % (ref 1–4)
HAPTOGLOB SERPL-MCNC: 196 MG/DL (ref 30–200)
HCT VFR BLD AUTO: 36.9 % (ref 36.3–47.1)
HGB BLD-MCNC: 11.3 G/DL (ref 11.9–15.1)
IMMATURE GRANULOCYTES: 0 %
IMMATURE RETIC FRACT: 20.8 % (ref 2.7–18.3)
LYMPHOCYTES # BLD: 17 % (ref 24–43)
MCH RBC QN AUTO: 30.4 PG (ref 25.2–33.5)
MCHC RBC AUTO-ENTMCNC: 30.6 G/DL (ref 28.4–34.8)
MCV RBC AUTO: 99.2 FL (ref 82.6–102.9)
MICROORGANISM SPEC CULT: ABNORMAL
MICROORGANISM SPEC CULT: ABNORMAL
MONOCYTES # BLD: 11 % (ref 3–12)
NRBC AUTOMATED: 0 PER 100 WBC
PDW BLD-RTO: 12.7 % (ref 11.8–14.4)
PLATELET # BLD AUTO: 227 K/UL (ref 138–453)
PMV BLD AUTO: 11.3 FL (ref 8.1–13.5)
RBC # BLD: 3.72 M/UL (ref 3.95–5.11)
RETIC HEMOGLOBIN: 30.2 PG (ref 28.2–35.7)
RETICS/RBC NFR AUTO: 2.4 % (ref 0.5–1.9)
SEG NEUTROPHILS: 65 % (ref 36–65)
SEGMENTED NEUTROPHILS ABSOLUTE COUNT: 5.07 K/UL (ref 1.5–8.1)
SPECIMEN DESCRIPTION: ABNORMAL
WBC # BLD AUTO: 7.5 K/UL (ref 3.5–11.3)

## 2023-03-21 PROCEDURE — 3079F DIAST BP 80-89 MM HG: CPT | Performed by: INTERNAL MEDICINE

## 2023-03-21 PROCEDURE — 84155 ASSAY OF PROTEIN SERUM: CPT

## 2023-03-21 PROCEDURE — 85045 AUTOMATED RETICULOCYTE COUNT: CPT

## 2023-03-21 PROCEDURE — 83010 ASSAY OF HAPTOGLOBIN QUANT: CPT

## 2023-03-21 PROCEDURE — 99204 OFFICE O/P NEW MOD 45 MIN: CPT | Performed by: INTERNAL MEDICINE

## 2023-03-21 PROCEDURE — 86334 IMMUNOFIX E-PHORESIS SERUM: CPT

## 2023-03-21 PROCEDURE — 88184 FLOWCYTOMETRY/ TC 1 MARKER: CPT

## 2023-03-21 PROCEDURE — G8427 DOCREV CUR MEDS BY ELIG CLIN: HCPCS | Performed by: INTERNAL MEDICINE

## 2023-03-21 PROCEDURE — 83521 IG LIGHT CHAINS FREE EACH: CPT

## 2023-03-21 PROCEDURE — 82668 ASSAY OF ERYTHROPOIETIN: CPT

## 2023-03-21 PROCEDURE — 84165 PROTEIN E-PHORESIS SERUM: CPT

## 2023-03-21 PROCEDURE — G8417 CALC BMI ABV UP PARAM F/U: HCPCS | Performed by: INTERNAL MEDICINE

## 2023-03-21 PROCEDURE — 36415 COLL VENOUS BLD VENIPUNCTURE: CPT

## 2023-03-21 PROCEDURE — 1090F PRES/ABSN URINE INCON ASSESS: CPT | Performed by: INTERNAL MEDICINE

## 2023-03-21 PROCEDURE — G8484 FLU IMMUNIZE NO ADMIN: HCPCS | Performed by: INTERNAL MEDICINE

## 2023-03-21 PROCEDURE — 85025 COMPLETE CBC W/AUTO DIFF WBC: CPT

## 2023-03-21 PROCEDURE — 3074F SYST BP LT 130 MM HG: CPT | Performed by: INTERNAL MEDICINE

## 2023-03-21 PROCEDURE — 88185 FLOWCYTOMETRY/TC ADD-ON: CPT

## 2023-03-21 NOTE — TELEPHONE ENCOUNTER
AVS From 3/21/23    Labs today   RTc 4 weeks      Labs today    Rv scheduled for 4/18 @ 2:30     Pt was given AVS and appointment schedule    Electronically signed by Maria Ines Hoffman on 3/21/2023 at 3:24 PM

## 2023-03-21 NOTE — TELEPHONE ENCOUNTER
Patient was scheduled for a cysto in office yesterday. Patient was symptomatic for UTI. She was seen in the ER and put on keflex. Patient stated \"I was feeling very nauseous after taking keflex. Per  d/gee Keflex.  Macrobid 100mg Bid x 10d    Phoned into [facility] [address]

## 2023-03-21 NOTE — PROGRESS NOTES
03/21/23 1415   BP: 124/80   Pulse: (!) 106   Temp: 98.1 °F (36.7 °C)       PHYSICAL EXAM:   General appearance - well appearing, no in pain or distress   Mental status - alert and cooperative   Eyes - pupils equal and reactive, extraocular eye movements intact   Ears - bilateral TM's and external ear canals normal   Mouth - mucous membranes moist, pharynx normal without lesions   Neck - supple, no significant adenopathy   Lymphatics - no palpable lymphadenopathy, no hepatosplenomegaly   Chest - clear to auscultation, no wheezes, rales or rhonchi, symmetric air entry   Heart - normal rate, regular rhythm, normal S1, S2, no murmurs, rubs, clicks or gallops   Abdomen - soft, nontender, nondistended, no masses or organomegaly   Neurological - alert, oriented, normal speech, no focal findings or movement disorder noted   Musculoskeletal - no joint tenderness, deformity or swelling   Extremities - peripheral pulses normal, no pedal edema, no clubbing or cyanosis   Skin - normal coloration and turgor, no rashes, no suspicious skin lesions noted ,      LABORATORY DATA:     Lab Results   Component Value Date    WBC 5.3 03/18/2023    HGB 10.6 (L) 03/18/2023    HCT 31.7 (L) 03/18/2023    MCV 91.1 03/18/2023     03/18/2023    LYMPHOPCT 14 (L) 03/18/2023    RBC 3.48 (L) 03/18/2023    MCH 30.4 03/18/2023    MCHC 33.4 03/18/2023    RDW 12.9 03/18/2023    MONOPCT 12 (H) 03/18/2023    BASOPCT 1 03/18/2023    NEUTROABS 3.60 03/18/2023    LYMPHSABS 0.70 (L) 03/18/2023    MONOSABS 0.60 03/18/2023    EOSABS 0.30 03/18/2023    BASOSABS 0.10 03/18/2023         Chemistry        Component Value Date/Time     03/18/2023 2230    K 3.5 (L) 03/18/2023 2230    CL 99 03/18/2023 2230    CO2 28 03/18/2023 2230    BUN 12 03/18/2023 2230    CREATININE 1.11 (H) 03/18/2023 2230        Component Value Date/Time    CALCIUM 8.8 03/18/2023 2230    ALKPHOS 120 (H) 03/18/2023 2230    AST 23 03/18/2023 2230    ALT 9 03/18/2023 2230    BILITOT

## 2023-03-21 NOTE — PROGRESS NOTES
Pharmacy Note:    Patient has a urinary tract infection which was empirically treated with cephalexin. Urine culture is growing E. Coli and Enterococcus faecalis, both sensitive to Macrobid. The Enterococcus is resistant to cephalosporins. Discussed case with Dr. Roxana Ivy and recommended Macrobid 100 mg PO BID x5 days. He agrees to prescribe. [facility] [place] [facility]. Prescription received by [facility]. Attempted to contact patient with no answer. Voicemail is not set up. Will attempt call again tomorrow.      Thank you,  Hank Ellington, PharmD, BCPS  [phone]

## 2023-03-22 LAB
ERYTHROPOIETIN: 39 MU/ML (ref 4–27)
FREE KAPPA/LAMBDA RATIO: 0.46 (ref 0.26–1.65)
KAPPA LC FREE SER-MCNC: 5.57 MG/DL (ref 0.37–1.94)
LAMBDA LC FREE SERPL-MCNC: 11.98 MG/DL (ref 0.57–2.63)
SURGICAL PATHOLOGY REPORT: NORMAL

## 2023-03-22 NOTE — PROGRESS NOTES
Pharmacy Note:    Patient was contacted regarding test results and treatment plan. Advised patient that her new prescription should be ready for pick-up at [facility]. She expressed understanding and stated her son would pick it up today.      Thank you,  Ada Stiles, PharmD, BCPS  [phone]

## 2023-03-23 LAB
ALBUMIN (CALCULATED): 3.4 G/DL (ref 3.2–5.2)
ALBUMIN PERCENT: 52 % (ref 45–65)
ALPHA 1 PERCENT: 5 % (ref 3–6)
ALPHA 2 PERCENT: 14 % (ref 6–13)
ALPHA1 GLOB SERPL ELPH-MCNC: 0.3 G/DL (ref 0.1–0.4)
ALPHA2 GLOB SERPL ELPH-MCNC: 0.9 G/DL (ref 0.5–0.9)
B-GLOBULIN SERPL ELPH-MCNC: 0.9 G/DL (ref 0.5–1.1)
BETA PERCENT: 14 % (ref 11–19)
FLOW CYTOMETRY BL: NORMAL
GAMMA GLOB SERPL ELPH-MCNC: 1 G/DL (ref 0.5–1.5)
GAMMA GLOBULIN %: 16 % (ref 9–20)
PATHOLOGIST: ABNORMAL
PATHOLOGIST: NORMAL
PROT SERPL-MCNC: 6.5 G/DL (ref 6.4–8.3)
PROTEIN ELECTROPHORESIS, SERUM: ABNORMAL
SERUM IFX INTERP: NORMAL
TOTAL PROT. SUM,%: 101 % (ref 98–102)
TOTAL PROT. SUM: 6.5 G/DL (ref 6.3–8.2)

## 2023-04-03 ENCOUNTER — PROCEDURE VISIT (OUTPATIENT)
Dept: UROLOGY | Age: 83
End: 2023-04-03
Payer: MEDICARE

## 2023-04-03 VITALS
SYSTOLIC BLOOD PRESSURE: 120 MMHG | WEIGHT: 171 LBS | BODY MASS INDEX: 28.49 KG/M2 | DIASTOLIC BLOOD PRESSURE: 82 MMHG | HEART RATE: 90 BPM | TEMPERATURE: 96.7 F | HEIGHT: 65 IN

## 2023-04-03 DIAGNOSIS — N39.0 RECURRENT UTI: Primary | ICD-10-CM

## 2023-04-03 PROCEDURE — 52000 CYSTOURETHROSCOPY: CPT | Performed by: UROLOGY

## 2023-04-03 RX ORDER — ESTRADIOL 0.1 MG/G
1 CREAM VAGINAL DAILY
Qty: 42.5 G | Refills: 3 | Status: SHIPPED | OUTPATIENT
Start: 2023-04-03

## 2023-04-03 NOTE — PROGRESS NOTES
Cystoscopy Operative Note (4/3/23): Surgeon: Mackenzie Muniz MD  Anesthesia: Urethral 2% Xylocaine  Indications: rec uti  Position: Dorsal Lithotomy  Findings:   The patient was prepped and draped in the usual sterile fashion. The flexible cystoscope was advanced through the urethra and into the bladder. The bladder was thoroughly inspected and the following was noted:    Vagina: atrophic  Residual Urine: none  Urethra: normal appearing urethra with no masses, tenderness or lesions  Bladder: No tumors or CIS noted. No bladder diverticulum. There was none trabeculation noted. Ureters: Clear efflux from both ureters. Orifices with normal configuration and location. The cystoscope was removed. The patient tolerated the procedure well. We will have patient start Estrace vaginal cream daily for 2 weeks and then twice a week thereafter. Follow-up 3 months.

## 2023-04-05 ENCOUNTER — TELEPHONE (OUTPATIENT)
Dept: UROLOGY | Age: 83
End: 2023-04-05

## 2023-04-05 NOTE — TELEPHONE ENCOUNTER
Patient called in and stated \"I was put on vaginal cream estradiol. But, I have AFib and I sometimes bleed from my rectum. I just want to make sure this medication is ok to use. \"

## 2023-04-17 ENCOUNTER — TELEPHONE (OUTPATIENT)
Dept: ONCOLOGY | Age: 83
End: 2023-04-17

## 2023-05-30 ENCOUNTER — OFFICE VISIT (OUTPATIENT)
Dept: ONCOLOGY | Age: 83
End: 2023-05-30
Payer: MEDICARE

## 2023-05-30 ENCOUNTER — TELEPHONE (OUTPATIENT)
Dept: ONCOLOGY | Age: 83
End: 2023-05-30

## 2023-05-30 VITALS
WEIGHT: 175.2 LBS | RESPIRATION RATE: 16 BRPM | HEART RATE: 91 BPM | SYSTOLIC BLOOD PRESSURE: 132 MMHG | BODY MASS INDEX: 29.15 KG/M2 | TEMPERATURE: 98.4 F | DIASTOLIC BLOOD PRESSURE: 80 MMHG

## 2023-05-30 DIAGNOSIS — D47.2 MGUS (MONOCLONAL GAMMOPATHY OF UNKNOWN SIGNIFICANCE): ICD-10-CM

## 2023-05-30 DIAGNOSIS — D50.0 IRON DEFICIENCY ANEMIA DUE TO CHRONIC BLOOD LOSS: Primary | ICD-10-CM

## 2023-05-30 PROCEDURE — 1123F ACP DISCUSS/DSCN MKR DOCD: CPT | Performed by: INTERNAL MEDICINE

## 2023-05-30 PROCEDURE — 3075F SYST BP GE 130 - 139MM HG: CPT | Performed by: INTERNAL MEDICINE

## 2023-05-30 PROCEDURE — 1036F TOBACCO NON-USER: CPT | Performed by: INTERNAL MEDICINE

## 2023-05-30 PROCEDURE — 3079F DIAST BP 80-89 MM HG: CPT | Performed by: INTERNAL MEDICINE

## 2023-05-30 PROCEDURE — 99214 OFFICE O/P EST MOD 30 MIN: CPT | Performed by: INTERNAL MEDICINE

## 2023-05-30 PROCEDURE — 1090F PRES/ABSN URINE INCON ASSESS: CPT | Performed by: INTERNAL MEDICINE

## 2023-05-30 PROCEDURE — 99211 OFF/OP EST MAY X REQ PHY/QHP: CPT

## 2023-05-30 PROCEDURE — G8417 CALC BMI ABV UP PARAM F/U: HCPCS | Performed by: INTERNAL MEDICINE

## 2023-05-30 PROCEDURE — G8427 DOCREV CUR MEDS BY ELIG CLIN: HCPCS | Performed by: INTERNAL MEDICINE

## 2023-05-30 PROCEDURE — G8400 PT W/DXA NO RESULTS DOC: HCPCS | Performed by: INTERNAL MEDICINE

## 2023-05-30 NOTE — PROGRESS NOTES
EXAMINATION:  CT OF THE ABDOMEN AND PELVIS WITHOUT CONTRAST 2/18/2023 1:36 pm    TECHNIQUE:  CT of the abdomen and pelvis was performed without the administration of  intravenous contrast. Multiplanar reformatted images are provided for review. Automated exposure control, iterative reconstruction, and/or weight based  adjustment of the mA/kV was utilized to reduce the radiation dose to as low  as reasonably achievable. COMPARISON:  11/29/2021    HISTORY:  GI bleeding. FINDINGS:  Image quality degraded by motion artifact. Lower Chest: Increased right middle lobe airspace disease compared with chest  CT on 08/10/2022. Dense mitral annular calcification. Organs: Cholecystectomy. Hepatosplenomegaly. Fatty infiltration of the  pancreas. Stable cyst at the inferior pole of the left kidney. Remaining  solid organs are unremarkable. GI/Bowel: Stable dilatation at the colonic anastomoses within the lower  abdomen. Sigmoid diverticulosis without acute inflammation. Remainder of  the gastrointestinal tract appears unremarkable. Pelvis: Bladder is unremarkable. Hysterectomy. No lymphadenopathy or free  fluid. Peritoneum/Retroperitoneum: No ascites or significant lymphadenopathy. IVC  filter. Moderate atherosclerotic disease of the abdominal aorta without  aneurysm. Bones/Soft Tissues: Osteopenia. No suspicious osseous lesions. Impression: Right middle lobe airspace disease could represent bronchiolitis and/or  pneumonia. Stable appearance of the bowel compared with prior CT from 2021. Sigmoid diverticulosis without definite acute diverticulitis. ASSESSMENT:    Yolis Williamson is a 79 y.o. female with a history of pulmonary embolism and DVT, atrial fibrillation, on chronic anticoagulation, history of chronic anemia and history of GI bleed. I reviewed the prior records, imaging studies, discussed diagnosis and treatment recommendations. Her lab work showed no evidence of hemolysis.

## 2023-05-30 NOTE — TELEPHONE ENCOUNTER
AVS from 5/30/23      RTC in 3 months with Labs    Rv sched for 8/29 @ 2:00 pm     Pt will have labs drawn one week prior to RV       Pt was given AVS and appointment schedule    Electronically signed by Fernanda Smith on 5/30/2023 at 2:57 PM

## 2023-06-06 ENCOUNTER — HOSPITAL ENCOUNTER (EMERGENCY)
Age: 83
Discharge: HOME | End: 2023-06-06
Attending: EMERGENCY MEDICINE
Payer: MEDICARE

## 2023-06-06 VITALS
SYSTOLIC BLOOD PRESSURE: 132 MMHG | RESPIRATION RATE: 18 BRPM | BODY MASS INDEX: 29.12 KG/M2 | DIASTOLIC BLOOD PRESSURE: 120 MMHG | WEIGHT: 175 LBS | TEMPERATURE: 97.9 F | OXYGEN SATURATION: 97 % | HEART RATE: 101 BPM

## 2023-06-06 DIAGNOSIS — Z13.9 ENCOUNTER FOR MEDICAL SCREENING EXAMINATION: Primary | ICD-10-CM

## 2023-06-06 PROCEDURE — 99282 EMERGENCY DEPT VISIT SF MDM: CPT

## 2023-06-06 ASSESSMENT — ENCOUNTER SYMPTOMS
SHORTNESS OF BREATH: 0
VOMITING: 0
DIARRHEA: 0
RHINORRHEA: 0
ABDOMINAL PAIN: 0
NAUSEA: 0
EYE REDNESS: 0

## 2023-06-06 ASSESSMENT — LIFESTYLE VARIABLES
HOW OFTEN DO YOU HAVE A DRINK CONTAINING ALCOHOL: NEVER
HOW MANY STANDARD DRINKS CONTAINING ALCOHOL DO YOU HAVE ON A TYPICAL DAY: PATIENT DOES NOT DRINK

## 2023-06-06 NOTE — ED NOTES
Handoff and report given to [facility].      Jose Rosenberg RN  06/06/23 9390

## 2023-06-06 NOTE — ED NOTES
Mode of arrival (squad #, walk in, police, etc) : walk in         Chief complaint(s): hypotension         Arrival Note (brief scenario, treatment PTA, etc). : Pt states taking her BP at home and having a BP of 90s/40s. Pt states she has passed out before from a low BP and didn't want to do that again. Pt BP is hypertensive in triage. C= \"Have you ever felt that you should Cut down on your drinking? \"  No  A= \"Have people Annoyed you by criticizing your drinking? \"  No  G= \"Have you ever felt bad or Guilty about your drinking? \"  No  E= \"Have you ever had a drink as an Eye-opener first thing in the morning to steady your nerves or to help a hangover? \"  No      Deferred []      Reason for deferring: N/A    *If yes to two or more: probable alcohol abuse. Alfonso Winters RN  06/06/23 2644

## 2023-06-06 NOTE — DISCHARGE INSTRUCTIONS
You would benefit from a upper arm blood pressure cuff as compared to a lower arm blood pressure cuff as these can be very inaccurate readings of your blood pressure. Continue to follow previous directions for use of your pressure medication as prescribed by your primary care doctor.

## 2023-06-06 NOTE — ED PROVIDER NOTES
[address]  Emergency Department Encounter  Emergency Medicine Resident     Pt Name:Genie Caceres  MRN: [id number]  [place] 1940  Date of evaluation: 6/6/23  PCP:  Mitchell Lyn MD  Note Started: 6:40 PM EDT    CHIEF COMPLAINT       Chief Complaint   Patient presents with    Hypotension     HISTORY OF PRESENT ILLNESS  (Location/Symptom, Timing/Onset, Context/Setting, Quality, Duration, Modifying Factors, Severity.)      Alisia Jones is a 79 y.o. female with history of A-fib RVR, systolic CHF, stage III CKD, [id number], hypertension and hypothyroidism who presents with concern over to low blood pressure readings at home via a wrist blood pressure cuff. Patient states that the first blood pressure reading was at 9 AM and read 90/50. She took a dose of midodrine at that time. She also checked her blood pressure at 5 PM and the blood pressure was also reading low. Patient has remained asymptomatic during all of these blood pressure readings and does not complain of any dizziness, lightheadedness, shortness of breath or chest pain. There is no other symptoms at this time. PAST MEDICAL / SURGICAL / SOCIAL / FAMILY HISTORY      has a past medical history of Atrial fibrillation ([address] Chronic back pain, Diverticulosis, GERD (gastroesophageal reflux disease), Hearing aid worn, Hearing deficit, bilateral, Hyperlipidemia, Hypertension, Hypothyroidism, Obesity (BMI 30.0-34.9), Onychomycosis, PE (pulmonary embolism), Poor venous access, and Type II or unspecified type diabetes mellitus without mention of complication, not stated as uncontrolled. has a past surgical history that includes Colon surgery; Vena Cava Filter Placement; Cholecystectomy; Hysterectomy; Tympanostomy tube placement (Bilateral, 09/20/2019); Colonoscopy (N/A, 01/25/2021); sigmoidoscopy (N/A, 12/02/2021); Upper gastrointestinal endoscopy (N/A, 12/02/2021); Arm Surgery (Right);  Carpal tunnel release (Bilateral);

## 2023-06-06 NOTE — ED NOTES
Report received from \[facility]\"". Report method in person. Any medication or safety alerts were reviewed. Any pending diagnostics and notifications were also reviewed, as well as any safety concerns or issues, abnormal labs, abnormal imaging, and abnormal assessment findings. Questions were answered.        Melisa Herrera RN  06/06/23 0387

## 2023-06-07 ENCOUNTER — CARE COORDINATION (OUTPATIENT)
Dept: CARE COORDINATION | Age: 83
End: 2023-06-07

## 2023-06-07 NOTE — CARE COORDINATION
Ambulatory Care Coordination  ED Follow up Call    Reason for ED visit:  Hypotension   Status:     improved    Did you call your PCP prior to going to the ED? No      Did you receive a discharge instructions from the Emergency Room? Yes  Review of Instructions:     Understands what to report/when to return?:  Yes   Understands discharge instructions?:  Yes   Following discharge instructions?:  Yes   If not why? Are there any new complaints of pain? No  New Pain Meds? No    Constipation prophylaxis needed? N/A    If you have a wound is the dressing clean, dry, and intact? N/A  Understands wound care regimen? N/A    Are there any other complaints/concerns that you wish to tell your provider? no    FU appts/Provider:    Future Appointments   Date Time Provider David Shen   7/17/2023  2:10 PM Panda Duke MD [address] [place] [address]   8/4/2023  1:00 PM Tano Cates, [address] [place] [address]   8/29/2023  2:00 PM Nghia Burnham MD [facility]           New Medications?:   No      Medication Reconciliation by phone - Yes  Understands Medications? Yes  Taking Medications? Yes  Can you swallow your pills? Yes    Any further needs in the home i.e. Equipment? No    Link to services in community?:  No   Which services:      Hakeem Hernandez states she feels fine. She never had any symptoms. She got a low reading with her BP machine, took a midodrine, and repeat was still low so she went to the ED to be evaluated. She states it is find this morning. She is going to have her son change the batteries and get a new BP cuff. Discussed RPM with her but she declines. Patient offered and declined RPM services on 6/7/23. She denies any other needs, questions, or concerns. She declines ACM program. Reminded her of the walk in clinic and hours they are open. Explained that they would have been able to see her instead of going to the ED. She verbalized understanding.

## 2023-07-11 DIAGNOSIS — I10 ESSENTIAL HYPERTENSION: ICD-10-CM

## 2023-07-11 RX ORDER — BUMETANIDE 1 MG/1
TABLET ORAL
Qty: 30 TABLET | Refills: 0 | Status: SHIPPED | OUTPATIENT
Start: 2023-07-11

## 2023-07-11 RX ORDER — FERROUS SULFATE 325(65) MG
TABLET ORAL
Qty: 30 TABLET | Refills: 0 | Status: SHIPPED | OUTPATIENT
Start: 2023-07-11

## 2023-07-17 ENCOUNTER — OFFICE VISIT (OUTPATIENT)
Dept: UROLOGY | Age: 83
End: 2023-07-17
Payer: MEDICARE

## 2023-07-17 VITALS
DIASTOLIC BLOOD PRESSURE: 73 MMHG | SYSTOLIC BLOOD PRESSURE: 132 MMHG | HEIGHT: 65 IN | HEART RATE: 70 BPM | BODY MASS INDEX: 29.16 KG/M2 | WEIGHT: 175 LBS | TEMPERATURE: 97.8 F | RESPIRATION RATE: 16 BRPM

## 2023-07-17 DIAGNOSIS — N39.0 RECURRENT UTI: Primary | ICD-10-CM

## 2023-07-17 DIAGNOSIS — N95.2 ATROPHIC VAGINITIS: ICD-10-CM

## 2023-07-17 DIAGNOSIS — R35.0 FREQUENCY OF URINATION: ICD-10-CM

## 2023-07-17 PROCEDURE — G8427 DOCREV CUR MEDS BY ELIG CLIN: HCPCS | Performed by: UROLOGY

## 2023-07-17 PROCEDURE — 1123F ACP DISCUSS/DSCN MKR DOCD: CPT | Performed by: UROLOGY

## 2023-07-17 PROCEDURE — 99213 OFFICE O/P EST LOW 20 MIN: CPT | Performed by: UROLOGY

## 2023-07-17 PROCEDURE — 3078F DIAST BP <80 MM HG: CPT | Performed by: UROLOGY

## 2023-07-17 PROCEDURE — 1036F TOBACCO NON-USER: CPT | Performed by: UROLOGY

## 2023-07-17 PROCEDURE — G8400 PT W/DXA NO RESULTS DOC: HCPCS | Performed by: UROLOGY

## 2023-07-17 PROCEDURE — 3075F SYST BP GE 130 - 139MM HG: CPT | Performed by: UROLOGY

## 2023-07-17 PROCEDURE — G8417 CALC BMI ABV UP PARAM F/U: HCPCS | Performed by: UROLOGY

## 2023-07-17 PROCEDURE — 1090F PRES/ABSN URINE INCON ASSESS: CPT | Performed by: UROLOGY

## 2023-07-17 RX ORDER — ESTRADIOL 0.1 MG/G
1 CREAM VAGINAL DAILY
Qty: 42.5 G | Refills: 3 | Status: SHIPPED | OUTPATIENT
Start: 2023-07-17

## 2023-07-17 ASSESSMENT — ENCOUNTER SYMPTOMS
SHORTNESS OF BREATH: 0
WHEEZING: 0
NAUSEA: 0
VOMITING: 0
BACK PAIN: 0
COUGH: 0
EYE REDNESS: 0
EYE PAIN: 0
CONSTIPATION: 0
ABDOMINAL PAIN: 0
DIARRHEA: 0

## 2023-07-17 NOTE — PROGRESS NOTES
[address]  [address] [facility] [address]  Dept: [address] [place] [address] Urology Office Note - Established    Patient:  Rian Starks  YOB: 1940  Date: 7/17/2023    The patient is a 79 y.o. female whopresents today for evaluation of the following problems:   Chief Complaint   Patient presents with    Follow-up     3 MONTHS       HPI  Is a very pleasant 79-year-old female who we see for recurrent UTIs. We had started her on vaginal estrogen cream.  She has been using the applicators and these have been somewhat painful for her but she has had a significant reduction in her infections. She feels that the only days she has symptoms of possible UTI or when she is dehydrated and as soon as she starts drinking lots of water, though symptoms get better. Summary of old records: N/A    Additional History: N/A    Procedures Today: N/A    Urinalysis today:  No results found for this visit on 07/17/23. Imaging Reviewed during this Office Visit: none  (results were independently reviewed by physician and radiology report verified)    AUA Symptom Score (7/17/2023):   INCOMPLETE EMPTYING: How often have you had the sensation of not emptying your bladder?: Not at all  FREQUENCY: How often do you have to urinate less than every two hours?: Not at all  INTERMITTENCY: How often have you found you stopped and started again several times when you urinated?: Not at all  URGENCY: How often have you found it difficult to postpone urination?: Not at all  WEAK STREAM: How often have you had a weak urinary stream?: Not at all  STRAINING: How often have you had to strain to start  urination?: Not at all  NOCTURIA: How many times did you typically get up at night to uriniate?: NONE  TOTAL I-PSS SCORE[de-identified] 0       Last BUN and creatinine:  Lab Results   Component Value Date    BUN 12 03/18/2023     Lab Results   Component Value Date

## 2023-08-04 ENCOUNTER — HOSPITAL ENCOUNTER (OUTPATIENT)
Age: 83
Discharge: HOME | End: 2023-08-04
Payer: MEDICARE

## 2023-08-04 ENCOUNTER — OFFICE VISIT (OUTPATIENT)
Dept: FAMILY MEDICINE CLINIC | Age: 83
End: 2023-08-04

## 2023-08-04 VITALS
DIASTOLIC BLOOD PRESSURE: 87 MMHG | OXYGEN SATURATION: 98 % | RESPIRATION RATE: 16 BRPM | WEIGHT: 176.2 LBS | BODY MASS INDEX: 29.32 KG/M2 | HEART RATE: 87 BPM | SYSTOLIC BLOOD PRESSURE: 124 MMHG

## 2023-08-04 DIAGNOSIS — D50.0 IRON DEFICIENCY ANEMIA DUE TO CHRONIC BLOOD LOSS: ICD-10-CM

## 2023-08-04 DIAGNOSIS — E11.9 TYPE 2 DIABETES MELLITUS WITHOUT COMPLICATION, WITHOUT LONG-TERM CURRENT USE OF INSULIN (HCC): Primary | ICD-10-CM

## 2023-08-04 DIAGNOSIS — I10 ESSENTIAL HYPERTENSION: ICD-10-CM

## 2023-08-04 DIAGNOSIS — E03.9 HYPOTHYROIDISM, UNSPECIFIED TYPE: ICD-10-CM

## 2023-08-04 DIAGNOSIS — E78.2 MIXED HYPERLIPIDEMIA: ICD-10-CM

## 2023-08-04 DIAGNOSIS — N18.30 STAGE 3 CHRONIC KIDNEY DISEASE, UNSPECIFIED WHETHER STAGE 3A OR 3B CKD (HCC): ICD-10-CM

## 2023-08-04 LAB
BASOPHILS # BLD: 0.1 K/UL (ref 0–0.2)
BASOPHILS NFR BLD: 1 % (ref 0–2)
EOSINOPHIL # BLD: 0.2 K/UL (ref 0–0.4)
EOSINOPHILS RELATIVE PERCENT: 3 % (ref 0–4)
ERYTHROCYTE [DISTWIDTH] IN BLOOD BY AUTOMATED COUNT: 13.9 % (ref 11.5–14.9)
FERRITIN SERPL-MCNC: 46 NG/ML (ref 13–150)
FOLATE SERPL-MCNC: 16.9 NG/ML
HBA1C MFR BLD: 5.5 %
HCT VFR BLD AUTO: 33.7 % (ref 36–46)
HGB BLD-MCNC: 11.1 G/DL (ref 12–16)
IRON SATN MFR SERPL: 37 % (ref 20–55)
IRON SERPL-MCNC: 124 UG/DL (ref 37–145)
LYMPHOCYTES NFR BLD: 1.4 K/UL (ref 1–4.8)
LYMPHOCYTES RELATIVE PERCENT: 26 % (ref 24–44)
MCH RBC QN AUTO: 31.1 PG (ref 26–34)
MCHC RBC AUTO-ENTMCNC: 33 G/DL (ref 31–37)
MCV RBC AUTO: 94.3 FL (ref 80–100)
MONOCYTES NFR BLD: 0.6 K/UL (ref 0.1–1.3)
MONOCYTES NFR BLD: 11 % (ref 1–7)
NEUTROPHILS NFR BLD: 59 % (ref 36–66)
NEUTS SEG NFR BLD: 3.1 K/UL (ref 1.3–9.1)
PLATELET # BLD AUTO: 154 K/UL (ref 150–450)
PMV BLD AUTO: 8.7 FL (ref 6–12)
RBC # BLD AUTO: 3.57 M/UL (ref 4–5.2)
TIBC SERPL-MCNC: 336 UG/DL (ref 250–450)
UNSATURATED IRON BINDING CAPACITY: 212 UG/DL (ref 112–347)
VIT B12 SERPL-MCNC: 232 PG/ML (ref 232–1245)
WBC OTHER # BLD: 5.3 K/UL (ref 3.5–11)

## 2023-08-04 PROCEDURE — 83550 IRON BINDING TEST: CPT

## 2023-08-04 PROCEDURE — 82607 VITAMIN B-12: CPT

## 2023-08-04 PROCEDURE — 36415 COLL VENOUS BLD VENIPUNCTURE: CPT

## 2023-08-04 PROCEDURE — 85025 COMPLETE CBC W/AUTO DIFF WBC: CPT

## 2023-08-04 PROCEDURE — 82728 ASSAY OF FERRITIN: CPT

## 2023-08-04 PROCEDURE — 83540 ASSAY OF IRON: CPT

## 2023-08-04 PROCEDURE — 82746 ASSAY OF FOLIC ACID SERUM: CPT

## 2023-08-04 SDOH — ECONOMIC STABILITY: FOOD INSECURITY: WITHIN THE PAST 12 MONTHS, YOU WORRIED THAT YOUR FOOD WOULD RUN OUT BEFORE YOU GOT MONEY TO BUY MORE.: NEVER TRUE

## 2023-08-04 SDOH — ECONOMIC STABILITY: INCOME INSECURITY: HOW HARD IS IT FOR YOU TO PAY FOR THE VERY BASICS LIKE FOOD, HOUSING, MEDICAL CARE, AND HEATING?: NOT HARD AT ALL

## 2023-08-04 SDOH — ECONOMIC STABILITY: FOOD INSECURITY: WITHIN THE PAST 12 MONTHS, THE FOOD YOU BOUGHT JUST DIDN'T LAST AND YOU DIDN'T HAVE MONEY TO GET MORE.: NEVER TRUE

## 2023-08-04 ASSESSMENT — ENCOUNTER SYMPTOMS
ABDOMINAL PAIN: 0
BLOOD IN STOOL: 0
CHEST TIGHTNESS: 0
SHORTNESS OF BREATH: 0

## 2023-08-04 ASSESSMENT — PATIENT HEALTH QUESTIONNAIRE - PHQ9
2. FEELING DOWN, DEPRESSED OR HOPELESS: 0
SUM OF ALL RESPONSES TO PHQ QUESTIONS 1-9: 0
1. LITTLE INTEREST OR PLEASURE IN DOING THINGS: 0
SUM OF ALL RESPONSES TO PHQ9 QUESTIONS 1 & 2: 0
SUM OF ALL RESPONSES TO PHQ QUESTIONS 1-9: 0

## 2023-08-04 NOTE — PROGRESS NOTES
Subjective:      Patient ID: Jean-Claude Baeza is a 79 y.o. female. HPI  Chronic Disease Visit Information    BP Readings from Last 3 Encounters:   08/04/23 124/87   07/17/23 132/73   06/06/23 (!) 132/120          Hemoglobin A1C (%)   Date Value   02/09/2023 5.0   02/21/2022 4.9   07/12/2021 5.3     LDL Cholesterol (mg/dL)   Date Value   02/20/2023 55     HDL (mg/dL)   Date Value   02/20/2023 55     BUN (mg/dL)   Date Value   03/18/2023 12     Creatinine (mg/dL)   Date Value   03/18/2023 1.11 (H)     Glucose (mg/dL)   Date Value   03/18/2023 170 (H)   03/17/2012 123 (H)            Have you changed or started any medications since your last visit including any over-the-counter medicines, vitamins, or herbal medicines? no   Are you having any side effects from any of your medications? -  no  Have you stopped taking any of your medications? Is so, why? -  no    Have you seen any other physician or provider since your last visit? Yes - Records Obtained  Have you had any other diagnostic tests since your last visit? Yes - Records Obtained  Have you been seen in the emergency room and/or had an admission to a hospital since we last saw you? Yes - Records Obtained  Have you had your annual diabetic retinal (eye) exam? Yes - Records Obtained  Have you had your routine dental cleaning in the past 6 months? no    Have you activated your [employer] account? If not, what are your barriers?  No:      Patient Care Team:  Martinez Bose MD as PCP - General (Family Medicine)  Martinez Bose MD as PCP - Empaneled Provider  Ester Bejarano MD as Consulting Physician (Pulmonology)  Antonia Reynolds MD as Surgeon (General Surgery)  Davey Edgar MD as Consulting Physician (Pain Management)  Corona Christianson MD as Surgeon (Orthopedic Surgery)  Lani Epsinoza MD as Consulting Physician (Gastroenterology)         Medical History Review  Past Medical, Family, and Social History reviewed and does contribute to the patient

## 2023-08-09 DIAGNOSIS — I10 ESSENTIAL HYPERTENSION: ICD-10-CM

## 2023-08-10 RX ORDER — BUMETANIDE 1 MG/1
TABLET ORAL
Qty: 30 TABLET | Refills: 0 | Status: SHIPPED | OUTPATIENT
Start: 2023-08-10

## 2023-08-12 ENCOUNTER — HOSPITAL ENCOUNTER (OUTPATIENT)
Age: 83
Discharge: HOME | End: 2023-08-12
Payer: MEDICARE

## 2023-08-12 DIAGNOSIS — E78.2 MIXED HYPERLIPIDEMIA: ICD-10-CM

## 2023-08-12 DIAGNOSIS — N18.30 STAGE 3 CHRONIC KIDNEY DISEASE, UNSPECIFIED WHETHER STAGE 3A OR 3B CKD (HCC): ICD-10-CM

## 2023-08-12 DIAGNOSIS — E03.9 HYPOTHYROIDISM, UNSPECIFIED TYPE: ICD-10-CM

## 2023-08-12 DIAGNOSIS — E11.9 TYPE 2 DIABETES MELLITUS WITHOUT COMPLICATION, WITHOUT LONG-TERM CURRENT USE OF INSULIN (HCC): ICD-10-CM

## 2023-08-12 DIAGNOSIS — I10 ESSENTIAL HYPERTENSION: ICD-10-CM

## 2023-08-12 LAB
ALBUMIN SERPL-MCNC: 3.3 G/DL (ref 3.5–5.2)
ALP SERPL-CCNC: 106 U/L (ref 35–104)
ALT SERPL-CCNC: 8 U/L (ref 5–33)
ANION GAP SERPL CALCULATED.3IONS-SCNC: 10 MMOL/L (ref 9–17)
AST SERPL-CCNC: 19 U/L
BILIRUB SERPL-MCNC: 0.5 MG/DL (ref 0.3–1.2)
BUN SERPL-MCNC: 13 MG/DL (ref 8–23)
CALCIUM SERPL-MCNC: 9.1 MG/DL (ref 8.6–10.4)
CHLORIDE SERPL-SCNC: 102 MMOL/L (ref 98–107)
CHOLEST SERPL-MCNC: 116 MG/DL
CHOLESTEROL/HDL RATIO: 2.1
CO2 SERPL-SCNC: 25 MMOL/L (ref 20–31)
CREAT SERPL-MCNC: 1.1 MG/DL (ref 0.5–0.9)
GFR SERPL CREATININE-BSD FRML MDRD: 50 ML/MIN/1.73M2
GLUCOSE SERPL-MCNC: 107 MG/DL (ref 70–99)
HDLC SERPL-MCNC: 55 MG/DL
LDLC SERPL CALC-MCNC: 45 MG/DL (ref 0–130)
MAGNESIUM SERPL-MCNC: 2.1 MG/DL (ref 1.6–2.6)
POTASSIUM SERPL-SCNC: 3.8 MMOL/L (ref 3.7–5.3)
PROT SERPL-MCNC: 6.6 G/DL (ref 6.4–8.3)
SODIUM SERPL-SCNC: 137 MMOL/L (ref 135–144)
TRIGL SERPL-MCNC: 78 MG/DL
TSH SERPL DL<=0.05 MIU/L-ACNC: 2.23 UIU/ML (ref 0.3–5)

## 2023-08-12 PROCEDURE — 83735 ASSAY OF MAGNESIUM: CPT

## 2023-08-12 PROCEDURE — 80053 COMPREHEN METABOLIC PANEL: CPT

## 2023-08-12 PROCEDURE — 80061 LIPID PANEL: CPT

## 2023-08-12 PROCEDURE — 84443 ASSAY THYROID STIM HORMONE: CPT

## 2023-08-12 PROCEDURE — 36415 COLL VENOUS BLD VENIPUNCTURE: CPT

## 2023-08-28 DIAGNOSIS — I10 ESSENTIAL HYPERTENSION: ICD-10-CM

## 2023-08-28 RX ORDER — METOPROLOL SUCCINATE 25 MG/1
TABLET, EXTENDED RELEASE ORAL
Qty: 90 TABLET | Refills: 0 | Status: SHIPPED | OUTPATIENT
Start: 2023-08-28

## 2023-08-29 ENCOUNTER — TELEPHONE (OUTPATIENT)
Dept: ONCOLOGY | Age: 83
End: 2023-08-29

## 2023-08-29 ENCOUNTER — OFFICE VISIT (OUTPATIENT)
Dept: ONCOLOGY | Age: 83
End: 2023-08-29
Payer: MEDICARE

## 2023-08-29 VITALS
TEMPERATURE: 97.7 F | SYSTOLIC BLOOD PRESSURE: 139 MMHG | DIASTOLIC BLOOD PRESSURE: 77 MMHG | BODY MASS INDEX: 29.64 KG/M2 | HEART RATE: 74 BPM | WEIGHT: 178.1 LBS

## 2023-08-29 DIAGNOSIS — D50.9 IRON DEFICIENCY ANEMIA, UNSPECIFIED IRON DEFICIENCY ANEMIA TYPE: Primary | ICD-10-CM

## 2023-08-29 DIAGNOSIS — D50.0 IRON DEFICIENCY ANEMIA DUE TO CHRONIC BLOOD LOSS: ICD-10-CM

## 2023-08-29 PROCEDURE — G8400 PT W/DXA NO RESULTS DOC: HCPCS | Performed by: INTERNAL MEDICINE

## 2023-08-29 PROCEDURE — 99214 OFFICE O/P EST MOD 30 MIN: CPT | Performed by: INTERNAL MEDICINE

## 2023-08-29 PROCEDURE — G8417 CALC BMI ABV UP PARAM F/U: HCPCS | Performed by: INTERNAL MEDICINE

## 2023-08-29 PROCEDURE — 99211 OFF/OP EST MAY X REQ PHY/QHP: CPT | Performed by: INTERNAL MEDICINE

## 2023-08-29 PROCEDURE — 1123F ACP DISCUSS/DSCN MKR DOCD: CPT | Performed by: INTERNAL MEDICINE

## 2023-08-29 PROCEDURE — G8427 DOCREV CUR MEDS BY ELIG CLIN: HCPCS | Performed by: INTERNAL MEDICINE

## 2023-08-29 PROCEDURE — 1036F TOBACCO NON-USER: CPT | Performed by: INTERNAL MEDICINE

## 2023-08-29 PROCEDURE — 3078F DIAST BP <80 MM HG: CPT | Performed by: INTERNAL MEDICINE

## 2023-08-29 PROCEDURE — 1090F PRES/ABSN URINE INCON ASSESS: CPT | Performed by: INTERNAL MEDICINE

## 2023-08-29 PROCEDURE — 3075F SYST BP GE 130 - 139MM HG: CPT | Performed by: INTERNAL MEDICINE

## 2023-08-29 NOTE — TELEPHONE ENCOUNTER
AVS from 8/29/23    RTC in 4 months with labs    Rv sched for 12/26 @ 2:30 with labs    Pt was given AVS and appointment schedule    Electronically signed by Erma Gabriel on 8/29/2023 at 2:53 PM

## 2023-09-03 DIAGNOSIS — I10 ESSENTIAL HYPERTENSION: ICD-10-CM

## 2023-09-05 RX ORDER — BUMETANIDE 1 MG/1
TABLET ORAL
Qty: 30 TABLET | Refills: 0 | OUTPATIENT
Start: 2023-09-05

## 2023-09-05 RX ORDER — METOPROLOL SUCCINATE 25 MG/1
TABLET, EXTENDED RELEASE ORAL
Qty: 90 TABLET | Refills: 0 | OUTPATIENT
Start: 2023-09-05

## 2023-09-07 DIAGNOSIS — I10 ESSENTIAL HYPERTENSION: ICD-10-CM

## 2023-09-07 RX ORDER — METOPROLOL SUCCINATE 25 MG/1
TABLET, EXTENDED RELEASE ORAL
Qty: 90 TABLET | Refills: 0 | OUTPATIENT
Start: 2023-09-07

## 2023-09-07 RX ORDER — BUMETANIDE 1 MG/1
TABLET ORAL
Qty: 30 TABLET | Refills: 0 | Status: SHIPPED | OUTPATIENT
Start: 2023-09-07

## 2023-09-27 ENCOUNTER — TELEPHONE (OUTPATIENT)
Dept: FAMILY MEDICINE CLINIC | Age: 83
End: 2023-09-27

## 2023-09-27 NOTE — TELEPHONE ENCOUNTER
Patient called stating that she had a reaction to cholestyramine (powder). Her teeth felt funny, then mouth and tongue sore, lip puffy. I told her to stop using this medication. If she has trouble breathing to go to the ER and patient agreed. She would like to know what she can take instead? Please advise.

## 2023-09-27 NOTE — TELEPHONE ENCOUNTER
I called and spoke with the patient, and advised her to follow-up with her gastroenterologist which she agreed to do

## 2023-09-29 ENCOUNTER — OFFICE VISIT (OUTPATIENT)
Dept: GASTROENTEROLOGY | Age: 83
End: 2023-09-29
Payer: MEDICARE

## 2023-09-29 VITALS
BODY MASS INDEX: 29.22 KG/M2 | HEIGHT: 65 IN | TEMPERATURE: 97.6 F | HEART RATE: 103 BPM | SYSTOLIC BLOOD PRESSURE: 144 MMHG | DIASTOLIC BLOOD PRESSURE: 75 MMHG | WEIGHT: 175.4 LBS

## 2023-09-29 DIAGNOSIS — R19.7 DIARRHEA, UNSPECIFIED TYPE: Primary | ICD-10-CM

## 2023-09-29 PROCEDURE — 99214 OFFICE O/P EST MOD 30 MIN: CPT | Performed by: INTERNAL MEDICINE

## 2023-09-29 PROCEDURE — 1123F ACP DISCUSS/DSCN MKR DOCD: CPT | Performed by: INTERNAL MEDICINE

## 2023-09-29 PROCEDURE — 3074F SYST BP LT 130 MM HG: CPT | Performed by: INTERNAL MEDICINE

## 2023-09-29 PROCEDURE — 3078F DIAST BP <80 MM HG: CPT | Performed by: INTERNAL MEDICINE

## 2023-09-29 ASSESSMENT — ENCOUNTER SYMPTOMS
DIARRHEA: 1
TROUBLE SWALLOWING: 0
ANAL BLEEDING: 0
ABDOMINAL PAIN: 0
COUGH: 1
SORE THROAT: 0
RECTAL PAIN: 1
VOMITING: 0
WHEEZING: 0
CONSTIPATION: 0
SINUS PRESSURE: 0
BACK PAIN: 0
VOICE CHANGE: 0
BLOOD IN STOOL: 0
CHOKING: 0
ABDOMINAL DISTENTION: 0
NAUSEA: 0

## 2023-09-29 NOTE — PROGRESS NOTES
08/12/2023    BILITOT 0.5 08/12/2023    ALKPHOS 106 (H) 08/12/2023    AST 19 08/12/2023    ALT 8 08/12/2023    INR 1.1 03/04/2023         Lab Results   Component Value Date    RBC 3.57 (L) 08/04/2023    HGB 11.1 (L) 08/04/2023    MCV 94.3 08/04/2023    MCH 31.1 08/04/2023    MCHC 33.0 08/04/2023    RDW 13.9 08/04/2023    MPV 8.7 08/04/2023    BASOPCT 1 08/04/2023    LYMPHSABS 1.40 08/04/2023    MONOSABS 0.60 08/04/2023    NEUTROABS 3.10 08/04/2023    EOSABS 0.20 08/04/2023    BASOSABS 0.10 08/04/2023         DIAGNOSTIC TESTING:     No results found. Assessment  1. Diarrhea, unspecified type        Plan    Check stool studies. Chronic diarrhea. Will trial xifaxan for possible SIBO. Will try to call around to see if able to get original formula of the Perla. Imodium PRN    RTO next few weeks. Thank you for allowing me to participate in the care of Ms. Lama. For any further questions please do not hesitate to contact me. I have reviewed and agree with the ROS entered by the MA/LPN. Note is dictated utilizing voice recognition software. Unfortunately this leads to occasional typographical errors.  Please contact our office if you have any questions        Carley Huang MD,FACP, [facility]  Board Certified in Gastroenterology and [address] [place] [address] Gastroenterology  Office #: [phone]

## 2023-10-01 ENCOUNTER — HOSPITAL ENCOUNTER (EMERGENCY)
Age: 83
Discharge: HOME | End: 2023-10-02
Attending: EMERGENCY MEDICINE
Payer: MEDICARE

## 2023-10-01 ENCOUNTER — APPOINTMENT (OUTPATIENT)
Dept: CT IMAGING | Age: 83
End: 2023-10-01
Payer: MEDICARE

## 2023-10-01 VITALS
HEIGHT: 65 IN | RESPIRATION RATE: 16 BRPM | BODY MASS INDEX: 29.16 KG/M2 | OXYGEN SATURATION: 97 % | WEIGHT: 175 LBS | SYSTOLIC BLOOD PRESSURE: 145 MMHG | DIASTOLIC BLOOD PRESSURE: 56 MMHG | TEMPERATURE: 98.3 F | HEART RATE: 100 BPM

## 2023-10-01 DIAGNOSIS — R51.9 ACUTE NONINTRACTABLE HEADACHE, UNSPECIFIED HEADACHE TYPE: ICD-10-CM

## 2023-10-01 DIAGNOSIS — R19.7 DIARRHEA, UNSPECIFIED TYPE: Primary | ICD-10-CM

## 2023-10-01 LAB
ALBUMIN SERPL-MCNC: 3.4 G/DL (ref 3.5–5.2)
ALP SERPL-CCNC: 97 U/L (ref 35–104)
ALT SERPL-CCNC: 8 U/L (ref 5–33)
ANION GAP SERPL CALCULATED.3IONS-SCNC: 8 MMOL/L (ref 9–17)
AST SERPL-CCNC: 18 U/L
BASOPHILS # BLD: 0.1 K/UL (ref 0–0.2)
BASOPHILS NFR BLD: 1 % (ref 0–2)
BILIRUB SERPL-MCNC: 0.4 MG/DL (ref 0.3–1.2)
BUN SERPL-MCNC: 12 MG/DL (ref 8–23)
CALCIUM SERPL-MCNC: 9.7 MG/DL (ref 8.6–10.4)
CHLORIDE SERPL-SCNC: 103 MMOL/L (ref 98–107)
CO2 SERPL-SCNC: 28 MMOL/L (ref 20–31)
CREAT SERPL-MCNC: 1.1 MG/DL (ref 0.5–0.9)
EOSINOPHIL # BLD: 0.1 K/UL (ref 0–0.4)
EOSINOPHILS RELATIVE PERCENT: 3 % (ref 0–4)
ERYTHROCYTE [DISTWIDTH] IN BLOOD BY AUTOMATED COUNT: 14.5 % (ref 11.5–14.9)
GFR SERPL CREATININE-BSD FRML MDRD: 50 ML/MIN/1.73M2
GLUCOSE SERPL-MCNC: 142 MG/DL (ref 70–99)
HCT VFR BLD AUTO: 33.9 % (ref 36–46)
HGB BLD-MCNC: 11 G/DL (ref 12–16)
LACTATE BLDV-SCNC: 1.6 MMOL/L (ref 0.5–2.2)
LIPASE SERPL-CCNC: 39 U/L (ref 13–60)
LYMPHOCYTES NFR BLD: 1 K/UL (ref 1–4.8)
LYMPHOCYTES RELATIVE PERCENT: 19 % (ref 24–44)
MAGNESIUM SERPL-MCNC: 1.8 MG/DL (ref 1.6–2.6)
MCH RBC QN AUTO: 31.2 PG (ref 26–34)
MCHC RBC AUTO-ENTMCNC: 32.3 G/DL (ref 31–37)
MCV RBC AUTO: 96.7 FL (ref 80–100)
MONOCYTES NFR BLD: 0.6 K/UL (ref 0.1–1.3)
MONOCYTES NFR BLD: 12 % (ref 1–7)
NEUTROPHILS NFR BLD: 65 % (ref 36–66)
NEUTS SEG NFR BLD: 3.4 K/UL (ref 1.3–9.1)
PLATELET # BLD AUTO: 149 K/UL (ref 150–450)
PMV BLD AUTO: 9 FL (ref 6–12)
POTASSIUM SERPL-SCNC: 4 MMOL/L (ref 3.7–5.3)
PROT SERPL-MCNC: 6.9 G/DL (ref 6.4–8.3)
RBC # BLD AUTO: 3.51 M/UL (ref 4–5.2)
SODIUM SERPL-SCNC: 139 MMOL/L (ref 135–144)
WBC OTHER # BLD: 5.1 K/UL (ref 3.5–11)

## 2023-10-01 PROCEDURE — 83605 ASSAY OF LACTIC ACID: CPT

## 2023-10-01 PROCEDURE — 6370000000 HC RX 637 (ALT 250 FOR IP): Performed by: STUDENT IN AN ORGANIZED HEALTH CARE EDUCATION/TRAINING PROGRAM

## 2023-10-01 PROCEDURE — 36415 COLL VENOUS BLD VENIPUNCTURE: CPT

## 2023-10-01 PROCEDURE — 99284 EMERGENCY DEPT VISIT MOD MDM: CPT

## 2023-10-01 PROCEDURE — 83690 ASSAY OF LIPASE: CPT

## 2023-10-01 PROCEDURE — 83735 ASSAY OF MAGNESIUM: CPT

## 2023-10-01 PROCEDURE — 85025 COMPLETE CBC W/AUTO DIFF WBC: CPT

## 2023-10-01 PROCEDURE — 80053 COMPREHEN METABOLIC PANEL: CPT

## 2023-10-01 PROCEDURE — 70450 CT HEAD/BRAIN W/O DYE: CPT

## 2023-10-01 RX ORDER — ONDANSETRON 4 MG/1
4 TABLET, ORALLY DISINTEGRATING ORAL ONCE
Status: COMPLETED | OUTPATIENT
Start: 2023-10-01 | End: 2023-10-01

## 2023-10-01 RX ORDER — ACETAMINOPHEN 325 MG/1
650 TABLET ORAL ONCE
Status: COMPLETED | OUTPATIENT
Start: 2023-10-01 | End: 2023-10-01

## 2023-10-01 RX ADMIN — ONDANSETRON 4 MG: 4 TABLET, ORALLY DISINTEGRATING ORAL at 22:33

## 2023-10-01 RX ADMIN — ACETAMINOPHEN 650 MG: 325 TABLET ORAL at 22:33

## 2023-10-01 ASSESSMENT — PAIN DESCRIPTION - LOCATION: LOCATION: HEAD

## 2023-10-01 ASSESSMENT — PATIENT HEALTH QUESTIONNAIRE - PHQ9
SUM OF ALL RESPONSES TO PHQ QUESTIONS 1-9: 0
2. FEELING DOWN, DEPRESSED OR HOPELESS: 0
SUM OF ALL RESPONSES TO PHQ QUESTIONS 1-9: 0
1. LITTLE INTEREST OR PLEASURE IN DOING THINGS: 0
SUM OF ALL RESPONSES TO PHQ9 QUESTIONS 1 & 2: 0

## 2023-10-01 ASSESSMENT — PAIN - FUNCTIONAL ASSESSMENT: PAIN_FUNCTIONAL_ASSESSMENT: 0-10

## 2023-10-01 ASSESSMENT — PAIN SCALES - GENERAL: PAINLEVEL_OUTOF10: 5

## 2023-10-02 ENCOUNTER — HOSPITAL ENCOUNTER (OUTPATIENT)
Age: 83
Setting detail: SPECIMEN
Discharge: HOME | End: 2023-10-02
Payer: MEDICARE

## 2023-10-02 DIAGNOSIS — R19.7 DIARRHEA, UNSPECIFIED TYPE: ICD-10-CM

## 2023-10-02 LAB
C DIFF GDH + TOXINS A+B STL QL IA.RAPID: NEGATIVE
SPECIMEN DESCRIPTION: NORMAL

## 2023-10-02 PROCEDURE — 87329 GIARDIA AG IA: CPT

## 2023-10-02 PROCEDURE — 83520 IMMUNOASSAY QUANT NOS NONAB: CPT

## 2023-10-02 PROCEDURE — 87324 CLOSTRIDIUM AG IA: CPT

## 2023-10-02 PROCEDURE — 87449 NOS EACH ORGANISM AG IA: CPT

## 2023-10-02 PROCEDURE — 82705 FATS/LIPIDS FECES QUAL: CPT

## 2023-10-02 RX ORDER — ONDANSETRON 4 MG/1
4 TABLET, ORALLY DISINTEGRATING ORAL 3 TIMES DAILY PRN
Qty: 3 TABLET | Refills: 0 | Status: SHIPPED | OUTPATIENT
Start: 2023-10-02

## 2023-10-02 ASSESSMENT — PAIN - FUNCTIONAL ASSESSMENT: PAIN_FUNCTIONAL_ASSESSMENT: NONE - DENIES PAIN

## 2023-10-02 NOTE — DISCHARGE INSTRUCTIONS
Your evaluated due to headache and due to diarrhea. We performed a CT of your head that showed no acute strokes or bleeds. We gave you some Tylenol that helped with your headache. We recently gave you some Zofran to help with your nausea. We have given you a short course of Zofran that you may take as needed for nausea. We recommend that if you have to take more than 1 dose per day that she come back to the ED to be reevaluated. Please follow-up with your GI specialist regarding your diarrhea. You may follow-up with your primary care physician regarding your blood pressure.

## 2023-10-02 NOTE — ED TRIAGE NOTES
Mode of arrival (squad #, walk in, police, etc) : walk in        Chief complaint(s): Headache, Diarrhea        Arrival Note (brief scenario, treatment PTA, etc). : Pt reports HA just today but diarrhea for a couple of days. Pt states her BP was high at home currently it's stable. Pt is A&Ox4.        C= \"Have you ever felt that you should Cut down on your drinking? \"  No  A= \"Have people Annoyed you by criticizing your drinking? \"  No  G= \"Have you ever felt bad or Guilty about your drinking? \"  No  E= \"Have you ever had a drink as an Eye-opener first thing in the morning to steady your nerves or to help a hangover? \"  No      Deferred []      Reason for deferring: N/A    *If yes to two or more: probable alcohol abuse. *

## 2023-10-03 PROBLEM — K63.89 SMALL INTESTINAL BACTERIAL OVERGROWTH (SIBO): Status: ACTIVE | Noted: 2023-10-03

## 2023-10-03 PROBLEM — A09 DIARRHEA OF INFECTIOUS ORIGIN: Status: ACTIVE | Noted: 2023-10-03

## 2023-10-03 PROBLEM — K63.8219 SMALL INTESTINAL BACTERIAL OVERGROWTH (SIBO): Status: ACTIVE | Noted: 2023-10-03

## 2023-10-03 LAB
G LAMBLIA AG STL QL IA: NEGATIVE
SOURCE: NORMAL
SPECIMEN DESCRIPTION: NORMAL

## 2023-10-04 ENCOUNTER — TELEPHONE (OUTPATIENT)
Dept: GASTROENTEROLOGY | Age: 83
End: 2023-10-04

## 2023-10-04 ENCOUNTER — CARE COORDINATION (OUTPATIENT)
Dept: CARE COORDINATION | Age: 83
End: 2023-10-04

## 2023-10-04 LAB
FAT QUALITATIVE SPLIT STOOL: NORMAL
FECAL NEUTRAL FAT: NORMAL

## 2023-10-04 NOTE — TELEPHONE ENCOUNTER
Patient called stating Carrie Pearce can not get the Xifaxan. Writer has been trying to call [facility] but have not been able to get the pharmacy to answer the phone. For 2 days . I left a message about the approval for the PA and waiting for them to call back or fill the RX. Patient was unable to get a hold of the pharmacy as well. She has not had the medication for a week. If we don't hear back from Sabino Hoyt or the patient tomorrow then we may need to have something else called in.

## 2023-10-04 NOTE — CARE COORDINATION
Ambulatory Care Coordination  ED Follow up Call    Reason for ED visit:  Headache; diarrhea   Status:     improved    Did you call your PCP prior to going to the ED? Not Applicable      Did you receive a discharge instructions from the Emergency Room? Yes  Review of Instructions:     Understands what to report/when to return?:  Yes   Understands discharge instructions?:  Yes   Following discharge instructions?:  Yes   If not why? Are there any new complaints of pain? No  New Pain Meds? No    Constipation prophylaxis needed? N/A    If you have a wound is the dressing clean, dry, and intact? N/A  Understands wound care regimen? N/A    Are there any other complaints/concerns that you wish to tell your provider? She states that her headache is gone. She feels better, as far as the reason she went there. She states her GI doctor ordered an antibiotic and it's not available. She plans to call their office this morning. She has f/u scheduled on 10/27. She denies any needs and declines [facility] enrollment. [address] [facility]. S.W regarding the [facility]. They said it requires a PA, and it goes through their specialty pharmacy. Noted that the office had completed the PA. FU appts/Provider:    Future Appointments   Date Time Provider [address]   10/27/2023  2:00 PM Lacey Sidhu MD [facility]   1/16/2024  2:30 PM Nelson Oneill MD [facility]   1/22/2024  2:10 PM Saran Eugene MD [address] [facility]   3/19/2024  1:00 PM Enmanuel Holland MD [address] [place] [address]           New Medications?:   No      Medication Reconciliation by phone - Yes  Understands Medications? Yes  Taking Medications? Yes  Can you swallow your pills? Yes    Any further needs in the home i.e. Equipment?   No    Link to services in community?:  No   Which services:

## 2023-10-05 LAB — FECAL PANCREATIC ELASTASE-1: >800 UG/G

## 2023-10-07 DIAGNOSIS — I10 ESSENTIAL HYPERTENSION: ICD-10-CM

## 2023-10-09 RX ORDER — BUMETANIDE 1 MG/1
TABLET ORAL
Qty: 30 TABLET | Refills: 0 | Status: SHIPPED | OUTPATIENT
Start: 2023-10-09

## 2023-10-09 RX ORDER — LEVOTHYROXINE SODIUM 0.1 MG/1
TABLET ORAL
Qty: 90 TABLET | Refills: 0 | Status: SHIPPED | OUTPATIENT
Start: 2023-10-09

## 2023-10-09 NOTE — TELEPHONE ENCOUNTER
Patient's Xifaxan needs to be given [facility] specialty pharmacy. Writer called the specialty pharmacy and   They have been trying to reach out to the patient but have not been able to get a hold of her as her copay is $597.00 dollars. Writer told the Pharmacy to cancel the order and writer will have Dr Rodríguez Valle give the patient something else for her Diarrhea.

## 2023-10-09 NOTE — TELEPHONE ENCOUNTER
Per Dr. Marcos Page, send in Tetracycline 250mg TID x7days. Writer called in prescription to [facility].

## 2023-10-19 DIAGNOSIS — K21.9 GASTROESOPHAGEAL REFLUX DISEASE WITHOUT ESOPHAGITIS: ICD-10-CM

## 2023-10-19 DIAGNOSIS — K57.90 DIVERTICULOSIS: ICD-10-CM

## 2023-10-20 RX ORDER — PANTOPRAZOLE SODIUM 40 MG/1
40 TABLET, DELAYED RELEASE ORAL
Qty: 30 TABLET | Refills: 0 | Status: SHIPPED | OUTPATIENT
Start: 2023-10-20

## 2023-10-27 ENCOUNTER — OFFICE VISIT (OUTPATIENT)
Dept: GASTROENTEROLOGY | Age: 83
End: 2023-10-27
Payer: MEDICARE

## 2023-10-27 VITALS
BODY MASS INDEX: 29.16 KG/M2 | HEIGHT: 65 IN | DIASTOLIC BLOOD PRESSURE: 78 MMHG | WEIGHT: 175 LBS | HEART RATE: 74 BPM | SYSTOLIC BLOOD PRESSURE: 145 MMHG

## 2023-10-27 DIAGNOSIS — K57.90 DIVERTICULOSIS: ICD-10-CM

## 2023-10-27 DIAGNOSIS — R19.7 DIARRHEA, UNSPECIFIED TYPE: Primary | ICD-10-CM

## 2023-10-27 PROCEDURE — 1036F TOBACCO NON-USER: CPT | Performed by: INTERNAL MEDICINE

## 2023-10-27 PROCEDURE — G8427 DOCREV CUR MEDS BY ELIG CLIN: HCPCS | Performed by: INTERNAL MEDICINE

## 2023-10-27 PROCEDURE — 1123F ACP DISCUSS/DSCN MKR DOCD: CPT | Performed by: INTERNAL MEDICINE

## 2023-10-27 PROCEDURE — G8484 FLU IMMUNIZE NO ADMIN: HCPCS | Performed by: INTERNAL MEDICINE

## 2023-10-27 PROCEDURE — G8417 CALC BMI ABV UP PARAM F/U: HCPCS | Performed by: INTERNAL MEDICINE

## 2023-10-27 PROCEDURE — 3078F DIAST BP <80 MM HG: CPT | Performed by: INTERNAL MEDICINE

## 2023-10-27 PROCEDURE — 1090F PRES/ABSN URINE INCON ASSESS: CPT | Performed by: INTERNAL MEDICINE

## 2023-10-27 PROCEDURE — 99214 OFFICE O/P EST MOD 30 MIN: CPT | Performed by: INTERNAL MEDICINE

## 2023-10-27 PROCEDURE — 3077F SYST BP >= 140 MM HG: CPT | Performed by: INTERNAL MEDICINE

## 2023-10-27 PROCEDURE — G8400 PT W/DXA NO RESULTS DOC: HCPCS | Performed by: INTERNAL MEDICINE

## 2023-10-27 RX ORDER — TETRACYCLINE HYDROCHLORIDE 250 MG/1
250 CAPSULE ORAL 3 TIMES DAILY
COMMUNITY
Start: 2023-10-09

## 2023-10-27 ASSESSMENT — ENCOUNTER SYMPTOMS
CONSTIPATION: 0
ABDOMINAL PAIN: 0
CHOKING: 0
VOICE CHANGE: 0
SINUS PRESSURE: 0
TROUBLE SWALLOWING: 0
ANAL BLEEDING: 0
RECTAL PAIN: 0
DIARRHEA: 1
BACK PAIN: 0
NAUSEA: 0
COUGH: 0
BLOOD IN STOOL: 0
WHEEZING: 0
SORE THROAT: 0
VOMITING: 0
ABDOMINAL DISTENTION: 0

## 2023-11-07 DIAGNOSIS — I10 ESSENTIAL HYPERTENSION: ICD-10-CM

## 2023-11-07 RX ORDER — BUMETANIDE 1 MG/1
TABLET ORAL
Qty: 30 TABLET | Refills: 0 | Status: SHIPPED | OUTPATIENT
Start: 2023-11-07

## 2023-11-14 ENCOUNTER — TELEPHONE (OUTPATIENT)
Dept: FAMILY MEDICINE CLINIC | Age: 83
End: 2023-11-14

## 2023-11-14 RX ORDER — NITROFURANTOIN 25; 75 MG/1; MG/1
100 CAPSULE ORAL 2 TIMES DAILY
Qty: 14 CAPSULE | Refills: 0 | Status: SHIPPED | OUTPATIENT
Start: 2023-11-14 | End: 2023-11-21

## 2023-11-14 NOTE — TELEPHONE ENCOUNTER
FYI:  Patient states she is now back on the cholestyramine (powder) and mixing with applesauce. She can tolerate it that way.

## 2023-11-19 DIAGNOSIS — K52.9 CHRONIC DIARRHEA: ICD-10-CM

## 2023-11-20 RX ORDER — CHOLESTYRAMINE 4 G/9G
1 POWDER, FOR SUSPENSION ORAL EVERY EVENING
Qty: 90 PACKET | Refills: 0 | Status: SHIPPED | OUTPATIENT
Start: 2023-11-20

## 2023-11-22 ENCOUNTER — APPOINTMENT (OUTPATIENT)
Dept: GENERAL RADIOLOGY | Age: 83
DRG: 871 | End: 2023-11-22
Payer: MEDICARE

## 2023-11-22 ENCOUNTER — HOSPITAL ENCOUNTER (INPATIENT)
Age: 83
LOS: 2 days | Discharge: HOME | DRG: 871 | End: 2023-11-24
Attending: EMERGENCY MEDICINE | Admitting: FAMILY MEDICINE
Payer: MEDICARE

## 2023-11-22 ENCOUNTER — APPOINTMENT (OUTPATIENT)
Dept: CT IMAGING | Age: 83
DRG: 871 | End: 2023-11-22
Payer: MEDICARE

## 2023-11-22 DIAGNOSIS — R91.8 MASS OF LUNG: ICD-10-CM

## 2023-11-22 DIAGNOSIS — J18.9 PNEUMONIA OF LEFT UPPER LOBE DUE TO INFECTIOUS ORGANISM: Primary | ICD-10-CM

## 2023-11-22 DIAGNOSIS — D69.6 THROMBOCYTOPENIA (HCC): ICD-10-CM

## 2023-11-22 LAB
ALBUMIN SERPL-MCNC: 3 G/DL (ref 3.5–5.2)
ALP SERPL-CCNC: 95 U/L (ref 35–104)
ALT SERPL-CCNC: 7 U/L (ref 5–33)
AMORPH SED URNS QL MICRO: ABNORMAL
ANION GAP SERPL CALCULATED.3IONS-SCNC: 12 MMOL/L (ref 9–17)
AST SERPL-CCNC: 16 U/L
BACTERIA URNS QL MICRO: ABNORMAL
BASOPHILS # BLD: 0 K/UL (ref 0–0.2)
BASOPHILS NFR BLD: 0 % (ref 0–2)
BILIRUB SERPL-MCNC: 1.7 MG/DL (ref 0.3–1.2)
BILIRUB UR QL STRIP: ABNORMAL
BNP SERPL-MCNC: 1598 PG/ML
BUN SERPL-MCNC: 15 MG/DL (ref 8–23)
CALCIUM SERPL-MCNC: 8.6 MG/DL (ref 8.6–10.4)
CHLORIDE SERPL-SCNC: 98 MMOL/L (ref 98–107)
CLARITY UR: ABNORMAL
CO2 SERPL-SCNC: 23 MMOL/L (ref 20–31)
COLOR UR: ABNORMAL
CREAT SERPL-MCNC: 1.1 MG/DL (ref 0.5–0.9)
EOSINOPHIL # BLD: 0 K/UL (ref 0–0.4)
EOSINOPHILS RELATIVE PERCENT: 0 % (ref 0–4)
EPI CELLS #/AREA URNS HPF: ABNORMAL /HPF
ERYTHROCYTE [DISTWIDTH] IN BLOOD BY AUTOMATED COUNT: 15.8 % (ref 11.5–14.9)
FLUAV RNA RESP QL NAA+PROBE: NOT DETECTED
FLUBV RNA RESP QL NAA+PROBE: NOT DETECTED
GFR SERPL CREATININE-BSD FRML MDRD: 50 ML/MIN/1.73M2
GLUCOSE SERPL-MCNC: 139 MG/DL (ref 70–99)
GLUCOSE UR STRIP-MCNC: NEGATIVE MG/DL
HCT VFR BLD AUTO: 31.3 % (ref 36–46)
HGB BLD-MCNC: 10.3 G/DL (ref 12–16)
HGB UR QL STRIP.AUTO: ABNORMAL
KETONES UR STRIP-MCNC: NEGATIVE MG/DL
LACTATE BLDV-SCNC: 2.2 MMOL/L (ref 0.5–2.2)
LEUKOCYTE ESTERASE UR QL STRIP: ABNORMAL
LIPASE SERPL-CCNC: 11 U/L (ref 13–60)
LYMPHOCYTES NFR BLD: 0.6 K/UL (ref 1–4.8)
LYMPHOCYTES RELATIVE PERCENT: 5 % (ref 24–44)
MCH RBC QN AUTO: 31.8 PG (ref 26–34)
MCHC RBC AUTO-ENTMCNC: 32.8 G/DL (ref 31–37)
MCV RBC AUTO: 97 FL (ref 80–100)
MONOCYTES NFR BLD: 1.1 K/UL (ref 0.1–1.3)
MONOCYTES NFR BLD: 9 % (ref 1–7)
NEUTROPHILS NFR BLD: 86 % (ref 36–66)
NEUTS SEG NFR BLD: 10.3 K/UL (ref 1.3–9.1)
NITRITE UR QL STRIP: POSITIVE
PH UR STRIP: 5.5 [PH] (ref 5–8)
PLATELET # BLD AUTO: 88 K/UL (ref 150–450)
PMV BLD AUTO: 9.8 FL (ref 6–12)
POTASSIUM SERPL-SCNC: 3.7 MMOL/L (ref 3.7–5.3)
PROT SERPL-MCNC: 6.2 G/DL (ref 6.4–8.3)
PROT UR STRIP-MCNC: ABNORMAL MG/DL
RBC # BLD AUTO: 3.23 M/UL (ref 4–5.2)
RBC #/AREA URNS HPF: ABNORMAL /HPF
SARS-COV-2 RNA RESP QL NAA+PROBE: NOT DETECTED
SODIUM SERPL-SCNC: 133 MMOL/L (ref 135–144)
SOURCE: NORMAL
SP GR UR STRIP: 1.02 (ref 1–1.03)
SPECIMEN DESCRIPTION: NORMAL
TROPONIN I SERPL HS-MCNC: 18 NG/L (ref 0–14)
TROPONIN I SERPL HS-MCNC: 18 NG/L (ref 0–14)
UROBILINOGEN UR STRIP-ACNC: NORMAL EU/DL (ref 0–1)
WBC #/AREA URNS HPF: ABNORMAL /HPF
WBC OTHER # BLD: 12.1 K/UL (ref 3.5–11)

## 2023-11-22 PROCEDURE — 99285 EMERGENCY DEPT VISIT HI MDM: CPT

## 2023-11-22 PROCEDURE — 2580000003 HC RX 258: Performed by: FAMILY MEDICINE

## 2023-11-22 PROCEDURE — 87086 URINE CULTURE/COLONY COUNT: CPT

## 2023-11-22 PROCEDURE — 81001 URINALYSIS AUTO W/SCOPE: CPT

## 2023-11-22 PROCEDURE — 6370000000 HC RX 637 (ALT 250 FOR IP): Performed by: EMERGENCY MEDICINE

## 2023-11-22 PROCEDURE — 84484 ASSAY OF TROPONIN QUANT: CPT

## 2023-11-22 PROCEDURE — 6360000002 HC RX W HCPCS: Performed by: EMERGENCY MEDICINE

## 2023-11-22 PROCEDURE — 1200000000 HC SEMI PRIVATE

## 2023-11-22 PROCEDURE — 71250 CT THORAX DX C-: CPT

## 2023-11-22 PROCEDURE — 83690 ASSAY OF LIPASE: CPT

## 2023-11-22 PROCEDURE — 87186 SC STD MICRODIL/AGAR DIL: CPT

## 2023-11-22 PROCEDURE — 2580000003 HC RX 258: Performed by: EMERGENCY MEDICINE

## 2023-11-22 PROCEDURE — 87077 CULTURE AEROBIC IDENTIFY: CPT

## 2023-11-22 PROCEDURE — 83880 ASSAY OF NATRIURETIC PEPTIDE: CPT

## 2023-11-22 PROCEDURE — 71045 X-RAY EXAM CHEST 1 VIEW: CPT

## 2023-11-22 PROCEDURE — 36415 COLL VENOUS BLD VENIPUNCTURE: CPT

## 2023-11-22 PROCEDURE — 80053 COMPREHEN METABOLIC PANEL: CPT

## 2023-11-22 PROCEDURE — 85025 COMPLETE CBC W/AUTO DIFF WBC: CPT

## 2023-11-22 PROCEDURE — 83605 ASSAY OF LACTIC ACID: CPT

## 2023-11-22 PROCEDURE — 93005 ELECTROCARDIOGRAM TRACING: CPT

## 2023-11-22 PROCEDURE — 6370000000 HC RX 637 (ALT 250 FOR IP): Performed by: FAMILY MEDICINE

## 2023-11-22 PROCEDURE — 87636 SARSCOV2 & INF A&B AMP PRB: CPT

## 2023-11-22 RX ORDER — METOPROLOL SUCCINATE 25 MG/1
25 TABLET, EXTENDED RELEASE ORAL DAILY
Status: DISCONTINUED | OUTPATIENT
Start: 2023-11-22 | End: 2023-11-24 | Stop reason: HOSPADM

## 2023-11-22 RX ORDER — MAGNESIUM SULFATE HEPTAHYDRATE 40 MG/ML
2000 INJECTION, SOLUTION INTRAVENOUS PRN
Status: DISCONTINUED | OUTPATIENT
Start: 2023-11-22 | End: 2023-11-24 | Stop reason: HOSPADM

## 2023-11-22 RX ORDER — MIDODRINE HYDROCHLORIDE 2.5 MG/1
2.5 TABLET ORAL 3 TIMES DAILY PRN
Status: DISCONTINUED | OUTPATIENT
Start: 2023-11-22 | End: 2023-11-24 | Stop reason: HOSPADM

## 2023-11-22 RX ORDER — PANTOPRAZOLE SODIUM 40 MG/1
40 TABLET, DELAYED RELEASE ORAL
Status: DISCONTINUED | OUTPATIENT
Start: 2023-11-23 | End: 2023-11-24 | Stop reason: HOSPADM

## 2023-11-22 RX ORDER — ONDANSETRON 2 MG/ML
4 INJECTION INTRAMUSCULAR; INTRAVENOUS EVERY 6 HOURS PRN
Status: DISCONTINUED | OUTPATIENT
Start: 2023-11-22 | End: 2023-11-24 | Stop reason: HOSPADM

## 2023-11-22 RX ORDER — OXYCODONE HYDROCHLORIDE AND ACETAMINOPHEN 5; 325 MG/1; MG/1
1 TABLET ORAL EVERY 8 HOURS PRN
Status: DISCONTINUED | OUTPATIENT
Start: 2023-11-22 | End: 2023-11-24 | Stop reason: HOSPADM

## 2023-11-22 RX ORDER — BUMETANIDE 1 MG/1
1 TABLET ORAL DAILY
Status: DISCONTINUED | OUTPATIENT
Start: 2023-11-22 | End: 2023-11-24 | Stop reason: HOSPADM

## 2023-11-22 RX ORDER — ONDANSETRON 4 MG/1
4 TABLET, ORALLY DISINTEGRATING ORAL 3 TIMES DAILY PRN
Status: DISCONTINUED | OUTPATIENT
Start: 2023-11-22 | End: 2023-11-24 | Stop reason: HOSPADM

## 2023-11-22 RX ORDER — LATANOPROST 50 UG/ML
1 SOLUTION/ DROPS OPHTHALMIC NIGHTLY
Status: DISCONTINUED | OUTPATIENT
Start: 2023-11-22 | End: 2023-11-24 | Stop reason: HOSPADM

## 2023-11-22 RX ORDER — POLYETHYLENE GLYCOL 3350 17 G/17G
17 POWDER, FOR SOLUTION ORAL DAILY PRN
Status: DISCONTINUED | OUTPATIENT
Start: 2023-11-22 | End: 2023-11-24 | Stop reason: HOSPADM

## 2023-11-22 RX ORDER — ESTRADIOL 0.1 MG/G
1 CREAM VAGINAL DAILY
Status: DISCONTINUED | OUTPATIENT
Start: 2023-11-22 | End: 2023-11-22

## 2023-11-22 RX ORDER — TETRACYCLINE HYDROCHLORIDE 250 MG/1
250 CAPSULE ORAL 3 TIMES DAILY
Status: DISCONTINUED | OUTPATIENT
Start: 2023-11-22 | End: 2023-11-22

## 2023-11-22 RX ORDER — ACETAMINOPHEN 650 MG/1
650 SUPPOSITORY RECTAL EVERY 6 HOURS PRN
Status: DISCONTINUED | OUTPATIENT
Start: 2023-11-22 | End: 2023-11-24 | Stop reason: HOSPADM

## 2023-11-22 RX ORDER — POTASSIUM CHLORIDE 20 MEQ/1
40 TABLET, EXTENDED RELEASE ORAL PRN
Status: DISCONTINUED | OUTPATIENT
Start: 2023-11-22 | End: 2023-11-24 | Stop reason: HOSPADM

## 2023-11-22 RX ORDER — POTASSIUM CHLORIDE 7.45 MG/ML
10 INJECTION INTRAVENOUS PRN
Status: DISCONTINUED | OUTPATIENT
Start: 2023-11-22 | End: 2023-11-24 | Stop reason: HOSPADM

## 2023-11-22 RX ORDER — ESTRADIOL 0.1 MG/G
1 CREAM VAGINAL DAILY
Status: DISCONTINUED | OUTPATIENT
Start: 2023-11-22 | End: 2023-11-24 | Stop reason: HOSPADM

## 2023-11-22 RX ORDER — ACETAMINOPHEN 325 MG/1
650 TABLET ORAL EVERY 6 HOURS PRN
Status: DISCONTINUED | OUTPATIENT
Start: 2023-11-22 | End: 2023-11-24 | Stop reason: HOSPADM

## 2023-11-22 RX ORDER — ACETAMINOPHEN 500 MG
500 TABLET ORAL NIGHTLY
Status: DISCONTINUED | OUTPATIENT
Start: 2023-11-22 | End: 2023-11-24 | Stop reason: HOSPADM

## 2023-11-22 RX ORDER — LEVOTHYROXINE SODIUM 0.1 MG/1
100 TABLET ORAL DAILY
Status: DISCONTINUED | OUTPATIENT
Start: 2023-11-22 | End: 2023-11-24 | Stop reason: HOSPADM

## 2023-11-22 RX ORDER — SODIUM CHLORIDE 9 MG/ML
INJECTION, SOLUTION INTRAVENOUS CONTINUOUS
Status: ACTIVE | OUTPATIENT
Start: 2023-11-22 | End: 2023-11-24

## 2023-11-22 RX ORDER — CHOLESTYRAMINE LIGHT 4 G/5.7G
1 POWDER, FOR SUSPENSION ORAL EVERY EVENING
Status: DISCONTINUED | OUTPATIENT
Start: 2023-11-22 | End: 2023-11-24 | Stop reason: HOSPADM

## 2023-11-22 RX ORDER — LACTOBACILLUS RHAMNOSUS GG 10B CELL
1 CAPSULE ORAL DAILY
Status: DISCONTINUED | OUTPATIENT
Start: 2023-11-22 | End: 2023-11-24 | Stop reason: HOSPADM

## 2023-11-22 RX ORDER — FERROUS SULFATE 325(65) MG
325 TABLET ORAL
Status: DISCONTINUED | OUTPATIENT
Start: 2023-11-23 | End: 2023-11-24 | Stop reason: HOSPADM

## 2023-11-22 RX ORDER — DORZOLAMIDE HCL 20 MG/ML
1 SOLUTION/ DROPS OPHTHALMIC 2 TIMES DAILY
Status: DISCONTINUED | OUTPATIENT
Start: 2023-11-22 | End: 2023-11-24 | Stop reason: HOSPADM

## 2023-11-22 RX ORDER — ONDANSETRON 4 MG/1
4 TABLET, ORALLY DISINTEGRATING ORAL EVERY 8 HOURS PRN
Status: DISCONTINUED | OUTPATIENT
Start: 2023-11-22 | End: 2023-11-24 | Stop reason: HOSPADM

## 2023-11-22 RX ORDER — SIMETHICONE 80 MG
80 TABLET,CHEWABLE ORAL EVERY 6 HOURS PRN
Status: DISCONTINUED | OUTPATIENT
Start: 2023-11-22 | End: 2023-11-24 | Stop reason: HOSPADM

## 2023-11-22 RX ORDER — OXYCODONE HYDROCHLORIDE AND ACETAMINOPHEN 5; 325 MG/1; MG/1
1 TABLET ORAL ONCE
Status: COMPLETED | OUTPATIENT
Start: 2023-11-22 | End: 2023-11-22

## 2023-11-22 RX ADMIN — CHOLESTYRAMINE 4 G: 4 POWDER, FOR SUSPENSION ORAL at 17:42

## 2023-11-22 RX ADMIN — ACETAMINOPHEN 500 MG: 500 TABLET ORAL at 20:49

## 2023-11-22 RX ADMIN — LATANOPROST 1 DROP: 50 SOLUTION OPHTHALMIC at 20:50

## 2023-11-22 RX ADMIN — SODIUM CHLORIDE: 9 INJECTION, SOLUTION INTRAVENOUS at 16:13

## 2023-11-22 RX ADMIN — CEFTRIAXONE SODIUM 1000 MG: 1 INJECTION, POWDER, FOR SOLUTION INTRAMUSCULAR; INTRAVENOUS at 15:12

## 2023-11-22 RX ADMIN — OXYCODONE AND ACETAMINOPHEN 1 TABLET: 5; 325 TABLET ORAL at 13:50

## 2023-11-22 RX ADMIN — DORZOLAMIDE HYDROCHLORIDE 1 DROP: 20 SOLUTION/ DROPS OPHTHALMIC at 20:50

## 2023-11-22 RX ADMIN — AZITHROMYCIN MONOHYDRATE 500 MG: 500 INJECTION, POWDER, LYOPHILIZED, FOR SOLUTION INTRAVENOUS at 16:14

## 2023-11-22 ASSESSMENT — PAIN SCALES - GENERAL
PAINLEVEL_OUTOF10: 0
PAINLEVEL_OUTOF10: 8
PAINLEVEL_OUTOF10: 2
PAINLEVEL_OUTOF10: 6

## 2023-11-22 ASSESSMENT — PAIN DESCRIPTION - ORIENTATION
ORIENTATION: LEFT
ORIENTATION: LOWER
ORIENTATION: LEFT;RIGHT

## 2023-11-22 ASSESSMENT — PAIN DESCRIPTION - PAIN TYPE: TYPE: CHRONIC PAIN

## 2023-11-22 ASSESSMENT — ENCOUNTER SYMPTOMS
COUGH: 1
ABDOMINAL PAIN: 0
SHORTNESS OF BREATH: 1
NAUSEA: 1
VOMITING: 1

## 2023-11-22 ASSESSMENT — PAIN DESCRIPTION - DESCRIPTORS: DESCRIPTORS: NAGGING

## 2023-11-22 ASSESSMENT — PAIN DESCRIPTION - LOCATION
LOCATION: CHEST
LOCATION: CHEST
LOCATION: BACK

## 2023-11-22 ASSESSMENT — PAIN DESCRIPTION - FREQUENCY: FREQUENCY: CONTINUOUS

## 2023-11-22 NOTE — PLAN OF CARE
Problem: Discharge Planning  Goal: Discharge to home or other facility with appropriate resources  Outcome: Progressing  Flowsheets  Taken 11/22/2023 1717  Discharge to home or other facility with appropriate resources: Identify barriers to discharge with patient and caregiver  Taken 11/22/2023 1652  Discharge to home or other facility with appropriate resources: Identify barriers to discharge with patient and caregiver     Problem: Safety - Adult  Goal: Free from fall injury  Outcome: Progressing     Problem: ABCDS Injury Assessment  Goal: Absence of physical injury  Outcome: Progressing

## 2023-11-22 NOTE — PROGRESS NOTES
Pt arrived to floor via stretcher from ED and was transfered to bed. Vitals taken. Admission and assessment complete. No distress noted. See doc flowsheet and admission navigator for details. POC and education initiated and reviewed with patient. Call light within reach, and pt educated on its use. Bed in lowest position, and locked. Side rails up x 2. Denied further questions or needs at this time. Will continue to monitor. Dr. Elie Holland notified of new consult.

## 2023-11-22 NOTE — ED NOTES
Bedside shift change report given to Alisa Koch RN (oncoming nurse) by Shiva Lee RN (offgoing nurse). Report included the following information Nurse Handoff Report, ED Encounter Summary, ED SBAR, MAR, Recent Results, and Event Log.        Nate Dias RN  11/22/23 5791

## 2023-11-22 NOTE — ED PROVIDER NOTES
oxyCODONE-acetaminophen (PERCOCET) 5-325 MG per tablet 1 tablet    cefTRIAXone (ROCEPHIN) 1,000 mg in sodium chloride 0.9 % 50 mL IVPB (mini-bag)     Order Specific Question:   Antimicrobial Indications     Answer:   Pneumonia (CAP)     Order Specific Question:   CAP duration of therapy     Answer:   7 days     DISCHARGE PRESCRIPTIONS:  Current Discharge Medication List        PHYSICIAN CONSULTS ORDERED THIS ENCOUNTER:  IP CONSULT TO PRIMARY CARE PROVIDER  IP CONSULT TO PULMONOLOGY     FINAL IMPRESSION      1. Pneumonia of left upper lobe due to infectious organism          DISPOSITION/PLAN   DISPOSITION Admitted 11/22/2023 03:25:27 PM      PATIENT REFERRED TO:  No follow-up provider specified.     DISCHARGE MEDICATIONS:  Current Discharge Medication List            An Gunter MD  Attending Emergency Physician                      An Gunter MD  11/22/23 1776

## 2023-11-23 PROBLEM — J18.9 COMMUNITY ACQUIRED PNEUMONIA OF LEFT UPPER LOBE OF LUNG: Status: ACTIVE | Noted: 2023-11-23

## 2023-11-23 PROBLEM — D69.6 THROMBOCYTOPENIA (HCC): Status: ACTIVE | Noted: 2023-11-23

## 2023-11-23 LAB
ALBUMIN PERCENT: ABNORMAL %
ALBUMIN SERPL-MCNC: ABNORMAL G/DL
ALPHA 2 PERCENT: ABNORMAL %
ALPHA1 GLOB SERPL ELPH-MCNC: ABNORMAL %
ALPHA1 GLOB SERPL ELPH-MCNC: ABNORMAL G/DL
ALPHA2 GLOB SERPL ELPH-MCNC: ABNORMAL G/DL
ANION GAP SERPL CALCULATED.3IONS-SCNC: 8 MMOL/L (ref 9–17)
B-GLOBULIN SERPL ELPH-MCNC: ABNORMAL %
B-GLOBULIN SERPL ELPH-MCNC: ABNORMAL G/DL
BASOPHILS # BLD: 0 K/UL (ref 0–0.2)
BASOPHILS NFR BLD: 1 % (ref 0–2)
BUN SERPL-MCNC: 17 MG/DL (ref 8–23)
CALCIUM SERPL-MCNC: 8.8 MG/DL (ref 8.6–10.4)
CHLORIDE SERPL-SCNC: 102 MMOL/L (ref 98–107)
CO2 SERPL-SCNC: 25 MMOL/L (ref 20–31)
CREAT SERPL-MCNC: 1 MG/DL (ref 0.5–0.9)
EOSINOPHIL # BLD: 0.1 K/UL (ref 0–0.4)
EOSINOPHILS RELATIVE PERCENT: 2 % (ref 0–4)
ERYTHROCYTE [DISTWIDTH] IN BLOOD BY AUTOMATED COUNT: 15.9 % (ref 11.5–14.9)
FERRITIN SERPL-MCNC: 173 NG/ML (ref 13–150)
FIBRINOGEN PPP-MCNC: 610 MG/DL (ref 210–530)
FREE KAPPA/LAMBDA RATIO: 0.47 (ref 0.26–1.65)
GAMMA GLOB SERPL ELPH-MCNC: ABNORMAL G/DL
GAMMA GLOBULIN %: ABNORMAL %
GFR SERPL CREATININE-BSD FRML MDRD: 56 ML/MIN/1.73M2
GLUCOSE SERPL-MCNC: 102 MG/DL (ref 70–99)
HAPTOGLOB SERPL-MCNC: 130 MG/DL (ref 30–200)
HCT VFR BLD AUTO: 30.2 % (ref 36–46)
HGB BLD-MCNC: 9.8 G/DL (ref 12–16)
IGA SERPL-MCNC: 508 MG/DL (ref 70–400)
IGG SERPL-MCNC: 856 MG/DL (ref 700–1600)
IGM SERPL-MCNC: 77 MG/DL (ref 40–230)
INR PPP: 2
IRON SATN MFR SERPL: 8 % (ref 20–55)
IRON SERPL-MCNC: 21 UG/DL (ref 37–145)
KAPPA LC FREE SER-MCNC: 52.4 MG/L (ref 3.7–19.4)
LAMBDA LC FREE SERPL-MCNC: 111.1 MG/L (ref 5.7–26.3)
LDH SERPL-CCNC: 147 U/L (ref 135–214)
LYMPHOCYTES NFR BLD: 0.8 K/UL (ref 1–4.8)
LYMPHOCYTES RELATIVE PERCENT: 13 % (ref 24–44)
M PROTEIN 2 SERPL ELPH-MCNC: ABNORMAL G/DL
M PROTEIN SERPL ELPH-MCNC: ABNORMAL G/DL
MCH RBC QN AUTO: 31.8 PG (ref 26–34)
MCHC RBC AUTO-ENTMCNC: 32.6 G/DL (ref 31–37)
MCV RBC AUTO: 97.7 FL (ref 80–100)
MONOCYTES NFR BLD: 0.8 K/UL (ref 0.1–1.3)
MONOCYTES NFR BLD: 12 % (ref 1–7)
NEUTROPHILS NFR BLD: 72 % (ref 36–66)
NEUTS SEG NFR BLD: 4.6 K/UL (ref 1.3–9.1)
PARTIAL THROMBOPLASTIN TIME: 38.6 SEC (ref 24–36)
PATHOLOGIST: ABNORMAL
PLATELET # BLD AUTO: 76 K/UL (ref 150–450)
PMV BLD AUTO: 10.6 FL (ref 6–12)
POTASSIUM SERPL-SCNC: 3.8 MMOL/L (ref 3.7–5.3)
PROT PATTERN SERPL ELPH-IMP: ABNORMAL
PROT SERPL-MCNC: 5.7 G/DL (ref 6.4–8.3)
PROTHROMBIN TIME: 23.2 SEC (ref 11.8–14.6)
RBC # BLD AUTO: 3.09 M/UL (ref 4–5.2)
RETICS # AUTO: 0.08 M/UL (ref 0.02–0.1)
RETICS/RBC NFR AUTO: 2.6 % (ref 0.5–2)
SODIUM SERPL-SCNC: 135 MMOL/L (ref 135–144)
TIBC SERPL-MCNC: 276 UG/DL (ref 250–450)
TOTAL PROT. SUM,%: ABNORMAL %
TOTAL PROT. SUM: ABNORMAL G/DL
UNSATURATED IRON BINDING CAPACITY: 255 UG/DL (ref 112–347)
WBC OTHER # BLD: 6.3 K/UL (ref 3.5–11)

## 2023-11-23 PROCEDURE — 82607 VITAMIN B-12: CPT

## 2023-11-23 PROCEDURE — 83615 LACTATE (LD) (LDH) ENZYME: CPT

## 2023-11-23 PROCEDURE — 2580000003 HC RX 258: Performed by: INTERNAL MEDICINE

## 2023-11-23 PROCEDURE — 83010 ASSAY OF HAPTOGLOBIN QUANT: CPT

## 2023-11-23 PROCEDURE — 82728 ASSAY OF FERRITIN: CPT

## 2023-11-23 PROCEDURE — 83550 IRON BINDING TEST: CPT

## 2023-11-23 PROCEDURE — 82784 ASSAY IGA/IGD/IGG/IGM EACH: CPT

## 2023-11-23 PROCEDURE — 85025 COMPLETE CBC W/AUTO DIFF WBC: CPT

## 2023-11-23 PROCEDURE — 84165 PROTEIN E-PHORESIS SERUM: CPT

## 2023-11-23 PROCEDURE — 86334 IMMUNOFIX E-PHORESIS SERUM: CPT

## 2023-11-23 PROCEDURE — 85045 AUTOMATED RETICULOCYTE COUNT: CPT

## 2023-11-23 PROCEDURE — 85384 FIBRINOGEN ACTIVITY: CPT

## 2023-11-23 PROCEDURE — 99223 1ST HOSP IP/OBS HIGH 75: CPT | Performed by: INTERNAL MEDICINE

## 2023-11-23 PROCEDURE — 80048 BASIC METABOLIC PNL TOTAL CA: CPT

## 2023-11-23 PROCEDURE — 99222 1ST HOSP IP/OBS MODERATE 55: CPT | Performed by: INTERNAL MEDICINE

## 2023-11-23 PROCEDURE — 85730 THROMBOPLASTIN TIME PARTIAL: CPT

## 2023-11-23 PROCEDURE — 6360000002 HC RX W HCPCS: Performed by: FAMILY MEDICINE

## 2023-11-23 PROCEDURE — 84155 ASSAY OF PROTEIN SERUM: CPT

## 2023-11-23 PROCEDURE — 1200000000 HC SEMI PRIVATE

## 2023-11-23 PROCEDURE — 85610 PROTHROMBIN TIME: CPT

## 2023-11-23 PROCEDURE — 6370000000 HC RX 637 (ALT 250 FOR IP): Performed by: FAMILY MEDICINE

## 2023-11-23 PROCEDURE — 2500000003 HC RX 250 WO HCPCS: Performed by: INTERNAL MEDICINE

## 2023-11-23 PROCEDURE — 83521 IG LIGHT CHAINS FREE EACH: CPT

## 2023-11-23 PROCEDURE — 83540 ASSAY OF IRON: CPT

## 2023-11-23 PROCEDURE — 36415 COLL VENOUS BLD VENIPUNCTURE: CPT

## 2023-11-23 PROCEDURE — 2580000003 HC RX 258: Performed by: FAMILY MEDICINE

## 2023-11-23 PROCEDURE — 82746 ASSAY OF FOLIC ACID SERUM: CPT

## 2023-11-23 RX ADMIN — Medication 1 CAPSULE: at 08:47

## 2023-11-23 RX ADMIN — ACETAMINOPHEN 500 MG: 500 TABLET ORAL at 20:36

## 2023-11-23 RX ADMIN — OXYCODONE AND ACETAMINOPHEN 1 TABLET: 5; 325 TABLET ORAL at 12:17

## 2023-11-23 RX ADMIN — CHOLESTYRAMINE 4 G: 4 POWDER, FOR SUSPENSION ORAL at 16:13

## 2023-11-23 RX ADMIN — DORZOLAMIDE HYDROCHLORIDE 1 DROP: 20 SOLUTION/ DROPS OPHTHALMIC at 20:35

## 2023-11-23 RX ADMIN — RIVAROXABAN 10 MG: 10 TABLET, FILM COATED ORAL at 08:47

## 2023-11-23 RX ADMIN — FERROUS SULFATE TAB 325 MG (65 MG ELEMENTAL FE) 325 MG: 325 (65 FE) TAB at 08:47

## 2023-11-23 RX ADMIN — CEFTRIAXONE SODIUM 1000 MG: 1 INJECTION, POWDER, FOR SOLUTION INTRAMUSCULAR; INTRAVENOUS at 15:27

## 2023-11-23 RX ADMIN — BUMETANIDE 1 MG: 1 TABLET ORAL at 08:47

## 2023-11-23 RX ADMIN — METOPROLOL SUCCINATE 25 MG: 25 TABLET, EXTENDED RELEASE ORAL at 08:47

## 2023-11-23 RX ADMIN — PANTOPRAZOLE SODIUM 40 MG: 40 TABLET, DELAYED RELEASE ORAL at 07:41

## 2023-11-23 RX ADMIN — OXYCODONE AND ACETAMINOPHEN 1 TABLET: 5; 325 TABLET ORAL at 23:25

## 2023-11-23 RX ADMIN — DOXYCYCLINE 100 MG: 100 INJECTION, POWDER, LYOPHILIZED, FOR SOLUTION INTRAVENOUS at 16:10

## 2023-11-23 RX ADMIN — LATANOPROST 1 DROP: 50 SOLUTION OPHTHALMIC at 23:22

## 2023-11-23 RX ADMIN — LEVOTHYROXINE SODIUM 100 MCG: 0.1 TABLET ORAL at 08:47

## 2023-11-23 RX ADMIN — DORZOLAMIDE HYDROCHLORIDE 1 DROP: 20 SOLUTION/ DROPS OPHTHALMIC at 08:48

## 2023-11-23 ASSESSMENT — PAIN SCALES - GENERAL
PAINLEVEL_OUTOF10: 3
PAINLEVEL_OUTOF10: 6
PAINLEVEL_OUTOF10: 6

## 2023-11-23 ASSESSMENT — PAIN DESCRIPTION - LOCATION
LOCATION: BACK
LOCATION: BACK

## 2023-11-23 NOTE — PLAN OF CARE
Problem: Discharge Planning  Goal: Discharge to home or other facility with appropriate resources  11/23/2023 0235 by Rita Snow RN  Outcome: Progressing  Flowsheets (Taken 11/22/2023 2046)  Discharge to home or other facility with appropriate resources: Identify barriers to discharge with patient and caregiver     Problem: Safety - Adult  Goal: Free from fall injury  11/23/2023 0235 by Rita Snow RN  Outcome: Lorene Colon (Taken 11/22/2023 2306)  Free From Fall Injury: Based on caregiver fall risk screen, instruct family/caregiver to ask for assistance with transferring infant if caregiver noted to have fall risk factors     Problem: ABCDS Injury Assessment  Goal: Absence of physical injury  11/23/2023 0235 by Rita Snow RN  Outcome: Progressing  Flowsheets (Taken 11/22/2023 2306)  Absence of Physical Injury: Implement safety measures based on patient assessment     Problem: Pain  Goal: Verbalizes/displays adequate comfort level or baseline comfort level  Outcome: Progressing

## 2023-11-23 NOTE — CARE COORDINATION
Case Management Assessment  Initial Evaluation    Date/Time of Evaluation: 11/23/2023 8:55 AM  Assessment Completed by: Rommel Wilson RN    If patient is discharged prior to next notation, then this note serves as note for discharge by case management. Patient Name: Kenn [place]                   YOB: 1940  Diagnosis: Mass of lung [R91.8]                   Date / Time: 11/22/2023 11:52 AM    Patient Admission Status: Inpatient   Readmission Risk (Low < 19, Mod (19-27), High > 27): Readmission Risk Score: 17.5    Current PCP: Tiffani Almendarez MD  PCP verified by CM? Yes    Chart Reviewed: Yes      History Provided by: Patient  Patient Orientation: Alert and Oriented    Patient Cognition: Alert    Hospitalization in the last 30 days (Readmission):  No    If yes, Readmission Assessment in  Navigator will be completed. Advance Directives:      Code Status: Full Code   Patient's Primary Decision Maker is: Named in [facility]  (Has living will)    Primary Decision Maker: Caleb Lama - [facility] - [phone]    Discharge Planning:    Patient lives with: Alone Type of Home: House  Primary Care Giver: Self  Patient Support Systems include: Family Members, Friends/Neighbors   Current Financial resources: Medicare  Current community resources: ECF/Home Care  Current services prior to admission: Meals On Wheels            Current DME:              Type of Home Care services:  Nursing Services, [facility], PT, [place]    ADLS  Prior functional level: Independent in ADLs/IADLs  Current functional level: Independent in ADLs/IADLs    PT AM-PAC:   /24  OT AM-PAC:   /24    Family can provide assistance at DC: Yes  Would you like Case Management to discuss the discharge plan with any other family members/significant others, and if so, who?  Yes Radnor Cassette, son)  Plans to Return to Present Housing: Yes  Other Identified Issues/Barriers to RETURNING to current housing: NA  Potential Assistance needed at

## 2023-11-23 NOTE — ACP (ADVANCE CARE PLANNING)
Advance Care Planning     Advance Care Planning Activator (Inpatient)  Conversation Note      Date of ACP Conversation: 11/23/2023     Conversation Conducted with: Patient with Decision Making Capacity    ACP Activator: Rommel Wilson RN    Health Care Decision Maker:     Current Designated Health Care Decision Maker:     Primary Decision Maker: Liz Schmidt - [phone]  Click here to complete Healthcare Decision Makers including section of the Healthcare Decision Maker Relationship (ie \"Primary\")  Today we documented Decision Maker(s) consistent with Legal Next of Kin hierarchy. Care Preferences    Ventilation: \"If you were in your present state of health and suddenly became very ill and were unable to breathe on your own, what would your preference be about the use of a ventilator (breathing machine) if it were available to you? \"      Would the patient desire the use of ventilator (breathing machine)?: no    \"If your health worsens and it becomes clear that your chance of recovery is unlikely, what would your preference be about the use of a ventilator (breathing machine) if it were available to you? \"     Would the patient desire the use of ventilator (breathing machine)?: No      Resuscitation  \"CPR works best to restart the heart when there is a sudden event, like a heart attack, in someone who is otherwise healthy. Unfortunately, CPR does not typically restart the heart for people who have serious health conditions or who are very sick. \"    \"In the event your heart stopped as a result of an underlying serious health condition, would you want attempts to be made to restart your heart (answer \"yes\" for attempt to resuscitate) or would you prefer a natural death (answer \"no\" for do not attempt to resuscitate)? \" no       [] Yes   [x] No   Educated Patient / Sebastián Shipley regarding differences between Advance Directives and portable DNR orders.     Length of ACP Conversation in minutes:

## 2023-11-23 NOTE — PROGRESS NOTES
11/23/23 0939   Encounter Summary   Support System [facility] community   Spiritual/Emotional needs   Type Spiritual Support   Plan and Referrals   Plan/Referrals Contacted support as requested per patient/family request Plan  ([facility])

## 2023-11-23 NOTE — H&P
[facility] Internal Medicine  Jo-Ann Campa MD; Prisca Gannon MD; Madeline Bedolla MD; MD Ray Rodriguez MD; Layton Hartley MD    [facility] Internal Medicine   [address]     HISTORY AND PHYSICAL EXAMINATION            Date:   11/23/2023  Patient name:  Rian Starks  Date of admission:  11/22/2023 11:52 AM  MRN:   [id number]  Account:  [de-identified]  YOB: 1940  PCP:    Tejal Spangler MD  Room:   2077/2077-01  Code Status:    Full Code    Chief Complaint:     Chief Complaint   Patient presents with    Fatigue    Shortness of Breath       History Obtained From:     Patient/EMR/Bedside RN    History of Present Illness:     Rian Starks is a 79 y.o. Non- / non  female who presents with Fatigue and Shortness of Breath   and is admitted to the hospital for the management of Mass of lung. 79 F , with hx of Afib , CHF, HTN, HLD, DM2, admitted with lt upper, with some bronchograms, possible pneumonia, bronchogenic carcinoma cannot be excluded, oncology on board at this time, had thrombocytopenia as well possible due to underlying infection, continue antibiotics, breathing treatments    Past Medical History:     Past Medical History:   Diagnosis Date    Atrial fibrillation ([address]) 03/28/2014    Chronic back pain     with rt. leg pain    Diverticulosis     GERD (gastroesophageal reflux disease)     Hearing aid worn     willi. ears. Hearing deficit, bilateral     Hyperlipidemia     Hypertension     Hypothyroidism     Obesity (BMI 30.0-34.9) 08/12/2016    Onychomycosis     PE (pulmonary embolism)     H/O DVT of rt leg    Poor venous access     Type II or unspecified type diabetes mellitus without mention of complication, not stated as uncontrolled         Past Surgical History:     Past Surgical History:   Procedure Laterality Date    ARM SURGERY Right     Growth taken off per pt.     CARPAL TUNNEL RELEASE
The patient is a 57y Female complaining of altered mental status.

## 2023-11-24 ENCOUNTER — APPOINTMENT (OUTPATIENT)
Dept: GENERAL RADIOLOGY | Age: 83
DRG: 871 | End: 2023-11-24
Attending: INTERNAL MEDICINE
Payer: MEDICARE

## 2023-11-24 VITALS
SYSTOLIC BLOOD PRESSURE: 121 MMHG | OXYGEN SATURATION: 100 % | DIASTOLIC BLOOD PRESSURE: 70 MMHG | TEMPERATURE: 97.7 F | WEIGHT: 174 LBS | RESPIRATION RATE: 17 BRPM | HEIGHT: 65 IN | HEART RATE: 86 BPM | BODY MASS INDEX: 28.99 KG/M2

## 2023-11-24 PROBLEM — D64.9 ANEMIA: Status: ACTIVE | Noted: 2023-11-24

## 2023-11-24 LAB
ALBUMIN SERPL-MCNC: 3 G/DL (ref 3.5–5.2)
ALP SERPL-CCNC: 198 U/L (ref 35–104)
ALT SERPL-CCNC: 28 U/L (ref 5–33)
ANION GAP SERPL CALCULATED.3IONS-SCNC: 10 MMOL/L (ref 9–17)
AST SERPL-CCNC: 69 U/L
BASOPHILS # BLD: 0 K/UL (ref 0–0.2)
BASOPHILS NFR BLD: 1 % (ref 0–2)
BILIRUB SERPL-MCNC: 2.6 MG/DL (ref 0.3–1.2)
BUN SERPL-MCNC: 14 MG/DL (ref 8–23)
CALCIUM SERPL-MCNC: 8.6 MG/DL (ref 8.6–10.4)
CHLORIDE SERPL-SCNC: 99 MMOL/L (ref 98–107)
CO2 SERPL-SCNC: 27 MMOL/L (ref 20–31)
CREAT SERPL-MCNC: 1 MG/DL (ref 0.5–0.9)
EKG ATRIAL RATE: 107 BPM
EKG P AXIS: 71 DEGREES
EKG P-R INTERVAL: 192 MS
EKG Q-T INTERVAL: 350 MS
EKG QRS DURATION: 122 MS
EKG QTC CALCULATION (BAZETT): 467 MS
EKG R AXIS: 20 DEGREES
EKG T AXIS: 21 DEGREES
EKG VENTRICULAR RATE: 107 BPM
EOSINOPHIL # BLD: 0.2 K/UL (ref 0–0.4)
EOSINOPHILS RELATIVE PERCENT: 4 % (ref 0–4)
ERYTHROCYTE [DISTWIDTH] IN BLOOD BY AUTOMATED COUNT: 15.6 % (ref 11.5–14.9)
FOLATE SERPL-MCNC: 17.3 NG/ML
GFR SERPL CREATININE-BSD FRML MDRD: 56 ML/MIN/1.73M2
GLUCOSE SERPL-MCNC: 108 MG/DL (ref 70–99)
HCT VFR BLD AUTO: 29.6 % (ref 36–46)
HGB BLD-MCNC: 9.8 G/DL (ref 12–16)
LYMPHOCYTES NFR BLD: 0.8 K/UL (ref 1–4.8)
LYMPHOCYTES RELATIVE PERCENT: 19 % (ref 24–44)
MCH RBC QN AUTO: 32.2 PG (ref 26–34)
MCHC RBC AUTO-ENTMCNC: 33.1 G/DL (ref 31–37)
MCV RBC AUTO: 97.3 FL (ref 80–100)
MICROORGANISM SPEC CULT: ABNORMAL
MONOCYTES NFR BLD: 0.4 K/UL (ref 0.1–1.3)
MONOCYTES NFR BLD: 9 % (ref 1–7)
NEUTROPHILS NFR BLD: 67 % (ref 36–66)
NEUTS SEG NFR BLD: 2.8 K/UL (ref 1.3–9.1)
PLATELET # BLD AUTO: 105 K/UL (ref 150–450)
PMV BLD AUTO: 9.7 FL (ref 6–12)
POTASSIUM SERPL-SCNC: 3.8 MMOL/L (ref 3.7–5.3)
PROT SERPL-MCNC: 6.3 G/DL (ref 6.4–8.3)
RBC # BLD AUTO: 3.04 M/UL (ref 4–5.2)
SODIUM SERPL-SCNC: 136 MMOL/L (ref 135–144)
SPECIMEN DESCRIPTION: ABNORMAL
VIT B12 SERPL-MCNC: 190 PG/ML (ref 232–1245)
WBC OTHER # BLD: 4.2 K/UL (ref 3.5–11)

## 2023-11-24 PROCEDURE — 80053 COMPREHEN METABOLIC PANEL: CPT

## 2023-11-24 PROCEDURE — 2580000003 HC RX 258: Performed by: INTERNAL MEDICINE

## 2023-11-24 PROCEDURE — 71046 X-RAY EXAM CHEST 2 VIEWS: CPT

## 2023-11-24 PROCEDURE — 2500000003 HC RX 250 WO HCPCS: Performed by: INTERNAL MEDICINE

## 2023-11-24 PROCEDURE — 36415 COLL VENOUS BLD VENIPUNCTURE: CPT

## 2023-11-24 PROCEDURE — 6370000000 HC RX 637 (ALT 250 FOR IP): Performed by: FAMILY MEDICINE

## 2023-11-24 PROCEDURE — 85025 COMPLETE CBC W/AUTO DIFF WBC: CPT

## 2023-11-24 PROCEDURE — 6360000002 HC RX W HCPCS: Performed by: INTERNAL MEDICINE

## 2023-11-24 PROCEDURE — 99232 SBSQ HOSP IP/OBS MODERATE 35: CPT | Performed by: INTERNAL MEDICINE

## 2023-11-24 PROCEDURE — 99233 SBSQ HOSP IP/OBS HIGH 50: CPT | Performed by: INTERNAL MEDICINE

## 2023-11-24 RX ORDER — CEFDINIR 300 MG/1
300 CAPSULE ORAL 2 TIMES DAILY
Qty: 10 CAPSULE | Refills: 0 | Status: SHIPPED | OUTPATIENT
Start: 2023-11-24 | End: 2023-11-29

## 2023-11-24 RX ORDER — LANOLIN ALCOHOL/MO/W.PET/CERES
1000 CREAM (GRAM) TOPICAL DAILY
Status: DISCONTINUED | OUTPATIENT
Start: 2023-11-24 | End: 2023-11-24 | Stop reason: HOSPADM

## 2023-11-24 RX ORDER — CYANOCOBALAMIN 1000 UG/ML
1000 INJECTION, SOLUTION INTRAMUSCULAR; SUBCUTANEOUS ONCE
Status: COMPLETED | OUTPATIENT
Start: 2023-11-24 | End: 2023-11-24

## 2023-11-24 RX ORDER — DOXYCYCLINE HYCLATE 100 MG
100 TABLET ORAL 2 TIMES DAILY
Qty: 10 TABLET | Refills: 0 | Status: SHIPPED | OUTPATIENT
Start: 2023-11-24 | End: 2023-11-29

## 2023-11-24 RX ADMIN — METOPROLOL SUCCINATE 25 MG: 25 TABLET, EXTENDED RELEASE ORAL at 08:36

## 2023-11-24 RX ADMIN — Medication 1 CAPSULE: at 08:36

## 2023-11-24 RX ADMIN — LEVOTHYROXINE SODIUM 100 MCG: 0.1 TABLET ORAL at 05:51

## 2023-11-24 RX ADMIN — DORZOLAMIDE HYDROCHLORIDE 1 DROP: 20 SOLUTION/ DROPS OPHTHALMIC at 08:37

## 2023-11-24 RX ADMIN — BUMETANIDE 1 MG: 1 TABLET ORAL at 08:36

## 2023-11-24 RX ADMIN — RIVAROXABAN 10 MG: 10 TABLET, FILM COATED ORAL at 08:36

## 2023-11-24 RX ADMIN — ONDANSETRON 4 MG: 4 TABLET, ORALLY DISINTEGRATING ORAL at 10:48

## 2023-11-24 RX ADMIN — OXYCODONE AND ACETAMINOPHEN 1 TABLET: 5; 325 TABLET ORAL at 05:46

## 2023-11-24 RX ADMIN — FERROUS SULFATE TAB 325 MG (65 MG ELEMENTAL FE) 325 MG: 325 (65 FE) TAB at 08:36

## 2023-11-24 RX ADMIN — DOXYCYCLINE 100 MG: 100 INJECTION, POWDER, LYOPHILIZED, FOR SOLUTION INTRAVENOUS at 03:26

## 2023-11-24 RX ADMIN — PANTOPRAZOLE SODIUM 40 MG: 40 TABLET, DELAYED RELEASE ORAL at 05:46

## 2023-11-24 RX ADMIN — CYANOCOBALAMIN 1000 MCG: 1000 INJECTION, SOLUTION INTRAMUSCULAR; SUBCUTANEOUS at 14:56

## 2023-11-24 ASSESSMENT — PAIN DESCRIPTION - LOCATION: LOCATION: BACK

## 2023-11-24 ASSESSMENT — PAIN SCALES - GENERAL: PAINLEVEL_OUTOF10: 9

## 2023-11-24 NOTE — DISCHARGE INSTR - COC
Continuity of Care Form    Patient Name: Luma Barker   :  1940  MRN:  [id number]    Admit date:  2023  Discharge date:  23    Code Status Order: Full Code   Advance Directives:     Admitting Physician:  Savana Telles MD  PCP: Bar Ruano MD    Discharging Nurse: Ailyn Prospecteric [facility] Unit/Room#: 2077/2077-01  Discharging Unit Phone Number: [phone]    Emergency Contact:   Extended Emergency Contact Information  Primary Emergency Contact: Caleb Lama  Address: [address] [place] [address], [address] [facility] [address] [address] Karlos Ramirez Phone: [phone]  Relation: Child  Secondary Emergency Contact: [address] [place] [address] Phone: [phone]  Relation: Child    Past Surgical History:  Past Surgical History:   Procedure Laterality Date    ARM SURGERY Right     Growth taken off per pt. CARPAL TUNNEL RELEASE Bilateral     Per pt.     CATARACT EXTRACTION EXTRACAPSULAR W/ INTRAOCULAR LENS IMPLANTATION Left 2022    [facility]    CATARACT EXTRACTION EXTRACAPSULAR W/ INTRAOCULAR LENS IMPLANTATION Right 2023    [facility]    CHOLECYSTECTOMY      COLON SURGERY      s/p sigmoid cloectomy    COLONOSCOPY N/A 2021    COLONOSCOPY DIAGNOSTIC performed by Ellie Mercado MD at [facility] ([facility])      INTRACAPSULAR CATARACT EXTRACTION Left 2022    EYE CATARACT EMULSIFICATION IOL IMPLANT performed by Lars Ware MD at [address] [facility] Right 2023    EYE CATARACT EMULSIFICATION IOL IMPLANT performed [facility]/A 3/1/2023    [facility] performed by Ellie Mercado MD at [facility] N/A 2021    SIGMOIDOSCOPY DIAGNOSTIC FLEXIBLE performed by Ellie Mercado MD at [address] [facility] Bilateral 2019    DR Tammy FLORES

## 2023-11-24 NOTE — CARE COORDINATION
DISCHARGE PLANNING NOTE:    Referral sent to [address] to follow for possible IV atb's on discharge.     Electronically signed by Leif Mayen RN on 11/24/2023 at 10:20 AM

## 2023-11-24 NOTE — PROGRESS NOTES
11/24/23 1343   Encounter Summary   Encounter Overview/Reason  Attempted Encounter   Service Provided For: Patient   Referral/Consult From: Rounding   Complexity of Encounter Low   Spiritual/Emotional needs   Type Spiritual Support   Assessment/Intervention/Outcome   Assessment Unable to assess   Intervention Prayer (assurance of)/[place]

## 2023-11-24 NOTE — PROGRESS NOTES
[facility] Internal Medicine  Huyen Presley MD; Rita Huff MD; Anival Suresh MD; MD Israel Jacobs MD; Theodora Beltrán MD    [facility] Internal Medicine   [address] [facility] [address]     HISTORY AND PHYSICAL EXAMINATION            Date:   11/24/2023  Patient name:  Yunior Esteves  Date of admission:  11/22/2023 11:52 AM  MRN:   [id number]  Account:  [de-identified]  YOB: 1940  PCP:    Kierra Avery MD  Room:   2077/2077-01  Code Status:    Full Code    Chief Complaint:     Chief Complaint   Patient presents with    Fatigue    Shortness of Breath       History Obtained From:     Patient/EMR/Bedside RN    History of Present Illness:     Yunior Esteves is a 79 y.o. Non- / non  female who presents with Fatigue and Shortness of Breath   and is admitted to the hospital for the management of Mass of lung. 79 F , with hx of Afib , CHF, HTN, HLD, DM2, admitted with lt upper, with some bronchograms, possible pneumonia, bronchogenic carcinoma cannot be excluded, oncology on board at this time, had thrombocytopenia as well possible due to underlying infection, continue antibiotics, breathing treatments    Past Medical History:     Past Medical History:   Diagnosis Date    Atrial fibrillation ([address]) 03/28/2014    Chronic back pain     with rt. leg pain    Diverticulosis     GERD (gastroesophageal reflux disease)     Hearing aid worn     willi. ears. Hearing deficit, bilateral     Hyperlipidemia     Hypertension     Hypothyroidism     Obesity (BMI 30.0-34.9) 08/12/2016    Onychomycosis     PE (pulmonary embolism)     H/O DVT of rt leg    Poor venous access     Type II or unspecified type diabetes mellitus without mention of complication, not stated as uncontrolled         Past Surgical History:     Past Surgical History:   Procedure Laterality Date    ARM SURGERY Right     Growth taken off per pt.     CARPAL TUNNEL RELEASE Gurvinder Solorio MD   pantoprazole (PROTONIX) 40 MG tablet Take 1 tablet by mouth every morning (before breakfast) 10/20/23   Analy Yepze APRN - NP   levothyroxine (SYNTHROID) 100 MCG tablet TAKE 1 TABLET BY MOUTH EVERY DAY 10/9/23   Carly Moreira MD   ondansetron (ZOFRAN-ODT) 4 MG disintegrating tablet Take 1 tablet by mouth 3 times daily as needed for Nausea or Vomiting 10/2/23   Penny Mena MD   metoprolol succinate (TOPROL XL) 25 MG extended release tablet TAKE 1 TABLET BY MOUTH EVERY DAY 8/28/23   Carly Moreira MD   estradiol (ESTRACE VAGINAL) 0.1 MG/GM vaginal cream Place 1 g vaginally daily Apply 1 gm vaginally twice weekly 7/17/23   Shannon Beltrán MD   FEROSUL 325 (65 Fe) MG tablet TAKE 1 TABLET BY MOUTH IN THE MORNING WITH FOOD 7/11/23   Carly Moreira MD   estradiol (ESTRACE VAGINAL) 0.1 MG/GM vaginal cream Place 1 g vaginally daily Apply 1 g vaginally daily for 2 weeks and then twice a week thereafter 4/3/23   Shannon Beltrán MD   rivaroxaban (XARELTO) 10 MG TABS tablet Take 1 tablet by mouth    Santhosh Martinez MD   midodrine (PROAMATINE) 2.5 MG tablet Take 1 tablet by mouth 3 times daily as needed (low SBP) 8/11/22   Evon CA MD   simethicone (MYLICON) 80 MG chewable tablet Take 1 tablet by mouth every 6 hours as needed for Flatulence    Santhosh Martinez MD   Probiotic Product (PROBIOTIC DAILY) CAPS Take 1 capsule by mouth daily  Patient not taking: Reported on 11/22/2023    Santhosh Martinez MD   atenolol (TENORMIN) 100 MG tablet TAKE 1 TABLET BY MOUTH EVERY DAY 7/11/22 8/11/22  Carly Moreira MD   acetaminophen (TYLENOL) 500 MG tablet Take 1 tablet by mouth at bedtime    Santhosh Martinez MD   dorzolamide (TRUSOPT) 2 % ophthalmic solution Place 1 drop into both eyes 2 times daily 8am and 8pm 11/9/18   Santhosh Martinez MD   latanoprost (XALATAN) 0.005 % ophthalmic solution Place 1 drop into both eyes nightly 1230am    Provider,

## 2023-11-24 NOTE — PROGRESS NOTES
completion of antibiotics in 6 weeks to rule out underlying malignancy  Mild thrombocytopenia could be related to bone marrow suppression from acute infection. Now platelets getting better  Continue antibiotics as per primary team  Okay for discharge   We will follow  Discussed with primary attending     Discussed with patient and Nurse. Thank you for asking us to see this patient. Baudilio Morales MD  Hematologist/Medical Oncologist      This note is created with the assistance of a speech recognition program.  While intending to generate a document that actually reflects the content of the visit, the document can still have some errors including those of syntax and sound a like substitutions which may escape proof reading. It such instances, actual meaning can be extrapolated by contextual diversion.

## 2023-11-24 NOTE — PLAN OF CARE
Problem: Safety - Adult  Goal: Free from fall injury  Outcome: Progressing  Note: Patient remains free of falls and injuries throughout shift. Bed remains in the lowest position, wheels locked, call light and bedside table are within reach. Problem: Pain  Goal: Verbalizes/displays adequate comfort level or baseline comfort level  Outcome: Progressing  Note: Assess the location, characteristics, onset, duration, frequency, quality, and severity of pain. Encourage immediate report of pain. Use appropriate pain scale to rate pain. Manage pain using nonpharmacologic/pharmacologic interventions.

## 2023-11-24 NOTE — CARE COORDINATION
ONGOING DISCHARGE PLAN:    Patient is alert and oriented x4. Spoke with patient regarding discharge plan. Explained to patient that she may need IV atb's on discharge. She states she does not have any teachable caregivers and would need to go to SNF. Pt is agreeable to [address] [place] [address]. States she has been there before and was happy with their care. Plan was otherwise home with S - Alva Fonseca. Since talking with patient, urine culture is back and does not show ESBL. Pt may not need IV atb's on discharge after all. Informed clin lead, Kayley Phipps, of this. [facility] as pt will likely be able to go on oral atb's and could then go home. Will continue to follow for additional discharge needs. If patient is discharged prior to next notation, then this note serves as note for discharge by case management. Electronically signed by Simran Bennett RN on 11/24/2023 at 11:44 AM    Pt will be discharged on PO Omnicef. Informed pt that she will not need IV atb's. She will now be able to go home. Spoke with Ирина Rosado from [address] [place] [address] who states pt has been accepted for [facility]. Informed Ирина Rosado that pt will be discharged home today. He will pull [facility] and d/c med list from [address] [place] [address].     Electronically signed by Simran Bennett RN on 11/24/2023 at 12:37 PM

## 2023-11-27 ENCOUNTER — HOSPITAL ENCOUNTER (OUTPATIENT)
Age: 83
Discharge: HOME | End: 2023-11-27
Payer: MEDICARE

## 2023-11-27 ENCOUNTER — CARE COORDINATION (OUTPATIENT)
Dept: CASE MANAGEMENT | Age: 83
End: 2023-11-27

## 2023-11-27 DIAGNOSIS — D69.6 THROMBOCYTOPENIA (HCC): ICD-10-CM

## 2023-11-27 DIAGNOSIS — R91.8 MASS OF LUNG: ICD-10-CM

## 2023-11-27 DIAGNOSIS — J18.9 PNEUMONIA OF LEFT UPPER LOBE DUE TO INFECTIOUS ORGANISM: Primary | ICD-10-CM

## 2023-11-27 LAB
BASOPHILS # BLD: 0.1 K/UL (ref 0–0.2)
BASOPHILS NFR BLD: 1 % (ref 0–2)
EOSINOPHIL # BLD: 0.2 K/UL (ref 0–0.4)
EOSINOPHILS RELATIVE PERCENT: 3 % (ref 0–4)
ERYTHROCYTE [DISTWIDTH] IN BLOOD BY AUTOMATED COUNT: 15.7 % (ref 11.5–14.9)
HCT VFR BLD AUTO: 31.6 % (ref 36–46)
HGB BLD-MCNC: 10.1 G/DL (ref 12–16)
LYMPHOCYTES NFR BLD: 1.2 K/UL (ref 1–4.8)
LYMPHOCYTES RELATIVE PERCENT: 21 % (ref 24–44)
MCH RBC QN AUTO: 31.2 PG (ref 26–34)
MCHC RBC AUTO-ENTMCNC: 32.1 G/DL (ref 31–37)
MCV RBC AUTO: 97.1 FL (ref 80–100)
MONOCYTES NFR BLD: 0.5 K/UL (ref 0.1–1.3)
MONOCYTES NFR BLD: 9 % (ref 1–7)
NEUTROPHILS NFR BLD: 66 % (ref 36–66)
NEUTS SEG NFR BLD: 3.8 K/UL (ref 1.3–9.1)
PATH REV BLD -IMP: NORMAL
PLATELET # BLD AUTO: 205 K/UL (ref 150–450)
PMV BLD AUTO: 9.1 FL (ref 6–12)
RBC # BLD AUTO: 3.25 M/UL (ref 4–5.2)
SURGICAL PATHOLOGY REPORT: NORMAL
WBC OTHER # BLD: 5.8 K/UL (ref 3.5–11)

## 2023-11-27 PROCEDURE — 1111F DSCHRG MED/CURRENT MED MERGE: CPT | Performed by: FAMILY MEDICINE

## 2023-11-27 PROCEDURE — 36415 COLL VENOUS BLD VENIPUNCTURE: CPT

## 2023-11-27 PROCEDURE — 85025 COMPLETE CBC W/AUTO DIFF WBC: CPT

## 2023-11-27 NOTE — PROGRESS NOTES
Physician Progress Note      PATIENT:               Prudence Ken  CSN #:                  [id number]  :                       1940  ADMIT DATE:       2023 11:52 AM  [address] [place] [address] DATE:        2023 6:04 PM  RESPONDING  PROVIDER #:        Leyla Azar MD          QUERY TEXT:    Dear attending,    Pt noted to have documentation of pneumonia and also has Urine culture growing   gram-negative rods. The patient has elevated neutrophils and lactic acid   level of 2.2 upon admission and is on IVABX. If possible, please document in progress notes and discharge summary the   present on admission status of: The medical record reflects the following:  Risk Factors :Hx.  ESBL, patient with  pna , + urine cultures    Clinical Indicators: - lactic acid 2.2, WBC 12.1, neutrophils 86  Per H&P-79year-old female placed in admission status with left upper lobe   pneumonia, mass  -Per PN-Urine culture growing gram-negative rods    Treatment: Monitor vital signs, labwork, imaging, IV Rocephin, IV Doxycycline    Thank you,  Bren Ley RN, BSN  Clinical documentation Improvement  [phone]  Options provided:  -- sepsis, POA  -- No sepsis  -- Other - I will add my own diagnosis  -- Disagree - Not applicable / Not valid  -- Disagree - Clinically unable to determine / Unknown  -- Refer to Clinical Documentation Reviewer    PROVIDER RESPONSE TEXT:    the patient has sepsis, POA    Query created by: Jessie Maria on 2023 11:54 AM      Electronically signed by:  Leyla Azar MD 2023 12:36 PM

## 2023-11-27 NOTE — TELEPHONE ENCOUNTER
Spoke with the patient. She is following with Dr. Chris Philip (Pulmonary) on 12-11-23. Taking her medications, causing some diarrhea, she is using probiotics which helps. She will call us if she has any further issues.

## 2023-11-27 NOTE — CARE COORDINATION
Care Transitions Initial Follow Up Call    Call within 2 business days of discharge: Yes    Patient Current Location:  Home: [address] [address]    LPN Care Coordinator contacted the patient by telephone to perform post hospital discharge assessment. Verified name and  with patient as identifiers. Provided introduction to self, and explanation of the LPN Care Coordinator role. Patient: Sophia Stanton Patient : 1940   MRN: [id number]  Reason for Admission: Fatigue Shortness of breath , pneumonia of left upper lobe due to infectious organism   Discharge Date: 23 RARS: Readmission Risk Score: 16.8      Last Discharge Facility       Date Complaint Diagnosis Description Type Department Provider    23 Fatigue; Shortness of Breath Pneumonia of left upper lobe due to infectious organism . .. ED to Hosp-Admission (Discharged) (ADMITTED)  LARRY Benedict MD; Gini Simmons, ... Was this an external facility discharge? No Discharge Facility: [facility] to be reviewed by the provider   Additional needs identified to be addressed with provider: Yes  [facility] would like to know if you would follow this patient for home care? Method of communication with provider: chart routing. Transitions of Care Initial Call:  Explained the role of Care Transition Nurse and the Transition program, patient is agreeable to follow up calls Post discharge from the [address] [place] [address] to [place] she reports that she has no worsening sob of breath . States she is  feeling pretty good. She has taken a shower and had breakfast. She is on cefdinir 300 mg  bid x7 days amd doxycycline 100 mg bid x 5 days. She denies fever or chills. She denies HA , dizziness ,nausea, vomiting. She does have diarrhea. States it's because of the antibiotics . Reminded to stay hydrated. She states I have medication for diarrhea.      Writer called [facility] they are

## 2023-11-28 LAB
ALBUMIN PERCENT: 54 % (ref 45–65)
ALBUMIN SERPL-MCNC: 3.1 G/DL (ref 3.2–5.2)
ALPHA 2 PERCENT: 14 % (ref 6–13)
ALPHA1 GLOB SERPL ELPH-MCNC: 0.3 G/DL (ref 0.1–0.4)
ALPHA1 GLOB SERPL ELPH-MCNC: 5 % (ref 3–6)
ALPHA2 GLOB SERPL ELPH-MCNC: 0.8 G/DL (ref 0.5–0.9)
B-GLOBULIN SERPL ELPH-MCNC: 0.8 G/DL (ref 0.5–1.1)
B-GLOBULIN SERPL ELPH-MCNC: 13 % (ref 11–19)
EKG ATRIAL RATE: 107 BPM
EKG P AXIS: 71 DEGREES
EKG P-R INTERVAL: 192 MS
EKG Q-T INTERVAL: 350 MS
EKG QRS DURATION: 122 MS
EKG QTC CALCULATION (BAZETT): 467 MS
EKG R AXIS: 20 DEGREES
EKG T AXIS: 21 DEGREES
EKG VENTRICULAR RATE: 107 BPM
GAMMA GLOB SERPL ELPH-MCNC: 0.8 G/DL (ref 0.5–1.5)
GAMMA GLOBULIN %: 14 % (ref 9–20)
INTERPRETATION SERPL IFE-IMP: NORMAL
PATH REV: NORMAL
PATHOLOGIST: ABNORMAL
PROT PATTERN SERPL ELPH-IMP: ABNORMAL
PROT SERPL-MCNC: 5.7 G/DL (ref 6.4–8.3)
TOTAL PROT. SUM,%: 100 % (ref 98–102)
TOTAL PROT. SUM: 5.8 G/DL (ref 6.3–8.2)

## 2023-11-28 NOTE — DISCHARGE SUMMARY
antibiotics community-acquired pneumonia and repeat CT in 4 to 6 weeks  If mass persists will need biopsy    Significant therapeutic interventions:     Significant Diagnostic Studies:   Labs / Micro:        Radiology:    XR CHEST (2 VW)    Result Date: 11/24/2023  EXAMINATION: TWO XRAY VIEWS OF THE CHEST 11/24/2023 9:52 am COMPARISON: 22 November 2023 HISTORY: ORDERING SYSTEM PROVIDED HISTORY: pneumonia TECHNOLOGIST PROVIDED HISTORY: pneumonia Reason for Exam: F/u pneumonia FINDINGS: Two-view chest time stamped at 955 hours is submitted. Again, the patient has a rounded left upper lung field consolidation similar to prior study although slightly less opaque. No cardiomegaly, vascular congestion, effusion or extrapleural air is noted. Lobular masslike consolidation posterior aspect of the left upper lung field, unchanged. This appears to be in the superior segment of the left lower lobe. RECOMMENDATION: Continued radiographic monitoring advised. CT CHEST WO CONTRAST    Result Date: 11/22/2023  EXAMINATION: CT OF THE CHEST WITHOUT CONTRAST 11/22/2023 2:17 pm TECHNIQUE: CT of the chest was performed without the administration of intravenous contrast. Multiplanar reformatted images are provided for review. Automated exposure control, iterative reconstruction, and/or weight based adjustment of the mA/kV was utilized to reduce the radiation dose to as low as reasonably achievable. COMPARISON: Chest x-ray 11/22/2023 and CT scan chest 08/10/2022 HISTORY: ORDERING SYSTEM PROVIDED HISTORY: lung mass TECHNOLOGIST PROVIDED HISTORY: lung mass Decision Support Exception - unselect if not a suspected or confirmed emergency medical condition->Emergency Medical Condition (MA) Reason for Exam: lung mass, Fatigue; Shortness of Breath FINDINGS: Mediastinum: There are few less than 1 cm mediastinal lymph nodes but no lymphadenopathy.   This is difficult to evaluate without intravenous contrast. The thoracic aorta is not

## 2023-11-30 DIAGNOSIS — I10 ESSENTIAL HYPERTENSION: ICD-10-CM

## 2023-11-30 RX ORDER — METOPROLOL SUCCINATE 25 MG/1
TABLET, EXTENDED RELEASE ORAL
Qty: 90 TABLET | Refills: 1 | Status: SHIPPED | OUTPATIENT
Start: 2023-11-30

## 2023-12-05 ENCOUNTER — CARE COORDINATION (OUTPATIENT)
Dept: CASE MANAGEMENT | Age: 83
End: 2023-12-05

## 2023-12-05 NOTE — CARE COORDINATION
Care Transitions Follow Up Call        Patient: Shelley Jean  Patient : 1940   MRN: [id number]  Reason for Admission: Fatigue Shortness of breath , pneumonia of left upper lobe due to infectious organism   Discharge Date: 23 RARS: Readmission Risk Score: 16.8      Needs to be reviewed by the provider   Additional needs identified to be addressed with provider: No  none             Method of communication with provider: none. 1st attempt to reach patient for Care Transitions. No answer and no voice mail. Will attempt to contact at a later date/time.       Follow Up  Future Appointments   Date Time Provider [address]   2024  2:30 PM Ramses Braun  [facility]   2024  2:10 PM Renita Porter MD [facility]   2024  1:30 PM Lauren Sidhu MD [facility] [address] [facility]   3/19/2024  1:00 PM Adelaida Perez MD [address] [facility] Transitions Subsequent and Final Call    Subsequent and Final Calls  Are you currently active with any services?: Home Health  Care Transitions Interventions    Pharmacist: Completed       Other Services: Completed (Comment: Jessica Mckinney referral)     Registered Dietician: Completed                            Other Interventions:             Plan for next call: symptom Kylie Frazier RN

## 2023-12-06 ENCOUNTER — CARE COORDINATION (OUTPATIENT)
Dept: CASE MANAGEMENT | Age: 83
End: 2023-12-06

## 2023-12-06 NOTE — CARE COORDINATION
Care Transitions Follow Up Call    Patient Current Location:  Home: [address] [place] [address]    Care Transition Nurse contacted the patient by telephone to follow up after admission on 23. Verified name and  with patient as identifiers. Patient: Yunior Estvees  Patient : 1940   MRN: [id number]  Reason for Admission: Fatigue Shortness of breath , pneumonia of left upper lobe due to infectious organism   Discharge Date: 23 RARS: Readmission Risk Score: 16.8      Needs to be reviewed by the provider   Additional needs identified to be addressed with provider: No  none             Method of communication with provider: none. Ruth Em returned call. She said that she was slowly getting better. She said that she will has some shortness of breath when ambulating through 2 rooms, has a dry cough, no fever/chills, chest pain, dizziness/lightheadedness or swelling. She said that nursing and therapy have been out. She did go to her pulmonologist appointment yesterday. No new medications were ordered, but will be having a CT scan next month. She had no further questions/concerns. Addressed changes since last contact:  none  Discussed follow-up appointments. If no appointment was previously scheduled, appointment scheduling offered: Yes. Is follow up appointment scheduled within 7 days of discharge?  No.    Follow Up  Future Appointments   Date Time Provider [address]   2024  2:00 PM [facility] CT RM 2 FAST SCANNER STCZ CT SCAN [facility] Radiolog   2024  2:30 PM Bradly Monroy MD [facility] CANCER [facility]   2024  2:10 PM Iraj Arreaga MD [facility]   2024  1:30 PM Giuliana Sidhu MD [facility] GI [facility]   3/19/2024  1:00 PM Kierra Avery MD [facility] [address]     External follow up appointment(s): no    Care Transition Nurse reviewed medical action plan with patient and discussed any barriers to care and/or understanding of plan of care after

## 2023-12-12 DIAGNOSIS — I10 ESSENTIAL HYPERTENSION: ICD-10-CM

## 2023-12-12 RX ORDER — BUMETANIDE 1 MG/1
TABLET ORAL
Qty: 30 TABLET | Refills: 0 | Status: SHIPPED | OUTPATIENT
Start: 2023-12-12 | End: 2024-01-16

## 2023-12-12 NOTE — TELEPHONE ENCOUNTER
Refill - bumetanide  Last filled - 11/7/23  [facility]  Last appt - 8/4/23, next appt - 3/19/24

## 2023-12-13 ENCOUNTER — CARE COORDINATION (OUTPATIENT)
Dept: CASE MANAGEMENT | Age: 83
End: 2023-12-13

## 2023-12-13 NOTE — CARE COORDINATION
Care Transitions Follow Up Call    Patient Current Location:  Home: [address] [place] [address]    Care Transition Nurse contacted the patient by telephone to follow up after admission on 23. Verified name and  with patient as identifiers. Patient: Doris Velásquez  Patient : 1940   MRN: [id number]  Reason for Admission: Fatigue Shortness of breath , pneumonia of left upper lobe due to infectious organism   Discharge Date: 23 RARS: Readmission Risk Score: 16.8      Needs to be reviewed by the provider   Additional needs identified to be addressed with provider: No  none             Method of communication with provider: none. Was able to contact [facility] for transitional outreach. She stated that she was getting \"better\". She said that she is not short of breath if she ambulates slowly. She said that she has an occasional dry cough, no fever/chills, no chest pain and is staying hydrated. Home care continues. She had no further questions or concerns. Addressed changes since last contact:  none  Discussed follow-up appointments. If no appointment was previously scheduled, appointment scheduling offered: Yes. Is follow up appointment scheduled within 7 days of discharge? No.    Follow Up  Future Appointments   Date Time Provider [address]   2024  2:00  [facility] CT RM 2 FAST SCANNER STCZ CT SCAN [facility] Radiolog   2024  2:30 PM Frankie Salamanca [facility]   2024  2:10 PM Susan Brunson MD [address] [facility]   2024  1:30 PM [facility]   3/19/2024  1:00 PM Aarti Satnley  [address] Transition Nurse reviewed medical action plan with patient and discussed any barriers to care and/or understanding of plan of care after discharge. Discussed appropriate site of care based on symptoms and resources available to patient including: PCP  Specialist  Home health  When to call 911.  The patient

## 2024-01-08 ENCOUNTER — HOSPITAL ENCOUNTER (OUTPATIENT)
Dept: CT IMAGING | Age: 84
Discharge: HOME | End: 2024-01-10
Payer: MEDICARE

## 2024-01-08 DIAGNOSIS — R91.8 LUNG MASS: ICD-10-CM

## 2024-01-08 PROCEDURE — 71250 CT THORAX DX C-: CPT

## 2024-01-16 ENCOUNTER — OFFICE VISIT (OUTPATIENT)
Dept: ONCOLOGY | Age: 84
End: 2024-01-16
Payer: MEDICARE

## 2024-01-16 ENCOUNTER — TELEPHONE (OUTPATIENT)
Dept: ONCOLOGY | Age: 84
End: 2024-01-16

## 2024-01-16 ENCOUNTER — HOSPITAL ENCOUNTER (OUTPATIENT)
Age: 84
Discharge: HOME | End: 2024-01-16
Payer: MEDICARE

## 2024-01-16 VITALS
TEMPERATURE: 98.5 F | BODY MASS INDEX: 29.57 KG/M2 | DIASTOLIC BLOOD PRESSURE: 82 MMHG | HEART RATE: 108 BPM | WEIGHT: 177.7 LBS | SYSTOLIC BLOOD PRESSURE: 157 MMHG

## 2024-01-16 DIAGNOSIS — I10 ESSENTIAL HYPERTENSION: ICD-10-CM

## 2024-01-16 DIAGNOSIS — D50.9 IRON DEFICIENCY ANEMIA, UNSPECIFIED IRON DEFICIENCY ANEMIA TYPE: ICD-10-CM

## 2024-01-16 DIAGNOSIS — D50.9 IRON DEFICIENCY ANEMIA, UNSPECIFIED IRON DEFICIENCY ANEMIA TYPE: Primary | ICD-10-CM

## 2024-01-16 LAB
BASOPHILS # BLD: 0.1 K/UL (ref 0–0.2)
BASOPHILS NFR BLD: 1 % (ref 0–2)
EOSINOPHIL # BLD: 0.1 K/UL (ref 0–0.4)
EOSINOPHILS RELATIVE PERCENT: 3 % (ref 1–4)
ERYTHROCYTE [DISTWIDTH] IN BLOOD BY AUTOMATED COUNT: 15.4 % (ref 12.5–15.4)
FERRITIN SERPL-MCNC: 49 NG/ML (ref 13–150)
HCT VFR BLD AUTO: 33.8 % (ref 36–46)
HGB BLD-MCNC: 11.2 G/DL (ref 12–16)
IRON SATN MFR SERPL: 67 % (ref 20–55)
IRON SERPL-MCNC: 226 UG/DL (ref 37–145)
LYMPHOCYTES NFR BLD: 1.3 K/UL (ref 1–4.8)
LYMPHOCYTES RELATIVE PERCENT: 29 % (ref 24–44)
MCH RBC QN AUTO: 31.8 PG (ref 26–34)
MCHC RBC AUTO-ENTMCNC: 33 G/DL (ref 31–37)
MCV RBC AUTO: 96.3 FL (ref 80–100)
MONOCYTES NFR BLD: 0.5 K/UL (ref 0.1–1.2)
MONOCYTES NFR BLD: 10 % (ref 2–11)
NEUTROPHILS NFR BLD: 57 % (ref 36–66)
NEUTS SEG NFR BLD: 2.5 K/UL (ref 1.8–7.7)
PLATELET # BLD AUTO: 131 K/UL (ref 140–450)
PMV BLD AUTO: 9.3 FL (ref 6–12)
RBC # BLD AUTO: 3.51 M/UL (ref 4–5.2)
TIBC SERPL-MCNC: 335 UG/DL (ref 250–450)
UNSATURATED IRON BINDING CAPACITY: 109 UG/DL (ref 112–347)
WBC OTHER # BLD: 4.4 K/UL (ref 3.5–11)

## 2024-01-16 PROCEDURE — 85025 COMPLETE CBC W/AUTO DIFF WBC: CPT

## 2024-01-16 PROCEDURE — 83550 IRON BINDING TEST: CPT

## 2024-01-16 PROCEDURE — 83540 ASSAY OF IRON: CPT

## 2024-01-16 PROCEDURE — G8417 CALC BMI ABV UP PARAM F/U: HCPCS | Performed by: INTERNAL MEDICINE

## 2024-01-16 PROCEDURE — 3077F SYST BP >= 140 MM HG: CPT | Performed by: INTERNAL MEDICINE

## 2024-01-16 PROCEDURE — 99211 OFF/OP EST MAY X REQ PHY/QHP: CPT | Performed by: INTERNAL MEDICINE

## 2024-01-16 PROCEDURE — 82607 VITAMIN B-12: CPT

## 2024-01-16 PROCEDURE — 3079F DIAST BP 80-89 MM HG: CPT | Performed by: INTERNAL MEDICINE

## 2024-01-16 PROCEDURE — 36415 COLL VENOUS BLD VENIPUNCTURE: CPT

## 2024-01-16 PROCEDURE — 1036F TOBACCO NON-USER: CPT | Performed by: INTERNAL MEDICINE

## 2024-01-16 PROCEDURE — 1123F ACP DISCUSS/DSCN MKR DOCD: CPT | Performed by: INTERNAL MEDICINE

## 2024-01-16 PROCEDURE — 1090F PRES/ABSN URINE INCON ASSESS: CPT | Performed by: INTERNAL MEDICINE

## 2024-01-16 PROCEDURE — 99214 OFFICE O/P EST MOD 30 MIN: CPT | Performed by: INTERNAL MEDICINE

## 2024-01-16 PROCEDURE — G8400 PT W/DXA NO RESULTS DOC: HCPCS | Performed by: INTERNAL MEDICINE

## 2024-01-16 PROCEDURE — G8427 DOCREV CUR MEDS BY ELIG CLIN: HCPCS | Performed by: INTERNAL MEDICINE

## 2024-01-16 PROCEDURE — 82728 ASSAY OF FERRITIN: CPT

## 2024-01-16 PROCEDURE — 82746 ASSAY OF FOLIC ACID SERUM: CPT

## 2024-01-16 PROCEDURE — G8484 FLU IMMUNIZE NO ADMIN: HCPCS | Performed by: INTERNAL MEDICINE

## 2024-01-16 RX ORDER — BUMETANIDE 1 MG/1
TABLET ORAL
Qty: 30 TABLET | Refills: 0 | Status: SHIPPED | OUTPATIENT
Start: 2024-01-16

## 2024-01-16 NOTE — PROGRESS NOTES
Patient ID: Genie Lama, 1940, [id number], 83 y.o.  Referred by : Reinier Day MD   Reason for consultation:   Anemia  MGUS  HISTORY OF PRESENT ILLNESS:    Hematology History:    Genie Lama is a 83 y.o. female with remote history of pulmonary embolism and DVT, history of atrial fibrillation, chronic anemia and history of GI bleed was seen during initial consultation visit for anemia.    She has history of chronic anemia and GI bleed in the past with severe iron deficiency.  She has been taking iron pill once daily for past 4 to 5 months.  She has had GI work-up with Dr Bertrand.  She also has a history of atrial fibrillation and on anticoagulation with Xarelto.    She report remote history of pulmonary embolism and DVT.  It is unclear if this was provoked or not.    She reports an episode of rectal bleeding about 2 weeks ago.  Denies any chest pain shortness of breath, unintentional weight loss drenching night sweats fever chills.    Her hemoglobin in August was around 9.4.  Most recent hemoglobin is 10.  6 on 3/18/2023.  Her WBC and platelets are within normal limits.  Her lab work did reveal mild monocytosis.  Her iron studies indicated iron deficiency back in 2021 however her recent iron levels in February are within normal limits.  She does have mildly low normal B12 levels.    INTERVAL HISTORY:  Patient is returning for follow-up visit and to discuss lab results, imaging studies and further recommendations.  She was recently admitted to hospital around [facility] with pneumonia.  She was noted to have large opacity in the left lung and was treated for pneumonia with antibiotics.  She had a follow-up CT scan on 1/10 which showed complete resolution of the left lower opacity.    She denies any bleeding symptoms.  She is taking iron pill once daily.  She denies any chest pain shortness of breath.        During this visit patient's allergy, social, medical, surgical history and medications

## 2024-01-16 NOTE — TELEPHONE ENCOUNTER
AVS from 1/16/24      Labs today   RTC in 3 months with same labs          *labs today  *rv is sched for 4/16/24@330 w labs 1 week prior      PT was given AVS and appt schedule

## 2024-01-17 LAB
FOLATE SERPL-MCNC: 13.1 NG/ML
VIT B12 SERPL-MCNC: 322 PG/ML (ref 232–1245)

## 2024-01-22 ENCOUNTER — OFFICE VISIT (OUTPATIENT)
Dept: UROLOGY | Age: 84
End: 2024-01-22
Payer: MEDICARE

## 2024-01-22 VITALS
SYSTOLIC BLOOD PRESSURE: 134 MMHG | HEIGHT: 65 IN | BODY MASS INDEX: 28.32 KG/M2 | HEART RATE: 97 BPM | TEMPERATURE: 98 F | DIASTOLIC BLOOD PRESSURE: 82 MMHG | OXYGEN SATURATION: 97 % | WEIGHT: 170 LBS

## 2024-01-22 DIAGNOSIS — R35.0 FREQUENCY OF URINATION: ICD-10-CM

## 2024-01-22 DIAGNOSIS — N39.0 RECURRENT UTI: Primary | ICD-10-CM

## 2024-01-22 DIAGNOSIS — N95.2 ATROPHIC VAGINITIS: ICD-10-CM

## 2024-01-22 PROCEDURE — 1036F TOBACCO NON-USER: CPT | Performed by: UROLOGY

## 2024-01-22 PROCEDURE — G8400 PT W/DXA NO RESULTS DOC: HCPCS | Performed by: UROLOGY

## 2024-01-22 PROCEDURE — 99214 OFFICE O/P EST MOD 30 MIN: CPT | Performed by: UROLOGY

## 2024-01-22 PROCEDURE — G8427 DOCREV CUR MEDS BY ELIG CLIN: HCPCS | Performed by: UROLOGY

## 2024-01-22 PROCEDURE — 3079F DIAST BP 80-89 MM HG: CPT | Performed by: UROLOGY

## 2024-01-22 PROCEDURE — 1123F ACP DISCUSS/DSCN MKR DOCD: CPT | Performed by: UROLOGY

## 2024-01-22 PROCEDURE — 1090F PRES/ABSN URINE INCON ASSESS: CPT | Performed by: UROLOGY

## 2024-01-22 PROCEDURE — G8417 CALC BMI ABV UP PARAM F/U: HCPCS | Performed by: UROLOGY

## 2024-01-22 PROCEDURE — G8484 FLU IMMUNIZE NO ADMIN: HCPCS | Performed by: UROLOGY

## 2024-01-22 PROCEDURE — 3075F SYST BP GE 130 - 139MM HG: CPT | Performed by: UROLOGY

## 2024-01-22 ASSESSMENT — ENCOUNTER SYMPTOMS
EYE PAIN: 0
VOMITING: 0
SHORTNESS OF BREATH: 0
COUGH: 0
EYES NEGATIVE: 1
ABDOMINAL PAIN: 0
BACK PAIN: 0
ALLERGIC/IMMUNOLOGIC NEGATIVE: 1
RESPIRATORY NEGATIVE: 1
WHEEZING: 0
NAUSEA: 0
GASTROINTESTINAL NEGATIVE: 1

## 2024-01-22 NOTE — PROGRESS NOTES
Review of Systems   Constitutional: Negative.  Negative for activity change, chills and fever.   HENT: Negative.     Eyes: Negative.  Negative for pain, redness and visual disturbance.   Respiratory: Negative.  Negative for cough, shortness of breath and wheezing.    Cardiovascular: Negative.  Negative for chest pain and leg swelling.   Gastrointestinal: Negative.  Negative for abdominal pain, nausea and vomiting.   Endocrine: Negative.    Genitourinary: Negative.  Negative for difficulty urinating, dysuria, enuresis, flank pain, frequency, hematuria and urgency.   Musculoskeletal: Negative.  Negative for back pain, joint swelling and myalgias.   Skin: Negative.  Negative for rash and wound.   Allergic/Immunologic: Negative.    Neurological: Negative.  Negative for dizziness, tremors and numbness.   Hematological: Negative.  Does not bruise/bleed easily.   Psychiatric/Behavioral: Negative.

## 2024-01-22 NOTE — PROGRESS NOTES
[facility]  [address]  [facility] [address]  Dept: [phone]    [facility] Office Note - Established    Patient:  Genie Lama  YOB: 1940  Date: 1/22/2024    The patient is a 83 y.o. female whopresents today for evaluation of the following problems:   Chief Complaint   Patient presents with    Frequent/Recurrent UTI       HPI  This is an 83-year-old female who has a history of recurrent UTIs.  She was previously using vaginal estrogen cream but stopped using this because she found it to be messy.  She did have 1 UTI last month.  She has otherwise been doing fairly well.  He does also have urge incontinence but does not wish to have this treated.  This does cause irritation to her groin creases.    Summary of old records: N/A    Additional History: N/A    Procedures Today: N/A    Urinalysis today:  No results found for this visit on 01/22/24.    Imaging Reviewed during this Office Visit: none  (results were independently reviewed by physician and radiology report verified)    AUA Symptom Score (1/22/2024):  INCOMPLETE EMPTYING: How often have you had the sensation of not emptying your bladder?: Not at all  FREQUENCY: How often do you have to urinate less than every two hours?: More than Half the time  INTERMITTENCY: How often have you found you stopped and started again several times when you urinated?: Not at all  URGENCY: How often have you found it difficult to postpone urination?: Almost always  WEAK STREAM: How often have you had a weak urinary stream?: More than Half the time  STRAINING: How often have you had to strain to start  urination?: Not at all  NOCTURIA: How many times did you typically get up at night to uriniate?: NONE  TOTAL I-PSS SCORE:: 13       Last BUN and creatinine:  Lab Results   Component Value Date    BUN 14 11/24/2023     Lab Results   Component Value Date    CREATININE 1.0

## 2024-02-01 RX ORDER — LEVOTHYROXINE SODIUM 0.1 MG/1
TABLET ORAL
Qty: 90 TABLET | Refills: 0 | Status: SHIPPED | OUTPATIENT
Start: 2024-02-01

## 2024-02-20 DIAGNOSIS — I10 ESSENTIAL HYPERTENSION: ICD-10-CM

## 2024-02-20 RX ORDER — BUMETANIDE 1 MG/1
TABLET ORAL
Qty: 30 TABLET | Refills: 0 | Status: SHIPPED | OUTPATIENT
Start: 2024-02-20

## 2024-02-27 ENCOUNTER — OFFICE VISIT (OUTPATIENT)
Dept: GASTROENTEROLOGY | Age: 84
End: 2024-02-27
Payer: MEDICARE

## 2024-02-27 VITALS
DIASTOLIC BLOOD PRESSURE: 76 MMHG | HEIGHT: 65 IN | SYSTOLIC BLOOD PRESSURE: 142 MMHG | WEIGHT: 175 LBS | TEMPERATURE: 97.8 F | HEART RATE: 72 BPM | BODY MASS INDEX: 29.16 KG/M2

## 2024-02-27 DIAGNOSIS — R19.7 DIARRHEA, UNSPECIFIED TYPE: Primary | ICD-10-CM

## 2024-02-27 DIAGNOSIS — K21.9 GASTROESOPHAGEAL REFLUX DISEASE WITHOUT ESOPHAGITIS: ICD-10-CM

## 2024-02-27 PROBLEM — I45.10 RIGHT BUNDLE BRANCH BLOCK (RBBB): Status: ACTIVE | Noted: 2021-04-30

## 2024-02-27 PROBLEM — I51.9 LEFT VENTRICULAR DIASTOLIC DYSFUNCTION: Status: ACTIVE | Noted: 2018-08-02

## 2024-02-27 PROBLEM — I34.0 MILD MITRAL REGURGITATION: Status: ACTIVE | Noted: 2018-08-02

## 2024-02-27 PROBLEM — Z86.711 HX PULMONARY EMBOLISM: Status: ACTIVE | Noted: 2024-02-27

## 2024-02-27 PROBLEM — I51.7 LVH (LEFT VENTRICULAR HYPERTROPHY): Status: ACTIVE | Noted: 2022-01-10

## 2024-02-27 PROBLEM — I44.0 FIRST DEGREE AV BLOCK: Status: ACTIVE | Noted: 2020-09-01

## 2024-02-27 PROBLEM — I50.32 CHRONIC DIASTOLIC CONGESTIVE HEART FAILURE (HCC): Status: ACTIVE | Noted: 2022-07-13

## 2024-02-27 PROBLEM — I34.0 NONRHEUMATIC MITRAL (VALVE) INSUFFICIENCY: Status: ACTIVE | Noted: 2018-08-02

## 2024-02-27 PROBLEM — R06.02 SOB (SHORTNESS OF BREATH) ON EXERTION: Status: ACTIVE | Noted: 2018-04-16

## 2024-02-27 PROBLEM — R94.31 ABNORMAL EKG: Status: ACTIVE | Noted: 2018-04-16

## 2024-02-27 PROBLEM — K62.5 HEMORRHAGE OF RECTUM AND ANUS: Status: ACTIVE | Noted: 2021-01-26

## 2024-02-27 PROBLEM — I26.99 RECURRENT PULMONARY EMBOLISM (HCC): Status: ACTIVE | Noted: 2024-02-27

## 2024-02-27 PROBLEM — K57.90 DIVERTICULOSIS: Status: ACTIVE | Noted: 2018-08-02

## 2024-02-27 PROCEDURE — G8427 DOCREV CUR MEDS BY ELIG CLIN: HCPCS | Performed by: INTERNAL MEDICINE

## 2024-02-27 PROCEDURE — 3078F DIAST BP <80 MM HG: CPT | Performed by: INTERNAL MEDICINE

## 2024-02-27 PROCEDURE — G8484 FLU IMMUNIZE NO ADMIN: HCPCS | Performed by: INTERNAL MEDICINE

## 2024-02-27 PROCEDURE — 1090F PRES/ABSN URINE INCON ASSESS: CPT | Performed by: INTERNAL MEDICINE

## 2024-02-27 PROCEDURE — 1123F ACP DISCUSS/DSCN MKR DOCD: CPT | Performed by: INTERNAL MEDICINE

## 2024-02-27 PROCEDURE — 99213 OFFICE O/P EST LOW 20 MIN: CPT | Performed by: INTERNAL MEDICINE

## 2024-02-27 PROCEDURE — G8417 CALC BMI ABV UP PARAM F/U: HCPCS | Performed by: INTERNAL MEDICINE

## 2024-02-27 PROCEDURE — 3077F SYST BP >= 140 MM HG: CPT | Performed by: INTERNAL MEDICINE

## 2024-02-27 PROCEDURE — 1036F TOBACCO NON-USER: CPT | Performed by: INTERNAL MEDICINE

## 2024-02-27 PROCEDURE — G8400 PT W/DXA NO RESULTS DOC: HCPCS | Performed by: INTERNAL MEDICINE

## 2024-02-27 ASSESSMENT — ENCOUNTER SYMPTOMS: DIARRHEA: 1

## 2024-02-27 NOTE — PROGRESS NOTES
Not on file     Social Determinants of Health     Financial Resource Strain: Low Risk  (8/4/2023)    Overall Financial Resource Strain (CARDIA)     Difficulty of Paying Living Expenses: Not hard at all   Food Insecurity: Not on file (11/22/2023)   Transportation Needs: No Transportation Needs (11/22/2023)    Transportation Problems ([facility] HRSN)     In the past 12 months, has lack of reliable transportation kept you from medical appointments, meetings, work or from getting things needed for daily living?: Not on file   Physical Activity: Not on file   Stress: Not on file   Social Connections: Not on file   Intimate Partner Violence: Not on file   Housing Stability: Low Risk  (11/22/2023)    Housing Stability Vital Sign     Unable to Pay for Housing in the Last Year: No     Number of Places Lived in the Last Year: 1     Unstable Housing in the Last Year: No         REVIEW OF SYSTEMS:         Review of Systems   Gastrointestinal:  Positive for diarrhea.       PHYSICAL EXAMINATION:     Vital signs reviewed per the nursing documentation.     BP (!) 142/76 (Site: Left Lower Arm)   Pulse 72   Temp 97.8 °F (36.6 °C)   Ht 1.651 m (5' 5\")   Wt 79.4 kg (175 lb)   BMI 29.12 kg/m²   Body mass index is 29.12 kg/m².   Physical Exam  Elderly patient appears stable.  Abdomen is obese.  No tenderness.  No acute distention.  Bowel sounds active.  Cardiovascular pulmonary exams nonspecific.  Rectal examination revealed trace stool negative for occult blood.    LABORATORY DATA: Reviewed  Lab Results   Component Value Date    WBC 4.4 01/16/2024    HGB 11.2 (L) 01/16/2024    HCT 33.8 (L) 01/16/2024    MCV 96.3 01/16/2024     (L) 01/16/2024     11/24/2023    K 3.8 11/24/2023    CL 99 11/24/2023    CO2 27 11/24/2023    BUN 14 11/24/2023    CREATININE 1.0 (H) 11/24/2023    LABPROT 5.4 (L) 10/08/2012    LABALBU 3.0 (L) 11/24/2023    BILITOT 2.6 (H) 11/24/2023    ALKPHOS 198 (H) 11/24/2023    AST 69 (H) 11/24/2023    ALT 28

## 2024-02-28 ENCOUNTER — APPOINTMENT (OUTPATIENT)
Dept: GENERAL RADIOLOGY | Age: 84
End: 2024-02-28
Payer: MEDICARE

## 2024-02-28 ENCOUNTER — HOSPITAL ENCOUNTER (EMERGENCY)
Age: 84
Discharge: HOME | End: 2024-02-28
Attending: STUDENT IN AN ORGANIZED HEALTH CARE EDUCATION/TRAINING PROGRAM
Payer: MEDICARE

## 2024-02-28 ENCOUNTER — APPOINTMENT (OUTPATIENT)
Dept: CT IMAGING | Age: 84
End: 2024-02-28
Payer: MEDICARE

## 2024-02-28 VITALS
RESPIRATION RATE: 24 BRPM | HEIGHT: 65 IN | HEART RATE: 75 BPM | DIASTOLIC BLOOD PRESSURE: 61 MMHG | SYSTOLIC BLOOD PRESSURE: 107 MMHG | TEMPERATURE: 97.8 F | BODY MASS INDEX: 29.16 KG/M2 | WEIGHT: 175 LBS | OXYGEN SATURATION: 98 %

## 2024-02-28 DIAGNOSIS — R19.7 DIARRHEA, UNSPECIFIED TYPE: ICD-10-CM

## 2024-02-28 DIAGNOSIS — R10.84 GENERALIZED ABDOMINAL PAIN: ICD-10-CM

## 2024-02-28 DIAGNOSIS — N30.00 ACUTE CYSTITIS WITHOUT HEMATURIA: Primary | ICD-10-CM

## 2024-02-28 LAB
ALBUMIN SERPL-MCNC: 3 G/DL (ref 3.5–5.2)
ALP SERPL-CCNC: 83 U/L (ref 35–104)
ALT SERPL-CCNC: 12 U/L (ref 5–33)
ANION GAP SERPL CALCULATED.3IONS-SCNC: 11 MMOL/L (ref 9–17)
AST SERPL-CCNC: 29 U/L
BACTERIA URNS QL MICRO: ABNORMAL
BASOPHILS # BLD: 0.1 K/UL (ref 0–0.2)
BASOPHILS NFR BLD: 1 % (ref 0–2)
BILIRUB DIRECT SERPL-MCNC: 0.1 MG/DL
BILIRUB INDIRECT SERPL-MCNC: 0.2 MG/DL (ref 0–1)
BILIRUB SERPL-MCNC: 0.3 MG/DL (ref 0.3–1.2)
BILIRUB UR QL STRIP: NEGATIVE
BUN SERPL-MCNC: 13 MG/DL (ref 8–23)
CALCIUM SERPL-MCNC: 8.3 MG/DL (ref 8.6–10.4)
CASTS #/AREA URNS LPF: ABNORMAL /LPF
CHLORIDE SERPL-SCNC: 107 MMOL/L (ref 98–107)
CLARITY UR: CLEAR
CO2 SERPL-SCNC: 23 MMOL/L (ref 20–31)
COLOR UR: YELLOW
CREAT SERPL-MCNC: 1.1 MG/DL (ref 0.5–0.9)
EOSINOPHIL # BLD: 0.1 K/UL (ref 0–0.4)
EOSINOPHILS RELATIVE PERCENT: 2 % (ref 0–4)
EPI CELLS #/AREA URNS HPF: ABNORMAL /HPF
ERYTHROCYTE [DISTWIDTH] IN BLOOD BY AUTOMATED COUNT: 15.6 % (ref 11.5–14.9)
GFR SERPL CREATININE-BSD FRML MDRD: 50 ML/MIN/1.73M2
GLUCOSE SERPL-MCNC: 139 MG/DL (ref 70–99)
GLUCOSE UR STRIP-MCNC: NEGATIVE MG/DL
HCT VFR BLD AUTO: 37 % (ref 36–46)
HGB BLD-MCNC: 11.8 G/DL (ref 12–16)
HGB UR QL STRIP.AUTO: NEGATIVE
INR PPP: 1.6
KETONES UR STRIP-MCNC: NEGATIVE MG/DL
LEUKOCYTE ESTERASE UR QL STRIP: ABNORMAL
LIPASE SERPL-CCNC: 20 U/L (ref 13–60)
LYMPHOCYTES NFR BLD: 0.9 K/UL (ref 1–4.8)
LYMPHOCYTES RELATIVE PERCENT: 18 % (ref 24–44)
MAGNESIUM SERPL-MCNC: 1.9 MG/DL (ref 1.6–2.6)
MCH RBC QN AUTO: 31.8 PG (ref 26–34)
MCHC RBC AUTO-ENTMCNC: 31.8 G/DL (ref 31–37)
MCV RBC AUTO: 100.1 FL (ref 80–100)
MONOCYTES NFR BLD: 0.5 K/UL (ref 0.1–1.3)
MONOCYTES NFR BLD: 10 % (ref 1–7)
NEUTROPHILS NFR BLD: 69 % (ref 36–66)
NEUTS SEG NFR BLD: 3.4 K/UL (ref 1.3–9.1)
NITRITE UR QL STRIP: POSITIVE
PH UR STRIP: 7.5 [PH] (ref 5–8)
PLATELET # BLD AUTO: 121 K/UL (ref 150–450)
PMV BLD AUTO: 9.9 FL (ref 6–12)
POTASSIUM SERPL-SCNC: 3.9 MMOL/L (ref 3.7–5.3)
PROT SERPL-MCNC: 5.7 G/DL (ref 6.4–8.3)
PROT UR STRIP-MCNC: NEGATIVE MG/DL
PROTHROMBIN TIME: 18.9 SEC (ref 11.8–14.6)
RBC # BLD AUTO: 3.69 M/UL (ref 4–5.2)
RBC #/AREA URNS HPF: ABNORMAL /HPF
SODIUM SERPL-SCNC: 141 MMOL/L (ref 135–144)
SP GR UR STRIP: 1.02 (ref 1–1.03)
TROPONIN I SERPL HS-MCNC: 12 NG/L (ref 0–14)
UROBILINOGEN UR STRIP-ACNC: NORMAL EU/DL (ref 0–1)
WBC #/AREA URNS HPF: ABNORMAL /HPF
WBC OTHER # BLD: 5 K/UL (ref 3.5–11)

## 2024-02-28 PROCEDURE — 81001 URINALYSIS AUTO W/SCOPE: CPT

## 2024-02-28 PROCEDURE — 85025 COMPLETE CBC W/AUTO DIFF WBC: CPT

## 2024-02-28 PROCEDURE — 80076 HEPATIC FUNCTION PANEL: CPT

## 2024-02-28 PROCEDURE — 6370000000 HC RX 637 (ALT 250 FOR IP): Performed by: STUDENT IN AN ORGANIZED HEALTH CARE EDUCATION/TRAINING PROGRAM

## 2024-02-28 PROCEDURE — 2580000003 HC RX 258: Performed by: STUDENT IN AN ORGANIZED HEALTH CARE EDUCATION/TRAINING PROGRAM

## 2024-02-28 PROCEDURE — 36415 COLL VENOUS BLD VENIPUNCTURE: CPT

## 2024-02-28 PROCEDURE — 83690 ASSAY OF LIPASE: CPT

## 2024-02-28 PROCEDURE — 83735 ASSAY OF MAGNESIUM: CPT

## 2024-02-28 PROCEDURE — 80048 BASIC METABOLIC PNL TOTAL CA: CPT

## 2024-02-28 PROCEDURE — 99285 EMERGENCY DEPT VISIT HI MDM: CPT

## 2024-02-28 PROCEDURE — 87086 URINE CULTURE/COLONY COUNT: CPT

## 2024-02-28 PROCEDURE — 2500000003 HC RX 250 WO HCPCS: Performed by: STUDENT IN AN ORGANIZED HEALTH CARE EDUCATION/TRAINING PROGRAM

## 2024-02-28 PROCEDURE — 84484 ASSAY OF TROPONIN QUANT: CPT

## 2024-02-28 PROCEDURE — 71045 X-RAY EXAM CHEST 1 VIEW: CPT

## 2024-02-28 PROCEDURE — A4216 STERILE WATER/SALINE, 10 ML: HCPCS | Performed by: STUDENT IN AN ORGANIZED HEALTH CARE EDUCATION/TRAINING PROGRAM

## 2024-02-28 PROCEDURE — 96374 THER/PROPH/DIAG INJ IV PUSH: CPT

## 2024-02-28 PROCEDURE — 85610 PROTHROMBIN TIME: CPT

## 2024-02-28 PROCEDURE — 6360000002 HC RX W HCPCS: Performed by: STUDENT IN AN ORGANIZED HEALTH CARE EDUCATION/TRAINING PROGRAM

## 2024-02-28 PROCEDURE — 74177 CT ABD & PELVIS W/CONTRAST: CPT

## 2024-02-28 PROCEDURE — 96375 TX/PRO/DX INJ NEW DRUG ADDON: CPT

## 2024-02-28 PROCEDURE — 87186 SC STD MICRODIL/AGAR DIL: CPT

## 2024-02-28 PROCEDURE — 87088 URINE BACTERIA CULTURE: CPT

## 2024-02-28 PROCEDURE — 93005 ELECTROCARDIOGRAM TRACING: CPT | Performed by: STUDENT IN AN ORGANIZED HEALTH CARE EDUCATION/TRAINING PROGRAM

## 2024-02-28 PROCEDURE — 6360000004 HC RX CONTRAST MEDICATION: Performed by: STUDENT IN AN ORGANIZED HEALTH CARE EDUCATION/TRAINING PROGRAM

## 2024-02-28 RX ORDER — 0.9 % SODIUM CHLORIDE 0.9 %
1000 INTRAVENOUS SOLUTION INTRAVENOUS ONCE
Status: COMPLETED | OUTPATIENT
Start: 2024-02-28 | End: 2024-02-28

## 2024-02-28 RX ORDER — ONDANSETRON 4 MG/1
4 TABLET, ORALLY DISINTEGRATING ORAL 3 TIMES DAILY PRN
Qty: 21 TABLET | Refills: 0 | Status: SHIPPED | OUTPATIENT
Start: 2024-02-28

## 2024-02-28 RX ORDER — FENTANYL CITRATE 0.05 MG/ML
50 INJECTION, SOLUTION INTRAMUSCULAR; INTRAVENOUS ONCE
Status: COMPLETED | OUTPATIENT
Start: 2024-02-28 | End: 2024-02-28

## 2024-02-28 RX ORDER — CEPHALEXIN 250 MG/1
500 CAPSULE ORAL ONCE
Status: COMPLETED | OUTPATIENT
Start: 2024-02-28 | End: 2024-02-28

## 2024-02-28 RX ORDER — SODIUM CHLORIDE 0.9 % (FLUSH) 0.9 %
10 SYRINGE (ML) INJECTION ONCE
Status: COMPLETED | OUTPATIENT
Start: 2024-02-28 | End: 2024-02-28

## 2024-02-28 RX ORDER — 0.9 % SODIUM CHLORIDE 0.9 %
100 INTRAVENOUS SOLUTION INTRAVENOUS ONCE
Status: COMPLETED | OUTPATIENT
Start: 2024-02-28 | End: 2024-02-28

## 2024-02-28 RX ORDER — ONDANSETRON 2 MG/ML
4 INJECTION INTRAMUSCULAR; INTRAVENOUS ONCE
Status: COMPLETED | OUTPATIENT
Start: 2024-02-28 | End: 2024-02-28

## 2024-02-28 RX ORDER — CEPHALEXIN 500 MG/1
500 CAPSULE ORAL 3 TIMES DAILY
Qty: 21 CAPSULE | Refills: 0 | Status: SHIPPED | OUTPATIENT
Start: 2024-02-28 | End: 2024-03-06

## 2024-02-28 RX ADMIN — FENTANYL CITRATE 50 MCG: 50 INJECTION INTRAMUSCULAR; INTRAVENOUS at 21:15

## 2024-02-28 RX ADMIN — SODIUM CHLORIDE 100 ML: 9 INJECTION, SOLUTION INTRAVENOUS at 22:46

## 2024-02-28 RX ADMIN — ONDANSETRON 4 MG: 2 INJECTION INTRAMUSCULAR; INTRAVENOUS at 21:15

## 2024-02-28 RX ADMIN — SODIUM CHLORIDE 1000 ML: 9 INJECTION, SOLUTION INTRAVENOUS at 21:14

## 2024-02-28 RX ADMIN — IOPAMIDOL 75 ML: 755 INJECTION, SOLUTION INTRAVENOUS at 22:45

## 2024-02-28 RX ADMIN — CEPHALEXIN 500 MG: 250 CAPSULE ORAL at 23:47

## 2024-02-28 RX ADMIN — FAMOTIDINE 20 MG: 10 INJECTION, SOLUTION INTRAVENOUS at 21:15

## 2024-02-28 RX ADMIN — SODIUM CHLORIDE, PRESERVATIVE FREE 10 ML: 5 INJECTION INTRAVENOUS at 22:45

## 2024-02-28 ASSESSMENT — PAIN SCALES - GENERAL
PAINLEVEL_OUTOF10: 10
PAINLEVEL_OUTOF10: 2
PAINLEVEL_OUTOF10: 10

## 2024-02-28 ASSESSMENT — ENCOUNTER SYMPTOMS
EYE DISCHARGE: 0
DIARRHEA: 0
RHINORRHEA: 0
SHORTNESS OF BREATH: 0
VOMITING: 0
ABDOMINAL PAIN: 1
SORE THROAT: 0
EYE REDNESS: 0
NAUSEA: 1

## 2024-02-28 ASSESSMENT — PAIN DESCRIPTION - LOCATION: LOCATION: ABDOMEN

## 2024-02-28 ASSESSMENT — PAIN - FUNCTIONAL ASSESSMENT
PAIN_FUNCTIONAL_ASSESSMENT: 0-10
PAIN_FUNCTIONAL_ASSESSMENT: NONE - DENIES PAIN
PAIN_FUNCTIONAL_ASSESSMENT: 0-10

## 2024-02-29 ENCOUNTER — CARE COORDINATION (OUTPATIENT)
Dept: CARE COORDINATION | Age: 84
End: 2024-02-29

## 2024-02-29 LAB
EKG ATRIAL RATE: 89 BPM
EKG P AXIS: 66 DEGREES
EKG P-R INTERVAL: 198 MS
EKG Q-T INTERVAL: 404 MS
EKG QRS DURATION: 116 MS
EKG QTC CALCULATION (BAZETT): 491 MS
EKG R AXIS: 27 DEGREES
EKG T AXIS: 23 DEGREES
EKG VENTRICULAR RATE: 89 BPM

## 2024-02-29 PROCEDURE — 93010 ELECTROCARDIOGRAM REPORT: CPT | Performed by: INTERNAL MEDICINE

## 2024-02-29 NOTE — ED PROVIDER NOTES
EMERGENCY DEPARTMENT ENCOUNTER    Pt Name: Genie Lama  MRN: [id number]  Birthdate 1940  Date of evaluation: 2/28/24  CHIEF COMPLAINT       Chief Complaint   Patient presents with    Abdominal Pain     HISTORY OF PRESENT ILLNESS   This is an 83-year-old woman presenting with abdominal pain    Epigastric abdominal pain    Aching.  Throbbing.  Going on throughout the day    Nausea but no vomiting    Had bowel movements nonbloody    Some change in urination    No chest pain              REVIEW OF SYSTEMS     Review of Systems   Constitutional:  Negative for chills and fever.   HENT:  Negative for rhinorrhea and sore throat.    Eyes:  Negative for discharge and redness.   Respiratory:  Negative for shortness of breath.    Cardiovascular:  Negative for chest pain.   Gastrointestinal:  Positive for abdominal pain and nausea. Negative for diarrhea and vomiting.   Genitourinary:  Negative for dysuria, frequency and urgency.   Musculoskeletal:  Negative for arthralgias and myalgias.   Skin:  Negative for rash.   Neurological:  Negative for weakness and numbness.   Psychiatric/Behavioral:  Negative for suicidal ideas.    All other systems reviewed and are negative.    PASTMEDICAL HISTORY     Past Medical History:   Diagnosis Date    Atrial fibrillation (HCC) 03/28/2014    Chronic back pain     with rt. leg pain    Diverticulosis     GERD (gastroesophageal reflux disease)     Hearing aid worn     willi. ears.    Hearing deficit, bilateral     Hyperlipidemia     Hypertension     Hypothyroidism     Obesity (BMI 30.0-34.9) 08/12/2016    Onychomycosis     PE (pulmonary embolism)     H/O DVT of rt leg    Poor venous access     Type II or unspecified type diabetes mellitus without mention of complication, not stated as uncontrolled      Past Problem List  Patient Active Problem List   Diagnosis Code    Pure hypercholesterolemia, unspecified E78.00    Gastroesophageal reflux disease without esophagitis K21.9    Onychomycosis B35.1

## 2024-02-29 NOTE — CARE COORDINATION
Ambulatory Care Coordination  ED Follow up Call    Reason for ED visit:  Abdominal pain/ cystitis    Status:     improved    Did you call your PCP prior to going to the ED?  No      Did you receive a discharge instructions from the Emergency Room? Yes  Review of Instructions:     Understands what to report/when to return?:  Yes   Understands discharge instructions?:  Yes   Following discharge instructions?:  Yes   If not why?     Are there any new complaints of pain? No  New Pain Meds? No    Constipation prophylaxis needed?  N/A    If you have a wound is the dressing clean, dry, and intact? N/A  Understands wound care regimen? N/A    Are there any other complaints/concerns that you wish to tell your provider?   no    FU appts/Provider:    Future Appointments   Date Time Provider Department Center   3/19/2024  1:00 PM Reinier Day MD [facility]   4/16/2024  3:30 PM Baudilio Valadez MD [facility]   7/22/2024  2:00 PM Nasir Eugene MD [facility]           New Medications?:   Yes- cephalexin and zofran       Medication Reconciliation by phone - Yes  Understands Medications?  Yes  Taking Medications? Yes  Can you swallow your pills?  Yes    Any further needs in the home i.e. Equipment?  No    Link to services in community?:  N/A   Which services:      Genie states she is feeling a little better.  Taking the keflex. Has not needed the zofran yet. Will call for a follow up appt with PCP. Encouraged fluids to stay hydrated and flush out the infection. She was agreeable to follow up call. Will enroll in ACM program.

## 2024-03-01 LAB
MICROORGANISM SPEC CULT: ABNORMAL
SPECIMEN DESCRIPTION: ABNORMAL

## 2024-03-05 ENCOUNTER — CARE COORDINATION (OUTPATIENT)
Dept: CARE COORDINATION | Age: 84
End: 2024-03-05

## 2024-03-05 DIAGNOSIS — K52.9 CHRONIC DIARRHEA: ICD-10-CM

## 2024-03-05 RX ORDER — CHOLESTYRAMINE 4 G/9G
POWDER, FOR SUSPENSION ORAL
Qty: 90 PACKET | Refills: 0 | Status: SHIPPED | OUTPATIENT
Start: 2024-03-05

## 2024-03-05 NOTE — CARE COORDINATION
Ambulatory Care Coordination Note  3/5/2024    Patient Current Location:  Home: [address] [place] [address]     ACM contacted the patient by telephone. Verified name and  with patient as identifiers. Provided introduction to self, and explanation of the ACM role.     Challenges to be reviewed by the provider   Additional needs identified to be addressed with provider: No  none               Method of communication with provider: none.    ACM: Kezia Delcid RN  Summary  Date Care Coordination Episode Started:  2024     Reason for Call Today:     CC Enrollment    Reason patient is in Care Coordination:     RAEV 89%  CHF  Diabetes  CKD    Topics Discussed Today:     General- called Genie for follow up. She is feeling better than last week. She has completed the antibiotic. She has a f/u with PCP on 3/19. She said she has been getting a pain in her rib area, that will come and go. She said yesterday she had the pain quite a bit, but today, nothing. She will ask her provider about it when she goes, if it is still happening. She denies any other symptoms. She stated that she has glaucoma and her vision has been a little more blurry lately. She wondered if the antibiotic could aggravate her glaucoma. Did not see any concerns for interaction with Keflex and glaucoma.     Interventions completed today:    Assessments completed today:     Fall risk  Initial assessment  Medication reconciliation  SDOH  CHF; General Health     Care Coordinator plan of care:     Genie will take her medications as prescribed  Keep upcoming appt with PCP on 3/19  Monitor daily weight and BP  Will follow up next week to assess symptoms; needs; complete enrollment      Offered patient enrollment in the Remote Patient Monitoring (RPM) program for in-home monitoring: Patient declined.    Lab Results       None            Care Coordination Interventions    Referral from Primary Care Provider: No  Suggested

## 2024-03-12 ENCOUNTER — CARE COORDINATION (OUTPATIENT)
Dept: CARE COORDINATION | Age: 84
End: 2024-03-12

## 2024-03-12 NOTE — CARE COORDINATION
Attempted to reach Genie for follow up. Her phone just rings and rings with no voicemail.   Will try to reach her Thursday.

## 2024-03-14 ENCOUNTER — CARE COORDINATION (OUTPATIENT)
Dept: CARE COORDINATION | Age: 84
End: 2024-03-14

## 2024-03-14 NOTE — CARE COORDINATION
Attempted to reach Genie for follow up. Her phone just rings and rings. No answering machine or voicemail.   Will try to reach her next week after her PCP appt.

## 2024-03-19 ENCOUNTER — OFFICE VISIT (OUTPATIENT)
Dept: FAMILY MEDICINE CLINIC | Age: 84
End: 2024-03-19
Payer: MEDICARE

## 2024-03-19 VITALS
BODY MASS INDEX: 28.96 KG/M2 | WEIGHT: 174 LBS | SYSTOLIC BLOOD PRESSURE: 102 MMHG | OXYGEN SATURATION: 99 % | DIASTOLIC BLOOD PRESSURE: 62 MMHG | HEART RATE: 92 BPM | RESPIRATION RATE: 14 BRPM

## 2024-03-19 DIAGNOSIS — I10 ESSENTIAL HYPERTENSION: ICD-10-CM

## 2024-03-19 DIAGNOSIS — E11.9 TYPE 2 DIABETES MELLITUS WITHOUT COMPLICATION, WITHOUT LONG-TERM CURRENT USE OF INSULIN (HCC): Primary | ICD-10-CM

## 2024-03-19 DIAGNOSIS — I27.82 OTHER CHRONIC PULMONARY EMBOLISM WITHOUT ACUTE COR PULMONALE (HCC): ICD-10-CM

## 2024-03-19 DIAGNOSIS — I50.22 CHRONIC SYSTOLIC (CONGESTIVE) HEART FAILURE (HCC): ICD-10-CM

## 2024-03-19 DIAGNOSIS — D69.6 THROMBOCYTOPENIA, UNSPECIFIED (HCC): ICD-10-CM

## 2024-03-19 DIAGNOSIS — I50.22 CHRONIC SYSTOLIC (CONGESTIVE) HEART FAILURE (HCC): Primary | ICD-10-CM

## 2024-03-19 DIAGNOSIS — E03.9 ACQUIRED HYPOTHYROIDISM: ICD-10-CM

## 2024-03-19 DIAGNOSIS — N18.30 STAGE 3 CHRONIC KIDNEY DISEASE, UNSPECIFIED WHETHER STAGE 3A OR 3B CKD (HCC): ICD-10-CM

## 2024-03-19 DIAGNOSIS — I48.91 ATRIAL FIBRILLATION WITH RVR (HCC): ICD-10-CM

## 2024-03-19 DIAGNOSIS — Z79.899 CURRENT USE OF PROTON PUMP INHIBITOR: ICD-10-CM

## 2024-03-19 DIAGNOSIS — K21.9 GASTROESOPHAGEAL REFLUX DISEASE WITHOUT ESOPHAGITIS: ICD-10-CM

## 2024-03-19 LAB — HBA1C MFR BLD: 5.1 %

## 2024-03-19 PROCEDURE — 3044F HG A1C LEVEL LT 7.0%: CPT | Performed by: FAMILY MEDICINE

## 2024-03-19 PROCEDURE — 99214 OFFICE O/P EST MOD 30 MIN: CPT | Performed by: FAMILY MEDICINE

## 2024-03-19 PROCEDURE — 3078F DIAST BP <80 MM HG: CPT | Performed by: FAMILY MEDICINE

## 2024-03-19 PROCEDURE — 1036F TOBACCO NON-USER: CPT | Performed by: FAMILY MEDICINE

## 2024-03-19 PROCEDURE — G8484 FLU IMMUNIZE NO ADMIN: HCPCS | Performed by: FAMILY MEDICINE

## 2024-03-19 PROCEDURE — G8417 CALC BMI ABV UP PARAM F/U: HCPCS | Performed by: FAMILY MEDICINE

## 2024-03-19 PROCEDURE — G8400 PT W/DXA NO RESULTS DOC: HCPCS | Performed by: FAMILY MEDICINE

## 2024-03-19 PROCEDURE — 1090F PRES/ABSN URINE INCON ASSESS: CPT | Performed by: FAMILY MEDICINE

## 2024-03-19 PROCEDURE — 3074F SYST BP LT 130 MM HG: CPT | Performed by: FAMILY MEDICINE

## 2024-03-19 PROCEDURE — 1123F ACP DISCUSS/DSCN MKR DOCD: CPT | Performed by: FAMILY MEDICINE

## 2024-03-19 PROCEDURE — G8427 DOCREV CUR MEDS BY ELIG CLIN: HCPCS | Performed by: FAMILY MEDICINE

## 2024-03-19 PROCEDURE — 83036 HEMOGLOBIN GLYCOSYLATED A1C: CPT | Performed by: FAMILY MEDICINE

## 2024-03-19 SDOH — ECONOMIC STABILITY: FOOD INSECURITY: WITHIN THE PAST 12 MONTHS, YOU WORRIED THAT YOUR FOOD WOULD RUN OUT BEFORE YOU GOT MONEY TO BUY MORE.: NEVER TRUE

## 2024-03-19 SDOH — ECONOMIC STABILITY: FOOD INSECURITY: WITHIN THE PAST 12 MONTHS, THE FOOD YOU BOUGHT JUST DIDN'T LAST AND YOU DIDN'T HAVE MONEY TO GET MORE.: NEVER TRUE

## 2024-03-19 SDOH — ECONOMIC STABILITY: INCOME INSECURITY: HOW HARD IS IT FOR YOU TO PAY FOR THE VERY BASICS LIKE FOOD, HOUSING, MEDICAL CARE, AND HEATING?: NOT HARD AT ALL

## 2024-03-19 ASSESSMENT — PATIENT HEALTH QUESTIONNAIRE - PHQ9
SUM OF ALL RESPONSES TO PHQ9 QUESTIONS 1 & 2: 0
1. LITTLE INTEREST OR PLEASURE IN DOING THINGS: NOT AT ALL
SUM OF ALL RESPONSES TO PHQ QUESTIONS 1-9: 0
2. FEELING DOWN, DEPRESSED OR HOPELESS: NOT AT ALL
SUM OF ALL RESPONSES TO PHQ QUESTIONS 1-9: 0

## 2024-03-19 ASSESSMENT — ENCOUNTER SYMPTOMS
ABDOMINAL PAIN: 0
SHORTNESS OF BREATH: 0
BLOOD IN STOOL: 0
CHEST TIGHTNESS: 0

## 2024-03-19 NOTE — PROGRESS NOTES
Subjective:      Patient ID: Genie Lama is a 83 y.o. female.    HPIChronic Disease Visit Information    BP Readings from Last 3 Encounters:   03/19/24 102/62   02/28/24 107/61   02/27/24 (!) 142/76          Hemoglobin A1C (%)   Date Value   08/04/2023 5.5   02/09/2023 5.0   02/21/2022 4.9     LDL Cholesterol (mg/dL)   Date Value   08/12/2023 45     HDL (mg/dL)   Date Value   08/12/2023 55     BUN (mg/dL)   Date Value   02/28/2024 13     Creatinine (mg/dL)   Date Value   02/28/2024 1.1 (H)     Glucose (mg/dL)   Date Value   02/28/2024 139 (H)   03/17/2012 123 (H)            Have you changed or started any medications since your last visit including any over-the-counter medicines, vitamins, or herbal medicines? no   Are you having any side effects from any of your medications? -  no  Have you stopped taking any of your medications? Is so, why? -  no    Have you seen any other physician or provider since your last visit? Yes - Records Obtained  Have you had any other diagnostic tests since your last visit? Yes - Records Obtained  Have you been seen in the emergency room and/or had an admission to a hospital since we last saw you? Yes - Records Obtained  Have you had your annual diabetic retinal (eye) exam? Yes - Records Obtained  Have you had your routine dental cleaning in the past 6 months? no    Have you activated your Look[employer] account? If not, what are your barriers? No:      Patient Care Team:  Reinier Day MD as PCP - General (Family Medicine)  Reinier Day MD as PCP - Empaneled Provider  Sean Valerio MD as Consulting Physician (Pulmonology)  Steve Womack MD as Surgeon (General Surgery)  Cuauhtemoc Alvarado MD as Consulting Physician (Pain Management)  Michel Cole MD as Surgeon (Orthopedic Surgery)  Jason Sidhu MD as Consulting Physician (Gastroenterology)  Kezia Delcid, RN as Ambulatory Care Manager         Medical History Review  Past Medical, Family, and Social

## 2024-03-25 DIAGNOSIS — I10 ESSENTIAL HYPERTENSION: ICD-10-CM

## 2024-03-25 RX ORDER — BUMETANIDE 1 MG/1
TABLET ORAL
Qty: 30 TABLET | Refills: 0 | Status: SHIPPED | OUTPATIENT
Start: 2024-03-25

## 2024-03-28 ENCOUNTER — CARE COORDINATION (OUTPATIENT)
Dept: CARE COORDINATION | Age: 84
End: 2024-03-28

## 2024-03-28 NOTE — CARE COORDINATION
Ambulatory Care Coordination Note  3/28/2024    Patient Current Location:  Home: [address] [place] [address]     ACM contacted the patient by telephone. Verified name and  with patient as identifiers. Provided introduction to self, and explanation of the ACM role.     Challenges to be reviewed by the provider   Additional needs identified to be addressed with provider: No  none               Method of communication with provider: none.    ACM: Kezia Delcid RN  Summary  Date Care Coordination Episode Started:  ***    Reason for Call Today:     CC Enrollment    Reason patient is in Care Coordination:     ***    Topics Discussed Today:     ***    Interventions completed today:    ***    Assessments completed today:     Fall risk  Initial assessment  Medication reconciliation  SDOH  *** (Health assessments)      Care Coordinator plan of care:     ***       Offered patient enrollment in the Remote Patient Monitoring (RPM) program for in-home monitoring: Patient is not eligible for RPM program.    Lab Results       None            Care Coordination Interventions    Referral from Primary Care Provider: No  Suggested Interventions and Community Resources  Zone Management Tools: In Process          Goals Addressed                   This Visit's Progress     Conditions and Symptoms   On track     I will schedule office visits, as directed by my provider.  I will keep my appointment or reschedule if I have to cancel.  I will notify my provider of any barriers to my plan of care.  I will follow my Zone Management tool to seek urgent or emergent care.  I will notify my provider of any symptoms that indicate a worsening of my condition.    Barriers: overwhelmed by complexity of regimen and lack of education  Plan for overcoming my barriers: Care Management   Confidence: 8/10  Anticipated Goal Completion Date: 2024                Future Appointments   Date Time Provider Department Center   2024  3:30 PM 
barriers to my plan of care.  I will follow my Zone Management tool to seek urgent or emergent care.  I will notify my provider of any symptoms that indicate a worsening of my condition.    Barriers: overwhelmed by complexity of regimen and lack of education  Plan for overcoming my barriers: Care Management   Confidence: 8/10  Anticipated Goal Completion Date: 6/30/2024                Future Appointments   Date Time Provider Department Center   4/16/2024  3:30 PM Baudilio Valadez MD [facility]   6/17/2024  2:45 PM Zach Johnson MD [facility]   7/22/2024  2:00 PM Nasir Eugene MD [facility]   9/17/2024  2:30 PM Reinier Day MD [facility]   10/24/2024  3:30 PM SCHEDULE, [facility]    and   Congestive Heart Failure Assessment    Are you currently restricting fluids?: 2000cc  Do you understand a low sodium diet?: Yes  Do you understand how to read food labels?: Yes  How many restaurant meals do you eat per week?: 0  Do you salt your food before tasting it?: No     No patient-reported symptoms      Symptoms:  None: Yes      Symptom course: stable  Patient-reported weight (lb): 174  Weight trend: stable  Salt intake watch compared to last visit: stable

## 2024-04-06 ENCOUNTER — HOSPITAL ENCOUNTER (OUTPATIENT)
Age: 84
Discharge: HOME | End: 2024-04-06
Payer: MEDICARE

## 2024-04-06 LAB
ALBUMIN SERPL-MCNC: 3.6 G/DL (ref 3.5–5.2)
ALP SERPL-CCNC: 102 U/L (ref 35–104)
ALT SERPL-CCNC: 11 U/L (ref 5–33)
ANION GAP SERPL CALCULATED.3IONS-SCNC: 10 MMOL/L (ref 9–17)
AST SERPL-CCNC: 25 U/L
BILIRUB SERPL-MCNC: 0.7 MG/DL (ref 0.3–1.2)
BUN SERPL-MCNC: 13 MG/DL (ref 8–23)
CALCIUM SERPL-MCNC: 9.2 MG/DL (ref 8.6–10.4)
CHLORIDE SERPL-SCNC: 105 MMOL/L (ref 98–107)
CHOLEST SERPL-MCNC: 126 MG/DL
CHOLESTEROL/HDL RATIO: 2.2
CO2 SERPL-SCNC: 27 MMOL/L (ref 20–31)
CREAT SERPL-MCNC: 1 MG/DL (ref 0.5–0.9)
CREAT UR-MCNC: 31.6 MG/DL (ref 28–217)
GFR SERPL CREATININE-BSD FRML MDRD: 56 ML/MIN/1.73M2
GLUCOSE SERPL-MCNC: 104 MG/DL (ref 70–99)
HDLC SERPL-MCNC: 57 MG/DL
LDLC SERPL CALC-MCNC: 50 MG/DL (ref 0–130)
MAGNESIUM SERPL-MCNC: 2 MG/DL (ref 1.6–2.6)
MICROALBUMIN UR-MCNC: <12 MG/L (ref 0–20)
MICROALBUMIN/CREAT UR-RTO: NORMAL MCG/MG CREAT (ref 0–25)
POTASSIUM SERPL-SCNC: 4.1 MMOL/L (ref 3.7–5.3)
PROT SERPL-MCNC: 6.8 G/DL (ref 6.4–8.3)
SODIUM SERPL-SCNC: 142 MMOL/L (ref 135–144)
TRIGL SERPL-MCNC: 97 MG/DL

## 2024-04-06 PROCEDURE — 80061 LIPID PANEL: CPT

## 2024-04-06 PROCEDURE — 36415 COLL VENOUS BLD VENIPUNCTURE: CPT

## 2024-04-06 PROCEDURE — 82043 UR ALBUMIN QUANTITATIVE: CPT

## 2024-04-06 PROCEDURE — 83735 ASSAY OF MAGNESIUM: CPT

## 2024-04-06 PROCEDURE — 82570 ASSAY OF URINE CREATININE: CPT

## 2024-04-06 PROCEDURE — 80053 COMPREHEN METABOLIC PANEL: CPT

## 2024-04-06 PROCEDURE — 84630 ASSAY OF ZINC: CPT

## 2024-04-08 LAB — ZINC SERPL-MCNC: 66.8 UG/DL (ref 60–120)

## 2024-04-12 ENCOUNTER — CARE COORDINATION (OUTPATIENT)
Dept: CARE COORDINATION | Age: 84
End: 2024-04-12

## 2024-04-12 NOTE — CARE COORDINATION
Attempted to reach Genie for follow up. No answer and her phone just rings and rings. Unable to leave a message.   Will try to reach her next week.

## 2024-04-16 ENCOUNTER — HOSPITAL ENCOUNTER (OUTPATIENT)
Age: 84
Discharge: HOME | End: 2024-04-16
Payer: MEDICARE

## 2024-04-16 ENCOUNTER — OFFICE VISIT (OUTPATIENT)
Dept: ONCOLOGY | Age: 84
End: 2024-04-16
Payer: MEDICARE

## 2024-04-16 ENCOUNTER — TELEPHONE (OUTPATIENT)
Dept: ONCOLOGY | Age: 84
End: 2024-04-16

## 2024-04-16 VITALS
BODY MASS INDEX: 28.52 KG/M2 | DIASTOLIC BLOOD PRESSURE: 81 MMHG | TEMPERATURE: 97.4 F | SYSTOLIC BLOOD PRESSURE: 124 MMHG | HEART RATE: 71 BPM | WEIGHT: 171.4 LBS

## 2024-04-16 DIAGNOSIS — D50.9 IRON DEFICIENCY ANEMIA, UNSPECIFIED IRON DEFICIENCY ANEMIA TYPE: ICD-10-CM

## 2024-04-16 DIAGNOSIS — D50.9 IRON DEFICIENCY ANEMIA, UNSPECIFIED IRON DEFICIENCY ANEMIA TYPE: Primary | ICD-10-CM

## 2024-04-16 LAB
BASOPHILS # BLD: 0.1 K/UL (ref 0–0.2)
BASOPHILS NFR BLD: 2 % (ref 0–2)
EOSINOPHIL # BLD: 0.2 K/UL (ref 0–0.4)
EOSINOPHILS RELATIVE PERCENT: 4 % (ref 1–4)
ERYTHROCYTE [DISTWIDTH] IN BLOOD BY AUTOMATED COUNT: 14.5 % (ref 12.5–15.4)
HCT VFR BLD AUTO: 34.3 % (ref 36–46)
HGB BLD-MCNC: 11.3 G/DL (ref 12–16)
LYMPHOCYTES NFR BLD: 1.5 K/UL (ref 1–4.8)
LYMPHOCYTES RELATIVE PERCENT: 30 % (ref 24–44)
MCH RBC QN AUTO: 31.9 PG (ref 26–34)
MCHC RBC AUTO-ENTMCNC: 33 G/DL (ref 31–37)
MCV RBC AUTO: 96.9 FL (ref 80–100)
MONOCYTES NFR BLD: 0.5 K/UL (ref 0.1–1.2)
MONOCYTES NFR BLD: 10 % (ref 2–11)
NEUTROPHILS NFR BLD: 54 % (ref 36–66)
NEUTS SEG NFR BLD: 2.7 K/UL (ref 1.8–7.7)
PLATELET # BLD AUTO: 130 K/UL (ref 140–450)
PMV BLD AUTO: 10.3 FL (ref 6–12)
RBC # BLD AUTO: 3.54 M/UL (ref 4–5.2)
WBC OTHER # BLD: 4.9 K/UL (ref 3.5–11)

## 2024-04-16 PROCEDURE — 36415 COLL VENOUS BLD VENIPUNCTURE: CPT

## 2024-04-16 PROCEDURE — 3074F SYST BP LT 130 MM HG: CPT | Performed by: INTERNAL MEDICINE

## 2024-04-16 PROCEDURE — 99211 OFF/OP EST MAY X REQ PHY/QHP: CPT | Performed by: INTERNAL MEDICINE

## 2024-04-16 PROCEDURE — 85025 COMPLETE CBC W/AUTO DIFF WBC: CPT

## 2024-04-16 PROCEDURE — 1123F ACP DISCUSS/DSCN MKR DOCD: CPT | Performed by: INTERNAL MEDICINE

## 2024-04-16 PROCEDURE — G8427 DOCREV CUR MEDS BY ELIG CLIN: HCPCS | Performed by: INTERNAL MEDICINE

## 2024-04-16 PROCEDURE — 82746 ASSAY OF FOLIC ACID SERUM: CPT

## 2024-04-16 PROCEDURE — 82728 ASSAY OF FERRITIN: CPT

## 2024-04-16 PROCEDURE — 83540 ASSAY OF IRON: CPT

## 2024-04-16 PROCEDURE — 1036F TOBACCO NON-USER: CPT | Performed by: INTERNAL MEDICINE

## 2024-04-16 PROCEDURE — G8400 PT W/DXA NO RESULTS DOC: HCPCS | Performed by: INTERNAL MEDICINE

## 2024-04-16 PROCEDURE — 3079F DIAST BP 80-89 MM HG: CPT | Performed by: INTERNAL MEDICINE

## 2024-04-16 PROCEDURE — 99214 OFFICE O/P EST MOD 30 MIN: CPT | Performed by: INTERNAL MEDICINE

## 2024-04-16 PROCEDURE — 83550 IRON BINDING TEST: CPT

## 2024-04-16 PROCEDURE — 1090F PRES/ABSN URINE INCON ASSESS: CPT | Performed by: INTERNAL MEDICINE

## 2024-04-16 PROCEDURE — G8417 CALC BMI ABV UP PARAM F/U: HCPCS | Performed by: INTERNAL MEDICINE

## 2024-04-16 PROCEDURE — 82607 VITAMIN B-12: CPT

## 2024-04-16 NOTE — TELEPHONE ENCOUNTER
RTc in 3 months with labs a week prior               Labs to be done one week prior to appt    Rv scheduled for 7/16/24 @ 245pm    An After Visit Summary was printed and given to the patient.    Electronically signed by Angelique Martinez on 4/16/2024 at 4:14 PM

## 2024-04-16 NOTE — PROGRESS NOTES
recommendations.  Her lab work showed no evidence of hemolysis.  She has a mild MGUS.  Her light chains are slightly increased from her previous level.  Now her hemoglobin improved to 11.3.    I will hold off on the biopsy at this point and will consider if her hemoglobin drops further.  During today's visit, the patient and the family had a number of reasonable questions which were answered to their satisfaction.  They verbalized understanding of the information provided and they agreed to proceed as outlined above.        PLAN:   I reviewed the recent lab work, discussed diagnosis treatment recommendations   Her hemoglobin is stable   I will change her iron pill to every other day   Return to clinic in 3 months with labs prior and I will check myeloma labs at that time   Advised her to get lab work about a week prior to her next appointment       Baudilio Valadez MD  Hematologist/Medical Oncologist    On this date 4/16/24  I have spent 40 minutes reviewing previous notes, test results and face to face with the patient discussing the diagnosis and importance of compliance with the treatment plan. Greater than 50% of that time was spent face-to-face with the patient in counseling and coordinating her care.       This note is created with the assistance of a speech recognition program.  While intending to generate a document that actually reflects the content of the visit, the document can still have some errors including those of syntax and sound a like substitutions which may escape proof reading.  It such instances, actual meaning can be extrapolated by contextual diversion.

## 2024-04-17 LAB
FERRITIN SERPL-MCNC: 60 NG/ML (ref 13–150)
FOLATE SERPL-MCNC: >20 NG/ML (ref 4.8–24.2)
IRON SATN MFR SERPL: 38 % (ref 20–55)
IRON SERPL-MCNC: 141 UG/DL (ref 37–145)
TIBC SERPL-MCNC: 374 UG/DL (ref 250–450)
UNSATURATED IRON BINDING CAPACITY: 233 UG/DL (ref 112–347)
VIT B12 SERPL-MCNC: 342 PG/ML (ref 232–1245)

## 2024-04-19 ENCOUNTER — CARE COORDINATION (OUTPATIENT)
Dept: CARE COORDINATION | Age: 84
End: 2024-04-19

## 2024-04-19 NOTE — CARE COORDINATION
Attempted to reach Genie for follow up. Left a message on her voicemail with call back number.   Will try to reach her next week.

## 2024-04-22 ENCOUNTER — HOSPITAL ENCOUNTER (INPATIENT)
Age: 84
LOS: 2 days | Discharge: HOME | DRG: 378 | End: 2024-04-24
Attending: EMERGENCY MEDICINE | Admitting: FAMILY MEDICINE
Payer: MEDICARE

## 2024-04-22 ENCOUNTER — APPOINTMENT (OUTPATIENT)
Dept: CT IMAGING | Age: 84
DRG: 378 | End: 2024-04-22
Payer: MEDICARE

## 2024-04-22 DIAGNOSIS — K92.2 LOWER GI BLEED: Primary | ICD-10-CM

## 2024-04-22 LAB
ABO + RH BLD: NORMAL
ALBUMIN SERPL-MCNC: 3.5 G/DL (ref 3.5–5.2)
ALP SERPL-CCNC: 96 U/L (ref 35–104)
ALT SERPL-CCNC: 14 U/L (ref 5–33)
ANION GAP SERPL CALCULATED.3IONS-SCNC: 10 MMOL/L (ref 9–17)
ARM BAND NUMBER: NORMAL
AST SERPL-CCNC: 25 U/L
BACTERIA URNS QL MICRO: ABNORMAL
BASOPHILS # BLD: 0.1 K/UL (ref 0–0.2)
BASOPHILS NFR BLD: 2 % (ref 0–2)
BILIRUB SERPL-MCNC: 0.6 MG/DL (ref 0.3–1.2)
BILIRUB UR QL STRIP: NEGATIVE
BLOOD BANK SAMPLE EXPIRATION: NORMAL
BLOOD GROUP ANTIBODIES SERPL: NEGATIVE
BUN SERPL-MCNC: 16 MG/DL (ref 8–23)
CALCIUM SERPL-MCNC: 9.1 MG/DL (ref 8.6–10.4)
CASTS #/AREA URNS LPF: ABNORMAL /LPF
CHLORIDE SERPL-SCNC: 106 MMOL/L (ref 98–107)
CLARITY UR: CLEAR
CO2 SERPL-SCNC: 26 MMOL/L (ref 20–31)
COLOR UR: YELLOW
CREAT SERPL-MCNC: 1 MG/DL (ref 0.5–0.9)
DATE, STOOL #1: ABNORMAL
EOSINOPHIL # BLD: 0.1 K/UL (ref 0–0.4)
EOSINOPHILS RELATIVE PERCENT: 3 % (ref 0–4)
EPI CELLS #/AREA URNS HPF: ABNORMAL /HPF
ERYTHROCYTE [DISTWIDTH] IN BLOOD BY AUTOMATED COUNT: 14.9 % (ref 11.5–14.9)
GFR SERPL CREATININE-BSD FRML MDRD: 56 ML/MIN/1.73M2
GLUCOSE SERPL-MCNC: 108 MG/DL (ref 70–99)
GLUCOSE UR STRIP-MCNC: NEGATIVE MG/DL
HCT VFR BLD AUTO: 32.5 % (ref 36–46)
HEMOCCULT SP1 STL QL: POSITIVE
HGB BLD-MCNC: 10.6 G/DL (ref 12–16)
HGB UR QL STRIP.AUTO: NEGATIVE
INR PPP: 1.9
KETONES UR STRIP-MCNC: NEGATIVE MG/DL
LEUKOCYTE ESTERASE UR QL STRIP: ABNORMAL
LIPASE SERPL-CCNC: 22 U/L (ref 13–60)
LYMPHOCYTES NFR BLD: 1.3 K/UL (ref 1–4.8)
LYMPHOCYTES RELATIVE PERCENT: 29 % (ref 24–44)
MCH RBC QN AUTO: 32.3 PG (ref 26–34)
MCHC RBC AUTO-ENTMCNC: 32.6 G/DL (ref 31–37)
MCV RBC AUTO: 99.2 FL (ref 80–100)
MONOCYTES NFR BLD: 0.5 K/UL (ref 0.1–1.3)
MONOCYTES NFR BLD: 11 % (ref 1–7)
NEUTROPHILS NFR BLD: 55 % (ref 36–66)
NEUTS SEG NFR BLD: 2.5 K/UL (ref 1.3–9.1)
NITRITE UR QL STRIP: POSITIVE
PH UR STRIP: 7 [PH] (ref 5–8)
PLATELET # BLD AUTO: 102 K/UL (ref 150–450)
PMV BLD AUTO: 9.8 FL (ref 6–12)
POTASSIUM SERPL-SCNC: 4.4 MMOL/L (ref 3.7–5.3)
PROT SERPL-MCNC: 6.6 G/DL (ref 6.4–8.3)
PROT UR STRIP-MCNC: NEGATIVE MG/DL
PROTHROMBIN TIME: 22 SEC (ref 11.8–14.6)
RBC # BLD AUTO: 3.28 M/UL (ref 4–5.2)
RBC #/AREA URNS HPF: ABNORMAL /HPF
SODIUM SERPL-SCNC: 142 MMOL/L (ref 135–144)
SP GR UR STRIP: 1.02 (ref 1–1.03)
TIME, STOOL #1: 1555
UROBILINOGEN UR STRIP-ACNC: NORMAL EU/DL (ref 0–1)
WBC #/AREA URNS HPF: ABNORMAL /HPF
WBC OTHER # BLD: 4.4 K/UL (ref 3.5–11)

## 2024-04-22 PROCEDURE — 96361 HYDRATE IV INFUSION ADD-ON: CPT

## 2024-04-22 PROCEDURE — 83690 ASSAY OF LIPASE: CPT

## 2024-04-22 PROCEDURE — 86901 BLOOD TYPING SEROLOGIC RH(D): CPT

## 2024-04-22 PROCEDURE — 81001 URINALYSIS AUTO W/SCOPE: CPT

## 2024-04-22 PROCEDURE — 74177 CT ABD & PELVIS W/CONTRAST: CPT

## 2024-04-22 PROCEDURE — 2580000003 HC RX 258: Performed by: EMERGENCY MEDICINE

## 2024-04-22 PROCEDURE — 2580000003 HC RX 258: Performed by: FAMILY MEDICINE

## 2024-04-22 PROCEDURE — 86900 BLOOD TYPING SEROLOGIC ABO: CPT

## 2024-04-22 PROCEDURE — 1200000000 HC SEMI PRIVATE

## 2024-04-22 PROCEDURE — 85610 PROTHROMBIN TIME: CPT

## 2024-04-22 PROCEDURE — 6360000002 HC RX W HCPCS: Performed by: EMERGENCY MEDICINE

## 2024-04-22 PROCEDURE — 6360000004 HC RX CONTRAST MEDICATION: Performed by: EMERGENCY MEDICINE

## 2024-04-22 PROCEDURE — 86850 RBC ANTIBODY SCREEN: CPT

## 2024-04-22 PROCEDURE — 99285 EMERGENCY DEPT VISIT HI MDM: CPT

## 2024-04-22 PROCEDURE — C9113 INJ PANTOPRAZOLE SODIUM, VIA: HCPCS | Performed by: EMERGENCY MEDICINE

## 2024-04-22 PROCEDURE — 36415 COLL VENOUS BLD VENIPUNCTURE: CPT

## 2024-04-22 PROCEDURE — 96360 HYDRATION IV INFUSION INIT: CPT

## 2024-04-22 PROCEDURE — 6370000000 HC RX 637 (ALT 250 FOR IP): Performed by: EMERGENCY MEDICINE

## 2024-04-22 PROCEDURE — 80053 COMPREHEN METABOLIC PANEL: CPT

## 2024-04-22 PROCEDURE — 85025 COMPLETE CBC W/AUTO DIFF WBC: CPT

## 2024-04-22 PROCEDURE — 82272 OCCULT BLD FECES 1-3 TESTS: CPT

## 2024-04-22 RX ORDER — SIMETHICONE 80 MG
80 TABLET,CHEWABLE ORAL EVERY 6 HOURS PRN
Status: DISCONTINUED | OUTPATIENT
Start: 2024-04-22 | End: 2024-04-24 | Stop reason: HOSPADM

## 2024-04-22 RX ORDER — SODIUM CHLORIDE 9 MG/ML
INJECTION, SOLUTION INTRAVENOUS CONTINUOUS
Status: DISCONTINUED | OUTPATIENT
Start: 2024-04-22 | End: 2024-04-24 | Stop reason: HOSPADM

## 2024-04-22 RX ORDER — LATANOPROST 50 UG/ML
1 SOLUTION/ DROPS OPHTHALMIC NIGHTLY
Status: DISCONTINUED | OUTPATIENT
Start: 2024-04-22 | End: 2024-04-24 | Stop reason: HOSPADM

## 2024-04-22 RX ORDER — ACETAMINOPHEN 500 MG
500 TABLET ORAL EVERY 6 HOURS PRN
Status: DISCONTINUED | OUTPATIENT
Start: 2024-04-22 | End: 2024-04-24 | Stop reason: HOSPADM

## 2024-04-22 RX ORDER — SODIUM CHLORIDE 0.9 % (FLUSH) 0.9 %
10 SYRINGE (ML) INJECTION PRN
Status: DISCONTINUED | OUTPATIENT
Start: 2024-04-22 | End: 2024-04-24 | Stop reason: HOSPADM

## 2024-04-22 RX ORDER — METOPROLOL SUCCINATE 25 MG/1
25 TABLET, EXTENDED RELEASE ORAL DAILY
Status: DISCONTINUED | OUTPATIENT
Start: 2024-04-23 | End: 2024-04-24 | Stop reason: HOSPADM

## 2024-04-22 RX ORDER — ONDANSETRON 4 MG/1
4 TABLET, ORALLY DISINTEGRATING ORAL EVERY 8 HOURS PRN
Status: DISCONTINUED | OUTPATIENT
Start: 2024-04-22 | End: 2024-04-24 | Stop reason: HOSPADM

## 2024-04-22 RX ORDER — POTASSIUM CHLORIDE 7.45 MG/ML
10 INJECTION INTRAVENOUS PRN
Status: DISCONTINUED | OUTPATIENT
Start: 2024-04-22 | End: 2024-04-24 | Stop reason: HOSPADM

## 2024-04-22 RX ORDER — LEVOTHYROXINE SODIUM 0.1 MG/1
100 TABLET ORAL DAILY
Status: DISCONTINUED | OUTPATIENT
Start: 2024-04-23 | End: 2024-04-24 | Stop reason: HOSPADM

## 2024-04-22 RX ORDER — ACETAMINOPHEN 500 MG
1000 TABLET ORAL ONCE
Status: COMPLETED | OUTPATIENT
Start: 2024-04-22 | End: 2024-04-22

## 2024-04-22 RX ORDER — POTASSIUM CHLORIDE 20 MEQ/1
40 TABLET, EXTENDED RELEASE ORAL PRN
Status: DISCONTINUED | OUTPATIENT
Start: 2024-04-22 | End: 2024-04-24 | Stop reason: HOSPADM

## 2024-04-22 RX ORDER — ONDANSETRON 2 MG/ML
4 INJECTION INTRAMUSCULAR; INTRAVENOUS EVERY 6 HOURS PRN
Status: DISCONTINUED | OUTPATIENT
Start: 2024-04-22 | End: 2024-04-24 | Stop reason: HOSPADM

## 2024-04-22 RX ORDER — MAGNESIUM SULFATE HEPTAHYDRATE 40 MG/ML
2000 INJECTION, SOLUTION INTRAVENOUS PRN
Status: DISCONTINUED | OUTPATIENT
Start: 2024-04-22 | End: 2024-04-24 | Stop reason: HOSPADM

## 2024-04-22 RX ORDER — 0.9 % SODIUM CHLORIDE 0.9 %
100 INTRAVENOUS SOLUTION INTRAVENOUS ONCE
Status: COMPLETED | OUTPATIENT
Start: 2024-04-22 | End: 2024-04-22

## 2024-04-22 RX ORDER — 0.9 % SODIUM CHLORIDE 0.9 %
500 INTRAVENOUS SOLUTION INTRAVENOUS ONCE
Status: COMPLETED | OUTPATIENT
Start: 2024-04-22 | End: 2024-04-22

## 2024-04-22 RX ORDER — POLYETHYLENE GLYCOL 3350 17 G/17G
17 POWDER, FOR SOLUTION ORAL DAILY PRN
Status: DISCONTINUED | OUTPATIENT
Start: 2024-04-22 | End: 2024-04-24 | Stop reason: HOSPADM

## 2024-04-22 RX ORDER — DORZOLAMIDE HCL 20 MG/ML
1 SOLUTION/ DROPS OPHTHALMIC 3 TIMES DAILY
Status: DISCONTINUED | OUTPATIENT
Start: 2024-04-22 | End: 2024-04-24 | Stop reason: HOSPADM

## 2024-04-22 RX ADMIN — PANTOPRAZOLE SODIUM 8 MG/HR: 40 INJECTION, POWDER, FOR SOLUTION INTRAVENOUS at 21:34

## 2024-04-22 RX ADMIN — IOPAMIDOL 75 ML: 755 INJECTION, SOLUTION INTRAVENOUS at 17:57

## 2024-04-22 RX ADMIN — SODIUM CHLORIDE, PRESERVATIVE FREE 10 ML: 5 INJECTION INTRAVENOUS at 17:58

## 2024-04-22 RX ADMIN — SODIUM CHLORIDE 100 ML: 9 INJECTION, SOLUTION INTRAVENOUS at 17:58

## 2024-04-22 RX ADMIN — CEFTRIAXONE SODIUM 1000 MG: 1 INJECTION, POWDER, FOR SOLUTION INTRAMUSCULAR; INTRAVENOUS at 19:54

## 2024-04-22 RX ADMIN — ACETAMINOPHEN 1000 MG: 500 TABLET ORAL at 18:42

## 2024-04-22 RX ADMIN — SODIUM CHLORIDE 500 ML: 0.9 INJECTION, SOLUTION INTRAVENOUS at 16:53

## 2024-04-22 RX ADMIN — SODIUM CHLORIDE: 9 INJECTION, SOLUTION INTRAVENOUS at 21:47

## 2024-04-22 ASSESSMENT — ENCOUNTER SYMPTOMS
NAUSEA: 0
COUGH: 0
BLOOD IN STOOL: 1
ABDOMINAL PAIN: 0
SHORTNESS OF BREATH: 0
VOMITING: 0

## 2024-04-22 ASSESSMENT — PAIN DESCRIPTION - LOCATION: LOCATION: BACK

## 2024-04-22 ASSESSMENT — PAIN - FUNCTIONAL ASSESSMENT: PAIN_FUNCTIONAL_ASSESSMENT: NONE - DENIES PAIN

## 2024-04-22 NOTE — ED PROVIDER NOTES
[facility] ED  eMERGENCY dEPARTMENT eNCOUnter   Attending Attestation     Pt Name: Genie Lama  MRN: [id number]  Birthdate 1940  Date of evaluation: 4/22/24       Genie Lama is a 83 y.o. female who presents with Rectal Bleeding (Pt c/o blood in stool. States this is not a new problem for her. She sees a GI doctor. Pt states she noticed it this time yesterday. Pt states \"sometimes my bowels movements are sold and sometimes they are loose\" Pt denies denies any abdominal pain but states she \"feels weird\")      History:   Patient has been noticing blood in her stool since yesterday.  Patient has no abdominal pain but she does feel pain when you push on her lower abdomen on the left.  Patient is had no fevers no shortness of breath.  Patient has had a history of diverticulitis before and has had multiple bowel surgeries.    Exam: Vitals:   Vitals:    04/22/24 1403   BP: (!) 149/60   Pulse: 97   Resp: 18   Temp: 97.7 °F (36.5 °C)   TempSrc: Oral   SpO2: 98%   Weight: 77.1 kg (170 lb)   Height: 1.651 m (5' 5\")     Heart regular rate rhythm no murmurs.  Lungs clear auscultation bilaterally.  Abdomen soft nondistended with tenderness palpation of left upper and lower abdomen with no peritoneal signs.    I performed a history and physical examination of the patient and discussed management with the resident. I reviewed the resident’s note and agree with the documented findings and plan of care. Any areas of disagreement are noted on the chart. I was personally present for the key portions of any procedures. I have documented in the chart those procedures where I was not present during the key portions. I have personally reviewed all images and agree with the resident's interpretation. I have reviewed the emergency nurses triage note. I agree with the chief complaint, past medical history, past surgical history, allergies, medications, social and family history as documented unless otherwise noted below. 
Quant(!): 10.6  Down from 11 3 6 days ago [BL]   1615 Occult Blood, Stool #1(!): POSITIVE [BL]   1716 INR: 1.9 [BL]   1913 1. No acute intra-abdominal or pelvic abnormality.  2. Cirrhosis of the liver.  3. Postsurgical changes in the large bowel loops. Moderate amount of stool  burden in the colonic loop.  No active extravasation of contrast is seen to  suggest ongoing hemorrhage.  4. Status post cholecystectomy and hysterectomy. [BL]   1916 Plan for admission for lower GI bleeding.  Will discuss with patient's primary care provider. [BL]   1929 Spoke with Dr. Kasper who admits for Dr. Day recommending Protonix drip and n.p.o. with GI consult.  Routine GI consult placed.  Dr. Kasper will admit. [BL]   1934 Patient's urine concerning for urinary tract infection.  Given that the patient is having some abdominal pain.  Will order Rocephin.  On chart review, patient does have penicillin allergy, however does tolerate Rocephin. [BL]      ED Course User Index  [BL] Alicia Spence DO       PROCEDURES:      CONSULTS:  IP CONSULT TO PRIMARY CARE PROVIDER  IP CONSULT TO GI  IP CONSULT TO GI    CRITICAL CARE:  There was significant risk of life threatening deterioration of patient's condition requiring my direct management. Critical care time 0 minutes, excluding any documented procedures.    FINAL IMPRESSION      1. Lower GI bleed          DISPOSITION / PLAN     DISPOSITION Admitted 04/22/2024 07:46:47 PM      PATIENT REFERRED TO:  No follow-up provider specified.    DISCHARGE MEDICATIONS:  Current Discharge Medication List          Alicia Spence DO  Emergency Medicine Resident    (Please note that portions of this note were completed with a voice recognition program.  Efforts were made to edit the dictations but occasionally words are mis-transcribed.)

## 2024-04-22 NOTE — PROGRESS NOTES
Pharmacy Medication History Note      List of current medications patient is taking is complete.     Source of information: dispense report, OARRS    Changes made to medication list:  Medications flagged for removal (include reason, ex. noncompliance):  Midodrine - last filled in 2022  Pantoprazole - last filled in October 2023 for 30 days    Medications removed (include reason, ex. therapy complete or physician discontinued):  Ondansetron - course complete    Medications added/doses adjusted:  Dorzolamide 2% increased to 1 drop in each eye three times daily     Other notes (ex. Recent course of antibiotics, Coumadin dosing):  OARRS negative    Denies use of other OTC or herbal medications.      Allergies clarified    Medication list provided to the patient: no  Medication education provided to the patient: none    Prior to Admission Medications   Prescriptions Last Dose Informant Patient Reported? Taking?   FEROSUL 325 (65 Fe) MG tablet   No No   Sig: TAKE 1 TABLET BY MOUTH IN THE MORNING WITH FOOD   acetaminophen (TYLENOL) 500 MG tablet   Yes No   Sig: Take 1 tablet by mouth at bedtime   atenolol (TENORMIN) 100 MG tablet   No No   Sig: TAKE 1 TABLET BY MOUTH EVERY DAY   bumetanide (BUMEX) 1 MG tablet   No No   Sig: TAKE 1 TABLET BY MOUTH EVERY DAY   cholestyramine (QUESTRAN) 4 g packet   No No   Sig: mix and dissolve 1 packet as directed on package and then take by mouth every evening   dorzolamide (TRUSOPT) 2 % ophthalmic solution   Yes No   Sig: Place 1 drop into both eyes 3 times daily   estradiol (ESTRACE VAGINAL) 0.1 MG/GM vaginal cream   No No   Sig: Place 1 g vaginally daily Apply 1 gm vaginally twice weekly   latanoprost (XALATAN) 0.005 % ophthalmic solution   Yes No   Sig: Place 1 drop into both eyes nightly 1230am   levothyroxine (SYNTHROID) 100 MCG tablet   No No   Sig: TAKE 1 TABLET BY MOUTH EVERY DAY   metoprolol succinate (TOPROL XL) 25 MG extended release tablet   No No   Sig: TAKE 1 TABLET BY MOUTH

## 2024-04-23 ENCOUNTER — APPOINTMENT (OUTPATIENT)
Dept: GENERAL RADIOLOGY | Age: 84
DRG: 378 | End: 2024-04-23
Payer: MEDICARE

## 2024-04-23 PROBLEM — Z92.29 HX OF LONG TERM USE OF BLOOD THINNERS: Status: ACTIVE | Noted: 2024-04-23

## 2024-04-23 PROBLEM — Z79.01 HX OF LONG TERM USE OF BLOOD THINNERS: Status: ACTIVE | Noted: 2024-04-23

## 2024-04-23 PROBLEM — Z90.49 H/O PARTIAL RESECTION OF COLON: Status: ACTIVE | Noted: 2024-04-23

## 2024-04-23 LAB
ERYTHROCYTE [DISTWIDTH] IN BLOOD BY AUTOMATED COUNT: 14.8 % (ref 11.5–14.9)
HCT VFR BLD AUTO: 32.8 % (ref 36–46)
HGB BLD-MCNC: 10.5 G/DL (ref 12–16)
MCH RBC QN AUTO: 31.7 PG (ref 26–34)
MCHC RBC AUTO-ENTMCNC: 32 G/DL (ref 31–37)
MCV RBC AUTO: 98.9 FL (ref 80–100)
PLATELET # BLD AUTO: 83 K/UL (ref 150–450)
PMV BLD AUTO: 9.9 FL (ref 6–12)
RBC # BLD AUTO: 3.31 M/UL (ref 4–5.2)
WBC OTHER # BLD: 4.4 K/UL (ref 3.5–11)

## 2024-04-23 PROCEDURE — 6360000002 HC RX W HCPCS: Performed by: EMERGENCY MEDICINE

## 2024-04-23 PROCEDURE — C9113 INJ PANTOPRAZOLE SODIUM, VIA: HCPCS | Performed by: EMERGENCY MEDICINE

## 2024-04-23 PROCEDURE — 6370000000 HC RX 637 (ALT 250 FOR IP): Performed by: FAMILY MEDICINE

## 2024-04-23 PROCEDURE — 6360000002 HC RX W HCPCS: Performed by: FAMILY MEDICINE

## 2024-04-23 PROCEDURE — C9113 INJ PANTOPRAZOLE SODIUM, VIA: HCPCS | Performed by: NURSE PRACTITIONER

## 2024-04-23 PROCEDURE — 71045 X-RAY EXAM CHEST 1 VIEW: CPT

## 2024-04-23 PROCEDURE — 85027 COMPLETE CBC AUTOMATED: CPT

## 2024-04-23 PROCEDURE — 2580000003 HC RX 258: Performed by: EMERGENCY MEDICINE

## 2024-04-23 PROCEDURE — 1200000000 HC SEMI PRIVATE

## 2024-04-23 PROCEDURE — 6360000002 HC RX W HCPCS: Performed by: INTERNAL MEDICINE

## 2024-04-23 PROCEDURE — 99222 1ST HOSP IP/OBS MODERATE 55: CPT | Performed by: INTERNAL MEDICINE

## 2024-04-23 PROCEDURE — 2580000003 HC RX 258: Performed by: INTERNAL MEDICINE

## 2024-04-23 PROCEDURE — 2580000003 HC RX 258: Performed by: FAMILY MEDICINE

## 2024-04-23 PROCEDURE — 87086 URINE CULTURE/COLONY COUNT: CPT

## 2024-04-23 PROCEDURE — 36415 COLL VENOUS BLD VENIPUNCTURE: CPT

## 2024-04-23 PROCEDURE — 2580000003 HC RX 258: Performed by: NURSE PRACTITIONER

## 2024-04-23 PROCEDURE — 99223 1ST HOSP IP/OBS HIGH 75: CPT | Performed by: INTERNAL MEDICINE

## 2024-04-23 PROCEDURE — 6360000002 HC RX W HCPCS: Performed by: NURSE PRACTITIONER

## 2024-04-23 RX ADMIN — DORZOLAMIDE HCL 1 DROP: 20 SOLUTION/ DROPS OPHTHALMIC at 21:14

## 2024-04-23 RX ADMIN — SODIUM CHLORIDE 125 MG: 9 INJECTION, SOLUTION INTRAVENOUS at 19:49

## 2024-04-23 RX ADMIN — DORZOLAMIDE HCL 1 DROP: 20 SOLUTION/ DROPS OPHTHALMIC at 08:01

## 2024-04-23 RX ADMIN — DORZOLAMIDE HCL 1 DROP: 20 SOLUTION/ DROPS OPHTHALMIC at 13:51

## 2024-04-23 RX ADMIN — LEVOTHYROXINE SODIUM 100 MCG: 0.1 TABLET ORAL at 06:07

## 2024-04-23 RX ADMIN — METOPROLOL SUCCINATE 25 MG: 25 TABLET, EXTENDED RELEASE ORAL at 08:00

## 2024-04-23 RX ADMIN — PANTOPRAZOLE SODIUM 8 MG/HR: 40 INJECTION, POWDER, FOR SOLUTION INTRAVENOUS at 08:15

## 2024-04-23 RX ADMIN — SODIUM CHLORIDE: 9 INJECTION, SOLUTION INTRAVENOUS at 11:47

## 2024-04-23 RX ADMIN — SODIUM CHLORIDE, PRESERVATIVE FREE 40 MG: 5 INJECTION INTRAVENOUS at 12:03

## 2024-04-23 RX ADMIN — CEFTRIAXONE SODIUM 1000 MG: 1 INJECTION, POWDER, FOR SOLUTION INTRAMUSCULAR; INTRAVENOUS at 21:14

## 2024-04-23 RX ADMIN — OCTREOTIDE ACETATE 50 MCG/HR: 500 INJECTION, SOLUTION INTRAVENOUS; SUBCUTANEOUS at 12:05

## 2024-04-23 RX ADMIN — LATANOPROST 1 DROP: 50 SOLUTION OPHTHALMIC at 21:14

## 2024-04-23 RX ADMIN — ACETAMINOPHEN 500 MG: 500 TABLET ORAL at 22:41

## 2024-04-23 RX ADMIN — ACETAMINOPHEN 500 MG: 500 TABLET ORAL at 00:41

## 2024-04-23 ASSESSMENT — PAIN SCALES - GENERAL
PAINLEVEL_OUTOF10: 2
PAINLEVEL_OUTOF10: 6
PAINLEVEL_OUTOF10: 6

## 2024-04-23 ASSESSMENT — PAIN DESCRIPTION - ORIENTATION: ORIENTATION: RIGHT;LEFT

## 2024-04-23 ASSESSMENT — PAIN DESCRIPTION - LOCATION
LOCATION: BACK;LEG
LOCATION: BACK

## 2024-04-23 ASSESSMENT — PAIN DESCRIPTION - DESCRIPTORS
DESCRIPTORS: ACHING
DESCRIPTORS: ACHING

## 2024-04-23 ASSESSMENT — PAIN - FUNCTIONAL ASSESSMENT: PAIN_FUNCTIONAL_ASSESSMENT: PREVENTS OR INTERFERES SOME ACTIVE ACTIVITIES AND ADLS

## 2024-04-23 NOTE — CARE COORDINATION
Case Management Assessment  Initial Evaluation    Date/Time of Evaluation: 4/23/2024 3:19 PM  Assessment Completed by: Shahida Kilpatrick RN    If patient is discharged prior to next notation, then this note serves as note for discharge by case management.    Patient Name: Genie Lama                   YOB: 1940  Diagnosis: Lower GI bleed [K92.2]  GIB (gastrointestinal bleeding) [K92.2]                   Date / Time: 4/22/2024  3:13 PM    Patient Admission Status: Inpatient   Readmission Risk (Low < 19, Mod (19-27), High > 27): Readmission Risk Score: 16.8    Current PCP: Reinier Day MD  PCP verified by CM? Yes    Chart Reviewed: Yes      History Provided by: Patient  Patient Orientation: Alert and Oriented    Patient Cognition: Alert    Hospitalization in the last 30 days (Readmission):  No    If yes, Readmission Assessment in CM Navigator will be completed.    Advance Directives:      Code Status: Full Code   Patient's Primary Decision Maker is: Named in Scanned ACP Document    Primary Decision Maker: Caleb Lama - Child - [phone]    Discharge Planning:    Patient lives with: Alone Type of Home: House  Primary Care Giver: Self  Patient Support Systems include: Children   Current Financial resources: Medicare  Current community resources: None  Current services prior to admission: Durable Medical Equipment, Meals On Wheels            Current DME: Walker, Wheelchair            Type of Home Care services:  None    ADLS  Prior functional level: Independent in ADLs/IADLs  Current functional level:      PT AM-PAC:   /24  OT AM-PAC:   /24    Family can provide assistance at DC: Yes  Would you like Case Management to discuss the discharge plan with any other family members/significant others, and if so, who? No  Plans to Return to Present Housing: Yes  Other Identified Issues/Barriers to RETURNING to current housing: none  Potential Assistance needed at discharge: N/A            Potential DME:

## 2024-04-23 NOTE — PLAN OF CARE
Problem: Discharge Planning  Goal: Discharge to home or other facility with appropriate resources  Outcome: Progressing     Problem: Skin/Tissue Integrity  Goal: Absence of new skin breakdown  Description: 1.  Monitor for areas of redness and/or skin breakdown  2.  Assess vascular access sites hourly  3.  Every 4-6 hours minimum:  Change oxygen saturation probe site  4.  Every 4-6 hours:  If on nasal continuous positive airway pressure, respiratory therapy assess nares and determine need for appliance change or resting period.  4/23/2024 1410 by Darlene Mcfarland RN  Outcome: Progressing  4/23/2024 0420 by Yan Aguila RN  Outcome: Progressing     Problem: Safety - Adult  Goal: Free from fall injury  4/23/2024 1410 by Darlene Mcfarland RN  Outcome: Progressing  4/23/2024 0420 by Yan Aguila RN  Outcome: Progressing     Problem: ABCDS Injury Assessment  Goal: Absence of physical injury  Outcome: Progressing     Problem: Chronic Conditions and Co-morbidities  Goal: Patient's chronic conditions and co-morbidity symptoms are monitored and maintained or improved  Outcome: Progressing

## 2024-04-23 NOTE — CONSULTS
Today's Date: 4/23/2024  Patient Name: Genie Lama  Date of admission: 4/22/2024  3:13 PM  Patient's age: 83 y.o., 1940  Admission Dx: Lower GI bleed [K92.2]  GIB (gastrointestinal bleeding) [K92.2]    Reason for Consult: anemia Blood in stool   Requesting Physician: Mohit Kasper MD    CHIEF COMPLAINT:    Chief Complaint   Patient presents with    Rectal Bleeding     Pt c/o blood in stool. States this is not a new problem for her. She sees a GI doctor. Pt states she noticed it this time yesterday. Pt states \"sometimes my bowels movements are sold and sometimes they are loose\" Pt denies denies any abdominal pain but states she \"feels weird\"       History Obtained From:  patient and chart    HISTORY OF PRESENT ILLNESS:      Genie Lama is a 83 y.o. female who is admitted to the hospital on 4/22/2024  for rectal bleeding.  Patient is known to me for her chronic anemia possibly mild iron deficiency.  She was placed on oral iron therapy with a stable hemoglobin but now she presented with rectal bleeding and her hemoglobin seems to be decreased from her baseline.  She does feel tired and fatigue.  Her hemoglobin is around 10.5, platelets 83 K      Past Medical History:   has a past medical history of Atrial fibrillation (HCC), Chronic back pain, Diverticulosis, GERD (gastroesophageal reflux disease), Hearing aid worn, Hearing deficit, bilateral, Hyperlipidemia, Hypertension, Hypothyroidism, Obesity (BMI 30.0-34.9), Onychomycosis, PE (pulmonary embolism), Poor venous access, and Type II or unspecified type diabetes mellitus without mention of complication, not stated as uncontrolled.    Past Surgical History:   has a past surgical history that includes Colon surgery; Vena Cava Filter Placement; Cholecystectomy; Hysterectomy; Tympanostomy tube placement (Bilateral, 09/20/2019); Colonoscopy (N/A, 01/25/2021); sigmoidoscopy (N/A, 12/02/2021); Upper gastrointestinal endoscopy (N/A, 12/02/2021); Arm

## 2024-04-23 NOTE — ACP (ADVANCE CARE PLANNING)
Advance Care Planning     Advance Care Planning Activator (Inpatient)  Conversation Note      Date of ACP Conversation: 4/23/2024     Conversation Conducted with: Patient with Decision Making Capacity    ACP Activator: Shahida Kilpatrick RN        Health Care Decision Maker:     Current Designated Health Care Decision Maker:     Primary Decision Maker: Caleb Lama - Child - [phone]  Click here to complete Healthcare Decision Makers including section of the Healthcare Decision Maker Relationship (ie \"Primary\")  Today we documented Decision Maker(s) consistent with ACP documents on file.    Care Preferences    Ventilation:  \"If you were in your present state of health and suddenly became very ill and were unable to breathe on your own, what would your preference be about the use of a ventilator (breathing machine) if it were available to you?\"      Would the patient desire the use of ventilator (breathing machine)?: no    \"If your health worsens and it becomes clear that your chance of recovery is unlikely, what would your preference be about the use of a ventilator (breathing machine) if it were available to you?\"     Would the patient desire the use of ventilator (breathing machine)?: No      Resuscitation  \"CPR works best to restart the heart when there is a sudden event, like a heart attack, in someone who is otherwise healthy. Unfortunately, CPR does not typically restart the heart for people who have serious health conditions or who are very sick.\"    \"In the event your heart stopped as a result of an underlying serious health condition, would you want attempts to be made to restart your heart (answer \"yes\" for attempt to resuscitate) or would you prefer a natural death (answer \"no\" for do not attempt to resuscitate)?\" no       [] Yes   [] No   Educated Patient / Decision Maker regarding differences between Advance Directives and portable DNR orders.    Length of ACP Conversation in minutes:      Conversation

## 2024-04-23 NOTE — H&P
Hospital Medicine  History and Physical                                                                                                                                                 Full Code    Patient:  Genie Lama  MRN: [id number]                                                                                                                                                                     CHIEF COMPLAINT:  weakness/black stool    History Obtained From:  patient  PCP: Reinier Day MD    HISTORY OF PRESENT ILLNESS:   The patient is a 83 y.o. female who presents with h/o of feeling weaker, pt says she has been passing black stool since last Sunday  Pt has afib abd h/o of pul embolism, pt on xarelto 10mg daily, pt also see oncology, pt has iron def anemia, pt on iron tab.      Past Medical History:        Diagnosis Date    Atrial fibrillation (HCC) 03/28/2014    Chronic back pain     with rt. leg pain    Diverticulosis     GERD (gastroesophageal reflux disease)     Hearing aid worn     willi. ears.    Hearing deficit, bilateral     Hyperlipidemia     Hypertension     Hypothyroidism     Obesity (BMI 30.0-34.9) 08/12/2016    Onychomycosis     PE (pulmonary embolism)     H/O DVT of rt leg    Poor venous access     Type II or unspecified type diabetes mellitus without mention of complication, not stated as uncontrolled        Past Surgical History:        Procedure Laterality Date    ARM SURGERY Right     Growth taken off per pt.    CARPAL TUNNEL RELEASE Bilateral     Per pt.    CATARACT EXTRACTION EXTRACAPSULAR W/ INTRAOCULAR LENS IMPLANTATION Left 12/06/2022    [facility]    CATARACT EXTRACTION EXTRACAPSULAR W/ INTRAOCULAR LENS IMPLANTATION Right 02/07/2023    [facility]    CHOLECYSTECTOMY      COLON SURGERY      s/p sigmoid cloectomy    COLONOSCOPY N/A 01/25/2021    COLONOSCOPY DIAGNOSTIC performed by Jason Sidhu MD at [facility]    HYSTERECTOMY (CERVIX STATUS UNKNOWN)

## 2024-04-23 NOTE — CONSULTS
GI Consult Note:    Name: Genie Lama  MRN: [id number]     Acct: [id number]  Room: 2076/2076-01    Admit Date: 4/22/2024  PCP: Reinier Day MD    Physician Requesting Consult: Mohit Kasper MD     Reason for Consult:      Rectal bleeding  Mild abdominal discomfort  Patient on blood thinners  Mild anemia    Chief Complaint:     Chief Complaint   Patient presents with    Rectal Bleeding     Pt c/o blood in stool. States this is not a new problem for her. She sees a GI doctor. Pt states she noticed it this time yesterday. Pt states \"sometimes my bowels movements are sold and sometimes they are loose\" Pt denies denies any abdominal pain but states she \"feels weird\"       History Obtained From:     Patient and EMR    History of Present Illness:      Genie Lama is a  83 y.o.  female who presents with Rectal Bleeding (Pt c/o blood in stool. States this is not a new problem for her. She sees a GI doctor. Pt states she noticed it this time yesterday. Pt states \"sometimes my bowels movements are sold and sometimes they are loose\" Pt denies denies any abdominal pain but states she \"feels weird\")    This elderly female patient is being seen and followed by my  in the office  History for intermittent lower GI bleeding  Has been on blood thinners chronically  Patient had a colonoscopy done in the past revealing some questionable minimal oozing around the anastomotic area without any active bleeding  He has history for sigmoid resection secondary to diverticulitis in the past  She was admitted at [facility] yesterday with few episodes of bright red blood per rectum  Blood thinners.  She had a good brown bowel movement today  Currently denies any abdominal pain  Has some off-and-on abdominal bloating gas and diarrhea history for irritable bowel syndrome like symptoms she has been on cholestyramine  Denies any nausea vomiting hematemesis  No smoking alcohol abuse illicit

## 2024-04-23 NOTE — ED NOTES
Report given to SINA Heredia from [employer].   Report method by phone   The following was reviewed with receiving RN:   Current vital signs:  /71   Pulse 77   Temp 97.7 °F (36.5 °C) (Oral)   Resp 14   Ht 1.651 m (5' 5\")   Wt 77.1 kg (170 lb)   SpO2 96%   BMI 28.29 kg/m²                MEWS Score: 1     Any medication or safety alerts were reviewed. Any pending diagnostics and notifications were also reviewed, as well as any safety concerns or issues, abnormal labs, abnormal imaging, and abnormal assessment findings. Questions were answered.

## 2024-04-23 NOTE — PROGRESS NOTES
Patient arrived to room 2076. Vital signs taken, patient oriented to room, side rails up x2, call light within reach. Assessment to follow

## 2024-04-24 ENCOUNTER — TELEPHONE (OUTPATIENT)
Dept: FAMILY MEDICINE CLINIC | Age: 84
End: 2024-04-24

## 2024-04-24 VITALS
DIASTOLIC BLOOD PRESSURE: 60 MMHG | WEIGHT: 170 LBS | HEIGHT: 65 IN | TEMPERATURE: 97.9 F | SYSTOLIC BLOOD PRESSURE: 132 MMHG | BODY MASS INDEX: 28.32 KG/M2 | OXYGEN SATURATION: 94 % | RESPIRATION RATE: 18 BRPM | HEART RATE: 75 BPM

## 2024-04-24 LAB
ERYTHROCYTE [DISTWIDTH] IN BLOOD BY AUTOMATED COUNT: 15 % (ref 11.5–14.9)
HCT VFR BLD AUTO: 30.7 % (ref 36–46)
HGB BLD-MCNC: 10 G/DL (ref 12–16)
MCH RBC QN AUTO: 32.6 PG (ref 26–34)
MCHC RBC AUTO-ENTMCNC: 32.4 G/DL (ref 31–37)
MCV RBC AUTO: 100.5 FL (ref 80–100)
MICROORGANISM SPEC CULT: NORMAL
PLATELET # BLD AUTO: 84 K/UL (ref 150–450)
PMV BLD AUTO: 10 FL (ref 6–12)
RBC # BLD AUTO: 3.06 M/UL (ref 4–5.2)
SPECIMEN DESCRIPTION: NORMAL
WBC OTHER # BLD: 4.2 K/UL (ref 3.5–11)

## 2024-04-24 PROCEDURE — 6360000002 HC RX W HCPCS: Performed by: NURSE PRACTITIONER

## 2024-04-24 PROCEDURE — 99231 SBSQ HOSP IP/OBS SF/LOW 25: CPT | Performed by: INTERNAL MEDICINE

## 2024-04-24 PROCEDURE — 2580000003 HC RX 258: Performed by: FAMILY MEDICINE

## 2024-04-24 PROCEDURE — C9113 INJ PANTOPRAZOLE SODIUM, VIA: HCPCS | Performed by: NURSE PRACTITIONER

## 2024-04-24 PROCEDURE — APPSS30 APP SPLIT SHARED TIME 16-30 MINUTES: Performed by: NURSE PRACTITIONER

## 2024-04-24 PROCEDURE — 36415 COLL VENOUS BLD VENIPUNCTURE: CPT

## 2024-04-24 PROCEDURE — 2580000003 HC RX 258: Performed by: INTERNAL MEDICINE

## 2024-04-24 PROCEDURE — 6360000002 HC RX W HCPCS: Performed by: INTERNAL MEDICINE

## 2024-04-24 PROCEDURE — 6370000000 HC RX 637 (ALT 250 FOR IP): Performed by: FAMILY MEDICINE

## 2024-04-24 PROCEDURE — 2580000003 HC RX 258: Performed by: NURSE PRACTITIONER

## 2024-04-24 PROCEDURE — 85027 COMPLETE CBC AUTOMATED: CPT

## 2024-04-24 RX ORDER — NITROFURANTOIN 25; 75 MG/1; MG/1
100 CAPSULE ORAL 2 TIMES DAILY
Qty: 20 CAPSULE | Refills: 0 | Status: SHIPPED | OUTPATIENT
Start: 2024-04-24 | End: 2024-05-04

## 2024-04-24 RX ADMIN — DORZOLAMIDE HCL 1 DROP: 20 SOLUTION/ DROPS OPHTHALMIC at 08:38

## 2024-04-24 RX ADMIN — METOPROLOL SUCCINATE 25 MG: 25 TABLET, EXTENDED RELEASE ORAL at 08:33

## 2024-04-24 RX ADMIN — LEVOTHYROXINE SODIUM 100 MCG: 0.1 TABLET ORAL at 05:53

## 2024-04-24 RX ADMIN — SODIUM CHLORIDE: 9 INJECTION, SOLUTION INTRAVENOUS at 01:12

## 2024-04-24 RX ADMIN — SODIUM CHLORIDE 125 MG: 9 INJECTION, SOLUTION INTRAVENOUS at 08:35

## 2024-04-24 RX ADMIN — SODIUM CHLORIDE, PRESERVATIVE FREE 40 MG: 5 INJECTION INTRAVENOUS at 08:33

## 2024-04-24 NOTE — TELEPHONE ENCOUNTER
[facility], Admit 24, D/C 24 GI Bleed    Care Transitions Initial Follow Up Call    Outreach made within 2 business days of discharge: Yes    Patient: Genie Lama Patient : 1940   MRN: [id number]  Reason for Admission: GI BLEED  Discharge Date: 24  Admit:  24     Spoke with: Genie Lama    Discharge department/facility: [facility] Interactive Patient Contact:  Was patient able to fill all prescriptions: Yes  Was patient instructed to bring all medications to the follow-up visit: Yes  Is patient taking all medications as directed in the discharge summary? Yes  Does patient understand their discharge instructions: Yes  Does patient have questions or concerns that need addressed prior to 7-14 day follow up office visit: no    Scheduled appointment with PCP within 7-14 days    Follow Up  Future Appointments   Date Time Provider Department Center   2024  1:00 PM Gina Ramirez, APRN - NP [facility]   2024  2:45 PM Zach Johnson MD [facility]   2024  2:45 PM Baudilio Valadez MD [facility]   2024  2:00 PM Nasir Eugene MD [facility]   2024  2:30 PM Reinier Day MD [facility]   10/24/2024  3:30 PM SCHEDULE, [facility]       Misty Stark MA

## 2024-04-24 NOTE — DISCHARGE SUMMARY
Hospitalist Discharge Summary    Genie Lama  :  1940  MRN:  [id number]    Admit date:  2024  Discharge date:  24    Admitting Physician:  Mohit Kasper MD    Discharge Diagnoses:   Patient Active Problem List   Diagnosis    Pure hypercholesterolemia, unspecified    Gastroesophageal reflux disease without esophagitis    Onychomycosis    Diverticulosis    Chronic back pain    Acquired hypothyroidism    Essential hypertension    Right bundle branch block    Chronic pulmonary embolism (HCC)    Type 2 diabetes mellitus without complication, without long-term current use of insulin ([facility])    Obesity (BMI 30.0-34.9)    Tracheobronchitis    Allergic rhinitis    Glaucoma    S/p bilateral myringotomy with tube placement    GI bleed    Iron deficiency anemia    Paroxysmal atrial fibrillation (HCC)    Rectal bleeding    Intractable nausea and vomiting    Atrial fibrillation with RVR ([facility])    Class 2 obesity due to excess calories in adult    Sepsis ([facility])    JOSH (acute kidney injury) ([facility])    Syncope    Acute cystitis without hematuria    Chronic systolic (congestive) heart failure    Chronic renal disease, stage III ([facility]) [459121]    Small intestinal bacterial overgrowth (SIBO)    Diarrhea of infectious origin    Mass of lung    Pneumonia of left upper lobe due to infectious organism    Thrombocytopenia (HCC)    Anemia    Abnormal EKG    Difficulty in walking, not elsewhere classified    Diverticulitis of intestine, part unspecified, without perforation or abscess with bleeding    First degree AV block    Heart failure, unspecified (HCC)    Hx pulmonary embolism    Left ventricular diastolic dysfunction    LVH (left ventricular hypertrophy)    Muscle weakness (generalized)    Mild mitral regurgitation    Nonrheumatic mitral (valve) insufficiency    Other abnormalities of gait and mobility    SOB (shortness of breath) on exertion    Weakness    Recurrent pulmonary embolism (HCC)    Chronic diastolic

## 2024-04-24 NOTE — PROGRESS NOTES
Ivs removed without complication and discharge instructions reviewed with patient, no questions at this time

## 2024-04-24 NOTE — CARE COORDINATION
ONGOING DISCHARGE PLAN:    Patient is alert and oriented x4.    Spoke with patient regarding discharge plan and patient confirms that plan is still home. Pt would like [facility] as she has used them in the past. Referral sent and notified Joseph from [facility]. He states pt has been accepted. Informed Joseph that pt will be discharged today. He will pull [facility] and d/c med list from [facility].    Active order for IV Rocephin.    Hgb 10.0 today.    F/U Gina Ramirez 4/2 @ 1pm.    Will continue to follow for additional discharge needs.    If patient is discharged prior to next notation, then this note serves as note for discharge by case management.    Electronically signed by Araceli Campos RN on 4/24/2024 at 11:26 AM

## 2024-04-24 NOTE — DISCHARGE INSTR - COC
By    ESBL (Extended Spectrum Beta Lactamase)  10/17/16 10/17/16 Margie Pearce RN        Klebsiella - Urine 10/2016                         Nurse Assessment:  Last Vital Signs: /60   Pulse 75   Temp 97.9 °F (36.6 °C)   Resp 18   Ht 1.651 m (5' 5\")   Wt 77.1 kg (170 lb)   SpO2 94%   BMI 28.29 kg/m²     Last documented pain score (0-10 scale): Pain Level: 2  Last Weight:   Wt Readings from Last 1 Encounters:   04/22/24 77.1 kg (170 lb)     Mental Status:  oriented and alert    IV Access:  - None    Nursing Mobility/ADLs:  Walking   Assisted  Transfer  Assisted  Bathing  Assisted  Dressing  Assisted  Toileting  Assisted  Feeding  independent  Med Admin  Independent  Med Delivery   whole    Wound Care Documentation and Therapy:        Elimination:  Continence:   Bowel: Yes  Bladder: Yes  Urinary Catheter: None   Colostomy/Ileostomy/Ileal Conduit: No       Date of Last BM: 4/24/2024  No intake or output data in the 24 hours ending 04/24/24 1125  No intake/output data recorded.    Safety Concerns:     History of Falls (last 30 days) and At Risk for Falls    Impairments/Disabilities:      None    Nutrition Therapy:  Current Nutrition Therapy:   - Oral Diet:  General and Low Sodium (3-4gm)    Routes of Feeding: Oral  Liquids: Thin Liquids  Daily Fluid Restriction: no  Last Modified Barium Swallow with Video (Video Swallowing Test): not done    Treatments at the Time of Hospital Discharge:   Respiratory Treatments: see resp notes   Oxygen Therapy:  is not on home oxygen therapy.  Ventilator:    - No ventilator support    Rehab Therapies: Physical Therapy and Occupational Therapy  Weight Bearing Status/Restrictions: No weight bearing restrictions  Other Medical Equipment (for information only, NOT a DME order):  N/A  Other Treatments: Skilled Nursing assessment and monitoring. Medication education and monitoring per protocol.       Patient's personal belongings (please select all that are sent with

## 2024-04-24 NOTE — PLAN OF CARE
Problem: Discharge Planning  Goal: Discharge to home or other facility with appropriate resources  4/24/2024 0331 by Grace Gee RN  Outcome: Progressing  Flowsheets (Taken 4/24/2024 0331)  Discharge to home or other facility with appropriate resources:   Identify barriers to discharge with patient and caregiver   Arrange for needed discharge resources and transportation as appropriate   Identify discharge learning needs (meds, wound care, etc)   Arrange for interpreters to assist at discharge as needed   Refer to discharge planning if patient needs post-hospital services based on physician order or complex needs related to functional status, cognitive ability or social support system  4/23/2024 1410 by Darlene Mcfarland RN  Outcome: Progressing     Problem: Skin/Tissue Integrity  Goal: Absence of new skin breakdown  Description: 1.  Monitor for areas of redness and/or skin breakdown  2.  Assess vascular access sites hourly  3.  Every 4-6 hours minimum:  Change oxygen saturation probe site  4.  Every 4-6 hours:  If on nasal continuous positive airway pressure, respiratory therapy assess nares and determine need for appliance change or resting period.  4/24/2024 0331 by Grace Gee RN  Outcome: Progressing  Note: Monitoring skin  4/23/2024 1410 by Darlene Mcfarland RN  Outcome: Progressing     Problem: Safety - Adult  Goal: Free from fall injury  4/24/2024 0331 by Grace Gee RN  Outcome: Progressing  Flowsheets (Taken 4/24/2024 0331)  Free From Fall Injury: Instruct family/caregiver on patient safety  4/23/2024 1410 by Darlene Mcfarland RN  Outcome: Progressing     Problem: ABCDS Injury Assessment  Goal: Absence of physical injury  4/24/2024 0331 by Grace Gee RN  Outcome: Progressing  Flowsheets (Taken 4/24/2024 0331)  Absence of Physical Injury: Implement safety measures based on patient assessment  4/23/2024 1410 by Darlene Mcfarland RN  Outcome: Progressing

## 2024-04-24 NOTE — PROGRESS NOTES
[facility]    Gastroenterology Daily Progress Note      Patient:   Genie Lama   :    1940   Facility:   [facility]  Date:     2024  Consultant:   MINH Guardado - CNP, CNP      SUBJECTIVE  83 y.o. female admitted 2024 with Lower GI bleed [K92.2]  GIB (gastrointestinal bleeding) [K92.2] and seen for hematochezia. The pt was seen and examined. Pt having brown stool denies nausea and abdominal pain. Hgb 10. Tolerating a diet.        OBJECTIVE  Scheduled Meds:   pantoprazole (PROTONIX) 40 mg in sodium chloride (PF) 0.9 % 10 mL injection  40 mg IntraVENous Daily    ferric gluconate (FERRLECIT) 125 mg in sodium chloride 0.9 % 100 mL IVPB  125 mg IntraVENous Daily    dorzolamide  1 drop Both Eyes TID    latanoprost  1 drop Both Eyes Nightly    levothyroxine  100 mcg Oral Daily    metoprolol succinate  25 mg Oral Daily    cefTRIAXone (ROCEPHIN) IV  1,000 mg IntraVENous Q24H       Vital Signs:  /60   Pulse 75   Temp 97.9 °F (36.6 °C)   Resp 18   Ht 1.651 m (5' 5\")   Wt 77.1 kg (170 lb)   SpO2 94%   BMI 28.29 kg/m²    Review of Systems - History obtained from chart review and the patient  General ROS: negative  Respiratory ROS: no cough, shortness of breath, or wheezing  Cardiovascular ROS: no chest pain or dyspnea on exertion  Gastrointestinal ROS: no abdominal pain, change in bowel habits, or black or bloody stools   Physical Exam:     General Appearance: alert and oriented to person, place and time, well-developed and well-nourished, in no acute distress  Skin: warm and dry, no rash or erythema  Head: normocephalic and atraumatic  Eyes: pupils equal, round, and reactive to light, extraocular eye movements intact, conjunctivae normal  ENT: hearing grossly normal bilaterally  Neck: neck supple and non tender without mass, no thyromegaly or thyroid nodules, no cervical lymphadenopathy   Pulmonary/Chest: clear to auscultation bilaterally- no wheezes, rales or

## 2024-04-24 NOTE — PROGRESS NOTES
Physician Progress Note      PATIENT:               KENDELL FRANCO  CSN #:                  [id number]  :                       1940  ADMIT DATE:       2024 3:13 PM  DISCH DATE:  RESPONDING  PROVIDER #:        Mohit Kasper MD          QUERY TEXT:    Patient admitted with GIB, noted to have atrial fibrillation and is maintained   on Xarelto. If possible, please document in progress notes and discharge   summary if you are evaluating and/or treating any of the following:    The medical record reflects the following:  Risk Factors:  83 yof PMH DM2; chronic systolic CHF, HTN  Clinical Indicators: Atrial fibrillation  Treatment:  Hold Xarelto    Thank you,  Sayda Stevens RN/CCDS  [email or web address]  Options provided:  -- Secondary hypercoagulable state in a patient with atrial fibrillation  -- Other - I will add my own diagnosis  -- Disagree - Not applicable / Not valid  -- Disagree - Clinically unable to determine / Unknown  -- Refer to Clinical Documentation Reviewer    PROVIDER RESPONSE TEXT:    This patient has secondary hypercoagulable state in a patient with atrial   fibrillation.    Query created by: Sayda Stevens on 2024 1:21 PM      Electronically signed by:  Mohit Kasper MD 2024 10:58 AM

## 2024-04-25 ENCOUNTER — CARE COORDINATION (OUTPATIENT)
Dept: CASE MANAGEMENT | Age: 84
End: 2024-04-25

## 2024-04-25 DIAGNOSIS — K92.2 LOWER GI BLEED: Primary | ICD-10-CM

## 2024-04-25 DIAGNOSIS — I10 ESSENTIAL HYPERTENSION: ICD-10-CM

## 2024-04-25 PROCEDURE — 1111F DSCHRG MED/CURRENT MED MERGE: CPT | Performed by: FAMILY MEDICINE

## 2024-04-25 RX ORDER — BUMETANIDE 1 MG/1
1 TABLET ORAL DAILY
Qty: 30 TABLET | Refills: 0 | Status: SHIPPED | OUTPATIENT
Start: 2024-04-25

## 2024-04-26 RX ORDER — SIMETHICONE 80 MG
TABLET,CHEWABLE ORAL
COMMUNITY
Start: 2023-11-28 | End: 2024-04-29

## 2024-04-26 RX ORDER — MIDODRINE HYDROCHLORIDE 2.5 MG/1
TABLET ORAL
COMMUNITY
Start: 2023-11-28

## 2024-04-26 RX ORDER — PANTOPRAZOLE SODIUM 40 MG/1
FOR SUSPENSION ORAL
COMMUNITY
Start: 2023-11-28 | End: 2024-04-29

## 2024-04-28 DIAGNOSIS — K21.9 GASTROESOPHAGEAL REFLUX DISEASE WITHOUT ESOPHAGITIS: ICD-10-CM

## 2024-04-28 DIAGNOSIS — K57.90 DIVERTICULOSIS: ICD-10-CM

## 2024-04-28 RX ORDER — PANTOPRAZOLE SODIUM 40 MG/1
40 TABLET, DELAYED RELEASE ORAL
Qty: 30 TABLET | Refills: 0 | Status: SHIPPED | OUTPATIENT
Start: 2024-04-28

## 2024-04-29 ENCOUNTER — OFFICE VISIT (OUTPATIENT)
Dept: FAMILY MEDICINE CLINIC | Age: 84
End: 2024-04-29

## 2024-04-29 ENCOUNTER — CARE COORDINATION (OUTPATIENT)
Dept: CARE COORDINATION | Age: 84
End: 2024-04-29

## 2024-04-29 VITALS
RESPIRATION RATE: 20 BRPM | OXYGEN SATURATION: 96 % | DIASTOLIC BLOOD PRESSURE: 70 MMHG | HEART RATE: 72 BPM | TEMPERATURE: 98.2 F | WEIGHT: 174.2 LBS | BODY MASS INDEX: 28.99 KG/M2 | SYSTOLIC BLOOD PRESSURE: 118 MMHG

## 2024-04-29 DIAGNOSIS — S60.512A ABRASION OF SKIN OF LEFT HAND: ICD-10-CM

## 2024-04-29 DIAGNOSIS — K59.03 DRUG-INDUCED CONSTIPATION: ICD-10-CM

## 2024-04-29 DIAGNOSIS — Z09 HOSPITAL DISCHARGE FOLLOW-UP: ICD-10-CM

## 2024-04-29 DIAGNOSIS — R53.1 GENERALIZED WEAKNESS: ICD-10-CM

## 2024-04-29 DIAGNOSIS — K92.2 GASTROINTESTINAL HEMORRHAGE, UNSPECIFIED GASTROINTESTINAL HEMORRHAGE TYPE: Primary | ICD-10-CM

## 2024-04-29 DIAGNOSIS — D50.0 IRON DEFICIENCY ANEMIA DUE TO CHRONIC BLOOD LOSS: ICD-10-CM

## 2024-04-29 RX ORDER — DOCUSATE SODIUM 100 MG/1
100 CAPSULE, LIQUID FILLED ORAL DAILY PRN
Qty: 30 CAPSULE | Refills: 0 | Status: SHIPPED | OUTPATIENT
Start: 2024-04-29

## 2024-04-29 RX ORDER — MUPIROCIN CALCIUM 20 MG/G
CREAM TOPICAL
Qty: 15 G | Refills: 0 | Status: SHIPPED | OUTPATIENT
Start: 2024-04-29 | End: 2024-05-29

## 2024-04-29 ASSESSMENT — PATIENT HEALTH QUESTIONNAIRE - PHQ9
2. FEELING DOWN, DEPRESSED OR HOPELESS: NOT AT ALL
SUM OF ALL RESPONSES TO PHQ QUESTIONS 1-9: 0
SUM OF ALL RESPONSES TO PHQ QUESTIONS 1-9: 0
SUM OF ALL RESPONSES TO PHQ9 QUESTIONS 1 & 2: 0
SUM OF ALL RESPONSES TO PHQ QUESTIONS 1-9: 0
1. LITTLE INTEREST OR PLEASURE IN DOING THINGS: NOT AT ALL
SUM OF ALL RESPONSES TO PHQ QUESTIONS 1-9: 0

## 2024-04-29 NOTE — PROGRESS NOTES
Post-Discharge Transitional Care  Follow Up      Genie Lama   YOB: 1940    Date of Office Visit:  4/29/2024  Date of Hospital Admission: 4/22/24  Date of Hospital Discharge: 4/24/24  Risk of hospital readmission (high >=14%. Medium >=10%) :Readmission Risk Score: 16.8      Care management risk score Rising risk (score 2-5) and Complex Care (Scores >=6): No Risk Score On File     Non face to face  following discharge, date last encounter closed (first attempt may have been earlier): 04/25/2024    Call initiated 2 business days of discharge: Yes    ASSESSMENT/PLAN:   Gastrointestinal hemorrhage, unspecified gastrointestinal hemorrhage type  Iron deficiency anemia due to chronic blood loss  -    stable. Continue iron supplements   -  recheck  CBC; Future and follow up with GI as scheduled     Generalized weakness         - continue home health     Drug-induced constipation  -    start colace   -  docusate sodium (COLACE) 100 MG capsule; Take 1 capsule by mouth daily as needed for Constipation, Disp-30 capsule, R-0Normal    Abrasion of skin of left hand  -   the abrasion cleaned with alcohol pads and mupirocin ointment applied.   Steri strips applied. Advised to keep it clean and dry   -  mupirocin (BACTROBAN) 2 % cream; Apply topically 3 times daily., Disp-15 g, R-0, Normal    Hospital discharge follow-up  -     NV DISCHARGE MEDS RECONCILED W/ CURRENT OUTPATIENT MED LIST      Medical Decision Making: high complexity  Return for as scheduled , diabetes, HTN, HLD.           Subjective:   HPI:  Follow up of Hospital problems/diagnosis(es):      83 year old female presents for follow up s/p hospital discharge for acute GI bleeding, anemia, drug induced constipation, generalized weakness,  UTI and skin abrasion. Was admitted on 4/22 for generalized weakness and states she noticed pure blood in the toilet when having bowel movements. Blood tests showed worsening HH ( 10/30.7). CT abd /pelvis  showed

## 2024-05-02 ENCOUNTER — HOSPITAL ENCOUNTER (OUTPATIENT)
Age: 84
Discharge: HOME | End: 2024-05-02
Payer: MEDICARE

## 2024-05-02 LAB
ERYTHROCYTE [DISTWIDTH] IN BLOOD BY AUTOMATED COUNT: 14.8 % (ref 11.5–14.9)
HCT VFR BLD AUTO: 35 % (ref 36–46)
HGB BLD-MCNC: 10.9 G/DL (ref 12–16)
MCH RBC QN AUTO: 30.9 PG (ref 26–34)
MCHC RBC AUTO-ENTMCNC: 31.1 G/DL (ref 31–37)
MCV RBC AUTO: 99.3 FL (ref 80–100)
PLATELET # BLD AUTO: 123 K/UL (ref 150–450)
PMV BLD AUTO: 9.9 FL (ref 6–12)
RBC # BLD AUTO: 3.52 M/UL (ref 4–5.2)
WBC OTHER # BLD: 4.8 K/UL (ref 3.5–11)

## 2024-05-02 PROCEDURE — 36415 COLL VENOUS BLD VENIPUNCTURE: CPT

## 2024-05-02 PROCEDURE — 85027 COMPLETE CBC AUTOMATED: CPT

## 2024-05-03 ENCOUNTER — CARE COORDINATION (OUTPATIENT)
Dept: CARE COORDINATION | Age: 84
End: 2024-05-03

## 2024-05-03 NOTE — CARE COORDINATION
Therapy: Declined  Zone Management Tools: Completed          Goals Addressed                   This Visit's Progress     Conditions and Symptoms   On track     I will schedule office visits, as directed by my provider.  I will keep my appointment or reschedule if I have to cancel.  I will notify my provider of any barriers to my plan of care.  I will follow my Zone Management tool to seek urgent or emergent care.  I will notify my provider of any symptoms that indicate a worsening of my condition.    Barriers: overwhelmed by complexity of regimen and lack of education  Plan for overcoming my barriers: Care Management   Confidence: 8/10  Anticipated Goal Completion Date: 6/30/2024                Future Appointments   Date Time Provider Department Center   5/7/2024 12:45 PM Jason Sidhu MD [facility]   6/17/2024  2:45 PM Zach Johnson MD [facility]   7/16/2024  2:45 PM Baudilio Valadez MD [facility]   7/22/2024  2:00 PM Nasir Eugene MD [facility]   9/17/2024  2:30 PM Reinier Day MD [facility]   10/24/2024  3:30 PM SCHEDULE, [facility]    and   Congestive Heart Failure Assessment    Are you currently restricting fluids?: 2000cc  Do you understand a low sodium diet?: Yes  Do you understand how to read food labels?: Yes  How many restaurant meals do you eat per week?: 0  Do you salt your food before tasting it?: No     No patient-reported symptoms      Symptoms:  CHF associated dyspnea on exertion: Pos, CHF associated weakness: Pos      Symptom course: improving  Weight trend: stable  Salt intake watch compared to last visit: stable

## 2024-05-07 ENCOUNTER — OFFICE VISIT (OUTPATIENT)
Dept: GASTROENTEROLOGY | Age: 84
End: 2024-05-07
Payer: MEDICARE

## 2024-05-07 VITALS
HEART RATE: 71 BPM | HEIGHT: 65 IN | WEIGHT: 170 LBS | SYSTOLIC BLOOD PRESSURE: 138 MMHG | BODY MASS INDEX: 28.32 KG/M2 | DIASTOLIC BLOOD PRESSURE: 67 MMHG

## 2024-05-07 DIAGNOSIS — K62.5 HEMORRHAGE OF RECTUM AND ANUS: Primary | ICD-10-CM

## 2024-05-07 DIAGNOSIS — K57.90 DIVERTICULOSIS: ICD-10-CM

## 2024-05-07 PROCEDURE — 3075F SYST BP GE 130 - 139MM HG: CPT | Performed by: INTERNAL MEDICINE

## 2024-05-07 PROCEDURE — G8427 DOCREV CUR MEDS BY ELIG CLIN: HCPCS | Performed by: INTERNAL MEDICINE

## 2024-05-07 PROCEDURE — 1123F ACP DISCUSS/DSCN MKR DOCD: CPT | Performed by: INTERNAL MEDICINE

## 2024-05-07 PROCEDURE — 1111F DSCHRG MED/CURRENT MED MERGE: CPT | Performed by: INTERNAL MEDICINE

## 2024-05-07 PROCEDURE — 1090F PRES/ABSN URINE INCON ASSESS: CPT | Performed by: INTERNAL MEDICINE

## 2024-05-07 PROCEDURE — G8417 CALC BMI ABV UP PARAM F/U: HCPCS | Performed by: INTERNAL MEDICINE

## 2024-05-07 PROCEDURE — 1036F TOBACCO NON-USER: CPT | Performed by: INTERNAL MEDICINE

## 2024-05-07 PROCEDURE — G8400 PT W/DXA NO RESULTS DOC: HCPCS | Performed by: INTERNAL MEDICINE

## 2024-05-07 PROCEDURE — 99214 OFFICE O/P EST MOD 30 MIN: CPT | Performed by: INTERNAL MEDICINE

## 2024-05-07 PROCEDURE — 3078F DIAST BP <80 MM HG: CPT | Performed by: INTERNAL MEDICINE

## 2024-05-07 ASSESSMENT — ENCOUNTER SYMPTOMS
NAUSEA: 1
COUGH: 0
ANAL BLEEDING: 1
RECTAL PAIN: 0
CONSTIPATION: 1
DIARRHEA: 1
VOICE CHANGE: 0
ABDOMINAL DISTENTION: 1
TROUBLE SWALLOWING: 0
SINUS PRESSURE: 0
BLOOD IN STOOL: 0
VOMITING: 0
BACK PAIN: 0
WHEEZING: 0
CHOKING: 0
SORE THROAT: 0
ABDOMINAL PAIN: 0

## 2024-05-07 NOTE — PROGRESS NOTES
ecchymosis, erythema or rash.   Neurological:      Mental Status: She is alert and oriented to person, place, and time.      Cranial Nerves: No cranial nerve deficit.   Psychiatric:         Mood and Affect: Mood normal.         Behavior: Behavior normal.         Thought Content: Thought content normal.           LABORATORY DATA: Reviewed  Lab Results   Component Value Date    WBC 4.8 05/02/2024    HGB 10.9 (L) 05/02/2024    HCT 35.0 (L) 05/02/2024    MCV 99.3 05/02/2024     (L) 05/02/2024     04/22/2024    K 4.4 04/22/2024     04/22/2024    CO2 26 04/22/2024    BUN 16 04/22/2024    CREATININE 1.0 (H) 04/22/2024    BILITOT 0.6 04/22/2024    ALKPHOS 96 04/22/2024    AST 25 04/22/2024    ALT 14 04/22/2024    INR 1.9 04/22/2024         Lab Results   Component Value Date    RBC 3.52 (L) 05/02/2024    HGB 10.9 (L) 05/02/2024    MCV 99.3 05/02/2024    MCH 30.9 05/02/2024    MCHC 31.1 05/02/2024    RDW 14.8 05/02/2024    MPV 9.9 05/02/2024    BASOPCT 2 04/22/2024    LYMPHSABS 1.32 03/21/2023    MONOSABS 0.50 04/22/2024    NEUTROABS 2.50 04/22/2024    EOSABS 0.10 04/22/2024    BASOSABS 0.10 04/22/2024         DIAGNOSTIC TESTING:     XR CHEST (SINGLE VIEW FRONTAL)    Result Date: 4/23/2024  EXAMINATION: ONE XRAY VIEW OF THE CHEST 4/23/2024 2:50 pm COMPARISON: 02/28/2024 HISTORY: ORDERING SYSTEM PROVIDED HISTORY: follow up pneumonia TECHNOLOGIST PROVIDED HISTORY: follow up pneumonia Reason for Exam: follow up pneumonia FINDINGS: Stable chronic interstitial markings noted throughout both lungs.  No consolidation or pulmonary edema. No evidence of pleural effusion or pneumothorax. Cardiomediastinal silhouette and bony thorax are unchanged.     No acute focal process.  Chronic interstitial markings noted throughout the lungs.     CT ABDOMEN PELVIS W IV CONTRAST Additional Contrast? None    Result Date: 4/22/2024  EXAMINATION: CT OF THE ABDOMEN AND PELVIS WITH CONTRAST 4/22/2024 4:50 pm TECHNIQUE: CT of the

## 2024-05-13 ENCOUNTER — CARE COORDINATION (OUTPATIENT)
Dept: CARE COORDINATION | Age: 84
End: 2024-05-13

## 2024-05-13 DIAGNOSIS — I10 ESSENTIAL HYPERTENSION: ICD-10-CM

## 2024-05-13 RX ORDER — METOPROLOL SUCCINATE 25 MG/1
TABLET, EXTENDED RELEASE ORAL
Qty: 90 TABLET | Refills: 1 | Status: SHIPPED | OUTPATIENT
Start: 2024-05-13

## 2024-05-13 RX ORDER — LEVOTHYROXINE SODIUM 0.1 MG/1
TABLET ORAL
Qty: 90 TABLET | Refills: 1 | Status: SHIPPED | OUTPATIENT
Start: 2024-05-13

## 2024-05-13 NOTE — CARE COORDINATION
Attempted to reach Genie for follow up. No answer. Just rings and rings. No voicemail. Unable to leave a message.   Will try to reach her next week.

## 2024-05-20 DIAGNOSIS — K21.9 GASTROESOPHAGEAL REFLUX DISEASE WITHOUT ESOPHAGITIS: ICD-10-CM

## 2024-05-20 DIAGNOSIS — K57.90 DIVERTICULOSIS: ICD-10-CM

## 2024-05-20 DIAGNOSIS — I10 ESSENTIAL HYPERTENSION: ICD-10-CM

## 2024-05-20 RX ORDER — BUMETANIDE 1 MG/1
1 TABLET ORAL DAILY
Qty: 30 TABLET | Refills: 0 | Status: SHIPPED | OUTPATIENT
Start: 2024-05-20

## 2024-05-20 RX ORDER — PANTOPRAZOLE SODIUM 40 MG/1
40 TABLET, DELAYED RELEASE ORAL
Qty: 30 TABLET | Refills: 2 | Status: SHIPPED | OUTPATIENT
Start: 2024-05-20

## 2024-05-21 ENCOUNTER — CARE COORDINATION (OUTPATIENT)
Dept: CARE COORDINATION | Age: 84
End: 2024-05-21

## 2024-05-23 NOTE — CARE COORDINATION
will continue to take her medications as prescribed  [facility]  Monitor weight and BP daily   Will follow up in 2 weeks  to assess for any symptoms or needs;     Offered patient enrollment in the Remote Patient Monitoring (RPM) program for in-home monitoring: Yes, but did not enroll at this time: will discuss further at next encounter .    Lab Results       None            Care Coordination Interventions    Referral from Primary Care Provider: No  Suggested Interventions and Community Resources  Home Health Services: Completed (Comment: [facility] - SN, PT, OT)  Occupational Therapy: Declined  Physical Therapy: Declined  Zone Management Tools: Completed          Goals Addressed                   This Visit's Progress     Conditions and Symptoms   On track     I will schedule office visits, as directed by my provider.  I will keep my appointment or reschedule if I have to cancel.  I will notify my provider of any barriers to my plan of care.  I will follow my Zone Management tool to seek urgent or emergent care.  I will notify my provider of any symptoms that indicate a worsening of my condition.    Barriers: overwhelmed by complexity of regimen and lack of education  Plan for overcoming my barriers: Care Management   Confidence: 8/10  Anticipated Goal Completion Date: 6/30/2024                Future Appointments   Date Time Provider Department Center   6/17/2024  2:45 PM Zach Johnson MD [facility]   7/16/2024  2:45 PM Baudilio Valadez MD [facility]   7/22/2024  2:00 PM Nasir Eugene [facility]   9/17/2024  2:30 PM Reinier Day MD [facility]   10/24/2024  3:30 PM SCHEDULE, [facility]    and   Congestive Heart Failure Assessment    Are you currently restricting fluids?: 2000cc  Do you understand a low sodium diet?: Yes  Do you understand how to read food labels?: Yes  How many restaurant meals do

## 2024-05-25 ENCOUNTER — HOSPITAL ENCOUNTER (OUTPATIENT)
Age: 84
Setting detail: SPECIMEN
Discharge: HOME | End: 2024-05-25

## 2024-05-25 ENCOUNTER — OFFICE VISIT (OUTPATIENT)
Dept: FAMILY MEDICINE CLINIC | Age: 84
End: 2024-05-25

## 2024-05-25 VITALS
DIASTOLIC BLOOD PRESSURE: 58 MMHG | WEIGHT: 174 LBS | TEMPERATURE: 97.2 F | OXYGEN SATURATION: 98 % | BODY MASS INDEX: 28.99 KG/M2 | SYSTOLIC BLOOD PRESSURE: 120 MMHG | HEIGHT: 65 IN | HEART RATE: 74 BPM

## 2024-05-25 DIAGNOSIS — N30.01 ACUTE CYSTITIS WITH HEMATURIA: ICD-10-CM

## 2024-05-25 DIAGNOSIS — N30.01 ACUTE CYSTITIS WITH HEMATURIA: Primary | ICD-10-CM

## 2024-05-25 LAB
APPEARANCE FLUID: ABNORMAL
BILIRUBIN, POC: ABNORMAL
BLOOD URINE, POC: ABNORMAL
CLARITY, POC: ABNORMAL
COLOR, POC: YELLOW
GLUCOSE URINE, POC: ABNORMAL
KETONES, POC: ABNORMAL
LEUKOCYTE EST, POC: ABNORMAL
NITRITE, POC: ABNORMAL
PH, POC: 7
PROTEIN, POC: ABNORMAL
SPECIFIC GRAVITY, POC: 1.01
UROBILINOGEN, POC: 0.2

## 2024-05-25 RX ORDER — CEPHALEXIN 250 MG/1
250 CAPSULE ORAL 4 TIMES DAILY
Qty: 28 CAPSULE | Refills: 0 | Status: SHIPPED | OUTPATIENT
Start: 2024-05-25 | End: 2024-06-01

## 2024-05-25 RX ORDER — GRANULES FOR ORAL 3 G/1
3 POWDER ORAL ONCE
Qty: 1 EACH | Refills: 0 | Status: CANCELLED | OUTPATIENT
Start: 2024-05-25 | End: 2024-05-25

## 2024-05-25 NOTE — PROGRESS NOTES
Shanon Ortiz MD  [facility] W[facility]-IN [facility]IC[facility]  [address]  [address]  [facility] [address]  Dept: [phone]    Genie Lama is a 83 y.o. female who presents today for their medical conditions/complaints as noted below.  Genie Lama is here today c/o Dysuria (Onset 2 days )       HPI:     HPI    Patient presents to the walk-in clinic for evaluation of UTI symptoms that began 3 days ago  Endorses dysuria, increased urinary frequency, worsening midline back pain, suprapubic discomfort  Denies fever, nausea, vomiting, vaginal discharge  Follows with urology for recurrent UTIs, has appointment upcoming this summer    Patient Active Problem List   Diagnosis    Pure hypercholesterolemia, unspecified    Gastroesophageal reflux disease without esophagitis    Onychomycosis    Diverticulosis    Chronic back pain    Acquired hypothyroidism    Essential hypertension    Right bundle branch block    Chronic pulmonary embolism (HCC)    Type 2 diabetes mellitus without complication, without long-term current use of insulin (HCC)    Obesity (BMI 30.0-34.9)    Tracheobronchitis    Allergic rhinitis    Glaucoma    S/p bilateral myringotomy with tube placement    GI bleed    Iron deficiency anemia    Paroxysmal atrial fibrillation (HCC)    Rectal bleeding    Intractable nausea and vomiting    Atrial fibrillation with RVR (HCC)    Class 2 obesity due to excess calories in adult    Sepsis (HCC)    JOSH (acute kidney injury) (HCC)    Syncope    Acute cystitis without hematuria    Chronic systolic (congestive) heart failure    Chronic renal disease, stage III (HCC) [325534]    Small intestinal bacterial overgrowth (SIBO)    Diarrhea of infectious origin    Mass of lung    Pneumonia of left upper lobe due to infectious organism    Thrombocytopenia (HCC)    Anemia    Abnormal EKG    Difficulty in walking, not elsewhere classified    Diverticulitis of

## 2024-05-26 LAB
MICROORGANISM SPEC CULT: ABNORMAL
SPECIMEN DESCRIPTION: ABNORMAL

## 2024-05-27 LAB
MICROORGANISM SPEC CULT: ABNORMAL
SPECIMEN DESCRIPTION: ABNORMAL

## 2024-05-31 ENCOUNTER — CARE COORDINATION (OUTPATIENT)
Dept: CARE COORDINATION | Age: 84
End: 2024-05-31

## 2024-05-31 NOTE — CARE COORDINATION
I spoke with the patient for continued Care Coordination follow up and education. Patient states she only has her sugar tested when she goes to see Dr. Day.  Not on any diabetic medication and does not self test.  Denies any swelling, chest pain, sob and states unable to check weight due to her balance. Patient also states she is feeling better and UTI symptoms are improving.  Staying hydrated and states she has an appointment in July with Urology. No needs or concerns at this time.  I advised patient to contact PCP office if needed.  No further needs at this time.   CC f/u one week

## 2024-06-10 ENCOUNTER — HOSPITAL ENCOUNTER (OUTPATIENT)
Age: 84
Setting detail: SPECIMEN
Discharge: HOME | End: 2024-06-10

## 2024-06-10 ENCOUNTER — CARE COORDINATION (OUTPATIENT)
Dept: CARE COORDINATION | Age: 84
End: 2024-06-10

## 2024-06-10 ENCOUNTER — OFFICE VISIT (OUTPATIENT)
Dept: UROLOGY | Age: 84
End: 2024-06-10
Payer: MEDICARE

## 2024-06-10 VITALS
BODY MASS INDEX: 28.99 KG/M2 | HEIGHT: 65 IN | WEIGHT: 174 LBS | TEMPERATURE: 97.3 F | DIASTOLIC BLOOD PRESSURE: 66 MMHG | SYSTOLIC BLOOD PRESSURE: 130 MMHG | RESPIRATION RATE: 16 BRPM | HEART RATE: 80 BPM | OXYGEN SATURATION: 96 %

## 2024-06-10 DIAGNOSIS — N39.0 RECURRENT UTI: Primary | ICD-10-CM

## 2024-06-10 DIAGNOSIS — N95.2 ATROPHIC VAGINITIS: ICD-10-CM

## 2024-06-10 DIAGNOSIS — N39.41 URGE INCONTINENCE: ICD-10-CM

## 2024-06-10 DIAGNOSIS — N39.0 RECURRENT UTI: ICD-10-CM

## 2024-06-10 DIAGNOSIS — R30.0 DYSURIA: ICD-10-CM

## 2024-06-10 PROCEDURE — 81002 URINALYSIS NONAUTO W/O SCOPE: CPT | Performed by: NURSE PRACTITIONER

## 2024-06-10 PROCEDURE — 1036F TOBACCO NON-USER: CPT | Performed by: NURSE PRACTITIONER

## 2024-06-10 PROCEDURE — G8427 DOCREV CUR MEDS BY ELIG CLIN: HCPCS | Performed by: NURSE PRACTITIONER

## 2024-06-10 PROCEDURE — 1090F PRES/ABSN URINE INCON ASSESS: CPT | Performed by: NURSE PRACTITIONER

## 2024-06-10 PROCEDURE — 0509F URINE INCON PLAN DOCD: CPT | Performed by: NURSE PRACTITIONER

## 2024-06-10 PROCEDURE — 3078F DIAST BP <80 MM HG: CPT | Performed by: NURSE PRACTITIONER

## 2024-06-10 PROCEDURE — 1123F ACP DISCUSS/DSCN MKR DOCD: CPT | Performed by: NURSE PRACTITIONER

## 2024-06-10 PROCEDURE — G8417 CALC BMI ABV UP PARAM F/U: HCPCS | Performed by: NURSE PRACTITIONER

## 2024-06-10 PROCEDURE — G8400 PT W/DXA NO RESULTS DOC: HCPCS | Performed by: NURSE PRACTITIONER

## 2024-06-10 PROCEDURE — 3075F SYST BP GE 130 - 139MM HG: CPT | Performed by: NURSE PRACTITIONER

## 2024-06-10 PROCEDURE — 99213 OFFICE O/P EST LOW 20 MIN: CPT | Performed by: NURSE PRACTITIONER

## 2024-06-10 RX ORDER — TRIMETHOPRIM 100 MG/1
100 TABLET ORAL 2 TIMES DAILY
Qty: 14 TABLET | Refills: 0 | Status: SHIPPED | OUTPATIENT
Start: 2024-06-10 | End: 2024-06-17

## 2024-06-10 ASSESSMENT — ENCOUNTER SYMPTOMS
ABDOMINAL PAIN: 0
COUGH: 0
VOMITING: 0
NAUSEA: 0
EYE PAIN: 0
EYE REDNESS: 0
BACK PAIN: 0
WHEEZING: 0
CONSTIPATION: 0
SHORTNESS OF BREATH: 0

## 2024-06-10 NOTE — PROGRESS NOTES
Review of Systems   Constitutional:  Negative for chills, fatigue and fever.   Eyes:  Negative for pain, redness and visual disturbance.   Respiratory:  Negative for cough, shortness of breath and wheezing.    Cardiovascular:  Negative for chest pain and leg swelling.   Gastrointestinal:  Negative for abdominal pain, constipation, nausea and vomiting.   Genitourinary:  Positive for dysuria, frequency and urgency. Negative for difficulty urinating, flank pain and hematuria.   Musculoskeletal:  Negative for back pain, joint swelling and myalgias.   Skin:  Negative for rash and wound.   Neurological:  Negative for dizziness, tremors and numbness.   Hematological:  Does not bruise/bleed easily.     
tablet TAKE 1 TABLET BY MOUTH IN THE MORNING before breakfast 30 tablet 2    bumetanide (BUMEX) 1 MG tablet Take 1 tablet by mouth daily 30 tablet 0    metoprolol succinate (TOPROL XL) 25 MG extended release tablet TAKE 1 TABLET BY MOUTH EVERY DAY 90 tablet 1    levothyroxine (SYNTHROID) 100 MCG tablet TAKE 1 TABLET BY MOUTH EVERY DAY 90 tablet 1    docusate sodium (COLACE) 100 MG capsule Take 1 capsule by mouth daily as needed for Constipation 30 capsule 0    midodrine (PROAMATINE) 2.5 MG tablet       Ondansetron 4 MG FILM 1 tablet 3 TIMES DAILY (route: oral)      cholestyramine (QUESTRAN) 4 g packet mix and dissolve 1 packet as directed on package and then take by mouth every evening 90 packet 0    estradiol (ESTRACE VAGINAL) 0.1 MG/GM vaginal cream Place 1 g vaginally daily Apply 1 gm vaginally twice weekly 42.5 g 3    FEROSUL 325 (65 Fe) MG tablet TAKE 1 TABLET BY MOUTH IN THE MORNING WITH FOOD 30 tablet 0    rivaroxaban (XARELTO) 10 MG TABS tablet Take 1 tablet by mouth daily (with breakfast)      simethicone (MYLICON) 80 MG chewable tablet Take 1 tablet by mouth every 6 hours as needed for Flatulence      acetaminophen (TYLENOL) 500 MG tablet Take 1 tablet by mouth every 6 hours as needed for Pain      dorzolamide (TRUSOPT) 2 % ophthalmic solution Place 1 drop into both eyes 3 times daily  10    latanoprost (XALATAN) 0.005 % ophthalmic solution Place 1 drop into both eyes nightly 1230am         Avapro [irbesartan], Bactrim [sulfamethoxazole-trimethoprim], Ciprofloxacin hcl, Cozaar [losartan potassium], Diovan [valsartan], Lipitor [atorvastatin calcium], Lisinopril, Pcn [penicillins], Pravachol [pravastatin sodium], Tape [adhesive tape], Tramadol, Zetia [ezetimibe], and Morphine  Social History     Tobacco Use   Smoking Status Never   Smokeless Tobacco Never     (Ifpatient a smoker, smoking cessation counseling offered)    Social History     Substance and Sexual Activity   Alcohol Use No       REVIEW OF

## 2024-06-11 LAB
MICROORGANISM SPEC CULT: NORMAL
SERVICE CMNT-IMP: NORMAL
SPECIMEN DESCRIPTION: NORMAL

## 2024-06-11 NOTE — CARE COORDINATION
Ambulatory Care Coordination Note  6/10/2024    Patient Current Location:  Home: [address] [place] [address]     ACM contacted the patient by telephone. Verified name and  with patient as identifiers. Provided introduction to self, and explanation of the ACM role.     Challenges to be reviewed by the provider   Additional needs identified to be addressed with provider: No  none               Method of communication with provider: none.    ACM: Kezia Delcid RN  Summary  Date Care Coordination Episode Started:  2024     Reason for Call Today:     CC Follow Up     Reason patient is in Care Coordination:     RAEV 89%  CHF  Diabetes  CKD    Topics Discussed Today:     General- called Genie for follow up. She stated that she wasn't doing very well. She sounded uncomfortable. States she just took an antibiotic for a UTI and she doesn't think it worked. She is leaving to go to a urology appt.     Interventions completed today:    Assessments completed today:     Fall risk  Initial assessment  Medication reconciliation  SDOH  CHF; General health     Care Coordinator plan of care:     Genie will continue to take her medications as prescribed  [facility]  Monitor weight and BP daily   Will follow up next week to assess for any symptoms or needs;     Offered patient enrollment in the Remote Patient Monitoring (RPM) program for in-home monitoring: Yes, but did not enroll at this time: already monitoring with home equipment.    Lab Results       None            Care Coordination Interventions    Referral from Primary Care Provider: No  Suggested Interventions and Community Resources  Home Health Services: Completed (Comment: [facility] - , PT, OT)  Occupational Therapy: Declined  Physical Therapy: Declined  Zone Management Tools: Completed          Goals Addressed                   This Visit's Progress     Conditions and Symptoms   On track     I will schedule office

## 2024-06-12 DIAGNOSIS — I10 ESSENTIAL HYPERTENSION: ICD-10-CM

## 2024-06-17 RX ORDER — BUMETANIDE 1 MG/1
1 TABLET ORAL DAILY
Qty: 30 TABLET | Refills: 0 | Status: SHIPPED | OUTPATIENT
Start: 2024-06-17

## 2024-06-19 ENCOUNTER — CARE COORDINATION (OUTPATIENT)
Dept: CARE COORDINATION | Age: 84
End: 2024-06-19

## 2024-06-19 NOTE — CARE COORDINATION
Ambulatory Care Coordination Note     2024 12:16 PM     [place] [address]     ACM contacted the patient by telephone. Verified name and  with patient as identifiers.         ACM: Kezia Delcid RN     Challenges to be reviewed by the provider   Additional needs identified to be addressed with provider No  none               Method of communication with provider: none.    Care Summary Note: Followed up with patient, states completed antibiotic for UTI , denies current symptom. Has established with new cardiologist, Dr. Johnson.     Offered patient enrollment in the Remote Patient Monitoring (RPM) program for in-home monitoring: Yes, but did not enroll at this time: already monitoring with home equipment.     Assessments Completed:   Congestive Heart Failure Assessment    Are you currently restricting fluids?: 2000cc  Do you understand a low sodium diet?: Yes  Do you understand how to read food labels?: Yes  How many restaurant meals do you eat per week?: 0  Do you salt your food before tasting it?: No     No patient-reported symptoms      Symptoms:  None: Yes             and   Hypertension - Encounter Level              Medications Reviewed:   Completed during a previous call     Advance Care Planning:   Reviewed during previous call      Care Planning:    Goals Addressed                   This Visit's Progress     Conditions and Symptoms   On track     I will schedule office visits, as directed by my provider.  I will keep my appointment or reschedule if I have to cancel.  I will notify my provider of any barriers to my plan of care.  I will follow my Zone Management tool to seek urgent or emergent care.  I will notify my provider of any symptoms that indicate a worsening of my condition.    Barriers: overwhelmed by complexity of regimen and lack of education  Plan for overcoming my barriers: Care Management   Confidence: 8/10  Anticipated Goal

## 2024-06-21 DIAGNOSIS — K52.9 CHRONIC DIARRHEA: ICD-10-CM

## 2024-06-21 RX ORDER — CHOLESTYRAMINE 4 G/9G
POWDER, FOR SUSPENSION ORAL
Qty: 90 PACKET | Refills: 0 | Status: SHIPPED | OUTPATIENT
Start: 2024-06-21

## 2024-07-01 ENCOUNTER — HOSPITAL ENCOUNTER (OUTPATIENT)
Age: 84
Setting detail: SPECIMEN
Discharge: HOME | End: 2024-07-01
Payer: MEDICARE

## 2024-07-01 LAB
BACTERIA URNS QL MICRO: ABNORMAL
BILIRUB UR QL STRIP: NEGATIVE
CASTS #/AREA URNS LPF: ABNORMAL /LPF
CLARITY UR: ABNORMAL
COLOR UR: YELLOW
EPI CELLS #/AREA URNS HPF: ABNORMAL /HPF
GLUCOSE UR STRIP-MCNC: NEGATIVE MG/DL
HGB UR QL STRIP.AUTO: NEGATIVE
KETONES UR STRIP-MCNC: NEGATIVE MG/DL
LEUKOCYTE ESTERASE UR QL STRIP: ABNORMAL
NITRITE UR QL STRIP: NEGATIVE
PH UR STRIP: 5.5 [PH] (ref 5–8)
PROT UR STRIP-MCNC: NEGATIVE MG/DL
RBC #/AREA URNS HPF: ABNORMAL /HPF
SP GR UR STRIP: 1.01 (ref 1–1.03)
UROBILINOGEN UR STRIP-ACNC: NORMAL EU/DL (ref 0–1)
WBC #/AREA URNS HPF: ABNORMAL /HPF

## 2024-07-01 PROCEDURE — 87186 SC STD MICRODIL/AGAR DIL: CPT

## 2024-07-01 PROCEDURE — 87086 URINE CULTURE/COLONY COUNT: CPT

## 2024-07-01 PROCEDURE — 87077 CULTURE AEROBIC IDENTIFY: CPT

## 2024-07-01 PROCEDURE — 81001 URINALYSIS AUTO W/SCOPE: CPT

## 2024-07-02 ENCOUNTER — TELEPHONE (OUTPATIENT)
Dept: FAMILY MEDICINE CLINIC | Age: 84
End: 2024-07-02

## 2024-07-02 NOTE — TELEPHONE ENCOUNTER
I spoke with the patient.  She is having burning, when she urinates she only urinates little.  She has not been treated yet for this.  Her pharmacy is [facility] on [address].  Please advise.

## 2024-07-03 ENCOUNTER — CARE COORDINATION (OUTPATIENT)
Dept: CARE COORDINATION | Age: 84
End: 2024-07-03

## 2024-07-03 LAB
MICROORGANISM SPEC CULT: ABNORMAL
SPECIMEN DESCRIPTION: ABNORMAL

## 2024-07-03 NOTE — TELEPHONE ENCOUNTER
Spoke to the lab, sensitivity may be back after 3 pm this afternoon.  Will check chart and forward to Gina.

## 2024-07-06 NOTE — CARE COORDINATION
Understanding    Diet, taught by Kezia Delcid RN at 7/6/2024  2:49 PM.  Learner: Patient  Readiness: Acceptance  Method: Explanation  Response: Verbalizes Understanding    WHEN TO CALL THE DOCTOR CHF, taught by Kezia Delcid RN at 7/6/2024  2:49 PM.  Learner: Patient  Readiness: Acceptance  Method: Explanation  Response: Verbalizes Understanding    Monitoring Weight, taught by Kezia Delcid RN at 7/6/2024  2:49 PM.  Learner: Patient  Readiness: Acceptance  Method: Explanation  Response: Verbalizes Understanding    Education Comments  No comments found.     ,    Goals Addressed                   This Visit's Progress     Conditions and Symptoms   On track     I will schedule office visits, as directed by my provider.  I will keep my appointment or reschedule if I have to cancel.  I will notify my provider of any barriers to my plan of care.  I will follow my Zone Management tool to seek urgent or emergent care.  I will notify my provider of any symptoms that indicate a worsening of my condition.    Barriers: overwhelmed by complexity of regimen and lack of education  Plan for overcoming my barriers: Care Management   Confidence: 8/10  Anticipated Goal Completion Date: 6/30/2024                 PCP/Specialist follow up:   Future Appointments         Provider Specialty Dept Phone    7/15/2024 2:30 PM SCHEDULE, [facility] Cardiology [phone]    7/16/2024 2:45 PM Baudilio Valadez MD Oncology [phone]    7/22/2024 2:00 PM Nasir Eugene MD Urology [phone]    9/17/2024 2:30 PM Reinier Day MD Family Medicine [phone]    10/24/2024 3:30 PM SCHEDULE, [facility] [phone]            Follow Up:   Plan for next AC outreach in approximately 2 weeks to complete:  - disease specific assessments  - graduation .   patient  is agreeable to this plan.

## 2024-07-09 ENCOUNTER — HOSPITAL ENCOUNTER (OUTPATIENT)
Age: 84
Discharge: HOME | End: 2024-07-09
Payer: MEDICARE

## 2024-07-09 DIAGNOSIS — D50.9 IRON DEFICIENCY ANEMIA, UNSPECIFIED IRON DEFICIENCY ANEMIA TYPE: ICD-10-CM

## 2024-07-09 LAB
ALBUMIN SERPL-MCNC: 3.8 G/DL (ref 3.5–5.2)
ALP SERPL-CCNC: 105 U/L (ref 35–104)
ALT SERPL-CCNC: 7 U/L (ref 5–33)
ANION GAP SERPL CALCULATED.3IONS-SCNC: 11 MMOL/L (ref 9–17)
AST SERPL-CCNC: 19 U/L
BASOPHILS # BLD: 0 K/UL (ref 0–0.2)
BASOPHILS NFR BLD: 1 % (ref 0–2)
BILIRUB SERPL-MCNC: 0.5 MG/DL (ref 0.3–1.2)
BUN SERPL-MCNC: 12 MG/DL (ref 8–23)
CALCIUM SERPL-MCNC: 9.3 MG/DL (ref 8.6–10.4)
CHLORIDE SERPL-SCNC: 102 MMOL/L (ref 98–107)
CO2 SERPL-SCNC: 27 MMOL/L (ref 20–31)
CREAT SERPL-MCNC: 1.1 MG/DL (ref 0.5–0.9)
EOSINOPHIL # BLD: 0.1 K/UL (ref 0–0.4)
EOSINOPHILS RELATIVE PERCENT: 3 % (ref 0–4)
ERYTHROCYTE [DISTWIDTH] IN BLOOD BY AUTOMATED COUNT: 14.9 % (ref 11.5–14.9)
FERRITIN SERPL-MCNC: 43 NG/ML (ref 13–150)
FREE KAPPA/LAMBDA RATIO: 0.89 (ref 0.22–1.74)
GFR, ESTIMATED: 49 ML/MIN/1.73M2
GLUCOSE SERPL-MCNC: 106 MG/DL (ref 70–99)
HCT VFR BLD AUTO: 32.2 % (ref 36–46)
HGB BLD-MCNC: 10.7 G/DL (ref 12–16)
IGA SERPL-MCNC: 567 MG/DL (ref 70–400)
IGG SERPL-MCNC: 1013 MG/DL (ref 700–1600)
IGM SERPL-MCNC: 80 MG/DL (ref 40–230)
IRON SATN MFR SERPL: 23 % (ref 20–55)
IRON SERPL-MCNC: 86 UG/DL (ref 37–145)
KAPPA LC FREE SER-MCNC: 88.3 MG/L
LAMBDA LC FREE SERPL-MCNC: 98.8 MG/L (ref 4.2–27.7)
LYMPHOCYTES NFR BLD: 1 K/UL (ref 1–4.8)
LYMPHOCYTES RELATIVE PERCENT: 27 % (ref 24–44)
MCH RBC QN AUTO: 33.4 PG (ref 26–34)
MCHC RBC AUTO-ENTMCNC: 33.3 G/DL (ref 31–37)
MCV RBC AUTO: 100.1 FL (ref 80–100)
MONOCYTES NFR BLD: 0.4 K/UL (ref 0.1–1.3)
MONOCYTES NFR BLD: 11 % (ref 1–7)
NEUTROPHILS NFR BLD: 58 % (ref 36–66)
NEUTS SEG NFR BLD: 2.1 K/UL (ref 1.3–9.1)
PLATELET # BLD AUTO: 114 K/UL (ref 150–450)
PMV BLD AUTO: 9.2 FL (ref 6–12)
POTASSIUM SERPL-SCNC: 3.8 MMOL/L (ref 3.7–5.3)
PROT SERPL-MCNC: 7 G/DL (ref 6.4–8.3)
RBC # BLD AUTO: 3.21 M/UL (ref 4–5.2)
SODIUM SERPL-SCNC: 140 MMOL/L (ref 135–144)
TIBC SERPL-MCNC: 377 UG/DL (ref 250–450)
UNSATURATED IRON BINDING CAPACITY: 291 UG/DL (ref 112–347)
WBC OTHER # BLD: 3.6 K/UL (ref 3.5–11)

## 2024-07-09 PROCEDURE — 36415 COLL VENOUS BLD VENIPUNCTURE: CPT

## 2024-07-09 PROCEDURE — 83540 ASSAY OF IRON: CPT

## 2024-07-09 PROCEDURE — 80053 COMPREHEN METABOLIC PANEL: CPT

## 2024-07-09 PROCEDURE — 83550 IRON BINDING TEST: CPT

## 2024-07-09 PROCEDURE — 82728 ASSAY OF FERRITIN: CPT

## 2024-07-09 PROCEDURE — 83521 IG LIGHT CHAINS FREE EACH: CPT

## 2024-07-09 PROCEDURE — 85025 COMPLETE CBC W/AUTO DIFF WBC: CPT

## 2024-07-09 PROCEDURE — 82784 ASSAY IGA/IGD/IGG/IGM EACH: CPT

## 2024-07-09 PROCEDURE — 84165 PROTEIN E-PHORESIS SERUM: CPT

## 2024-07-09 PROCEDURE — 84155 ASSAY OF PROTEIN SERUM: CPT

## 2024-07-09 PROCEDURE — 86334 IMMUNOFIX E-PHORESIS SERUM: CPT

## 2024-07-10 LAB
ALBUMIN PERCENT: 53 % (ref 45–65)
ALBUMIN SERPL-MCNC: 3.4 G/DL (ref 3.2–5.2)
ALPHA 2 PERCENT: 11 % (ref 6–13)
ALPHA1 GLOB SERPL ELPH-MCNC: 0.3 G/DL (ref 0.1–0.4)
ALPHA1 GLOB SERPL ELPH-MCNC: 5 % (ref 3–6)
ALPHA2 GLOB SERPL ELPH-MCNC: 0.7 G/DL (ref 0.5–0.9)
B-GLOBULIN SERPL ELPH-MCNC: 0.9 G/DL (ref 0.5–1.1)
B-GLOBULIN SERPL ELPH-MCNC: 14 % (ref 11–19)
GAMMA GLOB SERPL ELPH-MCNC: 1.1 G/DL (ref 0.5–1.5)
GAMMA GLOBULIN %: 17 % (ref 9–20)
ITYP INTERPRETATION: NORMAL
PATH REV: NORMAL
PATHOLOGIST: ABNORMAL
PROT PATTERN SERPL ELPH-IMP: ABNORMAL
PROT SERPL-MCNC: 6.5 G/DL (ref 6.6–8.7)
TOTAL PROT. SUM,%: 100 % (ref 98–102)
TOTAL PROT. SUM: 6.4 G/DL (ref 6.3–8.2)

## 2024-07-15 DIAGNOSIS — I10 ESSENTIAL HYPERTENSION: ICD-10-CM

## 2024-07-15 RX ORDER — BUMETANIDE 1 MG/1
1 TABLET ORAL DAILY
Qty: 30 TABLET | Refills: 0 | Status: SHIPPED | OUTPATIENT
Start: 2024-07-15

## 2024-07-16 ENCOUNTER — OFFICE VISIT (OUTPATIENT)
Dept: ONCOLOGY | Age: 84
End: 2024-07-16
Payer: MEDICARE

## 2024-07-16 ENCOUNTER — TELEPHONE (OUTPATIENT)
Dept: FAMILY MEDICINE CLINIC | Age: 84
End: 2024-07-16

## 2024-07-16 VITALS
DIASTOLIC BLOOD PRESSURE: 69 MMHG | SYSTOLIC BLOOD PRESSURE: 122 MMHG | HEART RATE: 71 BPM | RESPIRATION RATE: 18 BRPM | TEMPERATURE: 96.8 F | WEIGHT: 173 LBS | BODY MASS INDEX: 28.82 KG/M2 | OXYGEN SATURATION: 100 %

## 2024-07-16 DIAGNOSIS — D47.2 MGUS (MONOCLONAL GAMMOPATHY OF UNKNOWN SIGNIFICANCE): ICD-10-CM

## 2024-07-16 DIAGNOSIS — D50.9 IRON DEFICIENCY ANEMIA, UNSPECIFIED IRON DEFICIENCY ANEMIA TYPE: ICD-10-CM

## 2024-07-16 DIAGNOSIS — D50.8 OTHER IRON DEFICIENCY ANEMIA: Primary | ICD-10-CM

## 2024-07-16 PROCEDURE — 1036F TOBACCO NON-USER: CPT | Performed by: INTERNAL MEDICINE

## 2024-07-16 PROCEDURE — 99214 OFFICE O/P EST MOD 30 MIN: CPT | Performed by: INTERNAL MEDICINE

## 2024-07-16 PROCEDURE — 99211 OFF/OP EST MAY X REQ PHY/QHP: CPT | Performed by: INTERNAL MEDICINE

## 2024-07-16 PROCEDURE — 1090F PRES/ABSN URINE INCON ASSESS: CPT | Performed by: INTERNAL MEDICINE

## 2024-07-16 PROCEDURE — G8400 PT W/DXA NO RESULTS DOC: HCPCS | Performed by: INTERNAL MEDICINE

## 2024-07-16 PROCEDURE — G8417 CALC BMI ABV UP PARAM F/U: HCPCS | Performed by: INTERNAL MEDICINE

## 2024-07-16 PROCEDURE — 1123F ACP DISCUSS/DSCN MKR DOCD: CPT | Performed by: INTERNAL MEDICINE

## 2024-07-16 PROCEDURE — 3074F SYST BP LT 130 MM HG: CPT | Performed by: INTERNAL MEDICINE

## 2024-07-16 PROCEDURE — G8427 DOCREV CUR MEDS BY ELIG CLIN: HCPCS | Performed by: INTERNAL MEDICINE

## 2024-07-16 PROCEDURE — 3078F DIAST BP <80 MM HG: CPT | Performed by: INTERNAL MEDICINE

## 2024-07-16 NOTE — PROGRESS NOTES
Patient ID: Genie Lama, 1940, [id number], 84 y.o.  Referred by : Reinier Day MD   Reason for consultation:   Anemia  MGUS  HISTORY OF PRESENT ILLNESS:    Hematology History:    Genie Lama is a 84 y.o. female with remote history of pulmonary embolism and DVT, history of atrial fibrillation, chronic anemia and history of GI bleed was seen during initial consultation visit for anemia.    She has history of chronic anemia and GI bleed in the past with severe iron deficiency.  She has been taking iron pill once daily for past 4 to 5 months.  She has had GI work-up with Dr Bertrand.  She also has a history of atrial fibrillation and on anticoagulation with Xarelto.    She report remote history of pulmonary embolism and DVT.  It is unclear if this was provoked or not.    She reports an episode of rectal bleeding about 2 weeks ago.  Denies any chest pain shortness of breath, unintentional weight loss drenching night sweats fever chills.    Her hemoglobin in August was around 9.4.  Most recent hemoglobin is 10.  6 on 3/18/2023.  Her WBC and platelets are within normal limits.  Her lab work did reveal mild monocytosis.  Her iron studies indicated iron deficiency back in 2021 however her recent iron levels in February are within normal limits.  She does have mildly low normal B12 levels.    INTERVAL HISTORY:  Patient is returning for follow visit and to discuss lab results and further recommendations.  Her monoclonal protein including light chain appears stable.  Her hemoglobin is stable around 10.7.  Denies any chest pain shortness of breath.  She is taking iron pill once daily and tolerating well and denies any abdominal pain nausea matting, significant constipation.  She denies any blood in stool.    During this visit patient's allergy, social, medical, surgical history and medications were reviewed and updated.   Past Medical History:   Diagnosis Date    Atrial fibrillation (HCC) 03/28/2014

## 2024-07-16 NOTE — TELEPHONE ENCOUNTER
FYI: [facility] called.  The are D/C patient from skilled nursing home care.  Patient doing well.

## 2024-07-23 ENCOUNTER — CARE COORDINATION (OUTPATIENT)
Dept: CARE COORDINATION | Age: 84
End: 2024-07-23

## 2024-07-23 NOTE — CARE COORDINATION
reschedule if I have to cancel.  I will notify my provider of any barriers to my plan of care.  I will follow my Zone Management tool to seek urgent or emergent care.  I will notify my provider of any symptoms that indicate a worsening of my condition.    Barriers: overwhelmed by complexity of regimen and lack of education  Plan for overcoming my barriers: Care Management   Confidence: 8/10  Anticipated Goal Completion Date: 6/30/2024                 PCP/Specialist follow up:   Future Appointments         Provider Specialty Dept Phone    8/12/2024 2:40 PM Nasir Eugene MD Urology [phone] 9/17/2024 2:30 PM Reinier Day MD Family Medicine [phone]    10/24/2024 3:30 PM SCHEDULE, [facility] Family Medicine [phone]    11/12/2024 2:45 PM Baudilio Valadez MD Oncology [phone]            Follow Up:   No further Ambulatory Care Management follow-up scheduled at this time.  Patient  has Ambulatory Care Manager's contact information for any further questions, concerns or needs.

## 2024-08-12 ENCOUNTER — OFFICE VISIT (OUTPATIENT)
Dept: UROLOGY | Age: 84
End: 2024-08-12
Payer: MEDICARE

## 2024-08-12 VITALS
SYSTOLIC BLOOD PRESSURE: 133 MMHG | WEIGHT: 173 LBS | TEMPERATURE: 97.5 F | BODY MASS INDEX: 28.82 KG/M2 | HEIGHT: 65 IN | HEART RATE: 71 BPM | DIASTOLIC BLOOD PRESSURE: 76 MMHG | OXYGEN SATURATION: 92 %

## 2024-08-12 DIAGNOSIS — N39.0 RECURRENT UTI: Primary | ICD-10-CM

## 2024-08-12 DIAGNOSIS — N95.2 ATROPHIC VAGINITIS: ICD-10-CM

## 2024-08-12 PROCEDURE — 3078F DIAST BP <80 MM HG: CPT | Performed by: UROLOGY

## 2024-08-12 PROCEDURE — G8427 DOCREV CUR MEDS BY ELIG CLIN: HCPCS | Performed by: UROLOGY

## 2024-08-12 PROCEDURE — 1123F ACP DISCUSS/DSCN MKR DOCD: CPT | Performed by: UROLOGY

## 2024-08-12 PROCEDURE — G8417 CALC BMI ABV UP PARAM F/U: HCPCS | Performed by: UROLOGY

## 2024-08-12 PROCEDURE — G8400 PT W/DXA NO RESULTS DOC: HCPCS | Performed by: UROLOGY

## 2024-08-12 PROCEDURE — 1090F PRES/ABSN URINE INCON ASSESS: CPT | Performed by: UROLOGY

## 2024-08-12 PROCEDURE — 1036F TOBACCO NON-USER: CPT | Performed by: UROLOGY

## 2024-08-12 PROCEDURE — 99214 OFFICE O/P EST MOD 30 MIN: CPT | Performed by: UROLOGY

## 2024-08-12 PROCEDURE — 3075F SYST BP GE 130 - 139MM HG: CPT | Performed by: UROLOGY

## 2024-08-12 RX ORDER — BRINZOLAMIDE 10 MG/ML
1 SUSPENSION/ DROPS OPHTHALMIC 3 TIMES DAILY
COMMUNITY

## 2024-08-12 RX ORDER — CEPHALEXIN 500 MG/1
500 CAPSULE ORAL NIGHTLY
Qty: 30 CAPSULE | Refills: 1 | Status: SHIPPED | OUTPATIENT
Start: 2024-08-12

## 2024-08-12 ASSESSMENT — ENCOUNTER SYMPTOMS
SHORTNESS OF BREATH: 0
DIARRHEA: 0
BACK PAIN: 0
CONSTIPATION: 0
WHEEZING: 0
EYE PAIN: 0
EYE REDNESS: 0
NAUSEA: 0
ABDOMINAL PAIN: 0
COUGH: 0
VOMITING: 0

## 2024-08-12 NOTE — PROGRESS NOTES
[facility] PHYSICIANS [facility] UROLOGY CENTER  [address]  [facility] [address]  Dept: [phone]    [facility] Urology Office Note - Established    Patient:  Genie Lama  YOB: 1940  Date: 8/12/2024    The patient is a 84 y.o. female whopresents today for evaluation of the following problems:   Chief Complaint   Patient presents with    Other     2 month follow up        HPI  This is a very pleasant 84-year-old female who we typically see for recurrent UTIs.  She has been on vaginal estrogen but has had at least 3 breakthrough infections this summer.  Both of her cultures grew Klebsiella.    Summary of old records: N/A    Additional History: N/A    Procedures Today: N/A    Urinalysis today:  No results found for this visit on 08/12/24.    Imaging Reviewed during this Office Visit: none  (results were independently reviewed by physician and radiology report verified)    AUA Symptom Score (8/12/2024):  INCOMPLETE EMPTYING: How often have you had the sensation of not emptying your bladder?: Less than 1 to 5 times  FREQUENCY: How often do you have to urinate less than every two hours?: Less than 1 to 5 times  INTERMITTENCY: How often have you found you stopped and started again several times when you urinated?: Less than 1 to 5 times  URGENCY: How often have you found it difficult to postpone urination?: Less than 1 to 5 times  WEAK STREAM: How often have you had a weak urinary stream?: Less than 1 to 5 times  STRAINING: How often have you had to strain to start  urination?: Less than 1 to 5 times  NOCTURIA: How many times did you typically get up at night to uriniate?: NONE  TOTAL I-PSS SCORE:: 6  How would you feel if you were to spend the rest of your life with your urinary condition?: Mostly Satisfied    Last BUN and creatinine:  Lab Results   Component Value Date    BUN 12 07/09/2024     Lab Results   Component Value Date    CREATININE 1.1 (H)

## 2024-08-14 DIAGNOSIS — I10 ESSENTIAL HYPERTENSION: ICD-10-CM

## 2024-08-14 RX ORDER — BUMETANIDE 1 MG/1
1 TABLET ORAL DAILY
Qty: 30 TABLET | Refills: 0 | Status: SHIPPED | OUTPATIENT
Start: 2024-08-14

## 2024-08-19 ENCOUNTER — TELEPHONE (OUTPATIENT)
Dept: UROLOGY | Age: 84
End: 2024-08-19

## 2024-08-19 NOTE — TELEPHONE ENCOUNTER
Patient called into the office, stated that \" I stopped my antibiotic, I have diarrhea, and it was green. I have stopped the antibiotic. Writer advised a message would be sent to Dr. Eugene's nurse and the office would get back to her. Patient verbalized understanding, call was ended.

## 2024-08-27 ENCOUNTER — TELEPHONE (OUTPATIENT)
Dept: FAMILY MEDICINE CLINIC | Age: 84
End: 2024-08-27

## 2024-08-27 NOTE — TELEPHONE ENCOUNTER
The patient called wanting Dr Day to know that due to having a UTI, Dr Eugene ordered keflex, and due to the antibiotic, the patient developed diarrhea. Dr Eugene has now ordered a stool sample for the patient, and Dr Eugene wanted the patient's PCP to be made aware of this.

## 2024-08-29 ENCOUNTER — HOSPITAL ENCOUNTER (OUTPATIENT)
Age: 84
Setting detail: SPECIMEN
Discharge: HOME | End: 2024-08-29
Payer: MEDICARE

## 2024-08-29 DIAGNOSIS — R19.7 DIARRHEA, UNSPECIFIED TYPE: ICD-10-CM

## 2024-08-29 LAB
C DIFF GDH + TOXINS A+B STL QL IA.RAPID: ABNORMAL
SPECIMEN DESCRIPTION: ABNORMAL

## 2024-08-29 PROCEDURE — 87493 C DIFF AMPLIFIED PROBE: CPT

## 2024-08-29 PROCEDURE — 87449 NOS EACH ORGANISM AG IA: CPT

## 2024-08-29 PROCEDURE — 87324 CLOSTRIDIUM AG IA: CPT

## 2024-08-30 LAB
C DIFFICILE TOXINS, PCR: NORMAL
SPECIMEN DESCRIPTION: NORMAL

## 2024-09-09 DIAGNOSIS — I10 ESSENTIAL HYPERTENSION: ICD-10-CM

## 2024-09-09 RX ORDER — BUMETANIDE 1 MG/1
1 TABLET ORAL DAILY
Qty: 30 TABLET | Refills: 0 | Status: SHIPPED | OUTPATIENT
Start: 2024-09-09

## 2024-09-17 ENCOUNTER — OFFICE VISIT (OUTPATIENT)
Dept: FAMILY MEDICINE CLINIC | Age: 84
End: 2024-09-17

## 2024-09-17 VITALS
OXYGEN SATURATION: 100 % | DIASTOLIC BLOOD PRESSURE: 60 MMHG | SYSTOLIC BLOOD PRESSURE: 116 MMHG | HEART RATE: 70 BPM | RESPIRATION RATE: 16 BRPM | WEIGHT: 172.4 LBS | BODY MASS INDEX: 28.72 KG/M2

## 2024-09-17 DIAGNOSIS — K21.9 GASTROESOPHAGEAL REFLUX DISEASE WITHOUT ESOPHAGITIS: ICD-10-CM

## 2024-09-17 DIAGNOSIS — I10 ESSENTIAL HYPERTENSION: ICD-10-CM

## 2024-09-17 DIAGNOSIS — E03.9 ACQUIRED HYPOTHYROIDISM: ICD-10-CM

## 2024-09-17 DIAGNOSIS — E78.00 PURE HYPERCHOLESTEROLEMIA, UNSPECIFIED: ICD-10-CM

## 2024-09-17 DIAGNOSIS — E11.9 TYPE 2 DIABETES MELLITUS WITHOUT COMPLICATION, WITHOUT LONG-TERM CURRENT USE OF INSULIN (HCC): Primary | ICD-10-CM

## 2024-09-17 RX ORDER — BUMETANIDE 1 MG/1
1 TABLET ORAL DAILY
Qty: 90 TABLET | Refills: 0 | Status: SHIPPED | OUTPATIENT
Start: 2024-09-17

## 2024-09-17 RX ORDER — FAMOTIDINE 20 MG/1
20 TABLET, FILM COATED ORAL DAILY
Qty: 90 TABLET | Refills: 0 | Status: SHIPPED | OUTPATIENT
Start: 2024-09-17

## 2024-09-17 SDOH — ECONOMIC STABILITY: FOOD INSECURITY: WITHIN THE PAST 12 MONTHS, YOU WORRIED THAT YOUR FOOD WOULD RUN OUT BEFORE YOU GOT MONEY TO BUY MORE.: NEVER TRUE

## 2024-09-17 SDOH — ECONOMIC STABILITY: FOOD INSECURITY: WITHIN THE PAST 12 MONTHS, THE FOOD YOU BOUGHT JUST DIDN'T LAST AND YOU DIDN'T HAVE MONEY TO GET MORE.: NEVER TRUE

## 2024-09-17 SDOH — ECONOMIC STABILITY: INCOME INSECURITY: HOW HARD IS IT FOR YOU TO PAY FOR THE VERY BASICS LIKE FOOD, HOUSING, MEDICAL CARE, AND HEATING?: NOT HARD AT ALL

## 2024-09-17 ASSESSMENT — PATIENT HEALTH QUESTIONNAIRE - PHQ9
SUM OF ALL RESPONSES TO PHQ9 QUESTIONS 1 & 2: 0
1. LITTLE INTEREST OR PLEASURE IN DOING THINGS: NOT AT ALL
SUM OF ALL RESPONSES TO PHQ QUESTIONS 1-9: 0
SUM OF ALL RESPONSES TO PHQ QUESTIONS 1-9: 0
2. FEELING DOWN, DEPRESSED OR HOPELESS: NOT AT ALL
SUM OF ALL RESPONSES TO PHQ QUESTIONS 1-9: 0
SUM OF ALL RESPONSES TO PHQ QUESTIONS 1-9: 0

## 2024-09-17 ASSESSMENT — ENCOUNTER SYMPTOMS
ABDOMINAL PAIN: 0
BLOOD IN STOOL: 0
SHORTNESS OF BREATH: 0
CHEST TIGHTNESS: 0

## 2024-09-24 DIAGNOSIS — K52.9 CHRONIC DIARRHEA: ICD-10-CM

## 2024-09-24 RX ORDER — CHOLESTYRAMINE 4 G/9G
POWDER, FOR SUSPENSION ORAL
Qty: 90 PACKET | Refills: 0 | Status: SHIPPED | OUTPATIENT
Start: 2024-09-24

## 2024-10-24 ENCOUNTER — OFFICE VISIT (OUTPATIENT)
Dept: FAMILY MEDICINE CLINIC | Age: 84
End: 2024-10-24

## 2024-10-24 VITALS
HEIGHT: 61 IN | DIASTOLIC BLOOD PRESSURE: 70 MMHG | WEIGHT: 171.2 LBS | RESPIRATION RATE: 16 BRPM | BODY MASS INDEX: 32.32 KG/M2 | TEMPERATURE: 98.2 F | SYSTOLIC BLOOD PRESSURE: 120 MMHG | HEART RATE: 56 BPM

## 2024-10-24 DIAGNOSIS — Z23 FLU VACCINE NEED: Primary | ICD-10-CM

## 2024-10-24 DIAGNOSIS — Z00.00 INITIAL MEDICARE ANNUAL WELLNESS VISIT: ICD-10-CM

## 2024-10-24 ASSESSMENT — PATIENT HEALTH QUESTIONNAIRE - PHQ9
SUM OF ALL RESPONSES TO PHQ9 QUESTIONS 1 & 2: 0
SUM OF ALL RESPONSES TO PHQ QUESTIONS 1-9: 0
1. LITTLE INTEREST OR PLEASURE IN DOING THINGS: NOT AT ALL
2. FEELING DOWN, DEPRESSED OR HOPELESS: NOT AT ALL
SUM OF ALL RESPONSES TO PHQ QUESTIONS 1-9: 0

## 2024-10-24 NOTE — PROGRESS NOTES
Medicare Annual Wellness Visit    Genie Lama is here for Medicare AWV    Assessment & Plan   Flu vaccine need    Recommendations for Preventive Services Due: see orders and patient instructions/AVS.  Recommended screening schedule for the next 5-10 years is provided to the patient in written form: see Patient Instructions/AVS.     No follow-ups on file.  Patient already received flu vaccine.  VIS given for Tdap and shingles vaccine.     Subjective       Patient's complete Health Risk Assessment and screening values have been reviewed and are found in Flowsheets. The following problems were reviewed today and where indicated follow up appointments were made and/or referrals ordered.    Positive Risk Factor Screenings with Interventions:                  Abnormal BMI (obese):  Body mass index is 32.35 kg/m². (!) Abnormal    Interventions:  See AVS for additional education material  Heart healthy diet and exercise information reviewed and provided in AVS.        Dentist Screen:  Have you seen the dentist within the past year?: (!) No    Intervention:  See AVS for additional education material     Vision Screen:  Do you have difficulty driving, watching TV, or doing any of your daily activities because of your eyesight?: (!) Yes  Have you had an eye exam within the past year?: Yes    Interventions:   See AVS for additional education material     ADL's:   Patient reports needing help with:  Select all that apply: (!) Transportation, Shopping    Interventions:  See AVS for additional education material                  Objective   Vitals:    10/24/24 1534   BP: 120/70   Pulse: 56   Resp: 16   Temp: 98.2 °F (36.8 °C)   Weight: 77.7 kg (171 lb 3.2 oz)   Height: 1.549 m (5' 1\")      Body mass index is 32.35 kg/m².                    Allergies   Allergen Reactions    Avapro [Irbesartan]     Bactrim [Sulfamethoxazole-Trimethoprim]      Rash    Ciprofloxacin Hcl Nausea Only    Cozaar [Losartan Potassium]     Diovan

## 2024-11-04 RX ORDER — LEVOTHYROXINE SODIUM 100 UG/1
TABLET ORAL
Qty: 90 TABLET | Refills: 0 | Status: SHIPPED | OUTPATIENT
Start: 2024-11-04

## 2024-11-09 ENCOUNTER — HOSPITAL ENCOUNTER (OUTPATIENT)
Age: 84
Discharge: HOME | End: 2024-11-09
Payer: MEDICARE

## 2024-11-09 DIAGNOSIS — I10 ESSENTIAL HYPERTENSION: ICD-10-CM

## 2024-11-09 DIAGNOSIS — K21.9 GASTROESOPHAGEAL REFLUX DISEASE WITHOUT ESOPHAGITIS: ICD-10-CM

## 2024-11-09 DIAGNOSIS — D50.8 OTHER IRON DEFICIENCY ANEMIA: ICD-10-CM

## 2024-11-09 DIAGNOSIS — E11.9 TYPE 2 DIABETES MELLITUS WITHOUT COMPLICATION, WITHOUT LONG-TERM CURRENT USE OF INSULIN (HCC): ICD-10-CM

## 2024-11-09 DIAGNOSIS — D50.9 IRON DEFICIENCY ANEMIA, UNSPECIFIED IRON DEFICIENCY ANEMIA TYPE: ICD-10-CM

## 2024-11-09 DIAGNOSIS — E78.00 PURE HYPERCHOLESTEROLEMIA, UNSPECIFIED: ICD-10-CM

## 2024-11-09 DIAGNOSIS — E03.9 ACQUIRED HYPOTHYROIDISM: ICD-10-CM

## 2024-11-09 LAB
ALBUMIN SERPL-MCNC: 3.4 G/DL (ref 3.5–5.2)
ALP SERPL-CCNC: 89 U/L (ref 35–104)
ALT SERPL-CCNC: 8 U/L (ref 10–35)
ANION GAP SERPL CALCULATED.3IONS-SCNC: 8 MMOL/L (ref 9–16)
AST SERPL-CCNC: 23 U/L (ref 10–35)
BASOPHILS # BLD: 0.08 K/UL (ref 0–0.2)
BASOPHILS # BLD: 0.08 K/UL (ref 0–0.2)
BASOPHILS NFR BLD: 2 % (ref 0–2)
BASOPHILS NFR BLD: 2 % (ref 0–2)
BILIRUB SERPL-MCNC: 0.6 MG/DL (ref 0–1.2)
BUN SERPL-MCNC: 12 MG/DL (ref 8–23)
CALCIUM SERPL-MCNC: 9.6 MG/DL (ref 8.6–10.4)
CHLORIDE SERPL-SCNC: 104 MMOL/L (ref 98–107)
CHOLEST SERPL-MCNC: 120 MG/DL (ref 0–199)
CHOLESTEROL/HDL RATIO: 2.2
CO2 SERPL-SCNC: 27 MMOL/L (ref 20–31)
CREAT SERPL-MCNC: 1.2 MG/DL (ref 0.7–1.2)
EOSINOPHIL # BLD: 0.08 K/UL (ref 0–0.4)
EOSINOPHIL # BLD: 0.08 K/UL (ref 0–0.4)
EOSINOPHILS RELATIVE PERCENT: 2 % (ref 0–4)
EOSINOPHILS RELATIVE PERCENT: 2 % (ref 0–4)
ERYTHROCYTE [DISTWIDTH] IN BLOOD BY AUTOMATED COUNT: 14.2 % (ref 11.5–14.9)
ERYTHROCYTE [DISTWIDTH] IN BLOOD BY AUTOMATED COUNT: 14.2 % (ref 11.5–14.9)
FERRITIN SERPL-MCNC: 69 NG/ML
FOLATE SERPL-MCNC: 16.9 NG/ML (ref 4.8–24.2)
GFR, ESTIMATED: 44 ML/MIN/1.73M2
GLUCOSE SERPL-MCNC: 98 MG/DL (ref 74–99)
HCT VFR BLD AUTO: 35.5 % (ref 36–46)
HCT VFR BLD AUTO: 35.5 % (ref 36–46)
HDLC SERPL-MCNC: 55 MG/DL
HGB BLD-MCNC: 12.2 G/DL (ref 12–16)
HGB BLD-MCNC: 12.2 G/DL (ref 12–16)
IRON SATN MFR SERPL: 15 % (ref 20–55)
IRON SERPL-MCNC: 48 UG/DL (ref 37–145)
LDLC SERPL CALC-MCNC: 51 MG/DL (ref 0–100)
LYMPHOCYTES NFR BLD: 0.92 K/UL (ref 1–4.8)
LYMPHOCYTES NFR BLD: 0.92 K/UL (ref 1–4.8)
LYMPHOCYTES RELATIVE PERCENT: 23 % (ref 24–44)
LYMPHOCYTES RELATIVE PERCENT: 23 % (ref 24–44)
MAGNESIUM SERPL-MCNC: 2.1 MG/DL (ref 1.6–2.4)
MCH RBC QN AUTO: 33.2 PG (ref 26–34)
MCH RBC QN AUTO: 33.2 PG (ref 26–34)
MCHC RBC AUTO-ENTMCNC: 34.3 G/DL (ref 31–37)
MCHC RBC AUTO-ENTMCNC: 34.3 G/DL (ref 31–37)
MCV RBC AUTO: 96.7 FL (ref 80–100)
MCV RBC AUTO: 96.7 FL (ref 80–100)
MONOCYTES NFR BLD: 0.2 K/UL (ref 0.1–1.3)
MONOCYTES NFR BLD: 0.2 K/UL (ref 0.1–1.3)
MONOCYTES NFR BLD: 5 % (ref 1–7)
MONOCYTES NFR BLD: 5 % (ref 1–7)
MORPHOLOGY: ABNORMAL
NEUTROPHILS NFR BLD: 68 % (ref 36–66)
NEUTROPHILS NFR BLD: 68 % (ref 36–66)
NEUTS SEG NFR BLD: 2.72 K/UL (ref 1.3–9.1)
NEUTS SEG NFR BLD: 2.72 K/UL (ref 1.3–9.1)
PLATELET # BLD AUTO: 127 K/UL (ref 150–450)
PLATELET # BLD AUTO: 127 K/UL (ref 150–450)
PMV BLD AUTO: 9.2 FL (ref 6–12)
PMV BLD AUTO: 9.2 FL (ref 6–12)
POTASSIUM SERPL-SCNC: 3.8 MMOL/L (ref 3.7–5.3)
PROT SERPL-MCNC: 6.7 G/DL (ref 6.6–8.7)
RBC # BLD AUTO: 3.67 M/UL (ref 4–5.2)
RBC # BLD AUTO: 3.67 M/UL (ref 4–5.2)
SODIUM SERPL-SCNC: 139 MMOL/L (ref 136–145)
TIBC SERPL-MCNC: 328 UG/DL (ref 250–450)
TRIGL SERPL-MCNC: 72 MG/DL (ref 0–149)
TSH SERPL DL<=0.05 MIU/L-ACNC: 1.12 UIU/ML (ref 0.27–4.2)
UNSATURATED IRON BINDING CAPACITY: 280 UG/DL (ref 112–347)
VIT B12 SERPL-MCNC: 275 PG/ML (ref 232–1245)
WBC OTHER # BLD: 4 K/UL (ref 3.5–11)
WBC OTHER # BLD: 4 K/UL (ref 3.5–11)

## 2024-11-09 PROCEDURE — 84630 ASSAY OF ZINC: CPT

## 2024-11-09 PROCEDURE — 83735 ASSAY OF MAGNESIUM: CPT

## 2024-11-09 PROCEDURE — 36415 COLL VENOUS BLD VENIPUNCTURE: CPT

## 2024-11-09 PROCEDURE — 84443 ASSAY THYROID STIM HORMONE: CPT

## 2024-11-09 PROCEDURE — 85025 COMPLETE CBC W/AUTO DIFF WBC: CPT

## 2024-11-09 PROCEDURE — 80053 COMPREHEN METABOLIC PANEL: CPT

## 2024-11-09 PROCEDURE — 83540 ASSAY OF IRON: CPT

## 2024-11-09 PROCEDURE — 82607 VITAMIN B-12: CPT

## 2024-11-09 PROCEDURE — 82746 ASSAY OF FOLIC ACID SERUM: CPT

## 2024-11-09 PROCEDURE — 80061 LIPID PANEL: CPT

## 2024-11-09 PROCEDURE — 82728 ASSAY OF FERRITIN: CPT

## 2024-11-09 PROCEDURE — 83550 IRON BINDING TEST: CPT

## 2024-11-11 LAB — ZINC SERPL-MCNC: 73.7 UG/DL (ref 60–120)

## 2024-11-12 ENCOUNTER — TELEPHONE (OUTPATIENT)
Dept: ONCOLOGY | Age: 84
End: 2024-11-12

## 2024-11-12 ENCOUNTER — OFFICE VISIT (OUTPATIENT)
Dept: ONCOLOGY | Age: 84
End: 2024-11-12

## 2024-11-12 VITALS
DIASTOLIC BLOOD PRESSURE: 85 MMHG | BODY MASS INDEX: 31.74 KG/M2 | OXYGEN SATURATION: 97 % | HEART RATE: 92 BPM | RESPIRATION RATE: 18 BRPM | WEIGHT: 168 LBS | SYSTOLIC BLOOD PRESSURE: 143 MMHG | TEMPERATURE: 97 F

## 2024-11-12 DIAGNOSIS — D47.2 MGUS (MONOCLONAL GAMMOPATHY OF UNKNOWN SIGNIFICANCE): ICD-10-CM

## 2024-11-12 DIAGNOSIS — D50.9 IRON DEFICIENCY ANEMIA, UNSPECIFIED IRON DEFICIENCY ANEMIA TYPE: Primary | ICD-10-CM

## 2024-11-12 NOTE — TELEPHONE ENCOUNTER
Instructions   from Dr. Baudilio Valadez MD    RTc in 6 months w labs a week prior    Rv scheduled on 5/6/2025 @2pm; gave pt avs

## 2024-11-12 NOTE — PROGRESS NOTES
Patient ID: Genie Lama, 1940, [id number], 84 y.o.  Referred by : Reinier Day MD   Reason for consultation:   Anemia  MGUS  HISTORY OF PRESENT ILLNESS:    Hematology History:    Genie Lama is a 84 y.o. female with remote history of pulmonary embolism and DVT, history of atrial fibrillation, chronic anemia and history of GI bleed was seen during initial consultation visit for anemia.    She has history of chronic anemia and GI bleed in the past with severe iron deficiency.  She has been taking iron pill once daily for past 4 to 5 months.  She has had GI work-up with Dr Bertrand.  She also has a history of atrial fibrillation and on anticoagulation with Xarelto.    She report remote history of pulmonary embolism and DVT.  It is unclear if this was provoked or not.    She reports an episode of rectal bleeding about 2 weeks ago.  Denies any chest pain shortness of breath, unintentional weight loss drenching night sweats fever chills.    Her hemoglobin in August was around 9.4.  Most recent hemoglobin is 10.  6 on 3/18/2023.  Her WBC and platelets are within normal limits.  Her lab work did reveal mild monocytosis.  Her iron studies indicated iron deficiency back in 2021 however her recent iron levels in February are within normal limits.  She does have mildly low normal B12 levels.    INTERVAL HISTORY:  Patient is returning for follow-up visit and to discuss lab results and further commissions.  Currently she is taking iron pill once daily.  Her hemoglobin has improved to 12.2.  Clinically she is doing well.  She has chronic diarrhea she is taking cholestyramine without any help.  She is also taking over-the-counter Imodium.      During this visit patient's allergy, social, medical, surgical history and medications were reviewed and updated.   Past Medical History:   Diagnosis Date    Atrial fibrillation (HCC) 03/28/2014    Chronic back pain     with rt. leg pain    Diverticulosis     GERD

## 2024-11-18 ENCOUNTER — OFFICE VISIT (OUTPATIENT)
Dept: UROLOGY | Age: 84
End: 2024-11-18
Payer: MEDICARE

## 2024-11-18 DIAGNOSIS — N39.0 RECURRENT UTI: Primary | ICD-10-CM

## 2024-11-18 DIAGNOSIS — N95.2 ATROPHIC VAGINITIS: ICD-10-CM

## 2024-11-18 DIAGNOSIS — I10 ESSENTIAL HYPERTENSION: ICD-10-CM

## 2024-11-18 PROCEDURE — G8427 DOCREV CUR MEDS BY ELIG CLIN: HCPCS | Performed by: UROLOGY

## 2024-11-18 PROCEDURE — 99213 OFFICE O/P EST LOW 20 MIN: CPT | Performed by: UROLOGY

## 2024-11-18 PROCEDURE — G8400 PT W/DXA NO RESULTS DOC: HCPCS | Performed by: UROLOGY

## 2024-11-18 PROCEDURE — G8417 CALC BMI ABV UP PARAM F/U: HCPCS | Performed by: UROLOGY

## 2024-11-18 PROCEDURE — G8482 FLU IMMUNIZE ORDER/ADMIN: HCPCS | Performed by: UROLOGY

## 2024-11-18 PROCEDURE — 1123F ACP DISCUSS/DSCN MKR DOCD: CPT | Performed by: UROLOGY

## 2024-11-18 PROCEDURE — 1159F MED LIST DOCD IN RCRD: CPT | Performed by: UROLOGY

## 2024-11-18 PROCEDURE — 1090F PRES/ABSN URINE INCON ASSESS: CPT | Performed by: UROLOGY

## 2024-11-18 PROCEDURE — 1036F TOBACCO NON-USER: CPT | Performed by: UROLOGY

## 2024-11-18 RX ORDER — METOPROLOL SUCCINATE 25 MG/1
TABLET, EXTENDED RELEASE ORAL
Qty: 90 TABLET | Refills: 0 | Status: SHIPPED | OUTPATIENT
Start: 2024-11-18

## 2024-11-18 RX ORDER — ESTRADIOL 0.1 MG/G
CREAM VAGINAL
Qty: 42.5 G | Refills: 3 | Status: SHIPPED | OUTPATIENT
Start: 2024-11-18

## 2024-11-18 ASSESSMENT — ENCOUNTER SYMPTOMS
BACK PAIN: 0
WHEEZING: 0
EYE REDNESS: 0
SHORTNESS OF BREATH: 0
EYE PAIN: 0
NAUSEA: 0
COUGH: 0
ABDOMINAL PAIN: 0
VOMITING: 0
CONSTIPATION: 0

## 2024-11-18 NOTE — PROGRESS NOTES
[facility] PHYSICIANS [facility] UROLOGY CENTER  [address]  [facility] [address]  Dept: [phone]    [facility] Urology Office Note - Established    Patient:  Genie Lama  YOB: 1940  Date: 11/18/2024    The patient is a 84 y.o. female whopresents today for evaluation of the following problems:   Chief Complaint   Patient presents with    Frequent/Recurrent UTI     3 month check up        HPI  This is an 84-year-old female who we see for recurrent UTIs.  She has been on Estrace and takes d-mannose twice a day.  This combination has served her well.  She has not had any recent infections.    Summary of old records: N/A    Additional History: N/A    Procedures Today: N/A    Urinalysis today:  No results found for this visit on 11/18/24.    Imaging Reviewed during this Office Visit: none  (results were independently reviewed by physician and radiology report verified)    AUA Symptom Score (11/18/2024):                               Last BUN and creatinine:  Lab Results   Component Value Date    BUN 12 11/09/2024     Lab Results   Component Value Date    CREATININE 1.2 11/09/2024       Additional Lab/Culture results: none    PAST MEDICAL, FAMILY AND SOCIAL HISTORY UPDATE:  Past Medical History:   Diagnosis Date    Atrial fibrillation (HCC) 03/28/2014    Chronic back pain     with rt. leg pain    Diverticulosis     GERD (gastroesophageal reflux disease)     Hearing aid worn     willi. ears.    Hearing deficit, bilateral     Hyperlipidemia     Hypertension     Hypothyroidism     Obesity (BMI 30.0-34.9) 08/12/2016    Onychomycosis     PE (pulmonary embolism)     H/O DVT of rt leg    Poor venous access     Type II or unspecified type diabetes mellitus without mention of complication, not stated as uncontrolled      Past Surgical History:   Procedure Laterality Date    ARM SURGERY Right     Growth taken off per pt.    CARPAL TUNNEL RELEASE Bilateral     Per

## 2024-12-12 DIAGNOSIS — K21.9 GASTROESOPHAGEAL REFLUX DISEASE WITHOUT ESOPHAGITIS: ICD-10-CM

## 2024-12-12 RX ORDER — FAMOTIDINE 20 MG/1
20 TABLET, FILM COATED ORAL DAILY
Qty: 90 TABLET | Refills: 0 | Status: SHIPPED | OUTPATIENT
Start: 2024-12-12

## 2024-12-31 DIAGNOSIS — K52.9 CHRONIC DIARRHEA: ICD-10-CM

## 2025-01-02 RX ORDER — CHOLESTYRAMINE 4 G/9G
POWDER, FOR SUSPENSION ORAL
Qty: 90 PACKET | Refills: 0 | Status: SHIPPED | OUTPATIENT
Start: 2025-01-02

## 2025-01-13 DIAGNOSIS — I10 ESSENTIAL HYPERTENSION: ICD-10-CM

## 2025-01-13 RX ORDER — BUMETANIDE 1 MG/1
1 TABLET ORAL DAILY
Qty: 90 TABLET | Refills: 0 | Status: SHIPPED | OUTPATIENT
Start: 2025-01-13

## 2025-02-04 RX ORDER — LEVOTHYROXINE SODIUM 100 UG/1
TABLET ORAL
Qty: 90 TABLET | Refills: 0 | Status: SHIPPED | OUTPATIENT
Start: 2025-02-04

## 2025-02-12 DIAGNOSIS — I10 ESSENTIAL HYPERTENSION: ICD-10-CM

## 2025-02-12 RX ORDER — METOPROLOL SUCCINATE 25 MG/1
TABLET, EXTENDED RELEASE ORAL
Qty: 90 TABLET | Refills: 0 | Status: SHIPPED | OUTPATIENT
Start: 2025-02-12

## 2025-03-20 ENCOUNTER — TELEPHONE (OUTPATIENT)
Dept: FAMILY MEDICINE CLINIC | Age: 85
End: 2025-03-20

## 2025-03-20 ENCOUNTER — OFFICE VISIT (OUTPATIENT)
Dept: FAMILY MEDICINE CLINIC | Age: 85
End: 2025-03-20

## 2025-03-20 VITALS
HEART RATE: 100 BPM | DIASTOLIC BLOOD PRESSURE: 62 MMHG | RESPIRATION RATE: 16 BRPM | OXYGEN SATURATION: 98 % | SYSTOLIC BLOOD PRESSURE: 116 MMHG

## 2025-03-20 DIAGNOSIS — E03.9 ACQUIRED HYPOTHYROIDISM: ICD-10-CM

## 2025-03-20 DIAGNOSIS — K21.9 GASTROESOPHAGEAL REFLUX DISEASE WITHOUT ESOPHAGITIS: ICD-10-CM

## 2025-03-20 DIAGNOSIS — I10 ESSENTIAL HYPERTENSION: ICD-10-CM

## 2025-03-20 DIAGNOSIS — M54.2 NECK PAIN: ICD-10-CM

## 2025-03-20 DIAGNOSIS — I50.32 CHRONIC DIASTOLIC CONGESTIVE HEART FAILURE (HCC): ICD-10-CM

## 2025-03-20 DIAGNOSIS — N18.30 STAGE 3 CHRONIC KIDNEY DISEASE, UNSPECIFIED WHETHER STAGE 3A OR 3B CKD (HCC): ICD-10-CM

## 2025-03-20 DIAGNOSIS — E78.5 DYSLIPIDEMIA: ICD-10-CM

## 2025-03-20 DIAGNOSIS — I48.0 PAF (PAROXYSMAL ATRIAL FIBRILLATION) (HCC): ICD-10-CM

## 2025-03-20 DIAGNOSIS — E11.9 TYPE 2 DIABETES MELLITUS WITHOUT COMPLICATION, WITHOUT LONG-TERM CURRENT USE OF INSULIN: Primary | ICD-10-CM

## 2025-03-20 PROBLEM — Z79.899 CURRENT USE OF PROTON PUMP INHIBITOR: Status: RESOLVED | Noted: 2024-03-19 | Resolved: 2025-03-20

## 2025-03-20 LAB — HBA1C MFR BLD: 4.8 %

## 2025-03-20 RX ORDER — FAMOTIDINE 20 MG/1
20 TABLET, FILM COATED ORAL 2 TIMES DAILY
Qty: 180 TABLET | Refills: 0 | Status: SHIPPED | OUTPATIENT
Start: 2025-03-20

## 2025-03-20 SDOH — ECONOMIC STABILITY: FOOD INSECURITY: WITHIN THE PAST 12 MONTHS, YOU WORRIED THAT YOUR FOOD WOULD RUN OUT BEFORE YOU GOT MONEY TO BUY MORE.: NEVER TRUE

## 2025-03-20 SDOH — ECONOMIC STABILITY: FOOD INSECURITY: WITHIN THE PAST 12 MONTHS, THE FOOD YOU BOUGHT JUST DIDN'T LAST AND YOU DIDN'T HAVE MONEY TO GET MORE.: NEVER TRUE

## 2025-03-20 ASSESSMENT — ENCOUNTER SYMPTOMS
BLOOD IN STOOL: 0
CHEST TIGHTNESS: 0
ABDOMINAL PAIN: 0
SHORTNESS OF BREATH: 0

## 2025-03-20 ASSESSMENT — PATIENT HEALTH QUESTIONNAIRE - PHQ9
SUM OF ALL RESPONSES TO PHQ QUESTIONS 1-9: 0
SUM OF ALL RESPONSES TO PHQ QUESTIONS 1-9: 0
1. LITTLE INTEREST OR PLEASURE IN DOING THINGS: NOT AT ALL
SUM OF ALL RESPONSES TO PHQ QUESTIONS 1-9: 0
SUM OF ALL RESPONSES TO PHQ QUESTIONS 1-9: 0
2. FEELING DOWN, DEPRESSED OR HOPELESS: NOT AT ALL

## 2025-03-20 NOTE — PROGRESS NOTES
0.005 % ophthalmic solution Place 1 drop into both eyes nightly 1230am      midodrine (PROAMATINE) 2.5 MG tablet Prn- has not needed in 2 month       No current facility-administered medications for this visit.        Allergies   Allergen Reactions    Avapro [Irbesartan]     Bactrim [Sulfamethoxazole-Trimethoprim]      Rash    Ciprofloxacin Hcl Nausea Only    Cozaar [Losartan Potassium]     Diovan [Valsartan]     Keflex [Cephalexin]      TURNED STOOL GREEN    Lipitor [Atorvastatin Calcium]     Lisinopril     Pcn [Penicillins]     Pravachol [Pravastatin Sodium]      myalgias    Tape [Adhesive Tape] Dermatitis    Tramadol Swelling    Zetia [Ezetimibe]     Morphine Nausea And Vomiting       Patient Active Problem List   Diagnosis    Gastroesophageal reflux disease without esophagitis    Onychomycosis    Diverticulosis    Chronic back pain    Acquired hypothyroidism    Essential hypertension    Right bundle branch block    Chronic pulmonary embolism ([facility])    Type 2 diabetes mellitus without complication, without long-term current use of insulin ([facility])    Obesity (BMI 30.0-34.9)    Tracheobronchitis    Allergic rhinitis    Glaucoma    S/p bilateral myringotomy with tube placement    GI bleed    Iron deficiency anemia    Paroxysmal atrial fibrillation ([facility])    Rectal bleeding    Intractable nausea and vomiting    Atrial fibrillation with RVR ([facility])    Class 2 obesity due to excess calories in adult    Sepsis ([facility])    JOSH (acute kidney injury)    Syncope    Acute cystitis without hematuria    Chronic systolic (congestive) heart failure    Chronic renal disease, stage III ([facility]) [615526]    Small intestinal bacterial overgrowth (SIBO)    Diarrhea of infectious origin    Mass of lung    Pneumonia of left upper lobe due to infectious organism    Thrombocytopenia    Anemia    Abnormal EKG    Difficulty in walking, not elsewhere classified    Diverticulitis of intestine, part unspecified, without perforation or abscess with

## 2025-03-24 ENCOUNTER — HOSPITAL ENCOUNTER (OUTPATIENT)
Dept: GENERAL RADIOLOGY | Age: 85
Discharge: HOME | End: 2025-03-26
Payer: MEDICARE

## 2025-03-24 ENCOUNTER — HOSPITAL ENCOUNTER (OUTPATIENT)
Age: 85
Discharge: HOME | End: 2025-03-26
Payer: MEDICARE

## 2025-03-24 DIAGNOSIS — M54.2 NECK PAIN: ICD-10-CM

## 2025-03-24 PROCEDURE — 72040 X-RAY EXAM NECK SPINE 2-3 VW: CPT

## 2025-03-26 DIAGNOSIS — K52.9 CHRONIC DIARRHEA: ICD-10-CM

## 2025-03-26 RX ORDER — CHOLESTYRAMINE 4 G/9G
POWDER, FOR SUSPENSION ORAL
Qty: 90 PACKET | Refills: 0 | Status: SHIPPED | OUTPATIENT
Start: 2025-03-26

## 2025-03-27 ENCOUNTER — TELEPHONE (OUTPATIENT)
Dept: FAMILY MEDICINE CLINIC | Age: 85
End: 2025-03-27

## 2025-03-27 RX ORDER — LORATADINE 10 MG/1
10 TABLET ORAL DAILY
Qty: 30 TABLET | Refills: 0 | Status: SHIPPED | OUTPATIENT
Start: 2025-03-27

## 2025-03-27 RX ORDER — BENZONATATE 100 MG/1
CAPSULE ORAL
Qty: 30 CAPSULE | Refills: 1 | Status: SHIPPED | OUTPATIENT
Start: 2025-03-27

## 2025-03-27 NOTE — TELEPHONE ENCOUNTER
Patient called C/O sore throat, cough, runny nose.  She would like to know if there is something she can take either OTC or prescription.  Ongoing for 2 weeks.  Pharmacy is [facility].  Please advise.

## 2025-03-27 NOTE — TELEPHONE ENCOUNTER
Tessalon Perles and Claritin prescribed.  If condition persists or worsens she is to go to an urgent care center or ER.

## 2025-03-28 ENCOUNTER — RESULTS FOLLOW-UP (OUTPATIENT)
Dept: FAMILY MEDICINE CLINIC | Age: 85
End: 2025-03-28

## 2025-04-08 DIAGNOSIS — I10 ESSENTIAL HYPERTENSION: ICD-10-CM

## 2025-04-08 RX ORDER — BUMETANIDE 1 MG/1
1 TABLET ORAL DAILY
Qty: 90 TABLET | Refills: 0 | Status: SHIPPED | OUTPATIENT
Start: 2025-04-08

## 2025-04-26 ENCOUNTER — HOSPITAL ENCOUNTER (OUTPATIENT)
Age: 85
Discharge: HOME | End: 2025-04-26
Payer: MEDICARE

## 2025-04-26 DIAGNOSIS — E03.9 ACQUIRED HYPOTHYROIDISM: ICD-10-CM

## 2025-04-26 DIAGNOSIS — E78.5 DYSLIPIDEMIA: ICD-10-CM

## 2025-04-26 DIAGNOSIS — D50.9 IRON DEFICIENCY ANEMIA, UNSPECIFIED IRON DEFICIENCY ANEMIA TYPE: ICD-10-CM

## 2025-04-26 DIAGNOSIS — I10 ESSENTIAL HYPERTENSION: ICD-10-CM

## 2025-04-26 DIAGNOSIS — D47.2 MGUS (MONOCLONAL GAMMOPATHY OF UNKNOWN SIGNIFICANCE): ICD-10-CM

## 2025-04-26 DIAGNOSIS — N18.30 STAGE 3 CHRONIC KIDNEY DISEASE, UNSPECIFIED WHETHER STAGE 3A OR 3B CKD (HCC): ICD-10-CM

## 2025-04-26 DIAGNOSIS — E11.9 TYPE 2 DIABETES MELLITUS WITHOUT COMPLICATION, WITHOUT LONG-TERM CURRENT USE OF INSULIN (HCC): ICD-10-CM

## 2025-04-26 LAB
ALBUMIN PERCENT: ABNORMAL %
ALBUMIN SERPL-MCNC: 3.2 G/DL (ref 3.5–5.2)
ALBUMIN SERPL-MCNC: 3.2 G/DL (ref 3.5–5.2)
ALBUMIN SERPL-MCNC: ABNORMAL G/DL
ALP SERPL-CCNC: 94 U/L (ref 35–104)
ALP SERPL-CCNC: 94 U/L (ref 35–104)
ALPHA 2 PERCENT: ABNORMAL %
ALPHA1 GLOB SERPL ELPH-MCNC: ABNORMAL %
ALPHA1 GLOB SERPL ELPH-MCNC: ABNORMAL G/DL
ALPHA2 GLOB SERPL ELPH-MCNC: ABNORMAL G/DL
ALT SERPL-CCNC: 17 U/L (ref 10–35)
ALT SERPL-CCNC: 17 U/L (ref 10–35)
ANION GAP SERPL CALCULATED.3IONS-SCNC: 8 MMOL/L (ref 9–16)
ANION GAP SERPL CALCULATED.3IONS-SCNC: 8 MMOL/L (ref 9–16)
AST SERPL-CCNC: 37 U/L (ref 10–35)
AST SERPL-CCNC: 37 U/L (ref 10–35)
B-GLOBULIN SERPL ELPH-MCNC: ABNORMAL %
B-GLOBULIN SERPL ELPH-MCNC: ABNORMAL G/DL
BASOPHILS # BLD: 0.07 K/UL (ref 0–0.2)
BASOPHILS NFR BLD: 2 % (ref 0–2)
BILIRUB SERPL-MCNC: 0.4 MG/DL (ref 0–1.2)
BILIRUB SERPL-MCNC: 0.4 MG/DL (ref 0–1.2)
BUN SERPL-MCNC: 15 MG/DL (ref 8–23)
BUN SERPL-MCNC: 15 MG/DL (ref 8–23)
CALCIUM SERPL-MCNC: 9 MG/DL (ref 8.6–10.4)
CALCIUM SERPL-MCNC: 9 MG/DL (ref 8.6–10.4)
CHLORIDE SERPL-SCNC: 107 MMOL/L (ref 98–107)
CHLORIDE SERPL-SCNC: 107 MMOL/L (ref 98–107)
CHOLEST SERPL-MCNC: 105 MG/DL (ref 0–199)
CHOLESTEROL/HDL RATIO: 2.1
CO2 SERPL-SCNC: 25 MMOL/L (ref 20–31)
CO2 SERPL-SCNC: 25 MMOL/L (ref 20–31)
CREAT SERPL-MCNC: 1.2 MG/DL (ref 0.7–1.2)
CREAT SERPL-MCNC: 1.2 MG/DL (ref 0.7–1.2)
EOSINOPHIL # BLD: 0.14 K/UL (ref 0–0.4)
EOSINOPHILS RELATIVE PERCENT: 4 % (ref 0–4)
ERYTHROCYTE [DISTWIDTH] IN BLOOD BY AUTOMATED COUNT: 15 % (ref 11.5–14.9)
FERRITIN SERPL-MCNC: 53 NG/ML
FREE KAPPA/LAMBDA RATIO: 0.69 (ref 0.22–1.74)
GAMMA GLOB SERPL ELPH-MCNC: ABNORMAL G/DL
GAMMA GLOBULIN %: ABNORMAL %
GFR, ESTIMATED: 44 ML/MIN/1.73M2
GFR, ESTIMATED: 44 ML/MIN/1.73M2
GLUCOSE SERPL-MCNC: 101 MG/DL (ref 74–99)
GLUCOSE SERPL-MCNC: 101 MG/DL (ref 74–99)
HCT VFR BLD AUTO: 32.9 % (ref 36–46)
HDLC SERPL-MCNC: 51 MG/DL
HGB BLD-MCNC: 10.6 G/DL (ref 12–16)
IGA SERPL-MCNC: 592 MG/DL (ref 70–400)
IGG SERPL-MCNC: 1043 MG/DL (ref 700–1600)
IGM SERPL-MCNC: 83 MG/DL (ref 40–230)
IRON SATN MFR SERPL: 11 % (ref 20–55)
IRON SERPL-MCNC: 32 UG/DL (ref 37–145)
KAPPA LC FREE SER-MCNC: 88.2 MG/L
LAMBDA LC FREE SERPL-MCNC: 128 MG/L (ref 4.2–27.7)
LDLC SERPL CALC-MCNC: 38 MG/DL (ref 0–100)
LYMPHOCYTES NFR BLD: 0.68 K/UL (ref 1–4.8)
LYMPHOCYTES RELATIVE PERCENT: 20 % (ref 24–44)
M PROTEIN 2 SERPL ELPH-MCNC: ABNORMAL G/DL
M PROTEIN SERPL ELPH-MCNC: ABNORMAL G/DL
MAGNESIUM SERPL-MCNC: 1.9 MG/DL (ref 1.6–2.4)
MCH RBC QN AUTO: 31.9 PG (ref 26–34)
MCHC RBC AUTO-ENTMCNC: 32.3 G/DL (ref 31–37)
MCV RBC AUTO: 98.7 FL (ref 80–100)
MONOCYTES NFR BLD: 0.14 K/UL (ref 0.1–1.3)
MONOCYTES NFR BLD: 4 % (ref 1–7)
MORPHOLOGY: ABNORMAL
MORPHOLOGY: ABNORMAL
NEUTROPHILS NFR BLD: 70 % (ref 36–66)
NEUTS SEG NFR BLD: 2.37 K/UL (ref 1.3–9.1)
PATHOLOGIST: ABNORMAL
PLATELET # BLD AUTO: 95 K/UL (ref 150–450)
PMV BLD AUTO: 9.9 FL (ref 6–12)
POTASSIUM SERPL-SCNC: 4.2 MMOL/L (ref 3.7–5.3)
POTASSIUM SERPL-SCNC: 4.2 MMOL/L (ref 3.7–5.3)
PROT PATTERN SERPL ELPH-IMP: ABNORMAL
PROT SERPL-MCNC: 5.9 G/DL (ref 6.6–8.7)
PROT SERPL-MCNC: 6.1 G/DL (ref 6.6–8.7)
PROT SERPL-MCNC: 6.1 G/DL (ref 6.6–8.7)
RBC # BLD AUTO: 3.33 M/UL (ref 4–5.2)
SODIUM SERPL-SCNC: 140 MMOL/L (ref 136–145)
SODIUM SERPL-SCNC: 140 MMOL/L (ref 136–145)
TIBC SERPL-MCNC: 290 UG/DL (ref 250–450)
TOTAL PROT. SUM,%: ABNORMAL %
TOTAL PROT. SUM: ABNORMAL G/DL
TRIGL SERPL-MCNC: 79 MG/DL (ref 0–149)
UNSATURATED IRON BINDING CAPACITY: 258 UG/DL (ref 112–347)
WBC OTHER # BLD: 3.4 K/UL (ref 3.5–11)

## 2025-04-26 PROCEDURE — 83550 IRON BINDING TEST: CPT

## 2025-04-26 PROCEDURE — 83735 ASSAY OF MAGNESIUM: CPT

## 2025-04-26 PROCEDURE — 82728 ASSAY OF FERRITIN: CPT

## 2025-04-26 PROCEDURE — 86334 IMMUNOFIX E-PHORESIS SERUM: CPT

## 2025-04-26 PROCEDURE — 83540 ASSAY OF IRON: CPT

## 2025-04-26 PROCEDURE — 36415 COLL VENOUS BLD VENIPUNCTURE: CPT

## 2025-04-26 PROCEDURE — 83521 IG LIGHT CHAINS FREE EACH: CPT

## 2025-04-26 PROCEDURE — 85025 COMPLETE CBC W/AUTO DIFF WBC: CPT

## 2025-04-26 PROCEDURE — 80053 COMPREHEN METABOLIC PANEL: CPT

## 2025-04-26 PROCEDURE — 80061 LIPID PANEL: CPT

## 2025-04-26 PROCEDURE — 82784 ASSAY IGA/IGD/IGG/IGM EACH: CPT

## 2025-04-26 PROCEDURE — 84155 ASSAY OF PROTEIN SERUM: CPT

## 2025-04-26 PROCEDURE — 84165 PROTEIN E-PHORESIS SERUM: CPT

## 2025-04-29 LAB
ALBUMIN PERCENT: 51 % (ref 56–66)
ALBUMIN SERPL-MCNC: 3 G/DL (ref 3.2–5.2)
ALPHA 2 PERCENT: 11 % (ref 7–12)
ALPHA1 GLOB SERPL ELPH-MCNC: 0.3 G/DL (ref 0.1–0.4)
ALPHA1 GLOB SERPL ELPH-MCNC: 5 % (ref 3–5)
ALPHA2 GLOB SERPL ELPH-MCNC: 0.7 G/DL (ref 0.5–0.9)
B-GLOBULIN SERPL ELPH-MCNC: 0.8 G/DL (ref 0.7–1.4)
B-GLOBULIN SERPL ELPH-MCNC: 14 % (ref 8–13)
GAMMA GLOB SERPL ELPH-MCNC: 1.1 G/DL (ref 0.5–1.5)
GAMMA GLOBULIN %: 19 % (ref 11–19)
ITYP INTERPRETATION: NORMAL
PATH REV: NORMAL
PATHOLOGIST: ABNORMAL
PROT PATTERN SERPL ELPH-IMP: ABNORMAL
PROT SERPL-MCNC: 5.9 G/DL (ref 6.6–8.7)
TOTAL PROT. SUM,%: 100 % (ref 98–102)
TOTAL PROT. SUM: 5.9 G/DL (ref 6.3–8.2)

## 2025-05-06 ENCOUNTER — TELEPHONE (OUTPATIENT)
Age: 85
End: 2025-05-06

## 2025-05-06 ENCOUNTER — OFFICE VISIT (OUTPATIENT)
Age: 85
End: 2025-05-06
Payer: MEDICARE

## 2025-05-06 VITALS
OXYGEN SATURATION: 99 % | HEIGHT: 61 IN | WEIGHT: 167.6 LBS | SYSTOLIC BLOOD PRESSURE: 137 MMHG | BODY MASS INDEX: 31.64 KG/M2 | HEART RATE: 71 BPM | DIASTOLIC BLOOD PRESSURE: 56 MMHG

## 2025-05-06 DIAGNOSIS — D47.2 MGUS (MONOCLONAL GAMMOPATHY OF UNKNOWN SIGNIFICANCE): ICD-10-CM

## 2025-05-06 DIAGNOSIS — D50.9 IRON DEFICIENCY ANEMIA, UNSPECIFIED IRON DEFICIENCY ANEMIA TYPE: Primary | ICD-10-CM

## 2025-05-06 PROCEDURE — G8417 CALC BMI ABV UP PARAM F/U: HCPCS | Performed by: INTERNAL MEDICINE

## 2025-05-06 PROCEDURE — 1090F PRES/ABSN URINE INCON ASSESS: CPT | Performed by: INTERNAL MEDICINE

## 2025-05-06 PROCEDURE — G8400 PT W/DXA NO RESULTS DOC: HCPCS | Performed by: INTERNAL MEDICINE

## 2025-05-06 PROCEDURE — 99203 OFFICE O/P NEW LOW 30 MIN: CPT | Performed by: INTERNAL MEDICINE

## 2025-05-06 PROCEDURE — 1036F TOBACCO NON-USER: CPT | Performed by: INTERNAL MEDICINE

## 2025-05-06 PROCEDURE — 3075F SYST BP GE 130 - 139MM HG: CPT | Performed by: INTERNAL MEDICINE

## 2025-05-06 PROCEDURE — 99204 OFFICE O/P NEW MOD 45 MIN: CPT | Performed by: INTERNAL MEDICINE

## 2025-05-06 PROCEDURE — 1123F ACP DISCUSS/DSCN MKR DOCD: CPT | Performed by: INTERNAL MEDICINE

## 2025-05-06 PROCEDURE — G8427 DOCREV CUR MEDS BY ELIG CLIN: HCPCS | Performed by: INTERNAL MEDICINE

## 2025-05-06 PROCEDURE — 1160F RVW MEDS BY RX/DR IN RCRD: CPT | Performed by: INTERNAL MEDICINE

## 2025-05-06 PROCEDURE — 3078F DIAST BP <80 MM HG: CPT | Performed by: INTERNAL MEDICINE

## 2025-05-06 PROCEDURE — 1159F MED LIST DOCD IN RCRD: CPT | Performed by: INTERNAL MEDICINE

## 2025-05-06 NOTE — PROGRESS NOTES
Patient ID: Genie Lama, 1940, [id number], 84 y.o.  Referred by : Reinier Day MD   Reason for consultation:   Anemia  MGUS  HISTORY OF PRESENT ILLNESS:    Hematology History:    Genie Lama is a 84 y.o. female with remote history of pulmonary embolism and DVT, history of atrial fibrillation, chronic anemia and history of GI bleed was seen during initial consultation visit for anemia.    She has history of chronic anemia and GI bleed in the past with severe iron deficiency.  She has been taking iron pill once daily for past 4 to 5 months.  She has had GI work-up with Dr Bertrand.  She also has a history of atrial fibrillation and on anticoagulation with Xarelto.    She report remote history of pulmonary embolism and DVT.  It is unclear if this was provoked or not.    She reports an episode of rectal bleeding about 2 weeks ago.  Denies any chest pain shortness of breath, unintentional weight loss drenching night sweats fever chills.    Her hemoglobin in August was around 9.4.  Most recent hemoglobin is 10.  6 on 3/18/2023.  Her WBC and platelets are within normal limits.  Her lab work did reveal mild monocytosis.  Her iron studies indicated iron deficiency back in 2021 however her recent iron levels in February are within normal limits.  She does have mildly low normal B12 levels.    INTERVAL HISTORY:  Patient is returning for follow-up visit and to discuss lab results and further recommendations.  She denied any abdominal pain nausea vomiting.  She is taking iron pill once daily and tolerating well.  She does have mild constipation.  Her hemoglobin is stable around 10.6.  Her iron levels shows iron saturation of 13%.    She does not want IV iron infusion.    Her monoclonal protein appears stable.      During this visit patient's allergy, social, medical, surgical history and medications were reviewed and updated.   Past Medical History:   Diagnosis Date    Atrial fibrillation (HCC) 03/28/2014

## 2025-05-13 DIAGNOSIS — I10 ESSENTIAL HYPERTENSION: ICD-10-CM

## 2025-05-13 RX ORDER — METOPROLOL SUCCINATE 25 MG/1
25 TABLET, EXTENDED RELEASE ORAL DAILY
Qty: 90 TABLET | Refills: 0 | Status: SHIPPED | OUTPATIENT
Start: 2025-05-13

## 2025-05-19 ENCOUNTER — OFFICE VISIT (OUTPATIENT)
Dept: UROLOGY | Age: 85
End: 2025-05-19
Payer: MEDICARE

## 2025-05-19 VITALS — HEART RATE: 74 BPM | DIASTOLIC BLOOD PRESSURE: 84 MMHG | SYSTOLIC BLOOD PRESSURE: 148 MMHG | TEMPERATURE: 97.9 F

## 2025-05-19 DIAGNOSIS — N95.2 ATROPHIC VAGINITIS: ICD-10-CM

## 2025-05-19 DIAGNOSIS — N39.0 RECURRENT UTI: Primary | ICD-10-CM

## 2025-05-19 PROCEDURE — G8417 CALC BMI ABV UP PARAM F/U: HCPCS | Performed by: UROLOGY

## 2025-05-19 PROCEDURE — 99213 OFFICE O/P EST LOW 20 MIN: CPT | Performed by: UROLOGY

## 2025-05-19 PROCEDURE — 3079F DIAST BP 80-89 MM HG: CPT | Performed by: UROLOGY

## 2025-05-19 PROCEDURE — 1090F PRES/ABSN URINE INCON ASSESS: CPT | Performed by: UROLOGY

## 2025-05-19 PROCEDURE — 1123F ACP DISCUSS/DSCN MKR DOCD: CPT | Performed by: UROLOGY

## 2025-05-19 PROCEDURE — 1159F MED LIST DOCD IN RCRD: CPT | Performed by: UROLOGY

## 2025-05-19 PROCEDURE — G8400 PT W/DXA NO RESULTS DOC: HCPCS | Performed by: UROLOGY

## 2025-05-19 PROCEDURE — 3077F SYST BP >= 140 MM HG: CPT | Performed by: UROLOGY

## 2025-05-19 PROCEDURE — G8427 DOCREV CUR MEDS BY ELIG CLIN: HCPCS | Performed by: UROLOGY

## 2025-05-19 PROCEDURE — 1036F TOBACCO NON-USER: CPT | Performed by: UROLOGY

## 2025-05-19 RX ORDER — LEVOTHYROXINE SODIUM 100 UG/1
100 TABLET ORAL DAILY
Qty: 90 TABLET | Refills: 0 | Status: SHIPPED | OUTPATIENT
Start: 2025-05-19

## 2025-05-19 ASSESSMENT — ENCOUNTER SYMPTOMS
BACK PAIN: 0
ABDOMINAL PAIN: 0

## 2025-05-19 NOTE — PROGRESS NOTES
Review of Systems   Constitutional:  Negative for fatigue and fever.   Cardiovascular:  Negative for chest pain.   Gastrointestinal:  Negative for abdominal pain.   Genitourinary:  Positive for frequency and urgency. Negative for decreased urine volume, difficulty urinating, dyspareunia, dysuria, flank pain, genital sores, hematuria, pelvic pain, vaginal bleeding, vaginal discharge and vaginal pain.   Musculoskeletal:  Negative for back pain.   Neurological:  Negative for weakness.     
Systems      Physical Exam:      Vitals:    05/19/25 1428   BP: (!) 148/84   Pulse: 74   Temp: 97.9 °F (36.6 °C)     There is no height or weight on file to calculate BMI.  Patient is a 84 y.o. female in noacute distress and alert and oriented to person, place and time.  Physical Exam  Constitutional: Patient in no acute distress.  Neuro: Alert andoriented to person, place and time.  Psych: Mood normal, affect normal        and Plan      1. Recurrent UTI    2. Atrophic vaginitis           Plan:   Cont estrace  Cont d mannose twice daily  F/u 6 mo  Assessment & Plan   Return in about 6 months (around 11/19/2025).    Prescriptions Ordered:  No orders of the defined types were placed in this encounter.     Orders Placed:  No orders of the defined types were placed in this encounter.           Nasir Eugene MD    Agree with the ROS entered by the MA.

## 2025-06-25 DIAGNOSIS — K52.9 CHRONIC DIARRHEA: ICD-10-CM

## 2025-06-25 RX ORDER — CHOLESTYRAMINE 4 G/9G
POWDER, FOR SUSPENSION ORAL
Qty: 90 PACKET | Refills: 1 | Status: SHIPPED | OUTPATIENT
Start: 2025-06-25

## 2025-07-09 ENCOUNTER — TELEPHONE (OUTPATIENT)
Dept: FAMILY MEDICINE CLINIC | Age: 85
End: 2025-07-09

## 2025-07-09 ENCOUNTER — OFFICE VISIT (OUTPATIENT)
Dept: FAMILY MEDICINE CLINIC | Age: 85
End: 2025-07-09

## 2025-07-09 VITALS
TEMPERATURE: 98.4 F | OXYGEN SATURATION: 91 % | RESPIRATION RATE: 18 BRPM | HEART RATE: 80 BPM | SYSTOLIC BLOOD PRESSURE: 124 MMHG | BODY MASS INDEX: 30.91 KG/M2 | DIASTOLIC BLOOD PRESSURE: 74 MMHG | WEIGHT: 163.6 LBS

## 2025-07-09 DIAGNOSIS — S16.1XXA NECK STRAIN, INITIAL ENCOUNTER: ICD-10-CM

## 2025-07-09 DIAGNOSIS — M54.2 CHRONIC NECK PAIN: Primary | ICD-10-CM

## 2025-07-09 DIAGNOSIS — G89.29 CHRONIC NECK PAIN: Primary | ICD-10-CM

## 2025-07-09 RX ORDER — TIZANIDINE 2 MG/1
2 TABLET ORAL 2 TIMES DAILY PRN
Qty: 30 TABLET | Refills: 0 | Status: SHIPPED | OUTPATIENT
Start: 2025-07-09

## 2025-07-09 ASSESSMENT — PATIENT HEALTH QUESTIONNAIRE - PHQ9
2. FEELING DOWN, DEPRESSED OR HOPELESS: NOT AT ALL
1. LITTLE INTEREST OR PLEASURE IN DOING THINGS: NOT AT ALL
SUM OF ALL RESPONSES TO PHQ QUESTIONS 1-9: 0

## 2025-07-09 ASSESSMENT — ENCOUNTER SYMPTOMS
CHEST TIGHTNESS: 0
SHORTNESS OF BREATH: 0

## 2025-07-09 NOTE — PROGRESS NOTES
DPM as Surgeon (Podiatry)  Mayte Quintero MD as Consulting Physician (Otolaryngology)  Flavio Szymanski MD as Consulting Physician (Orthopedic Surgery)     Medical History Review  Past Medical, Family, and Social History reviewed and does contribute to the patient presenting condition    Health Maintenance   Topic Date Due    Shingles vaccine (1 of 2) Never done    DTaP/Tdap/Td vaccine (1 - Tdap) 10/02/2002    COVID-19 Vaccine (4 - 2024-25 season) 09/01/2024    Flu vaccine (1) 08/01/2025    Annual Wellness Visit (Medicare)  10/25/2025    Depression Screen  03/20/2026    Pneumococcal 50+ years Vaccine  Completed    Respiratory Syncytial Virus (RSV) Pregnant or age 60 yrs+  Completed    Hepatitis A vaccine  Aged Out    Hepatitis B vaccine  Aged Out    Hib vaccine  Aged Out    Polio vaccine  Aged Out    Meningococcal (ACWY) vaccine  Aged Out    Meningococcal B vaccine  Aged Out    DEXA (modify frequency per FRAX score)  Discontinued        PHYSICAL EXAM:     /74 (BP Site: Left Upper Arm, Patient Position: Sitting, BP Cuff Size: Large Adult)   Pulse 80   Temp 98.4 °F (36.9 °C) (Temporal)   Resp 18   Wt 74.2 kg (163 lb 9.6 oz)   SpO2 91%   BMI 30.91 kg/m²    Physical Exam  Vitals and nursing note reviewed.   Constitutional:       General: She is not in acute distress.     Appearance: Normal appearance.   HENT:      Head: Normocephalic and atraumatic.   Eyes:      Extraocular Movements: Extraocular movements intact.      Conjunctiva/sclera: Conjunctivae normal.   Cardiovascular:      Rate and Rhythm: Regular rhythm.      Heart sounds: Normal heart sounds.   Pulmonary:      Effort: Pulmonary effort is normal. No respiratory distress.      Breath sounds: No wheezing.   Musculoskeletal:         General: Tenderness present.        Arms:       Comments: Tenderness to palpation. Limited ROM of cervical spine due to pain. Spasm noted    Neurological:      Mental Status: She is oriented to person, place, and time. 
Male

## 2025-07-14 ENCOUNTER — OFFICE VISIT (OUTPATIENT)
Dept: FAMILY MEDICINE CLINIC | Age: 85
End: 2025-07-14

## 2025-07-14 DIAGNOSIS — G89.29 CHRONIC NECK PAIN: Primary | ICD-10-CM

## 2025-07-14 DIAGNOSIS — M54.2 CHRONIC NECK PAIN: Primary | ICD-10-CM

## 2025-07-14 DIAGNOSIS — J30.9 ALLERGIC RHINITIS, UNSPECIFIED SEASONALITY, UNSPECIFIED TRIGGER: ICD-10-CM

## 2025-07-14 RX ORDER — BACLOFEN 5 MG/1
5 TABLET ORAL 3 TIMES DAILY PRN
Qty: 30 TABLET | Refills: 0 | Status: SHIPPED | OUTPATIENT
Start: 2025-07-14

## 2025-07-14 RX ORDER — FEXOFENADINE HCL 60 MG/1
60 TABLET, FILM COATED ORAL DAILY
Qty: 30 TABLET | Refills: 2 | Status: SHIPPED | OUTPATIENT
Start: 2025-07-14

## 2025-07-14 SDOH — ECONOMIC STABILITY: FOOD INSECURITY: WITHIN THE PAST 12 MONTHS, YOU WORRIED THAT YOUR FOOD WOULD RUN OUT BEFORE YOU GOT MONEY TO BUY MORE.: NEVER TRUE

## 2025-07-14 SDOH — ECONOMIC STABILITY: FOOD INSECURITY: WITHIN THE PAST 12 MONTHS, THE FOOD YOU BOUGHT JUST DIDN'T LAST AND YOU DIDN'T HAVE MONEY TO GET MORE.: NEVER TRUE

## 2025-07-14 ASSESSMENT — PATIENT HEALTH QUESTIONNAIRE - PHQ9
SUM OF ALL RESPONSES TO PHQ QUESTIONS 1-9: 0
1. LITTLE INTEREST OR PLEASURE IN DOING THINGS: NOT AT ALL
2. FEELING DOWN, DEPRESSED OR HOPELESS: NOT AT ALL

## 2025-07-14 ASSESSMENT — ENCOUNTER SYMPTOMS
CHEST TIGHTNESS: 0
RHINORRHEA: 1
SHORTNESS OF BREATH: 0
ABDOMINAL PAIN: 0

## 2025-07-14 NOTE — PROGRESS NOTES
[facility]     Date of Visit:  2025  Patient Name: Genie Lama   Patient :  1940     CHIEF COMPLAINT:     Genie Lama is a 85 y.o. female who presents today for an general visit to be evaluated for the following condition(s):  Chief Complaint   Patient presents with    Neck Pain     Ongoing, pt seen Gina on  was given Voltaren gel ( pt states she would ice her neck and put the gel on and it would hurt ) and tizanadine 2 mg knocked her out-dizzy.    Cough     Dry, allergies, states loratadine did not work last time did not work last time you gave it to her. Pt has        HISTORY OF PRESENT ILLNESS:       HPI   Visit Information    Have you changed or started any medications since your last visit including any over-the-counter medicines, vitamins, or herbal medicines? no   Are you having any side effects from any of your medications? -  yes - unknown    Have you stopped taking any of your medications? Is so, why? -  no    Have you seen any other physician or provider since your last visit? No  Have you had any other diagnostic tests since your last visit? No  Have you been seen in the emergency room and/or had an admission to a hospital since we last saw you? No  Have you had your routine dental cleaning in the past 6 months? no    Have you activated your [employer] account? If not, what are your barriers? No:      Patient Care Team:  Reinier Day MD as PCP - General (Family Medicine)  [facility]  Sean Valerio MD as Consulting Physician (Pulmonology)  Steve Womack MD as Surgeon (General Surgery)  Michel Cole MD as Surgeon (Orthopedic Surgery)  Nasir Eugene MD as Consulting Physician (Urology)  Baudilio Valadez MD as Consulting Physician (Hematology and Oncology)  Zach Johnson MD as Consulting Physician (Cardiology)  Court Julio MD as Surgeon (Ophthalmology)  Ramesh Almanza MD as Surgeon (Ophthalmology)  Lyndsey

## 2025-07-16 DIAGNOSIS — I10 ESSENTIAL HYPERTENSION: ICD-10-CM

## 2025-07-17 RX ORDER — BUMETANIDE 1 MG/1
1 TABLET ORAL DAILY
Qty: 90 TABLET | Refills: 0 | Status: SHIPPED | OUTPATIENT
Start: 2025-07-17

## 2025-07-24 ENCOUNTER — APPOINTMENT (OUTPATIENT)
Dept: VASCULAR LAB | Age: 85
DRG: 690 | End: 2025-07-24
Attending: EMERGENCY MEDICINE
Payer: MEDICARE

## 2025-07-24 ENCOUNTER — APPOINTMENT (OUTPATIENT)
Dept: GENERAL RADIOLOGY | Age: 85
DRG: 690 | End: 2025-07-24
Payer: MEDICARE

## 2025-07-24 ENCOUNTER — HOSPITAL ENCOUNTER (INPATIENT)
Age: 85
LOS: 3 days | Discharge: NURSING, REHAB OR LONG-TERM CARE | DRG: 690 | End: 2025-07-27
Attending: EMERGENCY MEDICINE | Admitting: FAMILY MEDICINE
Payer: MEDICARE

## 2025-07-24 DIAGNOSIS — R60.0 PERIPHERAL EDEMA: Primary | ICD-10-CM

## 2025-07-24 DIAGNOSIS — N30.00 ACUTE CYSTITIS WITHOUT HEMATURIA: ICD-10-CM

## 2025-07-24 DIAGNOSIS — R26.2 UNABLE TO WALK: ICD-10-CM

## 2025-07-24 PROBLEM — N39.0 UTI (URINARY TRACT INFECTION): Status: ACTIVE | Noted: 2025-07-24

## 2025-07-24 LAB
ANION GAP SERPL CALCULATED.3IONS-SCNC: 10 MMOL/L (ref 9–16)
BACTERIA URNS QL MICRO: ABNORMAL
BASOPHILS # BLD: 0.04 K/UL (ref 0–0.2)
BASOPHILS NFR BLD: 1 % (ref 0–2)
BILIRUB UR QL STRIP: NEGATIVE
BNP SERPL-MCNC: 745 PG/ML (ref 0–300)
BUN SERPL-MCNC: 16 MG/DL (ref 8–23)
CALCIUM SERPL-MCNC: 9.1 MG/DL (ref 8.6–10.4)
CASTS #/AREA URNS LPF: ABNORMAL /LPF
CHLORIDE SERPL-SCNC: 108 MMOL/L (ref 98–107)
CLARITY UR: ABNORMAL
CO2 SERPL-SCNC: 25 MMOL/L (ref 20–31)
COLOR UR: YELLOW
CREAT SERPL-MCNC: 1.2 MG/DL (ref 0.7–1.2)
D DIMER PPP FEU-MCNC: 0.48 UG/ML FEU (ref 0–0.59)
ECHO BSA: 1.81 M2
EKG ATRIAL RATE: 62 BPM
EKG ATRIAL RATE: 68 BPM
EKG P AXIS: 75 DEGREES
EKG P AXIS: 79 DEGREES
EKG P-R INTERVAL: 160 MS
EKG P-R INTERVAL: 172 MS
EKG Q-T INTERVAL: 424 MS
EKG Q-T INTERVAL: 454 MS
EKG QRS DURATION: 122 MS
EKG QRS DURATION: 126 MS
EKG QTC CALCULATION (BAZETT): 450 MS
EKG QTC CALCULATION (BAZETT): 460 MS
EKG R AXIS: 20 DEGREES
EKG R AXIS: 20 DEGREES
EKG T AXIS: 10 DEGREES
EKG T AXIS: 6 DEGREES
EKG VENTRICULAR RATE: 62 BPM
EKG VENTRICULAR RATE: 68 BPM
EOSINOPHIL # BLD: 0.07 K/UL (ref 0–0.44)
EOSINOPHILS RELATIVE PERCENT: 2 % (ref 0–4)
EPI CELLS #/AREA URNS HPF: ABNORMAL /HPF
ERYTHROCYTE [DISTWIDTH] IN BLOOD BY AUTOMATED COUNT: 14.6 % (ref 11.5–14.9)
GFR, ESTIMATED: 44 ML/MIN/1.73M2
GLUCOSE SERPL-MCNC: 109 MG/DL (ref 74–99)
GLUCOSE UR STRIP-MCNC: NEGATIVE MG/DL
HCT VFR BLD AUTO: 31.1 % (ref 36–46)
HGB BLD-MCNC: 9.9 G/DL (ref 12–16)
HGB UR QL STRIP.AUTO: NEGATIVE
IMM GRANULOCYTES # BLD AUTO: <0.03 K/UL (ref 0–0.3)
IMM GRANULOCYTES NFR BLD: 0 %
INR PPP: 1.3
KETONES UR STRIP-MCNC: NEGATIVE MG/DL
LEUKOCYTE ESTERASE UR QL STRIP: ABNORMAL
LYMPHOCYTES NFR BLD: 0.75 K/UL (ref 1.1–3.7)
LYMPHOCYTES RELATIVE PERCENT: 25 % (ref 24–44)
MCH RBC QN AUTO: 31.8 PG (ref 26–34)
MCHC RBC AUTO-ENTMCNC: 31.8 G/DL (ref 31–37)
MCV RBC AUTO: 100 FL (ref 80–100)
MONOCYTES NFR BLD: 0.35 K/UL (ref 0.1–1.2)
MONOCYTES NFR BLD: 12 % (ref 3–12)
NEUTROPHILS NFR BLD: 60 % (ref 36–66)
NEUTS SEG NFR BLD: 1.81 K/UL (ref 1.5–8.1)
NITRITE UR QL STRIP: POSITIVE
NRBC BLD-RTO: 0 PER 100 WBC
PARTIAL THROMBOPLASTIN TIME: 31.2 SEC (ref 24–36)
PH UR STRIP: 5.5 [PH] (ref 5–8)
PLATELET # BLD AUTO: 88 K/UL (ref 150–450)
PMV BLD AUTO: 12 FL (ref 8–13.5)
POTASSIUM SERPL-SCNC: 4 MMOL/L (ref 3.7–5.3)
PROT UR STRIP-MCNC: NEGATIVE MG/DL
PROTHROMBIN TIME: 17.8 SEC (ref 11.8–14.6)
RBC # BLD AUTO: 3.11 M/UL (ref 3.95–5.11)
RBC #/AREA URNS HPF: ABNORMAL /HPF
SODIUM SERPL-SCNC: 143 MMOL/L (ref 136–145)
SP GR UR STRIP: 1.02 (ref 1–1.03)
T4 FREE SERPL-MCNC: 1.2 NG/DL (ref 0.9–1.7)
TROPONIN I SERPL HS-MCNC: 16 NG/L (ref 0–14)
TROPONIN I SERPL HS-MCNC: 16 NG/L (ref 0–14)
TSH SERPL DL<=0.05 MIU/L-ACNC: 1.32 UIU/ML (ref 0.27–4.2)
UROBILINOGEN UR STRIP-ACNC: NORMAL EU/DL (ref 0–1)
WBC #/AREA URNS HPF: ABNORMAL /HPF
WBC OTHER # BLD: 3 K/UL (ref 3.5–11)

## 2025-07-24 PROCEDURE — 85379 FIBRIN DEGRADATION QUANT: CPT

## 2025-07-24 PROCEDURE — 85730 THROMBOPLASTIN TIME PARTIAL: CPT

## 2025-07-24 PROCEDURE — 83880 ASSAY OF NATRIURETIC PEPTIDE: CPT

## 2025-07-24 PROCEDURE — 84439 ASSAY OF FREE THYROXINE: CPT

## 2025-07-24 PROCEDURE — 1200000000 HC SEMI PRIVATE

## 2025-07-24 PROCEDURE — 87077 CULTURE AEROBIC IDENTIFY: CPT

## 2025-07-24 PROCEDURE — 85610 PROTHROMBIN TIME: CPT

## 2025-07-24 PROCEDURE — 36415 COLL VENOUS BLD VENIPUNCTURE: CPT

## 2025-07-24 PROCEDURE — 93970 EXTREMITY STUDY: CPT | Performed by: STUDENT IN AN ORGANIZED HEALTH CARE EDUCATION/TRAINING PROGRAM

## 2025-07-24 PROCEDURE — 81001 URINALYSIS AUTO W/SCOPE: CPT

## 2025-07-24 PROCEDURE — 85025 COMPLETE CBC W/AUTO DIFF WBC: CPT

## 2025-07-24 PROCEDURE — 84443 ASSAY THYROID STIM HORMONE: CPT

## 2025-07-24 PROCEDURE — 6370000000 HC RX 637 (ALT 250 FOR IP): Performed by: FAMILY MEDICINE

## 2025-07-24 PROCEDURE — 2500000003 HC RX 250 WO HCPCS: Performed by: EMERGENCY MEDICINE

## 2025-07-24 PROCEDURE — 6360000002 HC RX W HCPCS: Performed by: EMERGENCY MEDICINE

## 2025-07-24 PROCEDURE — 87086 URINE CULTURE/COLONY COUNT: CPT

## 2025-07-24 PROCEDURE — 96375 TX/PRO/DX INJ NEW DRUG ADDON: CPT

## 2025-07-24 PROCEDURE — 84484 ASSAY OF TROPONIN QUANT: CPT

## 2025-07-24 PROCEDURE — 71045 X-RAY EXAM CHEST 1 VIEW: CPT

## 2025-07-24 PROCEDURE — 2580000003 HC RX 258: Performed by: FAMILY MEDICINE

## 2025-07-24 PROCEDURE — 80048 BASIC METABOLIC PNL TOTAL CA: CPT

## 2025-07-24 PROCEDURE — 87186 SC STD MICRODIL/AGAR DIL: CPT

## 2025-07-24 PROCEDURE — 93005 ELECTROCARDIOGRAM TRACING: CPT | Performed by: EMERGENCY MEDICINE

## 2025-07-24 PROCEDURE — 93970 EXTREMITY STUDY: CPT

## 2025-07-24 PROCEDURE — 93010 ELECTROCARDIOGRAM REPORT: CPT | Performed by: INTERNAL MEDICINE

## 2025-07-24 PROCEDURE — 99285 EMERGENCY DEPT VISIT HI MDM: CPT

## 2025-07-24 PROCEDURE — 96374 THER/PROPH/DIAG INJ IV PUSH: CPT

## 2025-07-24 RX ORDER — LEVOTHYROXINE SODIUM 100 UG/1
100 TABLET ORAL DAILY
Status: DISCONTINUED | OUTPATIENT
Start: 2025-07-24 | End: 2025-07-27 | Stop reason: HOSPADM

## 2025-07-24 RX ORDER — FAMOTIDINE 20 MG/1
20 TABLET, FILM COATED ORAL 2 TIMES DAILY
Status: DISCONTINUED | OUTPATIENT
Start: 2025-07-24 | End: 2025-07-26

## 2025-07-24 RX ORDER — BUMETANIDE 1 MG/1
1 TABLET ORAL DAILY
Status: DISCONTINUED | OUTPATIENT
Start: 2025-07-24 | End: 2025-07-27

## 2025-07-24 RX ORDER — ACETAMINOPHEN 650 MG/1
650 SUPPOSITORY RECTAL EVERY 6 HOURS PRN
Status: DISCONTINUED | OUTPATIENT
Start: 2025-07-24 | End: 2025-07-27 | Stop reason: HOSPADM

## 2025-07-24 RX ORDER — BUMETANIDE 0.25 MG/ML
1 INJECTION, SOLUTION INTRAMUSCULAR; INTRAVENOUS ONCE
Status: COMPLETED | OUTPATIENT
Start: 2025-07-24 | End: 2025-07-24

## 2025-07-24 RX ORDER — DORZOLAMIDE HCL 20 MG/ML
1 SOLUTION/ DROPS OPHTHALMIC NIGHTLY
Status: DISCONTINUED | OUTPATIENT
Start: 2025-07-24 | End: 2025-07-24

## 2025-07-24 RX ORDER — POLYETHYLENE GLYCOL 3350 17 G/17G
17 POWDER, FOR SOLUTION ORAL DAILY PRN
Status: DISCONTINUED | OUTPATIENT
Start: 2025-07-24 | End: 2025-07-27 | Stop reason: HOSPADM

## 2025-07-24 RX ORDER — ACETAMINOPHEN 325 MG/1
650 TABLET ORAL EVERY 6 HOURS PRN
Status: DISCONTINUED | OUTPATIENT
Start: 2025-07-24 | End: 2025-07-27 | Stop reason: HOSPADM

## 2025-07-24 RX ORDER — DORZOLAMIDE HCL 20 MG/ML
1 SOLUTION/ DROPS OPHTHALMIC 3 TIMES DAILY
Status: DISCONTINUED | OUTPATIENT
Start: 2025-07-24 | End: 2025-07-27 | Stop reason: HOSPADM

## 2025-07-24 RX ORDER — LATANOPROST 50 UG/ML
1 SOLUTION/ DROPS OPHTHALMIC NIGHTLY
Status: DISCONTINUED | OUTPATIENT
Start: 2025-07-24 | End: 2025-07-27 | Stop reason: HOSPADM

## 2025-07-24 RX ORDER — FERROUS SULFATE 325(65) MG
325 TABLET ORAL
Status: DISCONTINUED | OUTPATIENT
Start: 2025-07-25 | End: 2025-07-27 | Stop reason: HOSPADM

## 2025-07-24 RX ORDER — BACLOFEN 10 MG/1
5 TABLET ORAL 3 TIMES DAILY PRN
Status: DISCONTINUED | OUTPATIENT
Start: 2025-07-24 | End: 2025-07-27 | Stop reason: HOSPADM

## 2025-07-24 RX ORDER — DOCUSATE SODIUM 100 MG/1
100 CAPSULE, LIQUID FILLED ORAL DAILY PRN
Status: DISCONTINUED | OUTPATIENT
Start: 2025-07-24 | End: 2025-07-27 | Stop reason: HOSPADM

## 2025-07-24 RX ORDER — SODIUM CHLORIDE 9 MG/ML
INJECTION, SOLUTION INTRAVENOUS CONTINUOUS
Status: ACTIVE | OUTPATIENT
Start: 2025-07-24 | End: 2025-07-25

## 2025-07-24 RX ORDER — POTASSIUM CHLORIDE 1500 MG/1
40 TABLET, EXTENDED RELEASE ORAL PRN
Status: DISCONTINUED | OUTPATIENT
Start: 2025-07-24 | End: 2025-07-27 | Stop reason: HOSPADM

## 2025-07-24 RX ORDER — POTASSIUM CHLORIDE 7.45 MG/ML
10 INJECTION INTRAVENOUS PRN
Status: DISCONTINUED | OUTPATIENT
Start: 2025-07-24 | End: 2025-07-27 | Stop reason: HOSPADM

## 2025-07-24 RX ORDER — ONDANSETRON 2 MG/ML
4 INJECTION INTRAMUSCULAR; INTRAVENOUS EVERY 6 HOURS PRN
Status: DISCONTINUED | OUTPATIENT
Start: 2025-07-24 | End: 2025-07-27 | Stop reason: HOSPADM

## 2025-07-24 RX ORDER — DORZOLAMIDE HCL 20 MG/ML
1 SOLUTION/ DROPS OPHTHALMIC 3 TIMES DAILY
Status: DISCONTINUED | OUTPATIENT
Start: 2025-07-24 | End: 2025-07-24

## 2025-07-24 RX ORDER — ONDANSETRON 4 MG/1
4 TABLET, ORALLY DISINTEGRATING ORAL EVERY 8 HOURS PRN
Status: DISCONTINUED | OUTPATIENT
Start: 2025-07-24 | End: 2025-07-27 | Stop reason: HOSPADM

## 2025-07-24 RX ORDER — LATANOPROST 50 UG/ML
1 SOLUTION/ DROPS OPHTHALMIC DAILY
Status: DISCONTINUED | OUTPATIENT
Start: 2025-07-24 | End: 2025-07-24

## 2025-07-24 RX ORDER — METOPROLOL SUCCINATE 25 MG/1
25 TABLET, EXTENDED RELEASE ORAL DAILY
Status: DISCONTINUED | OUTPATIENT
Start: 2025-07-24 | End: 2025-07-27 | Stop reason: HOSPADM

## 2025-07-24 RX ORDER — MAGNESIUM SULFATE HEPTAHYDRATE 40 MG/ML
2000 INJECTION, SOLUTION INTRAVENOUS PRN
Status: DISCONTINUED | OUTPATIENT
Start: 2025-07-24 | End: 2025-07-27 | Stop reason: HOSPADM

## 2025-07-24 RX ORDER — CHOLESTYRAMINE LIGHT 4 G/5.7G
1 POWDER, FOR SUSPENSION ORAL DAILY
Status: DISCONTINUED | OUTPATIENT
Start: 2025-07-24 | End: 2025-07-27 | Stop reason: HOSPADM

## 2025-07-24 RX ORDER — FENTANYL CITRATE 0.05 MG/ML
25 INJECTION, SOLUTION INTRAMUSCULAR; INTRAVENOUS ONCE
Refills: 0 | Status: COMPLETED | OUTPATIENT
Start: 2025-07-24 | End: 2025-07-24

## 2025-07-24 RX ORDER — LATANOPROST 50 UG/ML
1 SOLUTION/ DROPS OPHTHALMIC NIGHTLY
Status: DISCONTINUED | OUTPATIENT
Start: 2025-07-24 | End: 2025-07-24

## 2025-07-24 RX ORDER — SIMETHICONE 80 MG
80 TABLET,CHEWABLE ORAL EVERY 6 HOURS PRN
Status: DISCONTINUED | OUTPATIENT
Start: 2025-07-24 | End: 2025-07-27 | Stop reason: HOSPADM

## 2025-07-24 RX ORDER — ACETAMINOPHEN 500 MG
500 TABLET ORAL EVERY 6 HOURS PRN
Status: DISCONTINUED | OUTPATIENT
Start: 2025-07-24 | End: 2025-07-24

## 2025-07-24 RX ADMIN — FENTANYL CITRATE 25 MCG: 0.05 INJECTION, SOLUTION INTRAMUSCULAR; INTRAVENOUS at 10:48

## 2025-07-24 RX ADMIN — BUMETANIDE 1 MG: 1 TABLET ORAL at 15:20

## 2025-07-24 RX ADMIN — LATANOPROST 1 DROP: 50 SOLUTION OPHTHALMIC at 21:00

## 2025-07-24 RX ADMIN — DORZOLAMIDE HYDROCHLORIDE 1 DROP: 20 SOLUTION/ DROPS OPHTHALMIC at 20:03

## 2025-07-24 RX ADMIN — WATER 1000 MG: 1 INJECTION INTRAMUSCULAR; INTRAVENOUS; SUBCUTANEOUS at 12:12

## 2025-07-24 RX ADMIN — SODIUM CHLORIDE: 0.9 INJECTION, SOLUTION INTRAVENOUS at 15:40

## 2025-07-24 RX ADMIN — ACETAMINOPHEN 650 MG: 325 TABLET ORAL at 15:20

## 2025-07-24 RX ADMIN — DICLOFENAC SODIUM 2 G: 10 GEL TOPICAL at 17:56

## 2025-07-24 RX ADMIN — FAMOTIDINE 20 MG: 20 TABLET, FILM COATED ORAL at 21:00

## 2025-07-24 RX ADMIN — LEVOTHYROXINE SODIUM 100 MCG: 0.1 TABLET ORAL at 15:20

## 2025-07-24 RX ADMIN — FAMOTIDINE 20 MG: 20 TABLET, FILM COATED ORAL at 15:21

## 2025-07-24 RX ADMIN — BUMETANIDE 1 MG: 0.25 INJECTION INTRAMUSCULAR; INTRAVENOUS at 12:12

## 2025-07-24 RX ADMIN — ACETAMINOPHEN 650 MG: 325 TABLET ORAL at 23:33

## 2025-07-24 RX ADMIN — METOPROLOL SUCCINATE 25 MG: 25 TABLET, EXTENDED RELEASE ORAL at 15:27

## 2025-07-24 RX ADMIN — CHOLESTYRAMINE 4 G: 4 POWDER, FOR SUSPENSION ORAL at 15:21

## 2025-07-24 ASSESSMENT — PAIN SCALES - GENERAL
PAINLEVEL_OUTOF10: 10
PAINLEVEL_OUTOF10: 7
PAINLEVEL_OUTOF10: 5
PAINLEVEL_OUTOF10: 10
PAINLEVEL_OUTOF10: 8
PAINLEVEL_OUTOF10: 10
PAINLEVEL_OUTOF10: 9

## 2025-07-24 ASSESSMENT — PAIN DESCRIPTION - LOCATION
LOCATION: BACK
LOCATION: LEG
LOCATION: BACK
LOCATION: BACK

## 2025-07-24 ASSESSMENT — PAIN DESCRIPTION - ORIENTATION
ORIENTATION: LOWER
ORIENTATION: LOWER

## 2025-07-24 NOTE — ED NOTES
Report given to SINA Wright from ED.   Report method in person   The following was reviewed with receiving RN:   Current vital signs:  BP (!) 135/44   Pulse 65   Temp 98.2 °F (36.8 °C) (Oral)   Resp 20   Ht 1.6 m (5' 3\")   Wt 73.9 kg (163 lb)   SpO2 99%   BMI 28.87 kg/m²                MEWS Score: 1     Any medication or safety alerts were reviewed. Any pending diagnostics and notifications were also reviewed, as well as any safety concerns or issues, abnormal labs, abnormal imaging, and abnormal assessment findings. Questions were answered.

## 2025-07-24 NOTE — ED NOTES
Report given to SINA Pal from [facility].   Report method via phone.    The following was reviewed with receiving RN:   Current vital signs:  BP (!) 123/39   Pulse 62   Temp 98.2 °F (36.8 °C) (Oral)   Resp 19   Ht 1.6 m (5' 3\")   Wt 73.9 kg (163 lb)   SpO2 100%   BMI 28.87 kg/m²                MEWS Score: 1     Any medication or safety alerts were reviewed. Any pending diagnostics and notifications were also reviewed, as well as any safety concerns or issues, abnormal labs, abnormal imaging, and abnormal assessment findings. Questions were answered.

## 2025-07-24 NOTE — ED PROVIDER NOTES
eMERGENCY dEPARTMENT eNCOUnter      Pt Name: Genie Lama  MRN: [id number]  Birthdate 1940  Date of evaluation: 7/24/25      CHIEF COMPLAINT       Chief Complaint   Patient presents with    Leg Swelling    Leg Pain    Breast Pain         HISTORY OF PRESENT ILLNESS    Genie Lama is a 85 y.o. female who presents complaining of leg swelling.  Patient is here from EMS at home due to leg swelling.  Patient states her right leg has been swollen for a long time and that they really do not know what is going on though she did have a history of a clot in that leg in the past.  Patient is on blood thinners.  Patient states she just over the last couple days started having swelling in the left leg and significant mount of pain in the legs.  Patient also has a pain kind of in the left breast and a headache.  Patient denies shortness of breath.  Patient states she does have a history of hypertension diabetes and the blood clot.      REVIEW OF SYSTEMS       Review of Systems   Constitutional:  Negative for activity change, appetite change, chills, diaphoresis and fever.   HENT:  Negative for congestion, ear pain, facial swelling, nosebleeds, rhinorrhea, sinus pressure, sore throat and trouble swallowing.    Eyes:  Negative for pain, discharge and redness.   Respiratory:  Negative for cough, chest tightness, shortness of breath and wheezing.    Cardiovascular:  Positive for chest pain and leg swelling. Negative for palpitations.   Gastrointestinal:  Negative for abdominal pain, blood in stool, constipation, diarrhea, nausea and vomiting.   Genitourinary:  Negative for difficulty urinating, dysuria, flank pain, frequency, genital sores and hematuria.   Musculoskeletal:  Negative for arthralgias, back pain, gait problem, joint swelling, myalgias and neck pain.        Bilateral leg pain   Skin:  Negative for color change, pallor, rash and wound.   Neurological:  Negative for dizziness, tremors, seizures, syncope, speech

## 2025-07-25 LAB
ANION GAP SERPL CALCULATED.3IONS-SCNC: 10 MMOL/L (ref 9–16)
BASOPHILS # BLD: 0.05 K/UL (ref 0–0.2)
BASOPHILS NFR BLD: 1 % (ref 0–2)
BUN SERPL-MCNC: 14 MG/DL (ref 8–23)
CALCIUM SERPL-MCNC: 8.4 MG/DL (ref 8.6–10.4)
CHLORIDE SERPL-SCNC: 107 MMOL/L (ref 98–107)
CO2 SERPL-SCNC: 22 MMOL/L (ref 20–31)
CREAT SERPL-MCNC: 1.2 MG/DL (ref 0.7–1.2)
EOSINOPHIL # BLD: 0.15 K/UL (ref 0–0.44)
EOSINOPHILS RELATIVE PERCENT: 4 % (ref 0–4)
ERYTHROCYTE [DISTWIDTH] IN BLOOD BY AUTOMATED COUNT: 14.8 % (ref 11.5–14.9)
GFR, ESTIMATED: 44 ML/MIN/1.73M2
GLUCOSE SERPL-MCNC: 96 MG/DL (ref 74–99)
HCT VFR BLD AUTO: 30 % (ref 36–46)
HGB BLD-MCNC: 9.7 G/DL (ref 12–16)
IMM GRANULOCYTES # BLD AUTO: <0.03 K/UL (ref 0–0.3)
IMM GRANULOCYTES NFR BLD: 0 %
LYMPHOCYTES NFR BLD: 0.99 K/UL (ref 1.1–3.7)
LYMPHOCYTES RELATIVE PERCENT: 28 % (ref 24–44)
MCH RBC QN AUTO: 32.7 PG (ref 26–34)
MCHC RBC AUTO-ENTMCNC: 32.3 G/DL (ref 31–37)
MCV RBC AUTO: 101 FL (ref 80–100)
MICROORGANISM SPEC CULT: ABNORMAL
MONOCYTES NFR BLD: 0.45 K/UL (ref 0.1–1.2)
MONOCYTES NFR BLD: 13 % (ref 3–12)
NEUTROPHILS NFR BLD: 54 % (ref 36–66)
NEUTS SEG NFR BLD: 1.91 K/UL (ref 1.5–8.1)
NRBC BLD-RTO: 0 PER 100 WBC
PLATELET # BLD AUTO: 99 K/UL (ref 150–450)
PMV BLD AUTO: 12.2 FL (ref 8–13.5)
POTASSIUM SERPL-SCNC: 4 MMOL/L (ref 3.7–5.3)
RBC # BLD AUTO: 2.97 M/UL (ref 3.95–5.11)
SODIUM SERPL-SCNC: 139 MMOL/L (ref 136–145)
SPECIMEN DESCRIPTION: ABNORMAL
WBC OTHER # BLD: 3.6 K/UL (ref 3.5–11)

## 2025-07-25 PROCEDURE — 85025 COMPLETE CBC W/AUTO DIFF WBC: CPT

## 2025-07-25 PROCEDURE — 80048 BASIC METABOLIC PNL TOTAL CA: CPT

## 2025-07-25 PROCEDURE — 36415 COLL VENOUS BLD VENIPUNCTURE: CPT

## 2025-07-25 PROCEDURE — 1200000000 HC SEMI PRIVATE

## 2025-07-25 PROCEDURE — 2580000003 HC RX 258: Performed by: FAMILY MEDICINE

## 2025-07-25 PROCEDURE — 6370000000 HC RX 637 (ALT 250 FOR IP): Performed by: FAMILY MEDICINE

## 2025-07-25 PROCEDURE — 6360000002 HC RX W HCPCS: Performed by: FAMILY MEDICINE

## 2025-07-25 PROCEDURE — 2500000003 HC RX 250 WO HCPCS: Performed by: FAMILY MEDICINE

## 2025-07-25 RX ADMIN — DICLOFENAC SODIUM 2 G: 10 GEL TOPICAL at 08:31

## 2025-07-25 RX ADMIN — WATER 1000 MG: 1 INJECTION INTRAMUSCULAR; INTRAVENOUS; SUBCUTANEOUS at 11:39

## 2025-07-25 RX ADMIN — RIVAROXABAN 10 MG: 10 TABLET, FILM COATED ORAL at 08:58

## 2025-07-25 RX ADMIN — FERROUS SULFATE TAB 325 MG (65 MG ELEMENTAL FE) 325 MG: 325 (65 FE) TAB at 08:58

## 2025-07-25 RX ADMIN — DORZOLAMIDE HYDROCHLORIDE 1 DROP: 20 SOLUTION/ DROPS OPHTHALMIC at 21:33

## 2025-07-25 RX ADMIN — DICLOFENAC SODIUM 2 G: 10 GEL TOPICAL at 13:49

## 2025-07-25 RX ADMIN — METOPROLOL SUCCINATE 25 MG: 25 TABLET, EXTENDED RELEASE ORAL at 08:58

## 2025-07-25 RX ADMIN — DORZOLAMIDE HYDROCHLORIDE 1 DROP: 20 SOLUTION/ DROPS OPHTHALMIC at 13:48

## 2025-07-25 RX ADMIN — LATANOPROST 1 DROP: 50 SOLUTION OPHTHALMIC at 22:15

## 2025-07-25 RX ADMIN — BUMETANIDE 1 MG: 1 TABLET ORAL at 08:58

## 2025-07-25 RX ADMIN — ACETAMINOPHEN 650 MG: 325 TABLET ORAL at 08:57

## 2025-07-25 RX ADMIN — CHOLESTYRAMINE 4 G: 4 POWDER, FOR SUSPENSION ORAL at 08:59

## 2025-07-25 RX ADMIN — FAMOTIDINE 20 MG: 20 TABLET, FILM COATED ORAL at 08:58

## 2025-07-25 RX ADMIN — DORZOLAMIDE HYDROCHLORIDE 1 DROP: 20 SOLUTION/ DROPS OPHTHALMIC at 08:59

## 2025-07-25 RX ADMIN — FAMOTIDINE 20 MG: 20 TABLET, FILM COATED ORAL at 22:14

## 2025-07-25 RX ADMIN — ACETAMINOPHEN 650 MG: 325 TABLET ORAL at 22:14

## 2025-07-25 RX ADMIN — SODIUM CHLORIDE: 0.9 INJECTION, SOLUTION INTRAVENOUS at 05:14

## 2025-07-25 RX ADMIN — LEVOTHYROXINE SODIUM 100 MCG: 0.1 TABLET ORAL at 08:58

## 2025-07-25 ASSESSMENT — PAIN DESCRIPTION - ORIENTATION
ORIENTATION: LOWER

## 2025-07-25 ASSESSMENT — PAIN SCALES - GENERAL
PAINLEVEL_OUTOF10: 0
PAINLEVEL_OUTOF10: 6
PAINLEVEL_OUTOF10: 4
PAINLEVEL_OUTOF10: 2
PAINLEVEL_OUTOF10: 4
PAINLEVEL_OUTOF10: 2
PAINLEVEL_OUTOF10: 2
PAINLEVEL_OUTOF10: 8
PAINLEVEL_OUTOF10: 4

## 2025-07-25 ASSESSMENT — PAIN DESCRIPTION - PAIN TYPE: TYPE: CHRONIC PAIN

## 2025-07-25 ASSESSMENT — PAIN DESCRIPTION - LOCATION
LOCATION: BACK

## 2025-07-25 ASSESSMENT — PAIN DESCRIPTION - DESCRIPTORS: DESCRIPTORS: ACHING

## 2025-07-25 NOTE — H&P
[facility] Medicine  History and Physical                                                                                                                                                 Full Code    Patient:  Genie Lama  MRN: [id number]                                                                                                                                                                     CHIEF COMPLAINT:  leg pain    History Obtained From:  patient, electronic medical record  PCP: Reinier Day MD    HISTORY OF PRESENT ILLNESS:   The patient is a 85 y.o. female who presents with history of feeling not well..  And swelling of the legs for quite some time patient was not too sure what is going on patient was staying alone at home patient has a history of blood clots in the leg also patient is on blood thinner patient has a history of A-fib patient denies any shortness of breath history of diabetes and history of hypertension    Past Medical History:        Diagnosis Date    Atrial fibrillation (HCC) 03/28/2014    Chronic back pain     with rt. leg pain    Diverticulosis     GERD (gastroesophageal reflux disease)     Hearing aid worn     willi. ears.    Hearing deficit, bilateral     Hyperlipidemia     Hypertension     Hypothyroidism     Obesity (BMI 30.0-34.9) 08/12/2016    Onychomycosis     PE (pulmonary embolism)     H/O DVT of rt leg    Poor venous access     Type II or unspecified type diabetes mellitus without mention of complication, not stated as uncontrolled        Past Surgical History:        Procedure Laterality Date    ARM SURGERY Right     Growth taken off per pt.    CARPAL TUNNEL RELEASE Bilateral     Per pt.    CATARACT EXTRACTION EXTRACAPSULAR W/ INTRAOCULAR LENS IMPLANTATION Left 12/06/2022    [facility]    CATARACT EXTRACTION EXTRACAPSULAR W/ INTRAOCULAR LENS IMPLANTATION Right 02/07/2023    [facility]    CHOLECYSTECTOMY      COLON SURGERY      s/p sigmoid  problems, visual disturbance, weakness and syncope      Physical Exam:    Vitals: BP (!) 120/57   Pulse 66   Temp 98.1 °F (36.7 °C)   Resp 16   Ht 1.6 m (5' 3\")   Wt 73.9 kg (163 lb)   SpO2 95%   BMI 28.87 kg/m²   General appearance: alert, appears stated age and cooperative  Skin: Skin color, texture, turgor normal. No rashes or lesions  HEENT: Head: Normocephalic, no lesions, without obvious abnormality.  Eye: Normal external eye, conjunctiva, lids cornea, BREN  Neck: no adenopathy, no carotid bruit, no JVD, supple, symmetrical, trachea midline and thyroid not enlarged, symmetric, no tenderness/mass/nodules  Lungs: clear to auscultation bilaterally  Heart: regular rate and rhythm, S1, S2 normal, no murmur, click, rub or gallop  Abdomen: soft, non-tender; bowel sounds normal; no masses,  no organomegaly  Extremities: extremities normal, atraumatic, no cyanosis or edema  Neurologic: Mental status: Alert, oriented, thought content appropriate    CBC:   Recent Labs     07/24/25  1040   WBC 3.0*   HGB 9.9*   PLT 88*     BMP:    Recent Labs     07/24/25  1040      K 4.0   *   CO2 25   BUN 16   CREATININE 1.2   GLUCOSE 109*     Hepatic: No results for input(s): \"AST\", \"ALT\", \"BILITOT\", \"ALKPHOS\" in the last 72 hours.    Invalid input(s): \"ALB\"  Troponin: No results for input(s): \"TROPONINI\" in the last 72 hours.  BNP: No results for input(s): \"BNP\" in the last 72 hours.  Lipids: No results for input(s): \"CHOL\", \"HDL\" in the last 72 hours.    Invalid input(s): \"LDLCALCU\"  ABGs:   Lab Results   Component Value Date/Time    PO2ART 110.0 10/03/2012 05:10 AM     INR:   Recent Labs     07/24/25  1040   INR 1.3     -----------------------------------------------------------------  PA/lat CXR: Vascular duplex lower extremity venous bilateral  Result Date: 7/24/2025    No evidence of deep vein or superficial vein thrombosis in the right lower extremity. Vessels demonstrate normal compressibility, color

## 2025-07-25 NOTE — DISCHARGE INSTR - COC
Continuity of Care Form    Patient Name: Genie Lama   :  1940  MRN:  [id number]    Admit date:  2025  Discharge date:  25    Code Status Order: Full Code   Advance Directives:     Admitting Physician:  Mohit Kasper MD  PCP: Reinier Day MD    Discharging Nurse: Farhad ANDINO  Discharging Hospital Unit/Room#: 3/-  Discharging Unit Phone Number: [phone]    Emergency Contact:   Extended Emergency Contact Information  Primary Emergency Contact: Caleb Lama  Address: [address]  Home Phone: [phone]  Relation: Child  Secondary Emergency Contact: Serina Hess  Home Phone: [phone]  Relation: Child    Past Surgical History:  Past Surgical History:   Procedure Laterality Date    ARM SURGERY Right     Growth taken off per pt.    CARPAL TUNNEL RELEASE Bilateral     Per pt.    CATARACT EXTRACTION EXTRACAPSULAR W/ INTRAOCULAR LENS IMPLANTATION Left 2022    [facility]    CATARACT EXTRACTION EXTRACAPSULAR W/ INTRAOCULAR LENS IMPLANTATION Right 2023    [facility]    CHOLECYSTECTOMY      COLON SURGERY      s/p sigmoid cloectomy    COLONOSCOPY N/A 2021    COLONOSCOPY DIAGNOSTIC performed by Jason Sidhu MD at [facility]    HYSTERECTOMY (CERVIX STATUS UNKNOWN)      INTRACAPSULAR CATARACT EXTRACTION Left 2022    EYE CATARACT EMULSIFICATION IOL IMPLANT performed by Ramesh Almanza MD at [facility] OR    INTRACAPSULAR CATARACT EXTRACTION Right 2023    EYE CATARACT EMULSIFICATION IOL IMPLANT performed by Ramesh Almanza MD at [facility] OR    PROCTOSIGMOIDOSCOPY N/A 3/1/2023    ANAL PROCTO SIGMOIDOSCOPY FLEXIBLE performed by Jason Sidhu MD at [facility] ENDO    SIGMOIDOSCOPY N/A 2021    SIGMOIDOSCOPY DIAGNOSTIC FLEXIBLE performed by Jason Sidhu MD at [facility] ENDO    TYMPANOSTOMY TUBE PLACEMENT Bilateral 2019    DR IMAN GAUTHIER    UPPER GASTROINTESTINAL

## 2025-07-25 NOTE — PLAN OF CARE
Problem: Chronic Conditions and Co-morbidities  Goal: Patient's chronic conditions and co-morbidity symptoms are monitored and maintained or improved  7/25/2025 1304 by Yeni Shane RN  Outcome: Progressing  7/25/2025 0414 by Ne Huggins RN  Outcome: Progressing  Flowsheets (Taken 7/25/2025 0414)  Care Plan - Patient's Chronic Conditions and Co-Morbidity Symptoms are Monitored and Maintained or Improved:   Monitor and assess patient's chronic conditions and comorbid symptoms for stability, deterioration, or improvement   Collaborate with multidisciplinary team to address chronic and comorbid conditions and prevent exacerbation or deterioration   Update acute care plan with appropriate goals if chronic or comorbid symptoms are exacerbated and prevent overall improvement and discharge     Problem: Discharge Planning  Goal: Discharge to home or other facility with appropriate resources  7/25/2025 1304 by Yeni Shane RN  Outcome: Progressing  7/25/2025 0414 by Ne Huggins RN  Outcome: Progressing  Flowsheets (Taken 7/25/2025 0414)  Discharge to home or other facility with appropriate resources: Identify barriers to discharge with patient and caregiver     Problem: Pain  Goal: Verbalizes/displays adequate comfort level or baseline comfort level  7/25/2025 1304 by Yeni Shane RN  Outcome: Progressing  7/25/2025 0414 by Ne Huggins RN  Outcome: Progressing  Flowsheets (Taken 7/25/2025 0414)  Verbalizes/displays adequate comfort level or baseline comfort level:   Encourage patient to monitor pain and request assistance   Assess pain using appropriate pain scale   Administer analgesics based on type and severity of pain and evaluate response   Implement non-pharmacological measures as appropriate and evaluate response     Problem: Safety - Adult  Goal: Free from fall injury  7/25/2025 1304 by Yeni Shane RN  Outcome: Progressing  7/25/2025 0414 by Ne Huggins RN  Outcome:

## 2025-07-25 NOTE — CARE COORDINATION
Case Management Assessment  Initial Evaluation    Date/Time of Evaluation: 7/25/2025 10:28 AM  Assessment Completed by: VERONICA Jolly, TASHA    If patient is discharged prior to next notation, then this note serves as note for discharge by case management.    Patient Name: Genie Lama                   YOB: 1940  Diagnosis: Peripheral edema [R60.0]  Unable to walk [R26.2]  Acute cystitis without hematuria [N30.00]  UTI (urinary tract infection) [N39.0]                   Date / Time: 7/24/2025  9:24 AM    Patient Admission Status: Inpatient   Readmission Risk (Low < 19, Mod (19-27), High > 27): Readmission Risk Score: 17.6    Current PCP: Reinier Day MD  PCP verified by CM? Yes    Chart Reviewed: Yes      History Provided by: Patient  Patient Orientation: Alert and Oriented    Patient Cognition: Alert    Hospitalization in the last 30 days (Readmission):  No    If yes, Readmission Assessment in CM Navigator will be completed.    Advance Directives:      Code Status: Full Code   Patient's Primary Decision Maker is: Named in Scanned ACP Document    Primary Decision Maker: Caleb Lama - Child - [phone]    Discharge Planning:    Patient lives with: Alone Type of Home: House  Primary Care Giver: Self  Patient Support Systems include: Children   Current Financial resources: Medicare  Current community resources: None  Current services prior to admission: Durable Medical Equipment            Current DME: Walker, Wheelchair            Type of Home Care services:  None    ADLS  Prior functional level: Assistance with the following:, Housework  Current functional level: Assistance with the following:, Housework    PT AM-PAC:   /24  OT AM-PAC:   /24    Family can provide assistance at DC: Yes  Would you like Case Management to discuss the discharge plan with any other family members/significant others, and if so, who? Yes (son Caleb)  Plans to Return to Present Housing: Yes  Other Identified

## 2025-07-25 NOTE — PROGRESS NOTES
Comprehensive Nutrition Assessment    Type and Reason for Visit:  Initial, Positive nutrition screen (Poor appetite and intake, wt loss.)    Nutrition Recommendations/Plan:   Liberalize diet to 3-4 gm Sodium diet without other restrictions.   Avoid provision of sugar substitutes.  Pt to select foods from menu as tolerated within sodium restriction.      Malnutrition Assessment:  Malnutrition Status:  At risk for malnutrition (07/25/25 1332)    Context:  Chronic Illness (Acute on chronic)     Findings of the 6 clinical characteristics of malnutrition:  Energy Intake:  Mild decrease in energy intake  Weight Loss:  Mild weight loss     Body Fat Loss:  Unable to assess     Muscle Mass Loss:  Mild muscle mass loss (to moderate; may be age-related) Clavicles (pectoralis & deltoids)  Fluid Accumulation:  Mild (to moderate; may not be related to nutritional status)     Strength:  Not Performed    Nutrition Assessment:    Pt admitted with acute cystitis and also being evluated for leg swelling. Pt states some of the portions of food are too large and she doesn't eat as much as she used to. States she doesn't care for meat much anymore. Cannot tolerate certain dairy products and sugar substitutes. Declined offer of oral nutrition supplements. States she follows a low salt diet at home and limits some carbohydrate foods. However, pt does not follow a strict diabetic diet and glucose levels have been controlled. Pt requests liberalization of her diet. Glucose of 96 noted today; A1C: 4.8 on 3/20/25. Diet will be adjusted. Pt reports usual wt of 166 lb with loss down to 163lb at last doctor's office visit.    Nutrition Related Findings:    Edema: +2 generalized, RLE, LLE (7/24). Labs and meds reviewed. Chronic diarrhea reported. Hx: DM, HTN, GERD, DVT.         Current Nutrition Intake & Therapies:    Average Meal Intake: 26-50%     ADULT DIET; Regular; No Added Salt (3-4 gm)    Anthropometric Measures:  Height: 160 cm (5'

## 2025-07-25 NOTE — PROGRESS NOTES
Hospitalist Progress Note  7/25/2025 5:07 PM  Subjective:   Admit Date: 7/24/2025  PCP: Reinier Day MD     Full Code      C/c:  Chief Complaint   Patient presents with    Leg Swelling    Leg Pain    Breast Pain         Interval History: pt doing slightly better, pt ohas uti, urine shows e coli sensitivity pending    Diet: ADULT DIET; Regular; No Added Salt (3-4 gm)                                ip days:1  Medications:   Scheduled Meds:   bumetanide  1 mg Oral Daily    cholestyramine light  1 packet Oral Daily    famotidine  20 mg Oral BID    ferrous sulfate  325 mg Oral Daily with breakfast    levothyroxine  100 mcg Oral Daily    metoprolol succinate  25 mg Oral Daily    rivaroxaban  10 mg Oral Daily with breakfast    cefTRIAXone (ROCEPHIN) IV  1,000 mg IntraVENous Daily    latanoprost  1 drop Both Eyes Nightly    dorzolamide  1 drop Both Eyes TID     Continuous Infusions:  PRN Meds:.[Held by provider] Baclofen, diclofenac sodium, docusate sodium, simethicone, potassium chloride **OR** potassium alternative oral replacement **OR** potassium chloride, magnesium sulfate, ondansetron **OR** ondansetron, polyethylene glycol, acetaminophen **OR** acetaminophen     CBC:   Recent Labs     07/24/25  1040 07/25/25  0708   WBC 3.0* 3.6   HGB 9.9* 9.7*   PLT 88* 99*     BMP:    Recent Labs     07/24/25  1040 07/25/25  0729    139   K 4.0 4.0   * 107   CO2 25 22   BUN 16 14   CREATININE 1.2 1.2   GLUCOSE 109* 96     Hepatic: No results for input(s): \"AST\", \"ALT\", \"BILITOT\", \"ALKPHOS\" in the last 72 hours.    Invalid input(s): \"ALB\"  Troponin: No results for input(s): \"TROPONINI\" in the last 72 hours.  BNP: No results for input(s): \"BNP\" in the last 72 hours.  Lipids: No results for input(s): \"CHOL\", \"HDL\" in the last 72 hours.    Invalid input(s): \"LDLCALCU\"  INR:   Recent Labs     07/24/25  1040   INR 1.3       Objective:   Vitals: BP (!) 130/55   Pulse 60   Temp 97.9 °F (36.6 °C) (Oral)   Resp 18

## 2025-07-25 NOTE — PROGRESS NOTES
A perfect serve message was sent to Dr Kasper requesting that he call the patient's dtr, Serina after he sees the patient today.  Her contact number was provided.

## 2025-07-25 NOTE — DISCHARGE INSTRUCTIONS
Your information:  Name: Genie Lama  : 1940    Your instructions:    ***    What to do after you leave the hospital:    Recommended diet: {diet:55311}    Recommended activity: {discharge activity:87010}        Your personal items were sent home with you at discharge.    Belongings  Dental Appliances: Partials, Uppers  Vision - Corrective Lenses: Eyeglasses  Hearing Aid: Bilateral hearing aids  Clothing: Footwear, Pants, Shirt, Undergarments, Socks  Jewelry: Ring  Electronic Devices: None  Weapons (Notify Protective Services/Security): None  Home Medications: None  Valuables Given To: Patient  Provide Name(s) of Who Valuable(s) Were Given To: Genie    If any problems occur once you leave the hospital, contact your physician or go to the emergency room.

## 2025-07-25 NOTE — PROGRESS NOTES
Dr Kasper attempted 2 x to reach dtrSerina by phone.  There were no answers.  Messages were left by this writer.

## 2025-07-26 LAB
ANION GAP SERPL CALCULATED.3IONS-SCNC: 11 MMOL/L (ref 9–16)
BASOPHILS # BLD: 0.04 K/UL (ref 0–0.2)
BASOPHILS NFR BLD: 1 % (ref 0–2)
BUN SERPL-MCNC: 12 MG/DL (ref 8–23)
CALCIUM SERPL-MCNC: 8.4 MG/DL (ref 8.6–10.4)
CHLORIDE SERPL-SCNC: 107 MMOL/L (ref 98–107)
CO2 SERPL-SCNC: 22 MMOL/L (ref 20–31)
CREAT SERPL-MCNC: 1.1 MG/DL (ref 0.7–1.2)
EOSINOPHIL # BLD: 0.14 K/UL (ref 0–0.44)
EOSINOPHILS RELATIVE PERCENT: 4 % (ref 0–4)
ERYTHROCYTE [DISTWIDTH] IN BLOOD BY AUTOMATED COUNT: 14.6 % (ref 11.5–14.9)
GFR, ESTIMATED: 49 ML/MIN/1.73M2
GLUCOSE SERPL-MCNC: 101 MG/DL (ref 74–99)
HCT VFR BLD AUTO: 35.9 % (ref 36–46)
HGB BLD-MCNC: 11.3 G/DL (ref 12–16)
IMM GRANULOCYTES # BLD AUTO: 0 K/UL (ref 0–0.3)
IMM GRANULOCYTES NFR BLD: 0 %
LYMPHOCYTES NFR BLD: 0.86 K/UL (ref 1.1–3.7)
LYMPHOCYTES RELATIVE PERCENT: 24 % (ref 24–44)
MCH RBC QN AUTO: 31.2 PG (ref 26–34)
MCHC RBC AUTO-ENTMCNC: 31.5 G/DL (ref 31–37)
MCV RBC AUTO: 99.2 FL (ref 80–100)
MONOCYTES NFR BLD: 0.43 K/UL (ref 0.1–1.2)
MONOCYTES NFR BLD: 12 % (ref 3–12)
MORPHOLOGY: NORMAL
NEUTROPHILS NFR BLD: 59 % (ref 36–66)
NEUTS SEG NFR BLD: 2.13 K/UL (ref 1.5–8.1)
NRBC BLD-RTO: 0 PER 100 WBC
PLATELET # BLD AUTO: ABNORMAL K/UL (ref 150–450)
PMV BLD AUTO: 13.1 FL (ref 8–13.5)
POTASSIUM SERPL-SCNC: 4.3 MMOL/L (ref 3.7–5.3)
RBC # BLD AUTO: 3.62 M/UL (ref 3.95–5.11)
SODIUM SERPL-SCNC: 140 MMOL/L (ref 136–145)
WBC OTHER # BLD: 3.6 K/UL (ref 3.5–11)

## 2025-07-26 PROCEDURE — 97166 OT EVAL MOD COMPLEX 45 MIN: CPT

## 2025-07-26 PROCEDURE — 97162 PT EVAL MOD COMPLEX 30 MIN: CPT

## 2025-07-26 PROCEDURE — 2500000003 HC RX 250 WO HCPCS: Performed by: FAMILY MEDICINE

## 2025-07-26 PROCEDURE — 6370000000 HC RX 637 (ALT 250 FOR IP): Performed by: FAMILY MEDICINE

## 2025-07-26 PROCEDURE — 36415 COLL VENOUS BLD VENIPUNCTURE: CPT

## 2025-07-26 PROCEDURE — 97530 THERAPEUTIC ACTIVITIES: CPT

## 2025-07-26 PROCEDURE — 6360000002 HC RX W HCPCS: Performed by: FAMILY MEDICINE

## 2025-07-26 PROCEDURE — 1200000000 HC SEMI PRIVATE

## 2025-07-26 PROCEDURE — 80048 BASIC METABOLIC PNL TOTAL CA: CPT

## 2025-07-26 PROCEDURE — 85025 COMPLETE CBC W/AUTO DIFF WBC: CPT

## 2025-07-26 RX ORDER — FAMOTIDINE 20 MG/1
20 TABLET, FILM COATED ORAL DAILY
Status: DISCONTINUED | OUTPATIENT
Start: 2025-07-27 | End: 2025-07-27 | Stop reason: HOSPADM

## 2025-07-26 RX ADMIN — CHOLESTYRAMINE 4 G: 4 POWDER, FOR SUSPENSION ORAL at 08:45

## 2025-07-26 RX ADMIN — DORZOLAMIDE HYDROCHLORIDE 1 DROP: 20 SOLUTION/ DROPS OPHTHALMIC at 08:46

## 2025-07-26 RX ADMIN — DORZOLAMIDE HYDROCHLORIDE 1 DROP: 20 SOLUTION/ DROPS OPHTHALMIC at 15:30

## 2025-07-26 RX ADMIN — ACETAMINOPHEN 650 MG: 325 TABLET ORAL at 22:11

## 2025-07-26 RX ADMIN — FERROUS SULFATE TAB 325 MG (65 MG ELEMENTAL FE) 325 MG: 325 (65 FE) TAB at 08:45

## 2025-07-26 RX ADMIN — LEVOTHYROXINE SODIUM 100 MCG: 0.1 TABLET ORAL at 08:45

## 2025-07-26 RX ADMIN — METOPROLOL SUCCINATE 25 MG: 25 TABLET, EXTENDED RELEASE ORAL at 08:45

## 2025-07-26 RX ADMIN — BUMETANIDE 1 MG: 1 TABLET ORAL at 08:45

## 2025-07-26 RX ADMIN — FAMOTIDINE 20 MG: 20 TABLET, FILM COATED ORAL at 08:45

## 2025-07-26 RX ADMIN — ACETAMINOPHEN 650 MG: 325 TABLET ORAL at 05:29

## 2025-07-26 RX ADMIN — ACETAMINOPHEN 650 MG: 325 TABLET ORAL at 15:30

## 2025-07-26 RX ADMIN — RIVAROXABAN 10 MG: 10 TABLET, FILM COATED ORAL at 08:45

## 2025-07-26 RX ADMIN — DORZOLAMIDE HYDROCHLORIDE 1 DROP: 20 SOLUTION/ DROPS OPHTHALMIC at 21:26

## 2025-07-26 RX ADMIN — LATANOPROST 1 DROP: 50 SOLUTION OPHTHALMIC at 22:12

## 2025-07-26 RX ADMIN — WATER 1000 MG: 1 INJECTION INTRAMUSCULAR; INTRAVENOUS; SUBCUTANEOUS at 06:28

## 2025-07-26 ASSESSMENT — PAIN SCALES - GENERAL
PAINLEVEL_OUTOF10: 8
PAINLEVEL_OUTOF10: 6
PAINLEVEL_OUTOF10: 3
PAINLEVEL_OUTOF10: 5
PAINLEVEL_OUTOF10: 4

## 2025-07-26 ASSESSMENT — PAIN DESCRIPTION - LOCATION: LOCATION: LEG

## 2025-07-26 ASSESSMENT — PAIN DESCRIPTION - ORIENTATION: ORIENTATION: RIGHT;LEFT

## 2025-07-26 NOTE — PLAN OF CARE
Problem: Chronic Conditions and Co-morbidities  Goal: Patient's chronic conditions and co-morbidity symptoms are monitored and maintained or improved  Outcome: Progressing     Problem: Discharge Planning  Goal: Discharge to home or other facility with appropriate resources  Outcome: Progressing     Problem: Pain  Goal: Verbalizes/displays adequate comfort level or baseline comfort level  Outcome: Progressing     Problem: Safety - Adult  Goal: Free from fall injury  Outcome: Progressing  Flowsheets (Taken 7/25/2025 2130)  Free From Fall Injury: Instruct family/caregiver on patient safety     Problem: ABCDS Injury Assessment  Goal: Absence of physical injury  Outcome: Progressing  Flowsheets (Taken 7/25/2025 2130)  Absence of Physical Injury: Implement safety measures based on patient assessment     Problem: Skin/Tissue Integrity  Goal: Skin integrity remains intact  Description: 1.  Monitor for areas of redness and/or skin breakdown  2.  Assess vascular access sites hourly  3.  Every 4-6 hours minimum:  Change oxygen saturation probe site  4.  Every 4-6 hours:  If on nasal continuous positive airway pressure, respiratory therapy assess nares and determine need for appliance change or resting period  Outcome: Progressing  Flowsheets (Taken 7/25/2025 2130)  Skin Integrity Remains Intact:   Monitor for areas of redness and/or skin breakdown   Assess vascular access sites hourly     Problem: Nutrition Deficit:  Goal: Optimize nutritional status  Outcome: Progressing

## 2025-07-26 NOTE — PROGRESS NOTES
Hospitalist Progress Note  7/26/2025 2:27 PM  Subjective:   Admit Date: 7/24/2025  PCP: Reinier Day MD     Full Code      C/c:  Chief Complaint   Patient presents with    Leg Swelling    Leg Pain    Breast Pain         Interval History: Patient doing slightly better patient's urine which showed E. coli to Rocephin and Levaquin patient is on  Rocephin at the present time, therapy did see the patient patient was advised rehab, patient was willing to go to rehab patient will be discharged tomorrow to the rehab of her choice    Diet: ADULT DIET; Regular; No Added Salt (3-4 gm)                                ip days:2  Medications:   Scheduled Meds:   [START ON 7/27/2025] famotidine  20 mg Oral Daily    bumetanide  1 mg Oral Daily    cholestyramine light  1 packet Oral Daily    ferrous sulfate  325 mg Oral Daily with breakfast    levothyroxine  100 mcg Oral Daily    metoprolol succinate  25 mg Oral Daily    rivaroxaban  10 mg Oral Daily with breakfast    cefTRIAXone (ROCEPHIN) IV  1,000 mg IntraVENous Daily    latanoprost  1 drop Both Eyes Nightly    dorzolamide  1 drop Both Eyes TID     Continuous Infusions:  PRN Meds:.[Held by provider] Baclofen, diclofenac sodium, docusate sodium, simethicone, potassium chloride **OR** potassium alternative oral replacement **OR** potassium chloride, magnesium sulfate, ondansetron **OR** ondansetron, polyethylene glycol, acetaminophen **OR** acetaminophen     CBC:   Recent Labs     07/24/25  1040 07/25/25  0708 07/26/25  0628   WBC 3.0* 3.6 3.6   HGB 9.9* 9.7* 11.3*   PLT 88* 99* See Reflexed IPF Result     BMP:    Recent Labs     07/24/25  1040 07/25/25  0729 07/26/25  0628    139 140   K 4.0 4.0 4.3   * 107 107   CO2 25 22 22   BUN 16 14 12   CREATININE 1.2 1.2 1.1   GLUCOSE 109* 96 101*     Hepatic: No results for input(s): \"AST\", \"ALT\", \"BILITOT\", \"ALKPHOS\" in the last 72 hours.    Invalid input(s): \"ALB\"  Troponin: No results for input(s): \"TROPONINI\" in the

## 2025-07-26 NOTE — CARE COORDINATION
DISCHARGE PLANNING NOTE:    Referrals sent to [facility] and [facility].     [facility].    Electronically signed by rAaceli Glover RN on 7/26/2025 at 4:20 PM

## 2025-07-26 NOTE — PROGRESS NOTES
Physical Therapy  [facility]   Physical Therapy Evaluation  Date: 25  Patient Name: Genie Lama       Room: -  MRN: [id number]  Account: [id number]   : 1940  (85 y.o.) Gender: female     Discharge Recommendations:  Discharge Recommendations: Continue to assess pending progress, Therapy recommended at discharge     PT D/C Equipment  Equipment Needed: No  PT Equipment Recommendations  Equipment Needed: No     Past Medical History:  has a past medical history of Atrial fibrillation (HCC), Chronic back pain, Diverticulosis, GERD (gastroesophageal reflux disease), Hearing aid worn, Hearing deficit, bilateral, Hyperlipidemia, Hypertension, Hypothyroidism, Obesity (BMI 30.0-34.9), Onychomycosis, PE (pulmonary embolism), Poor venous access, and Type II or unspecified type diabetes mellitus without mention of complication, not stated as uncontrolled.  Past Surgical History:   has a past surgical history that includes Colon surgery; Vena Cava Filter Placement; Cholecystectomy; Hysterectomy; Tympanostomy tube placement (Bilateral, 2019); Colonoscopy (N/A, 2021); sigmoidoscopy (N/A, 2021); Upper gastrointestinal endoscopy (N/A, 2021); Arm Surgery (Right); Carpal tunnel release (Bilateral); Cataract extraction, extracapsular w/ intraocular lens implant (Left, 2022); Intracapsular cataract extraction (Left, 2022); Cataract extraction, extracapsular w/ intraocular lens implant (Right, 2023); Intracapsular cataract extraction (Right, 2023); Upper gastrointestinal endoscopy (N/A, 3/1/2023); and proctosigmoidoscopy (N/A, 3/1/2023).    Subjective  Subjective  Subjective: pt up in chair and agreeable to therapy     General  Patient assessed for rehabilitation services?: Yes  Follows Commands: Within Functional Limits     Pain  Pre-Pain: 2  Post-Pain: 2  Pain Location: Neck  Pain Descriptor: Aching  Pain Interventions: Repositioning  CMS 0-100% Score: 50.57  Mobility Inpatient CMS G-Code Modifier : CK     Goals  Patient Goals   Patient Goals : to get back home indep  Short Term Goals  Time Frame for Short Term Goals: 7 visits  Short Term Goal 1: pt to perform transfers STS with WW with SBA  Short Term Goal 2: pt to ambulate 75ft c WW c CGA  Short Term Goal 3: pt to ascend 1 stair c Min A c railing  Short Term Goal 4: pt to be indep with LE HEP            Plan  Physical Therapy Plan  General Plan: 4-5 times per week  Current Treatment Recommendations: Strengthening, ROM, Balance training, Functional mobility training, Transfer training, Gait training, Stair training, Endurance training, Home exercise program, Safety education & training, Patient/Caregiver education & training, Therapeutic activities   Safety Devices  Type of Devices: Call light within reach, Chair alarm in place, Left in chair, Nurse notified, Gait belt  Restraints  Restraints Initially in Place: No       PT Individual Minutes  Time In: 1129  Time Out: 1159  Minutes: 30   Time Code Minutes  Timed Code Treatment Minutes: 8 Minutes     Co-eval with OT due to unknown pt mobility status and assist level needed  Electronically signed by Lanny Inman PT on 7/26/25 at 12:37 PM EDT

## 2025-07-26 NOTE — CARE COORDINATION
Case Management   Daily Progress Note       Patient Name: Genie aLma                   YOB: 1940  Diagnosis: Peripheral edema [R60.0]  Unable to walk [R26.2]  Acute cystitis without hematuria [N30.00]  UTI (urinary tract infection) [N39.0]                       GMLOS: 2.8 days  Length of Stay: 2  days    Readmission Risk (Low < 19, Mod (19-27), High > 27): Readmission Risk Score: 14.3      Chart Review, and Current Transitional Plan is:    [x] Home Independently    [] Home with HC    [] Skilled Nursing Facility    [] Acute Rehabilitation    [] Long Term Acute Care (LTAC)    [] Other:     Medical Management: IV Rocephin.     PT/OT on.     Additional Notes: n/a    Electronically signed by Araceli Glover RN on 7/26/2025 at 12:06 PM

## 2025-07-26 NOTE — CARE COORDINATION
DISCHARGE PLANNING NOTE:    Patient was seen by PT they are recommending SNF. Son is agreeable. FOC list given.                        Post Acute Facility/Agency List     Provided child with the following list, the list includes the overall star ratings obtained from CMS per the Medicare Web site (www.Medicare.gov):     [] Long Term Acute Care Facilities  [] Acute Inpatient Rehabilitation Facilities  [x] Skilled Nursing Facilities  [] Hospice Facilities  [] Home Care    Provided verbal instructions on how to utilize the QR Code to obtain additional detailed star ratings from www.Medicare.gov     offered to print and provide the detailed list:    [x]Accepted   []Declined    The following printed detailed lists were provided:     SNF    Electronically signed by Araceli Glover RN on 7/26/2025 at 2:37 PM

## 2025-07-26 NOTE — PROGRESS NOTES
[facility]   Occupational Therapy Evaluation  Date: 25  Patient Name: Genie Lama       Room:   MRN: [id number]  Account: [id number]   : 1940  (85 y.o.) Gender: female     Discharge Recommendations:  Further Occupational Therapy is recommended upon facility discharge.    OT Equipment Recommendations  Other: CTA    Referring Practitioner: Mohit Kasper MD  Diagnosis: acute cystitis without hematuria        Treatment Diagnosis: impaired self care status    Past Medical History:  has a past medical history of Atrial fibrillation (HCC), Chronic back pain, Diverticulosis, GERD (gastroesophageal reflux disease), Hearing aid worn, Hearing deficit, bilateral, Hyperlipidemia, Hypertension, Hypothyroidism, Obesity (BMI 30.0-34.9), Onychomycosis, PE (pulmonary embolism), Poor venous access, and Type II or unspecified type diabetes mellitus without mention of complication, not stated as uncontrolled.    Past Surgical History:   has a past surgical history that includes Colon surgery; Vena Cava Filter Placement; Cholecystectomy; Hysterectomy; Tympanostomy tube placement (Bilateral, 2019); Colonoscopy (N/A, 2021); sigmoidoscopy (N/A, 2021); Upper gastrointestinal endoscopy (N/A, 2021); Arm Surgery (Right); Carpal tunnel release (Bilateral); Cataract extraction, extracapsular w/ intraocular lens implant (Left, 2022); Intracapsular cataract extraction (Left, 2022); Cataract extraction, extracapsular w/ intraocular lens implant (Right, 2023); Intracapsular cataract extraction (Right, 2023); Upper gastrointestinal endoscopy (N/A, 3/1/2023); and proctosigmoidoscopy (N/A, 3/1/2023).    Restrictions  Restrictions/Precautions  Restrictions/Precautions: General Precautions, Contact Precautions  Activity Level: Up with Assist  Required Braces or Orthoses?: No  Implants Present? :  (pt denies)      Vitals  Vitals  O2 Device: None (Room

## 2025-07-26 NOTE — PLAN OF CARE
Problem: Chronic Conditions and Co-morbidities  Goal: Patient's chronic conditions and co-morbidity symptoms are monitored and maintained or improved  7/26/2025 1624 by Farhad Brothers RN  Outcome: Progressing  7/26/2025 0423 by Lilian Saldivar RN  Outcome: Progressing     Problem: Discharge Planning  Goal: Discharge to home or other facility with appropriate resources  7/26/2025 1624 by Farhad Brothers RN  Outcome: Progressing  7/26/2025 0423 by Lilian Saldivar RN  Outcome: Progressing     Problem: Pain  Goal: Verbalizes/displays adequate comfort level or baseline comfort level  7/26/2025 1624 by Farhad Brothers RN  Outcome: Progressing  7/26/2025 0423 by Lilian Saldivar RN  Outcome: Progressing     Problem: Safety - Adult  Goal: Free from fall injury  7/26/2025 1624 by Farhad Brothers RN  Outcome: Progressing  7/26/2025 0423 by Lilian Saldivar RN  Outcome: Progressing  Flowsheets (Taken 7/25/2025 2130)  Free From Fall Injury: Instruct family/caregiver on patient safety     Problem: ABCDS Injury Assessment  Goal: Absence of physical injury  7/26/2025 1624 by Farhad Brothers RN  Outcome: Progressing  7/26/2025 0423 by Lilian Saldivar RN  Outcome: Progressing  Flowsheets (Taken 7/25/2025 2130)  Absence of Physical Injury: Implement safety measures based on patient assessment     Problem: Skin/Tissue Integrity  Goal: Skin integrity remains intact  Description: 1.  Monitor for areas of redness and/or skin breakdown  2.  Assess vascular access sites hourly  3.  Every 4-6 hours minimum:  Change oxygen saturation probe site  4.  Every 4-6 hours:  If on nasal continuous positive airway pressure, respiratory therapy assess nares and determine need for appliance change or resting period  7/26/2025 1624 by Farhad Brothers RN  Outcome: Progressing  7/26/2025 0423 by Lilian Saldivar RN  Outcome: Progressing  Flowsheets (Taken 7/25/2025 2130)  Skin Integrity Remains Intact:   Monitor for areas of

## 2025-07-27 VITALS
DIASTOLIC BLOOD PRESSURE: 54 MMHG | OXYGEN SATURATION: 98 % | HEART RATE: 66 BPM | WEIGHT: 173.28 LBS | HEIGHT: 63 IN | RESPIRATION RATE: 16 BRPM | TEMPERATURE: 97.5 F | BODY MASS INDEX: 30.7 KG/M2 | SYSTOLIC BLOOD PRESSURE: 119 MMHG

## 2025-07-27 LAB
ANION GAP SERPL CALCULATED.3IONS-SCNC: 9 MMOL/L (ref 9–16)
BASOPHILS # BLD: 0.04 K/UL (ref 0–0.2)
BASOPHILS NFR BLD: 1 % (ref 0–2)
BUN SERPL-MCNC: 11 MG/DL (ref 8–23)
CALCIUM SERPL-MCNC: 8.5 MG/DL (ref 8.6–10.4)
CHLORIDE SERPL-SCNC: 109 MMOL/L (ref 98–107)
CO2 SERPL-SCNC: 22 MMOL/L (ref 20–31)
CREAT SERPL-MCNC: 1.1 MG/DL (ref 0.7–1.2)
EOSINOPHIL # BLD: 0.16 K/UL (ref 0–0.44)
EOSINOPHILS RELATIVE PERCENT: 4 % (ref 0–4)
ERYTHROCYTE [DISTWIDTH] IN BLOOD BY AUTOMATED COUNT: 14.4 % (ref 11.5–14.9)
GFR, ESTIMATED: 49 ML/MIN/1.73M2
GLUCOSE SERPL-MCNC: 88 MG/DL (ref 74–99)
HCT VFR BLD AUTO: 29.8 % (ref 36–46)
HGB BLD-MCNC: 9.4 G/DL (ref 12–16)
IMM GRANULOCYTES # BLD AUTO: <0.03 K/UL (ref 0–0.3)
IMM GRANULOCYTES NFR BLD: 0 %
LYMPHOCYTES NFR BLD: 1.2 K/UL (ref 1.1–3.7)
LYMPHOCYTES RELATIVE PERCENT: 32 % (ref 24–44)
MCH RBC QN AUTO: 31.9 PG (ref 26–34)
MCHC RBC AUTO-ENTMCNC: 31.5 G/DL (ref 31–37)
MCV RBC AUTO: 101 FL (ref 80–100)
MONOCYTES NFR BLD: 0.51 K/UL (ref 0.1–1.2)
MONOCYTES NFR BLD: 13 % (ref 3–12)
NEUTROPHILS NFR BLD: 50 % (ref 36–66)
NEUTS SEG NFR BLD: 1.89 K/UL (ref 1.5–8.1)
NRBC BLD-RTO: 0 PER 100 WBC
PLATELET # BLD AUTO: 81 K/UL (ref 150–450)
PMV BLD AUTO: 12.5 FL (ref 8–13.5)
POTASSIUM SERPL-SCNC: 3.8 MMOL/L (ref 3.7–5.3)
RBC # BLD AUTO: 2.95 M/UL (ref 3.95–5.11)
SODIUM SERPL-SCNC: 140 MMOL/L (ref 136–145)
WBC OTHER # BLD: 3.8 K/UL (ref 3.5–11)

## 2025-07-27 PROCEDURE — 36415 COLL VENOUS BLD VENIPUNCTURE: CPT

## 2025-07-27 PROCEDURE — 85025 COMPLETE CBC W/AUTO DIFF WBC: CPT

## 2025-07-27 PROCEDURE — 80048 BASIC METABOLIC PNL TOTAL CA: CPT

## 2025-07-27 PROCEDURE — 97110 THERAPEUTIC EXERCISES: CPT

## 2025-07-27 PROCEDURE — 97116 GAIT TRAINING THERAPY: CPT

## 2025-07-27 PROCEDURE — 6370000000 HC RX 637 (ALT 250 FOR IP): Performed by: FAMILY MEDICINE

## 2025-07-27 RX ORDER — BUMETANIDE 1 MG/1
1 TABLET ORAL 2 TIMES DAILY
Status: DISCONTINUED | OUTPATIENT
Start: 2025-07-27 | End: 2025-07-27 | Stop reason: HOSPADM

## 2025-07-27 RX ORDER — BUMETANIDE 1 MG/1
1 TABLET ORAL 2 TIMES DAILY
Qty: 30 TABLET | Refills: 3 | Status: SHIPPED | OUTPATIENT
Start: 2025-07-27

## 2025-07-27 RX ORDER — LEVOFLOXACIN 500 MG/1
500 TABLET, FILM COATED ORAL DAILY
Qty: 5 TABLET | Refills: 0 | Status: SHIPPED | OUTPATIENT
Start: 2025-07-27 | End: 2025-08-01

## 2025-07-27 RX ADMIN — DORZOLAMIDE HYDROCHLORIDE 1 DROP: 20 SOLUTION/ DROPS OPHTHALMIC at 14:29

## 2025-07-27 RX ADMIN — CHOLESTYRAMINE 4 G: 4 POWDER, FOR SUSPENSION ORAL at 08:56

## 2025-07-27 RX ADMIN — FAMOTIDINE 20 MG: 20 TABLET, FILM COATED ORAL at 08:56

## 2025-07-27 RX ADMIN — ACETAMINOPHEN 650 MG: 325 TABLET ORAL at 07:06

## 2025-07-27 RX ADMIN — METOPROLOL SUCCINATE 25 MG: 25 TABLET, EXTENDED RELEASE ORAL at 08:56

## 2025-07-27 RX ADMIN — FERROUS SULFATE TAB 325 MG (65 MG ELEMENTAL FE) 325 MG: 325 (65 FE) TAB at 08:56

## 2025-07-27 RX ADMIN — RIVAROXABAN 10 MG: 10 TABLET, FILM COATED ORAL at 08:56

## 2025-07-27 RX ADMIN — DICLOFENAC SODIUM 2 G: 10 GEL TOPICAL at 08:57

## 2025-07-27 RX ADMIN — BUMETANIDE 1 MG: 1 TABLET ORAL at 08:56

## 2025-07-27 RX ADMIN — LEVOTHYROXINE SODIUM 100 MCG: 0.1 TABLET ORAL at 08:56

## 2025-07-27 RX ADMIN — DORZOLAMIDE HYDROCHLORIDE 1 DROP: 20 SOLUTION/ DROPS OPHTHALMIC at 08:57

## 2025-07-27 ASSESSMENT — PAIN SCALES - GENERAL: PAINLEVEL_OUTOF10: 4

## 2025-07-27 NOTE — PLAN OF CARE
Problem: Chronic Conditions and Co-morbidities  Goal: Patient's chronic conditions and co-morbidity symptoms are monitored and maintained or improved  7/27/2025 1559 by Farhad Brothers RN  Outcome: Adequate for Discharge  7/27/2025 0332 by Lilian Saldivar RN  Outcome: Progressing     Problem: Discharge Planning  Goal: Discharge to home or other facility with appropriate resources  7/27/2025 1559 by Farhad Brothers RN  Outcome: Adequate for Discharge  7/27/2025 0332 by Lilian Saldivar RN  Outcome: Progressing     Problem: Pain  Goal: Verbalizes/displays adequate comfort level or baseline comfort level  7/27/2025 1559 by Farhad Brothers RN  Outcome: Adequate for Discharge  7/27/2025 0332 by Lilian Saldivar RN  Outcome: Progressing     Problem: Safety - Adult  Goal: Free from fall injury  7/27/2025 1559 by Farhad Brothers RN  Outcome: Adequate for Discharge  7/27/2025 0332 by Lilian Saldivar RN  Outcome: Progressing  Flowsheets (Taken 7/26/2025 2130)  Free From Fall Injury: Instruct family/caregiver on patient safety     Problem: ABCDS Injury Assessment  Goal: Absence of physical injury  7/27/2025 1559 by Farhad Brothers RN  Outcome: Adequate for Discharge  7/27/2025 0332 by Lilian Saldivar RN  Outcome: Progressing  Flowsheets (Taken 7/26/2025 2130)  Absence of Physical Injury: Implement safety measures based on patient assessment     Problem: Skin/Tissue Integrity  Goal: Skin integrity remains intact  Description: 1.  Monitor for areas of redness and/or skin breakdown  2.  Assess vascular access sites hourly  3.  Every 4-6 hours minimum:  Change oxygen saturation probe site  4.  Every 4-6 hours:  If on nasal continuous positive airway pressure, respiratory therapy assess nares and determine need for appliance change or resting period  7/27/2025 1559 by Farhad Brothers RN  Outcome: Adequate for Discharge  7/27/2025 0332 by Lilian Saldivar RN  Outcome: Progressing  Flowsheets (Taken 7/26/2025  2130)  Skin Integrity Remains Intact:   Monitor for areas of redness and/or skin breakdown   Assess vascular access sites hourly     Problem: Nutrition Deficit:  Goal: Optimize nutritional status  7/27/2025 1559 by Farhad Brothers, RN  Outcome: Adequate for Discharge  7/27/2025 0332 by Lilian Saldivar, RN  Outcome: Progressing

## 2025-07-27 NOTE — DISCHARGE SUMMARY
Hospitalist Discharge Summary    Genie Lama  :  1940  MRN:  [id number]    Admit date:  2025  Discharge date:  25    Admitting Physician:  Mohit Kasper MD    Discharge Diagnoses:   Patient Active Problem List   Diagnosis    Gastroesophageal reflux disease without esophagitis    Onychomycosis    Diverticulosis    Chronic back pain    Acquired hypothyroidism    Essential hypertension    Right bundle branch block    Chronic pulmonary embolism (HCC)    Type 2 diabetes mellitus without complication, without long-term current use of insulin ([facility])    Obesity (BMI 30.0-34.9)    Tracheobronchitis    Allergic rhinitis    Glaucoma    S/p bilateral myringotomy with tube placement    GI bleed    Iron deficiency anemia    Paroxysmal atrial fibrillation (HCC)    Rectal bleeding    Intractable nausea and vomiting    Atrial fibrillation with RVR ([facility])    Class 2 obesity due to excess calories in adult    Sepsis ([facility])    JOSH (acute kidney injury)    Syncope    Acute cystitis without hematuria    Chronic systolic (congestive) heart failure    Chronic renal disease, stage III ([facility]) [600811]    Small intestinal bacterial overgrowth (SIBO)    Diarrhea of infectious origin    Mass of lung    Pneumonia of left upper lobe due to infectious organism    Thrombocytopenia    Anemia    Abnormal EKG    Difficulty in walking, not elsewhere classified    Diverticulitis of intestine, part unspecified, without perforation or abscess with bleeding    First degree AV block    Heart failure, unspecified (HCC)    Hx pulmonary embolism    Left ventricular diastolic dysfunction    LVH (left ventricular hypertrophy)    Muscle weakness (generalized)    Mild mitral regurgitation    Nonrheumatic mitral (valve) insufficiency    Other abnormalities of gait and mobility    SOB (shortness of breath) on exertion    Weakness    Recurrent pulmonary embolism (HCC)    Chronic diastolic congestive heart failure (HCC)    Obesity, unspecified

## 2025-07-27 NOTE — PROGRESS NOTES
Pt discharged at this time pt transported to [facility] via [facility] all belongings sent with pt

## 2025-07-27 NOTE — CARE COORDINATION
Case Management   Daily Progress Note       Patient Name: Genie Lama                   YOB: 1940  Diagnosis: Peripheral edema [R60.0]  Unable to walk [R26.2]  Acute cystitis without hematuria [N30.00]  UTI (urinary tract infection) [N39.0]                       GMLOS: 2.8 days  Length of Stay: 3  days    Readmission Risk (Low < 19, Mod (19-27), High > 27): Readmission Risk Score: 16.5      Patient is alert and oriented.    Spoke with patient, and Current Transitional Plan is:    [] Home Independently    [] Home with HC    [x] Skilled Nursing Facility    [] Acute Rehabilitation    [] Long Term Acute Care (LTAC)    [] Other:     Medical Management: IV Rocephin.     PT/OT on.     Additional Notes:  [facility] accepts. Patient agreeable.    Electronically signed by Araceli Glover RN on 7/27/2025 at 11:37 AM

## 2025-07-27 NOTE — CARE COORDINATION
DISCHARGE PLANNING NOTE:    HENS Complete    Document ID : [id number]     Electronically signed by Araceli Glover RN on 7/27/2025 at 4:12 PM

## 2025-07-27 NOTE — PROGRESS NOTES
Physical Therapy  Facility/Department: [facility] MED SURG  Physical Therapy  Progress Note     Name: Genie Lama  : 1940  MRN: [id number]  Date of Service: 2025    Discharge Recommendations:  Continue to assess pending progress, Therapy recommended at discharge   PT Equipment Recommendations  Equipment Needed: No      Patient Diagnosis(es): The primary encounter diagnosis was Peripheral edema. Diagnoses of Acute cystitis without hematuria and Unable to walk were also pertinent to this visit.  Past Medical History:  has a past medical history of Atrial fibrillation (HCC), Chronic back pain, Diverticulosis, GERD (gastroesophageal reflux disease), Hearing aid worn, Hearing deficit, bilateral, Hyperlipidemia, Hypertension, Hypothyroidism, Obesity (BMI 30.0-34.9), Onychomycosis, PE (pulmonary embolism), Poor venous access, and Type II or unspecified type diabetes mellitus without mention of complication, not stated as uncontrolled.  Past Surgical History:  has a past surgical history that includes Colon surgery; Vena Cava Filter Placement; Cholecystectomy; Hysterectomy; Tympanostomy tube placement (Bilateral, 2019); Colonoscopy (N/A, 2021); sigmoidoscopy (N/A, 2021); Upper gastrointestinal endoscopy (N/A, 2021); Arm Surgery (Right); Carpal tunnel release (Bilateral); Cataract extraction, extracapsular w/ intraocular lens implant (Left, 2022); Intracapsular cataract extraction (Left, 2022); Cataract extraction, extracapsular w/ intraocular lens implant (Right, 2023); Intracapsular cataract extraction (Right, 2023); Upper gastrointestinal endoscopy (N/A, 3/1/2023); and proctosigmoidoscopy (N/A, 3/1/2023).    Assessment  Therapy Prognosis: Good  Decision Making: Medium Complexity  Requires PT Follow-Up: Yes  Activity Tolerance  Activity Tolerance: Patient limited by endurance;Patient limited by fatigue    Plan  Physical Therapy Plan  General Plan: 4-5 times per  week  Safety Devices  Type of Devices: Call light within reach, Chair alarm in place, Left in chair, Nurse notified, Gait belt  Restraints  Restraints Initially in Place: No    Restrictions  Restrictions/Precautions  Restrictions/Precautions: General Precautions, Contact Precautions  Activity Level: Up with Assist  Required Braces or Orthoses?: No  Implants Present? :  (pt denies)     Subjective  General  Patient assessed for rehabilitation services?: Yes  Follows Commands: Within Functional Limits  Subjective  Subjective: Pt seated in bedside chair, pleasantly agreeable to tx and reporting \"I can't move my legs\" Despite demo'ing AROM.  RN Farhad ok's tx.         Objective  Pain  Pre-Pain: 5  Post-Pain: 5  Pain Location: Right;Left;Knee  Pain Descriptor: Aching  Pain Interventions: Repositioning  Bed mobility  Bed Mobility Comments: Pt OOB at start and end of session  Transfers  Sit to Stand: Contact guard assistance  Stand to Sit: Contact guard assistance  Comment: Transfers completed with RW, VC's provided for proper RW alignment to decrease risk for falls with transfers.  Ambulation  Surface: Level tile  Device: Rolling Walker  Assistance: Contact guard assistance  Gait Deviations: Slow Geoffrey;Increased JW;Decreased step length;Decreased step height  Distance: 45ft x2  Comments: Min A for balance and stability, slow geoffrey, fwd flexed posure with downward gaze.  Stairs/Curb  Stairs?: No    A/AROM Exercises: Seated B LE AROM ex's pre ans post gait 10x2                AM-PAC - Mobility    AM-PAC Basic Mobility - Inpatient   How much help is needed turning from your back to your side while in a flat bed without using bedrails?: A Little  How much help is needed moving from lying on your back to sitting on the side of a flat bed without using bedrails?: A Little  How much help is needed moving to and from a bed to a chair?: A Little  How much help is needed standing up from a chair using your arms?: A Little  How

## 2025-07-27 NOTE — PROGRESS NOTES
Dr George man notified of increased swelling to bilateral lower extremities. New orders received for ghazal hose and to increase bumex to bid

## 2025-07-27 NOTE — PLAN OF CARE
Problem: Chronic Conditions and Co-morbidities  Goal: Patient's chronic conditions and co-morbidity symptoms are monitored and maintained or improved  7/27/2025 0332 by Lilian Saldivar RN  Outcome: Progressing  7/26/2025 1624 by Farhad Brothers RN  Outcome: Progressing     Problem: Discharge Planning  Goal: Discharge to home or other facility with appropriate resources  7/27/2025 0332 by Lilian Saldivar RN  Outcome: Progressing  7/26/2025 1624 by Farhad Brothers RN  Outcome: Progressing     Problem: Pain  Goal: Verbalizes/displays adequate comfort level or baseline comfort level  7/27/2025 0332 by Lilian Saldivar RN  Outcome: Progressing  7/26/2025 1624 by Farhad Brothers RN  Outcome: Progressing     Problem: Safety - Adult  Goal: Free from fall injury  7/27/2025 0332 by Lilian Saldivar RN  Outcome: Progressing  Flowsheets (Taken 7/26/2025 2130)  Free From Fall Injury: Instruct family/caregiver on patient safety  7/26/2025 1624 by Farhad Brothers RN  Outcome: Progressing     Problem: ABCDS Injury Assessment  Goal: Absence of physical injury  7/27/2025 0332 by Lilian Saldivar RN  Outcome: Progressing  Flowsheets (Taken 7/26/2025 2130)  Absence of Physical Injury: Implement safety measures based on patient assessment  7/26/2025 1624 by Farhad Brothers RN  Outcome: Progressing     Problem: Skin/Tissue Integrity  Goal: Skin integrity remains intact  Description: 1.  Monitor for areas of redness and/or skin breakdown  2.  Assess vascular access sites hourly  3.  Every 4-6 hours minimum:  Change oxygen saturation probe site  4.  Every 4-6 hours:  If on nasal continuous positive airway pressure, respiratory therapy assess nares and determine need for appliance change or resting period  7/27/2025 0332 by Lilian Saldivar RN  Outcome: Progressing  Flowsheets (Taken 7/26/2025 2130)  Skin Integrity Remains Intact:   Monitor for areas of redness and/or skin breakdown   Assess vascular access sites

## 2025-07-27 NOTE — CARE COORDINATION
DISCHARGE PLANNING NOTE:    Stretcher transport set up with [facility] for 5:00 PM to take patient to [facility]. Bedside nurse and Clinical lead notified.     Number for Report [phone]     Electronically signed by Araceli Glover RN on 7/27/2025 at 4:14 PM

## 2025-08-04 ENCOUNTER — HOSPITAL ENCOUNTER (OUTPATIENT)
Dept: PAIN MANAGEMENT | Age: 85
Discharge: HOME | End: 2025-08-04
Payer: MEDICARE

## 2025-08-04 VITALS — HEIGHT: 64 IN | BODY MASS INDEX: 28.51 KG/M2 | WEIGHT: 167 LBS

## 2025-08-04 DIAGNOSIS — M50.30 DEGENERATIVE DISC DISEASE, CERVICAL: ICD-10-CM

## 2025-08-04 DIAGNOSIS — M47.812 CERVICAL SPONDYLOSIS: Primary | ICD-10-CM

## 2025-08-04 DIAGNOSIS — M79.18 MYOFASCIAL PAIN SYNDROME: ICD-10-CM

## 2025-08-04 PROCEDURE — 99203 OFFICE O/P NEW LOW 30 MIN: CPT

## 2025-08-04 PROCEDURE — 99204 OFFICE O/P NEW MOD 45 MIN: CPT | Performed by: STUDENT IN AN ORGANIZED HEALTH CARE EDUCATION/TRAINING PROGRAM

## 2025-08-04 ASSESSMENT — PAIN SCALES - GENERAL: PAINLEVEL_OUTOF10: 5

## 2025-08-08 NOTE — PROGRESS NOTES
Physician Progress Note      PATIENT:               KENDELL FRANCO  CSN #:                  [id number]  :                       1940  ADMIT DATE:       2025 9:24 AM  DISCH DATE:        2025 5:24 PM  RESPONDING  PROVIDER #:        Mohit Kasper MD          QUERY TEXT:    Based on your medical judgment, please clarify these findings and document if   any of the following are being evaluated and/or treated:    The clinical indicators include:  85 years old female with HTN , CHF , DM ,    -\"PAF\"-H&P by Mohit Kasper MD at 2025    - Tab. rivaroxaban (MAR)    Thank you    Padmini Henderson [facility]  ,  CDS  Options provided:  -- Secondary hypercoagulable state in a patient with atrial fibrillation  -- Other - I will add my own diagnosis  -- Disagree - Not applicable / Not valid  -- Disagree - Clinically unable to determine / Unknown  -- Refer to Clinical Documentation Reviewer    PROVIDER RESPONSE TEXT:    This patient has secondary hypercoagulable state in a patient with atrial   fibrillation.    Query created by: Padmini Henderson on 2025 3:28 AM      Electronically signed by:  Mohit Kasper MD 2025 3:12 PM

## 2025-08-11 ENCOUNTER — TELEPHONE (OUTPATIENT)
Dept: PAIN MANAGEMENT | Age: 85
End: 2025-08-11

## 2025-08-11 DIAGNOSIS — M50.30 DEGENERATIVE DISC DISEASE, CERVICAL: ICD-10-CM

## 2025-08-11 DIAGNOSIS — M47.812 CERVICAL SPONDYLOSIS: Primary | ICD-10-CM

## 2025-08-12 ENCOUNTER — HOSPITAL ENCOUNTER (INPATIENT)
Age: 85
LOS: 2 days | Discharge: NURSING, REHAB OR LONG-TERM CARE | End: 2025-08-14
Attending: EMERGENCY MEDICINE
Payer: MEDICARE

## 2025-08-12 ENCOUNTER — APPOINTMENT (OUTPATIENT)
Dept: CT IMAGING | Age: 85
End: 2025-08-12
Payer: MEDICARE

## 2025-08-12 DIAGNOSIS — K62.5 RECTAL BLEEDING: Primary | ICD-10-CM

## 2025-08-12 LAB
ALBUMIN SERPL-MCNC: 3.4 G/DL (ref 3.5–5.2)
ALP SERPL-CCNC: 112 U/L (ref 35–104)
ALT SERPL-CCNC: 18 U/L (ref 10–35)
ANION GAP SERPL CALCULATED.3IONS-SCNC: 10 MMOL/L (ref 9–16)
AST SERPL-CCNC: 41 U/L (ref 10–35)
BASOPHILS # BLD: 0.06 K/UL (ref 0–0.2)
BASOPHILS NFR BLD: 2 % (ref 0–2)
BILIRUB SERPL-MCNC: 0.5 MG/DL (ref 0–1.2)
BUN SERPL-MCNC: 12 MG/DL (ref 8–23)
CALCIUM SERPL-MCNC: 9.4 MG/DL (ref 8.6–10.4)
CHLORIDE SERPL-SCNC: 105 MMOL/L (ref 98–107)
CO2 SERPL-SCNC: 26 MMOL/L (ref 20–31)
CREAT SERPL-MCNC: 1.3 MG/DL (ref 0.7–1.2)
DATE, STOOL #1: ABNORMAL
EOSINOPHIL # BLD: 0.15 K/UL (ref 0–0.44)
EOSINOPHILS RELATIVE PERCENT: 4 % (ref 0–4)
ERYTHROCYTE [DISTWIDTH] IN BLOOD BY AUTOMATED COUNT: 14.2 % (ref 11.5–14.9)
GFR, ESTIMATED: 40 ML/MIN/1.73M2
GLUCOSE SERPL-MCNC: 136 MG/DL (ref 74–99)
HCT VFR BLD AUTO: 31.6 % (ref 36–46)
HEMOCCULT SP1 STL QL: POSITIVE
HGB BLD-MCNC: 9.8 G/DL (ref 12–16)
IMM GRANULOCYTES # BLD AUTO: <0.03 K/UL (ref 0–0.3)
IMM GRANULOCYTES NFR BLD: 0 %
INR PPP: 1.8
LYMPHOCYTES NFR BLD: 0.86 K/UL (ref 1.1–3.7)
LYMPHOCYTES RELATIVE PERCENT: 22 % (ref 24–44)
MCH RBC QN AUTO: 30.6 PG (ref 26–34)
MCHC RBC AUTO-ENTMCNC: 31 G/DL (ref 31–37)
MCV RBC AUTO: 98.8 FL (ref 80–100)
MONOCYTES NFR BLD: 0.48 K/UL (ref 0.1–1.2)
MONOCYTES NFR BLD: 12 % (ref 3–12)
NEUTROPHILS NFR BLD: 60 % (ref 36–66)
NEUTS SEG NFR BLD: 2.32 K/UL (ref 1.5–8.1)
NRBC BLD-RTO: 0 PER 100 WBC
PLATELET # BLD AUTO: 145 K/UL (ref 150–450)
PMV BLD AUTO: 11.8 FL (ref 8–13.5)
POTASSIUM SERPL-SCNC: 4 MMOL/L (ref 3.7–5.3)
PROT SERPL-MCNC: 6.3 G/DL (ref 6.6–8.7)
PROTHROMBIN TIME: 22.4 SEC (ref 11.8–14.6)
RBC # BLD AUTO: 3.2 M/UL (ref 3.95–5.11)
SODIUM SERPL-SCNC: 141 MMOL/L (ref 136–145)
TIME, STOOL #1: 1926
WBC OTHER # BLD: 3.9 K/UL (ref 3.5–11)

## 2025-08-12 PROCEDURE — 6360000002 HC RX W HCPCS

## 2025-08-12 PROCEDURE — 2580000003 HC RX 258

## 2025-08-12 PROCEDURE — 2580000003 HC RX 258: Performed by: EMERGENCY MEDICINE

## 2025-08-12 PROCEDURE — 86900 BLOOD TYPING SEROLOGIC ABO: CPT

## 2025-08-12 PROCEDURE — 85025 COMPLETE CBC W/AUTO DIFF WBC: CPT

## 2025-08-12 PROCEDURE — 99285 EMERGENCY DEPT VISIT HI MDM: CPT

## 2025-08-12 PROCEDURE — 86901 BLOOD TYPING SEROLOGIC RH(D): CPT

## 2025-08-12 PROCEDURE — 74174 CTA ABD&PLVS W/CONTRAST: CPT

## 2025-08-12 PROCEDURE — 36415 COLL VENOUS BLD VENIPUNCTURE: CPT

## 2025-08-12 PROCEDURE — 2500000003 HC RX 250 WO HCPCS: Performed by: EMERGENCY MEDICINE

## 2025-08-12 PROCEDURE — 86850 RBC ANTIBODY SCREEN: CPT

## 2025-08-12 PROCEDURE — 2060000000 HC ICU INTERMEDIATE R&B

## 2025-08-12 PROCEDURE — 6360000004 HC RX CONTRAST MEDICATION: Performed by: EMERGENCY MEDICINE

## 2025-08-12 PROCEDURE — 82272 OCCULT BLD FECES 1-3 TESTS: CPT

## 2025-08-12 PROCEDURE — 80053 COMPREHEN METABOLIC PANEL: CPT

## 2025-08-12 PROCEDURE — 85610 PROTHROMBIN TIME: CPT

## 2025-08-12 RX ORDER — IOPAMIDOL 755 MG/ML
100 INJECTION, SOLUTION INTRAVASCULAR
Status: COMPLETED | OUTPATIENT
Start: 2025-08-12 | End: 2025-08-12

## 2025-08-12 RX ORDER — SODIUM CHLORIDE, SODIUM LACTATE, POTASSIUM CHLORIDE, CALCIUM CHLORIDE 600; 310; 30; 20 MG/100ML; MG/100ML; MG/100ML; MG/100ML
INJECTION, SOLUTION INTRAVENOUS CONTINUOUS
Status: DISCONTINUED | OUTPATIENT
Start: 2025-08-12 | End: 2025-08-14 | Stop reason: HOSPADM

## 2025-08-12 RX ORDER — SODIUM CHLORIDE 0.9 % (FLUSH) 0.9 %
10 SYRINGE (ML) INJECTION PRN
Status: COMPLETED | OUTPATIENT
Start: 2025-08-12 | End: 2025-08-12

## 2025-08-12 RX ORDER — 0.9 % SODIUM CHLORIDE 0.9 %
100 INTRAVENOUS SOLUTION INTRAVENOUS ONCE
Status: COMPLETED | OUTPATIENT
Start: 2025-08-12 | End: 2025-08-12

## 2025-08-12 RX ADMIN — SODIUM CHLORIDE, SODIUM LACTATE, POTASSIUM CHLORIDE, AND CALCIUM CHLORIDE: .6; .31; .03; .02 INJECTION, SOLUTION INTRAVENOUS at 23:51

## 2025-08-12 RX ADMIN — IOPAMIDOL 100 ML: 755 INJECTION, SOLUTION INTRAVENOUS at 20:57

## 2025-08-12 RX ADMIN — PANTOPRAZOLE SODIUM 80 MG: 40 INJECTION, POWDER, FOR SOLUTION INTRAVENOUS at 23:27

## 2025-08-12 RX ADMIN — SODIUM CHLORIDE 100 ML: 9 INJECTION, SOLUTION INTRAVENOUS at 21:00

## 2025-08-12 RX ADMIN — SODIUM CHLORIDE, PRESERVATIVE FREE 10 ML: 5 INJECTION INTRAVENOUS at 20:57

## 2025-08-12 ASSESSMENT — PAIN - FUNCTIONAL ASSESSMENT: PAIN_FUNCTIONAL_ASSESSMENT: 0-10

## 2025-08-12 ASSESSMENT — PAIN SCALES - GENERAL: PAINLEVEL_OUTOF10: 0

## 2025-08-13 ENCOUNTER — ANESTHESIA EVENT (OUTPATIENT)
Dept: ENDOSCOPY | Age: 85
End: 2025-08-13
Payer: MEDICARE

## 2025-08-13 PROBLEM — R79.89 ELEVATED LFTS: Status: ACTIVE | Noted: 2025-08-13

## 2025-08-13 LAB
A1AT SERPL-MCNC: 149 MG/DL (ref 90–200)
ABO + RH BLD: NORMAL
ARM BAND NUMBER: NORMAL
BLOOD BANK SAMPLE EXPIRATION: NORMAL
BLOOD GROUP ANTIBODIES SERPL: NEGATIVE
CERULOPLASMIN SERPL-MCNC: 18 MG/DL (ref 16–45)
HAV IGM SERPL QL IA: NONREACTIVE
HBV CORE IGM SERPL QL IA: NONREACTIVE
HBV SURFACE AG SERPL QL IA: NONREACTIVE
HCV AB SERPL QL IA: NONREACTIVE

## 2025-08-13 PROCEDURE — 6370000000 HC RX 637 (ALT 250 FOR IP): Performed by: NURSE PRACTITIONER

## 2025-08-13 PROCEDURE — 86645 CMV ANTIBODY IGM: CPT

## 2025-08-13 PROCEDURE — 2060000000 HC ICU INTERMEDIATE R&B

## 2025-08-13 PROCEDURE — 83516 IMMUNOASSAY NONANTIBODY: CPT

## 2025-08-13 PROCEDURE — 82103 ALPHA-1-ANTITRYPSIN TOTAL: CPT

## 2025-08-13 PROCEDURE — 86376 MICROSOMAL ANTIBODY EACH: CPT

## 2025-08-13 PROCEDURE — 86664 EPSTEIN-BARR NUCLEAR ANTIGEN: CPT

## 2025-08-13 PROCEDURE — 86663 EPSTEIN-BARR ANTIBODY: CPT

## 2025-08-13 PROCEDURE — 86665 EPSTEIN-BARR CAPSID VCA: CPT

## 2025-08-13 PROCEDURE — 80074 ACUTE HEPATITIS PANEL: CPT

## 2025-08-13 PROCEDURE — 36415 COLL VENOUS BLD VENIPUNCTURE: CPT

## 2025-08-13 PROCEDURE — 2580000003 HC RX 258

## 2025-08-13 PROCEDURE — 6360000002 HC RX W HCPCS

## 2025-08-13 PROCEDURE — APPNB30 APP NON BILLABLE TIME 0-30 MINS: Performed by: NURSE PRACTITIONER

## 2025-08-13 PROCEDURE — 86644 CMV ANTIBODY: CPT

## 2025-08-13 PROCEDURE — 2500000003 HC RX 250 WO HCPCS

## 2025-08-13 PROCEDURE — 6370000000 HC RX 637 (ALT 250 FOR IP): Performed by: FAMILY MEDICINE

## 2025-08-13 PROCEDURE — 86225 DNA ANTIBODY NATIVE: CPT

## 2025-08-13 PROCEDURE — 99223 1ST HOSP IP/OBS HIGH 75: CPT | Performed by: INTERNAL MEDICINE

## 2025-08-13 PROCEDURE — 86038 ANTINUCLEAR ANTIBODIES: CPT

## 2025-08-13 PROCEDURE — 82390 ASSAY OF CERULOPLASMIN: CPT

## 2025-08-13 RX ORDER — DORZOLAMIDE HCL 20 MG/ML
1 SOLUTION/ DROPS OPHTHALMIC 3 TIMES DAILY
Status: DISCONTINUED | OUTPATIENT
Start: 2025-08-13 | End: 2025-08-14 | Stop reason: HOSPADM

## 2025-08-13 RX ORDER — LATANOPROST 50 UG/ML
1 SOLUTION/ DROPS OPHTHALMIC NIGHTLY
Status: DISCONTINUED | OUTPATIENT
Start: 2025-08-13 | End: 2025-08-14 | Stop reason: HOSPADM

## 2025-08-13 RX ORDER — BISACODYL 5 MG/1
20 TABLET, DELAYED RELEASE ORAL ONCE
Status: COMPLETED | OUTPATIENT
Start: 2025-08-13 | End: 2025-08-13

## 2025-08-13 RX ORDER — ACETAMINOPHEN 325 MG/1
650 TABLET ORAL EVERY 6 HOURS PRN
Status: DISCONTINUED | OUTPATIENT
Start: 2025-08-13 | End: 2025-08-14 | Stop reason: HOSPADM

## 2025-08-13 RX ORDER — POLYETHYLENE GLYCOL 3350 17 G/17G
238 POWDER, FOR SOLUTION ORAL ONCE
Status: COMPLETED | OUTPATIENT
Start: 2025-08-13 | End: 2025-08-13

## 2025-08-13 RX ORDER — ONDANSETRON 4 MG/1
4 TABLET, ORALLY DISINTEGRATING ORAL EVERY 6 HOURS PRN
Status: DISCONTINUED | OUTPATIENT
Start: 2025-08-13 | End: 2025-08-14 | Stop reason: HOSPADM

## 2025-08-13 RX ADMIN — SODIUM CHLORIDE, SODIUM LACTATE, POTASSIUM CHLORIDE, AND CALCIUM CHLORIDE: .6; .31; .03; .02 INJECTION, SOLUTION INTRAVENOUS at 14:34

## 2025-08-13 RX ADMIN — BISACODYL 20 MG: 5 TABLET, COATED ORAL at 16:42

## 2025-08-13 RX ADMIN — PANTOPRAZOLE SODIUM 80 MG: 40 INJECTION, POWDER, FOR SOLUTION INTRAVENOUS at 16:38

## 2025-08-13 RX ADMIN — LATANOPROST 1 DROP: 50 SOLUTION OPHTHALMIC at 21:40

## 2025-08-13 RX ADMIN — ACETAMINOPHEN 650 MG: 325 TABLET ORAL at 09:14

## 2025-08-13 RX ADMIN — ACETAMINOPHEN 650 MG: 325 TABLET ORAL at 20:01

## 2025-08-13 RX ADMIN — ONDANSETRON 4 MG: 4 TABLET, ORALLY DISINTEGRATING ORAL at 21:40

## 2025-08-13 RX ADMIN — DORZOLAMIDE HYDROCHLORIDE 1 DROP: 20 SOLUTION/ DROPS OPHTHALMIC at 21:40

## 2025-08-13 RX ADMIN — DORZOLAMIDE HYDROCHLORIDE 1 DROP: 20 SOLUTION/ DROPS OPHTHALMIC at 19:05

## 2025-08-13 RX ADMIN — POLYETHYLENE GLYCOL 3350 238 G: 17 POWDER, FOR SOLUTION ORAL at 16:43

## 2025-08-13 RX ADMIN — SODIUM CHLORIDE, SODIUM LACTATE, POTASSIUM CHLORIDE, AND CALCIUM CHLORIDE: .6; .31; .03; .02 INJECTION, SOLUTION INTRAVENOUS at 21:42

## 2025-08-13 RX ADMIN — SODIUM CHLORIDE, SODIUM LACTATE, POTASSIUM CHLORIDE, AND CALCIUM CHLORIDE: .6; .31; .03; .02 INJECTION, SOLUTION INTRAVENOUS at 14:47

## 2025-08-13 ASSESSMENT — ENCOUNTER SYMPTOMS
NAUSEA: 0
VOMITING: 0
SHORTNESS OF BREATH: 0
DIARRHEA: 0

## 2025-08-13 ASSESSMENT — PAIN SCALES - GENERAL
PAINLEVEL_OUTOF10: 5
PAINLEVEL_OUTOF10: 4
PAINLEVEL_OUTOF10: 5
PAINLEVEL_OUTOF10: 5

## 2025-08-13 ASSESSMENT — PAIN DESCRIPTION - LOCATION
LOCATION: NECK;BACK
LOCATION: NECK
LOCATION: NECK

## 2025-08-13 ASSESSMENT — PAIN DESCRIPTION - ORIENTATION: ORIENTATION: RIGHT

## 2025-08-13 ASSESSMENT — PAIN - FUNCTIONAL ASSESSMENT
PAIN_FUNCTIONAL_ASSESSMENT: 0-10

## 2025-08-13 ASSESSMENT — PAIN DESCRIPTION - DESCRIPTORS
DESCRIPTORS: STABBING;SHARP;BURNING
DESCRIPTORS: ACHING

## 2025-08-14 ENCOUNTER — ANESTHESIA (OUTPATIENT)
Dept: ENDOSCOPY | Age: 85
End: 2025-08-14
Payer: MEDICARE

## 2025-08-14 VITALS
HEIGHT: 63 IN | OXYGEN SATURATION: 100 % | RESPIRATION RATE: 20 BRPM | TEMPERATURE: 97.5 F | WEIGHT: 166 LBS | HEART RATE: 87 BPM | SYSTOLIC BLOOD PRESSURE: 128 MMHG | DIASTOLIC BLOOD PRESSURE: 52 MMHG | BODY MASS INDEX: 29.41 KG/M2

## 2025-08-14 LAB
ANA SER QL IA: NEGATIVE
CMV IGG SERPL QL IA: <0.2
CMV IGM SERPL QL IA: 0.2
DSDNA IGG SER QL IA: 2.8 IU/ML
EBV EA-D IGG SER-ACNC: 135 U/ML
EBV INTERPRETATION: ABNORMAL
EBV NA IGG SER IA-ACNC: 565 U/ML
EBV VCA IGG SER-ACNC: 1043 U/ML
EBV VCA IGM SER-ACNC: 24 U/ML
HCT VFR BLD AUTO: 30.4 % (ref 36–46)
HGB BLD-MCNC: 9.5 G/DL (ref 12–16)
MITOCHONDRIA M2 IGG SER-ACNC: 1.5 U/ML (ref 0–4)
NUCLEAR IGG SER IA-RTO: 0.2 U/ML

## 2025-08-14 PROCEDURE — 7100000000 HC PACU RECOVERY - FIRST 15 MIN: Performed by: INTERNAL MEDICINE

## 2025-08-14 PROCEDURE — 85018 HEMOGLOBIN: CPT

## 2025-08-14 PROCEDURE — 3700000000 HC ANESTHESIA ATTENDED CARE: Performed by: INTERNAL MEDICINE

## 2025-08-14 PROCEDURE — 36415 COLL VENOUS BLD VENIPUNCTURE: CPT

## 2025-08-14 PROCEDURE — 0DJD8ZZ INSPECTION OF LOWER INTESTINAL TRACT, VIA NATURAL OR ARTIFICIAL OPENING ENDOSCOPIC: ICD-10-PCS | Performed by: INTERNAL MEDICINE

## 2025-08-14 PROCEDURE — 3609027000 HC COLONOSCOPY: Performed by: INTERNAL MEDICINE

## 2025-08-14 PROCEDURE — 7100000001 HC PACU RECOVERY - ADDTL 15 MIN: Performed by: INTERNAL MEDICINE

## 2025-08-14 PROCEDURE — 6370000000 HC RX 637 (ALT 250 FOR IP): Performed by: INTERNAL MEDICINE

## 2025-08-14 PROCEDURE — 6360000002 HC RX W HCPCS: Performed by: NURSE ANESTHETIST, CERTIFIED REGISTERED

## 2025-08-14 PROCEDURE — 97535 SELF CARE MNGMENT TRAINING: CPT

## 2025-08-14 PROCEDURE — 3700000001 HC ADD 15 MINUTES (ANESTHESIA): Performed by: INTERNAL MEDICINE

## 2025-08-14 PROCEDURE — 97530 THERAPEUTIC ACTIVITIES: CPT

## 2025-08-14 PROCEDURE — 45378 DIAGNOSTIC COLONOSCOPY: CPT | Performed by: INTERNAL MEDICINE

## 2025-08-14 PROCEDURE — 85014 HEMATOCRIT: CPT

## 2025-08-14 PROCEDURE — 6360000002 HC RX W HCPCS

## 2025-08-14 PROCEDURE — 97166 OT EVAL MOD COMPLEX 45 MIN: CPT

## 2025-08-14 PROCEDURE — 2709999900 HC NON-CHARGEABLE SUPPLY: Performed by: INTERNAL MEDICINE

## 2025-08-14 PROCEDURE — 6370000000 HC RX 637 (ALT 250 FOR IP): Performed by: FAMILY MEDICINE

## 2025-08-14 PROCEDURE — 2500000003 HC RX 250 WO HCPCS

## 2025-08-14 RX ORDER — LIDOCAINE HYDROCHLORIDE 20 MG/ML
INJECTION, SOLUTION EPIDURAL; INFILTRATION; INTRACAUDAL; PERINEURAL
Status: DISCONTINUED | OUTPATIENT
Start: 2025-08-14 | End: 2025-08-14 | Stop reason: SDUPTHER

## 2025-08-14 RX ORDER — DORZOLAMIDE HCL 20 MG/ML
1 SOLUTION/ DROPS OPHTHALMIC 3 TIMES DAILY
Qty: 10 ML | Status: SHIPPED | OUTPATIENT
Start: 2025-08-14

## 2025-08-14 RX ORDER — PROPOFOL 10 MG/ML
INJECTION, EMULSION INTRAVENOUS
Status: DISCONTINUED | OUTPATIENT
Start: 2025-08-14 | End: 2025-08-14 | Stop reason: SDUPTHER

## 2025-08-14 RX ADMIN — LIDOCAINE HYDROCHLORIDE 50 MG: 20 INJECTION, SOLUTION EPIDURAL; INFILTRATION; INTRACAUDAL; PERINEURAL at 12:34

## 2025-08-14 RX ADMIN — PROPOFOL 100 MCG/KG/MIN: 10 INJECTION, EMULSION INTRAVENOUS at 12:43

## 2025-08-14 RX ADMIN — PANTOPRAZOLE SODIUM 80 MG: 40 INJECTION, POWDER, FOR SOLUTION INTRAVENOUS at 00:55

## 2025-08-14 RX ADMIN — DORZOLAMIDE HYDROCHLORIDE 1 DROP: 20 SOLUTION/ DROPS OPHTHALMIC at 14:47

## 2025-08-14 RX ADMIN — PROPOFOL 50 MG: 10 INJECTION, EMULSION INTRAVENOUS at 12:34

## 2025-08-14 RX ADMIN — ACETAMINOPHEN 650 MG: 325 TABLET ORAL at 05:44

## 2025-08-14 RX ADMIN — ACETAMINOPHEN 650 MG: 325 TABLET ORAL at 14:46

## 2025-08-14 RX ADMIN — PROPOFOL 30 MG: 10 INJECTION, EMULSION INTRAVENOUS at 12:34

## 2025-08-14 RX ADMIN — PROPOFOL 50 MG: 10 INJECTION, EMULSION INTRAVENOUS at 12:43

## 2025-08-14 ASSESSMENT — PAIN DESCRIPTION - LOCATION
LOCATION: BACK;HEAD
LOCATION: BACK;NECK

## 2025-08-14 ASSESSMENT — PAIN - FUNCTIONAL ASSESSMENT
PAIN_FUNCTIONAL_ASSESSMENT: WONG-BAKER FACES
PAIN_FUNCTIONAL_ASSESSMENT: 0-10

## 2025-08-14 ASSESSMENT — PAIN SCALES - GENERAL
PAINLEVEL_OUTOF10: 5
PAINLEVEL_OUTOF10: 6
PAINLEVEL_OUTOF10: 0

## 2025-08-14 ASSESSMENT — PAIN DESCRIPTION - DESCRIPTORS
DESCRIPTORS: ACHING
DESCRIPTORS: ACHING

## 2025-08-14 ASSESSMENT — PAIN SCALES - WONG BAKER
WONGBAKER_NUMERICALRESPONSE: NO HURT
WONGBAKER_NUMERICALRESPONSE: NO HURT

## 2025-08-14 ASSESSMENT — ENCOUNTER SYMPTOMS: SHORTNESS OF BREATH: 1

## 2025-08-15 LAB — SMOOTH MUSCLE ANTIBODY: 8 UNITS (ref 0–19)

## 2025-08-16 LAB — LKM AB TITR SER IF: NORMAL {TITER}

## 2025-08-23 PROBLEM — N39.0 UTI (URINARY TRACT INFECTION): Status: RESOLVED | Noted: 2025-07-24 | Resolved: 2025-08-23

## 2025-09-05 ENCOUNTER — OFFICE VISIT (OUTPATIENT)
Dept: FAMILY MEDICINE CLINIC | Age: 85
End: 2025-09-05

## 2025-09-05 DIAGNOSIS — L98.9 SKIN LESION: ICD-10-CM

## 2025-09-05 DIAGNOSIS — K62.5 RECTAL BLEEDING: ICD-10-CM

## 2025-09-05 DIAGNOSIS — I10 ESSENTIAL HYPERTENSION: ICD-10-CM

## 2025-09-05 DIAGNOSIS — E78.5 DYSLIPIDEMIA: ICD-10-CM

## 2025-09-05 DIAGNOSIS — E03.9 ACQUIRED HYPOTHYROIDISM: ICD-10-CM

## 2025-09-05 DIAGNOSIS — E11.9 TYPE 2 DIABETES MELLITUS WITHOUT COMPLICATION, WITHOUT LONG-TERM CURRENT USE OF INSULIN (HCC): Primary | ICD-10-CM

## 2025-09-05 SDOH — ECONOMIC STABILITY: FOOD INSECURITY: WITHIN THE PAST 12 MONTHS, THE FOOD YOU BOUGHT JUST DIDN'T LAST AND YOU DIDN'T HAVE MONEY TO GET MORE.: NEVER TRUE

## 2025-09-05 SDOH — ECONOMIC STABILITY: FOOD INSECURITY: WITHIN THE PAST 12 MONTHS, YOU WORRIED THAT YOUR FOOD WOULD RUN OUT BEFORE YOU GOT MONEY TO BUY MORE.: NEVER TRUE

## 2025-09-05 ASSESSMENT — PATIENT HEALTH QUESTIONNAIRE - PHQ9
SUM OF ALL RESPONSES TO PHQ QUESTIONS 1-9: 0
2. FEELING DOWN, DEPRESSED OR HOPELESS: NOT AT ALL
SUM OF ALL RESPONSES TO PHQ QUESTIONS 1-9: 0
SUM OF ALL RESPONSES TO PHQ QUESTIONS 1-9: 0
1. LITTLE INTEREST OR PLEASURE IN DOING THINGS: NOT AT ALL
SUM OF ALL RESPONSES TO PHQ QUESTIONS 1-9: 0

## 2025-09-05 ASSESSMENT — ENCOUNTER SYMPTOMS
SHORTNESS OF BREATH: 0
CHEST TIGHTNESS: 0
BLOOD IN STOOL: 1
ABDOMINAL PAIN: 0

## (undated) DEVICE — CLEARCUT® SIDEPORT KNIFE DUAL BEVEL 1.0MM ANGLED: Brand: CLEARCUT®

## (undated) DEVICE — LABEL MED EYE STRL

## (undated) DEVICE — GAUZE,SPONGE,4"X4",16PLY,XRAY,STRL,LF: Brand: MEDLINE

## (undated) DEVICE — SURGICAL PROCEDURE PACK LT EYE CUST ST CHARLES STRL LF DISP

## (undated) DEVICE — SOLUTION PREP POVIDONE IOD FOR SKIN MUCOUS MEM PRIOR TO

## (undated) DEVICE — Device

## (undated) DEVICE — GLOVE ORANGE PI 7 1/2   MSG9075

## (undated) DEVICE — DEFENDO AIR WATER SUCTION AND BIOPSY VALVE KIT FOR  OLYMPUS: Brand: DEFENDO AIR/WATER/SUCTION AND BIOPSY VALVE

## (undated) DEVICE — ENDO KIT W/SYRINGE: Brand: MEDLINE INDUSTRIES, INC.

## (undated) DEVICE — THE MONARCH® "D" CARTRIDGE IS A SINGLE-USE POLYPROPYLENE CARTRIDGE FOR POSTERIOR CHAMBER IOL DELIVERY: Brand: MONARCH® III

## (undated) DEVICE — GLOVE ORANGE PI 7   MSG9070

## (undated) DEVICE — TOWEL,OR,DSP,ST,WHITE,DLX,2/PK,40PK/CS: Brand: MEDLINE

## (undated) DEVICE — COVER,MAYO STAND,STERILE: Brand: MEDLINE

## (undated) DEVICE — SHIELD EYE PLAS CATRCT PT CARE

## (undated) DEVICE — GLOVE SURG SZ 75 L12IN FNGR THK79MIL GRN LTX FREE

## (undated) DEVICE — ENDOSCOPIC KIT 1.1+ 10 FT OP4 CA DE

## (undated) DEVICE — SOLUTION IRRIGATION BAL SALT SOLUTION 500 ML STRL BSS

## (undated) DEVICE — GOWN,AURORA,NONREINFORCED,LARGE: Brand: MEDLINE

## (undated) DEVICE — VALVE SUCTION AIR H2O SET ORCA POD + DISP

## (undated) DEVICE — PREMIUM DRY TRAY LF: Brand: MEDLINE INDUSTRIES, INC.

## (undated) DEVICE — BITEBLOCK 54FR W/ DENT RIM BLOX